# Patient Record
Sex: FEMALE | Race: WHITE | Employment: FULL TIME | URBAN - METROPOLITAN AREA
[De-identification: names, ages, dates, MRNs, and addresses within clinical notes are randomized per-mention and may not be internally consistent; named-entity substitution may affect disease eponyms.]

---

## 2017-01-04 ENCOUNTER — TELEPHONE (OUTPATIENT)
Dept: INTERNAL MEDICINE CLINIC | Age: 57
End: 2017-01-04

## 2017-01-04 ENCOUNTER — ANTI-COAG VISIT (OUTPATIENT)
Dept: INTERNAL MEDICINE CLINIC | Age: 57
End: 2017-01-04

## 2017-01-04 LAB — INR BLD: 2.8

## 2017-01-19 ENCOUNTER — TELEPHONE (OUTPATIENT)
Dept: INTERNAL MEDICINE CLINIC | Age: 57
End: 2017-01-19

## 2017-01-19 ENCOUNTER — ANTI-COAG VISIT (OUTPATIENT)
Dept: INTERNAL MEDICINE CLINIC | Age: 57
End: 2017-01-19

## 2017-01-19 LAB — INR BLD: 2.6

## 2017-01-23 ENCOUNTER — OFFICE VISIT (OUTPATIENT)
Dept: INTERNAL MEDICINE CLINIC | Age: 57
End: 2017-01-23

## 2017-01-23 VITALS
RESPIRATION RATE: 14 BRPM | BODY MASS INDEX: 48.82 KG/M2 | HEART RATE: 80 BPM | SYSTOLIC BLOOD PRESSURE: 130 MMHG | HEIGHT: 65 IN | DIASTOLIC BLOOD PRESSURE: 80 MMHG | WEIGHT: 293 LBS

## 2017-01-23 DIAGNOSIS — K76.0 FATTY INFILTRATION OF LIVER: ICD-10-CM

## 2017-01-23 DIAGNOSIS — I27.82 OTHER CHRONIC PULMONARY EMBOLISM WITHOUT ACUTE COR PULMONALE (HCC): ICD-10-CM

## 2017-01-23 DIAGNOSIS — G47.33 OSA ON CPAP: ICD-10-CM

## 2017-01-23 DIAGNOSIS — Z99.89 OSA ON CPAP: ICD-10-CM

## 2017-01-23 DIAGNOSIS — G25.0 BENIGN HEAD TREMOR: ICD-10-CM

## 2017-01-23 DIAGNOSIS — E11.9 TYPE 2 DIABETES MELLITUS WITHOUT COMPLICATION, WITHOUT LONG-TERM CURRENT USE OF INSULIN (HCC): Primary | ICD-10-CM

## 2017-01-23 DIAGNOSIS — E78.5 HYPERLIPIDEMIA ASSOCIATED WITH TYPE 2 DIABETES MELLITUS (HCC): ICD-10-CM

## 2017-01-23 DIAGNOSIS — E66.01 MORBID OBESITY WITH BMI OF 50.0-59.9, ADULT (HCC): ICD-10-CM

## 2017-01-23 DIAGNOSIS — E11.69 HYPERLIPIDEMIA ASSOCIATED WITH TYPE 2 DIABETES MELLITUS (HCC): ICD-10-CM

## 2017-01-23 PROCEDURE — 81002 URINALYSIS NONAUTO W/O SCOPE: CPT | Performed by: INTERNAL MEDICINE

## 2017-01-23 PROCEDURE — 99214 OFFICE O/P EST MOD 30 MIN: CPT | Performed by: INTERNAL MEDICINE

## 2017-01-23 ASSESSMENT — ENCOUNTER SYMPTOMS
VOMITING: 0
SHORTNESS OF BREATH: 0
NAUSEA: 0
RHINORRHEA: 0
WHEEZING: 0
ABDOMINAL PAIN: 0

## 2017-02-02 ENCOUNTER — ANTI-COAG VISIT (OUTPATIENT)
Dept: INTERNAL MEDICINE CLINIC | Age: 57
End: 2017-02-02

## 2017-02-02 ENCOUNTER — TELEPHONE (OUTPATIENT)
Dept: INTERNAL MEDICINE CLINIC | Age: 57
End: 2017-02-02

## 2017-02-02 LAB — INR BLD: 2.4

## 2017-02-04 ENCOUNTER — HOSPITAL ENCOUNTER (OUTPATIENT)
Dept: OTHER | Age: 57
Discharge: HOME | End: 2017-02-04
Attending: INTERNAL MEDICINE | Admitting: INTERNAL MEDICINE

## 2017-02-04 LAB
A/G RATIO: 1.4 (ref 1.1–2.2)
ALBUMIN SERPL-MCNC: 4.2 G/DL (ref 3.4–5)
ALP BLD-CCNC: 52 U/L (ref 40–129)
ALT SERPL-CCNC: 17 U/L (ref 10–40)
ANION GAP SERPL CALCULATED.3IONS-SCNC: 13 MMOL/L (ref 3–16)
AST SERPL-CCNC: 40 U/L (ref 15–37)
BASOPHILS ABSOLUTE: 0.1 K/UL (ref 0–0.2)
BASOPHILS RELATIVE PERCENT: 1.3 %
BILIRUB SERPL-MCNC: 0.4 MG/DL (ref 0–1)
BUN BLDV-MCNC: 12 MG/DL (ref 7–20)
CALCIUM SERPL-MCNC: 9.3 MG/DL (ref 8.3–10.6)
CHLORIDE BLD-SCNC: 104 MMOL/L (ref 99–110)
CHOLESTEROL, TOTAL: 149 MG/DL (ref 0–199)
CO2: 25 MMOL/L (ref 21–32)
CREAT SERPL-MCNC: 0.6 MG/DL (ref 0.6–1.1)
EOSINOPHILS ABSOLUTE: 0.1 K/UL (ref 0–0.6)
EOSINOPHILS RELATIVE PERCENT: 1.9 %
GFR AFRICAN AMERICAN: >60
GFR NON-AFRICAN AMERICAN: >60
GLOBULIN: 2.9 G/DL
GLUCOSE BLD-MCNC: 140 MG/DL (ref 70–99)
HCT VFR BLD CALC: 40.7 % (ref 36–48)
HDLC SERPL-MCNC: 26 MG/DL (ref 40–60)
HEMOGLOBIN: 13.7 G/DL (ref 12–16)
LDL CHOLESTEROL CALCULATED: ABNORMAL MG/DL
LDL CHOLESTEROL DIRECT: 88 MG/DL
LYMPHOCYTES ABSOLUTE: 1.2 K/UL (ref 1–5.1)
LYMPHOCYTES RELATIVE PERCENT: 24.3 %
MCH RBC QN AUTO: 29.3 PG (ref 26–34)
MCHC RBC AUTO-ENTMCNC: 33.7 G/DL (ref 31–36)
MCV RBC AUTO: 86.9 FL (ref 80–100)
MONOCYTES ABSOLUTE: 0.4 K/UL (ref 0–1.3)
MONOCYTES RELATIVE PERCENT: 8.5 %
NEUTROPHILS ABSOLUTE: 3.2 K/UL (ref 1.7–7.7)
NEUTROPHILS RELATIVE PERCENT: 64 %
PDW BLD-RTO: 14.7 % (ref 12.4–15.4)
PLATELET # BLD: 189 K/UL (ref 135–450)
PMV BLD AUTO: 8 FL (ref 5–10.5)
POTASSIUM SERPL-SCNC: 4.4 MMOL/L (ref 3.5–5.1)
RBC # BLD: 4.69 M/UL (ref 4–5.2)
SODIUM BLD-SCNC: 142 MMOL/L (ref 136–145)
TOTAL PROTEIN: 7.1 G/DL (ref 6.4–8.2)
TRIGL SERPL-MCNC: 365 MG/DL (ref 0–150)
VLDLC SERPL CALC-MCNC: ABNORMAL MG/DL
WBC # BLD: 5.1 K/UL (ref 4–11)

## 2017-02-05 LAB
ESTIMATED AVERAGE GLUCOSE: 148.5 MG/DL
HBA1C MFR BLD: 6.8 %

## 2017-02-06 RX ORDER — BLOOD SUGAR DIAGNOSTIC
STRIP MISCELLANEOUS
Qty: 100 EACH | Refills: 11 | Status: SHIPPED | OUTPATIENT
Start: 2017-02-06 | End: 2018-03-15 | Stop reason: SDUPTHER

## 2017-02-15 ENCOUNTER — TELEPHONE (OUTPATIENT)
Dept: INTERNAL MEDICINE CLINIC | Age: 57
End: 2017-02-15

## 2017-02-16 ENCOUNTER — ANTI-COAG VISIT (OUTPATIENT)
Dept: INTERNAL MEDICINE CLINIC | Age: 57
End: 2017-02-16

## 2017-02-16 LAB — INR BLD: 2.5

## 2017-03-03 ENCOUNTER — ANTI-COAG VISIT (OUTPATIENT)
Dept: INTERNAL MEDICINE CLINIC | Age: 57
End: 2017-03-03

## 2017-03-03 LAB — INR BLD: 2

## 2017-03-15 LAB — INR BLD: 2.6

## 2017-03-16 ENCOUNTER — ANTI-COAG VISIT (OUTPATIENT)
Dept: INTERNAL MEDICINE CLINIC | Age: 57
End: 2017-03-16

## 2017-03-16 RX ORDER — CITALOPRAM 40 MG/1
TABLET ORAL
Qty: 30 TABLET | Refills: 2 | Status: SHIPPED | OUTPATIENT
Start: 2017-03-16 | End: 2017-06-29 | Stop reason: SDUPTHER

## 2017-03-16 RX ORDER — GLIMEPIRIDE 2 MG/1
TABLET ORAL
Qty: 60 TABLET | Refills: 2 | Status: SHIPPED | OUTPATIENT
Start: 2017-03-16 | End: 2017-06-29 | Stop reason: SDUPTHER

## 2017-03-16 RX ORDER — WARFARIN SODIUM 3 MG/1
TABLET ORAL
Qty: 30 TABLET | Refills: 2 | Status: SHIPPED | OUTPATIENT
Start: 2017-03-16 | End: 2017-06-29 | Stop reason: SDUPTHER

## 2017-03-16 RX ORDER — SITAGLIPTIN AND METFORMIN HYDROCHLORIDE 1000; 50 MG/1; MG/1
TABLET, FILM COATED, EXTENDED RELEASE ORAL
Qty: 60 TABLET | Refills: 2 | Status: SHIPPED | OUTPATIENT
Start: 2017-03-16 | End: 2017-06-29 | Stop reason: SDUPTHER

## 2017-03-16 RX ORDER — FENOFIBRIC ACID 135 MG/1
CAPSULE, DELAYED RELEASE ORAL
Qty: 30 CAPSULE | Refills: 2 | Status: SHIPPED | OUTPATIENT
Start: 2017-03-16 | End: 2017-06-29 | Stop reason: SDUPTHER

## 2017-03-16 RX ORDER — TOPIRAMATE 50 MG/1
TABLET, FILM COATED ORAL
Qty: 30 TABLET | Refills: 2 | Status: SHIPPED | OUTPATIENT
Start: 2017-03-16 | End: 2017-06-29 | Stop reason: SDUPTHER

## 2017-03-16 RX ORDER — WARFARIN SODIUM 1 MG/1
TABLET ORAL
Qty: 30 TABLET | Refills: 2 | Status: SHIPPED | OUTPATIENT
Start: 2017-03-16 | End: 2017-06-29 | Stop reason: SDUPTHER

## 2017-03-16 RX ORDER — WARFARIN SODIUM 5 MG/1
TABLET ORAL
Qty: 30 TABLET | Refills: 2 | Status: SHIPPED | OUTPATIENT
Start: 2017-03-16 | End: 2017-06-29 | Stop reason: SDUPTHER

## 2017-03-27 ENCOUNTER — OFFICE VISIT (OUTPATIENT)
Dept: PULMONOLOGY | Age: 57
End: 2017-03-27

## 2017-03-27 VITALS
HEART RATE: 74 BPM | OXYGEN SATURATION: 98 % | RESPIRATION RATE: 20 BRPM | TEMPERATURE: 97.8 F | SYSTOLIC BLOOD PRESSURE: 136 MMHG | BODY MASS INDEX: 48.82 KG/M2 | WEIGHT: 293 LBS | DIASTOLIC BLOOD PRESSURE: 84 MMHG | HEIGHT: 65 IN

## 2017-03-27 DIAGNOSIS — G47.33 OSA ON CPAP: Primary | ICD-10-CM

## 2017-03-27 DIAGNOSIS — E66.01 MORBID OBESITY WITH BMI OF 50.0-59.9, ADULT (HCC): ICD-10-CM

## 2017-03-27 DIAGNOSIS — Z99.89 OSA ON CPAP: Primary | ICD-10-CM

## 2017-03-27 PROCEDURE — 99213 OFFICE O/P EST LOW 20 MIN: CPT | Performed by: NURSE PRACTITIONER

## 2017-03-27 ASSESSMENT — SLEEP AND FATIGUE QUESTIONNAIRES
HOW LIKELY ARE YOU TO NOD OFF OR FALL ASLEEP WHILE WATCHING TV: 1
HOW LIKELY ARE YOU TO NOD OFF OR FALL ASLEEP WHILE LYING DOWN TO REST IN THE AFTERNOON WHEN CIRCUMSTANCES PERMIT: 3
HOW LIKELY ARE YOU TO NOD OFF OR FALL ASLEEP WHILE SITTING AND TALKING TO SOMEONE: 0
HOW LIKELY ARE YOU TO NOD OFF OR FALL ASLEEP IN A CAR, WHILE STOPPED FOR A FEW MINUTES IN TRAFFIC: 0
HOW LIKELY ARE YOU TO NOD OFF OR FALL ASLEEP WHILE SITTING QUIETLY AFTER LUNCH WITHOUT ALCOHOL: 0
NECK CIRCUMFERENCE (INCHES): 18.5
HOW LIKELY ARE YOU TO NOD OFF OR FALL ASLEEP WHILE SITTING AND READING: 1
HOW LIKELY ARE YOU TO NOD OFF OR FALL ASLEEP WHILE SITTING INACTIVE IN A PUBLIC PLACE: 0
ESS TOTAL SCORE: 6
HOW LIKELY ARE YOU TO NOD OFF OR FALL ASLEEP WHEN YOU ARE A PASSENGER IN A CAR FOR AN HOUR WITHOUT A BREAK: 1

## 2017-03-31 ENCOUNTER — ANTI-COAG VISIT (OUTPATIENT)
Dept: INTERNAL MEDICINE CLINIC | Age: 57
End: 2017-03-31

## 2017-03-31 LAB — INR BLD: 2.5

## 2017-04-13 ENCOUNTER — TELEPHONE (OUTPATIENT)
Dept: INTERNAL MEDICINE CLINIC | Age: 57
End: 2017-04-13

## 2017-04-13 ENCOUNTER — ANTI-COAG VISIT (OUTPATIENT)
Dept: INTERNAL MEDICINE CLINIC | Age: 57
End: 2017-04-13

## 2017-04-13 LAB — INR BLD: 2.4

## 2017-04-25 ENCOUNTER — OFFICE VISIT (OUTPATIENT)
Dept: INTERNAL MEDICINE CLINIC | Age: 57
End: 2017-04-25

## 2017-04-25 VITALS
SYSTOLIC BLOOD PRESSURE: 138 MMHG | WEIGHT: 293 LBS | BODY MASS INDEX: 48.82 KG/M2 | RESPIRATION RATE: 14 BRPM | HEIGHT: 65 IN | DIASTOLIC BLOOD PRESSURE: 80 MMHG | HEART RATE: 80 BPM

## 2017-04-25 DIAGNOSIS — E78.5 HYPERLIPIDEMIA ASSOCIATED WITH TYPE 2 DIABETES MELLITUS (HCC): ICD-10-CM

## 2017-04-25 DIAGNOSIS — E66.01 MORBID OBESITY WITH BMI OF 50.0-59.9, ADULT (HCC): ICD-10-CM

## 2017-04-25 DIAGNOSIS — Z99.89 OSA ON CPAP: ICD-10-CM

## 2017-04-25 DIAGNOSIS — K76.0 FATTY INFILTRATION OF LIVER: ICD-10-CM

## 2017-04-25 DIAGNOSIS — K42.9 UMBILICAL HERNIA WITHOUT OBSTRUCTION AND WITHOUT GANGRENE: ICD-10-CM

## 2017-04-25 DIAGNOSIS — G47.33 OSA ON CPAP: ICD-10-CM

## 2017-04-25 DIAGNOSIS — E11.9 TYPE 2 DIABETES MELLITUS WITHOUT COMPLICATION, WITHOUT LONG-TERM CURRENT USE OF INSULIN (HCC): Primary | ICD-10-CM

## 2017-04-25 DIAGNOSIS — E11.69 HYPERLIPIDEMIA ASSOCIATED WITH TYPE 2 DIABETES MELLITUS (HCC): ICD-10-CM

## 2017-04-25 DIAGNOSIS — I27.82 OTHER CHRONIC PULMONARY EMBOLISM WITHOUT ACUTE COR PULMONALE (HCC): ICD-10-CM

## 2017-04-25 LAB
BILIRUBIN, POC: NORMAL
BLOOD URINE, POC: NORMAL
CLARITY, POC: NORMAL
COLOR, POC: NORMAL
CREATININE URINE: 106.6 MG/DL (ref 28–259)
GLUCOSE URINE, POC: NORMAL
KETONES, POC: NORMAL
LEUKOCYTE EST, POC: NORMAL
MICROALBUMIN UR-MCNC: 1.6 MG/DL
MICROALBUMIN/CREAT UR-RTO: 15 MG/G (ref 0–30)
NITRITE, POC: NORMAL
PH, POC: NORMAL
PROTEIN, POC: NORMAL
SPECIFIC GRAVITY, POC: NORMAL
UROBILINOGEN, POC: NORMAL

## 2017-04-25 PROCEDURE — 81002 URINALYSIS NONAUTO W/O SCOPE: CPT | Performed by: INTERNAL MEDICINE

## 2017-04-25 PROCEDURE — 99214 OFFICE O/P EST MOD 30 MIN: CPT | Performed by: INTERNAL MEDICINE

## 2017-04-25 ASSESSMENT — ENCOUNTER SYMPTOMS
WHEEZING: 0
ABDOMINAL PAIN: 0
NAUSEA: 0
RHINORRHEA: 0
VOMITING: 0
SHORTNESS OF BREATH: 0

## 2017-05-01 ENCOUNTER — TELEPHONE (OUTPATIENT)
Dept: INTERNAL MEDICINE CLINIC | Age: 57
End: 2017-05-01

## 2017-05-04 LAB — INR BLD: 2.2

## 2017-05-12 ENCOUNTER — ANTI-COAG VISIT (OUTPATIENT)
Dept: INTERNAL MEDICINE CLINIC | Age: 57
End: 2017-05-12

## 2017-05-12 ENCOUNTER — TELEPHONE (OUTPATIENT)
Dept: INTERNAL MEDICINE CLINIC | Age: 57
End: 2017-05-12

## 2017-05-22 LAB — INR BLD: 2

## 2017-05-23 ENCOUNTER — TELEPHONE (OUTPATIENT)
Dept: INTERNAL MEDICINE CLINIC | Age: 57
End: 2017-05-23

## 2017-05-23 ENCOUNTER — ANTI-COAG VISIT (OUTPATIENT)
Dept: INTERNAL MEDICINE CLINIC | Age: 57
End: 2017-05-23

## 2017-06-09 ENCOUNTER — ANTI-COAG VISIT (OUTPATIENT)
Dept: INTERNAL MEDICINE CLINIC | Age: 57
End: 2017-06-09

## 2017-06-09 LAB — INR BLD: 2.4

## 2017-06-22 ENCOUNTER — ANTI-COAG VISIT (OUTPATIENT)
Dept: INTERNAL MEDICINE CLINIC | Age: 57
End: 2017-06-22

## 2017-06-22 LAB — INR BLD: 2

## 2017-06-29 RX ORDER — FENOFIBRIC ACID 135 MG/1
CAPSULE, DELAYED RELEASE ORAL
Qty: 30 CAPSULE | Refills: 2 | Status: SHIPPED | OUTPATIENT
Start: 2017-06-29 | End: 2017-09-26 | Stop reason: SDUPTHER

## 2017-06-29 RX ORDER — GLIMEPIRIDE 2 MG/1
TABLET ORAL
Qty: 60 TABLET | Refills: 2 | Status: SHIPPED | OUTPATIENT
Start: 2017-06-29 | End: 2017-09-26 | Stop reason: SDUPTHER

## 2017-06-29 RX ORDER — WARFARIN SODIUM 5 MG/1
TABLET ORAL
Qty: 30 TABLET | Refills: 2 | Status: SHIPPED | OUTPATIENT
Start: 2017-06-29 | End: 2017-09-26 | Stop reason: SDUPTHER

## 2017-06-29 RX ORDER — CITALOPRAM 40 MG/1
TABLET ORAL
Qty: 30 TABLET | Refills: 2 | Status: SHIPPED | OUTPATIENT
Start: 2017-06-29 | End: 2017-09-26 | Stop reason: SDUPTHER

## 2017-06-29 RX ORDER — TOPIRAMATE 50 MG/1
TABLET, FILM COATED ORAL
Qty: 30 TABLET | Refills: 2 | Status: SHIPPED | OUTPATIENT
Start: 2017-06-29 | End: 2017-09-26 | Stop reason: SDUPTHER

## 2017-06-29 RX ORDER — WARFARIN SODIUM 3 MG/1
TABLET ORAL
Qty: 30 TABLET | Refills: 2 | Status: SHIPPED | OUTPATIENT
Start: 2017-06-29 | End: 2017-09-26 | Stop reason: SDUPTHER

## 2017-06-29 RX ORDER — SITAGLIPTIN AND METFORMIN HYDROCHLORIDE 1000; 50 MG/1; MG/1
TABLET, FILM COATED, EXTENDED RELEASE ORAL
Qty: 60 TABLET | Refills: 2 | Status: SHIPPED | OUTPATIENT
Start: 2017-06-29 | End: 2017-09-26 | Stop reason: SDUPTHER

## 2017-06-29 RX ORDER — WARFARIN SODIUM 1 MG/1
TABLET ORAL
Qty: 30 TABLET | Refills: 2 | Status: SHIPPED | OUTPATIENT
Start: 2017-06-29 | End: 2017-09-26 | Stop reason: SDUPTHER

## 2017-07-05 ENCOUNTER — ANTI-COAG VISIT (OUTPATIENT)
Dept: INTERNAL MEDICINE CLINIC | Age: 57
End: 2017-07-05

## 2017-07-05 LAB — INR BLD: 2.3

## 2017-07-20 ENCOUNTER — ANTI-COAG VISIT (OUTPATIENT)
Dept: INTERNAL MEDICINE CLINIC | Age: 57
End: 2017-07-20

## 2017-07-20 ENCOUNTER — TELEPHONE (OUTPATIENT)
Dept: INTERNAL MEDICINE CLINIC | Age: 57
End: 2017-07-20

## 2017-07-20 LAB — INR BLD: 2.6

## 2017-07-27 ENCOUNTER — OFFICE VISIT (OUTPATIENT)
Dept: INTERNAL MEDICINE CLINIC | Age: 57
End: 2017-07-27

## 2017-07-27 VITALS
HEART RATE: 70 BPM | BODY MASS INDEX: 48.82 KG/M2 | HEIGHT: 65 IN | WEIGHT: 293 LBS | SYSTOLIC BLOOD PRESSURE: 138 MMHG | RESPIRATION RATE: 14 BRPM | DIASTOLIC BLOOD PRESSURE: 80 MMHG

## 2017-07-27 DIAGNOSIS — Z11.59 NEED FOR HEPATITIS C SCREENING TEST: ICD-10-CM

## 2017-07-27 DIAGNOSIS — Z11.4 SCREENING FOR HIV WITHOUT PRESENCE OF RISK FACTORS: ICD-10-CM

## 2017-07-27 DIAGNOSIS — E11.9 TYPE 2 DIABETES MELLITUS WITHOUT COMPLICATION, WITHOUT LONG-TERM CURRENT USE OF INSULIN (HCC): ICD-10-CM

## 2017-07-27 DIAGNOSIS — G25.0 BENIGN HEAD TREMOR: ICD-10-CM

## 2017-07-27 DIAGNOSIS — E11.9 TYPE 2 DIABETES MELLITUS WITHOUT COMPLICATION, WITHOUT LONG-TERM CURRENT USE OF INSULIN (HCC): Primary | ICD-10-CM

## 2017-07-27 DIAGNOSIS — K76.0 FATTY INFILTRATION OF LIVER: ICD-10-CM

## 2017-07-27 DIAGNOSIS — E78.5 HYPERLIPIDEMIA ASSOCIATED WITH TYPE 2 DIABETES MELLITUS (HCC): ICD-10-CM

## 2017-07-27 DIAGNOSIS — I27.82 OTHER CHRONIC PULMONARY EMBOLISM WITHOUT ACUTE COR PULMONALE (HCC): ICD-10-CM

## 2017-07-27 DIAGNOSIS — E66.01 MORBID OBESITY WITH BMI OF 50.0-59.9, ADULT (HCC): ICD-10-CM

## 2017-07-27 DIAGNOSIS — G47.33 OSA ON CPAP: ICD-10-CM

## 2017-07-27 DIAGNOSIS — Z99.89 OSA ON CPAP: ICD-10-CM

## 2017-07-27 DIAGNOSIS — E11.69 HYPERLIPIDEMIA ASSOCIATED WITH TYPE 2 DIABETES MELLITUS (HCC): ICD-10-CM

## 2017-07-27 LAB — HEPATITIS C ANTIBODY INTERPRETATION: NORMAL

## 2017-07-27 PROCEDURE — 99214 OFFICE O/P EST MOD 30 MIN: CPT | Performed by: INTERNAL MEDICINE

## 2017-07-27 ASSESSMENT — ENCOUNTER SYMPTOMS
NAUSEA: 0
VOMITING: 0
RHINORRHEA: 0
ABDOMINAL PAIN: 0
WHEEZING: 0
SHORTNESS OF BREATH: 0

## 2017-07-27 ASSESSMENT — PATIENT HEALTH QUESTIONNAIRE - PHQ9
2. FEELING DOWN, DEPRESSED OR HOPELESS: 0
1. LITTLE INTEREST OR PLEASURE IN DOING THINGS: 0
SUM OF ALL RESPONSES TO PHQ QUESTIONS 1-9: 0
SUM OF ALL RESPONSES TO PHQ9 QUESTIONS 1 & 2: 0

## 2017-07-28 LAB
ESTIMATED AVERAGE GLUCOSE: 151.3 MG/DL
HBA1C MFR BLD: 6.9 %
HIV-1 AND HIV-2 ANTIBODIES: NORMAL

## 2017-08-02 ENCOUNTER — TELEPHONE (OUTPATIENT)
Dept: INTERNAL MEDICINE CLINIC | Age: 57
End: 2017-08-02

## 2017-08-02 ENCOUNTER — ANTI-COAG VISIT (OUTPATIENT)
Dept: INTERNAL MEDICINE CLINIC | Age: 57
End: 2017-08-02

## 2017-08-02 LAB — INR BLD: 1.8

## 2017-08-17 ENCOUNTER — ANTI-COAG VISIT (OUTPATIENT)
Dept: INTERNAL MEDICINE CLINIC | Age: 57
End: 2017-08-17

## 2017-08-17 LAB — INR BLD: 1.5

## 2017-08-24 ENCOUNTER — ANTI-COAG VISIT (OUTPATIENT)
Dept: INTERNAL MEDICINE CLINIC | Age: 57
End: 2017-08-24

## 2017-08-24 ENCOUNTER — TELEPHONE (OUTPATIENT)
Dept: INTERNAL MEDICINE CLINIC | Age: 57
End: 2017-08-24

## 2017-08-24 LAB — INR BLD: 2.3

## 2017-08-30 ENCOUNTER — HOSPITAL ENCOUNTER (OUTPATIENT)
Dept: MAMMOGRAPHY | Age: 57
Discharge: HOME | End: 2017-08-30
Attending: INTERNAL MEDICINE | Admitting: INTERNAL MEDICINE

## 2017-08-30 DIAGNOSIS — Z12.31 VISIT FOR SCREENING MAMMOGRAM: ICD-10-CM

## 2017-09-07 ENCOUNTER — ANTI-COAG VISIT (OUTPATIENT)
Dept: INTERNAL MEDICINE CLINIC | Age: 57
End: 2017-09-07

## 2017-09-07 ENCOUNTER — TELEPHONE (OUTPATIENT)
Dept: INTERNAL MEDICINE CLINIC | Age: 57
End: 2017-09-07

## 2017-09-07 LAB — INR BLD: 1.9

## 2017-09-13 ENCOUNTER — ANTI-COAG VISIT (OUTPATIENT)
Dept: INTERNAL MEDICINE CLINIC | Age: 57
End: 2017-09-13

## 2017-09-13 LAB — INR BLD: 2.5

## 2017-09-20 ENCOUNTER — ANTI-COAG VISIT (OUTPATIENT)
Dept: INTERNAL MEDICINE CLINIC | Age: 57
End: 2017-09-20

## 2017-09-20 LAB — INR BLD: 2.9

## 2017-09-26 RX ORDER — GLIMEPIRIDE 2 MG/1
TABLET ORAL
Qty: 60 TABLET | Refills: 0 | Status: SHIPPED | OUTPATIENT
Start: 2017-09-26 | End: 2017-10-23 | Stop reason: SDUPTHER

## 2017-09-26 RX ORDER — WARFARIN SODIUM 3 MG/1
TABLET ORAL
Qty: 30 TABLET | Refills: 0 | Status: SHIPPED | OUTPATIENT
Start: 2017-09-26 | End: 2018-03-09 | Stop reason: ALTCHOICE

## 2017-09-26 RX ORDER — SITAGLIPTIN AND METFORMIN HYDROCHLORIDE 1000; 50 MG/1; MG/1
TABLET, FILM COATED, EXTENDED RELEASE ORAL
Qty: 60 TABLET | Refills: 0 | Status: SHIPPED | OUTPATIENT
Start: 2017-09-26 | End: 2017-10-23 | Stop reason: SDUPTHER

## 2017-09-26 RX ORDER — TOPIRAMATE 50 MG/1
TABLET, FILM COATED ORAL
Qty: 30 TABLET | Refills: 0 | Status: SHIPPED | OUTPATIENT
Start: 2017-09-26 | End: 2017-10-23 | Stop reason: SDUPTHER

## 2017-09-26 RX ORDER — FENOFIBRIC ACID 135 MG/1
CAPSULE, DELAYED RELEASE ORAL
Qty: 30 CAPSULE | Refills: 0 | Status: SHIPPED | OUTPATIENT
Start: 2017-09-26 | End: 2017-10-23 | Stop reason: SDUPTHER

## 2017-09-26 RX ORDER — WARFARIN SODIUM 1 MG/1
TABLET ORAL
Qty: 30 TABLET | Refills: 0 | Status: SHIPPED | OUTPATIENT
Start: 2017-09-26 | End: 2018-03-09 | Stop reason: ALTCHOICE

## 2017-09-26 RX ORDER — WARFARIN SODIUM 5 MG/1
TABLET ORAL
Qty: 30 TABLET | Refills: 0 | Status: SHIPPED | OUTPATIENT
Start: 2017-09-26 | End: 2017-10-09 | Stop reason: SDUPTHER

## 2017-09-26 RX ORDER — CITALOPRAM 40 MG/1
TABLET ORAL
Qty: 30 TABLET | Refills: 0 | Status: SHIPPED | OUTPATIENT
Start: 2017-09-26 | End: 2017-10-23 | Stop reason: SDUPTHER

## 2017-10-05 ENCOUNTER — ANTI-COAG VISIT (OUTPATIENT)
Dept: INTERNAL MEDICINE CLINIC | Age: 57
End: 2017-10-05

## 2017-10-05 LAB — INR BLD: 2.8

## 2017-10-09 RX ORDER — WARFARIN SODIUM 5 MG/1
TABLET ORAL
Qty: 180 TABLET | Refills: 0 | Status: SHIPPED | OUTPATIENT
Start: 2017-10-09 | End: 2017-11-27 | Stop reason: SDUPTHER

## 2017-10-19 ENCOUNTER — TELEPHONE (OUTPATIENT)
Dept: INTERNAL MEDICINE CLINIC | Age: 57
End: 2017-10-19

## 2017-10-19 ENCOUNTER — ANTI-COAG VISIT (OUTPATIENT)
Dept: INTERNAL MEDICINE CLINIC | Age: 57
End: 2017-10-19

## 2017-10-19 LAB — INR BLD: 2.3

## 2017-10-19 NOTE — TELEPHONE ENCOUNTER
----- Message from Dedra Simmonds, MD sent at 10/19/2017 12:42 PM EDT -----  Contact: [phone]  Same.  Repeat 2 weeks   ----- Message -----  From: Kaila Bernal  Sent: 10/19/2017  12:36 PM  To: Dedra Simmonds, MD Dr H patient  INR: 2.30

## 2017-10-23 RX ORDER — SITAGLIPTIN AND METFORMIN HYDROCHLORIDE 1000; 50 MG/1; MG/1
TABLET, FILM COATED, EXTENDED RELEASE ORAL
Qty: 60 TABLET | Refills: 0 | Status: SHIPPED | OUTPATIENT
Start: 2017-10-23 | End: 2017-11-27 | Stop reason: SDUPTHER

## 2017-10-23 RX ORDER — TOPIRAMATE 50 MG/1
TABLET, FILM COATED ORAL
Qty: 30 TABLET | Refills: 0 | Status: SHIPPED | OUTPATIENT
Start: 2017-10-23 | End: 2017-11-27 | Stop reason: SDUPTHER

## 2017-10-23 RX ORDER — FENOFIBRIC ACID 135 MG/1
CAPSULE, DELAYED RELEASE ORAL
Qty: 30 CAPSULE | Refills: 0 | Status: SHIPPED | OUTPATIENT
Start: 2017-10-23 | End: 2017-11-27 | Stop reason: SDUPTHER

## 2017-10-23 RX ORDER — CITALOPRAM 40 MG/1
TABLET ORAL
Qty: 30 TABLET | Refills: 0 | Status: SHIPPED | OUTPATIENT
Start: 2017-10-23 | End: 2017-11-27 | Stop reason: SDUPTHER

## 2017-10-23 RX ORDER — GLIMEPIRIDE 2 MG/1
TABLET ORAL
Qty: 60 TABLET | Refills: 0 | Status: SHIPPED | OUTPATIENT
Start: 2017-10-23 | End: 2017-11-27 | Stop reason: SDUPTHER

## 2017-11-02 ENCOUNTER — TELEPHONE (OUTPATIENT)
Dept: INTERNAL MEDICINE CLINIC | Age: 57
End: 2017-11-02

## 2017-11-02 ENCOUNTER — ANTI-COAG VISIT (OUTPATIENT)
Dept: INTERNAL MEDICINE CLINIC | Age: 57
End: 2017-11-02

## 2017-11-02 LAB — INR BLD: 2.6

## 2017-11-14 ENCOUNTER — OFFICE VISIT (OUTPATIENT)
Dept: INTERNAL MEDICINE CLINIC | Age: 57
End: 2017-11-14

## 2017-11-14 VITALS
BODY MASS INDEX: 48.82 KG/M2 | SYSTOLIC BLOOD PRESSURE: 160 MMHG | HEIGHT: 65 IN | WEIGHT: 293 LBS | RESPIRATION RATE: 14 BRPM | DIASTOLIC BLOOD PRESSURE: 90 MMHG | HEART RATE: 80 BPM

## 2017-11-14 DIAGNOSIS — G47.33 OSA ON CPAP: ICD-10-CM

## 2017-11-14 DIAGNOSIS — Z00.00 ROUTINE GENERAL MEDICAL EXAMINATION AT A HEALTH CARE FACILITY: Primary | ICD-10-CM

## 2017-11-14 DIAGNOSIS — G25.0 BENIGN HEAD TREMOR: ICD-10-CM

## 2017-11-14 DIAGNOSIS — E66.01 MORBID OBESITY WITH BMI OF 50.0-59.9, ADULT (HCC): ICD-10-CM

## 2017-11-14 DIAGNOSIS — K76.0 FATTY INFILTRATION OF LIVER: ICD-10-CM

## 2017-11-14 DIAGNOSIS — E11.9 TYPE 2 DIABETES MELLITUS WITHOUT COMPLICATION, WITHOUT LONG-TERM CURRENT USE OF INSULIN (HCC): ICD-10-CM

## 2017-11-14 DIAGNOSIS — E78.5 HYPERLIPIDEMIA ASSOCIATED WITH TYPE 2 DIABETES MELLITUS (HCC): ICD-10-CM

## 2017-11-14 DIAGNOSIS — E11.69 HYPERLIPIDEMIA ASSOCIATED WITH TYPE 2 DIABETES MELLITUS (HCC): ICD-10-CM

## 2017-11-14 DIAGNOSIS — Z99.89 OSA ON CPAP: ICD-10-CM

## 2017-11-14 DIAGNOSIS — I27.82 OTHER CHRONIC PULMONARY EMBOLISM WITHOUT ACUTE COR PULMONALE (HCC): ICD-10-CM

## 2017-11-14 DIAGNOSIS — I10 BENIGN ESSENTIAL HYPERTENSION: ICD-10-CM

## 2017-11-14 LAB
A/G RATIO: 1.4 (ref 1.1–2.2)
ALBUMIN SERPL-MCNC: 4 G/DL (ref 3.4–5)
ALP BLD-CCNC: 73 U/L (ref 40–129)
ALT SERPL-CCNC: 14 U/L (ref 10–40)
ANION GAP SERPL CALCULATED.3IONS-SCNC: 14 MMOL/L (ref 3–16)
AST SERPL-CCNC: 30 U/L (ref 15–37)
BASOPHILS ABSOLUTE: 0.1 K/UL (ref 0–0.2)
BASOPHILS RELATIVE PERCENT: 1.2 %
BILIRUB SERPL-MCNC: 0.3 MG/DL (ref 0–1)
BUN BLDV-MCNC: 19 MG/DL (ref 7–20)
CALCIUM SERPL-MCNC: 9.6 MG/DL (ref 8.3–10.6)
CHLORIDE BLD-SCNC: 102 MMOL/L (ref 99–110)
CO2: 22 MMOL/L (ref 21–32)
CREAT SERPL-MCNC: 0.7 MG/DL (ref 0.6–1.1)
EOSINOPHILS ABSOLUTE: 0.1 K/UL (ref 0–0.6)
EOSINOPHILS RELATIVE PERCENT: 2.2 %
GFR AFRICAN AMERICAN: >60
GFR NON-AFRICAN AMERICAN: >60
GLOBULIN: 2.8 G/DL
GLUCOSE BLD-MCNC: 180 MG/DL (ref 70–99)
HCT VFR BLD CALC: 41.2 % (ref 36–48)
HEMOGLOBIN: 13.7 G/DL (ref 12–16)
LYMPHOCYTES ABSOLUTE: 1.3 K/UL (ref 1–5.1)
LYMPHOCYTES RELATIVE PERCENT: 23.1 %
MCH RBC QN AUTO: 29.8 PG (ref 26–34)
MCHC RBC AUTO-ENTMCNC: 33.2 G/DL (ref 31–36)
MCV RBC AUTO: 89.9 FL (ref 80–100)
MONOCYTES ABSOLUTE: 0.5 K/UL (ref 0–1.3)
MONOCYTES RELATIVE PERCENT: 8.9 %
NEUTROPHILS ABSOLUTE: 3.7 K/UL (ref 1.7–7.7)
NEUTROPHILS RELATIVE PERCENT: 64.6 %
PDW BLD-RTO: 14.7 % (ref 12.4–15.4)
PLATELET # BLD: 220 K/UL (ref 135–450)
PMV BLD AUTO: 9.4 FL (ref 5–10.5)
POTASSIUM SERPL-SCNC: 4.4 MMOL/L (ref 3.5–5.1)
RBC # BLD: 4.58 M/UL (ref 4–5.2)
SODIUM BLD-SCNC: 138 MMOL/L (ref 136–145)
TOTAL PROTEIN: 6.8 G/DL (ref 6.4–8.2)
WBC # BLD: 5.7 K/UL (ref 4–11)

## 2017-11-14 PROCEDURE — 99396 PREV VISIT EST AGE 40-64: CPT | Performed by: INTERNAL MEDICINE

## 2017-11-14 ASSESSMENT — ENCOUNTER SYMPTOMS
RHINORRHEA: 0
VOMITING: 0
ABDOMINAL PAIN: 0
WHEEZING: 0
SHORTNESS OF BREATH: 0
NAUSEA: 0

## 2017-11-14 NOTE — PATIENT INSTRUCTIONS
areas in the floor. · Move furniture and electrical cords to keep them out of walking paths. · Use nonskid floor wax, and wipe up spills right away, especially on ceramic tile floors. · If you use a walker or cane, put rubber tips on it. If you use crutches, clean the bottoms of them regularly with an abrasive pad, such as steel wool. · Keep your house well lit, especially [place], and outside walkways. Use night-lights in areas such as hallways and bathrooms. Add extra light switches or use remote switches (such as switches that go on or off when you clap your hands) to make it easier to turn lights on if you have to get up during the night. · Install sturdy handrails on stairways. Put grab bars near your shower, bathtub, and toilet. · Store household items on low shelves so that you do not have to climb or reach high. Or use a reaching device that you can get at a medical supply store. If you have to climb for something, use a step stool with handrails, or ask someone to get it for you. · Keep a cordless phone and a flashlight with new batteries by your bed. If possible, put a phone in each of the main rooms of your house, or carry a cell phone in case you fall and cannot reach a phone. Or you can wear a device around your neck or wrist. You push a button that sends a signal for help. · Wear low-heeled shoes that fit well and give your feet good support. Use footwear with nonskid soles. Check the heels and soles of your shoes for wear. Repair or replace worn heels or soles. · Do not wear socks without shoes on wood floors. · Walk on the grass when the sidewalks are slippery. If you live in an area that gets snow and ice in the winter, sprinkle salt on slippery steps and sidewalks. Where can you learn more? Go to [email or web address] and sign in to your [employer] account.  Enter J557 in the [facility] box to learn more about \"Diabetes and Preventing Falls: Care Instructions. \"     If you do not have an account, please click on the \"Sign Up Now\" link. Current as of: August 4, 2016  Content Version: 11.3  © 5675-4470 [employer]. Care instructions adapted under license by [facility] ([facility]). If you have questions about a medical condition or this instruction, always ask your healthcare professional. [address] any warranty or liability for your use of this information.

## 2017-11-14 NOTE — PROGRESS NOTES
oriented to person, place, and time. She appears well-developed. Morbidly obese   HENT:   Head: Normocephalic. Eyes: Conjunctivae and EOM are normal. Pupils are equal, round, and reactive to light. Neck: Trachea normal and normal range of motion. Neck supple. No JVD present. Carotid bruit is not present. No thyroid mass and no thyromegaly present. Cardiovascular: Normal rate, regular rhythm and normal heart sounds. Exam reveals no gallop. No murmur heard. Pulmonary/Chest: Effort normal and breath sounds normal. No respiratory distress. She has no wheezes. She has no rales. Abdominal: Soft. Bowel sounds are normal. She exhibits no distension. There is no splenomegaly or hepatomegaly. There is no tenderness. Musculoskeletal: She exhibits no edema. Lymphadenopathy:     She has no cervical adenopathy. Neurological: She is alert and oriented to person, place, and time. Skin: Skin is warm and dry. No rash noted. Psychiatric: She has a normal mood and affect. Her behavior is normal. Judgment and thought content normal.       Assessment:      1. Routine general medical examination at a health care facility     2. Type 2 diabetes mellitus without complication, without long-term current use of insulin (HCC)  Comprehensive Metabolic Panel    CBC Auto Differential    Hemoglobin A1C   3. Other chronic pulmonary embolism without acute cor pulmonale (HCC)     4. Benign head tremor     5. Hyperlipidemia associated with type 2 diabetes mellitus ([facility] [address]     6. Morbid obesity with BMI of 50.0-59.9, adult ([facility] [address]     7. ELEAZAR on CPAP     8. Fatty infiltration of liver     9. Benign essential hypertension            Plan:      Decrease calorie intake. Check labs. DM type 2: check labs. Continue oral medication. HTN new onset. I will start Lisinopril 10 mg po daily. PE: On home monitoring system. Head tremor is controlled well. Fatty liver: she is trying to lose weight. Depression: on Celexa. ELEAZAR: on CPAP. She is using it for over 8 hours a night. Hypertriglyceridemia. Stable. Bennett received counseling on the following healthy behaviors: nutrition and exercise  Reviewed prior labs and health maintenance  Continue current medications, diet and exercise. Discussed use, benefit, and side effects of prescribed medications. Barriers to medication compliance addressed. Patient given educational materials - see patient instructions  Was a self-tracking handout given in paper form or via [employer]? Yes    Requested Prescriptions      No prescriptions requested or ordered in this encounter       All patient questions answered. Patient voiced understanding. Quality Measures    Body mass index is 54.91 kg/m². Elevated. Weight control planned discussed conventional weight loss and Healthy diet and regular exercise. BP: (!) 160/90 (right wrist) Blood pressure is high. Treatment plan consists of Antihypertensive Medication Started and No treatment change needed. Lab Results   Component Value Date    LDLCALC see below 02/04/2017    LDLDIRECT 88 02/04/2017    (goal LDL reduction with dx if diabetes is 50% LDL reduction)      PHQ Scores 7/27/2017 7/25/2016   PHQ2 Score 0 5   PHQ9 Score 0 13     Interpretation of Total Score Depression Severity: 1-4 = Minimal depression, 5-9 = Mild depression, 10-14 = Moderate depression, 15-19 = Moderately severe depression, 20-27 = Severe depression         Discussed use, benefit, and side effects of prescribed medications. Barriers to medication compliance addressed. All patient questions answered. Pt voiced understanding. Exercise,weight loss recommended. The current medical regimen is effective;  continue present plan and medications. See orders.

## 2017-11-15 ENCOUNTER — ANTI-COAG VISIT (OUTPATIENT)
Dept: INTERNAL MEDICINE CLINIC | Age: 57
End: 2017-11-15

## 2017-11-15 ENCOUNTER — TELEPHONE (OUTPATIENT)
Dept: INTERNAL MEDICINE CLINIC | Age: 57
End: 2017-11-15

## 2017-11-15 LAB
ESTIMATED AVERAGE GLUCOSE: 157.1 MG/DL
HBA1C MFR BLD: 7.1 %
INR BLD: 2.7

## 2017-11-16 ENCOUNTER — TELEPHONE (OUTPATIENT)
Dept: INTERNAL MEDICINE CLINIC | Age: 57
End: 2017-11-16

## 2017-11-16 RX ORDER — LISINOPRIL 10 MG/1
10 TABLET ORAL DAILY
Qty: 30 TABLET | Refills: 1 | Status: SHIPPED | OUTPATIENT
Start: 2017-11-16 | End: 2017-12-15 | Stop reason: SDUPTHER

## 2017-11-16 NOTE — TELEPHONE ENCOUNTER
----- Message from Dwayne Octaviano sent at 11/16/2017  9:46 AM EST -----  Contact: pt [phone]      ----- Message -----  From: Mt Chaudhary MD  Sent: 11/16/2017   7:13 AM  To: Samy Cooneym    Sent it in   [facility] 3-4 weeks  ----- Message -----  From: Ericka Js  Sent: 11/15/2017   3:08 PM  To: Mt Chaudhary MD    Pt said that you were going to put her on BP medicine and she called the pharmacy and they didn't have anything.    She didn't know if you were still going to do it or not.     [address]     Last visit: 11-14-17  Future visit:12-15-17

## 2017-11-16 NOTE — TELEPHONE ENCOUNTER
----- Message from Delvin Rutledge sent at 11/16/2017  9:46 AM EST -----  Contact: pt [phone]      ----- Message -----  From: Jazmín Mattson MD  Sent: 11/16/2017   7:13 AM  To: Neha Del Valle    Sent it in   [facility] 3-4 weeks  ----- Message -----  From: Reid Blackburn  Sent: 11/15/2017   3:08 PM  To: Jazmín Mattson MD    Pt said that you were going to put her on BP medicine and she called the pharmacy and they didn't have anything.    She didn't know if you were still going to do it or not.     [address]     Last visit: 11-14-17  Future visit:12-15-17

## 2017-11-27 RX ORDER — TOPIRAMATE 50 MG/1
TABLET, FILM COATED ORAL
Qty: 30 TABLET | Refills: 0 | Status: SHIPPED | OUTPATIENT
Start: 2017-11-27 | End: 2017-12-15 | Stop reason: SDUPTHER

## 2017-11-27 RX ORDER — GLIMEPIRIDE 2 MG/1
TABLET ORAL
Qty: 60 TABLET | Refills: 0 | Status: SHIPPED | OUTPATIENT
Start: 2017-11-27 | End: 2017-12-15 | Stop reason: SDUPTHER

## 2017-11-27 RX ORDER — CITALOPRAM 40 MG/1
TABLET ORAL
Qty: 30 TABLET | Refills: 0 | Status: SHIPPED | OUTPATIENT
Start: 2017-11-27 | End: 2017-12-07 | Stop reason: SDUPTHER

## 2017-11-27 RX ORDER — WARFARIN SODIUM 5 MG/1
TABLET ORAL
Qty: 180 TABLET | Refills: 0 | Status: SHIPPED | OUTPATIENT
Start: 2017-11-27 | End: 2017-12-15 | Stop reason: SDUPTHER

## 2017-11-27 RX ORDER — SITAGLIPTIN AND METFORMIN HYDROCHLORIDE 1000; 50 MG/1; MG/1
TABLET, FILM COATED, EXTENDED RELEASE ORAL
Qty: 60 TABLET | Refills: 0 | Status: SHIPPED | OUTPATIENT
Start: 2017-11-27 | End: 2017-12-15 | Stop reason: SDUPTHER

## 2017-11-27 RX ORDER — FENOFIBRIC ACID 135 MG/1
CAPSULE, DELAYED RELEASE ORAL
Qty: 30 CAPSULE | Refills: 0 | Status: SHIPPED | OUTPATIENT
Start: 2017-11-27 | End: 2017-12-15 | Stop reason: SDUPTHER

## 2017-11-30 ENCOUNTER — TELEPHONE (OUTPATIENT)
Dept: INTERNAL MEDICINE CLINIC | Age: 57
End: 2017-11-30

## 2017-11-30 ENCOUNTER — ANTI-COAG VISIT (OUTPATIENT)
Dept: INTERNAL MEDICINE CLINIC | Age: 57
End: 2017-11-30

## 2017-11-30 LAB — INR BLD: 2.2

## 2017-11-30 NOTE — TELEPHONE ENCOUNTER
----- Message from Mannie Thomas MD sent at 11/30/2017  3:20 PM EST -----  Contact: [phone]  Same.    Repeat 2 weeks   ----- Message -----  From: Percell Soulier  Sent: 11/30/2017   3:13 PM  To: Mannie Thomas MD    INR 2.2  Unable to reach pt for dose

## 2017-12-07 RX ORDER — CITALOPRAM 40 MG/1
TABLET ORAL
Qty: 30 TABLET | Refills: 0 | Status: SHIPPED | OUTPATIENT
Start: 2017-12-07 | End: 2017-12-15 | Stop reason: SDUPTHER

## 2017-12-14 ENCOUNTER — ANTI-COAG VISIT (OUTPATIENT)
Dept: INTERNAL MEDICINE CLINIC | Age: 57
End: 2017-12-14

## 2017-12-14 LAB
INR BLD: 2.1
INR BLD: 2.6

## 2017-12-15 ENCOUNTER — OFFICE VISIT (OUTPATIENT)
Dept: INTERNAL MEDICINE CLINIC | Age: 57
End: 2017-12-15

## 2017-12-15 VITALS
DIASTOLIC BLOOD PRESSURE: 80 MMHG | HEART RATE: 80 BPM | SYSTOLIC BLOOD PRESSURE: 130 MMHG | WEIGHT: 293 LBS | HEIGHT: 65 IN | RESPIRATION RATE: 14 BRPM | BODY MASS INDEX: 48.82 KG/M2

## 2017-12-15 DIAGNOSIS — I10 BENIGN ESSENTIAL HYPERTENSION: ICD-10-CM

## 2017-12-15 DIAGNOSIS — I10 BENIGN ESSENTIAL HYPERTENSION: Primary | ICD-10-CM

## 2017-12-15 LAB
ANION GAP SERPL CALCULATED.3IONS-SCNC: 16 MMOL/L (ref 3–16)
BUN BLDV-MCNC: 18 MG/DL (ref 7–20)
CALCIUM SERPL-MCNC: 9.6 MG/DL (ref 8.3–10.6)
CHLORIDE BLD-SCNC: 99 MMOL/L (ref 99–110)
CO2: 22 MMOL/L (ref 21–32)
CREAT SERPL-MCNC: 0.8 MG/DL (ref 0.6–1.1)
GFR AFRICAN AMERICAN: >60
GFR NON-AFRICAN AMERICAN: >60
GLUCOSE BLD-MCNC: 131 MG/DL (ref 70–99)
POTASSIUM SERPL-SCNC: 4.2 MMOL/L (ref 3.5–5.1)
SODIUM BLD-SCNC: 137 MMOL/L (ref 136–145)

## 2017-12-15 PROCEDURE — 99213 OFFICE O/P EST LOW 20 MIN: CPT | Performed by: INTERNAL MEDICINE

## 2017-12-15 RX ORDER — FENOFIBRIC ACID 135 MG/1
CAPSULE, DELAYED RELEASE ORAL
Qty: 90 CAPSULE | Refills: 1 | Status: SHIPPED | OUTPATIENT
Start: 2017-12-15 | End: 2018-04-24 | Stop reason: SDUPTHER

## 2017-12-15 RX ORDER — LISINOPRIL 10 MG/1
10 TABLET ORAL DAILY
Qty: 90 TABLET | Refills: 1 | Status: SHIPPED | OUTPATIENT
Start: 2017-12-15 | End: 2018-04-24 | Stop reason: SDUPTHER

## 2017-12-15 RX ORDER — TOPIRAMATE 50 MG/1
TABLET, FILM COATED ORAL
Qty: 90 TABLET | Refills: 1 | Status: SHIPPED | OUTPATIENT
Start: 2017-12-15 | End: 2018-04-24 | Stop reason: SDUPTHER

## 2017-12-15 RX ORDER — GLIMEPIRIDE 2 MG/1
TABLET ORAL
Qty: 180 TABLET | Refills: 1 | Status: SHIPPED | OUTPATIENT
Start: 2017-12-15 | End: 2018-04-24 | Stop reason: SDUPTHER

## 2017-12-15 RX ORDER — CITALOPRAM 40 MG/1
TABLET ORAL
Qty: 90 TABLET | Refills: 1 | Status: SHIPPED | OUTPATIENT
Start: 2017-12-15 | End: 2018-04-24 | Stop reason: SDUPTHER

## 2017-12-15 RX ORDER — WARFARIN SODIUM 5 MG/1
TABLET ORAL
Qty: 180 TABLET | Refills: 1 | Status: SHIPPED | OUTPATIENT
Start: 2017-12-15 | End: 2018-06-05 | Stop reason: SDUPTHER

## 2017-12-15 ASSESSMENT — ENCOUNTER SYMPTOMS
SHORTNESS OF BREATH: 0
COUGH: 0

## 2017-12-15 NOTE — PROGRESS NOTES
Subjective:      Patient ID: Kaycee Bautista is a 65 y.o. female. HPI     Patient is here for follow up. She was diagnosed with hypertension. No chest pain or dyspnea. No cough. She is compliant. Review of Systems   Respiratory: Negative for cough and shortness of breath. Cardiovascular: Negative for chest pain. There are no changes to past medical history, family history, social history or review of systems(except as noted in the history section) since prior note (all reviewed with patient). Current Outpatient Prescriptions:     glimepiride (AMARYL) 2 MG tablet, TAKE ONE TABLET BY MOUTH 2 TIMES DAILY, Disp: 180 tablet, Rfl: 1    SITagliptin-metFORMIN (JANUMET XR)  MG TB24 per extended release tablet, TAKE ONE TABLET BY MOUTH 2 TIMES DAILY, Disp: 180 tablet, Rfl: 1    fenofibric acid (FIBRICOR) 135 MG CPDR capsule, TAKE ONE CAPSULE BY MOUTH DAILY, Disp: 90 capsule, Rfl: 1    topiramate (TOPAMAX) 50 MG tablet, TAKE ONE TABLET BY MOUTH EVERY EVENING, Disp: 90 tablet, Rfl: 1    warfarin (COUMADIN) 5 MG tablet, TAKE TWO (2) tablets BY MOUTH DAILY or as directed by your doctor, Disp: 180 tablet, Rfl: 1    lisinopril (PRINIVIL;ZESTRIL) 10 MG tablet, Take 1 tablet by mouth daily, Disp: 90 tablet, Rfl: 1    citalopram (CELEXA) 40 MG tablet, TAKE ONE TABLET BY MOUTH DAILY, Disp: 90 tablet, Rfl: 1    warfarin (COUMADIN) 3 MG tablet, TAKE 1 TABLET BY MOUTH DAILY, Disp: 30 tablet, Rfl: 0    warfarin (COUMADIN) 1 MG tablet, TAKE 1 TABLET BY MOUTH DAILY WITH 8MG, Disp: 30 tablet, Rfl: 0    PRODIGY NO CODING BLOOD GLUC strip, TEST BLOOD SUGARS 3 TIMES DAILY, Disp: 100 each, Rfl: 11    cyanocobalamin 1000 MCG tablet, Take 1,000 mcg by mouth daily, Disp: , Rfl:     ONE TOUCH LANCETS MISC, 1 each by Does not apply route daily. , Disp: 100 each, Rfl: 3    /80 (Site: Right Arm, Position: Sitting, Cuff Size: Large Adult)   Pulse 80   Resp 14   Ht 5' 5\" (1.651 m)   Wt (!) 332 lb (150.6 kg)

## 2017-12-22 ENCOUNTER — TELEPHONE (OUTPATIENT)
Dept: INTERNAL MEDICINE CLINIC | Age: 57
End: 2017-12-22

## 2017-12-22 NOTE — TELEPHONE ENCOUNTER
----- Message from Kennon Galeazzi, MD sent at 12/22/2017 10:56 AM EST -----  Contact: DD:[phone]  She needs to call her gyn    ----- Message -----  From: Anny Patten  Sent: 12/22/2017   9:26 AM  To: Kennon Galeazzi, MD    Stated she has not had a period in a year, said she started this month and is bleeding heavy. Stated that she tried to get ahold of her Gynecologist but hasn't had any luck getting ahold of them. The color is bright red, and she stated there are a few pea sized clots. Said she has some cramping in back and cramping here and there in ovary area. Stated she has taken medication to ease the cramps. Stated she wants to know if it is ok for her to go that long without a period then be bleeding how she is. Would like someone to give her a call back.     Last Visit:12-15-17  Future Visit:02-16-18    Glenn Myers

## 2017-12-28 ENCOUNTER — TELEPHONE (OUTPATIENT)
Dept: INTERNAL MEDICINE CLINIC | Age: 57
End: 2017-12-28

## 2017-12-28 ENCOUNTER — ANTI-COAG VISIT (OUTPATIENT)
Dept: INTERNAL MEDICINE CLINIC | Age: 57
End: 2017-12-28

## 2017-12-28 LAB — INR BLD: 3.3

## 2017-12-28 NOTE — TELEPHONE ENCOUNTER
----- Message from Sravani Crowder MD sent at 12/28/2017  3:40 PM EST -----  yes  ----- Message -----  From: Marie Sensing  Sent: 12/28/2017   2:44 PM  To: Sravani Crowder MD    Yes, 10 mg QD.  Hold today, resume dose tomorrow re 1 week still?  ----- Message -----  From: Sravani Crowder MD  Sent: 12/28/2017   2:41 PM  To: [address] [place] [address] today   Resume from am  Need dose  1 week  ----- Message -----  From: Marie Sensing  Sent: 12/28/2017   2:18 PM  To: Sravani Crowder MD    INR: 3.3    Taking: 10 mg QD according to chart (awaiting return call to verify accuracy)      LAST INR: 2.1 (12/14/17)

## 2018-01-08 ENCOUNTER — OFFICE VISIT (OUTPATIENT)
Dept: INTERNAL MEDICINE CLINIC | Age: 58
End: 2018-01-08

## 2018-01-08 VITALS
SYSTOLIC BLOOD PRESSURE: 138 MMHG | DIASTOLIC BLOOD PRESSURE: 80 MMHG | BODY MASS INDEX: 48.82 KG/M2 | WEIGHT: 293 LBS | HEIGHT: 65 IN | RESPIRATION RATE: 14 BRPM | HEART RATE: 80 BPM

## 2018-01-08 DIAGNOSIS — J01.00 ACUTE NON-RECURRENT MAXILLARY SINUSITIS: Primary | ICD-10-CM

## 2018-01-08 PROCEDURE — 99213 OFFICE O/P EST LOW 20 MIN: CPT | Performed by: INTERNAL MEDICINE

## 2018-01-08 RX ORDER — CEFDINIR 300 MG/1
300 CAPSULE ORAL 2 TIMES DAILY
Qty: 14 CAPSULE | Refills: 0 | Status: SHIPPED | OUTPATIENT
Start: 2018-01-08 | End: 2018-01-15

## 2018-01-08 ASSESSMENT — ENCOUNTER SYMPTOMS: COUGH: 1

## 2018-01-08 NOTE — PROGRESS NOTES
Subjective:      Patient ID: Lelia Cooper is a 65 y.o. female. Cough   The current episode started 1 to 4 weeks ago. The problem has been gradually worsening. The problem occurs every few minutes. The cough is non-productive. Associated symptoms include nasal congestion, postnasal drip and sweats. Pertinent negatives include no fever. She has tried OTC cough suppressant for the symptoms. The treatment provided mild relief. Review of Systems   Constitutional: Negative for fever. HENT: Positive for postnasal drip. Respiratory: Positive for cough. There are no changes to past medical history, family history, social history or review of systems(except as noted in the history section) since prior note (all reviewed with patient).       Current Outpatient Prescriptions:     glimepiride (AMARYL) 2 MG tablet, TAKE ONE TABLET BY MOUTH 2 TIMES DAILY, Disp: 180 tablet, Rfl: 1    SITagliptin-metFORMIN (JANUMET XR)  MG TB24 per extended release tablet, TAKE ONE TABLET BY MOUTH 2 TIMES DAILY, Disp: 180 tablet, Rfl: 1    fenofibric acid (FIBRICOR) 135 MG CPDR capsule, TAKE ONE CAPSULE BY MOUTH DAILY, Disp: 90 capsule, Rfl: 1    topiramate (TOPAMAX) 50 MG tablet, TAKE ONE TABLET BY MOUTH EVERY EVENING, Disp: 90 tablet, Rfl: 1    warfarin (COUMADIN) 5 MG tablet, TAKE TWO (2) tablets BY MOUTH DAILY or as directed by your doctor, Disp: 180 tablet, Rfl: 1    lisinopril (PRINIVIL;ZESTRIL) 10 MG tablet, Take 1 tablet by mouth daily, Disp: 90 tablet, Rfl: 1    citalopram (CELEXA) 40 MG tablet, TAKE ONE TABLET BY MOUTH DAILY, Disp: 90 tablet, Rfl: 1    warfarin (COUMADIN) 3 MG tablet, TAKE 1 TABLET BY MOUTH DAILY, Disp: 30 tablet, Rfl: 0    warfarin (COUMADIN) 1 MG tablet, TAKE 1 TABLET BY MOUTH DAILY WITH 8MG, Disp: 30 tablet, Rfl: 0    PRODIGY NO CODING BLOOD GLUC strip, TEST BLOOD SUGARS 3 TIMES DAILY, Disp: 100 each, Rfl: 11    cyanocobalamin 1000 MCG tablet, Take 1,000 mcg by mouth daily, Disp: , Rfl:     ONE TOUCH LANCETS MISC, 1 each by Does not apply route daily. , Disp: 100 each, Rfl: 3    /80 (Site: Right Arm, Position: Sitting, Cuff Size: Large Adult)   Pulse 80   Resp 14   Ht 5' 5\" (1.651 m)   Wt (!) 325 lb (147.4 kg)   BMI 54.08 kg/m²     Objective:   Physical Exam   Constitutional: She appears well-developed and well-nourished. She has a sickly appearance. No distress. HENT:   Head: Normocephalic. Right Ear: Tympanic membrane is not injected and not erythematous. Left Ear: Tympanic membrane is not injected and not erythematous. Nose: Rhinorrhea present. No septal deviation. Right sinus exhibits maxillary sinus tenderness. Right sinus exhibits no frontal sinus tenderness. Left sinus exhibits maxillary sinus tenderness. Left sinus exhibits no frontal sinus tenderness. Mouth/Throat: Oropharynx is clear and moist.   Eyes: Conjunctivae and EOM are normal. Pupils are equal, round, and reactive to light. Neck: Trachea normal. Neck supple. Cardiovascular: Normal rate, regular rhythm, S1 normal, S2 normal and normal heart sounds. Pulmonary/Chest: No respiratory distress. Assessment:      1. Acute non-recurrent maxillary sinusitis             Plan:      Treat with Omnicef. Check pro time.

## 2018-01-11 ENCOUNTER — ANTI-COAG VISIT (OUTPATIENT)
Dept: INTERNAL MEDICINE CLINIC | Age: 58
End: 2018-01-11

## 2018-01-11 LAB — INR BLD: 3.7

## 2018-01-23 ENCOUNTER — HOSPITAL ENCOUNTER (OUTPATIENT)
Dept: ULTRASOUND IMAGING | Age: 58
Discharge: HOME | End: 2018-01-23
Attending: OBSTETRICS & GYNECOLOGY | Admitting: OBSTETRICS & GYNECOLOGY

## 2018-01-23 DIAGNOSIS — N95.0 HEMORRHAGE, POSTMENOPAUSAL: ICD-10-CM

## 2018-01-23 DIAGNOSIS — N95.0 POSTMENOPAUSAL BLEEDING: ICD-10-CM

## 2018-01-25 ENCOUNTER — ANTI-COAG VISIT (OUTPATIENT)
Dept: INTERNAL MEDICINE CLINIC | Age: 58
End: 2018-01-25

## 2018-01-25 LAB
INR BLD: 2.2

## 2018-02-07 ENCOUNTER — ANTI-COAG VISIT (OUTPATIENT)
Dept: INTERNAL MEDICINE CLINIC | Age: 58
End: 2018-02-07

## 2018-02-07 ENCOUNTER — TELEPHONE (OUTPATIENT)
Dept: INTERNAL MEDICINE CLINIC | Age: 58
End: 2018-02-07

## 2018-02-07 LAB — INR BLD: 2.6

## 2018-02-16 ENCOUNTER — OFFICE VISIT (OUTPATIENT)
Dept: INTERNAL MEDICINE CLINIC | Age: 58
End: 2018-02-16

## 2018-02-16 VITALS
HEART RATE: 80 BPM | BODY MASS INDEX: 48.82 KG/M2 | SYSTOLIC BLOOD PRESSURE: 130 MMHG | RESPIRATION RATE: 14 BRPM | HEIGHT: 65 IN | DIASTOLIC BLOOD PRESSURE: 80 MMHG | WEIGHT: 293 LBS

## 2018-02-16 DIAGNOSIS — Z99.89 OSA ON CPAP: ICD-10-CM

## 2018-02-16 DIAGNOSIS — K42.9 UMBILICAL HERNIA WITHOUT OBSTRUCTION AND WITHOUT GANGRENE: ICD-10-CM

## 2018-02-16 DIAGNOSIS — G47.33 OSA ON CPAP: ICD-10-CM

## 2018-02-16 DIAGNOSIS — E78.5 HYPERLIPIDEMIA ASSOCIATED WITH TYPE 2 DIABETES MELLITUS (HCC): ICD-10-CM

## 2018-02-16 DIAGNOSIS — E11.9 TYPE 2 DIABETES MELLITUS WITHOUT COMPLICATION, WITHOUT LONG-TERM CURRENT USE OF INSULIN (HCC): Primary | ICD-10-CM

## 2018-02-16 DIAGNOSIS — I27.82 OTHER CHRONIC PULMONARY EMBOLISM WITHOUT ACUTE COR PULMONALE (HCC): ICD-10-CM

## 2018-02-16 DIAGNOSIS — K76.0 FATTY INFILTRATION OF LIVER: ICD-10-CM

## 2018-02-16 DIAGNOSIS — E11.69 HYPERLIPIDEMIA ASSOCIATED WITH TYPE 2 DIABETES MELLITUS (HCC): ICD-10-CM

## 2018-02-16 DIAGNOSIS — E66.01 MORBID OBESITY WITH BMI OF 50.0-59.9, ADULT (HCC): ICD-10-CM

## 2018-02-16 DIAGNOSIS — I10 BENIGN ESSENTIAL HYPERTENSION: ICD-10-CM

## 2018-02-16 DIAGNOSIS — G25.0 BENIGN HEAD TREMOR: ICD-10-CM

## 2018-02-16 PROCEDURE — 99214 OFFICE O/P EST MOD 30 MIN: CPT | Performed by: INTERNAL MEDICINE

## 2018-02-16 ASSESSMENT — ENCOUNTER SYMPTOMS
VOMITING: 0
RHINORRHEA: 0
NAUSEA: 0
WHEEZING: 0
SHORTNESS OF BREATH: 0
ABDOMINAL PAIN: 0

## 2018-02-16 NOTE — PROGRESS NOTES
behavior is normal. Judgment and thought content normal.       Assessment:      1. Type 2 diabetes mellitus without complication, without long-term current use of insulin (HCC)  Lipid Panel    Hemoglobin A1C   2. Other chronic pulmonary embolism without acute cor pulmonale (HCC)     3. Fatty infiltration of liver     4. Umbilical hernia without obstruction and without gangrene     5. Benign head tremor     6. Hyperlipidemia associated with type 2 diabetes mellitus ([facility] [address]     7. Morbid obesity with BMI of 50.0-59.9, adult ([facility] [address]     8. ELEAZAR on CPAP     9. Benign essential hypertension            Plan:      Decrease calorie intake. Check labs. DM type 2: check labs. Continue oral medication. Check A1C  PE: On home monitoring system. Head tremor is controlled well. Fatty liver: she is trying to lose weight. Depression: on Celexa. ELEAZAR: on CPAP. She is using it for over 8 hours a night. Hypertriglyceridemia. Stable. Discussed use, benefit, and side effects of prescribed medications. Barriers to medication compliance addressed. All patient questions answered. Pt voiced understanding. Exercise,weight loss recommended. The current medical regimen is effective;  continue present plan and medications. See orders.

## 2018-02-23 ENCOUNTER — ANTI-COAG VISIT (OUTPATIENT)
Dept: INTERNAL MEDICINE CLINIC | Age: 58
End: 2018-02-23

## 2018-02-23 ENCOUNTER — TELEPHONE (OUTPATIENT)
Dept: INTERNAL MEDICINE CLINIC | Age: 58
End: 2018-02-23

## 2018-02-23 LAB — INR BLD: 2.4

## 2018-02-24 ENCOUNTER — HOSPITAL ENCOUNTER (OUTPATIENT)
Dept: OTHER | Age: 58
Discharge: HOME | End: 2018-02-24
Attending: INTERNAL MEDICINE | Admitting: INTERNAL MEDICINE

## 2018-02-25 LAB
CHOLESTEROL, FASTING: 158 MG/DL (ref 0–199)
ESTIMATED AVERAGE GLUCOSE: 162.8 MG/DL
HBA1C MFR BLD: 7.3 %
HDLC SERPL-MCNC: 23 MG/DL (ref 40–60)
LDL CHOLESTEROL CALCULATED: ABNORMAL MG/DL
LDL CHOLESTEROL DIRECT: 68 MG/DL
TRIGLYCERIDE, FASTING: 452 MG/DL (ref 0–150)
VLDLC SERPL CALC-MCNC: ABNORMAL MG/DL

## 2018-02-26 ENCOUNTER — TELEPHONE (OUTPATIENT)
Dept: INTERNAL MEDICINE CLINIC | Age: 58
End: 2018-02-26

## 2018-02-26 ENCOUNTER — ANTI-COAG VISIT (OUTPATIENT)
Dept: INTERNAL MEDICINE CLINIC | Age: 58
End: 2018-02-26

## 2018-03-07 ENCOUNTER — OFFICE VISIT (OUTPATIENT)
Dept: INTERNAL MEDICINE CLINIC | Age: 58
End: 2018-03-07

## 2018-03-07 VITALS
RESPIRATION RATE: 16 BRPM | WEIGHT: 293 LBS | HEART RATE: 84 BPM | SYSTOLIC BLOOD PRESSURE: 136 MMHG | DIASTOLIC BLOOD PRESSURE: 80 MMHG | HEIGHT: 65 IN | BODY MASS INDEX: 48.82 KG/M2

## 2018-03-07 DIAGNOSIS — E78.5 HYPERLIPIDEMIA ASSOCIATED WITH TYPE 2 DIABETES MELLITUS (HCC): ICD-10-CM

## 2018-03-07 DIAGNOSIS — G47.33 OSA ON CPAP: ICD-10-CM

## 2018-03-07 DIAGNOSIS — I27.82 OTHER CHRONIC PULMONARY EMBOLISM WITHOUT ACUTE COR PULMONALE (HCC): ICD-10-CM

## 2018-03-07 DIAGNOSIS — E11.69 HYPERLIPIDEMIA ASSOCIATED WITH TYPE 2 DIABETES MELLITUS (HCC): ICD-10-CM

## 2018-03-07 DIAGNOSIS — Z01.818 PREOP EXAMINATION: Primary | ICD-10-CM

## 2018-03-07 DIAGNOSIS — K76.0 FATTY INFILTRATION OF LIVER: ICD-10-CM

## 2018-03-07 DIAGNOSIS — I10 BENIGN ESSENTIAL HYPERTENSION: ICD-10-CM

## 2018-03-07 DIAGNOSIS — E11.9 TYPE 2 DIABETES MELLITUS WITHOUT COMPLICATION, WITHOUT LONG-TERM CURRENT USE OF INSULIN (HCC): ICD-10-CM

## 2018-03-07 DIAGNOSIS — N93.9 VAGINAL BLEEDING: ICD-10-CM

## 2018-03-07 DIAGNOSIS — Z99.89 OSA ON CPAP: ICD-10-CM

## 2018-03-07 PROCEDURE — 99214 OFFICE O/P EST MOD 30 MIN: CPT | Performed by: INTERNAL MEDICINE

## 2018-03-13 ENCOUNTER — HOSPITAL ENCOUNTER (OUTPATIENT)
Dept: SURGERY | Age: 58
Discharge: HOME | End: 2018-03-13
Attending: OBSTETRICS & GYNECOLOGY | Admitting: OBSTETRICS & GYNECOLOGY

## 2018-03-13 VITALS
HEART RATE: 71 BPM | TEMPERATURE: 97.2 F | RESPIRATION RATE: 16 BRPM | OXYGEN SATURATION: 94 % | BODY MASS INDEX: 48.82 KG/M2 | DIASTOLIC BLOOD PRESSURE: 68 MMHG | WEIGHT: 293 LBS | SYSTOLIC BLOOD PRESSURE: 146 MMHG | HEIGHT: 65 IN

## 2018-03-13 LAB
ANION GAP SERPL CALCULATED.3IONS-SCNC: 13 MMOL/L (ref 3–16)
BUN BLDV-MCNC: 14 MG/DL (ref 7–20)
CALCIUM SERPL-MCNC: 9 MG/DL (ref 8.3–10.6)
CHLORIDE BLD-SCNC: 103 MMOL/L (ref 99–110)
CO2: 21 MMOL/L (ref 21–32)
CREAT SERPL-MCNC: 0.6 MG/DL (ref 0.6–1.1)
GFR AFRICAN AMERICAN: >60
GFR NON-AFRICAN AMERICAN: >60
GLUCOSE BLD-MCNC: 173 MG/DL (ref 70–99)
GLUCOSE BLD-MCNC: 177 MG/DL (ref 70–99)
GLUCOSE BLD-MCNC: 181 MG/DL (ref 70–99)
PERFORMED ON: ABNORMAL
PERFORMED ON: ABNORMAL
POTASSIUM SERPL-SCNC: 4 MMOL/L (ref 3.5–5.1)
SODIUM BLD-SCNC: 137 MMOL/L (ref 136–145)

## 2018-03-13 PROCEDURE — 93010 ELECTROCARDIOGRAM REPORT: CPT | Performed by: INTERNAL MEDICINE

## 2018-03-13 RX ORDER — HYDRALAZINE HYDROCHLORIDE 20 MG/ML
5 INJECTION INTRAMUSCULAR; INTRAVENOUS EVERY 10 MIN PRN
Status: DISCONTINUED | OUTPATIENT
Start: 2018-03-13 | End: 2018-03-14 | Stop reason: HOSPADM

## 2018-03-13 RX ORDER — OXYCODONE HYDROCHLORIDE AND ACETAMINOPHEN 5; 325 MG/1; MG/1
2 TABLET ORAL PRN
Status: ACTIVE | OUTPATIENT
Start: 2018-03-13 | End: 2018-03-13

## 2018-03-13 RX ORDER — MEPERIDINE HYDROCHLORIDE 25 MG/ML
12.5 INJECTION INTRAMUSCULAR; INTRAVENOUS; SUBCUTANEOUS EVERY 5 MIN PRN
Status: DISCONTINUED | OUTPATIENT
Start: 2018-03-13 | End: 2018-03-14 | Stop reason: HOSPADM

## 2018-03-13 RX ORDER — SODIUM CHLORIDE, SODIUM LACTATE, POTASSIUM CHLORIDE, CALCIUM CHLORIDE 600; 310; 30; 20 MG/100ML; MG/100ML; MG/100ML; MG/100ML
INJECTION, SOLUTION INTRAVENOUS CONTINUOUS
Status: DISCONTINUED | OUTPATIENT
Start: 2018-03-13 | End: 2018-03-14 | Stop reason: HOSPADM

## 2018-03-13 RX ORDER — OXYCODONE HYDROCHLORIDE AND ACETAMINOPHEN 5; 325 MG/1; MG/1
1 TABLET ORAL PRN
Status: ACTIVE | OUTPATIENT
Start: 2018-03-13 | End: 2018-03-13

## 2018-03-13 RX ORDER — PROMETHAZINE HYDROCHLORIDE 25 MG/ML
6.25 INJECTION, SOLUTION INTRAMUSCULAR; INTRAVENOUS
Status: DISCONTINUED | OUTPATIENT
Start: 2018-03-13 | End: 2018-03-14 | Stop reason: HOSPADM

## 2018-03-13 RX ORDER — HYDROMORPHONE HCL 110MG/55ML
0.5 PATIENT CONTROLLED ANALGESIA SYRINGE INTRAVENOUS EVERY 5 MIN PRN
Status: DISCONTINUED | OUTPATIENT
Start: 2018-03-13 | End: 2018-03-14 | Stop reason: HOSPADM

## 2018-03-13 RX ORDER — SODIUM CHLORIDE, SODIUM LACTATE, POTASSIUM CHLORIDE, CALCIUM CHLORIDE 600; 310; 30; 20 MG/100ML; MG/100ML; MG/100ML; MG/100ML
INJECTION, SOLUTION INTRAVENOUS
Status: COMPLETED
Start: 2018-03-13 | End: 2018-03-13

## 2018-03-13 RX ORDER — LIDOCAINE HYDROCHLORIDE 10 MG/ML
INJECTION, SOLUTION EPIDURAL; INFILTRATION; INTRACAUDAL; PERINEURAL
Status: COMPLETED
Start: 2018-03-13 | End: 2018-03-13

## 2018-03-13 RX ORDER — LABETALOL HYDROCHLORIDE 5 MG/ML
5 INJECTION, SOLUTION INTRAVENOUS EVERY 10 MIN PRN
Status: DISCONTINUED | OUTPATIENT
Start: 2018-03-13 | End: 2018-03-14 | Stop reason: HOSPADM

## 2018-03-13 RX ORDER — DIPHENHYDRAMINE HYDROCHLORIDE 50 MG/ML
12.5 INJECTION INTRAMUSCULAR; INTRAVENOUS
Status: ACTIVE | OUTPATIENT
Start: 2018-03-13 | End: 2018-03-13

## 2018-03-13 RX ORDER — HYDROMORPHONE HCL 110MG/55ML
0.25 PATIENT CONTROLLED ANALGESIA SYRINGE INTRAVENOUS EVERY 5 MIN PRN
Status: DISCONTINUED | OUTPATIENT
Start: 2018-03-13 | End: 2018-03-14 | Stop reason: HOSPADM

## 2018-03-13 RX ORDER — ONDANSETRON 2 MG/ML
4 INJECTION INTRAMUSCULAR; INTRAVENOUS PRN
Status: DISCONTINUED | OUTPATIENT
Start: 2018-03-13 | End: 2018-03-14 | Stop reason: HOSPADM

## 2018-03-13 RX ORDER — MORPHINE SULFATE 10 MG/ML
2 INJECTION, SOLUTION INTRAMUSCULAR; INTRAVENOUS EVERY 5 MIN PRN
Status: DISCONTINUED | OUTPATIENT
Start: 2018-03-13 | End: 2018-03-14 | Stop reason: HOSPADM

## 2018-03-13 RX ORDER — MORPHINE SULFATE 4 MG/ML
1 INJECTION, SOLUTION INTRAMUSCULAR; INTRAVENOUS EVERY 5 MIN PRN
Status: DISCONTINUED | OUTPATIENT
Start: 2018-03-13 | End: 2018-03-14 | Stop reason: HOSPADM

## 2018-03-13 RX ADMIN — SODIUM CHLORIDE, SODIUM LACTATE, POTASSIUM CHLORIDE, CALCIUM CHLORIDE: 600; 310; 30; 20 INJECTION, SOLUTION INTRAVENOUS at 07:17

## 2018-03-13 RX ADMIN — LIDOCAINE HYDROCHLORIDE 0.1 ML: 10 INJECTION, SOLUTION EPIDURAL; INFILTRATION; INTRACAUDAL; PERINEURAL at 07:17

## 2018-03-13 ASSESSMENT — PAIN SCALES - GENERAL
PAINLEVEL_OUTOF10: 0

## 2018-03-13 ASSESSMENT — PAIN - FUNCTIONAL ASSESSMENT: PAIN_FUNCTIONAL_ASSESSMENT: 0-10

## 2018-03-13 NOTE — ANESTHESIA PRE-OP
Department of Anesthesiology  Preprocedure Note       Name:  Eugene Slater   Age:  65 y.o.  :  1960                                          MRN:  [id number]         Date:  3/12/2018      Surgeon:    Procedure:    Medications prior to admission:   Prior to Admission medications    Medication Sig Start Date End Date Taking? Authorizing Provider   enoxaparin (LOVENOX) 150 MG/ML injection Inject 1 mL into the skin every 12 hours for 10 days 3/7/18 3/17/18 Yes Kaycee Bates MD   glimepiride (AMARYL) 2 MG tablet TAKE ONE TABLET BY MOUTH 2 TIMES DAILY 12/15/17  Yes Kaycee Bates MD   SITagliptin-metFORMIN (JANUMET XR)  MG TB24 per extended release tablet TAKE ONE TABLET BY MOUTH 2 TIMES DAILY 12/15/17  Yes Kaycee Bates MD   fenofibric acid (FIBRICOR) 135 MG CPDR capsule TAKE ONE CAPSULE BY MOUTH DAILY 12/15/17  Yes Kaycee Bates MD   topiramate (TOPAMAX) 50 MG tablet TAKE ONE TABLET BY MOUTH EVERY EVENING 12/15/17  Yes Kaycee Bates MD   warfarin (COUMADIN) 5 MG tablet TAKE TWO (2) tablets BY MOUTH DAILY or as directed by your doctor 12/15/17  Yes Kaycee Bates MD   lisinopril (PRINIVIL;ZESTRIL) 10 MG tablet Take 1 tablet by mouth daily 12/15/17  Yes Kaycee Bates MD   citalopram (CELEXA) 40 MG tablet TAKE ONE TABLET BY MOUTH DAILY 12/15/17  Yes Kaycee Bates MD   cyanocobalamin 1000 MCG tablet Take 1,000 mcg by mouth daily   Yes Historical Provider, MD   PRODIGY NO CODING BLOOD GLUC strip TEST BLOOD SUGARS 3 TIMES DAILY 17   Kaycee Bates MD   ONE TOUCH LANCETS MISC 1 each by Does not apply route daily.  14   Kaycee Bates MD       Current medications:    Current Outpatient Prescriptions   Medication Sig Dispense Refill    enoxaparin (LOVENOX) 150 MG/ML injection Inject 1 mL into the skin every 12 hours for 10 days 20 Syringe 0    glimepiride (AMARYL) 2 MG tablet TAKE ONE TABLET BY MOUTH 2 TIMES Hyperlipidemia associated with type 2 diabetes mellitus ([facility] 10/23/2015    Morbid obesity with BMI of 50.0-59.9, adult ([facility] 10/23/2015    No history of procedure 11/9/2015    no past colonoscopy    Pulmonary embolism (HCC)     Type II or unspecified type diabetes mellitus without mention of complication, not stated as uncontrolled     Umbilical hernia 86/6/1996    Unspecified sleep apnea        Past Surgical History:        Procedure Laterality Date    BREAST SURGERY  2015    CHOLECYSTECTOMY, LAPAROSCOPIC  8/2/2012    COLONOSCOPY  11/9/2015    cecal polyp    HERNIA REPAIR  16/78/08    lap umbilical       Social History:    Social History   Substance Use Topics    Smoking status: Never Smoker    Smokeless tobacco: Never Used    Alcohol use No                                Counseling given: Not Answered      Vital Signs (Current):   Vitals:    03/09/18 1102   Weight: (!) 332 lb (150.6 kg)   Height: 5' 5\" (1.651 m)                                              BP Readings from Last 3 Encounters:   03/07/18 136/80   02/16/18 130/80   01/08/18 138/80       NPO Status:                                                                                 BMI:   Wt Readings from Last 3 Encounters:   03/09/18 (!) 332 lb (150.6 kg)   03/07/18 (!) 332 lb (150.6 kg)   02/16/18 (!) 330 lb (149.7 kg)     Body mass index is 55.25 kg/m². Anesthesia Evaluation  Patient summary reviewed and Nursing notes reviewed no history of anesthetic complications:   Airway: Mallampati: II     Neck ROM: full   Dental:          Pulmonary:Negative Pulmonary ROS and normal exam    (+) sleep apnea:                             Cardiovascular:Negative CV ROS    (+) hypertension:, hyperlipidemia                  Neuro/Psych:   Negative Neuro/Psych ROS              GI/Hepatic/Renal: Neg GI/Hepatic/Renal ROS  (+) liver disease:,      (-) hiatal hernia and GERD       Endo/Other: Negative Endo/Other ROS   (+) Diabetes, . Abdominal:           Vascular:                                        Anesthesia Plan      general     ASA 3     (I discussed with the patient the risks and benefits of PIV, general anesthesia, IV Narcotics, PACU. All questions were answered the patient agrees with the plan.)  Induction: intravenous. Pre-Operative Diagnosis: POST MENOPAUSAL BLEEDING    65 y.o.   BMI:  Body mass index is 55.25 kg/m².      Vitals:    03/09/18 1102 03/13/18 0643   BP:  (!) 144/75   Pulse:  80   Resp:  18   Temp:  97.8 °F (36.6 °C)   TempSrc:  Temporal   SpO2:  97%   Weight: (!) 332 lb (150.6 kg) (!) 332 lb (150.6 kg)   Height: 5' 5\" (1.651 m) 5' 5\" (1.651 m)       Allergies   Allergen Reactions    Penicillins Other (See Comments)     Unsure of reaction--childhood    Sulfa Antibiotics Other (See Comments)     Unsure of reaction       Social History   Substance Use Topics    Smoking status: Never Smoker    Smokeless tobacco: Never Used    Alcohol use No       LABS:    CBC  Lab Results   Component Value Date/Time    WBC 5.7 11/14/2017 09:41 AM    HGB 13.7 11/14/2017 09:41 AM    HCT 41.2 11/14/2017 09:41 AM     11/14/2017 09:41 AM     RENAL  Lab Results   Component Value Date/Time     03/13/2018 06:54 AM    K 4.0 03/13/2018 06:54 AM     03/13/2018 06:54 AM    CO2 21 03/13/2018 06:54 AM    BUN 14 03/13/2018 06:54 AM    CREATININE 0.6 03/13/2018 06:54 AM    GLUCOSE 177 (H) 03/13/2018 06:54 AM    GLUCOSE 165 (H) 04/10/2012 05:50 PM     COAGS  Lab Results   Component Value Date/Time    PROTIME 28.7 (H) 10/26/2016 08:28 AM    PROTIME 15.6 07/27/2012    INR 2.40 02/23/2018         Jose Marcelino MD   3/12/2018

## 2018-03-14 LAB
EKG ATRIAL RATE: 81 BPM
EKG DIAGNOSIS: NORMAL
EKG P AXIS: 52 DEGREES
EKG P-R INTERVAL: 144 MS
EKG Q-T INTERVAL: 398 MS
EKG QRS DURATION: 86 MS
EKG QTC CALCULATION (BAZETT): 462 MS
EKG R AXIS: 36 DEGREES
EKG T AXIS: 28 DEGREES
EKG VENTRICULAR RATE: 81 BPM

## 2018-03-15 RX ORDER — BLOOD SUGAR DIAGNOSTIC
STRIP MISCELLANEOUS
Qty: 100 EACH | Refills: 2 | Status: SHIPPED | OUTPATIENT
Start: 2018-03-15

## 2018-03-26 ENCOUNTER — TELEPHONE (OUTPATIENT)
Dept: PULMONOLOGY | Age: 58
End: 2018-03-26

## 2018-03-26 ENCOUNTER — OFFICE VISIT (OUTPATIENT)
Dept: PULMONOLOGY | Age: 58
End: 2018-03-26

## 2018-03-26 VITALS
DIASTOLIC BLOOD PRESSURE: 83 MMHG | RESPIRATION RATE: 16 BRPM | OXYGEN SATURATION: 98 % | WEIGHT: 293 LBS | TEMPERATURE: 98.1 F | BODY MASS INDEX: 48.82 KG/M2 | HEIGHT: 65 IN | SYSTOLIC BLOOD PRESSURE: 152 MMHG | HEART RATE: 81 BPM

## 2018-03-26 DIAGNOSIS — G47.33 OSA ON CPAP: Primary | ICD-10-CM

## 2018-03-26 DIAGNOSIS — I10 BENIGN ESSENTIAL HYPERTENSION: ICD-10-CM

## 2018-03-26 DIAGNOSIS — E66.01 MORBID OBESITY WITH BMI OF 50.0-59.9, ADULT (HCC): ICD-10-CM

## 2018-03-26 DIAGNOSIS — R53.83 FATIGUE, UNSPECIFIED TYPE: ICD-10-CM

## 2018-03-26 DIAGNOSIS — Z99.89 OSA ON CPAP: Primary | ICD-10-CM

## 2018-03-26 PROCEDURE — 99213 OFFICE O/P EST LOW 20 MIN: CPT | Performed by: NURSE PRACTITIONER

## 2018-03-26 ASSESSMENT — SLEEP AND FATIGUE QUESTIONNAIRES
HOW LIKELY ARE YOU TO NOD OFF OR FALL ASLEEP WHILE SITTING AND READING: 2
HOW LIKELY ARE YOU TO NOD OFF OR FALL ASLEEP WHILE SITTING INACTIVE IN A PUBLIC PLACE: 0
HOW LIKELY ARE YOU TO NOD OFF OR FALL ASLEEP WHILE SITTING AND TALKING TO SOMEONE: 1
HOW LIKELY ARE YOU TO NOD OFF OR FALL ASLEEP IN A CAR, WHILE STOPPED FOR A FEW MINUTES IN TRAFFIC: 0
HOW LIKELY ARE YOU TO NOD OFF OR FALL ASLEEP WHILE SITTING QUIETLY AFTER LUNCH WITHOUT ALCOHOL: 1
HOW LIKELY ARE YOU TO NOD OFF OR FALL ASLEEP WHILE LYING DOWN TO REST IN THE AFTERNOON WHEN CIRCUMSTANCES PERMIT: 3
NECK CIRCUMFERENCE (INCHES): 18.25
HOW LIKELY ARE YOU TO NOD OFF OR FALL ASLEEP WHILE WATCHING TV: 2
HOW LIKELY ARE YOU TO NOD OFF OR FALL ASLEEP WHEN YOU ARE A PASSENGER IN A CAR FOR AN HOUR WITHOUT A BREAK: 2
ESS TOTAL SCORE: 11

## 2018-03-26 NOTE — PROGRESS NOTES
Subjective:      Patient ID: Jelly Tong is a 65 y.o. female. HPI    Jelly Tong is a 65 y.o. female in office for ELEAZAR follow up. She is doing well with CPAP. Patient is using CPAP 7 hrs/night. Not using humidifier. No snoring on CPAP. The pressure is well tolerated. The mask is comfortable-nasal. No mask leak. 6+ months since mask changed. No significant daytime sleepiness. No nodding off when driving. No dry nose or throat. Some fatigue in afternoons. Bedtime is 1030-11 pm and rise time is 715 am. Sleep onset is 10-15 minutes. Wakes up 2 times at night total. 0 nocturia. It takes few minutes to fall back a sleep. Sometimes naps during the day. No headache in am. No weight gain. 1 caffienated beverages during the day. No alcohol. ESS is 11    Blood pressure is elevated in office today. Denies CP, vision change or Headache.        Review of Systems    Past Medical History:   Diagnosis Date    Benign essential hypertension 11/14/2017    Benign head tremor 5/14/2014    Cholelithiasis     Disease of nasal cavity and sinuses     Fatty infiltration of liver     Hot flash, menopausal 7/22/2015    Hyperlipidemia     Hyperlipidemia associated with type 2 diabetes mellitus ([facility] [address] 10/23/2015    Morbid obesity with BMI of 50.0-59.9, adult ([facility] [address] 10/23/2015    No history of procedure 11/9/2015    no past colonoscopy    Pulmonary embolism (HCC)     Type II or unspecified type diabetes mellitus without mention of complication, not stated as uncontrolled     Umbilical hernia 66/5/7243    Unspecified sleep apnea      Past Surgical History:   Procedure Laterality Date    BREAST SURGERY  2015    CHOLECYSTECTOMY, LAPAROSCOPIC  8/2/2012    COLONOSCOPY  11/9/2015    cecal polyp    DILATION AND CURETTAGE OF UTERUS  03/13/2018    HERNIA REPAIR  35/19/78    lap umbilical    HYSTEROSCOPY  03/13/2018    and D&C     Allergies   Allergen Reactions    Penicillins Other (See Comments)     Unsure of and low O2 82%, improved sleep related breathing disorder with CPAP therapy. Started on AutoCPAP min pressure of 12 cmH2O and max pressure of 16 cmH2O. CPAP compliance Data:  Compliance download report from 2/25/17 to 3/26/17  showed patient is using machine 8:38 hrs/night with 100% compliance and AHI 0.2 within this time frame. 30/30days with greater than 4 hours of machine use. 95th percentile leak 37.8 L/min  90% pressure 15 cm H20    Compliance download report from 2/24/18 to 3/25/18 reviewed today by me and showed patient is using machine 8:14 hrs/night with 100% compliance and AHI 0.3 within this time frame. 30/30days with greater than 4 hours of machine use. Leak 40 L/min  90% pressure 15 cm H20 on auto CPAP 12-16 cm H2O    Assessment:      1. ELEAZAR - severe. Failed CPAP 12 cmH2O, now better with Auto CPAP 12-16. Nasal mask. Optimal compliance and efficacy on review today. 2. DM  3. History of PE 2005 and 2006. Chronically on Coumadin. 4.  Fatigue  5. Obesity  6. HTN -BP elevated in office       Plan:      - Changed to AutoCPAP min pressure of 13 cmH2O and max pressure of 17 cmH2O.  -Mask fitting in office with RT, can order chin strap if needed  - Advised to use CPAP 6-8 hrs at night and during naps. - Replacement of mask, tubing, head straps every 3-6 months or sooner if damaged. - Follow up CPAP compliance   - Weight loss and exercise. - Sleep hygiene  - Avoid sedatives, alcohol and caffeinated drinks at bed time.  - No driving motorized vehicles or operating heavy machinery while fatigue, drowsy or sleepy.    - Weight loss is also recommended as a long-term intervention.    - Complications of ELEAZAR if not treated were discussed with patient patient to include systemic hypertension, pulmonary hypertension, cardiovascular morbidities, car accidents and all cause mortality.  -Patient education handout provided regarding sleep tips and CPAP cleaning recommendations   -Follow up with PCP for

## 2018-04-06 ENCOUNTER — ANTI-COAG VISIT (OUTPATIENT)
Dept: INTERNAL MEDICINE CLINIC | Age: 58
End: 2018-04-06

## 2018-04-06 ENCOUNTER — TELEPHONE (OUTPATIENT)
Dept: INTERNAL MEDICINE CLINIC | Age: 58
End: 2018-04-06

## 2018-04-06 LAB — INR BLD: 1.7

## 2018-04-19 ENCOUNTER — TELEPHONE (OUTPATIENT)
Dept: INTERNAL MEDICINE CLINIC | Age: 58
End: 2018-04-19

## 2018-04-19 ENCOUNTER — ANTI-COAG VISIT (OUTPATIENT)
Dept: INTERNAL MEDICINE CLINIC | Age: 58
End: 2018-04-19

## 2018-04-19 LAB — INR BLD: 1.9

## 2018-04-24 RX ORDER — GLIMEPIRIDE 2 MG/1
TABLET ORAL
Qty: 180 TABLET | Refills: 0 | Status: SHIPPED | OUTPATIENT
Start: 2018-04-24 | End: 2018-06-22 | Stop reason: SDUPTHER

## 2018-04-24 RX ORDER — SITAGLIPTIN AND METFORMIN HYDROCHLORIDE 1000; 50 MG/1; MG/1
TABLET, FILM COATED, EXTENDED RELEASE ORAL
Qty: 180 TABLET | Refills: 0 | Status: SHIPPED | OUTPATIENT
Start: 2018-04-24 | End: 2018-06-05 | Stop reason: SDUPTHER

## 2018-04-24 RX ORDER — LISINOPRIL 10 MG/1
10 TABLET ORAL DAILY
Qty: 90 TABLET | Refills: 0 | Status: SHIPPED | OUTPATIENT
Start: 2018-04-24 | End: 2018-05-08 | Stop reason: SINTOL

## 2018-04-24 RX ORDER — CITALOPRAM 40 MG/1
TABLET ORAL
Qty: 90 TABLET | Refills: 0 | Status: SHIPPED | OUTPATIENT
Start: 2018-04-24 | End: 2018-06-22 | Stop reason: SDUPTHER

## 2018-04-24 RX ORDER — FENOFIBRIC ACID 135 MG/1
CAPSULE, DELAYED RELEASE ORAL
Qty: 90 CAPSULE | Refills: 0 | Status: SHIPPED | OUTPATIENT
Start: 2018-04-24 | End: 2018-06-22 | Stop reason: SDUPTHER

## 2018-04-24 RX ORDER — TOPIRAMATE 50 MG/1
TABLET, FILM COATED ORAL
Qty: 90 TABLET | Refills: 0 | Status: SHIPPED | OUTPATIENT
Start: 2018-04-24 | End: 2018-06-22 | Stop reason: SDUPTHER

## 2018-05-04 ENCOUNTER — ANTI-COAG VISIT (OUTPATIENT)
Dept: INTERNAL MEDICINE CLINIC | Age: 58
End: 2018-05-04

## 2018-05-04 ENCOUNTER — TELEPHONE (OUTPATIENT)
Dept: INTERNAL MEDICINE CLINIC | Age: 58
End: 2018-05-04

## 2018-05-04 LAB — INR BLD: 1.5

## 2018-05-08 ENCOUNTER — OFFICE VISIT (OUTPATIENT)
Dept: INTERNAL MEDICINE CLINIC | Age: 58
End: 2018-05-08

## 2018-05-08 VITALS
HEART RATE: 80 BPM | SYSTOLIC BLOOD PRESSURE: 144 MMHG | HEIGHT: 65 IN | RESPIRATION RATE: 14 BRPM | DIASTOLIC BLOOD PRESSURE: 80 MMHG

## 2018-05-08 DIAGNOSIS — G47.33 OSA ON CPAP: ICD-10-CM

## 2018-05-08 DIAGNOSIS — E78.5 HYPERLIPIDEMIA ASSOCIATED WITH TYPE 2 DIABETES MELLITUS (HCC): ICD-10-CM

## 2018-05-08 DIAGNOSIS — R15.9 INCONTINENCE OF FECES WITH FECAL URGENCY: ICD-10-CM

## 2018-05-08 DIAGNOSIS — K76.0 FATTY INFILTRATION OF LIVER: ICD-10-CM

## 2018-05-08 DIAGNOSIS — I27.82 OTHER CHRONIC PULMONARY EMBOLISM WITHOUT ACUTE COR PULMONALE (HCC): ICD-10-CM

## 2018-05-08 DIAGNOSIS — E11.69 HYPERLIPIDEMIA ASSOCIATED WITH TYPE 2 DIABETES MELLITUS (HCC): ICD-10-CM

## 2018-05-08 DIAGNOSIS — E11.9 TYPE 2 DIABETES MELLITUS WITHOUT COMPLICATION, WITHOUT LONG-TERM CURRENT USE OF INSULIN (HCC): Primary | ICD-10-CM

## 2018-05-08 DIAGNOSIS — R15.2 INCONTINENCE OF FECES WITH FECAL URGENCY: ICD-10-CM

## 2018-05-08 DIAGNOSIS — E66.01 MORBID OBESITY WITH BMI OF 50.0-59.9, ADULT (HCC): ICD-10-CM

## 2018-05-08 DIAGNOSIS — G25.0 BENIGN HEAD TREMOR: ICD-10-CM

## 2018-05-08 DIAGNOSIS — Z99.89 OSA ON CPAP: ICD-10-CM

## 2018-05-08 PROCEDURE — 81002 URINALYSIS NONAUTO W/O SCOPE: CPT | Performed by: INTERNAL MEDICINE

## 2018-05-08 PROCEDURE — 99214 OFFICE O/P EST MOD 30 MIN: CPT | Performed by: INTERNAL MEDICINE

## 2018-05-08 RX ORDER — CHOLESTYRAMINE 4 G/9G
1 POWDER, FOR SUSPENSION ORAL 2 TIMES DAILY
Qty: 60 PACKET | Refills: 3 | Status: SHIPPED | OUTPATIENT
Start: 2018-05-08 | End: 2019-08-07

## 2018-05-08 RX ORDER — VALSARTAN 160 MG/1
160 TABLET ORAL DAILY
Qty: 30 TABLET | Refills: 3 | Status: SHIPPED | OUTPATIENT
Start: 2018-05-08 | End: 2018-06-05 | Stop reason: SDUPTHER

## 2018-05-08 ASSESSMENT — ENCOUNTER SYMPTOMS
SHORTNESS OF BREATH: 0
NAUSEA: 0
WHEEZING: 0
RHINORRHEA: 0
COUGH: 1
VOMITING: 0
ABDOMINAL PAIN: 0

## 2018-05-14 ENCOUNTER — TELEPHONE (OUTPATIENT)
Dept: INTERNAL MEDICINE CLINIC | Age: 58
End: 2018-05-14

## 2018-05-14 ENCOUNTER — ANTI-COAG VISIT (OUTPATIENT)
Dept: INTERNAL MEDICINE CLINIC | Age: 58
End: 2018-05-14

## 2018-05-14 LAB — INR BLD: 2.7

## 2018-05-29 ENCOUNTER — ANTI-COAG VISIT (OUTPATIENT)
Dept: INTERNAL MEDICINE CLINIC | Age: 58
End: 2018-05-29

## 2018-05-29 LAB — INR BLD: 2.3

## 2018-06-04 ENCOUNTER — HOSPITAL ENCOUNTER (OUTPATIENT)
Dept: OTHER | Age: 58
Discharge: HOME | End: 2018-06-04
Attending: INTERNAL MEDICINE | Admitting: INTERNAL MEDICINE

## 2018-06-04 LAB
A/G RATIO: 1.4 (ref 1.1–2.2)
ALBUMIN SERPL-MCNC: 4 G/DL (ref 3.4–5)
ALP BLD-CCNC: 71 U/L (ref 40–129)
ALT SERPL-CCNC: 16 U/L (ref 10–40)
ANION GAP SERPL CALCULATED.3IONS-SCNC: 14 MMOL/L (ref 3–16)
AST SERPL-CCNC: 31 U/L (ref 15–37)
BASOPHILS ABSOLUTE: 0.1 K/UL (ref 0–0.2)
BASOPHILS RELATIVE PERCENT: 1.8 %
BILIRUB SERPL-MCNC: 0.3 MG/DL (ref 0–1)
BUN BLDV-MCNC: 17 MG/DL (ref 7–20)
CALCIUM SERPL-MCNC: 9.2 MG/DL (ref 8.3–10.6)
CHLORIDE BLD-SCNC: 100 MMOL/L (ref 99–110)
CO2: 22 MMOL/L (ref 21–32)
CREAT SERPL-MCNC: 0.7 MG/DL (ref 0.6–1.1)
CREATININE URINE: 113.8 MG/DL (ref 28–259)
EOSINOPHILS ABSOLUTE: 0.2 K/UL (ref 0–0.6)
EOSINOPHILS RELATIVE PERCENT: 3.1 %
GFR AFRICAN AMERICAN: >60
GFR NON-AFRICAN AMERICAN: >60
GLOBULIN: 2.9 G/DL
GLUCOSE BLD-MCNC: 212 MG/DL (ref 70–99)
HCT VFR BLD CALC: 39 % (ref 36–48)
HEMOGLOBIN: 13.4 G/DL (ref 12–16)
LYMPHOCYTES ABSOLUTE: 1.4 K/UL (ref 1–5.1)
LYMPHOCYTES RELATIVE PERCENT: 27.7 %
MCH RBC QN AUTO: 29.5 PG (ref 26–34)
MCHC RBC AUTO-ENTMCNC: 34.4 G/DL (ref 31–36)
MCV RBC AUTO: 85.9 FL (ref 80–100)
MICROALBUMIN UR-MCNC: 1.3 MG/DL
MICROALBUMIN/CREAT UR-RTO: 11.4 MG/G (ref 0–30)
MONOCYTES ABSOLUTE: 0.5 K/UL (ref 0–1.3)
MONOCYTES RELATIVE PERCENT: 9.7 %
NEUTROPHILS ABSOLUTE: 2.9 K/UL (ref 1.7–7.7)
NEUTROPHILS RELATIVE PERCENT: 57.7 %
PDW BLD-RTO: 14.9 % (ref 12.4–15.4)
PLATELET # BLD: 220 K/UL (ref 135–450)
PMV BLD AUTO: 8.5 FL (ref 5–10.5)
POTASSIUM SERPL-SCNC: 4.2 MMOL/L (ref 3.5–5.1)
RBC # BLD: 4.54 M/UL (ref 4–5.2)
SODIUM BLD-SCNC: 136 MMOL/L (ref 136–145)
TOTAL PROTEIN: 6.9 G/DL (ref 6.4–8.2)
WBC # BLD: 5 K/UL (ref 4–11)

## 2018-06-05 ENCOUNTER — OFFICE VISIT (OUTPATIENT)
Dept: INTERNAL MEDICINE CLINIC | Age: 58
End: 2018-06-05

## 2018-06-05 VITALS
HEIGHT: 65 IN | SYSTOLIC BLOOD PRESSURE: 130 MMHG | RESPIRATION RATE: 14 BRPM | HEART RATE: 80 BPM | DIASTOLIC BLOOD PRESSURE: 80 MMHG | BODY MASS INDEX: 48.82 KG/M2 | WEIGHT: 293 LBS

## 2018-06-05 DIAGNOSIS — I10 BENIGN ESSENTIAL HYPERTENSION: Primary | ICD-10-CM

## 2018-06-05 LAB
ESTIMATED AVERAGE GLUCOSE: 177.2 MG/DL
HBA1C MFR BLD: 7.8 %

## 2018-06-05 PROCEDURE — 99213 OFFICE O/P EST LOW 20 MIN: CPT | Performed by: INTERNAL MEDICINE

## 2018-06-05 RX ORDER — WARFARIN SODIUM 5 MG/1
TABLET ORAL
Qty: 180 TABLET | Refills: 1 | Status: SHIPPED | OUTPATIENT
Start: 2018-06-05 | End: 2018-09-28 | Stop reason: SDUPTHER

## 2018-06-05 RX ORDER — VALSARTAN 160 MG/1
160 TABLET ORAL DAILY
Qty: 90 TABLET | Refills: 1 | Status: SHIPPED | OUTPATIENT
Start: 2018-06-05 | End: 2018-08-17 | Stop reason: ALTCHOICE

## 2018-06-05 ASSESSMENT — ENCOUNTER SYMPTOMS
SHORTNESS OF BREATH: 0
COUGH: 0

## 2018-06-14 ENCOUNTER — TELEPHONE (OUTPATIENT)
Dept: INTERNAL MEDICINE CLINIC | Age: 58
End: 2018-06-14

## 2018-06-14 ENCOUNTER — ANTI-COAG VISIT (OUTPATIENT)
Dept: INTERNAL MEDICINE CLINIC | Age: 58
End: 2018-06-14

## 2018-06-14 LAB — INR BLD: 1.9

## 2018-07-03 ENCOUNTER — TELEPHONE (OUTPATIENT)
Dept: INTERNAL MEDICINE CLINIC | Age: 58
End: 2018-07-03

## 2018-07-03 ENCOUNTER — ANTI-COAG VISIT (OUTPATIENT)
Dept: INTERNAL MEDICINE CLINIC | Age: 58
End: 2018-07-03

## 2018-07-03 LAB — INR BLD: 2.4

## 2018-07-03 NOTE — TELEPHONE ENCOUNTER
----- Message from Destinee Hamilton MD sent at 7/3/2018  4:03 PM EDT -----  Contact: [phone]  Same 2  ----- Message -----  From: Benigno Lu  Sent: 7/3/2018   3:03 PM  To: Destinee Hamilton MD    INR: 2.4

## 2018-07-19 ENCOUNTER — ANTI-COAG VISIT (OUTPATIENT)
Dept: INTERNAL MEDICINE CLINIC | Age: 58
End: 2018-07-19

## 2018-07-19 ENCOUNTER — TELEPHONE (OUTPATIENT)
Dept: INTERNAL MEDICINE CLINIC | Age: 58
End: 2018-07-19

## 2018-07-19 LAB — INR BLD: 2.8

## 2018-08-06 ENCOUNTER — TELEPHONE (OUTPATIENT)
Dept: INTERNAL MEDICINE CLINIC | Age: 58
End: 2018-08-06

## 2018-08-06 ENCOUNTER — ANTI-COAG VISIT (OUTPATIENT)
Dept: INTERNAL MEDICINE CLINIC | Age: 58
End: 2018-08-06

## 2018-08-06 LAB — INR BLD: 2.5

## 2018-08-17 RX ORDER — LOSARTAN POTASSIUM 50 MG/1
50 TABLET ORAL DAILY
Qty: 30 TABLET | Refills: 2 | Status: SHIPPED | OUTPATIENT
Start: 2018-08-17 | End: 2018-10-30 | Stop reason: SDUPTHER

## 2018-08-27 ENCOUNTER — ANTI-COAG VISIT (OUTPATIENT)
Dept: INTERNAL MEDICINE CLINIC | Age: 58
End: 2018-08-27

## 2018-08-27 ENCOUNTER — TELEPHONE (OUTPATIENT)
Dept: INTERNAL MEDICINE CLINIC | Age: 58
End: 2018-08-27

## 2018-08-27 LAB — INR BLD: 2.6

## 2018-08-29 RX ORDER — GLIMEPIRIDE 2 MG/1
TABLET ORAL
Qty: 180 TABLET | Refills: 0 | Status: SHIPPED | OUTPATIENT
Start: 2018-08-29 | End: 2018-10-30 | Stop reason: SDUPTHER

## 2018-08-29 RX ORDER — FENOFIBRIC ACID 135 MG/1
CAPSULE, DELAYED RELEASE ORAL
Qty: 90 CAPSULE | Refills: 0 | Status: SHIPPED | OUTPATIENT
Start: 2018-08-29 | End: 2018-10-30 | Stop reason: SDUPTHER

## 2018-08-29 RX ORDER — CITALOPRAM 40 MG/1
TABLET ORAL
Qty: 90 TABLET | Refills: 0 | Status: SHIPPED | OUTPATIENT
Start: 2018-08-29 | End: 2018-10-30 | Stop reason: SDUPTHER

## 2018-08-29 RX ORDER — TOPIRAMATE 50 MG/1
TABLET, FILM COATED ORAL
Qty: 90 TABLET | Refills: 0 | Status: SHIPPED | OUTPATIENT
Start: 2018-08-29 | End: 2018-10-30 | Stop reason: SDUPTHER

## 2018-08-31 ENCOUNTER — HOSPITAL ENCOUNTER (OUTPATIENT)
Dept: MAMMOGRAPHY | Age: 58
Discharge: HOME | End: 2018-09-05
Payer: COMMERCIAL

## 2018-08-31 DIAGNOSIS — Z12.31 VISIT FOR SCREENING MAMMOGRAM: ICD-10-CM

## 2018-08-31 PROCEDURE — 77067 SCR MAMMO BI INCL CAD: CPT

## 2018-09-14 ENCOUNTER — TELEPHONE (OUTPATIENT)
Dept: INTERNAL MEDICINE CLINIC | Age: 58
End: 2018-09-14

## 2018-09-28 ENCOUNTER — TELEPHONE (OUTPATIENT)
Dept: INTERNAL MEDICINE CLINIC | Age: 58
End: 2018-09-28

## 2018-09-28 ENCOUNTER — ANTI-COAG VISIT (OUTPATIENT)
Dept: INTERNAL MEDICINE CLINIC | Age: 58
End: 2018-09-28

## 2018-09-28 LAB — INR BLD: 2.5

## 2018-09-28 RX ORDER — WARFARIN SODIUM 5 MG/1
TABLET ORAL
Qty: 180 TABLET | Refills: 0 | Status: SHIPPED | OUTPATIENT
Start: 2018-09-28 | End: 2018-12-06 | Stop reason: SDUPTHER

## 2018-10-12 ENCOUNTER — OFFICE VISIT (OUTPATIENT)
Dept: INTERNAL MEDICINE CLINIC | Age: 58
End: 2018-10-12

## 2018-10-12 VITALS
WEIGHT: 293 LBS | DIASTOLIC BLOOD PRESSURE: 80 MMHG | HEIGHT: 65 IN | BODY MASS INDEX: 48.82 KG/M2 | SYSTOLIC BLOOD PRESSURE: 118 MMHG | HEART RATE: 80 BPM | RESPIRATION RATE: 14 BRPM

## 2018-10-12 DIAGNOSIS — G25.0 BENIGN HEAD TREMOR: ICD-10-CM

## 2018-10-12 DIAGNOSIS — E11.9 TYPE 2 DIABETES MELLITUS WITHOUT COMPLICATION, WITHOUT LONG-TERM CURRENT USE OF INSULIN (HCC): Primary | ICD-10-CM

## 2018-10-12 DIAGNOSIS — I10 BENIGN ESSENTIAL HYPERTENSION: ICD-10-CM

## 2018-10-12 DIAGNOSIS — Z99.89 OSA ON CPAP: ICD-10-CM

## 2018-10-12 DIAGNOSIS — K76.0 FATTY INFILTRATION OF LIVER: ICD-10-CM

## 2018-10-12 DIAGNOSIS — E11.69 HYPERLIPIDEMIA ASSOCIATED WITH TYPE 2 DIABETES MELLITUS (HCC): ICD-10-CM

## 2018-10-12 DIAGNOSIS — G47.33 OSA ON CPAP: ICD-10-CM

## 2018-10-12 DIAGNOSIS — I27.82 OTHER CHRONIC PULMONARY EMBOLISM WITHOUT ACUTE COR PULMONALE (HCC): ICD-10-CM

## 2018-10-12 DIAGNOSIS — E66.01 MORBID OBESITY WITH BMI OF 50.0-59.9, ADULT (HCC): ICD-10-CM

## 2018-10-12 DIAGNOSIS — E78.5 HYPERLIPIDEMIA ASSOCIATED WITH TYPE 2 DIABETES MELLITUS (HCC): ICD-10-CM

## 2018-10-12 DIAGNOSIS — E11.9 TYPE 2 DIABETES MELLITUS WITHOUT COMPLICATION, WITHOUT LONG-TERM CURRENT USE OF INSULIN (HCC): ICD-10-CM

## 2018-10-12 LAB
A/G RATIO: 1.7 (ref 1.1–2.2)
ALBUMIN SERPL-MCNC: 4.5 G/DL (ref 3.4–5)
ALP BLD-CCNC: 69 U/L (ref 40–129)
ALT SERPL-CCNC: 14 U/L (ref 10–40)
ANION GAP SERPL CALCULATED.3IONS-SCNC: 16 MMOL/L (ref 3–16)
AST SERPL-CCNC: 40 U/L (ref 15–37)
BASOPHILS ABSOLUTE: 0.1 K/UL (ref 0–0.2)
BASOPHILS RELATIVE PERCENT: 1.8 %
BILIRUB SERPL-MCNC: <0.2 MG/DL (ref 0–1)
BUN BLDV-MCNC: 17 MG/DL (ref 7–20)
CALCIUM SERPL-MCNC: 10.1 MG/DL (ref 8.3–10.6)
CHLORIDE BLD-SCNC: 103 MMOL/L (ref 99–110)
CO2: 23 MMOL/L (ref 21–32)
CREAT SERPL-MCNC: 0.9 MG/DL (ref 0.6–1.1)
EOSINOPHILS ABSOLUTE: 0.1 K/UL (ref 0–0.6)
EOSINOPHILS RELATIVE PERCENT: 2.2 %
GFR AFRICAN AMERICAN: >60
GFR NON-AFRICAN AMERICAN: >60
GLOBULIN: 2.7 G/DL
GLUCOSE BLD-MCNC: 128 MG/DL (ref 70–99)
HCT VFR BLD CALC: 40.4 % (ref 36–48)
HEMOGLOBIN: 13.4 G/DL (ref 12–16)
LYMPHOCYTES ABSOLUTE: 1.4 K/UL (ref 1–5.1)
LYMPHOCYTES RELATIVE PERCENT: 26.1 %
MCH RBC QN AUTO: 28.8 PG (ref 26–34)
MCHC RBC AUTO-ENTMCNC: 33.1 G/DL (ref 31–36)
MCV RBC AUTO: 87 FL (ref 80–100)
MONOCYTES ABSOLUTE: 0.4 K/UL (ref 0–1.3)
MONOCYTES RELATIVE PERCENT: 7 %
NEUTROPHILS ABSOLUTE: 3.3 K/UL (ref 1.7–7.7)
NEUTROPHILS RELATIVE PERCENT: 62.9 %
PDW BLD-RTO: 15.1 % (ref 12.4–15.4)
PLATELET # BLD: 216 K/UL (ref 135–450)
PMV BLD AUTO: 9.7 FL (ref 5–10.5)
POTASSIUM SERPL-SCNC: 4.2 MMOL/L (ref 3.5–5.1)
RBC # BLD: 4.64 M/UL (ref 4–5.2)
SODIUM BLD-SCNC: 142 MMOL/L (ref 136–145)
TOTAL PROTEIN: 7.2 G/DL (ref 6.4–8.2)
WBC # BLD: 5.3 K/UL (ref 4–11)

## 2018-10-12 PROCEDURE — 99214 OFFICE O/P EST MOD 30 MIN: CPT | Performed by: INTERNAL MEDICINE

## 2018-10-12 ASSESSMENT — ENCOUNTER SYMPTOMS
RHINORRHEA: 0
ABDOMINAL PAIN: 0
SHORTNESS OF BREATH: 0
VOMITING: 0
NAUSEA: 0
WHEEZING: 0
COUGH: 1

## 2018-10-12 ASSESSMENT — PATIENT HEALTH QUESTIONNAIRE - PHQ9
SUM OF ALL RESPONSES TO PHQ QUESTIONS 1-9: 0
2. FEELING DOWN, DEPRESSED OR HOPELESS: 0
SUM OF ALL RESPONSES TO PHQ QUESTIONS 1-9: 0
1. LITTLE INTEREST OR PLEASURE IN DOING THINGS: 0
SUM OF ALL RESPONSES TO PHQ9 QUESTIONS 1 & 2: 0

## 2018-10-12 NOTE — PROGRESS NOTES
affect. Her behavior is normal. Judgment and thought content normal.       Assessment:       Diagnosis Orders   1. Type 2 diabetes mellitus without complication, without long-term current use of insulin (HCC)  HM DIABETES FOOT EXAM    Comprehensive Metabolic Panel    CBC Auto Differential    Hemoglobin A1C   2. Hyperlipidemia associated with type 2 diabetes mellitus ([facility] [address]     3. Other chronic pulmonary embolism without acute cor pulmonale ([facility] [address]     4. Morbid obesity with BMI of 50.0-59.9, [facility] ([facility]     5. Fatty infiltration of liver     6. Benign head tremor     7. ELEAZAR on CPAP     8. Benign essential hypertension            Plan:      Decrease calorie intake. Check labs. DM type 2: check labs. Continue oral medication. Check A1C  HTN: on Cozaar 50 mg daily. PE: On home monitoring system. Head tremor is controlled well. Fatty liver: she is trying to lose weight. Depression: on Celexa. ELEAZAR: on CPAP. She is using it for over 8 hours a night. Hypertriglyceridemia. Stable. Discussed use, benefit, and side effects of prescribed medications. Barriers to medication compliance addressed. All patient questions answered. Pt voiced understanding. Exercise,weight loss recommended. The current medical regimen is effective;  continue present plan and medications. See orders.

## 2018-10-13 LAB
ESTIMATED AVERAGE GLUCOSE: 177.2 MG/DL
HBA1C MFR BLD: 7.8 %

## 2018-10-15 ENCOUNTER — TELEPHONE (OUTPATIENT)
Dept: INTERNAL MEDICINE CLINIC | Age: 58
End: 2018-10-15

## 2018-10-15 NOTE — TELEPHONE ENCOUNTER
----- Message from Esme Miranda MD sent at 10/15/2018  1:17 PM EDT -----  Contact: [place] ([place]) [phone]  Yes continue other med  ----- Message -----  From: Ros Maldonado  Sent: 10/15/2018  12:40 PM  To: Esme Miranda MD    Pharmacy wants to know since you sent in [address] [place] [address], is she still taking all her other DM medications as well? Jardiance requires a PA.

## 2018-10-19 ENCOUNTER — ANTI-COAG VISIT (OUTPATIENT)
Dept: INTERNAL MEDICINE CLINIC | Age: 58
End: 2018-10-19

## 2018-10-19 LAB — INR BLD: 2.5

## 2018-10-30 RX ORDER — FENOFIBRIC ACID 135 MG/1
CAPSULE, DELAYED RELEASE ORAL
Qty: 90 CAPSULE | Refills: 0 | Status: SHIPPED | OUTPATIENT
Start: 2018-10-30 | End: 2019-03-07 | Stop reason: SDUPTHER

## 2018-10-30 RX ORDER — TOPIRAMATE 50 MG/1
TABLET, FILM COATED ORAL
Qty: 90 TABLET | Refills: 0 | Status: SHIPPED | OUTPATIENT
Start: 2018-10-30 | End: 2019-03-07 | Stop reason: SDUPTHER

## 2018-10-30 RX ORDER — GLIMEPIRIDE 2 MG/1
TABLET ORAL
Qty: 180 TABLET | Refills: 0 | Status: SHIPPED | OUTPATIENT
Start: 2018-10-30 | End: 2019-03-07 | Stop reason: SDUPTHER

## 2018-10-30 RX ORDER — LOSARTAN POTASSIUM 50 MG/1
50 TABLET ORAL DAILY
Qty: 90 TABLET | Refills: 0 | Status: SHIPPED | OUTPATIENT
Start: 2018-10-30 | End: 2018-12-06 | Stop reason: SDUPTHER

## 2018-10-30 RX ORDER — CITALOPRAM 40 MG/1
TABLET ORAL
Qty: 90 TABLET | Refills: 0 | Status: SHIPPED | OUTPATIENT
Start: 2018-10-30 | End: 2019-03-07 | Stop reason: SDUPTHER

## 2018-11-09 ENCOUNTER — ANTI-COAG VISIT (OUTPATIENT)
Dept: INTERNAL MEDICINE CLINIC | Age: 58
End: 2018-11-09

## 2018-11-09 LAB — INR BLD: 2.2

## 2018-12-05 ENCOUNTER — TELEPHONE (OUTPATIENT)
Dept: INTERNAL MEDICINE CLINIC | Age: 58
End: 2018-12-05

## 2018-12-05 ENCOUNTER — ANTI-COAG VISIT (OUTPATIENT)
Dept: INTERNAL MEDICINE CLINIC | Age: 58
End: 2018-12-05

## 2018-12-05 LAB — INR BLD: 2.3

## 2018-12-06 RX ORDER — LOSARTAN POTASSIUM 50 MG/1
50 TABLET ORAL DAILY
Qty: 90 TABLET | Refills: 0 | Status: SHIPPED | OUTPATIENT
Start: 2018-12-06 | End: 2019-03-07 | Stop reason: SDUPTHER

## 2018-12-06 RX ORDER — WARFARIN SODIUM 5 MG/1
TABLET ORAL
Qty: 180 TABLET | Refills: 0 | Status: SHIPPED | OUTPATIENT
Start: 2018-12-06 | End: 2019-05-08 | Stop reason: SDUPTHER

## 2019-01-10 ENCOUNTER — ANTI-COAG VISIT (OUTPATIENT)
Dept: INTERNAL MEDICINE CLINIC | Age: 59
End: 2019-01-10

## 2019-01-10 ENCOUNTER — TELEPHONE (OUTPATIENT)
Dept: INTERNAL MEDICINE CLINIC | Age: 59
End: 2019-01-10

## 2019-01-10 LAB — INR BLD: 1.8

## 2019-02-08 RX ORDER — EMPAGLIFLOZIN 25 MG/1
TABLET, FILM COATED ORAL
Qty: 30 TABLET | Refills: 2 | Status: SHIPPED | OUTPATIENT
Start: 2019-02-08 | End: 2019-05-08 | Stop reason: SDUPTHER

## 2019-02-12 ENCOUNTER — TELEPHONE (OUTPATIENT)
Dept: INTERNAL MEDICINE CLINIC | Age: 59
End: 2019-02-12

## 2019-02-12 ENCOUNTER — ANTI-COAG VISIT (OUTPATIENT)
Dept: INTERNAL MEDICINE CLINIC | Age: 59
End: 2019-02-12

## 2019-02-12 LAB — INR BLD: 2.2

## 2019-02-25 ENCOUNTER — OFFICE VISIT (OUTPATIENT)
Dept: INTERNAL MEDICINE CLINIC | Age: 59
End: 2019-02-25

## 2019-02-25 VITALS
WEIGHT: 293 LBS | RESPIRATION RATE: 14 BRPM | HEART RATE: 70 BPM | DIASTOLIC BLOOD PRESSURE: 80 MMHG | HEIGHT: 65 IN | SYSTOLIC BLOOD PRESSURE: 120 MMHG | BODY MASS INDEX: 48.82 KG/M2

## 2019-02-25 DIAGNOSIS — E11.69 HYPERLIPIDEMIA ASSOCIATED WITH TYPE 2 DIABETES MELLITUS (HCC): ICD-10-CM

## 2019-02-25 DIAGNOSIS — Z00.00 ROUTINE GENERAL MEDICAL EXAMINATION AT A HEALTH CARE FACILITY: Primary | ICD-10-CM

## 2019-02-25 DIAGNOSIS — I27.82 OTHER CHRONIC PULMONARY EMBOLISM WITHOUT ACUTE COR PULMONALE (HCC): ICD-10-CM

## 2019-02-25 DIAGNOSIS — I10 BENIGN ESSENTIAL HYPERTENSION: ICD-10-CM

## 2019-02-25 DIAGNOSIS — E11.9 TYPE 2 DIABETES MELLITUS WITHOUT COMPLICATION, WITHOUT LONG-TERM CURRENT USE OF INSULIN (HCC): ICD-10-CM

## 2019-02-25 DIAGNOSIS — G25.0 BENIGN HEAD TREMOR: ICD-10-CM

## 2019-02-25 DIAGNOSIS — E66.01 MORBID OBESITY WITH BMI OF 50.0-59.9, ADULT (HCC): ICD-10-CM

## 2019-02-25 DIAGNOSIS — Z99.89 OSA ON CPAP: ICD-10-CM

## 2019-02-25 DIAGNOSIS — E78.5 HYPERLIPIDEMIA ASSOCIATED WITH TYPE 2 DIABETES MELLITUS (HCC): ICD-10-CM

## 2019-02-25 DIAGNOSIS — G47.33 OSA ON CPAP: ICD-10-CM

## 2019-02-25 LAB
CHOLESTEROL, TOTAL: 161 MG/DL (ref 0–199)
HDLC SERPL-MCNC: 25 MG/DL (ref 40–60)
LDL CHOLESTEROL CALCULATED: ABNORMAL MG/DL
LDL CHOLESTEROL DIRECT: 59 MG/DL
TRIGL SERPL-MCNC: 630 MG/DL (ref 0–150)
VLDLC SERPL CALC-MCNC: ABNORMAL MG/DL

## 2019-02-25 PROCEDURE — 99396 PREV VISIT EST AGE 40-64: CPT | Performed by: INTERNAL MEDICINE

## 2019-02-25 ASSESSMENT — ENCOUNTER SYMPTOMS
ABDOMINAL PAIN: 0
WHEEZING: 0
RHINORRHEA: 0
VOMITING: 0
NAUSEA: 0
SHORTNESS OF BREATH: 0

## 2019-02-26 LAB
ESTIMATED AVERAGE GLUCOSE: 165.7 MG/DL
HBA1C MFR BLD: 7.4 %

## 2019-03-04 ENCOUNTER — ANTI-COAG VISIT (OUTPATIENT)
Dept: INTERNAL MEDICINE CLINIC | Age: 59
End: 2019-03-04

## 2019-03-04 ENCOUNTER — TELEPHONE (OUTPATIENT)
Dept: INTERNAL MEDICINE CLINIC | Age: 59
End: 2019-03-04

## 2019-03-04 LAB — INR BLD: 1.9

## 2019-03-07 RX ORDER — LOSARTAN POTASSIUM 50 MG/1
50 TABLET ORAL DAILY
Qty: 90 TABLET | Refills: 0 | Status: SHIPPED | OUTPATIENT
Start: 2019-03-07 | End: 2019-06-03 | Stop reason: SDUPTHER

## 2019-03-07 RX ORDER — TOPIRAMATE 50 MG/1
TABLET, FILM COATED ORAL
Qty: 90 TABLET | Refills: 0 | Status: SHIPPED | OUTPATIENT
Start: 2019-03-07 | End: 2019-06-03 | Stop reason: SDUPTHER

## 2019-03-07 RX ORDER — FENOFIBRIC ACID 135 MG/1
CAPSULE, DELAYED RELEASE ORAL
Qty: 90 CAPSULE | Refills: 0 | Status: SHIPPED | OUTPATIENT
Start: 2019-03-07 | End: 2019-06-03 | Stop reason: SDUPTHER

## 2019-03-07 RX ORDER — CITALOPRAM 40 MG/1
TABLET ORAL
Qty: 90 TABLET | Refills: 0 | Status: SHIPPED | OUTPATIENT
Start: 2019-03-07 | End: 2019-06-03 | Stop reason: SDUPTHER

## 2019-03-07 RX ORDER — GLIMEPIRIDE 2 MG/1
TABLET ORAL
Qty: 180 TABLET | Refills: 0 | Status: SHIPPED | OUTPATIENT
Start: 2019-03-07 | End: 2019-06-03 | Stop reason: ALTCHOICE

## 2019-03-18 ENCOUNTER — TELEPHONE (OUTPATIENT)
Dept: INTERNAL MEDICINE CLINIC | Age: 59
End: 2019-03-18

## 2019-03-18 ENCOUNTER — ANTI-COAG VISIT (OUTPATIENT)
Dept: INTERNAL MEDICINE CLINIC | Age: 59
End: 2019-03-18

## 2019-03-18 LAB — INR BLD: 1.9

## 2019-03-26 ENCOUNTER — TELEPHONE (OUTPATIENT)
Dept: INTERNAL MEDICINE CLINIC | Age: 59
End: 2019-03-26

## 2019-03-26 ENCOUNTER — TELEPHONE (OUTPATIENT)
Dept: PULMONOLOGY | Age: 59
End: 2019-03-26

## 2019-04-03 ENCOUNTER — ANTI-COAG VISIT (OUTPATIENT)
Dept: INTERNAL MEDICINE CLINIC | Age: 59
End: 2019-04-03

## 2019-04-03 ENCOUNTER — TELEPHONE (OUTPATIENT)
Dept: INTERNAL MEDICINE CLINIC | Age: 59
End: 2019-04-03

## 2019-04-03 LAB — INR BLD: 1.4

## 2019-04-03 NOTE — TELEPHONE ENCOUNTER
----- Message from Curt Penaloza MD sent at 4/3/2019 11:56 AM EDT -----  Viji Abebe same and recheck one week  ----- Message -----  From: Katey Kelly  Sent: 4/3/2019  11:36 AM  To: Curt Penaloza MD    INR 1.4     10 mg for 2 days, 7.5 mg the third day repeating  (pt had kale Sunday)

## 2019-04-08 ENCOUNTER — TELEPHONE (OUTPATIENT)
Dept: INTERNAL MEDICINE CLINIC | Age: 59
End: 2019-04-08

## 2019-04-08 NOTE — TELEPHONE ENCOUNTER
----- Message from Shiva Zuniga MD sent at 4/8/2019  1:38 PM EDT -----  Contact: [phone]  Stop amaryl  ----- Message -----  From: Rosalbaprabhjot Shankar  Sent: 4/8/2019  10:46 AM  To: Shiva Zuniga MD    Pt started diet and blood sugar is running in 80s and 90s and she gets sweaty and shaky. This started a couple of weeks ago when she started going to a [facility] that gave her a diet plan and supplements to lose weight. Otherwise pt sugars have been 137 at the highest and the lowest was 74. She said she mentioned this weight loss clinic to you at her appt. And you said that it was fine for her to go.      [address]

## 2019-04-08 NOTE — TELEPHONE ENCOUNTER
----- Message from Roberto Ramirez MD sent at 4/8/2019  1:38 PM EDT -----  Contact: [phone]   Stop amaryl  ----- Message -----  From: Simon Crouch  Sent: 4/8/2019  10:46 AM  To: Roberto Ramirez MD    Pt started diet and blood sugar is running in 80s and 90s and she gets sweaty and shaky. This started a couple of weeks ago when she started going to a [facility] that gave her a diet plan and supplements to lose weight. Otherwise pt sugars have been 137 at the highest and the lowest was 74. She said she mentioned this weight loss clinic to you at her appt. And you said that it was fine for her to go.      [address]

## 2019-04-17 ENCOUNTER — TELEPHONE (OUTPATIENT)
Dept: INTERNAL MEDICINE CLINIC | Age: 59
End: 2019-04-17

## 2019-04-17 ENCOUNTER — ANTI-COAG VISIT (OUTPATIENT)
Dept: INTERNAL MEDICINE CLINIC | Age: 59
End: 2019-04-17

## 2019-04-17 LAB — INR BLD: 1.5

## 2019-04-17 NOTE — TELEPHONE ENCOUNTER
----- Message from Pepito Lee MD sent at 4/17/2019  3:32 PM EDT -----  Have her take 10 mg daily except 7.5 mg on Wed and Fri  Recheck in two weeks  ----- Message -----  From: Yonnydion Peters  Sent: 4/17/2019   3:20 PM  To: Pepito Lee MD    10/10/7.5  ----- Message -----  From: Pepito Lee MD  Sent: 4/17/2019   3:04 PM  To:  Jayde Jones    How much  ----- Message -----  From: Yamile Messer  Sent: 4/17/2019   1:27 PM  To: Pepito Lee MD    INR:1.5    10/10/7.5

## 2019-05-02 ENCOUNTER — ANTI-COAG VISIT (OUTPATIENT)
Dept: INTERNAL MEDICINE CLINIC | Age: 59
End: 2019-05-02

## 2019-05-02 ENCOUNTER — TELEPHONE (OUTPATIENT)
Dept: INTERNAL MEDICINE CLINIC | Age: 59
End: 2019-05-02

## 2019-05-02 LAB — INR BLD: 1.5

## 2019-05-02 NOTE — TELEPHONE ENCOUNTER
----- Message from Maxi Greenberg MD sent at 5/2/2019  3:46 PM EDT -----  Change to 10 mg daily except for Friday  1 week  ----- Message -----  From: Marilyn Ness  Sent: 5/2/2019  11:44 AM  To: Maxi Greenberg MD    Do not know. Patient has not missed any doses. Her INR was 1.5 on 4/17/19 as well.    ----- Message -----  From: Maxi Greenberg MD  Sent: 5/2/2019  11:42 AM  To:  Marilyn Ness    Why low    ----- Message -----  From: Marilyn Ness  Sent: 5/2/2019  11:23 AM  To: MD Dr VALENCIA Marion patient   INR: 1.5  Currently taking 10 mg every day except on Monday and Friday she takes 7.5 mg.

## 2019-05-08 RX ORDER — WARFARIN SODIUM 5 MG/1
TABLET ORAL
Qty: 180 TABLET | Refills: 0 | Status: SHIPPED | OUTPATIENT
Start: 2019-05-08 | End: 2019-10-14 | Stop reason: SDUPTHER

## 2019-05-08 RX ORDER — EMPAGLIFLOZIN 25 MG/1
TABLET, FILM COATED ORAL
Qty: 30 TABLET | Refills: 2 | Status: SHIPPED | OUTPATIENT
Start: 2019-05-08 | End: 2019-07-09 | Stop reason: SDUPTHER

## 2019-05-09 ENCOUNTER — ANTI-COAG VISIT (OUTPATIENT)
Dept: INTERNAL MEDICINE CLINIC | Age: 59
End: 2019-05-09

## 2019-05-09 ENCOUNTER — TELEPHONE (OUTPATIENT)
Dept: INTERNAL MEDICINE CLINIC | Age: 59
End: 2019-05-09

## 2019-05-09 LAB — INR BLD: 1.8

## 2019-05-09 NOTE — TELEPHONE ENCOUNTER
----- Message from Tianna Thompson MD sent at 5/9/2019  4:57 PM EDT -----  Contact: pt- [phone]  Why low  1 week    ----- Message -----  From: Jeniffer Montes De Oca MA  Sent: 5/9/2019   2:32 PM  To: MD Dr. Christine Valadez patient - INR is 1.8, currently taking 10 mg daily and 7.5 mg on Fridays.  ----- Message -----  From: Leonela Merchant Sandor  Sent: 5/9/2019   2:24 PM  To: Jeniffer Montes De Oca MA    She is taking 10mg daily ,except for on fridays,she takes 7 1/2 mgs. --lr

## 2019-05-16 ENCOUNTER — ANTI-COAG VISIT (OUTPATIENT)
Dept: INTERNAL MEDICINE CLINIC | Age: 59
End: 2019-05-16

## 2019-05-16 LAB — INR BLD: 1.7

## 2019-05-20 ENCOUNTER — OFFICE VISIT (OUTPATIENT)
Dept: PULMONOLOGY | Age: 59
End: 2019-05-20
Payer: COMMERCIAL

## 2019-05-20 VITALS
RESPIRATION RATE: 16 BRPM | HEIGHT: 65 IN | WEIGHT: 272 LBS | OXYGEN SATURATION: 98 % | TEMPERATURE: 98 F | HEART RATE: 78 BPM | SYSTOLIC BLOOD PRESSURE: 131 MMHG | BODY MASS INDEX: 45.32 KG/M2 | DIASTOLIC BLOOD PRESSURE: 71 MMHG

## 2019-05-20 DIAGNOSIS — G47.33 SEVERE OBSTRUCTIVE SLEEP APNEA: Primary | ICD-10-CM

## 2019-05-20 DIAGNOSIS — E66.01 OBESITY, MORBID, BMI 40.0-49.9 (HCC): ICD-10-CM

## 2019-05-20 DIAGNOSIS — Z71.89 CPAP USE COUNSELING: ICD-10-CM

## 2019-05-20 DIAGNOSIS — R68.2 DRY MOUTH: ICD-10-CM

## 2019-05-20 PROCEDURE — 99213 OFFICE O/P EST LOW 20 MIN: CPT | Performed by: NURSE PRACTITIONER

## 2019-05-20 ASSESSMENT — SLEEP AND FATIGUE QUESTIONNAIRES
HOW LIKELY ARE YOU TO NOD OFF OR FALL ASLEEP IN A CAR, WHILE STOPPED FOR A FEW MINUTES IN TRAFFIC: 0
HOW LIKELY ARE YOU TO NOD OFF OR FALL ASLEEP WHILE WATCHING TV: 3
HOW LIKELY ARE YOU TO NOD OFF OR FALL ASLEEP WHILE SITTING QUIETLY AFTER LUNCH WITHOUT ALCOHOL: 0
ESS TOTAL SCORE: 6
NECK CIRCUMFERENCE (INCHES): 18.25
HOW LIKELY ARE YOU TO NOD OFF OR FALL ASLEEP WHILE SITTING INACTIVE IN A PUBLIC PLACE: 0
HOW LIKELY ARE YOU TO NOD OFF OR FALL ASLEEP WHEN YOU ARE A PASSENGER IN A CAR FOR AN HOUR WITHOUT A BREAK: 0
HOW LIKELY ARE YOU TO NOD OFF OR FALL ASLEEP WHILE SITTING AND TALKING TO SOMEONE: 0
HOW LIKELY ARE YOU TO NOD OFF OR FALL ASLEEP WHILE LYING DOWN TO REST IN THE AFTERNOON WHEN CIRCUMSTANCES PERMIT: 3
HOW LIKELY ARE YOU TO NOD OFF OR FALL ASLEEP WHILE SITTING AND READING: 0

## 2019-05-20 NOTE — PROGRESS NOTES
Sig Dispense Refill    SITagliptin-metFORMIN (JANUMET XR)  MG TB24 per extended release tablet TAKE ONE TABLET BY MOUTH 2 TIMES DAILY 60 tablet 0    JARDIANCE 25 MG tablet TAKE 1 TABLET BY MOUTH DAILY 30 tablet 2    warfarin (COUMADIN) 5 MG tablet TAKE TWO (2) TABLETS BY MOUTH DAILY OR AS DIRECTED BY YOUR DOCTOR 180 tablet 0    losartan (COZAAR) 50 MG tablet TAKE 1 TABLET BY MOUTH DAILY 90 tablet 0    topiramate (TOPAMAX) 50 MG tablet TAKE ONE TABLET BY MOUTH EVERY EVENING 90 tablet 0    fenofibric acid (FIBRICOR) 135 MG CPDR capsule TAKE ONE CAPSULE BY MOUTH DAILY 90 capsule 0    citalopram (CELEXA) 40 MG tablet TAKE ONE TABLET BY MOUTH DAILY 90 tablet 0    cholestyramine (QUESTRAN) 4 g packet Take 1 packet by mouth 2 times daily 60 packet 3    PRODIGY NO CODING BLOOD GLUC strip TEST BLOOD SUGARS 3 TIMES DAILY 100 each 2    cyanocobalamin 1000 MCG tablet Take 1,000 mcg by mouth daily      ONE TOUCH LANCETS MISC 1 each by Does not apply route daily. 100 each 3    glimepiride (AMARYL) 2 MG tablet TAKE ONE TABLET BY MOUTH 2 TIMES DAILY 180 tablet 0     No current facility-administered medications for this visit. Objective:   Physical Exam  /71 (Site: Right Lower Arm, Position: Sitting)   Pulse 78   Temp 98 °F (36.7 °C) (Oral)   Resp 16   Ht 5' 5\" (1.651 m)   Wt 272 lb (123.4 kg)   SpO2 98%   BMI 45.26 kg/m²  RA  Gen: No acute distress. Obese. BMI of 45.26  Eyes: PERRL. No sclera icterus. No conjunctival injection. ENT: No discharge. Pharynx clear. Mallampati class IV. Neck: Trachea midline. No obvious mass. Neck circumference 18.25\"  Resp: No accessory muscle use. No crackles. No wheezes. No rhonchi. CV: Regular rate. Regular rhythm. No murmur or rub. Skin: Warm and dry. M/S: No cyanosis. No obvious joint deformity. Neuro: Awake. Alert. Moves all four extremities. Psych: Oriented x 3. No anxiety.      Data:   Split night 1/29/16: AHI 70 and low O2 82%, improved sleep related breathing disorder with CPAP therapy. Started on AutoCPAP min pressure of 12 cmH2O and max pressure of 16 cmH2O. CPAP compliance Data:  Compliance download report from 2/25/17 to 3/26/17  showed patient is using machine 8:38 hrs/night with 100% compliance and AHI 0.2 within this time frame. 30/30days with greater than 4 hours of machine use. 95th percentile leak 37.8 L/min  90% pressure 15 cm H20    Compliance download report from 2/24/18 to 3/25/18 showed patient is using machine 8:14 hrs/night with 100% compliance and AHI 0.3 within this time frame. 30/30days with greater than 4 hours of machine use. Leak 40 L/min  90% pressure 15 cm H20 on auto CPAP 12-16 cm H2O    Compliance download report from 4/19/19 to 5/18/19 reviewed today by me and showed patient is using machine 8:09 hrs/night with 100% compliance and AHI 0.5 within this time frame. 30/30days with greater than 4 hours of machine use. 90% pressure 14.4 cm H20 on auto CPAP 13-17 cm H2O    Assessment:      1. ELEAZAR - severe. Failed CPAP 12 cmH2O, improved with Auto CPAP 13-17 cm H2O. Nasal mask. Optimal compliance and efficacy on review today. 2. DM  3. Fatigue  4. Obesity  5. HTN per PCP      Plan:      - Continue AutoCPAP min pressure of 13 cmH2O and max pressure of 17 cmH2O.  -Discussed humidification, demonstrated in office how to adjust settings, encouraged to use if needed. Also discussed dry mouth may be from oral leak- states she has tried chinstrap and did not like. Discussed could try full face mask if needed    - Replacement of mask, tubing, head straps every 3-6 months or sooner if damaged. - Sleep hygiene  - Avoid sedatives, alcohol and caffeinated drinks at bed time.  - No driving motorized vehicles or operating heavy machinery while fatigue, drowsy or sleepy.    - Weight loss is also recommended as a long-term intervention.    - Complications of ELEAZAR if not treated were discussed with patient patient to include systemic hypertension, pulmonary hypertension, cardiovascular morbidities, car accidents and all cause mortality.  -Patient education handout provided regarding sleep tips and CPAP cleaning recommendations       Follow-up:  1 year, sooner if needed

## 2019-05-28 ENCOUNTER — ANTI-COAG VISIT (OUTPATIENT)
Dept: INTERNAL MEDICINE CLINIC | Age: 59
End: 2019-05-28

## 2019-05-28 ENCOUNTER — TELEPHONE (OUTPATIENT)
Dept: INTERNAL MEDICINE CLINIC | Age: 59
End: 2019-05-28

## 2019-05-28 LAB — INR BLD: 1.8

## 2019-05-28 NOTE — TELEPHONE ENCOUNTER
----- Message from Gerardo Masters MD sent at 5/28/2019  4:13 PM EDT -----  Same 2 w  ----- Message -----  From: Jeniffer Montes De Oca MA  Sent: 5/28/2019  10:51 AM  To: Gerardo Masters MD    INR is 1.8, currently taking 7.5 on Friday and 10 mg the rest of the week.

## 2019-06-03 ENCOUNTER — OFFICE VISIT (OUTPATIENT)
Dept: INTERNAL MEDICINE CLINIC | Age: 59
End: 2019-06-03

## 2019-06-03 VITALS
DIASTOLIC BLOOD PRESSURE: 80 MMHG | SYSTOLIC BLOOD PRESSURE: 130 MMHG | HEIGHT: 65 IN | HEART RATE: 70 BPM | WEIGHT: 264 LBS | BODY MASS INDEX: 43.99 KG/M2 | RESPIRATION RATE: 14 BRPM

## 2019-06-03 DIAGNOSIS — E78.5 HYPERLIPIDEMIA ASSOCIATED WITH TYPE 2 DIABETES MELLITUS (HCC): ICD-10-CM

## 2019-06-03 DIAGNOSIS — Z99.89 OSA ON CPAP: ICD-10-CM

## 2019-06-03 DIAGNOSIS — E11.69 HYPERLIPIDEMIA ASSOCIATED WITH TYPE 2 DIABETES MELLITUS (HCC): ICD-10-CM

## 2019-06-03 DIAGNOSIS — I27.82 OTHER CHRONIC PULMONARY EMBOLISM WITHOUT ACUTE COR PULMONALE (HCC): ICD-10-CM

## 2019-06-03 DIAGNOSIS — E66.01 MORBID OBESITY WITH BMI OF 50.0-59.9, ADULT (HCC): ICD-10-CM

## 2019-06-03 DIAGNOSIS — G47.33 OSA ON CPAP: ICD-10-CM

## 2019-06-03 DIAGNOSIS — E11.9 TYPE 2 DIABETES MELLITUS WITHOUT COMPLICATION, WITHOUT LONG-TERM CURRENT USE OF INSULIN (HCC): Primary | ICD-10-CM

## 2019-06-03 DIAGNOSIS — I10 BENIGN ESSENTIAL HYPERTENSION: ICD-10-CM

## 2019-06-03 DIAGNOSIS — E11.9 TYPE 2 DIABETES MELLITUS WITHOUT COMPLICATION, WITHOUT LONG-TERM CURRENT USE OF INSULIN (HCC): ICD-10-CM

## 2019-06-03 DIAGNOSIS — G25.0 BENIGN HEAD TREMOR: ICD-10-CM

## 2019-06-03 LAB
BILIRUBIN, POC: NORMAL
BLOOD URINE, POC: NORMAL
CLARITY, POC: NORMAL
COLOR, POC: NORMAL
GLUCOSE URINE, POC: NORMAL
KETONES, POC: NORMAL
LEUKOCYTE EST, POC: NORMAL
NITRITE, POC: NORMAL
PH, POC: NORMAL
PROTEIN, POC: NORMAL
SPECIFIC GRAVITY, POC: NORMAL
UROBILINOGEN, POC: NORMAL

## 2019-06-03 PROCEDURE — 99214 OFFICE O/P EST MOD 30 MIN: CPT | Performed by: INTERNAL MEDICINE

## 2019-06-03 PROCEDURE — 81002 URINALYSIS NONAUTO W/O SCOPE: CPT | Performed by: INTERNAL MEDICINE

## 2019-06-03 RX ORDER — FENOFIBRIC ACID 135 MG/1
CAPSULE, DELAYED RELEASE ORAL
Qty: 90 CAPSULE | Refills: 0 | Status: SHIPPED | OUTPATIENT
Start: 2019-06-03 | End: 2019-06-27 | Stop reason: SDUPTHER

## 2019-06-03 RX ORDER — TOPIRAMATE 50 MG/1
TABLET, FILM COATED ORAL
Qty: 90 TABLET | Refills: 0 | Status: SHIPPED | OUTPATIENT
Start: 2019-06-03 | End: 2019-06-27 | Stop reason: SDUPTHER

## 2019-06-03 RX ORDER — CITALOPRAM 40 MG/1
TABLET ORAL
Qty: 90 TABLET | Refills: 0 | Status: SHIPPED | OUTPATIENT
Start: 2019-06-03 | End: 2019-06-27 | Stop reason: SDUPTHER

## 2019-06-03 RX ORDER — LOSARTAN POTASSIUM 50 MG/1
50 TABLET ORAL DAILY
Qty: 90 TABLET | Refills: 0 | Status: SHIPPED | OUTPATIENT
Start: 2019-06-03 | End: 2019-06-27 | Stop reason: SDUPTHER

## 2019-06-03 ASSESSMENT — ENCOUNTER SYMPTOMS
SHORTNESS OF BREATH: 0
VOMITING: 0
RHINORRHEA: 0
WHEEZING: 0
ABDOMINAL PAIN: 0
NAUSEA: 0
COUGH: 1

## 2019-06-03 NOTE — PROGRESS NOTES
Subjective:      Patient ID: Maria Esther Jaquez is a 63 y.o. female. HPI    Patient is here for follow up of diabetes, HTN, depression, pulmonary embolism and hyperlipidemia. She has lost 68 lbs in one year. Patient's is checking her sugars. He sugars are between 100-120 mg/dl. No hypoglycemia. Patient does not have polydipsia and polyuria. Her last A1C was 7.4. She is on cozaar for HTN. Her Bp is at goal.  She is compliant with coumadin. She checks pro time at home. Her INR is low. Her depression is stable. No new issues. Her cholesterol is controlled. It is at goal.  She has ELEAZAR. She is on CPAP. She is compliant. She has benign head tremor. No new issues. Review of Systems   Constitutional: Negative for appetite change and fatigue. HENT: Negative for postnasal drip and rhinorrhea. Respiratory: Positive for cough. Negative for shortness of breath and wheezing. Cardiovascular: Negative for chest pain and palpitations. Gastrointestinal: Negative for abdominal pain, nausea and vomiting. Endocrine: Negative for polydipsia, polyphagia and polyuria. Musculoskeletal: Negative for joint swelling. Skin: Negative for rash. Neurological: Negative for light-headedness. Psychiatric/Behavioral: Positive for sleep disturbance. The patient is nervous/anxious.       Current Outpatient Medications on File Prior to Visit   Medication Sig Dispense Refill    SITagliptin-metFORMIN (JANUMET XR)  MG TB24 per extended release tablet TAKE ONE TABLET BY MOUTH 2 TIMES DAILY 60 tablet 0    JARDIANCE 25 MG tablet TAKE 1 TABLET BY MOUTH DAILY 30 tablet 2    warfarin (COUMADIN) 5 MG tablet TAKE TWO (2) TABLETS BY MOUTH DAILY OR AS DIRECTED BY YOUR DOCTOR 180 tablet 0    losartan (COZAAR) 50 MG tablet TAKE 1 TABLET BY MOUTH DAILY 90 tablet 0    topiramate (TOPAMAX) 50 MG tablet TAKE ONE TABLET BY MOUTH EVERY EVENING 90 tablet 0    fenofibric acid (FIBRICOR) 135 MG CPDR capsule TAKE ONE CAPSULE BY MOUTH

## 2019-06-04 LAB
A/G RATIO: 1.9 (ref 1.1–2.2)
ALBUMIN SERPL-MCNC: 4.7 G/DL (ref 3.4–5)
ALP BLD-CCNC: 66 U/L (ref 40–129)
ALT SERPL-CCNC: 10 U/L (ref 10–40)
ANION GAP SERPL CALCULATED.3IONS-SCNC: 22 MMOL/L (ref 3–16)
AST SERPL-CCNC: 25 U/L (ref 15–37)
BASOPHILS ABSOLUTE: 0.1 K/UL (ref 0–0.2)
BASOPHILS RELATIVE PERCENT: 1 %
BILIRUB SERPL-MCNC: 0.3 MG/DL (ref 0–1)
BUN BLDV-MCNC: 22 MG/DL (ref 7–20)
CALCIUM SERPL-MCNC: 9.9 MG/DL (ref 8.3–10.6)
CHLORIDE BLD-SCNC: 103 MMOL/L (ref 99–110)
CHOLESTEROL, TOTAL: 163 MG/DL (ref 0–199)
CO2: 18 MMOL/L (ref 21–32)
CREAT SERPL-MCNC: 0.8 MG/DL (ref 0.6–1.1)
CREATININE URINE: 51.1 MG/DL (ref 28–259)
EOSINOPHILS ABSOLUTE: 0.1 K/UL (ref 0–0.6)
EOSINOPHILS RELATIVE PERCENT: 1.2 %
ESTIMATED AVERAGE GLUCOSE: 99.7 MG/DL
GFR AFRICAN AMERICAN: >60
GFR NON-AFRICAN AMERICAN: >60
GLOBULIN: 2.5 G/DL
GLUCOSE BLD-MCNC: 93 MG/DL (ref 70–99)
HBA1C MFR BLD: 5.1 %
HCT VFR BLD CALC: 41.1 % (ref 36–48)
HDLC SERPL-MCNC: 34 MG/DL (ref 40–60)
HEMOGLOBIN: 13.5 G/DL (ref 12–16)
LDL CHOLESTEROL CALCULATED: ABNORMAL MG/DL
LDL CHOLESTEROL DIRECT: 76 MG/DL
LYMPHOCYTES ABSOLUTE: 1.3 K/UL (ref 1–5.1)
LYMPHOCYTES RELATIVE PERCENT: 24.2 %
MCH RBC QN AUTO: 28 PG (ref 26–34)
MCHC RBC AUTO-ENTMCNC: 32.8 G/DL (ref 31–36)
MCV RBC AUTO: 85.2 FL (ref 80–100)
MICROALBUMIN UR-MCNC: <1.2 MG/DL
MICROALBUMIN/CREAT UR-RTO: NORMAL MG/G (ref 0–30)
MONOCYTES ABSOLUTE: 0.5 K/UL (ref 0–1.3)
MONOCYTES RELATIVE PERCENT: 8.7 %
NEUTROPHILS ABSOLUTE: 3.5 K/UL (ref 1.7–7.7)
NEUTROPHILS RELATIVE PERCENT: 64.9 %
PDW BLD-RTO: 18.2 % (ref 12.4–15.4)
PLATELET # BLD: 200 K/UL (ref 135–450)
PMV BLD AUTO: 9.4 FL (ref 5–10.5)
POTASSIUM SERPL-SCNC: 4.5 MMOL/L (ref 3.5–5.1)
RBC # BLD: 4.83 M/UL (ref 4–5.2)
SODIUM BLD-SCNC: 143 MMOL/L (ref 136–145)
TOTAL PROTEIN: 7.2 G/DL (ref 6.4–8.2)
TRIGL SERPL-MCNC: 363 MG/DL (ref 0–150)
VLDLC SERPL CALC-MCNC: ABNORMAL MG/DL
WBC # BLD: 5.3 K/UL (ref 4–11)

## 2019-06-10 ENCOUNTER — ANTI-COAG VISIT (OUTPATIENT)
Dept: INTERNAL MEDICINE CLINIC | Age: 59
End: 2019-06-10

## 2019-06-10 ENCOUNTER — TELEPHONE (OUTPATIENT)
Dept: INTERNAL MEDICINE CLINIC | Age: 59
End: 2019-06-10

## 2019-06-10 LAB — INR BLD: 2.2

## 2019-06-10 NOTE — TELEPHONE ENCOUNTER
----- Message from Gerardo Masters MD sent at 6/10/2019  2:44 PM EDT -----  Contact: [phone]  Same 2 w  ----- Message -----  From: Peter Dong  Sent: 6/10/2019   2:28 PM  To: Gerardo Masters MD    Patient taking 10 mg daily.   ----- Message -----  From: ePter Dong  Sent: 6/10/2019   1:33 PM  To: Gerardo Masters MD    INR: 2.20  Left message to return call with dosing.

## 2019-06-27 RX ORDER — CITALOPRAM 40 MG/1
TABLET ORAL
Qty: 90 TABLET | Refills: 0 | Status: SHIPPED | OUTPATIENT
Start: 2019-06-27 | End: 2020-03-04

## 2019-06-27 RX ORDER — FENOFIBRIC ACID 135 MG/1
CAPSULE, DELAYED RELEASE ORAL
Qty: 90 CAPSULE | Refills: 0 | Status: SHIPPED | OUTPATIENT
Start: 2019-06-27 | End: 2020-03-04

## 2019-06-27 RX ORDER — LOSARTAN POTASSIUM 50 MG/1
50 TABLET ORAL DAILY
Qty: 90 TABLET | Refills: 0 | Status: SHIPPED | OUTPATIENT
Start: 2019-06-27 | End: 2020-03-04

## 2019-06-27 RX ORDER — TOPIRAMATE 50 MG/1
TABLET, FILM COATED ORAL
Qty: 90 TABLET | Refills: 0 | Status: SHIPPED | OUTPATIENT
Start: 2019-06-27 | End: 2020-01-13 | Stop reason: DRUGHIGH

## 2019-07-03 ENCOUNTER — TELEPHONE (OUTPATIENT)
Dept: INTERNAL MEDICINE CLINIC | Age: 59
End: 2019-07-03

## 2019-07-03 ENCOUNTER — ANTI-COAG VISIT (OUTPATIENT)
Dept: INTERNAL MEDICINE CLINIC | Age: 59
End: 2019-07-03

## 2019-07-03 LAB — INR BLD: 2

## 2019-07-03 NOTE — TELEPHONE ENCOUNTER
----- Message from Dorys Oliveira MD sent at 7/3/2019  1:30 PM EDT -----  Same 2 w  ----- Message -----  From: Malissa Brittle  Sent: 7/3/2019   1:05 PM  To: Dorys Oliveira MD    INR 2

## 2019-07-09 RX ORDER — EMPAGLIFLOZIN 25 MG/1
TABLET, FILM COATED ORAL
Qty: 30 TABLET | Refills: 2 | Status: SHIPPED | OUTPATIENT
Start: 2019-07-09 | End: 2019-11-07 | Stop reason: SDUPTHER

## 2019-07-19 ENCOUNTER — ANTI-COAG VISIT (OUTPATIENT)
Dept: INTERNAL MEDICINE CLINIC | Age: 59
End: 2019-07-19

## 2019-07-19 LAB — INR BLD: 2.5

## 2019-08-07 ENCOUNTER — TELEPHONE (OUTPATIENT)
Dept: INTERNAL MEDICINE CLINIC | Age: 59
End: 2019-08-07

## 2019-08-07 ENCOUNTER — ANTI-COAG VISIT (OUTPATIENT)
Dept: INTERNAL MEDICINE CLINIC | Age: 59
End: 2019-08-07

## 2019-08-07 ENCOUNTER — HOSPITAL ENCOUNTER (EMERGENCY)
Age: 59
Discharge: HOME | End: 2019-08-07
Attending: EMERGENCY MEDICINE
Payer: COMMERCIAL

## 2019-08-07 ENCOUNTER — HOSPITAL ENCOUNTER (OUTPATIENT)
Age: 59
Setting detail: SPECIMEN
Discharge: HOME | End: 2019-08-07
Attending: INTERNAL MEDICINE
Payer: COMMERCIAL

## 2019-08-07 ENCOUNTER — APPOINTMENT (OUTPATIENT)
Dept: CT IMAGING | Age: 59
End: 2019-08-07
Payer: COMMERCIAL

## 2019-08-07 VITALS
HEART RATE: 66 BPM | WEIGHT: 280 LBS | BODY MASS INDEX: 46.65 KG/M2 | HEIGHT: 65 IN | TEMPERATURE: 97.9 F | DIASTOLIC BLOOD PRESSURE: 70 MMHG | OXYGEN SATURATION: 96 % | RESPIRATION RATE: 18 BRPM | SYSTOLIC BLOOD PRESSURE: 132 MMHG

## 2019-08-07 DIAGNOSIS — N20.0 KIDNEY STONE: ICD-10-CM

## 2019-08-07 DIAGNOSIS — R11.2 NAUSEA AND VOMITING, INTRACTABILITY OF VOMITING NOT SPECIFIED, UNSPECIFIED VOMITING TYPE: ICD-10-CM

## 2019-08-07 DIAGNOSIS — N20.0 KIDNEY STONE: Primary | ICD-10-CM

## 2019-08-07 DIAGNOSIS — R10.9 ACUTE RIGHT FLANK PAIN: ICD-10-CM

## 2019-08-07 DIAGNOSIS — R10.31 RIGHT LOWER QUADRANT ABDOMINAL PAIN: Primary | ICD-10-CM

## 2019-08-07 LAB
A/G RATIO: 1.4 (ref 1.1–2.2)
ALBUMIN SERPL-MCNC: 4.2 G/DL (ref 3.4–5)
ALP BLD-CCNC: 51 U/L (ref 40–129)
ALT SERPL-CCNC: <5 U/L (ref 10–40)
ANION GAP SERPL CALCULATED.3IONS-SCNC: 15 MMOL/L (ref 3–16)
AST SERPL-CCNC: 15 U/L (ref 15–37)
BASOPHILS ABSOLUTE: 0.1 K/UL (ref 0–0.2)
BASOPHILS RELATIVE PERCENT: 1 %
BILIRUB SERPL-MCNC: 0.3 MG/DL (ref 0–1)
BILIRUBIN URINE: NEGATIVE
BLOOD, URINE: ABNORMAL
BUN BLDV-MCNC: 24 MG/DL (ref 7–20)
CALCIUM SERPL-MCNC: 9.5 MG/DL (ref 8.3–10.6)
CHLORIDE BLD-SCNC: 101 MMOL/L (ref 99–110)
CLARITY: CLEAR
CO2: 20 MMOL/L (ref 21–32)
COLOR: YELLOW
CREAT SERPL-MCNC: 0.8 MG/DL (ref 0.6–1.1)
EKG ATRIAL RATE: 66 BPM
EKG DIAGNOSIS: NORMAL
EKG P AXIS: 24 DEGREES
EKG P-R INTERVAL: 140 MS
EKG Q-T INTERVAL: 416 MS
EKG QRS DURATION: 82 MS
EKG QTC CALCULATION (BAZETT): 436 MS
EKG R AXIS: 33 DEGREES
EKG T AXIS: 31 DEGREES
EKG VENTRICULAR RATE: 66 BPM
EOSINOPHILS ABSOLUTE: 0.1 K/UL (ref 0–0.6)
EOSINOPHILS RELATIVE PERCENT: 0.7 %
EPITHELIAL CELLS, UA: ABNORMAL /HPF
GFR AFRICAN AMERICAN: >60
GFR NON-AFRICAN AMERICAN: >60
GLOBULIN: 3.1 G/DL
GLUCOSE BLD-MCNC: 179 MG/DL (ref 70–99)
GLUCOSE URINE: >=1000 MG/DL
HCT VFR BLD CALC: 37.9 % (ref 36–48)
HEMOGLOBIN: 12.6 G/DL (ref 12–16)
INR BLD: 1.9 (ref 0.86–1.14)
KETONES, URINE: NEGATIVE MG/DL
LEUKOCYTE ESTERASE, URINE: NEGATIVE
LIPASE: 42 U/L (ref 13–60)
LYMPHOCYTES ABSOLUTE: 0.9 K/UL (ref 1–5.1)
LYMPHOCYTES RELATIVE PERCENT: 10.7 %
MCH RBC QN AUTO: 28.6 PG (ref 26–34)
MCHC RBC AUTO-ENTMCNC: 33.4 G/DL (ref 31–36)
MCV RBC AUTO: 85.6 FL (ref 80–100)
MICROSCOPIC EXAMINATION: YES
MONOCYTES ABSOLUTE: 0.5 K/UL (ref 0–1.3)
MONOCYTES RELATIVE PERCENT: 6.5 %
NEUTROPHILS ABSOLUTE: 6.7 K/UL (ref 1.7–7.7)
NEUTROPHILS RELATIVE PERCENT: 81.1 %
NITRITE, URINE: NEGATIVE
PDW BLD-RTO: 16.4 % (ref 12.4–15.4)
PH UA: 6 (ref 5–8)
PLATELET # BLD: 230 K/UL (ref 135–450)
PMV BLD AUTO: 8.3 FL (ref 5–10.5)
POTASSIUM REFLEX MAGNESIUM: 4.3 MMOL/L (ref 3.5–5.1)
PROTEIN UA: NEGATIVE MG/DL
PROTHROMBIN TIME: 21.7 SEC (ref 9.8–13)
RBC # BLD: 4.42 M/UL (ref 4–5.2)
RBC UA: ABNORMAL /HPF (ref 0–2)
SODIUM BLD-SCNC: 136 MMOL/L (ref 136–145)
SPECIFIC GRAVITY UA: 1.01 (ref 1–1.03)
TOTAL PROTEIN: 7.3 G/DL (ref 6.4–8.2)
URINE REFLEX TO CULTURE: ABNORMAL
URINE TYPE: ABNORMAL
UROBILINOGEN, URINE: 0.2 E.U./DL
WBC # BLD: 8.2 K/UL (ref 4–11)
WBC UA: ABNORMAL /HPF (ref 0–5)

## 2019-08-07 PROCEDURE — 82365 CALCULUS SPECTROSCOPY: CPT

## 2019-08-07 PROCEDURE — 93010 ELECTROCARDIOGRAM REPORT: CPT | Performed by: INTERNAL MEDICINE

## 2019-08-07 PROCEDURE — 85610 PROTHROMBIN TIME: CPT

## 2019-08-07 PROCEDURE — 81001 URINALYSIS AUTO W/SCOPE: CPT

## 2019-08-07 PROCEDURE — 85025 COMPLETE CBC W/AUTO DIFF WBC: CPT

## 2019-08-07 PROCEDURE — 96361 HYDRATE IV INFUSION ADD-ON: CPT

## 2019-08-07 PROCEDURE — 74177 CT ABD & PELVIS W/CONTRAST: CPT

## 2019-08-07 PROCEDURE — 80053 COMPREHEN METABOLIC PANEL: CPT

## 2019-08-07 PROCEDURE — 2580000003 HC RX 258: Performed by: EMERGENCY MEDICINE

## 2019-08-07 PROCEDURE — 88300 SURGICAL PATH GROSS: CPT

## 2019-08-07 PROCEDURE — 6360000002 HC RX W HCPCS: Performed by: EMERGENCY MEDICINE

## 2019-08-07 PROCEDURE — 93005 ELECTROCARDIOGRAM TRACING: CPT | Performed by: EMERGENCY MEDICINE

## 2019-08-07 PROCEDURE — 96375 TX/PRO/DX INJ NEW DRUG ADDON: CPT

## 2019-08-07 PROCEDURE — 96374 THER/PROPH/DIAG INJ IV PUSH: CPT

## 2019-08-07 PROCEDURE — 83690 ASSAY OF LIPASE: CPT

## 2019-08-07 PROCEDURE — 6360000004 HC RX CONTRAST MEDICATION: Performed by: EMERGENCY MEDICINE

## 2019-08-07 PROCEDURE — 2500000003 HC RX 250 WO HCPCS: Performed by: EMERGENCY MEDICINE

## 2019-08-07 PROCEDURE — 99284 EMERGENCY DEPT VISIT MOD MDM: CPT

## 2019-08-07 RX ORDER — MORPHINE SULFATE 4 MG/ML
4 INJECTION, SOLUTION INTRAMUSCULAR; INTRAVENOUS
Status: DISCONTINUED | OUTPATIENT
Start: 2019-08-07 | End: 2019-08-07 | Stop reason: HOSPADM

## 2019-08-07 RX ORDER — KETOROLAC TROMETHAMINE 30 MG/ML
15 INJECTION, SOLUTION INTRAMUSCULAR; INTRAVENOUS ONCE
Status: COMPLETED | OUTPATIENT
Start: 2019-08-07 | End: 2019-08-07

## 2019-08-07 RX ORDER — 0.9 % SODIUM CHLORIDE 0.9 %
1000 INTRAVENOUS SOLUTION INTRAVENOUS ONCE
Status: COMPLETED | OUTPATIENT
Start: 2019-08-07 | End: 2019-08-07

## 2019-08-07 RX ORDER — ONDANSETRON 4 MG/1
4 TABLET, ORALLY DISINTEGRATING ORAL 3 TIMES DAILY PRN
Qty: 10 TABLET | Refills: 0 | Status: SHIPPED | OUTPATIENT
Start: 2019-08-07 | End: 2020-04-27 | Stop reason: ALTCHOICE

## 2019-08-07 RX ORDER — ONDANSETRON 2 MG/ML
4 INJECTION INTRAMUSCULAR; INTRAVENOUS EVERY 30 MIN PRN
Status: DISCONTINUED | OUTPATIENT
Start: 2019-08-07 | End: 2019-08-07 | Stop reason: HOSPADM

## 2019-08-07 RX ORDER — OMEGA-3 FATTY ACIDS CAP DELAYED RELEASE 1000 MG 1000 MG
2000 CAPSULE DELAYED RELEASE ORAL DAILY
COMMUNITY
End: 2022-10-26

## 2019-08-07 RX ADMIN — MORPHINE SULFATE 4 MG: 4 INJECTION INTRAVENOUS at 07:02

## 2019-08-07 RX ADMIN — ONDANSETRON 4 MG: 2 INJECTION INTRAMUSCULAR; INTRAVENOUS at 06:40

## 2019-08-07 RX ADMIN — SODIUM CHLORIDE 1000 ML: 9 INJECTION, SOLUTION INTRAVENOUS at 06:40

## 2019-08-07 RX ADMIN — KETOROLAC TROMETHAMINE 15 MG: 30 INJECTION, SOLUTION INTRAMUSCULAR at 08:27

## 2019-08-07 RX ADMIN — FAMOTIDINE 20 MG: 10 INJECTION, SOLUTION INTRAVENOUS at 06:40

## 2019-08-07 RX ADMIN — IOPAMIDOL 75 ML: 755 INJECTION, SOLUTION INTRAVENOUS at 07:35

## 2019-08-07 ASSESSMENT — PAIN SCALES - GENERAL
PAINLEVEL_OUTOF10: 7
PAINLEVEL_OUTOF10: 8
PAINLEVEL_OUTOF10: 8

## 2019-08-07 ASSESSMENT — PAIN DESCRIPTION - LOCATION: LOCATION: ABDOMEN

## 2019-08-07 NOTE — ED PROVIDER NOTES
11/9/2015    cecal polyp    DILATION AND CURETTAGE OF UTERUS  03/13/2018    HERNIA REPAIR  89/78/29    lap umbilical    HYSTEROSCOPY  03/13/2018    and D&C        Family History:    Family History   Problem Relation Age of Onset    Hypertension Mother     Diabetes Mother     Heart Failure Father     Other Father         AAA    High Blood Pressure Sister     Diabetes Sister     High Blood Pressure Brother     Heart Attack Brother     Diabetes Brother        Social History     Socioeconomic History    Marital status:      Spouse name: Not on file    Number of children: Not on file    Years of education: Not on file    Highest education level: Not on file   Occupational History    Not on file   Social Needs    Financial resource strain: Not on file    Food insecurity:     Worry: Not on file     Inability: Not on file    Transportation needs:     Medical: Not on file     Non-medical: Not on file   Tobacco Use    Smoking status: Never Smoker    Smokeless tobacco: Never Used   Substance and Sexual Activity    Alcohol use: No     Alcohol/week: 0.0 standard drinks    Drug use: No    Sexual activity: Not Currently     Partners: Male   Lifestyle    Physical activity:     Days per week: Not on file     Minutes per session: Not on file    Stress: Not on file   Relationships    Social connections:     Talks on phone: Not on file     Gets together: Not on file     Attends [employer] service: Not on file     Active member of club or organization: Not on file     Attends meetings of clubs or organizations: Not on file     Relationship status: Not on file    Intimate partner violence:     Fear of current or ex partner: Not on file     Emotionally abused: Not on file     Physically abused: Not on file     Forced sexual activity: Not on file   Other Topics Concern    Not on file   Social History Narrative    Not on file       Nursing notes reviewed.     ED Triage Vitals [08/07/19 0611]   Enc Vitals

## 2019-08-12 LAB
CALCULI COMPOSITION: NORMAL
MASS: 10 MG
STONE DESCRIPTION: NORMAL
STONE NUMBER: 1
STONE SIZE: NORMAL MM

## 2019-08-27 ENCOUNTER — TELEPHONE (OUTPATIENT)
Dept: INTERNAL MEDICINE CLINIC | Age: 59
End: 2019-08-27

## 2019-08-27 ENCOUNTER — ANTI-COAG VISIT (OUTPATIENT)
Dept: INTERNAL MEDICINE CLINIC | Age: 59
End: 2019-08-27

## 2019-08-27 LAB — INR BLD: 1.1

## 2019-09-06 ENCOUNTER — HOSPITAL ENCOUNTER (OUTPATIENT)
Dept: MAMMOGRAPHY | Age: 59
Discharge: HOME | End: 2019-09-11
Payer: COMMERCIAL

## 2019-09-06 DIAGNOSIS — Z12.31 VISIT FOR SCREENING MAMMOGRAM: ICD-10-CM

## 2019-09-06 PROCEDURE — 77063 BREAST TOMOSYNTHESIS BI: CPT

## 2019-09-10 ENCOUNTER — TELEPHONE (OUTPATIENT)
Dept: INTERNAL MEDICINE CLINIC | Age: 59
End: 2019-09-10

## 2019-09-10 ENCOUNTER — ANTI-COAG VISIT (OUTPATIENT)
Dept: INTERNAL MEDICINE CLINIC | Age: 59
End: 2019-09-10

## 2019-09-10 LAB — INR BLD: 1.9

## 2019-09-24 ENCOUNTER — OFFICE VISIT (OUTPATIENT)
Dept: INTERNAL MEDICINE CLINIC | Age: 59
End: 2019-09-24

## 2019-09-24 VITALS
HEIGHT: 65 IN | DIASTOLIC BLOOD PRESSURE: 80 MMHG | WEIGHT: 288 LBS | HEART RATE: 80 BPM | BODY MASS INDEX: 47.98 KG/M2 | RESPIRATION RATE: 14 BRPM | SYSTOLIC BLOOD PRESSURE: 110 MMHG

## 2019-09-24 DIAGNOSIS — E11.9 TYPE 2 DIABETES MELLITUS WITHOUT COMPLICATION, WITHOUT LONG-TERM CURRENT USE OF INSULIN (HCC): ICD-10-CM

## 2019-09-24 DIAGNOSIS — K42.9 UMBILICAL HERNIA WITHOUT OBSTRUCTION AND WITHOUT GANGRENE: ICD-10-CM

## 2019-09-24 DIAGNOSIS — G47.33 SEVERE OBSTRUCTIVE SLEEP APNEA: ICD-10-CM

## 2019-09-24 DIAGNOSIS — I10 BENIGN ESSENTIAL HYPERTENSION: ICD-10-CM

## 2019-09-24 DIAGNOSIS — E11.9 TYPE 2 DIABETES MELLITUS WITHOUT COMPLICATION, WITHOUT LONG-TERM CURRENT USE OF INSULIN (HCC): Primary | ICD-10-CM

## 2019-09-24 DIAGNOSIS — E78.5 HYPERLIPIDEMIA ASSOCIATED WITH TYPE 2 DIABETES MELLITUS (HCC): ICD-10-CM

## 2019-09-24 DIAGNOSIS — Z23 NEED FOR INFLUENZA VACCINATION: ICD-10-CM

## 2019-09-24 DIAGNOSIS — I27.82 OTHER CHRONIC PULMONARY EMBOLISM WITHOUT ACUTE COR PULMONALE (HCC): ICD-10-CM

## 2019-09-24 DIAGNOSIS — E66.01 MORBID OBESITY WITH BMI OF 50.0-59.9, ADULT (HCC): ICD-10-CM

## 2019-09-24 DIAGNOSIS — E11.69 HYPERLIPIDEMIA ASSOCIATED WITH TYPE 2 DIABETES MELLITUS (HCC): ICD-10-CM

## 2019-09-24 PROCEDURE — 90471 IMMUNIZATION ADMIN: CPT | Performed by: INTERNAL MEDICINE

## 2019-09-24 PROCEDURE — 90715 TDAP VACCINE 7 YRS/> IM: CPT | Performed by: INTERNAL MEDICINE

## 2019-09-24 PROCEDURE — 90472 IMMUNIZATION ADMIN EACH ADD: CPT | Performed by: INTERNAL MEDICINE

## 2019-09-24 PROCEDURE — 99214 OFFICE O/P EST MOD 30 MIN: CPT | Performed by: INTERNAL MEDICINE

## 2019-09-24 PROCEDURE — 90682 RIV4 VACC RECOMBINANT DNA IM: CPT | Performed by: INTERNAL MEDICINE

## 2019-09-24 ASSESSMENT — ENCOUNTER SYMPTOMS
WHEEZING: 0
VOMITING: 0
COUGH: 0
RHINORRHEA: 0
NAUSEA: 0
SHORTNESS OF BREATH: 0
ABDOMINAL PAIN: 0

## 2019-09-25 LAB
ESTIMATED AVERAGE GLUCOSE: 128.4 MG/DL
HBA1C MFR BLD: 6.1 %

## 2019-09-26 ENCOUNTER — TELEPHONE (OUTPATIENT)
Dept: INTERNAL MEDICINE CLINIC | Age: 59
End: 2019-09-26

## 2019-09-26 ENCOUNTER — ANTI-COAG VISIT (OUTPATIENT)
Dept: INTERNAL MEDICINE CLINIC | Age: 59
End: 2019-09-26

## 2019-09-26 LAB — INR BLD: 1.9

## 2019-10-11 ENCOUNTER — TELEPHONE (OUTPATIENT)
Dept: INTERNAL MEDICINE CLINIC | Age: 59
End: 2019-10-11

## 2019-10-11 ENCOUNTER — ANTI-COAG VISIT (OUTPATIENT)
Dept: INTERNAL MEDICINE CLINIC | Age: 59
End: 2019-10-11

## 2019-10-11 LAB — INR BLD: 1.7

## 2019-10-15 RX ORDER — WARFARIN SODIUM 5 MG/1
TABLET ORAL
Qty: 180 TABLET | Refills: 3 | Status: SHIPPED | OUTPATIENT
Start: 2019-10-15 | End: 2020-09-11

## 2019-11-19 ENCOUNTER — TELEPHONE (OUTPATIENT)
Dept: INTERNAL MEDICINE CLINIC | Age: 59
End: 2019-11-19

## 2019-11-19 ENCOUNTER — ANTI-COAG VISIT (OUTPATIENT)
Dept: INTERNAL MEDICINE CLINIC | Age: 59
End: 2019-11-19

## 2019-11-19 LAB — INR BLD: 2

## 2019-12-04 ENCOUNTER — TELEPHONE (OUTPATIENT)
Dept: INTERNAL MEDICINE CLINIC | Age: 59
End: 2019-12-04

## 2019-12-04 ENCOUNTER — ANTI-COAG VISIT (OUTPATIENT)
Dept: INTERNAL MEDICINE CLINIC | Age: 59
End: 2019-12-04

## 2019-12-04 LAB — INR BLD: 1.6

## 2019-12-11 ENCOUNTER — ANTI-COAG VISIT (OUTPATIENT)
Dept: INTERNAL MEDICINE CLINIC | Age: 59
End: 2019-12-11

## 2019-12-11 ENCOUNTER — TELEPHONE (OUTPATIENT)
Dept: INTERNAL MEDICINE CLINIC | Age: 59
End: 2019-12-11

## 2019-12-11 LAB — INR BLD: 2.3

## 2020-01-13 ENCOUNTER — ANTI-COAG VISIT (OUTPATIENT)
Dept: INTERNAL MEDICINE CLINIC | Age: 60
End: 2020-01-13

## 2020-01-13 ENCOUNTER — OFFICE VISIT (OUTPATIENT)
Dept: INTERNAL MEDICINE CLINIC | Age: 60
End: 2020-01-13

## 2020-01-13 VITALS
BODY MASS INDEX: 48.82 KG/M2 | SYSTOLIC BLOOD PRESSURE: 138 MMHG | HEART RATE: 70 BPM | WEIGHT: 293 LBS | RESPIRATION RATE: 14 BRPM | DIASTOLIC BLOOD PRESSURE: 70 MMHG | HEIGHT: 65 IN

## 2020-01-13 DIAGNOSIS — E11.9 TYPE 2 DIABETES MELLITUS WITHOUT COMPLICATION, WITHOUT LONG-TERM CURRENT USE OF INSULIN (HCC): ICD-10-CM

## 2020-01-13 LAB
BASOPHILS ABSOLUTE: 0.1 K/UL (ref 0–0.2)
BASOPHILS RELATIVE PERCENT: 1.3 %
EOSINOPHILS ABSOLUTE: 0.1 K/UL (ref 0–0.6)
EOSINOPHILS RELATIVE PERCENT: 1.8 %
HCT VFR BLD CALC: 38 % (ref 36–48)
HEMOGLOBIN: 12.9 G/DL (ref 12–16)
INR BLD: 1.9
LYMPHOCYTES ABSOLUTE: 1.5 K/UL (ref 1–5.1)
LYMPHOCYTES RELATIVE PERCENT: 26.3 %
MCH RBC QN AUTO: 28.5 PG (ref 26–34)
MCHC RBC AUTO-ENTMCNC: 34 G/DL (ref 31–36)
MCV RBC AUTO: 83.8 FL (ref 80–100)
MONOCYTES ABSOLUTE: 0.5 K/UL (ref 0–1.3)
MONOCYTES RELATIVE PERCENT: 9.4 %
NEUTROPHILS ABSOLUTE: 3.6 K/UL (ref 1.7–7.7)
NEUTROPHILS RELATIVE PERCENT: 61.2 %
PDW BLD-RTO: 16.4 % (ref 12.4–15.4)
PLATELET # BLD: 220 K/UL (ref 135–450)
PMV BLD AUTO: 9.2 FL (ref 5–10.5)
RBC # BLD: 4.54 M/UL (ref 4–5.2)
WBC # BLD: 5.9 K/UL (ref 4–11)

## 2020-01-13 PROCEDURE — 99214 OFFICE O/P EST MOD 30 MIN: CPT | Performed by: INTERNAL MEDICINE

## 2020-01-13 RX ORDER — TOPIRAMATE 100 MG/1
100 TABLET, FILM COATED ORAL DAILY
Qty: 90 TABLET | Refills: 0 | Status: SHIPPED | OUTPATIENT
Start: 2020-01-13 | End: 2020-03-04

## 2020-01-13 ASSESSMENT — ENCOUNTER SYMPTOMS
ABDOMINAL PAIN: 0
VOMITING: 0
NAUSEA: 0
SHORTNESS OF BREATH: 0
RHINORRHEA: 0
WHEEZING: 0
COUGH: 0

## 2020-01-13 NOTE — PROGRESS NOTES
Diagnosis Orders   1. Type 2 diabetes mellitus without complication, without long-term current use of insulin (HCC)  Comprehensive Metabolic Panel    CBC Auto Differential    Hemoglobin A1C   2. Other chronic pulmonary embolism without acute cor pulmonale (HCC)     3. Hyperlipidemia associated with type 2 diabetes mellitus ([facility] [address]     4. Morbid obesity with BMI of 50.0-59.9, adult ([facility] [address]     5. Severe obstructive sleep apnea     6. Benign essential hypertension     7. Benign head tremor            Plan:      Decrease calorie intake. Check labs. DM type 2: Continue oral medication. Check A1C. She has gained weight. HTN: on Cozaar 50 mg daily. PE: On home monitoring system. Head tremor is some worse. Increase Topamax to 100 mg po qhs. Fatty liver: she has lost weight. Depression: on Celexa. ELEAZAR: on CPAP. She is using it for over 8 hours a night. Hypertriglyceridemia. Stable. Discussed use, benefit, and side effects of prescribed medications. Barriers to medication compliance addressed. All patient questions answered. Pt voiced understanding. Exercise,weight loss recommended. The current medical regimen is effective;  continue present plan and medications. See orders.

## 2020-01-14 LAB
A/G RATIO: 1.4 (ref 1.1–2.2)
ALBUMIN SERPL-MCNC: 4.2 G/DL (ref 3.4–5)
ALP BLD-CCNC: 66 U/L (ref 40–129)
ALT SERPL-CCNC: <5 U/L (ref 10–40)
ANION GAP SERPL CALCULATED.3IONS-SCNC: 18 MMOL/L (ref 3–16)
AST SERPL-CCNC: <5 U/L (ref 15–37)
BILIRUB SERPL-MCNC: <0.2 MG/DL (ref 0–1)
BUN BLDV-MCNC: 18 MG/DL (ref 7–20)
CALCIUM SERPL-MCNC: 9.7 MG/DL (ref 8.3–10.6)
CHLORIDE BLD-SCNC: 101 MMOL/L (ref 99–110)
CO2: 20 MMOL/L (ref 21–32)
CREAT SERPL-MCNC: 0.7 MG/DL (ref 0.6–1.1)
ESTIMATED AVERAGE GLUCOSE: 151.3 MG/DL
GFR AFRICAN AMERICAN: >60
GFR NON-AFRICAN AMERICAN: >60
GLOBULIN: 3.1 G/DL
GLUCOSE BLD-MCNC: 186 MG/DL (ref 70–99)
HBA1C MFR BLD: 6.9 %
POTASSIUM SERPL-SCNC: 4.3 MMOL/L (ref 3.5–5.1)
SODIUM BLD-SCNC: 139 MMOL/L (ref 136–145)
TOTAL PROTEIN: 7.3 G/DL (ref 6.4–8.2)

## 2020-01-14 RX ORDER — BLOOD-GLUCOSE METER
KIT MISCELLANEOUS
Qty: 1 KIT | Refills: 0 | Status: SHIPPED | OUTPATIENT
Start: 2020-01-14 | End: 2021-09-27 | Stop reason: SDUPTHER

## 2020-01-14 NOTE — TELEPHONE ENCOUNTER
----- Message from Reva Ramsey sent at 1/14/2020  2:20 PM EST -----  Contact: hi-[phone]  Pt called because she needs a new glucometer-She is requesting a one touch kit.     [facility]    [phone]

## 2020-02-05 ENCOUNTER — TELEPHONE (OUTPATIENT)
Dept: INTERNAL MEDICINE CLINIC | Age: 60
End: 2020-02-05

## 2020-02-05 ENCOUNTER — ANTI-COAG VISIT (OUTPATIENT)
Dept: INTERNAL MEDICINE CLINIC | Age: 60
End: 2020-02-05

## 2020-02-05 LAB — INR BLD: 1.5

## 2020-02-05 RX ORDER — WARFARIN SODIUM 1 MG/1
TABLET ORAL
Qty: 30 TABLET | Refills: 3 | Status: SHIPPED | OUTPATIENT
Start: 2020-02-05 | End: 2020-05-26

## 2020-02-05 NOTE — TELEPHONE ENCOUNTER
----- Message from Deyanira Barnes MD sent at 2/5/2020  4:07 PM EST -----  10 and 11 alt   Repeat 1 week   ----- Message -----  From: Saleem Tatum  Sent: 2/5/2020   2:43 PM EST  To: Deyanira Barnes MD    10 mg daily and has not missed any doses. ----- Message -----  From: Tiff Caal  Sent: 2/5/2020  12:31 PM EST  To: MD Dr. Amita Bryant  INR 1.5  LM to call back with dose.

## 2020-02-12 ENCOUNTER — ANTI-COAG VISIT (OUTPATIENT)
Dept: INTERNAL MEDICINE CLINIC | Age: 60
End: 2020-02-12

## 2020-02-12 ENCOUNTER — TELEPHONE (OUTPATIENT)
Dept: INTERNAL MEDICINE CLINIC | Age: 60
End: 2020-02-12

## 2020-02-12 LAB — INR BLD: 1.8

## 2020-03-04 RX ORDER — FENOFIBRIC ACID 135 MG/1
CAPSULE, DELAYED RELEASE ORAL
Qty: 90 CAPSULE | Refills: 0 | Status: SHIPPED | OUTPATIENT
Start: 2020-03-04 | End: 2020-05-29

## 2020-03-04 RX ORDER — LOSARTAN POTASSIUM 50 MG/1
50 TABLET ORAL DAILY
Qty: 90 TABLET | Refills: 0 | Status: SHIPPED | OUTPATIENT
Start: 2020-03-04 | End: 2020-05-29

## 2020-03-04 RX ORDER — CITALOPRAM 40 MG/1
TABLET ORAL
Qty: 90 TABLET | Refills: 0 | Status: SHIPPED | OUTPATIENT
Start: 2020-03-04 | End: 2020-05-29

## 2020-03-04 RX ORDER — TOPIRAMATE 50 MG/1
TABLET, FILM COATED ORAL
Qty: 90 TABLET | Refills: 0 | Status: SHIPPED | OUTPATIENT
Start: 2020-03-04 | End: 2020-04-08

## 2020-03-06 ENCOUNTER — TELEPHONE (OUTPATIENT)
Dept: INTERNAL MEDICINE CLINIC | Age: 60
End: 2020-03-06

## 2020-03-06 ENCOUNTER — ANTI-COAG VISIT (OUTPATIENT)
Dept: INTERNAL MEDICINE CLINIC | Age: 60
End: 2020-03-06

## 2020-03-06 LAB — INR BLD: 2.1

## 2020-03-17 ENCOUNTER — TELEPHONE (OUTPATIENT)
Dept: INTERNAL MEDICINE CLINIC | Age: 60
End: 2020-03-17

## 2020-03-17 ENCOUNTER — ANTI-COAG VISIT (OUTPATIENT)
Dept: INTERNAL MEDICINE CLINIC | Age: 60
End: 2020-03-17

## 2020-03-17 LAB — INR BLD: 2.2

## 2020-03-17 NOTE — TELEPHONE ENCOUNTER
----- Message from Donovan Bolanos MD sent at 3/17/2020  4:31 PM EDT -----  No change  2 week  s  ----- Message -----  From: Isai Dubon  Sent: 3/17/2020   4:11 PM EDT  To: Donovan Bolanos MD    INR 2.2

## 2020-04-08 RX ORDER — TOPIRAMATE 100 MG/1
TABLET, FILM COATED ORAL
Qty: 90 TABLET | Refills: 0 | Status: SHIPPED | OUTPATIENT
Start: 2020-04-08 | End: 2020-05-29

## 2020-04-27 ENCOUNTER — VIRTUAL VISIT (OUTPATIENT)
Dept: INTERNAL MEDICINE CLINIC | Age: 60
End: 2020-04-27

## 2020-04-27 VITALS — WEIGHT: 293 LBS | BODY MASS INDEX: 48.82 KG/M2 | HEIGHT: 65 IN | RESPIRATION RATE: 14 BRPM

## 2020-04-27 PROBLEM — Z00.00 ROUTINE GENERAL MEDICAL EXAMINATION AT A HEALTH CARE FACILITY: Status: ACTIVE | Noted: 2020-04-27

## 2020-04-27 PROCEDURE — 99396 PREV VISIT EST AGE 40-64: CPT | Performed by: INTERNAL MEDICINE

## 2020-04-27 ASSESSMENT — ENCOUNTER SYMPTOMS
ABDOMINAL PAIN: 0
NAUSEA: 0
WHEEZING: 0
SHORTNESS OF BREATH: 0
VOMITING: 0
RHINORRHEA: 0

## 2020-04-27 NOTE — ASSESSMENT & PLAN NOTE
Her last A1c was 6.9. Doing reasonably well. Continue with Janumet XR and Jardiance. I recommended that she exercise regularly.

## 2020-04-27 NOTE — PROGRESS NOTES
2020    TELEHEALTH EVALUATION -- Audio/Visual (During QBSID-42 public health emergency)    HPI:    Ajay Cueto (:  1960) has requested an audio/video evaluation for the following concern(s):    Patient is due for an annual physical today. She is also here for a follow up of diabetes, HTN, depression, pulmonary embolism and hyperlipidemia.     She has gained some weight due to stress from the pandemic. Patient's is checking her sugars. She checks it bid. It ranges from 150-220 mg/dl. No hypoglycemia. Patient does not have polydipsia and polyuria. Her last A1C was 6.9. She is on cozaar for HTN. Her Bp is at goal.  She is compliant with coumadin. She checks pro time at home. She takes 11 mg a day. Her depression is stable. No new issues. She takes Celexa. Her cholesterol is controlled. It is at goal.  She has ELEAZAR. She is on CPAP. She is compliant. She has benign head tremor. It is about the same. Review of Systems   Constitutional: Negative for appetite change and fatigue. HENT: Negative for postnasal drip and rhinorrhea. Respiratory: Negative for shortness of breath and wheezing. Cardiovascular: Negative for chest pain and palpitations. Gastrointestinal: Negative for abdominal pain, nausea and vomiting. Musculoskeletal: Negative for joint swelling. Skin: Negative for rash. Neurological: Negative for light-headedness. Psychiatric/Behavioral: Negative for sleep disturbance. Prior to Visit Medications    Medication Sig Taking?  Authorizing Provider   topiramate (TOPAMAX) 100 MG tablet TAKE ONE TABLET BY MOUTH EVERY DAY  Jeovanny Ochoa MD   citalopram (CELEXA) 40 MG tablet TAKE ONE TABLET BY MOUTH DAILY  Jeovanny Ochoa MD   fenofibric acid (FIBRICOR) 135 MG CPDR capsule TAKE ONE CAPSULE BY MOUTH DAILY  Jeovanny Ochoa MD   losartan (COZAAR) 50 MG tablet TAKE 1 TABLET BY MOUTH DAILY  Jeovanny Ochoa MD   warfarin (COUMADIN) 1 MG tablet Take as directed by physician. Sammy Lincoln MD   Blood Glucose Monitoring Suppl (ONE TOUCH ULTRA MINI) w/Device KIT Test daily. DX: E11.9  Sammy Lincoln MD   ONE TOUCH LANCETS MISC Test daily. DX: E11.9  Sammy Lincoln MD   blood glucose test strips (ASCENSIA AUTODISC VI;ONE TOUCH ULTRA TEST VI) strip Test Daily. DX: E11.9  Sammy Lincoln MD   SITagliptin-metFORMIN (JANUMET XR)  MG TB24 per extended release tablet TAKE ONE TABLET BY MOUTH 2 TIMES DAILY  Sammy Lincoln MD   JARDIANCE 25 MG tablet TAKE 1 TABLET BY MOUTH DAILY  Sammy Lincoln MD   warfarin (COUMADIN) 5 MG tablet TAKE TWO (2) TABLETS BY MOUTH DAILY OR AS DIRECTED BY YOUR DOCTOR  Sammy Lincoln MD   Omega-3 Fatty Acids (FISH OIL) 1000 MG CPDR Take 2,000 mg by mouth daily  Historical Provider, MD   ondansetron (ZOFRAN-ODT) 4 MG disintegrating tablet Take 1 tablet by mouth 3 times daily as needed for Nausea or Vomiting  DO YOSSI De NO CODING BLOOD GLUC strip TEST BLOOD SUGARS 3 TIMES DAILY  Sammy Lincoln MD   ONE TOUCH LANCETS MISC 1 each by Does not apply route daily.   Sammy Lincoln MD       Social History     Tobacco Use    Smoking status: Never Smoker    Smokeless tobacco: Never Used   Substance Use Topics    Alcohol use: No     Alcohol/week: 0.0 standard drinks    Drug use: No        Allergies   Allergen Reactions    Lisinopril Other (See Comments)     cough    Penicillins Other (See Comments)     Unsure of reaction--childhood    Sulfa Antibiotics Other (See Comments)     Unsure of reaction   ,   Past Medical History:   Diagnosis Date    Benign essential hypertension 11/14/2017    Benign head tremor 5/14/2014    Cholelithiasis     Disease of nasal cavity and sinuses     Fatty infiltration of liver     Hot flash, menopausal 7/22/2015    Hyperlipidemia     Hyperlipidemia associated with type 2 diabetes mellitus ([facility] [address]

## 2020-04-29 ENCOUNTER — TELEPHONE (OUTPATIENT)
Dept: PULMONOLOGY | Age: 60
End: 2020-04-29

## 2020-04-29 NOTE — TELEPHONE ENCOUNTER
limited to the following:    Your Provider(s) may not able to provide medical treatment for your particular condition and you may be required to seek alternative healthcare or emergency care services.  The electronic systems or other security protocols or safeguards used in the Service could fail, causing a breach of privacy of your medical or other information.  Given regulatory requirements in certain jurisdictions, your Provider(s) diagnosis and/or treatment options, especially pertaining to certain prescriptions, may be limited. Acceptance   1. You understand that Services will be provided via Telehealth. This process involves the use of HIPAA compliant and secure, real-time audio-visual interfacing with a qualified and appropriately trained provider located at [facility]. 2. You understand that, under no circumstances, will this session be recorded. 3. You understand that the Provider(s) at [facility] and other clinical participants will be party to the information obtained during the Telehealth session in accordance with best medical practices. 4. You understand that the information obtained during the Telehealth session will be used to help determine the most appropriate treatment options. 5. You understand that You have the right to revoke this consent at any point in time. 6. You understand that Telehealth is voluntary, and that continued treatment is not dependent upon consent. 7. You understand that, in the event of non-consent to Telehealth services and/or technical difficulties, you will obtain services as typically provided in the absence of Telehealth technology. 8. You understand that this consent will be kept in Your medical record. 9. No potential benefits from the use of Telehealth or specific results can be guaranteed. Your condition may not be cured or improved and, in some cases, may get worse.    10. There are limitations in the terms described in the Terms of Service and this Telehealth Consent. The patient was read the following statement and has consented to the visit as of 4/29/20. The patient has been scheduled for their first telehealth visit on 5/1/20 with BRYANNA Gonzalez.

## 2020-04-30 ENCOUNTER — TELEPHONE (OUTPATIENT)
Dept: INTERNAL MEDICINE CLINIC | Age: 60
End: 2020-04-30

## 2020-04-30 ENCOUNTER — ANTI-COAG VISIT (OUTPATIENT)
Dept: INTERNAL MEDICINE CLINIC | Age: 60
End: 2020-04-30

## 2020-04-30 LAB — INR BLD: 2.6

## 2020-05-01 ENCOUNTER — VIRTUAL VISIT (OUTPATIENT)
Dept: PULMONOLOGY | Age: 60
End: 2020-05-01
Payer: COMMERCIAL

## 2020-05-01 VITALS — HEIGHT: 65 IN | BODY MASS INDEX: 48.82 KG/M2 | WEIGHT: 293 LBS

## 2020-05-01 PROCEDURE — 99441 PR PHYS/QHP TELEPHONE EVALUATION 5-10 MIN: CPT | Performed by: NURSE PRACTITIONER

## 2020-05-01 ASSESSMENT — SLEEP AND FATIGUE QUESTIONNAIRES
ESS TOTAL SCORE: 8
HOW LIKELY ARE YOU TO NOD OFF OR FALL ASLEEP WHILE WATCHING TV: 0
HOW LIKELY ARE YOU TO NOD OFF OR FALL ASLEEP WHILE LYING DOWN TO REST IN THE AFTERNOON WHEN CIRCUMSTANCES PERMIT: 3
HOW LIKELY ARE YOU TO NOD OFF OR FALL ASLEEP WHILE SITTING INACTIVE IN A PUBLIC PLACE: 0
HOW LIKELY ARE YOU TO NOD OFF OR FALL ASLEEP WHILE SITTING AND TALKING TO SOMEONE: 0
HOW LIKELY ARE YOU TO NOD OFF OR FALL ASLEEP IN A CAR, WHILE STOPPED FOR A FEW MINUTES IN TRAFFIC: 0
HOW LIKELY ARE YOU TO NOD OFF OR FALL ASLEEP WHILE SITTING QUIETLY AFTER LUNCH WITHOUT ALCOHOL: 0
HOW LIKELY ARE YOU TO NOD OFF OR FALL ASLEEP WHEN YOU ARE A PASSENGER IN A CAR FOR AN HOUR WITHOUT A BREAK: 3
HOW LIKELY ARE YOU TO NOD OFF OR FALL ASLEEP WHILE SITTING AND READING: 2

## 2020-05-01 NOTE — PROGRESS NOTES
CPAP cleaning recommendations         Follow-up:  1 year, sooner if needed    Consent for telehealth visit was obtained and is noted in chart    Bouchra Osborne is a 62 y.o. female evaluated via telephone on 5/1/2020.       Patient identification was verified at the start of the visit: Yes    Total Time: 8 min    Alden Beltrán

## 2020-05-19 ENCOUNTER — ANTI-COAG VISIT (OUTPATIENT)
Dept: INTERNAL MEDICINE CLINIC | Age: 60
End: 2020-05-19

## 2020-05-19 LAB — INR BLD: 2.5

## 2020-05-26 RX ORDER — WARFARIN SODIUM 1 MG/1
TABLET ORAL
Qty: 30 TABLET | Refills: 0 | Status: SHIPPED | OUTPATIENT
Start: 2020-05-26 | End: 2020-06-16

## 2020-05-27 PROBLEM — Z00.00 ROUTINE GENERAL MEDICAL EXAMINATION AT A HEALTH CARE FACILITY: Status: RESOLVED | Noted: 2020-04-27 | Resolved: 2020-05-27

## 2020-05-29 RX ORDER — TOPIRAMATE 50 MG/1
TABLET, FILM COATED ORAL
Qty: 90 TABLET | Refills: 0 | Status: SHIPPED | OUTPATIENT
Start: 2020-05-29 | End: 2020-08-21

## 2020-05-29 RX ORDER — LOSARTAN POTASSIUM 50 MG/1
50 TABLET ORAL DAILY
Qty: 90 TABLET | Refills: 0 | Status: SHIPPED | OUTPATIENT
Start: 2020-05-29 | End: 2020-08-21

## 2020-05-29 RX ORDER — FENOFIBRIC ACID 135 MG/1
CAPSULE, DELAYED RELEASE ORAL
Qty: 90 CAPSULE | Refills: 0 | Status: SHIPPED | OUTPATIENT
Start: 2020-05-29 | End: 2020-08-21

## 2020-05-29 RX ORDER — CITALOPRAM 40 MG/1
TABLET ORAL
Qty: 90 TABLET | Refills: 0 | Status: SHIPPED | OUTPATIENT
Start: 2020-05-29 | End: 2020-08-21

## 2020-06-02 ENCOUNTER — TELEPHONE (OUTPATIENT)
Dept: INTERNAL MEDICINE CLINIC | Age: 60
End: 2020-06-02

## 2020-06-02 ENCOUNTER — ANTI-COAG VISIT (OUTPATIENT)
Dept: INTERNAL MEDICINE CLINIC | Age: 60
End: 2020-06-02

## 2020-06-02 LAB — INR BLD: 2.6

## 2020-06-02 NOTE — TELEPHONE ENCOUNTER
----- Message from Angi Rico MD sent at 6/2/2020  1:01 PM EDT -----  Contact: [phone]  Same 2 weeks  ----- Message -----  From: Shankar Mast  Sent: 6/2/2020  12:51 PM EDT  To: Angi Rico MD    INR: 2.6

## 2020-06-16 ENCOUNTER — ANTI-COAG VISIT (OUTPATIENT)
Dept: INTERNAL MEDICINE CLINIC | Age: 60
End: 2020-06-16

## 2020-06-16 LAB — INR BLD: 2.4

## 2020-06-16 RX ORDER — WARFARIN SODIUM 1 MG/1
TABLET ORAL
Qty: 30 TABLET | Refills: 5 | Status: SHIPPED | OUTPATIENT
Start: 2020-06-16 | End: 2020-11-23

## 2020-07-17 ENCOUNTER — TELEPHONE (OUTPATIENT)
Dept: INTERNAL MEDICINE CLINIC | Age: 60
End: 2020-07-17

## 2020-07-17 ENCOUNTER — ANTI-COAG VISIT (OUTPATIENT)
Dept: INTERNAL MEDICINE CLINIC | Age: 60
End: 2020-07-17

## 2020-07-17 LAB — INR BLD: 2.2

## 2020-08-03 ENCOUNTER — VIRTUAL VISIT (OUTPATIENT)
Dept: INTERNAL MEDICINE CLINIC | Age: 60
End: 2020-08-03

## 2020-08-03 PROCEDURE — 99214 OFFICE O/P EST MOD 30 MIN: CPT | Performed by: INTERNAL MEDICINE

## 2020-08-03 NOTE — PROGRESS NOTES
8/3/2020    TELEHEALTH EVALUATION -- Audio/Visual (During VKWKU-41 public health emergency)    HPI:    Isaac Harmon (:  1960) has requested an audio/video evaluation for the following concern(s):    Diabetes Mellitus Type 2: Current symptoms/problems include none. Medication compliance:  compliant most of the time  Diabetic diet compliance:  compliant most of the time,  Weight trend: stable  Current exercise: no regular exercise    Home blood sugar records: fasting range: 150s  Any episodes of hypoglycemia? no  Eye exam current (within one year): yes   reports that she has never smoked. She has never used smokeless tobacco.   Daily Aspirin? No: n/a  Known diabetic complications: none    Hypertension:  Blood pressure typically runs normal outside of the office. She is adherent to a low sodium diet. Patient denies any complaints. Antihypertensive medication side effects: no medication side effects noted. Use of agents associated with hypertension: none. Hyperlipidemia:  No new myalgias or GI upset on fenofibrate (Tricor, Trilipix). Lab Results   Component Value Date    LABA1C 6.9 2020    LABA1C 6.1 2019    LABA1C 5.1 2019     Lab Results   Component Value Date    LABMICR YES 2019    CREATININE 0.7 2020     Lab Results   Component Value Date    ALT <5 (L) 2020    AST <5 (A) 2020     No components found for: CHLPL  Lab Results   Component Value Date    TRIG 363 (H) 2019     Lab Results   Component Value Date    HDL 34 (L) 2019     Lab Results   Component Value Date    LDLCALC see below 2019    LDLDIRECT 76 2019           Review of Systems    Prior to Visit Medications    Medication Sig Taking? Authorizing Provider   warfarin (COUMADIN) 1 MG tablet Take as directed by physician.   Melanie Chen MD   citalopram (CELEXA) 40 MG tablet TAKE ONE TABLET BY MOUTH DAILY  Melanie Chen MD   fenofibric acid (FIBRICOR) 135 Pulmonary embolism (HCC)     Type II or unspecified type diabetes mellitus without mention of complication, not stated as uncontrolled     Umbilical hernia 53/0/5653    Unspecified sleep apnea    ,   Past Surgical History:   Procedure Laterality Date    BREAST SURGERY  2015    CHOLECYSTECTOMY, LAPAROSCOPIC  8/2/2012    COLONOSCOPY  11/9/2015    cecal polyp    DILATION AND CURETTAGE OF UTERUS  03/13/2018    HERNIA REPAIR  30/24/88    lap umbilical    HYSTEROSCOPY  03/13/2018    and D&C   ,   Social History     Tobacco Use    Smoking status: Never Smoker    Smokeless tobacco: Never Used   Substance Use Topics    Alcohol use: No     Alcohol/week: 0.0 standard drinks    Drug use: No   ,   Family History   Problem Relation Age of Onset    Hypertension Mother     Diabetes Mother     Heart Failure Father     Other Father         AAA    High Blood Pressure Sister     Diabetes Sister     High Blood Pressure Brother     Heart Attack Brother     Diabetes Brother      She has ELEAZAR. Using CPAP. She has head tremor. It is about the same      PHYSICAL EXAMINATION:  [ INSTRUCTIONS:  \"[x]\" Indicates a positive item  \"[]\" Indicates a negative item  -- DELETE ALL ITEMS NOT EXAMINED]  Vital Signs: (As obtained by patient/caregiver or practitioner observation)    [facility] 08/20/2014        Constitutional: [x] Appears well-developed and well-nourished [x] No apparent distress                           Mental status  [x] Alert and awake  [x] Oriented to person/place/time [x]Able to follow commands       Eyes:  EOM    [x]  Normal  [] Abnormal-  Sclera  [x]  Normal  [] Abnormal -             HENT:   [x] Normocephalic, atraumatic.   [] Abnormal   [x] Mouth/Throat: Mucous membranes are moist.      External Ears [x] Normal  [] Abnormal-      Neck: [x] No visualized mass      Pulmonary/Chest: [x] Respiratory effort normal.  [x] No visualized signs of difficulty breathing or respiratory distress        [] Abnormal- Appropriations Act, this Virtual Visit was conducted with patient's (and/or legal guardian's) consent, to reduce the patient's risk of exposure to COVID-19 and provide necessary medical care. The patient (and/or legal guardian) has also been advised to contact this office for worsening conditions or problems, and seek emergency medical treatment and/or call 911 if deemed necessary. Patient identification was verified at the start of the visit: Yes    Total time spent on this encounter: Not billed by time    Services were provided through a video synchronous discussion virtually to substitute for in-person clinic visit. Patient and provider were located at their individual homes. --Elsi Kirby MD on 8/3/2020 at 3:43 PM    An electronic signature was used to authenticate this note.

## 2020-08-04 ENCOUNTER — ANTI-COAG VISIT (OUTPATIENT)
Dept: INTERNAL MEDICINE CLINIC | Age: 60
End: 2020-08-04

## 2020-08-04 LAB — INR BLD: 2.5

## 2020-08-20 ENCOUNTER — HOSPITAL ENCOUNTER (OUTPATIENT)
Age: 60
Discharge: HOME | End: 2020-08-20
Payer: COMMERCIAL

## 2020-08-20 LAB
A/G RATIO: 1.6 (ref 1.1–2.2)
ALBUMIN SERPL-MCNC: 4.3 G/DL (ref 3.4–5)
ALP BLD-CCNC: 61 U/L (ref 40–129)
ALT SERPL-CCNC: 8 U/L (ref 10–40)
ANION GAP SERPL CALCULATED.3IONS-SCNC: 14 MMOL/L (ref 3–16)
AST SERPL-CCNC: 19 U/L (ref 15–37)
BASOPHILS ABSOLUTE: 0.1 K/UL (ref 0–0.2)
BASOPHILS RELATIVE PERCENT: 1.8 %
BILIRUB SERPL-MCNC: 0.3 MG/DL (ref 0–1)
BILIRUBIN URINE: NEGATIVE
BLOOD, URINE: NEGATIVE
BUN BLDV-MCNC: 18 MG/DL (ref 7–20)
CALCIUM SERPL-MCNC: 9.6 MG/DL (ref 8.3–10.6)
CHLORIDE BLD-SCNC: 104 MMOL/L (ref 99–110)
CHOLESTEROL, TOTAL: 172 MG/DL (ref 0–199)
CLARITY: ABNORMAL
CO2: 22 MMOL/L (ref 21–32)
COLOR: YELLOW
CREAT SERPL-MCNC: 0.7 MG/DL (ref 0.6–1.1)
EOSINOPHILS ABSOLUTE: 0.1 K/UL (ref 0–0.6)
EOSINOPHILS RELATIVE PERCENT: 2.6 %
GFR AFRICAN AMERICAN: >60
GFR NON-AFRICAN AMERICAN: >60
GLOBULIN: 2.7 G/DL
GLUCOSE BLD-MCNC: 178 MG/DL (ref 70–99)
GLUCOSE URINE: >=1000 MG/DL
HCT VFR BLD CALC: 33.8 % (ref 36–48)
HDLC SERPL-MCNC: 27 MG/DL (ref 40–60)
HEMOGLOBIN: 10.3 G/DL (ref 12–16)
KETONES, URINE: NEGATIVE MG/DL
LDL CHOLESTEROL CALCULATED: ABNORMAL MG/DL
LEUKOCYTE ESTERASE, URINE: NEGATIVE
LYMPHOCYTES ABSOLUTE: 1.2 K/UL (ref 1–5.1)
LYMPHOCYTES RELATIVE PERCENT: 23.1 %
MCH RBC QN AUTO: 22.2 PG (ref 26–34)
MCHC RBC AUTO-ENTMCNC: 30.5 G/DL (ref 31–36)
MCV RBC AUTO: 72.7 FL (ref 80–100)
MICROSCOPIC EXAMINATION: ABNORMAL
MONOCYTES ABSOLUTE: 0.5 K/UL (ref 0–1.3)
MONOCYTES RELATIVE PERCENT: 10.2 %
NEUTROPHILS ABSOLUTE: 3.2 K/UL (ref 1.7–7.7)
NEUTROPHILS RELATIVE PERCENT: 62.3 %
NITRITE, URINE: NEGATIVE
PDW BLD-RTO: 18.5 % (ref 12.4–15.4)
PH UA: 5 (ref 5–8)
PLATELET # BLD: 253 K/UL (ref 135–450)
PMV BLD AUTO: 8.1 FL (ref 5–10.5)
POTASSIUM SERPL-SCNC: 4.3 MMOL/L (ref 3.5–5.1)
PROTEIN UA: NEGATIVE MG/DL
RBC # BLD: 4.65 M/UL (ref 4–5.2)
SODIUM BLD-SCNC: 140 MMOL/L (ref 136–145)
SPECIFIC GRAVITY UA: 1.02 (ref 1–1.03)
TOTAL PROTEIN: 7 G/DL (ref 6.4–8.2)
TRIGL SERPL-MCNC: 569 MG/DL (ref 0–150)
URINE TYPE: ABNORMAL
UROBILINOGEN, URINE: 0.2 E.U./DL
VLDLC SERPL CALC-MCNC: ABNORMAL MG/DL
WBC # BLD: 5.2 K/UL (ref 4–11)

## 2020-08-20 PROCEDURE — 85025 COMPLETE CBC W/AUTO DIFF WBC: CPT

## 2020-08-20 PROCEDURE — 81003 URINALYSIS AUTO W/O SCOPE: CPT

## 2020-08-20 PROCEDURE — 83036 HEMOGLOBIN GLYCOSYLATED A1C: CPT

## 2020-08-20 PROCEDURE — 36415 COLL VENOUS BLD VENIPUNCTURE: CPT

## 2020-08-20 PROCEDURE — 80053 COMPREHEN METABOLIC PANEL: CPT

## 2020-08-20 PROCEDURE — 83721 ASSAY OF BLOOD LIPOPROTEIN: CPT

## 2020-08-20 PROCEDURE — 80061 LIPID PANEL: CPT

## 2020-08-21 DIAGNOSIS — D50.9 IRON DEFICIENCY ANEMIA, UNSPECIFIED IRON DEFICIENCY ANEMIA TYPE: ICD-10-CM

## 2020-08-21 LAB
ESTIMATED AVERAGE GLUCOSE: 162.8 MG/DL
FERRITIN: 7.7 NG/ML (ref 15–150)
HBA1C MFR BLD: 7.3 %
IRON SATURATION: 9 % (ref 15–50)
IRON: 41 UG/DL (ref 37–145)
LDL CHOLESTEROL DIRECT: 65 MG/DL
TOTAL IRON BINDING CAPACITY: 475 UG/DL (ref 260–445)

## 2020-08-21 RX ORDER — FENOFIBRIC ACID 135 MG/1
CAPSULE, DELAYED RELEASE ORAL
Qty: 90 CAPSULE | Refills: 0 | Status: SHIPPED | OUTPATIENT
Start: 2020-08-21 | End: 2020-11-17

## 2020-08-21 RX ORDER — CITALOPRAM 40 MG/1
TABLET ORAL
Qty: 90 TABLET | Refills: 0 | Status: SHIPPED | OUTPATIENT
Start: 2020-08-21 | End: 2020-11-17

## 2020-08-21 RX ORDER — LOSARTAN POTASSIUM 50 MG/1
50 TABLET ORAL DAILY
Qty: 90 TABLET | Refills: 0 | Status: SHIPPED | OUTPATIENT
Start: 2020-08-21 | End: 2020-11-17

## 2020-08-21 RX ORDER — TOPIRAMATE 50 MG/1
TABLET, FILM COATED ORAL
Qty: 90 TABLET | Refills: 0 | Status: SHIPPED | OUTPATIENT
Start: 2020-08-21 | End: 2020-11-17

## 2020-08-21 RX ORDER — ICOSAPENT ETHYL 1000 MG/1
2 CAPSULE ORAL 2 TIMES DAILY
Qty: 60 CAPSULE | Refills: 2 | Status: SHIPPED | OUTPATIENT
Start: 2020-08-21 | End: 2021-10-01

## 2020-08-21 NOTE — TELEPHONE ENCOUNTER
----- Message from Karl Valle MD sent at 8/21/2020  8:24 AM EDT -----  Her sugars are up. Watch diet more carefully. Triglycerides are up. Call in Vascepa 1 g two capsules bid  Add iron studies    She is more anemic. Have her see Dr Jennifer Ponce. She is due for a repeat colonoscopy in November 2020 anyway.

## 2020-09-01 ENCOUNTER — TELEPHONE (OUTPATIENT)
Dept: INTERNAL MEDICINE CLINIC | Age: 60
End: 2020-09-01

## 2020-09-01 ENCOUNTER — ANTI-COAG VISIT (OUTPATIENT)
Dept: INTERNAL MEDICINE CLINIC | Age: 60
End: 2020-09-01

## 2020-09-01 LAB — INR BLD: 2.4

## 2020-09-01 NOTE — TELEPHONE ENCOUNTER
----- Message from Mitesh Wood MD sent at 9/1/2020 11:08 AM EDT -----  Contact: [phone]  Same 2 w  ----- Message -----  From: Margaret Hills  Sent: 9/1/2020  10:14 AM EDT  To: Mitesh Wood MD    INR: 2.40

## 2020-09-11 RX ORDER — WARFARIN SODIUM 5 MG/1
TABLET ORAL
Qty: 180 TABLET | Refills: 0 | Status: SHIPPED | OUTPATIENT
Start: 2020-09-11 | End: 2020-12-09

## 2020-09-18 ENCOUNTER — ANTI-COAG VISIT (OUTPATIENT)
Dept: INTERNAL MEDICINE CLINIC | Age: 60
End: 2020-09-18

## 2020-09-18 LAB — INR BLD: 1.9

## 2020-09-29 RX ORDER — EMPAGLIFLOZIN 25 MG/1
TABLET, FILM COATED ORAL
Qty: 30 TABLET | Refills: 1 | Status: SHIPPED | OUTPATIENT
Start: 2020-09-29 | End: 2020-11-23

## 2020-10-27 ENCOUNTER — TELEPHONE (OUTPATIENT)
Dept: INTERNAL MEDICINE CLINIC | Age: 60
End: 2020-10-27

## 2020-10-27 NOTE — TELEPHONE ENCOUNTER
----- Message from Jose Barnes MD sent at 10/27/2020  4:17 PM EDT -----  Same 2 w  ----- Message -----  From: Leo Hinton  Sent: 10/27/2020   3:54 PM EDT  To: Jose Barnes MD    INR- 2.3    Last INR-1.9

## 2020-11-03 ENCOUNTER — TELEPHONE (OUTPATIENT)
Dept: INTERNAL MEDICINE CLINIC | Age: 60
End: 2020-11-03

## 2020-11-03 NOTE — TELEPHONE ENCOUNTER
----- Message from Irina Garcia MD sent at 11/3/2020  3:12 PM EST -----  Contact: Beka Rios [phone]    ----- Message -----  From: Codie Delgado  Sent: 11/3/2020   2:59 PM EST  To: Irina Garcia MD    Beka Rios had appointment on Thursday, 11/12/20. Returned our call to reschedule or change to a tele visit. First available appointment for her was scheduled for 1/4/20. Patient asking if she needs to be seen sooner? Please advise.  Thank you tsh

## 2020-11-03 NOTE — TELEPHONE ENCOUNTER
Patient rescheduled for 11/13/2020 @ 10:30 per Dr Nirali Valadez. Patient said she was exposed to someone who tested positive for COVID. Per Dr Nirali Valadez, patient informed that she will need to be tested for COVID, and to have test done on this Friday 11/6/2020.

## 2020-11-05 ENCOUNTER — TELEPHONE (OUTPATIENT)
Dept: INTERNAL MEDICINE CLINIC | Age: 60
End: 2020-11-05

## 2020-11-05 NOTE — TELEPHONE ENCOUNTER
----- Message from Perry Parks MD sent at 11/5/2020 12:02 PM EST -----  Contact: Jessica Ramirez [phone]  Only if COVID is negative  ----- Message -----  From: Lg Michael  Sent: 11/5/2020  11:21 AM EST  To: Perry Parks MD      ----- Message -----  From: Analilia Tyler  Sent: 11/5/2020  11:02 AM EST  To: Lg Michael    Patient states she has appointment Friday 11/13/20 per Dr. Jemima Douglas She was told she needed to be tested for Covid on 11/6/20. She can not get tested until Monday 11/9/20 but will have results. Patient will have results faxed here. Patient is asking can she still be seen on 11/13/20?  Thank you tsh

## 2020-11-12 ENCOUNTER — ANTI-COAG VISIT (OUTPATIENT)
Dept: INTERNAL MEDICINE CLINIC | Age: 60
End: 2020-11-12

## 2020-11-12 ENCOUNTER — TELEPHONE (OUTPATIENT)
Dept: INTERNAL MEDICINE CLINIC | Age: 60
End: 2020-11-12

## 2020-11-12 LAB — INR BLD: 2.5

## 2020-11-12 NOTE — TELEPHONE ENCOUNTER
----- Message from Giuliano Messer MD sent at 11/12/2020  2:02 PM EST -----  No change   2 weeks  ----- Message -----  From: Shaye Messer  Sent: 11/12/2020   1:14 PM EST  To: Giuliano Messer MD    11 mg daily  ----- Message -----  From: Shaye Messer  Sent: 11/12/2020   1:05 PM EST  To: MD Dr. Aashish Lane pt    INR 2.5

## 2020-11-13 ENCOUNTER — VIRTUAL VISIT (OUTPATIENT)
Dept: INTERNAL MEDICINE CLINIC | Age: 60
End: 2020-11-13

## 2020-11-13 PROCEDURE — 3051F HG A1C>EQUAL 7.0%<8.0%: CPT | Performed by: INTERNAL MEDICINE

## 2020-11-13 PROCEDURE — 99214 OFFICE O/P EST MOD 30 MIN: CPT | Performed by: INTERNAL MEDICINE

## 2020-11-13 ASSESSMENT — ENCOUNTER SYMPTOMS
NAUSEA: 0
RHINORRHEA: 0
WHEEZING: 0
ABDOMINAL PAIN: 0
SHORTNESS OF BREATH: 0
VOMITING: 0

## 2020-11-13 ASSESSMENT — PATIENT HEALTH QUESTIONNAIRE - PHQ9
1. LITTLE INTEREST OR PLEASURE IN DOING THINGS: 0
SUM OF ALL RESPONSES TO PHQ QUESTIONS 1-9: 0
SUM OF ALL RESPONSES TO PHQ9 QUESTIONS 1 & 2: 0
2. FEELING DOWN, DEPRESSED OR HOPELESS: 0

## 2020-11-13 NOTE — PROGRESS NOTES
2020    TELEHEALTH EVALUATION -- Audio/Visual (During ULCAG-40 public health emergency)    HPI:    Tawanna Balderrama (:  1960) has requested an audio/video evaluation for the following concern(s):    Diabetes Mellitus Type 2: Current symptoms/problems include none. Medication compliance:  compliant most of the time  Diabetic diet compliance:  compliant most of the time,  Weight trend: stable  Current exercise: no regular exercise    Home blood sugar records: fasting range: 150  Any episodes of hypoglycemia? no  Eye exam current (within one year): yes   reports that she has never smoked. She has never used smokeless tobacco.   Daily Aspirin? No: n/a  Known diabetic complications: none    Hypertension:  Blood pressure typically runs normal outside of the office. She is adherent to a low sodium diet. Patient denies headache, chest pain, dry cough. Antihypertensive medication side effects: no medication side effects noted. Use of agents associated with hypertension: none. Hyperlipidemia:  No new myalgias or GI upset on fenofibrate (Tricor, Trilipix). Lab Results   Component Value Date    LABA1C 7.3 2020    LABA1C 6.9 2020    LABA1C 6.1 2019     Lab Results   Component Value Date    LABMICR Not Indicated 2020    CREATININE 0.7 2020     Lab Results   Component Value Date    ALT 8 (L) 2020    AST 19 2020     No components found for: CHLPL  Lab Results   Component Value Date    TRIG 569 (H) 2020     Lab Results   Component Value Date    HDL 27 (L) 2020     Lab Results   Component Value Date    LDLCALC see below 2020    LDLDIRECT 65 2020         Review of Systems   Constitutional: Negative for appetite change and fatigue. HENT: Negative for postnasal drip and rhinorrhea. Respiratory: Negative for shortness of breath and wheezing. Cardiovascular: Negative for chest pain and palpitations.    Gastrointestinal: Negative for abdominal pain, nausea and vomiting. Musculoskeletal: Negative for joint swelling. Skin: Negative for rash. Neurological: Negative for light-headedness. Psychiatric/Behavioral: Negative for agitation and sleep disturbance. The patient is not nervous/anxious. Prior to Visit Medications    Medication Sig Taking? Authorizing Provider   JARDIANCE 25 MG tablet TAKE 1 TABLET BY MOUTH DAILY  Lolly Zhang MD   warfarin (COUMADIN) 5 MG tablet TAKE TWO (2) TABLETS BY MOUTH DAILY OR AS DIRECTED BY YOUR DOCTOR  Lolly Zhang MD   Icosapent Ethyl (VASCEPA) 1 g CAPS capsule Take 2 capsules by mouth 2 times daily  Lolly Zhang MD   topiramate (TOPAMAX) 50 MG tablet TAKE ONE TABLET BY MOUTH EVERY EVENING  Lolly Zhang MD   fenofibric acid (FIBRICOR) 135 MG CPDR capsule TAKE ONE CAPSULE BY MOUTH DAILY  Lolly Zhang MD   citalopram (CELEXA) 40 MG tablet TAKE ONE TABLET BY MOUTH DAILY  Lolly Zhang MD   losartan (COZAAR) 50 MG tablet TAKE 1 TABLET BY MOUTH DAILY  Lolly Zhang MD   warfarin (COUMADIN) 1 MG tablet Take as directed by physician. Lolly Zhang MD   Blood Glucose Monitoring Suppl (ONE TOUCH ULTRA MINI) w/Device KIT Test daily. DX: E11.9  Lolly Zhang MD   ONE TOUCH LANCETS MISC Test daily. DX: E11.9  Lolly Zhang MD   blood glucose test strips (ASCENSIA AUTODISC VI;ONE TOUCH ULTRA TEST VI) strip Test Daily. DX: E11.9  Lolly Zhang MD   SITagliptin-metFORMIN (JANUMET XR)  MG TB24 per extended release tablet TAKE ONE TABLET BY MOUTH 2 TIMES DAILY  Lolly Zhang MD   Omega-3 Fatty Acids (FISH OIL) 1000 MG CPDR Take 2,000 mg by mouth daily  Historical Provider, MD   PRODIGY NO CODING BLOOD GLUC strip TEST BLOOD SUGARS 3 TIMES DAILY  Lolly Zhang MD   ONE TOUCH LANCETS MISC 1 each by Does not apply route daily.   Lolly Zhang MD           Allergies Allergen Reactions    Lisinopril Other (See Comments)     cough    Penicillins Other (See Comments)     Unsure of reaction--childhood    Sulfa Antibiotics Other (See Comments)     Unsure of reaction   ,   Past Medical History:   Diagnosis Date    Benign essential hypertension 11/14/2017    Benign head tremor 5/14/2014    Cholelithiasis     Disease of nasal cavity and sinuses     Fatty infiltration of liver     Hot flash, menopausal 7/22/2015    Hyperlipidemia     Hyperlipidemia associated with type 2 diabetes mellitus ([facility] [address] 10/23/2015    Morbid obesity with BMI of 50.0-59.9, adult ([facility] [address] 10/23/2015    No history of procedure 11/9/2015    no past colonoscopy    Pulmonary embolism (HCC)     Type II or unspecified type diabetes mellitus without mention of complication, not stated as uncontrolled     Umbilical hernia 15/4/5846    Unspecified sleep apnea    ,   Past Surgical History:   Procedure Laterality Date    BREAST SURGERY  2015    CHOLECYSTECTOMY, LAPAROSCOPIC  8/2/2012    COLONOSCOPY  11/9/2015    cecal polyp    DILATION AND CURETTAGE OF UTERUS  03/13/2018    HERNIA REPAIR  80/76/79    lap umbilical    HYSTEROSCOPY  03/13/2018    and D&C   ,   Social History     Tobacco Use    Smoking status: Never Smoker    Smokeless tobacco: Never Used   Substance Use Topics    Alcohol use: No     Alcohol/week: 0.0 standard drinks    Drug use: No   ,   Family History   Problem Relation Age of Onset    Hypertension Mother     Diabetes Mother     Heart Failure Father     Other Father         AAA    High Blood Pressure Sister     Diabetes Sister     High Blood Pressure Brother     Heart Attack Brother     Diabetes Brother        She was anemic. She had a colonoscopy done. She was due to get a CT abdomen and pelvis. She has not done it.      PHYSICAL EXAMINATION:  [ INSTRUCTIONS:  \"[x]\" Indicates a positive item  \"[]\" Indicates a negative item  -- DELETE ALL ITEMS NOT EXAMINED]  Vital Signs: (As obtained by patient/caregiver or practitioner observation)    LMP 08/20/2014        Constitutional: [x] Appears well-developed and well-nourished [x] No apparent distress                           Mental status  [x] Alert and awake  [x] Oriented to person/place/time [x]Able to follow commands       Eyes:  EOM    [x]  Normal  [] Abnormal-  Sclera  [x]  Normal  [] Abnormal -             HENT:   [x] Normocephalic, atraumatic. [] Abnormal   [x] Mouth/Throat: Mucous membranes are moist.      External Ears [x] Normal  [] Abnormal-      Neck: [x] No visualized mass      Pulmonary/Chest: [x] Respiratory effort normal.  [x] No visualized signs of difficulty breathing or respiratory distress        [] Abnormal-      Musculoskeletal:   [x] Normal gait with no signs of ataxia         [x] Normal range of motion of neck        [] Abnormal-         Neurological:        [x] No Facial Asymmetry (Cranial nerve 7 motor function) (limited exam to video visit)                       [] No gaze palsy        [] Abnormal-         Skin:                     [] No significant exanthematous lesions or discoloration noted on facial skin         [] Abnormal-                                  Psychiatric:           [x] Normal Affect [x] No Hallucinations        [] Abnormal-     ASSESSMENT/PLAN:  1. Type 2 diabetes mellitus with other specified complication, without long-term current use of insulin ([address]  - check labs. - Comprehensive Metabolic Panel; Future  - CBC Auto Differential; Future  - Hemoglobin A1C; Future  - Lipid Panel; Future    2. Iron deficiency anemia, unspecified iron deficiency anemia type  - had colonoscopy done. She needs a CT abdomen and pelvis. She is taking iron tablets three times a week    3. Other chronic pulmonary embolism without acute cor pulmonale (HCC)  - on warfarin. Does home monitoring. 4. Hyperlipidemia associated with type 2 diabetes mellitus ([address]   check lipids    5.  Severe obstructive sleep apnea  - on CPAP    6. Obesity, morbid, BMI 40.0-49.9 (HCC)  Morbid Obesity  - There is no height or weight on file to calculate BMI. - Complicating assessment and treatment. Placing patient at risk for multiple co-morbidities as well as early death and contributing to the patient's presentation.   - Counseled on weight loss. 7. Benign essential hypertension  - controlled. Follow up with PCP    Maru Roy is a 62 y.o. female being evaluated by a Virtual Visit (video visit) encounter to address concerns as mentioned above. A caregiver was present when appropriate. Due to this being a TeleHealth encounter (During PKECK-61 public health emergency), evaluation of the following organ systems was limited: Vitals/Constitutional/EENT/Resp/CV/GI//MS/Neuro/Skin/Heme-Lymph-Imm. Pursuant to the emergency declaration under the [address] [place] [address] authority and the [employer] and [employer] Act, this Virtual Visit was conducted with patient's (and/or legal guardian's) consent, to reduce the patient's risk of exposure to COVID-19 and provide necessary medical care. The patient (and/or legal guardian) has also been advised to contact this office for worsening conditions or problems, and seek emergency medical treatment and/or call 911 if deemed necessary. Patient identification was verified at the start of the visit: Yes    Total time spent on this encounter: Not billed by time    Services were provided through a video synchronous discussion virtually to substitute for in-person clinic visit. Patient and provider were located at their individual homes. --Aditi Youssef MD on 11/13/2020 at 10:36 AM    An electronic signature was used to authenticate this note.

## 2020-11-17 RX ORDER — FENOFIBRIC ACID 135 MG/1
CAPSULE, DELAYED RELEASE ORAL
Qty: 90 CAPSULE | Refills: 0 | Status: SHIPPED | OUTPATIENT
Start: 2020-11-17 | End: 2021-02-11

## 2020-11-17 RX ORDER — TOPIRAMATE 50 MG/1
TABLET, FILM COATED ORAL
Qty: 90 TABLET | Refills: 0 | Status: SHIPPED | OUTPATIENT
Start: 2020-11-17 | End: 2021-02-11

## 2020-11-17 RX ORDER — LOSARTAN POTASSIUM 50 MG/1
50 TABLET ORAL DAILY
Qty: 90 TABLET | Refills: 0 | Status: SHIPPED | OUTPATIENT
Start: 2020-11-17 | End: 2021-02-11

## 2020-11-17 RX ORDER — CITALOPRAM 40 MG/1
TABLET ORAL
Qty: 90 TABLET | Refills: 0 | Status: SHIPPED | OUTPATIENT
Start: 2020-11-17 | End: 2021-02-11

## 2020-11-23 RX ORDER — WARFARIN SODIUM 1 MG/1
TABLET ORAL
Qty: 30 TABLET | Refills: 0 | Status: SHIPPED | OUTPATIENT
Start: 2020-11-23 | End: 2020-12-17

## 2020-11-23 RX ORDER — EMPAGLIFLOZIN 25 MG/1
TABLET, FILM COATED ORAL
Qty: 30 TABLET | Refills: 0 | Status: SHIPPED | OUTPATIENT
Start: 2020-11-23 | End: 2020-12-17

## 2020-11-25 ENCOUNTER — HOSPITAL ENCOUNTER (OUTPATIENT)
Age: 60
Discharge: HOME | End: 2020-11-25
Payer: COMMERCIAL

## 2020-11-25 LAB
A/G RATIO: 1.6 (ref 1.1–2.2)
ALBUMIN SERPL-MCNC: 4.1 G/DL (ref 3.4–5)
ALP BLD-CCNC: 69 U/L (ref 40–129)
ALT SERPL-CCNC: 10 U/L (ref 10–40)
ANION GAP SERPL CALCULATED.3IONS-SCNC: 12 MMOL/L (ref 3–16)
AST SERPL-CCNC: 20 U/L (ref 15–37)
BASOPHILS ABSOLUTE: 0.1 K/UL (ref 0–0.2)
BASOPHILS RELATIVE PERCENT: 1.4 %
BILIRUB SERPL-MCNC: <0.2 MG/DL (ref 0–1)
BUN BLDV-MCNC: 16 MG/DL (ref 7–20)
BUN BLDV-MCNC: 16 MG/DL (ref 7–20)
CALCIUM SERPL-MCNC: 9.5 MG/DL (ref 8.3–10.6)
CHLORIDE BLD-SCNC: 107 MMOL/L (ref 99–110)
CHOLESTEROL, TOTAL: 185 MG/DL (ref 0–199)
CO2: 22 MMOL/L (ref 21–32)
CREAT SERPL-MCNC: 0.7 MG/DL (ref 0.6–1.1)
CREAT SERPL-MCNC: 0.7 MG/DL (ref 0.6–1.1)
EOSINOPHILS ABSOLUTE: 0.1 K/UL (ref 0–0.6)
EOSINOPHILS RELATIVE PERCENT: 2.1 %
FERRITIN: 24.8 NG/ML (ref 15–150)
FOLATE: >20 NG/ML (ref 4.78–24.2)
GFR AFRICAN AMERICAN: >60
GFR AFRICAN AMERICAN: >60
GFR NON-AFRICAN AMERICAN: >60
GFR NON-AFRICAN AMERICAN: >60
GLOBULIN: 2.5 G/DL
GLUCOSE BLD-MCNC: 178 MG/DL (ref 70–99)
HCT VFR BLD CALC: 37.7 % (ref 36–48)
HDLC SERPL-MCNC: 28 MG/DL (ref 40–60)
HEMOGLOBIN: 12.1 G/DL (ref 12–16)
IRON SATURATION: 8 % (ref 15–50)
IRON: 36 UG/DL (ref 37–145)
LDL CHOLESTEROL CALCULATED: ABNORMAL MG/DL
LDL CHOLESTEROL DIRECT: 70 MG/DL
LYMPHOCYTES ABSOLUTE: 1 K/UL (ref 1–5.1)
LYMPHOCYTES RELATIVE PERCENT: 24.5 %
MCH RBC QN AUTO: 25.5 PG (ref 26–34)
MCHC RBC AUTO-ENTMCNC: 32.2 G/DL (ref 31–36)
MCV RBC AUTO: 79.2 FL (ref 80–100)
MONOCYTES ABSOLUTE: 0.4 K/UL (ref 0–1.3)
MONOCYTES RELATIVE PERCENT: 9 %
NEUTROPHILS ABSOLUTE: 2.7 K/UL (ref 1.7–7.7)
NEUTROPHILS RELATIVE PERCENT: 63 %
PDW BLD-RTO: 20.9 % (ref 12.4–15.4)
PLATELET # BLD: 217 K/UL (ref 135–450)
PMV BLD AUTO: 8.3 FL (ref 5–10.5)
POTASSIUM SERPL-SCNC: 4.3 MMOL/L (ref 3.5–5.1)
RBC # BLD: 4.75 M/UL (ref 4–5.2)
SODIUM BLD-SCNC: 141 MMOL/L (ref 136–145)
TOTAL IRON BINDING CAPACITY: 431 UG/DL (ref 260–445)
TOTAL PROTEIN: 6.6 G/DL (ref 6.4–8.2)
TRIGL SERPL-MCNC: 572 MG/DL (ref 0–150)
VITAMIN B-12: 264 PG/ML (ref 211–911)
VLDLC SERPL CALC-MCNC: ABNORMAL MG/DL
WBC # BLD: 4.2 K/UL (ref 4–11)

## 2020-11-25 PROCEDURE — 36415 COLL VENOUS BLD VENIPUNCTURE: CPT

## 2020-11-25 PROCEDURE — 80053 COMPREHEN METABOLIC PANEL: CPT

## 2020-11-25 PROCEDURE — 82728 ASSAY OF FERRITIN: CPT

## 2020-11-25 PROCEDURE — 82570 ASSAY OF URINE CREATININE: CPT

## 2020-11-25 PROCEDURE — 83540 ASSAY OF IRON: CPT

## 2020-11-25 PROCEDURE — 85025 COMPLETE CBC W/AUTO DIFF WBC: CPT

## 2020-11-25 PROCEDURE — 82565 ASSAY OF CREATININE: CPT

## 2020-11-25 PROCEDURE — 83036 HEMOGLOBIN GLYCOSYLATED A1C: CPT

## 2020-11-25 PROCEDURE — 80061 LIPID PANEL: CPT

## 2020-11-25 PROCEDURE — 82746 ASSAY OF FOLIC ACID SERUM: CPT

## 2020-11-25 PROCEDURE — 82043 UR ALBUMIN QUANTITATIVE: CPT

## 2020-11-25 PROCEDURE — 82607 VITAMIN B-12: CPT

## 2020-11-25 PROCEDURE — 84520 ASSAY OF UREA NITROGEN: CPT

## 2020-11-25 PROCEDURE — 83550 IRON BINDING TEST: CPT

## 2020-11-25 PROCEDURE — 83721 ASSAY OF BLOOD LIPOPROTEIN: CPT

## 2020-11-26 LAB
CREATININE URINE: 36.4 MG/DL (ref 28–259)
ESTIMATED AVERAGE GLUCOSE: 154.2 MG/DL
HBA1C MFR BLD: 7 %
MICROALBUMIN UR-MCNC: <1.2 MG/DL
MICROALBUMIN/CREAT UR-RTO: NORMAL MG/G (ref 0–30)

## 2020-11-27 ENCOUNTER — HOSPITAL ENCOUNTER (OUTPATIENT)
Dept: CT IMAGING | Age: 60
Discharge: HOME | End: 2020-11-27
Payer: COMMERCIAL

## 2020-11-27 PROCEDURE — 6360000004 HC RX CONTRAST MEDICATION: Performed by: INTERNAL MEDICINE

## 2020-11-27 PROCEDURE — 74177 CT ABD & PELVIS W/CONTRAST: CPT

## 2020-11-27 RX ADMIN — IOPAMIDOL 75 ML: 755 INJECTION, SOLUTION INTRAVENOUS at 09:11

## 2020-11-30 ENCOUNTER — ANTI-COAG VISIT (OUTPATIENT)
Dept: INTERNAL MEDICINE CLINIC | Age: 60
End: 2020-11-30

## 2020-11-30 LAB — INR BLD: 2.9

## 2020-12-07 ENCOUNTER — TELEPHONE (OUTPATIENT)
Dept: INTERNAL MEDICINE CLINIC | Age: 60
End: 2020-12-07

## 2020-12-07 NOTE — TELEPHONE ENCOUNTER
----- Message from Erick Ocampo MD sent at 12/7/2020 12:18 PM EST -----  Yes she can  ----- Message -----  From: Rachel Ca  Sent: 12/7/2020  10:33 AM EST  To: Erick Ocampo MD    Patient has an EGD on 12/14/2020 and Dr Evin Rodriguez is needing to know if patient can come off coumadin 5 days prior to the procedure?
Elina thorne
Home

## 2020-12-09 RX ORDER — WARFARIN SODIUM 5 MG/1
TABLET ORAL
Qty: 180 TABLET | Refills: 0 | Status: SHIPPED | OUTPATIENT
Start: 2020-12-09 | End: 2021-03-24

## 2020-12-18 RX ORDER — EMPAGLIFLOZIN 25 MG/1
TABLET, FILM COATED ORAL
Qty: 30 TABLET | Refills: 1 | Status: SHIPPED | OUTPATIENT
Start: 2020-12-18 | End: 2021-02-11

## 2020-12-18 RX ORDER — WARFARIN SODIUM 1 MG/1
TABLET ORAL
Qty: 30 TABLET | Refills: 1 | Status: SHIPPED | OUTPATIENT
Start: 2020-12-18 | End: 2021-02-11

## 2020-12-23 ENCOUNTER — TELEPHONE (OUTPATIENT)
Dept: INTERNAL MEDICINE CLINIC | Age: 60
End: 2020-12-23

## 2020-12-23 ENCOUNTER — ANTI-COAG VISIT (OUTPATIENT)
Dept: INTERNAL MEDICINE CLINIC | Age: 60
End: 2020-12-23

## 2020-12-23 LAB — INR BLD: 2.5

## 2020-12-23 NOTE — TELEPHONE ENCOUNTER
----- Message from Erick Ocampo MD sent at 12/23/2020 12:48 PM EST -----  Same 4 w  ----- Message -----  From: Shaye Messer  Sent: 12/23/2020  10:20 AM EST  To: Erick Ocampo MD    INR 2.5  LM to call back with dose

## 2021-01-04 ENCOUNTER — TELEPHONE (OUTPATIENT)
Dept: INTERNAL MEDICINE CLINIC | Age: 61
End: 2021-01-04

## 2021-01-04 RX ORDER — SITAGLIPTIN AND METFORMIN HYDROCHLORIDE 1000; 50 MG/1; MG/1
TABLET, FILM COATED, EXTENDED RELEASE ORAL
Qty: 60 TABLET | Refills: 2 | Status: SHIPPED | OUTPATIENT
Start: 2021-01-04 | End: 2021-03-23

## 2021-01-04 NOTE — TELEPHONE ENCOUNTER
----- Message from Jeana Ronquillo MD sent at 1/4/2021 12:24 PM EST -----  Contact: [facility] [phone]  How is her oxygenation.  ----- Message -----  From: Sophia Olguin  Sent: 1/4/2021  11:10 AM EST  To: Jeana Ronquillo MD    Patient has tested positive for Covid and is asking is there any medications she can add to her Tylenol. She has a non-productive cough, congestion, low grade fever. She has been in quarantine for 10 days and not improving.  Thank you tsh

## 2021-01-04 NOTE — TELEPHONE ENCOUNTER
Patient's first symptom started on 12/23/2020. Per Dr Ashanti Torres patient informed that there is nothing he can prescribe but to continue taking tylenol. Patient also informed to purchase a pulse ox and if her oxygen is below 90% she will need to go to ER. Patient voiced understanding.

## 2021-01-14 ENCOUNTER — TELEPHONE (OUTPATIENT)
Dept: INTERNAL MEDICINE CLINIC | Age: 61
End: 2021-01-14

## 2021-01-14 ENCOUNTER — ANTI-COAG VISIT (OUTPATIENT)
Dept: INTERNAL MEDICINE CLINIC | Age: 61
End: 2021-01-14

## 2021-01-14 LAB — INR BLD: 3.3

## 2021-01-14 NOTE — TELEPHONE ENCOUNTER
----- Message from Jass Arredondo MD sent at 1/14/2021  2:45 PM EST -----  No change  2 weeks  ----- Message -----  From: Lazarus Batter  Sent: 1/14/2021   2:06 PM EST  To: MD Dr. Rachel Jamison pt-    INR- 3.3    Taking 11 mg daily.

## 2021-01-30 ENCOUNTER — HOSPITAL ENCOUNTER (OUTPATIENT)
Age: 61
Discharge: HOME | End: 2021-01-30
Payer: COMMERCIAL

## 2021-01-30 PROCEDURE — 83013 H PYLORI (C-13) BREATH: CPT

## 2021-02-02 LAB — H PYLORI BREATH TEST: NEGATIVE

## 2021-02-08 ENCOUNTER — TELEPHONE (OUTPATIENT)
Dept: INTERNAL MEDICINE CLINIC | Age: 61
End: 2021-02-08

## 2021-02-08 ENCOUNTER — ANTI-COAG VISIT (OUTPATIENT)
Dept: INTERNAL MEDICINE CLINIC | Age: 61
End: 2021-02-08

## 2021-02-08 LAB — INR BLD: 3

## 2021-02-08 NOTE — TELEPHONE ENCOUNTER
----- Message from Darby Melvin sent at 2/8/2021  4:54 PM EST -----  Same per Dr Hever Meza  ----- Message -----  From: Darby Melvin  Sent: 2/8/2021   4:07 PM EST  To: Baldo Schrader MD    11 mg daily  ----- Message -----  From: Darby Melvin  Sent: 2/8/2021   3:47 PM EST  To: Baldo Schrader MD    INR 3  LM to call back with dose.   Last INR 3.3

## 2021-02-08 NOTE — TELEPHONE ENCOUNTER
----- Message from Paulette Suarez sent at 2/8/2021  4:54 PM EST -----  Same per Dr Alexus Santamaria  ----- Message -----  From: Paulette Suarez  Sent: 2/8/2021   4:07 PM EST  To: Teresa Souza MD    11 mg daily  ----- Message -----  From: Paulette Suarez  Sent: 2/8/2021   3:47 PM EST  To: Teresa Souza MD    INR 3  LM to call back with dose.   Last INR 3.3

## 2021-02-11 RX ORDER — FENOFIBRIC ACID 135 MG/1
CAPSULE, DELAYED RELEASE ORAL
Qty: 90 CAPSULE | Refills: 0 | Status: SHIPPED | OUTPATIENT
Start: 2021-02-11 | End: 2021-05-10

## 2021-02-11 RX ORDER — LOSARTAN POTASSIUM 50 MG/1
50 TABLET ORAL DAILY
Qty: 90 TABLET | Refills: 0 | Status: SHIPPED | OUTPATIENT
Start: 2021-02-11 | End: 2021-05-10

## 2021-02-11 RX ORDER — TOPIRAMATE 50 MG/1
TABLET, FILM COATED ORAL
Qty: 90 TABLET | Refills: 0 | Status: SHIPPED | OUTPATIENT
Start: 2021-02-11 | End: 2021-05-10

## 2021-02-11 RX ORDER — CITALOPRAM 40 MG/1
TABLET ORAL
Qty: 90 TABLET | Refills: 0 | Status: SHIPPED | OUTPATIENT
Start: 2021-02-11 | End: 2021-05-10

## 2021-02-11 RX ORDER — EMPAGLIFLOZIN 25 MG/1
TABLET, FILM COATED ORAL
Qty: 90 TABLET | Refills: 0 | Status: SHIPPED | OUTPATIENT
Start: 2021-02-11 | End: 2021-05-10

## 2021-02-11 RX ORDER — WARFARIN SODIUM 1 MG/1
TABLET ORAL
Qty: 90 TABLET | Refills: 0 | Status: SHIPPED | OUTPATIENT
Start: 2021-02-11 | End: 2021-05-10

## 2021-03-03 ENCOUNTER — TELEPHONE (OUTPATIENT)
Dept: INTERNAL MEDICINE CLINIC | Age: 61
End: 2021-03-03

## 2021-03-03 ENCOUNTER — ANTI-COAG VISIT (OUTPATIENT)
Dept: INTERNAL MEDICINE CLINIC | Age: 61
End: 2021-03-03

## 2021-03-03 LAB — INR BLD: 2.4

## 2021-03-03 NOTE — TELEPHONE ENCOUNTER
----- Message from Brendon Zacarias MD sent at 3/3/2021  9:57 AM EST -----  Contact: [phone]  Same 4 w  ----- Message -----  From: Tammi Alanis  Sent: 3/3/2021   9:53 AM EST  To: Brendon Zacarias MD    INR: 2.40  Currently taking 11 mg daily.   Last INR: 3.0 on 2/8/2021

## 2021-03-17 ENCOUNTER — HOSPITAL ENCOUNTER (OUTPATIENT)
Dept: ULTRASOUND IMAGING | Age: 61
Discharge: HOME | End: 2021-03-17
Payer: COMMERCIAL

## 2021-03-17 DIAGNOSIS — R10.13 EPIGASTRIC PAIN: ICD-10-CM

## 2021-03-17 PROCEDURE — 76705 ECHO EXAM OF ABDOMEN: CPT

## 2021-03-24 RX ORDER — WARFARIN SODIUM 5 MG/1
TABLET ORAL
Qty: 180 TABLET | Refills: 0 | Status: SHIPPED | OUTPATIENT
Start: 2021-03-24 | End: 2021-06-21

## 2021-04-16 ENCOUNTER — HOSPITAL ENCOUNTER (OUTPATIENT)
Age: 61
Discharge: HOME | End: 2021-04-16
Payer: COMMERCIAL

## 2021-04-16 LAB
A/G RATIO: 1.8 (ref 1.1–2.2)
ALBUMIN SERPL-MCNC: 4.5 G/DL (ref 3.4–5)
ALP BLD-CCNC: 96 U/L (ref 40–129)
ALT SERPL-CCNC: <5 U/L (ref 10–40)
ANION GAP SERPL CALCULATED.3IONS-SCNC: 14 MMOL/L (ref 3–16)
AST SERPL-CCNC: 28 U/L (ref 15–37)
BASOPHILS ABSOLUTE: 0.1 K/UL (ref 0–0.2)
BASOPHILS RELATIVE PERCENT: 1.4 %
BILIRUB SERPL-MCNC: 0.3 MG/DL (ref 0–1)
BUN BLDV-MCNC: 18 MG/DL (ref 7–20)
CALCIUM SERPL-MCNC: 10.1 MG/DL (ref 8.3–10.6)
CHLORIDE BLD-SCNC: 104 MMOL/L (ref 99–110)
CO2: 22 MMOL/L (ref 21–32)
CREAT SERPL-MCNC: 0.6 MG/DL (ref 0.6–1.2)
EOSINOPHILS ABSOLUTE: 0.1 K/UL (ref 0–0.6)
EOSINOPHILS RELATIVE PERCENT: 2.4 %
GFR AFRICAN AMERICAN: >60
GFR NON-AFRICAN AMERICAN: >60
GLOBULIN: 2.5 G/DL
GLUCOSE BLD-MCNC: 202 MG/DL (ref 70–99)
HCT VFR BLD CALC: 45.3 % (ref 36–48)
HEMOGLOBIN: 15.4 G/DL (ref 12–16)
INR BLD: 2.72 (ref 0.86–1.14)
LIPASE: 44 U/L (ref 13–60)
LYMPHOCYTES ABSOLUTE: 1.2 K/UL (ref 1–5.1)
LYMPHOCYTES RELATIVE PERCENT: 27.7 %
MCH RBC QN AUTO: 29.8 PG (ref 26–34)
MCHC RBC AUTO-ENTMCNC: 34 G/DL (ref 31–36)
MCV RBC AUTO: 87.4 FL (ref 80–100)
MONOCYTES ABSOLUTE: 0.5 K/UL (ref 0–1.3)
MONOCYTES RELATIVE PERCENT: 10.5 %
NEUTROPHILS ABSOLUTE: 2.6 K/UL (ref 1.7–7.7)
NEUTROPHILS RELATIVE PERCENT: 58 %
PDW BLD-RTO: 16.7 % (ref 12.4–15.4)
PLATELET # BLD: 180 K/UL (ref 135–450)
PMV BLD AUTO: 8.5 FL (ref 5–10.5)
POTASSIUM SERPL-SCNC: 4.2 MMOL/L (ref 3.5–5.1)
PROTHROMBIN TIME: 30.6 SEC (ref 10–13.2)
RBC # BLD: 5.18 M/UL (ref 4–5.2)
SODIUM BLD-SCNC: 140 MMOL/L (ref 136–145)
TOTAL PROTEIN: 7 G/DL (ref 6.4–8.2)
WBC # BLD: 4.4 K/UL (ref 4–11)

## 2021-04-16 PROCEDURE — 36415 COLL VENOUS BLD VENIPUNCTURE: CPT

## 2021-04-16 PROCEDURE — 80053 COMPREHEN METABOLIC PANEL: CPT

## 2021-04-16 PROCEDURE — 85025 COMPLETE CBC W/AUTO DIFF WBC: CPT

## 2021-04-16 PROCEDURE — 82607 VITAMIN B-12: CPT

## 2021-04-16 PROCEDURE — 85610 PROTHROMBIN TIME: CPT

## 2021-04-16 PROCEDURE — 83550 IRON BINDING TEST: CPT

## 2021-04-16 PROCEDURE — 83540 ASSAY OF IRON: CPT

## 2021-04-16 PROCEDURE — 82728 ASSAY OF FERRITIN: CPT

## 2021-04-16 PROCEDURE — 82746 ASSAY OF FOLIC ACID SERUM: CPT

## 2021-04-16 PROCEDURE — 83690 ASSAY OF LIPASE: CPT

## 2021-04-17 LAB
FERRITIN: 58.8 NG/ML (ref 15–150)
FOLATE: 16.99 NG/ML (ref 4.78–24.2)
IRON SATURATION: 20 % (ref 15–50)
IRON: 77 UG/DL (ref 37–145)
TOTAL IRON BINDING CAPACITY: 376 UG/DL (ref 260–445)
VITAMIN B-12: 291 PG/ML (ref 211–911)

## 2021-05-07 ENCOUNTER — TELEPHONE (OUTPATIENT)
Dept: PULMONOLOGY | Age: 61
End: 2021-05-07

## 2021-05-07 ENCOUNTER — VIRTUAL VISIT (OUTPATIENT)
Dept: PULMONOLOGY | Age: 61
End: 2021-05-07
Payer: COMMERCIAL

## 2021-05-07 DIAGNOSIS — E66.01 OBESITY, MORBID, BMI 40.0-49.9 (HCC): ICD-10-CM

## 2021-05-07 DIAGNOSIS — Z71.89 CPAP USE COUNSELING: ICD-10-CM

## 2021-05-07 DIAGNOSIS — G47.33 SEVERE OBSTRUCTIVE SLEEP APNEA: Primary | ICD-10-CM

## 2021-05-07 DIAGNOSIS — R53.83 OTHER FATIGUE: ICD-10-CM

## 2021-05-07 PROCEDURE — 99441 PR PHYS/QHP TELEPHONE EVALUATION 5-10 MIN: CPT | Performed by: NURSE PRACTITIONER

## 2021-05-07 RX ORDER — PANTOPRAZOLE SODIUM 20 MG/1
20 TABLET, DELAYED RELEASE ORAL DAILY
COMMUNITY
End: 2022-03-23 | Stop reason: SDUPTHER

## 2021-05-07 ASSESSMENT — SLEEP AND FATIGUE QUESTIONNAIRES
HOW LIKELY ARE YOU TO NOD OFF OR FALL ASLEEP WHILE SITTING INACTIVE IN A PUBLIC PLACE: 0
HOW LIKELY ARE YOU TO NOD OFF OR FALL ASLEEP WHILE SITTING QUIETLY AFTER LUNCH WITHOUT ALCOHOL: 1
HOW LIKELY ARE YOU TO NOD OFF OR FALL ASLEEP WHILE SITTING AND READING: 2
HOW LIKELY ARE YOU TO NOD OFF OR FALL ASLEEP WHILE LYING DOWN TO REST IN THE AFTERNOON WHEN CIRCUMSTANCES PERMIT: 3

## 2021-05-07 NOTE — TELEPHONE ENCOUNTER
.Within this Telehealth Consent, the terms you and yours refer to the person using the Telehealth Service (Service), or in the case of a use of the Service by or on behalf of a minor, you and yours refer to and include (i) the parent or legal guardian who provides consent to the use of the Service by such minor or uses the Service on behalf of such minor, and (ii) the minor for whom consent is being provided or on whose behalf the Service is being utilized. When using Service, you will be consulting with your health care providers via the use of Telehealth.   Telehealth involves the delivery of healthcare services using electronic communications, information technology or other means between a healthcare provider and a patient who are not in the same physical location. Telehealth may be used for diagnosis, treatment, follow-up and/or patient education, and may include, but is not limited to, one or more of the following:    Electronic transmission of medical records, photo images, personal health information or other data between a patient and a healthcare provider    Interactions between a patient and healthcare provider via audio, video and/or data communications    Use of output data from medical devices, sound and video files    Anticipated Benefits   The use of Telehealth by your Provider(s) through the Service may have the following possible benefits:    Making it easier and more efficient for you to access medical care and treatment for the conditions treated by such Provider(s) utilizing the Service    Allowing you to obtain medical care and treatment by Provider(s) at times that are convenient for you    Enabling you to interact with Provider(s) without the necessity of an in-office appointment     Possible Risks   While the use of Telehealth can provide potential benefits for you, there are also potential risks associated with the use of Telehealth.  These risks include, but may not be limited to the following:    Your Provider(s) may not able to provide medical treatment for your particular condition and you may be required to seek alternative healthcare or emergency care services.  The electronic systems or other security protocols or safeguards used in the Service could fail, causing a breach of privacy of your medical or other information.  Given regulatory requirements in certain jurisdictions, your Provider(s) diagnosis and/or treatment options, especially pertaining to certain prescriptions, may be limited. Acceptance   1. You understand that Services will be provided via Telehealth. This process involves the use of HIPAA compliant and secure, real-time audio-visual interfacing with a qualified and appropriately trained provider located at [facility]. 2. You understand that, under no circumstances, will this session be recorded. 3. You understand that the Provider(s) at [facility] and other clinical participants will be party to the information obtained during the Telehealth session in accordance with best medical practices. 4. You understand that the information obtained during the Telehealth session will be used to help determine the most appropriate treatment options. 5. You understand that You have the right to revoke this consent at any point in time. 6. You understand that Telehealth is voluntary, and that continued treatment is not dependent upon consent. 7. You understand that, in the event of non-consent to Telehealth services and/or technical difficulties, you will obtain services as typically provided in the absence of Telehealth technology. 8. You understand that this consent will be kept in Your medical record. 9. No potential benefits from the use of Telehealth or specific results can be guaranteed. Your condition may not be cured or improved and, in some cases, may get worse.    10. There are limitations in

## 2021-05-07 NOTE — PROGRESS NOTES
Subjective:      Patient ID: Daron Bronson is a 62 y.o. female. HPI    Daron Bronson is a 62 y.o. female for telephone only virtual visit for ELEAZAR follow up. States she is doing well with CPAP. Patient is using CPAP   8 hrs/night. Not using humidifier. No snoring on CPAP. The pressure is well tolerated. The mask is comfortable-nasal mask. No mask leak. No significant daytime sleepiness. No nodding off when driving. No dry nose or throat. Some increased fatigue states has been following with GI for anemia. Bedtime is 1030 pm and rise time is 7 am. Sleep onset is 15 minutes. Wakes up 3 times at night total. 1 nocturia. It takes few minutes to fall back a sleep. Occasional naps during the day. No headache in am. No weight gain. 32 oz caffienated beverages during the day. No alcohol.  ESS is 8      Review of Systems    Past Medical History:   Diagnosis Date    Benign essential hypertension 11/14/2017    Benign head tremor 5/14/2014    Cholelithiasis     Disease of nasal cavity and sinuses     Fatty infiltration of liver     Hot flash, menopausal 7/22/2015    Hyperlipidemia     Hyperlipidemia associated with type 2 diabetes mellitus ([facility] [address] 10/23/2015    Morbid obesity with BMI of 50.0-59.9, adult ([facility] [address] 10/23/2015    No history of procedure 11/9/2015    no past colonoscopy    Pulmonary embolism (HCC)     Type II or unspecified type diabetes mellitus without mention of complication, not stated as uncontrolled     Umbilical hernia 21/4/8990    Unspecified sleep apnea      Past Surgical History:   Procedure Laterality Date    BREAST SURGERY  2015    CHOLECYSTECTOMY, LAPAROSCOPIC  8/2/2012    COLONOSCOPY  11/9/2015    cecal polyp    DILATION AND CURETTAGE OF UTERUS  03/13/2018    HERNIA REPAIR  79/23/89    lap umbilical    HYSTEROSCOPY  03/13/2018    and D&C     Allergies   Allergen Reactions    Lisinopril Other (See Comments)     cough    Penicillins Other (See Comments)     Unsure of reaction--childhood    Sulfa Antibiotics Other (See Comments)     Unsure of reaction     Current Outpatient Medications   Medication Sig Dispense Refill    pantoprazole (PROTONIX) 20 MG tablet Take 20 mg by mouth daily      warfarin (COUMADIN) 5 MG tablet TAKE TWO (2) TABLETS BY MOUTH DAILY OR AS DIRECTED BY YOUR DOCTOR 180 tablet 0    JANUMET XR  MG TB24 per extended release tablet TAKE ONE TABLET BY MOUTH 2 TIMES DAILY 60 tablet 2    JARDIANCE 25 MG tablet TAKE 1 TABLET BY MOUTH DAILY 90 tablet 0    warfarin (COUMADIN) 1 MG tablet Take as directed by physician. 90 tablet 0    fenofibric acid (FIBRICOR) 135 MG CPDR capsule TAKE ONE CAPSULE BY MOUTH DAILY 90 capsule 0    citalopram (CELEXA) 40 MG tablet TAKE ONE TABLET BY MOUTH DAILY 90 tablet 0    topiramate (TOPAMAX) 50 MG tablet TAKE ONE TABLET BY MOUTH EVERY EVENING 90 tablet 0    losartan (COZAAR) 50 MG tablet TAKE 1 TABLET BY MOUTH DAILY 90 tablet 0    Icosapent Ethyl (VASCEPA) 1 g CAPS capsule Take 2 capsules by mouth 2 times daily 60 capsule 2    Blood Glucose Monitoring Suppl (ONE TOUCH ULTRA MINI) w/Device KIT Test daily. DX: E11.9 1 kit 0    ONE TOUCH LANCETS MISC Test daily. DX: E11.9 100 each 3    blood glucose test strips (ASCENSIA AUTODISC VI;ONE TOUCH ULTRA TEST VI) strip Test Daily. DX: E11.9 100 each 3    Omega-3 Fatty Acids (FISH OIL) 1000 MG CPDR Take 2,000 mg by mouth daily      PRODIGY NO CODING BLOOD GLUC strip TEST BLOOD SUGARS 3 TIMES DAILY 100 each 2    ONE TOUCH LANCETS MISC 1 each by Does not apply route daily. 100 each 3     No current facility-administered medications for this visit. Objective:   Physical Exam  LMP 08/20/2014      Data:   Split night 1/29/16: AHI 70 and low O2 82%, improved sleep related breathing disorder with CPAP therapy. Started on AutoCPAP min pressure of 12 cmH2O and max pressure of 16 cmH2O.       CPAP compliance Data:  Compliance download report from 2/25/17 to 3/26/17  showed patient is using machine 8:38 hrs/night with 100% compliance and AHI 0.2 within this time frame. 30/30days with greater than 4 hours of machine use. 95th percentile leak 37.8 L/min  90% pressure 15 cm H20    Compliance download report from 2/24/18 to 3/25/18 showed patient is using machine 8:14 hrs/night with 100% compliance and AHI 0.3 within this time frame. 30/30days with greater than 4 hours of machine use. Leak 40 L/min  90% pressure 15 cm H20 on auto CPAP 12-16 cm H2O    Compliance download report from 4/19/19 to 5/18/19 reviewed today by me and showed patient is using machine 8:09 hrs/night with 100% compliance and AHI 0.5 within this time frame. 30/30days with greater than 4 hours of machine use. 90% pressure 14.4 cm H20 on auto CPAP 13-17 cm H2O    Compliance download report from 3/31/20 to 4/29/20 reviewed today by me and showed patient is using machine 8:40 hrs/night with 100% compliance and AHI 0.3 within this time frame. 30/30days with greater than 4 hours of machine use. 90% pressure 14.7 cm H20 on auto CPAP 13-17 cm H2O    Compliance download report from 4/6/21 to 5/5/21 reviewed today by me and showed patient is using machine 8:11 hrs/night with 100% compliance and AHI 0.3 within this time frame. 30/30days with greater than 4 hours of machine use. 90% pressure 14.8 cm H20 on auto CPAP 13-17 cm H2O please    Assessment:      1. Severe ELEAZAR . Failed CPAP 12 cmH2O, improved with Auto CPAP 13-17 cm H2O. Nasal mask. Optimal compliance and efficacy on review today. 2. DM  3. Fatigue  4. Obesity  5. HTN per PCP      Plan:      - Continue AutoCPAP min pressure of 13 cmH2O and max pressure of 17 cmH2O  - Advised to use CPAP 6-8 hrs at night and during naps. - Replacement of mask, tubing, head straps every 3-6 months or sooner if damaged.    - Patient instructed to contact [employer] for any mask, tubing or machine trouble shooting if problems arise.  - Sleep hygiene  - Avoid sedatives, alcohol and caffeinated drinks at bed time. - Patient counseled to never drive or operate heavy machinery while fatigue, drowsy or sleepy. - Weight loss is recommended as a long-term intervention.    - Complications of ELEAZAR if not treated were discussed with patient patient, including: systemic hypertension, pulmonary hypertension, cardiovascular morbidities, car accidents and all cause mortality.  -Patient education provided regarding sleep tips and CPAP cleaning recommendations           Follow-up:  1 year, sooner if needed    Tomas Ferrer is a 62 y.o. female evaluated via telephone on 5/7/2021. Consent:  She and/or health care decision maker is aware that that she may receive a bill for this telephone service, depending on her insurance coverage, and has provided verbal consent to proceed: Yes    I affirm this is a Patient Initiated Episode with a Patient who has not had a related appointment within my department in the past 7 days or scheduled within the next 24 hours. Patient identification was verified at the start of the visit: Yes    Total Time: minutes: 5-10 minutes    The visit was conducted pursuant to the emergency declaration under the [address] [place] [address], [address] [place] [address] [facility] authority and the [employer] General Act. Patient identification was verified, and a caregiver was present when appropriate. The patient was located in a state where the provider was credentialed to provide care.     Note: not billable if this call serves to triage the patient into an appointment for the relevant concern      [facility] [[email or web address]] 75.

## 2021-05-10 RX ORDER — LOSARTAN POTASSIUM 50 MG/1
50 TABLET ORAL DAILY
Qty: 90 TABLET | Refills: 0 | Status: SHIPPED | OUTPATIENT
Start: 2021-05-10 | End: 2021-08-04

## 2021-05-10 RX ORDER — EMPAGLIFLOZIN 25 MG/1
TABLET, FILM COATED ORAL
Qty: 90 TABLET | Refills: 0 | Status: SHIPPED | OUTPATIENT
Start: 2021-05-10 | End: 2021-07-13 | Stop reason: SDUPTHER

## 2021-05-10 RX ORDER — TOPIRAMATE 50 MG/1
TABLET, FILM COATED ORAL
Qty: 90 TABLET | Refills: 0 | Status: SHIPPED | OUTPATIENT
Start: 2021-05-10 | End: 2021-05-18 | Stop reason: DRUGHIGH

## 2021-05-10 RX ORDER — CITALOPRAM 40 MG/1
TABLET ORAL
Qty: 90 TABLET | Refills: 0 | Status: SHIPPED | OUTPATIENT
Start: 2021-05-10 | End: 2021-08-04

## 2021-05-10 RX ORDER — FENOFIBRIC ACID 135 MG/1
CAPSULE, DELAYED RELEASE ORAL
Qty: 90 CAPSULE | Refills: 0 | Status: SHIPPED | OUTPATIENT
Start: 2021-05-10 | End: 2021-08-04

## 2021-05-10 RX ORDER — WARFARIN SODIUM 1 MG/1
TABLET ORAL
Qty: 90 TABLET | Refills: 0 | Status: SHIPPED | OUTPATIENT
Start: 2021-05-10 | End: 2021-08-04

## 2021-05-11 ENCOUNTER — TELEPHONE (OUTPATIENT)
Dept: INTERNAL MEDICINE CLINIC | Age: 61
End: 2021-05-11

## 2021-05-11 ENCOUNTER — ANTI-COAG VISIT (OUTPATIENT)
Dept: INTERNAL MEDICINE CLINIC | Age: 61
End: 2021-05-11

## 2021-05-11 LAB — INR BLD: 2.7

## 2021-05-11 NOTE — TELEPHONE ENCOUNTER
----- Message from Melany Fischer MD sent at 5/11/2021  2:32 PM EDT -----  Contact: [phone]  Same 2 w  ----- Message -----  From: Ashish Aguirre  Sent: 5/11/2021  10:50 AM EDT  To: Melany Fischer MD    INR: 2.70

## 2021-05-18 ENCOUNTER — OFFICE VISIT (OUTPATIENT)
Dept: INTERNAL MEDICINE CLINIC | Age: 61
End: 2021-05-18

## 2021-05-18 VITALS
HEIGHT: 65 IN | SYSTOLIC BLOOD PRESSURE: 130 MMHG | RESPIRATION RATE: 12 BRPM | HEART RATE: 70 BPM | WEIGHT: 293 LBS | BODY MASS INDEX: 48.82 KG/M2 | DIASTOLIC BLOOD PRESSURE: 80 MMHG

## 2021-05-18 DIAGNOSIS — I10 BENIGN ESSENTIAL HYPERTENSION: ICD-10-CM

## 2021-05-18 DIAGNOSIS — E78.5 HYPERLIPIDEMIA ASSOCIATED WITH TYPE 2 DIABETES MELLITUS (HCC): ICD-10-CM

## 2021-05-18 DIAGNOSIS — G47.33 SEVERE OBSTRUCTIVE SLEEP APNEA: ICD-10-CM

## 2021-05-18 DIAGNOSIS — D50.9 IRON DEFICIENCY ANEMIA, UNSPECIFIED IRON DEFICIENCY ANEMIA TYPE: ICD-10-CM

## 2021-05-18 DIAGNOSIS — E11.69 HYPERLIPIDEMIA ASSOCIATED WITH TYPE 2 DIABETES MELLITUS (HCC): ICD-10-CM

## 2021-05-18 DIAGNOSIS — E11.69 TYPE 2 DIABETES MELLITUS WITH OTHER SPECIFIED COMPLICATION, WITHOUT LONG-TERM CURRENT USE OF INSULIN (HCC): Primary | ICD-10-CM

## 2021-05-18 DIAGNOSIS — G25.0 BENIGN HEAD TREMOR: ICD-10-CM

## 2021-05-18 DIAGNOSIS — I27.82 OTHER CHRONIC PULMONARY EMBOLISM WITHOUT ACUTE COR PULMONALE (HCC): ICD-10-CM

## 2021-05-18 DIAGNOSIS — E66.01 OBESITY, MORBID, BMI 40.0-49.9 (HCC): ICD-10-CM

## 2021-05-18 PROCEDURE — 99214 OFFICE O/P EST MOD 30 MIN: CPT | Performed by: INTERNAL MEDICINE

## 2021-05-18 RX ORDER — TOPIRAMATE 100 MG/1
100 TABLET, FILM COATED ORAL NIGHTLY
Qty: 90 TABLET | Refills: 0 | Status: SHIPPED | OUTPATIENT
Start: 2021-05-18 | End: 2021-08-12

## 2021-05-18 ASSESSMENT — ENCOUNTER SYMPTOMS
SHORTNESS OF BREATH: 0
RHINORRHEA: 0
WHEEZING: 0
ABDOMINAL PAIN: 0
NAUSEA: 0
VOMITING: 0
COUGH: 0

## 2021-05-18 NOTE — PROGRESS NOTES
Subjective:      Patient ID: Dilcia Dee is a 62 y.o. female. HPI    Patient is here for follow up of diabetes, HTN, depression, pulmonary embolism and hyperlipidemia. Patient's is not checking her sugars. No hypoglycemia. Patient does not have polydipsia and polyuria. Her last A1C was 7.0. She is on cozaar for HTN. Her Bp is at goal.  She is compliant with coumadin. She checks pro time at home. Her depression is stable. No new issues. Her cholesterol is controlled. It is at goal.  She has ELEAZAR. She is on CPAP. She is compliant. She has benign head tremor. It is some worse. No new changes. Review of Systems   Constitutional: Negative for appetite change and fatigue. HENT: Negative for postnasal drip and rhinorrhea. Respiratory: Negative for cough, shortness of breath and wheezing. Cardiovascular: Negative for chest pain and palpitations. Gastrointestinal: Negative for abdominal pain, nausea and vomiting. Endocrine: Negative for polydipsia, polyphagia and polyuria. Musculoskeletal: Negative for joint swelling. Skin: Negative for rash. Neurological: Negative for light-headedness. Psychiatric/Behavioral: Positive for sleep disturbance. The patient is nervous/anxious. Current Outpatient Medications on File Prior to Visit   Medication Sig Dispense Refill    losartan (COZAAR) 50 MG tablet TAKE 1 TABLET BY MOUTH DAILY 90 tablet 0    fenofibric acid (FIBRICOR) 135 MG CPDR capsule TAKE ONE CAPSULE BY MOUTH DAILY 90 capsule 0    warfarin (COUMADIN) 1 MG tablet Take as directed by physician.  90 tablet 0    JARDIANCE 25 MG tablet TAKE 1 TABLET BY MOUTH DAILY 90 tablet 0    citalopram (CELEXA) 40 MG tablet TAKE ONE TABLET BY MOUTH DAILY 90 tablet 0    topiramate (TOPAMAX) 50 MG tablet TAKE ONE TABLET BY MOUTH EVERY EVENING 90 tablet 0    pantoprazole (PROTONIX) 20 MG tablet Take 20 mg by mouth daily      warfarin (COUMADIN) 5 MG tablet TAKE TWO (2) TABLETS BY MOUTH DAILY OR AS DIRECTED BY YOUR DOCTOR 180 tablet 0    JANUMET XR  MG TB24 per extended release tablet TAKE ONE TABLET BY MOUTH 2 TIMES DAILY 60 tablet 2    Icosapent Ethyl (VASCEPA) 1 g CAPS capsule Take 2 capsules by mouth 2 times daily 60 capsule 2    Blood Glucose Monitoring Suppl (ONE TOUCH ULTRA MINI) w/Device KIT Test daily. DX: E11.9 1 kit 0    ONE TOUCH LANCETS MISC Test daily. DX: E11.9 100 each 3    blood glucose test strips (ASCENSIA AUTODISC VI;ONE TOUCH ULTRA TEST VI) strip Test Daily. DX: E11.9 100 each 3    Omega-3 Fatty Acids (FISH OIL) 1000 MG CPDR Take 2,000 mg by mouth daily      PRODIGY NO CODING BLOOD GLUC strip TEST BLOOD SUGARS 3 TIMES DAILY 100 each 2    ONE TOUCH LANCETS MISC 1 each by Does not apply route daily. 100 each 3     No current facility-administered medications on file prior to visit. There are no changes to past medical history, family history, social history or review of systems(except as noted in the history section) since prior note (all reviewed with patient). /80 (Site: Right Upper Arm, Position: Sitting, Cuff Size: Large Adult)   Pulse 70   Resp 12   Ht 5' 5\" (1.651 m)   Wt 294 lb (133.4 kg)   LMP 08/20/2014   BMI 48.92 kg/m²        Objective:   Physical Exam  Constitutional:       Appearance: She is well-developed. Comments: Morbidly obese   HENT:      Head: Normocephalic. Eyes:      Conjunctiva/sclera: Conjunctivae normal.      Pupils: Pupils are equal, round, and reactive to light. Neck:      Thyroid: No thyroid mass or thyromegaly. Vascular: No carotid bruit or JVD. Trachea: Trachea normal.   Cardiovascular:      Rate and Rhythm: Normal rate and regular rhythm. Heart sounds: Normal heart sounds. No murmur heard. No gallop. Pulmonary:      Effort: Pulmonary effort is normal. No respiratory distress. Breath sounds: Normal breath sounds. No wheezing or rales.    Abdominal:      General: Bowel sounds are normal. There is no distension. Palpations: Abdomen is soft. There is no hepatomegaly or splenomegaly. Tenderness: There is no abdominal tenderness. Musculoskeletal:      Cervical back: Normal range of motion and neck supple. Lymphadenopathy:      Cervical: No cervical adenopathy. Skin:     General: Skin is warm and dry. Findings: No rash. Neurological:      Mental Status: She is alert and oriented to person, place, and time. Psychiatric:         Behavior: Behavior normal.         Thought Content: Thought content normal.         Judgment: Judgment normal.         Assessment:       Diagnosis Orders   1. Type 2 diabetes mellitus with other specified complication, without long-term current use of insulin (HCC)  HM DIABETES FOOT EXAM   2. Iron deficiency anemia, unspecified iron deficiency anemia type  Hemoglobin A1C   3. Other chronic pulmonary embolism without acute cor pulmonale ([facility])     4. Hyperlipidemia associated with type 2 diabetes mellitus ([facility] [address]     5. Severe obstructive sleep apnea     6. Obesity, morbid, BMI 40.0-49.9 ([facility] [address]     7. Benign essential hypertension     8. Benign head tremor            Plan:      Decrease calorie intake. Check labs. DM type 2: Continue oral medication. Check A1C.   HTN: on Cozaar 50 mg daily. PE: On home monitoring system. Head tremor is some worse. I had increased Topamax to 100 mg po qhs but she is only taking 50. I increased the dose again. Fatty liver: she has lost weight. Depression: on Celexa. ELEAZAR: on CPAP. She is using it for over 8 hours a night. Hypertriglyceridemia. Stable. Discussed use, benefit, and side effects of prescribed medications. Barriers to medication compliance addressed. All patient questions answered. Pt voiced understanding. Exercise,weight loss recommended. The current medical regimen is effective;  continue present plan and medications. See orders.

## 2021-05-19 LAB
ESTIMATED AVERAGE GLUCOSE: 177.2 MG/DL
HBA1C MFR BLD: 7.8 %

## 2021-05-26 ENCOUNTER — ANTI-COAG VISIT (OUTPATIENT)
Dept: INTERNAL MEDICINE CLINIC | Age: 61
End: 2021-05-26

## 2021-05-26 ENCOUNTER — TELEPHONE (OUTPATIENT)
Dept: INTERNAL MEDICINE CLINIC | Age: 61
End: 2021-05-26

## 2021-05-26 LAB — INR BLD: 2.3

## 2021-05-26 NOTE — TELEPHONE ENCOUNTER
----- Message from Sindhu Simon MD sent at 5/26/2021  3:38 PM EDT -----  Same   2 weeks   ----- Message -----  From: Karol Donovan  Sent: 5/26/2021   1:29 PM EDT  To: MD Dr. Alexandra Salazar pt    INR 2.3  Taking 11 mg daily    Last INR 2.7 on 5/11

## 2021-06-21 RX ORDER — WARFARIN SODIUM 5 MG/1
TABLET ORAL
Qty: 180 TABLET | Refills: 0 | Status: SHIPPED | OUTPATIENT
Start: 2021-06-21 | End: 2021-09-20

## 2021-06-23 RX ORDER — FERROUS SULFATE 325(65) MG
TABLET ORAL
Qty: 100 TABLET | Refills: 0 | Status: SHIPPED | OUTPATIENT
Start: 2021-06-23 | End: 2021-09-28

## 2021-06-30 ENCOUNTER — TELEPHONE (OUTPATIENT)
Dept: INTERNAL MEDICINE CLINIC | Age: 61
End: 2021-06-30

## 2021-06-30 ENCOUNTER — ANTI-COAG VISIT (OUTPATIENT)
Dept: INTERNAL MEDICINE CLINIC | Age: 61
End: 2021-06-30

## 2021-06-30 LAB — INR BLD: 2.5

## 2021-06-30 NOTE — TELEPHONE ENCOUNTER
----- Message from Lacie Galvan MD sent at 6/30/2021 11:53 AM EDT -----  Contact: [phone]  Same 2 w  ----- Message -----  From: Sterling Shah  Sent: 6/30/2021   8:15 AM EDT  To: Lacie Galvan MD    INR: 2.50

## 2021-07-13 ENCOUNTER — TELEPHONE (OUTPATIENT)
Dept: INTERNAL MEDICINE CLINIC | Age: 61
End: 2021-07-13

## 2021-07-13 ENCOUNTER — ANTI-COAG VISIT (OUTPATIENT)
Dept: INTERNAL MEDICINE CLINIC | Age: 61
End: 2021-07-13

## 2021-07-13 LAB — INR BLD: 2.5

## 2021-07-13 RX ORDER — SITAGLIPTIN AND METFORMIN HYDROCHLORIDE 1000; 50 MG/1; MG/1
TABLET, FILM COATED, EXTENDED RELEASE ORAL
Qty: 180 TABLET | Refills: 1 | Status: SHIPPED | OUTPATIENT
Start: 2021-07-13 | End: 2021-11-29

## 2021-07-13 RX ORDER — EMPAGLIFLOZIN 25 MG/1
TABLET, FILM COATED ORAL
Qty: 90 TABLET | Refills: 1 | Status: SHIPPED | OUTPATIENT
Start: 2021-07-13 | End: 2021-11-29

## 2021-07-13 NOTE — TELEPHONE ENCOUNTER
Left vm informing pt to continue the same dose of coumadin and recheck in 2 weeks per Dr. Henry Card.

## 2021-07-13 NOTE — TELEPHONE ENCOUNTER
----- Message from German [place] sent at 7/13/2021 11:54 AM EDT -----  Contact: Ry Mantilla  [phone]  Medication refill:    JARDIANCE 25 MG tablet    JANUMET XR  MG TB24 per extended release tablet        Called pharmacy to refill, but was told be pharmacy that they are losing money, and patient needs to have these prescriptions sent by mail order.         [facility] Mail order service

## 2021-08-03 LAB — INR BLD: 2.1

## 2021-08-04 ENCOUNTER — TELEPHONE (OUTPATIENT)
Dept: INTERNAL MEDICINE CLINIC | Age: 61
End: 2021-08-04

## 2021-08-04 ENCOUNTER — ANTI-COAG VISIT (OUTPATIENT)
Dept: INTERNAL MEDICINE CLINIC | Age: 61
End: 2021-08-04

## 2021-08-04 RX ORDER — WARFARIN SODIUM 1 MG/1
TABLET ORAL
Qty: 90 TABLET | Refills: 0 | Status: SHIPPED | OUTPATIENT
Start: 2021-08-04 | End: 2021-10-25

## 2021-08-04 RX ORDER — LOSARTAN POTASSIUM 50 MG/1
50 TABLET ORAL DAILY
Qty: 90 TABLET | Refills: 0 | Status: SHIPPED | OUTPATIENT
Start: 2021-08-04 | End: 2021-10-25

## 2021-08-04 RX ORDER — TOPIRAMATE 50 MG/1
TABLET, FILM COATED ORAL
Qty: 90 TABLET | Refills: 0 | Status: SHIPPED | OUTPATIENT
Start: 2021-08-04 | End: 2021-10-25

## 2021-08-04 RX ORDER — CITALOPRAM 40 MG/1
TABLET ORAL
Qty: 90 TABLET | Refills: 0 | Status: SHIPPED | OUTPATIENT
Start: 2021-08-04 | End: 2021-10-25

## 2021-08-04 RX ORDER — FENOFIBRIC ACID 135 MG/1
CAPSULE, DELAYED RELEASE ORAL
Qty: 90 CAPSULE | Refills: 0 | Status: SHIPPED | OUTPATIENT
Start: 2021-08-04 | End: 2021-10-25

## 2021-08-04 NOTE — TELEPHONE ENCOUNTER
----- Message from Joseph Jeronimo MD sent at 8/4/2021  9:02 AM EDT -----  Same 2 w  ----- Message -----  From: Jamie Warner  Sent: 8/4/2021   8:39 AM EDT  To: Joseph Jeronimo MD    INR 2.10  Unable to reach for dose

## 2021-08-12 RX ORDER — TOPIRAMATE 100 MG/1
TABLET, FILM COATED ORAL
Qty: 90 TABLET | Refills: 0 | Status: SHIPPED | OUTPATIENT
Start: 2021-08-12 | End: 2021-11-09

## 2021-08-31 ENCOUNTER — ANTI-COAG VISIT (OUTPATIENT)
Dept: INTERNAL MEDICINE CLINIC | Age: 61
End: 2021-08-31

## 2021-08-31 LAB — INR BLD: 2.4

## 2021-09-20 RX ORDER — WARFARIN SODIUM 5 MG/1
TABLET ORAL
Qty: 180 TABLET | Refills: 0 | Status: SHIPPED | OUTPATIENT
Start: 2021-09-20 | End: 2021-09-27 | Stop reason: SDUPTHER

## 2021-09-27 ENCOUNTER — TELEPHONE (OUTPATIENT)
Dept: MAMMOGRAPHY | Age: 61
End: 2021-09-27

## 2021-09-27 ENCOUNTER — OFFICE VISIT (OUTPATIENT)
Dept: INTERNAL MEDICINE CLINIC | Age: 61
End: 2021-09-27

## 2021-09-27 ENCOUNTER — HOSPITAL ENCOUNTER (OUTPATIENT)
Dept: MAMMOGRAPHY | Age: 61
Discharge: HOME | End: 2021-09-27
Payer: COMMERCIAL

## 2021-09-27 VITALS
BODY MASS INDEX: 48.48 KG/M2 | WEIGHT: 291 LBS | HEIGHT: 65 IN | RESPIRATION RATE: 12 BRPM | HEART RATE: 70 BPM | DIASTOLIC BLOOD PRESSURE: 80 MMHG | SYSTOLIC BLOOD PRESSURE: 128 MMHG

## 2021-09-27 DIAGNOSIS — E78.5 HYPERLIPIDEMIA ASSOCIATED WITH TYPE 2 DIABETES MELLITUS (HCC): ICD-10-CM

## 2021-09-27 DIAGNOSIS — G47.33 SEVERE OBSTRUCTIVE SLEEP APNEA: ICD-10-CM

## 2021-09-27 DIAGNOSIS — Z12.31 ENCOUNTER FOR SCREENING MAMMOGRAM FOR MALIGNANT NEOPLASM OF BREAST: ICD-10-CM

## 2021-09-27 DIAGNOSIS — E66.01 MORBID OBESITY WITH BMI OF 50.0-59.9, ADULT (HCC): ICD-10-CM

## 2021-09-27 DIAGNOSIS — E11.69 HYPERLIPIDEMIA ASSOCIATED WITH TYPE 2 DIABETES MELLITUS (HCC): ICD-10-CM

## 2021-09-27 DIAGNOSIS — E11.69 TYPE 2 DIABETES MELLITUS WITH OTHER SPECIFIED COMPLICATION, WITHOUT LONG-TERM CURRENT USE OF INSULIN (HCC): Primary | ICD-10-CM

## 2021-09-27 DIAGNOSIS — G25.0 BENIGN HEAD TREMOR: ICD-10-CM

## 2021-09-27 DIAGNOSIS — I10 BENIGN ESSENTIAL HYPERTENSION: ICD-10-CM

## 2021-09-27 DIAGNOSIS — I27.82 OTHER CHRONIC PULMONARY EMBOLISM WITHOUT ACUTE COR PULMONALE (HCC): ICD-10-CM

## 2021-09-27 LAB
BILIRUBIN, POC: NORMAL
BLOOD URINE, POC: NORMAL
CLARITY, POC: NORMAL
COLOR, POC: NORMAL
CREATININE URINE: 30.5 MG/DL (ref 28–259)
GLUCOSE URINE, POC: NORMAL
KETONES, POC: NORMAL
LEUKOCYTE EST, POC: NORMAL
MICROALBUMIN UR-MCNC: <1.2 MG/DL
MICROALBUMIN/CREAT UR-RTO: NORMAL MG/G (ref 0–30)
NITRITE, POC: NORMAL
PH, POC: NORMAL
PROTEIN, POC: NORMAL
SPECIFIC GRAVITY, POC: NORMAL
UROBILINOGEN, POC: NORMAL

## 2021-09-27 PROCEDURE — 99214 OFFICE O/P EST MOD 30 MIN: CPT | Performed by: INTERNAL MEDICINE

## 2021-09-27 PROCEDURE — 3051F HG A1C>EQUAL 7.0%<8.0%: CPT | Performed by: INTERNAL MEDICINE

## 2021-09-27 PROCEDURE — 81002 URINALYSIS NONAUTO W/O SCOPE: CPT | Performed by: INTERNAL MEDICINE

## 2021-09-27 PROCEDURE — 77063 BREAST TOMOSYNTHESIS BI: CPT

## 2021-09-27 RX ORDER — WARFARIN SODIUM 5 MG/1
TABLET ORAL
Qty: 180 TABLET | Refills: 0 | Status: SHIPPED | OUTPATIENT
Start: 2021-09-27 | End: 2022-03-10

## 2021-09-27 RX ORDER — BLOOD-GLUCOSE METER
KIT MISCELLANEOUS
Qty: 1 KIT | Refills: 0 | Status: SHIPPED | OUTPATIENT
Start: 2021-09-27 | End: 2021-09-29 | Stop reason: ALTCHOICE

## 2021-09-27 ASSESSMENT — PATIENT HEALTH QUESTIONNAIRE - PHQ9
SUM OF ALL RESPONSES TO PHQ QUESTIONS 1-9: 0
SUM OF ALL RESPONSES TO PHQ QUESTIONS 1-9: 0
2. FEELING DOWN, DEPRESSED OR HOPELESS: 0
1. LITTLE INTEREST OR PLEASURE IN DOING THINGS: 0
SUM OF ALL RESPONSES TO PHQ QUESTIONS 1-9: 0
SUM OF ALL RESPONSES TO PHQ9 QUESTIONS 1 & 2: 0

## 2021-09-27 ASSESSMENT — ENCOUNTER SYMPTOMS
COUGH: 0
ABDOMINAL PAIN: 0
NAUSEA: 0
RHINORRHEA: 0
VOMITING: 0
SHORTNESS OF BREATH: 0
WHEEZING: 0

## 2021-09-27 NOTE — PROGRESS NOTES
Subjective:      Patient ID: Shilpa Cordova is a 62 y.o. female. HPI    Patient is here for follow up of diabetes, HTN, depression, pulmonary embolism and hyperlipidemia. Patient's is not checking her sugars. No hypoglycemia. Patient does not have polydipsia and polyuria. Her last A1C was 7.8. She has not been very compliant with her diet. She is on cozaar for HTN. Her Bp is at goal.  She is compliant with coumadin. She checks pro time at home. Her depression is stable. No new issues. Her cholesterol is controlled. It is at goal.  She has ELEAZAR. She is on CPAP. She is compliant. She has benign head tremor. It is about the same. .    Review of Systems   Constitutional: Negative for appetite change and fatigue. HENT: Negative for postnasal drip and rhinorrhea. Respiratory: Negative for cough, shortness of breath and wheezing. Cardiovascular: Negative for chest pain and palpitations. Gastrointestinal: Negative for abdominal pain, nausea and vomiting. Endocrine: Negative for polydipsia, polyphagia and polyuria. Musculoskeletal: Negative for joint swelling. Skin: Negative for rash. Neurological: Negative for light-headedness. Psychiatric/Behavioral: Positive for sleep disturbance. The patient is nervous/anxious. Current Outpatient Medications on File Prior to Visit   Medication Sig Dispense Refill    topiramate (TOPAMAX) 100 MG tablet TAKE ONE TABLET BY MOUTH EVERY NIGHT 90 tablet 0    topiramate (TOPAMAX) 50 MG tablet TAKE ONE TABLET BY MOUTH EVERY EVENING 90 tablet 0    citalopram (CELEXA) 40 MG tablet TAKE ONE TABLET BY MOUTH DAILY 90 tablet 0    warfarin (COUMADIN) 1 MG tablet Take as directed by physician.  90 tablet 0    fenofibric acid (FIBRICOR) 135 MG CPDR capsule TAKE ONE CAPSULE BY MOUTH DAILY 90 capsule 0    losartan (COZAAR) 50 MG tablet TAKE 1 TABLET BY MOUTH DAILY 90 tablet 0    SITagliptin-metFORMIN (JANUMET XR)  MG TB24 per extended release tablet TAKE ONE TABLET BY MOUTH 2 TIMES DAILY 180 tablet 1    empagliflozin (JARDIANCE) 25 MG tablet TAKE 1 TABLET BY MOUTH DAILY 90 tablet 1    FEROSUL 325 (65 Fe) MG tablet Take 1 tablet by mouth every morning 100 tablet 0    pantoprazole (PROTONIX) 20 MG tablet Take 20 mg by mouth daily      Icosapent Ethyl (VASCEPA) 1 g CAPS capsule Take 2 capsules by mouth 2 times daily 60 capsule 2    ONE TOUCH LANCETS MISC Test daily. DX: E11.9 100 each 3    blood glucose test strips (ASCENSIA AUTODISC VI;ONE TOUCH ULTRA TEST VI) strip Test Daily. DX: E11.9 100 each 3    Omega-3 Fatty Acids (FISH OIL) 1000 MG CPDR Take 2,000 mg by mouth daily      PRODIGY NO CODING BLOOD GLUC strip TEST BLOOD SUGARS 3 TIMES DAILY 100 each 2    ONE TOUCH LANCETS MISC 1 each by Does not apply route daily. 100 each 3     No current facility-administered medications on file prior to visit. There are no changes to past medical history, family history, social history or review of systems(except as noted in the history section) since prior note (all reviewed with patient). /80 (Site: Right Upper Arm, Position: Sitting, Cuff Size: Large Adult)   Pulse 70   Resp 12   Ht 5' 5\" (1.651 m)   Wt 291 lb (132 kg)   LMP 08/20/2014   BMI 48.42 kg/m²        Objective:   Physical Exam  Constitutional:       Appearance: She is well-developed. Comments: Morbidly obese   HENT:      Head: Normocephalic. Eyes:      Conjunctiva/sclera: Conjunctivae normal.      Pupils: Pupils are equal, round, and reactive to light. Neck:      Thyroid: No thyroid mass or thyromegaly. Vascular: No carotid bruit or JVD. Trachea: Trachea normal.   Cardiovascular:      Rate and Rhythm: Normal rate and regular rhythm. Heart sounds: Normal heart sounds. No murmur heard. No gallop. Pulmonary:      Effort: Pulmonary effort is normal. No respiratory distress. Breath sounds: Normal breath sounds. No wheezing or rales.    Abdominal:      General: Bowel sounds are normal. There is no distension. Palpations: Abdomen is soft. There is no hepatomegaly or splenomegaly. Tenderness: There is no abdominal tenderness. Musculoskeletal:      Cervical back: Normal range of motion and neck supple. Lymphadenopathy:      Cervical: No cervical adenopathy. Skin:     General: Skin is warm and dry. Findings: No rash. Neurological:      Mental Status: She is alert and oriented to person, place, and time. Psychiatric:         Behavior: Behavior normal.         Thought Content: Thought content normal.         Judgment: Judgment normal.         Assessment:       Diagnosis Orders   1. Type 2 diabetes mellitus with other specified complication, without long-term current use of insulin (HCC)  Comprehensive Metabolic Panel    CBC Auto Differential    Hemoglobin A1C    Lipid Panel    POCT Urinalysis no Micro    Microalbumin / Creatinine Urine Ratio   2. Encounter for screening mammogram for malignant neoplasm of breast  Mammography screening bilateral   3. Other chronic pulmonary embolism without acute cor pulmonale ([facility] [address]  Protime-INR   4. Hyperlipidemia associated with type 2 diabetes mellitus ([facility] [address]     5. Morbid obesity with BMI of 50.0-59.9, adult ([facility] [address]     6. Severe obstructive sleep apnea     7. Benign essential hypertension     8. Benign head tremor            Plan:      Decrease calorie intake. Check labs. DM type 2: Continue oral medication. Check A1C.   HTN: on Cozaar 50 mg daily. PE: On home monitoring system. Head tremor -on Topamax  Fatty liver: she has lost weight. Depression: on Celexa. ELEAZAR: on CPAP. She is using it for over 8 hours a night. Hypertriglyceridemia. Stable. Discussed use, benefit, and side effects of prescribed medications. Barriers to medication compliance addressed. All patient questions answered. Pt voiced understanding. Exercise,weight loss recommended.   The current medical regimen is effective;  continue present plan and medications. See orders.

## 2021-09-28 RX ORDER — FERROUS SULFATE 325(65) MG
TABLET ORAL
Qty: 100 TABLET | Refills: 0 | Status: SHIPPED | OUTPATIENT
Start: 2021-09-28 | End: 2022-01-05

## 2021-09-29 ENCOUNTER — TELEPHONE (OUTPATIENT)
Dept: INTERNAL MEDICINE CLINIC | Age: 61
End: 2021-09-29

## 2021-09-29 RX ORDER — BLOOD-GLUCOSE METER
EACH MISCELLANEOUS
Qty: 1 KIT | Refills: 0 | Status: SHIPPED | OUTPATIENT
Start: 2021-09-29 | End: 2021-10-07 | Stop reason: ALTCHOICE

## 2021-09-29 NOTE — TELEPHONE ENCOUNTER
----- Message from Fouzia Concepcion MD sent at 9/29/2021 10:23 AM EDT -----  Contact: [phone]  ext [phone]  k  ----- Message -----  From: Arpan Montero MA  Sent: 9/29/2021   9:29 AM EDT  To: Fouzia Concepcion MD    Pt called stating that neither of her pharmacies have the Blood Glucose Monitoring Suppl (ONE TOUCH ULTRA MINI) w/Device KIT would like to know if you can call in a Contour Next Kit to [address] her insurance does cover this and the pharmacy has this one in stock.     Thank you

## 2021-10-01 ENCOUNTER — TELEPHONE (OUTPATIENT)
Dept: INTERNAL MEDICINE CLINIC | Age: 61
End: 2021-10-01

## 2021-10-01 RX ORDER — ICOSAPENT ETHYL 1000 MG/1
2 CAPSULE ORAL 2 TIMES DAILY
Qty: 60 CAPSULE | Refills: 2 | Status: SHIPPED | OUTPATIENT
Start: 2021-10-01 | End: 2021-10-01 | Stop reason: SDUPTHER

## 2021-10-01 RX ORDER — ICOSAPENT ETHYL 1000 MG/1
2 CAPSULE ORAL 2 TIMES DAILY
Qty: 120 CAPSULE | Refills: 2 | OUTPATIENT
Start: 2021-10-01 | End: 2021-11-16

## 2021-10-01 NOTE — TELEPHONE ENCOUNTER
----- Message from Deonte Haynes MD sent at 10/1/2021  3:13 PM EDT -----  Contact: [phone]  Yes. She should do her diet more gradually  ----- Message -----  From: Lisa Whitmore  Sent: 10/1/2021   1:42 PM EDT  To: Deonte Haynes MD    Patient states she started a diet on Tuesday, she has completely cut out carbs, restricted her eating, no pop. On Wednesday she could barely keep her eyes open and was very lethargic. Today she feels very foggy. Said she took her blood sugar and it was 136. Wanting to know if this is normal from starting on the diet?

## 2021-10-01 NOTE — TELEPHONE ENCOUNTER
----- Message from Lin Lima MD sent at 10/1/2021  1:10 PM EDT -----  She needs both    1 g 2 capsules bid

## 2021-10-06 ENCOUNTER — TELEPHONE (OUTPATIENT)
Dept: INTERNAL MEDICINE CLINIC | Age: 61
End: 2021-10-06

## 2021-10-06 NOTE — TELEPHONE ENCOUNTER
----- Message from Armandina Barthel sent at 10/6/2021  4:52 PM EDT -----  Contact: Nancy Link [phone]  Pt wanted to let you know that she did have a fall about a month ago and wants to know if she still needs to come in for a urine sample or if you think the fall might be causing these issues? Please advise.  ----- Message -----  From: Lin Lima MD  Sent: 10/6/2021   4:25 PM EDT  To: Armandina Barthel    Check UA and C and S  ----- Message -----  From: Sarthak Matthews  Sent: 10/6/2021   2:01 PM EDT  To: Lin Lima MD    Patient states she has a burning and pain in low back and down outside of Bi legs to knees. She also states it is keeping her awake at night when she tries to lay down.  Onset 9/29/21    Thank you     [address]

## 2021-10-07 ENCOUNTER — NURSE ONLY (OUTPATIENT)
Dept: INTERNAL MEDICINE CLINIC | Age: 61
End: 2021-10-07

## 2021-10-07 DIAGNOSIS — R39.9 UTI SYMPTOMS: Primary | ICD-10-CM

## 2021-10-07 PROCEDURE — 81002 URINALYSIS NONAUTO W/O SCOPE: CPT | Performed by: INTERNAL MEDICINE

## 2021-10-07 RX ORDER — LANCETS 30 GAUGE
EACH MISCELLANEOUS
Qty: 100 EACH | Refills: 3 | Status: SHIPPED | OUTPATIENT
Start: 2021-10-07

## 2021-10-07 RX ORDER — BLOOD-GLUCOSE METER
EACH MISCELLANEOUS
Qty: 1 KIT | Refills: 0 | Status: SHIPPED | OUTPATIENT
Start: 2021-10-07

## 2021-10-07 NOTE — TELEPHONE ENCOUNTER
Pt informed that her insurance does not cover the Contour Next blood glucose monitor.  Will send a One Touch Ultra monitor to [facility] per pt's request.

## 2021-10-08 LAB — URINE CULTURE, ROUTINE: NORMAL

## 2021-10-12 ENCOUNTER — TELEPHONE (OUTPATIENT)
Dept: INTERNAL MEDICINE CLINIC | Age: 61
End: 2021-10-12

## 2021-10-12 ENCOUNTER — ANTI-COAG VISIT (OUTPATIENT)
Dept: INTERNAL MEDICINE CLINIC | Age: 61
End: 2021-10-12

## 2021-10-12 LAB — INR BLD: 2.3

## 2021-10-25 ENCOUNTER — TELEPHONE (OUTPATIENT)
Dept: INTERNAL MEDICINE CLINIC | Age: 61
End: 2021-10-25

## 2021-10-25 ENCOUNTER — ANTI-COAG VISIT (OUTPATIENT)
Dept: INTERNAL MEDICINE CLINIC | Age: 61
End: 2021-10-25

## 2021-10-25 LAB — INR BLD: 2.1

## 2021-10-25 RX ORDER — LOSARTAN POTASSIUM 50 MG/1
50 TABLET ORAL DAILY
Qty: 90 TABLET | Refills: 0 | Status: SHIPPED | OUTPATIENT
Start: 2021-10-25 | End: 2022-01-19

## 2021-10-25 RX ORDER — FENOFIBRIC ACID 135 MG/1
CAPSULE, DELAYED RELEASE ORAL
Qty: 90 CAPSULE | Refills: 0 | Status: SHIPPED | OUTPATIENT
Start: 2021-10-25 | End: 2022-01-19

## 2021-10-25 RX ORDER — WARFARIN SODIUM 1 MG/1
TABLET ORAL
Qty: 90 TABLET | Refills: 0 | Status: SHIPPED | OUTPATIENT
Start: 2021-10-25 | End: 2022-01-19

## 2021-10-25 RX ORDER — TOPIRAMATE 50 MG/1
TABLET, FILM COATED ORAL
Qty: 90 TABLET | Refills: 0 | Status: SHIPPED | OUTPATIENT
Start: 2021-10-25 | End: 2021-11-09

## 2021-10-25 RX ORDER — CITALOPRAM 40 MG/1
TABLET ORAL
Qty: 90 TABLET | Refills: 0 | Status: SHIPPED | OUTPATIENT
Start: 2021-10-25 | End: 2022-01-19

## 2021-10-25 NOTE — TELEPHONE ENCOUNTER
----- Message from Lamont Chaudhary MD sent at 10/25/2021  3:30 PM EDT -----  No change  2 weeks  ----- Message -----  From: Yang Thomas  Sent: 10/25/2021   2:59 PM EDT  To: MD Dr. Jeanne Dueñas pt-    INR- 2.1

## 2021-11-08 ENCOUNTER — ANTI-COAG VISIT (OUTPATIENT)
Dept: INTERNAL MEDICINE CLINIC | Age: 61
End: 2021-11-08

## 2021-11-08 LAB — INR BLD: 1.7

## 2021-11-08 NOTE — TELEPHONE ENCOUNTER
----- Message from Deonte Haynes MD sent at 11/8/2021 12:58 PM EST -----  Same 2 w  ----- Message -----  From: Alcario Fothergill  Sent: 11/8/2021  11:33 AM EST  To: Deonte Haynes MD    INR 1.7  Taking 11 mg daily. Patient forgot to take last night.

## 2021-11-09 RX ORDER — TOPIRAMATE 100 MG/1
TABLET, FILM COATED ORAL
Qty: 90 TABLET | Refills: 0 | Status: SHIPPED | OUTPATIENT
Start: 2021-11-09 | End: 2022-02-01

## 2021-11-16 RX ORDER — ICOSAPENT ETHYL 1000 MG/1
CAPSULE ORAL
Qty: 120 CAPSULE | Refills: 2 | Status: SHIPPED | OUTPATIENT
Start: 2021-11-16 | End: 2022-03-11

## 2021-11-24 ENCOUNTER — ANTI-COAG VISIT (OUTPATIENT)
Dept: INTERNAL MEDICINE CLINIC | Age: 61
End: 2021-11-24

## 2021-11-24 LAB — INR BLD: 2.1

## 2021-11-29 RX ORDER — EMPAGLIFLOZIN 25 MG/1
TABLET, FILM COATED ORAL
Qty: 90 TABLET | Refills: 0 | Status: SHIPPED | OUTPATIENT
Start: 2021-11-29 | End: 2022-02-17

## 2021-11-29 RX ORDER — SITAGLIPTIN AND METFORMIN HYDROCHLORIDE 1000; 50 MG/1; MG/1
TABLET, FILM COATED, EXTENDED RELEASE ORAL
Qty: 180 TABLET | Refills: 0 | Status: SHIPPED | OUTPATIENT
Start: 2021-11-29 | End: 2022-02-17

## 2021-12-10 ENCOUNTER — ANTI-COAG VISIT (OUTPATIENT)
Dept: INTERNAL MEDICINE CLINIC | Age: 61
End: 2021-12-10

## 2021-12-10 ENCOUNTER — TELEPHONE (OUTPATIENT)
Dept: INTERNAL MEDICINE CLINIC | Age: 61
End: 2021-12-10

## 2021-12-10 LAB — INR BLD: 2.5

## 2021-12-10 NOTE — TELEPHONE ENCOUNTER
----- Message from Tegan Zimmerman MD sent at 12/10/2021 10:06 AM EST -----  Contact: [phone]  Same 2 w  ----- Message -----  From: Wilner Perera  Sent: 12/10/2021   9:08 AM EST  To: Tegan Zimmerman MD    INR: 2.50

## 2021-12-14 RX ORDER — PERPHENAZINE 16 MG/1
TABLET, FILM COATED ORAL
Qty: 300 STRIP | Refills: 3 | Status: SHIPPED | OUTPATIENT
Start: 2021-12-14

## 2021-12-14 RX ORDER — LANCETS
EACH MISCELLANEOUS
Qty: 300 EACH | Refills: 3 | Status: SHIPPED | OUTPATIENT
Start: 2021-12-14

## 2021-12-30 ENCOUNTER — TELEPHONE (OUTPATIENT)
Dept: INTERNAL MEDICINE CLINIC | Age: 61
End: 2021-12-30

## 2021-12-30 ENCOUNTER — ANTI-COAG VISIT (OUTPATIENT)
Dept: INTERNAL MEDICINE CLINIC | Age: 61
End: 2021-12-30

## 2021-12-30 LAB — INR BLD: 2.7

## 2021-12-30 NOTE — TELEPHONE ENCOUNTER
----- Message from Ashley Maldonado MD sent at 12/30/2021 10:14 AM EST -----  Contact: [phone]  Same   2 weeks   ----- Message -----  From: Reid Valentino  Sent: 12/30/2021   8:20 AM EST  To: MD Dr VALENCIA Pablo patient  INR: 2.7

## 2022-01-05 RX ORDER — FERROUS SULFATE 325(65) MG
TABLET ORAL
Qty: 100 TABLET | Refills: 0 | Status: SHIPPED | OUTPATIENT
Start: 2022-01-05 | End: 2022-03-15

## 2022-01-19 RX ORDER — LOSARTAN POTASSIUM 50 MG/1
50 TABLET ORAL DAILY
Qty: 90 TABLET | Refills: 0 | Status: SHIPPED | OUTPATIENT
Start: 2022-01-19 | End: 2022-03-23 | Stop reason: SDUPTHER

## 2022-01-19 RX ORDER — CITALOPRAM 40 MG/1
TABLET ORAL
Qty: 90 TABLET | Refills: 0 | Status: SHIPPED | OUTPATIENT
Start: 2022-01-19 | End: 2022-03-23 | Stop reason: SDUPTHER

## 2022-01-19 RX ORDER — FENOFIBRIC ACID 135 MG/1
CAPSULE, DELAYED RELEASE ORAL
Qty: 90 CAPSULE | Refills: 0 | Status: SHIPPED | OUTPATIENT
Start: 2022-01-19 | End: 2022-03-23 | Stop reason: SDUPTHER

## 2022-01-19 RX ORDER — TOPIRAMATE 50 MG/1
TABLET, FILM COATED ORAL
Qty: 90 TABLET | Refills: 0 | Status: SHIPPED | OUTPATIENT
Start: 2022-01-19 | End: 2022-03-23 | Stop reason: SDUPTHER

## 2022-01-19 RX ORDER — WARFARIN SODIUM 1 MG/1
TABLET ORAL
Qty: 90 TABLET | Refills: 0 | Status: SHIPPED | OUTPATIENT
Start: 2022-01-19 | End: 2022-03-23 | Stop reason: SDUPTHER

## 2022-01-21 ENCOUNTER — ANTI-COAG VISIT (OUTPATIENT)
Dept: INTERNAL MEDICINE CLINIC | Age: 62
End: 2022-01-21

## 2022-01-21 ENCOUNTER — TELEPHONE (OUTPATIENT)
Dept: INTERNAL MEDICINE CLINIC | Age: 62
End: 2022-01-21

## 2022-01-21 LAB — INR BLD: 2.2

## 2022-01-21 NOTE — TELEPHONE ENCOUNTER
----- Message from Sara Olivo MD sent at 1/21/2022 11:44 AM EST -----  Same 2 w  ----- Message -----  From: Joe Ramon  Sent: 1/21/2022   8:43 AM EST  To: Sara Olivo MD    INR- 2.2

## 2022-02-01 ENCOUNTER — HOSPITAL ENCOUNTER (OUTPATIENT)
Age: 62
Discharge: HOME | End: 2022-02-01
Payer: COMMERCIAL

## 2022-02-01 ENCOUNTER — VIRTUAL VISIT (OUTPATIENT)
Dept: INTERNAL MEDICINE CLINIC | Age: 62
End: 2022-02-01

## 2022-02-01 DIAGNOSIS — E11.69 TYPE 2 DIABETES MELLITUS WITH OTHER SPECIFIED COMPLICATION, WITHOUT LONG-TERM CURRENT USE OF INSULIN (HCC): Primary | ICD-10-CM

## 2022-02-01 DIAGNOSIS — E11.69 HYPERLIPIDEMIA ASSOCIATED WITH TYPE 2 DIABETES MELLITUS (HCC): ICD-10-CM

## 2022-02-01 DIAGNOSIS — G47.33 SEVERE OBSTRUCTIVE SLEEP APNEA: ICD-10-CM

## 2022-02-01 DIAGNOSIS — G25.0 BENIGN HEAD TREMOR: ICD-10-CM

## 2022-02-01 DIAGNOSIS — E11.69 TYPE 2 DIABETES MELLITUS WITH OTHER SPECIFIED COMPLICATION, WITHOUT LONG-TERM CURRENT USE OF INSULIN (HCC): ICD-10-CM

## 2022-02-01 DIAGNOSIS — E78.5 HYPERLIPIDEMIA ASSOCIATED WITH TYPE 2 DIABETES MELLITUS (HCC): ICD-10-CM

## 2022-02-01 DIAGNOSIS — I27.82 OTHER CHRONIC PULMONARY EMBOLISM WITHOUT ACUTE COR PULMONALE (HCC): ICD-10-CM

## 2022-02-01 DIAGNOSIS — I10 BENIGN ESSENTIAL HYPERTENSION: ICD-10-CM

## 2022-02-01 DIAGNOSIS — E66.01 MORBID OBESITY WITH BMI OF 50.0-59.9, ADULT (HCC): ICD-10-CM

## 2022-02-01 LAB
A/G RATIO: 1.6 (ref 1.1–2.2)
ALBUMIN SERPL-MCNC: 4.3 G/DL (ref 3.4–5)
ALP BLD-CCNC: 83 U/L (ref 40–129)
ALT SERPL-CCNC: 22 U/L (ref 10–40)
ANION GAP SERPL CALCULATED.3IONS-SCNC: 14 MMOL/L (ref 3–16)
AST SERPL-CCNC: 26 U/L (ref 15–37)
BASOPHILS ABSOLUTE: 0 K/UL (ref 0–0.2)
BASOPHILS RELATIVE PERCENT: 1.1 %
BILIRUB SERPL-MCNC: <0.2 MG/DL (ref 0–1)
BUN BLDV-MCNC: 20 MG/DL (ref 7–20)
CALCIUM SERPL-MCNC: 9.8 MG/DL (ref 8.3–10.6)
CHLORIDE BLD-SCNC: 107 MMOL/L (ref 99–110)
CO2: 22 MMOL/L (ref 21–32)
CREAT SERPL-MCNC: 0.6 MG/DL (ref 0.6–1.2)
EOSINOPHILS ABSOLUTE: 0.1 K/UL (ref 0–0.6)
EOSINOPHILS RELATIVE PERCENT: 2.3 %
GFR AFRICAN AMERICAN: >60
GFR NON-AFRICAN AMERICAN: >60
GLUCOSE BLD-MCNC: 190 MG/DL (ref 70–99)
HCT VFR BLD CALC: 43.4 % (ref 36–48)
HEMOGLOBIN: 15.3 G/DL (ref 12–16)
LYMPHOCYTES ABSOLUTE: 1.3 K/UL (ref 1–5.1)
LYMPHOCYTES RELATIVE PERCENT: 29.4 %
MCH RBC QN AUTO: 31.2 PG (ref 26–34)
MCHC RBC AUTO-ENTMCNC: 35.2 G/DL (ref 31–36)
MCV RBC AUTO: 88.8 FL (ref 80–100)
MONOCYTES ABSOLUTE: 0.4 K/UL (ref 0–1.3)
MONOCYTES RELATIVE PERCENT: 8.8 %
NEUTROPHILS ABSOLUTE: 2.5 K/UL (ref 1.7–7.7)
NEUTROPHILS RELATIVE PERCENT: 58.4 %
PDW BLD-RTO: 14.3 % (ref 12.4–15.4)
PLATELET # BLD: 171 K/UL (ref 135–450)
PMV BLD AUTO: 7.8 FL (ref 5–10.5)
POTASSIUM SERPL-SCNC: 4.4 MMOL/L (ref 3.5–5.1)
RBC # BLD: 4.89 M/UL (ref 4–5.2)
SODIUM BLD-SCNC: 143 MMOL/L (ref 136–145)
TOTAL PROTEIN: 7 G/DL (ref 6.4–8.2)
WBC # BLD: 4.3 K/UL (ref 4–11)

## 2022-02-01 PROCEDURE — 85025 COMPLETE CBC W/AUTO DIFF WBC: CPT

## 2022-02-01 PROCEDURE — 83036 HEMOGLOBIN GLYCOSYLATED A1C: CPT

## 2022-02-01 PROCEDURE — 36415 COLL VENOUS BLD VENIPUNCTURE: CPT

## 2022-02-01 PROCEDURE — 80053 COMPREHEN METABOLIC PANEL: CPT

## 2022-02-01 PROCEDURE — 80061 LIPID PANEL: CPT

## 2022-02-01 PROCEDURE — 83721 ASSAY OF BLOOD LIPOPROTEIN: CPT

## 2022-02-01 PROCEDURE — 99214 OFFICE O/P EST MOD 30 MIN: CPT | Performed by: INTERNAL MEDICINE

## 2022-02-01 RX ORDER — TOPIRAMATE 100 MG/1
TABLET, FILM COATED ORAL
Qty: 90 TABLET | Refills: 0 | Status: SHIPPED | OUTPATIENT
Start: 2022-02-01 | End: 2022-06-15 | Stop reason: SDUPTHER

## 2022-02-01 NOTE — PROGRESS NOTES
2022    TELEHEALTH EVALUATION -- Audio/Visual (During YTNSD-99 public health emergency)    HPI:    Karly James (:  1960) has requested an audio/video evaluation for the following concern(s):    Diabetes Mellitus Type 2: Current symptoms/problems include none. Medication compliance:  compliant most of the time  Diabetic diet compliance:  noncompliant: half the time,  Weight trend: decreasing  Current exercise: no regular exercise    Home blood sugar records: fasting range: 150-190 mg/dl  Any episodes of hypoglycemia? no  Eye exam current (within one year): yes   reports that she has never smoked. She has never used smokeless tobacco.   Daily Aspirin? No: n/a  Known diabetic complications: none    Hypertension:  Blood pressure typically runs normal outside of the office. She is adherent to a low sodium diet. Patient denies none. Antihypertensive medication side effects: no medication side effects noted. Use of agents associated with hypertension: none. Hyperlipidemia:  No new myalgias or GI upset on fenofibrate (Tricor, Trilipix) and Vascepa. Lab Results   Component Value Date    LABA1C 7.4 2021    LABA1C 7.8 2021    LABA1C 7.0 2020     Lab Results   Component Value Date    LABMICR <1.20 2021    CREATININE 0.6 2021     Lab Results   Component Value Date    ALT 14 2021    AST 27 2021     No components found for: CHLPL  Lab Results   Component Value Date    TRIG 1,816 (H) 2021     Lab Results   Component Value Date    HDL 20 (L) 2021     Lab Results   Component Value Date    LDLCALC see below 2021    LDLDIRECT 51 2021          Review of Systems    Prior to Visit Medications    Medication Sig Taking?  Authorizing Provider   fenofibric acid (FIBRICOR) 135 MG CPDR capsule TAKE ONE CAPSULE BY MOUTH DAILY  Ted Tillman MD   losartan (COZAAR) 50 MG tablet TAKE 1 TABLET BY MOUTH DAILY  Ted Tillman MD topiramate (TOPAMAX) 50 MG tablet TAKE ONE TABLET BY MOUTH EVERY EVENING  Ted Tillman MD   warfarin (COUMADIN) 1 MG tablet TAKE AS DIRECTED BY PHYSICIAN  Ted Tillman MD   citalopram (CELEXA) 40 MG tablet TAKE ONE TABLET BY MOUTH DAILY  Ted Tillman MD   FEROSUL 325 (65 Fe) MG tablet TAKE 1 TABLET BY MOUTH EVERY MORNING  Ted Tillman MD   CONTOUR NEXT TEST strip TEST 3 TIMES DAILY  Ted Tillman MD   Microlet Lancets MISC TEST 3 TIMES DAILY  Ted Tillman MD   JARDIANCE 25 MG tablet TAKE 1 TABLET BY MOUTH  DAILY  Ted Tillman MD   SITagliptin-metFORMIN (JANUMET XR)  MG TB24 per extended release tablet TAKE 1 TABLET BY MOUTH  TWICE DAILY  Ted Tillman MD   Icosapent Ethyl (VASCEPA) 1 g CAPS capsule TAKE 2 CAPSULES BY MOUTH 2 TIMES A DAY  Ted Tillman MD   topiramate (TOPAMAX) 100 MG tablet TAKE ONE TABLET BY MOUTH EVERY NIGHT  Ted Tillman MD   Blood Glucose Monitoring Suppl (ONE TOUCH ULTRA 2) w/Device KIT Test three times daily. DX:E11.9  Ted Tillman MD   blood glucose test strips (ASCENSIA AUTODISC VI;ONE TOUCH ULTRA TEST VI) strip Test three times daily. DX:E11.9  Ted Tillman MD   Lancets MISC Test three times daily. DX:E11.9  Ted Tillman MD   warfarin (COUMADIN) 5 MG tablet TAKE TWO (2) TABLETS BY MOUTH DAILY OR AS DIRECTED BY YOUR DOCTOR  Ted Tillman MD   pantoprazole (PROTONIX) 20 MG tablet Take 20 mg by mouth daily  Historical Provider, MD   ONE TOUCH LANCETS MISC Test daily. DX: E11.9  Ted Tillman MD   blood glucose test strips (ASCENSIA AUTODISC VI;ONE TOUCH ULTRA TEST VI) strip Test Daily.  DX: E11.9  Ted Tillman MD   Omega-3 Fatty Acids (FISH OIL) 1000 MG CPDR Take 2,000 mg by mouth daily  Historical Provider, MD   PRODIGY NO CODING BLOOD GLUC strip TEST BLOOD SUGARS 3 TIMES DAILY  Ted Tillman MD   ONE TOUCH LANCETS MISC 1 each by Does not apply route daily.   Cyndee Sood MD           Allergies   Allergen Reactions    Lisinopril Other (See Comments)     cough    Penicillins Other (See Comments)     Unsure of reaction--childhood    Sulfa Antibiotics Other (See Comments)     Unsure of reaction   ,   Past Medical History:   Diagnosis Date    Benign essential hypertension 11/14/2017    Benign head tremor 5/14/2014    Cholelithiasis     Disease of nasal cavity and sinuses     Fatty infiltration of liver     Hot flash, menopausal 7/22/2015    Hyperlipidemia     Hyperlipidemia associated with type 2 diabetes mellitus ([facility] [address] 10/23/2015    Morbid obesity with BMI of 50.0-59.9, adult ([facility] [address] 10/23/2015    No history of procedure 11/9/2015    no past colonoscopy    Pulmonary embolism (HCC)     Type II or unspecified type diabetes mellitus without mention of complication, not stated as uncontrolled     Umbilical hernia 25/8/5750    Unspecified sleep apnea    ,   Past Surgical History:   Procedure Laterality Date    BREAST SURGERY  2015    CHOLECYSTECTOMY, LAPAROSCOPIC  8/2/2012    COLONOSCOPY  11/9/2015    cecal polyp    DILATION AND CURETTAGE OF UTERUS  03/13/2018    HERNIA REPAIR  67/38/55    lap umbilical    HYSTEROSCOPY  03/13/2018    and D&C   ,   Social History     Tobacco Use    Smoking status: Never Smoker    Smokeless tobacco: Never Used   Vaping Use    Vaping Use: Never used   Substance Use Topics    Alcohol use: No     Alcohol/week: 0.0 standard drinks    Drug use: No   ,   Family History   Problem Relation Age of Onset    Hypertension Mother     Diabetes Mother     Heart Failure Father     Other Father         AAA    High Blood Pressure Sister     Diabetes Sister     High Blood Pressure Brother     Heart Attack Brother     Diabetes Brother        PHYSICAL EXAMINATION:  [ INSTRUCTIONS:  \"[x]\" Indicates a positive item  \"[]\" Indicates a negative item  -- DELETE ALL ITEMS NOT EXAMINED]  Vital Signs: (As obtained by patient/caregiver or practitioner observation)    LMP 08/20/2014     Patient-Reported Vitals 2/1/2022   Patient-Reported Weight 282 lbs   Patient-Reported Height -           Constitutional: [x] Appears well-developed and well-nourished [x] No apparent distress                           Mental status  [x] Alert and awake  [x] Oriented to person/place/time [x]Able to follow commands       Eyes:  EOM    [x]  Normal  [] Abnormal-  Sclera  [x]  Normal  [] Abnormal -             HENT:   [x] Normocephalic, atraumatic. [] Abnormal   [x] Mouth/Throat: Mucous membranes are moist.      External Ears [x] Normal  [] Abnormal-      Neck: [x] No visualized mass      Pulmonary/Chest: [x] Respiratory effort normal.  [x] No visualized signs of difficulty breathing or respiratory distress        [] Abnormal-      Musculoskeletal:   [x] Normal gait with no signs of ataxia         [x] Normal range of motion of neck        [] Abnormal-         Neurological:        [x] No Facial Asymmetry (Cranial nerve 7 motor function) (limited exam to video visit)                       [] No gaze palsy        [] Abnormal-         Skin:                     [] No significant exanthematous lesions or discoloration noted on facial skin         [] Abnormal-                                  Psychiatric:           [x] Normal Affect [x] No Hallucinations        [] Abnormal-     ASSESSMENT/PLAN:  1. Type 2 diabetes mellitus with other specified complication, without long-term current use of insulin (HCC)  -Stressed diet and exercise. Stressed medication compliance. Check labs. - Comprehensive Metabolic Panel; Future  - CBC Auto Differential; Future  - Hemoglobin A1C; Future  - Lipid Panel; Future    2. Hyperlipidemia associated with type 2 diabetes mellitus ([facility] [address]  -Her triglycerides were over 1800 during last check. I started her on Vascepa. She is also on fenofibric acid. She says she has been compliant. Recheck. 3. Severe obstructive sleep apnea  -She is compliant with CPAP    4. Other chronic pulmonary embolism without acute cor pulmonale (HCC)  -On Coumadin. Last INR was in therapeutic range. 5. Morbid obesity with BMI of 50.0-59.9, adult ([address]  - Complicating assessment and treatment. Placing patient at risk for multiple co-morbidities as well as early death and contributing to the patient's presentation.   - Counseled on weight loss. 6. Benign essential hypertension  -She has not checked her blood pressure at home. Usually well controlled. 7. Benign head tremor  -Unchanged. Follow up with PCP    Malik Sandovallelia is a 62 y.o. female being evaluated by a Virtual Visit (video visit) encounter to address concerns as mentioned above. A caregiver was present when appropriate. Due to this being a TeleHealth encounter (During XBXQ-74 public health emergency), evaluation of the following organ systems was limited: Vitals/Constitutional/EENT/Resp/CV/GI//MS/Neuro/Skin/Heme-Lymph-Imm. Pursuant to the emergency declaration under the [facility], [address] [place] [address] authority and the [employer] Act, this Virtual Visit was conducted with patient's (and/or legal guardian's) consent, to reduce the patient's risk of exposure to COVID-19 and provide necessary medical care. The patient (and/or legal guardian) has also been advised to contact this office for worsening conditions or problems, and seek emergency medical treatment and/or call 911 if deemed necessary. Patient identification was verified at the start of the visit: Yes    Total time spent on this encounter: Not billed by time    Services were provided through a video synchronous discussion virtually to substitute for in-person clinic visit. Patient and provider were located at their individual homes.     --Arya Dunn MD on 2/1/2022 at 9:48 AM    An electronic signature was used to authenticate this note.

## 2022-02-02 LAB
CHOLESTEROL, TOTAL: 182 MG/DL (ref 0–199)
ESTIMATED AVERAGE GLUCOSE: 148.5 MG/DL
HBA1C MFR BLD: 6.8 %
HDLC SERPL-MCNC: 32 MG/DL (ref 40–60)
LDL CHOLESTEROL CALCULATED: ABNORMAL MG/DL
LDL CHOLESTEROL DIRECT: 80 MG/DL
TRIGL SERPL-MCNC: 591 MG/DL (ref 0–150)
VLDLC SERPL CALC-MCNC: ABNORMAL MG/DL

## 2022-02-09 ENCOUNTER — TELEPHONE (OUTPATIENT)
Dept: INTERNAL MEDICINE CLINIC | Age: 62
End: 2022-02-09

## 2022-02-09 ENCOUNTER — ANTI-COAG VISIT (OUTPATIENT)
Dept: INTERNAL MEDICINE CLINIC | Age: 62
End: 2022-02-09

## 2022-02-09 LAB — INR BLD: 2.7

## 2022-02-09 NOTE — TELEPHONE ENCOUNTER
----- Message from Carrie Bower MD sent at 2/9/2022  9:18 AM EST -----  Same 4 w  ----- Message -----  From: Hannah Esposito  Sent: 2/9/2022   8:50 AM EST  To: Carrie Bower MD    INR- 2.7      Last INR- 2.2

## 2022-02-17 RX ORDER — EMPAGLIFLOZIN 25 MG/1
TABLET, FILM COATED ORAL
Qty: 90 TABLET | Refills: 0 | Status: SHIPPED | OUTPATIENT
Start: 2022-02-17 | End: 2022-05-12

## 2022-02-17 RX ORDER — SITAGLIPTIN AND METFORMIN HYDROCHLORIDE 1000; 50 MG/1; MG/1
TABLET, FILM COATED, EXTENDED RELEASE ORAL
Qty: 180 TABLET | Refills: 0 | Status: SHIPPED | OUTPATIENT
Start: 2022-02-17 | End: 2022-05-12

## 2022-02-28 ENCOUNTER — TELEPHONE (OUTPATIENT)
Dept: INTERNAL MEDICINE CLINIC | Age: 62
End: 2022-02-28

## 2022-02-28 ENCOUNTER — ANTI-COAG VISIT (OUTPATIENT)
Dept: INTERNAL MEDICINE CLINIC | Age: 62
End: 2022-02-28

## 2022-02-28 LAB — INR BLD: 2.6

## 2022-02-28 NOTE — TELEPHONE ENCOUNTER
----- Message from Zhen Villarreal MD sent at 2/28/2022  1:41 PM EST -----  Same 2 w  ----- Message -----  From: Chong Lord  Sent: 2/28/2022   1:22 PM EST  To: Zhen Villarreal MD    INR 2.6  Taking 11 mg daily

## 2022-03-10 RX ORDER — WARFARIN SODIUM 5 MG/1
TABLET ORAL
Qty: 180 TABLET | Refills: 0 | Status: SHIPPED | OUTPATIENT
Start: 2022-03-10 | End: 2022-03-23 | Stop reason: SDUPTHER

## 2022-03-11 RX ORDER — ICOSAPENT ETHYL 1000 MG/1
CAPSULE ORAL
Qty: 360 CAPSULE | Refills: 0 | Status: SHIPPED | OUTPATIENT
Start: 2022-03-11 | End: 2022-06-02

## 2022-03-15 RX ORDER — FERROUS SULFATE 325(65) MG
TABLET ORAL
Qty: 100 TABLET | Refills: 0 | Status: SHIPPED | OUTPATIENT
Start: 2022-03-15 | End: 2022-06-16

## 2022-03-23 RX ORDER — FENOFIBRIC ACID 135 MG/1
CAPSULE, DELAYED RELEASE ORAL
Qty: 90 CAPSULE | Refills: 0 | Status: SHIPPED | OUTPATIENT
Start: 2022-03-23 | End: 2022-04-13

## 2022-03-23 RX ORDER — LOSARTAN POTASSIUM 50 MG/1
50 TABLET ORAL DAILY
Qty: 90 TABLET | Refills: 0 | Status: SHIPPED | OUTPATIENT
Start: 2022-03-23 | End: 2022-04-13

## 2022-03-23 RX ORDER — TOPIRAMATE 50 MG/1
TABLET, FILM COATED ORAL
Qty: 90 TABLET | Refills: 0 | Status: SHIPPED | OUTPATIENT
Start: 2022-03-23 | End: 2022-05-31

## 2022-03-23 RX ORDER — PANTOPRAZOLE SODIUM 20 MG/1
20 TABLET, DELAYED RELEASE ORAL DAILY
Qty: 90 TABLET | Refills: 0 | Status: SHIPPED | OUTPATIENT
Start: 2022-03-23 | End: 2022-05-31

## 2022-03-23 RX ORDER — WARFARIN SODIUM 1 MG/1
TABLET ORAL
Qty: 90 TABLET | Refills: 0 | Status: SHIPPED | OUTPATIENT
Start: 2022-03-23 | End: 2022-03-24 | Stop reason: SDUPTHER

## 2022-03-23 RX ORDER — CITALOPRAM 40 MG/1
TABLET ORAL
Qty: 90 TABLET | Refills: 0 | Status: SHIPPED | OUTPATIENT
Start: 2022-03-23 | End: 2022-04-13

## 2022-03-23 RX ORDER — WARFARIN SODIUM 5 MG/1
TABLET ORAL
Qty: 180 TABLET | Refills: 0 | Status: SHIPPED | OUTPATIENT
Start: 2022-03-23 | End: 2022-06-02

## 2022-03-24 ENCOUNTER — ANTI-COAG VISIT (OUTPATIENT)
Dept: INTERNAL MEDICINE CLINIC | Age: 62
End: 2022-03-24

## 2022-03-24 ENCOUNTER — TELEPHONE (OUTPATIENT)
Dept: INTERNAL MEDICINE CLINIC | Age: 62
End: 2022-03-24

## 2022-03-24 LAB — INR BLD: 2.1

## 2022-03-24 RX ORDER — WARFARIN SODIUM 1 MG/1
TABLET ORAL
Qty: 90 TABLET | Refills: 0 | Status: SHIPPED | OUTPATIENT
Start: 2022-03-24 | End: 2022-04-13

## 2022-03-24 NOTE — TELEPHONE ENCOUNTER
----- Message from Ck Mccloud MD sent at 3/24/2022  2:23 PM EDT -----  Same 2 w  ----- Message -----  From: Rebecca Elias  Sent: 3/24/2022  11:40 AM EDT  To: Ck Mccloud MD    INR 2.10  Taking 11 mg daily  Last INR 2.6 and was informed to continue same and re 2 weeks.

## 2022-04-12 ENCOUNTER — ANTI-COAG VISIT (OUTPATIENT)
Dept: INTERNAL MEDICINE CLINIC | Age: 62
End: 2022-04-12

## 2022-04-12 ENCOUNTER — TELEPHONE (OUTPATIENT)
Dept: INTERNAL MEDICINE CLINIC | Age: 62
End: 2022-04-12

## 2022-04-12 LAB — INR BLD: 2.1

## 2022-04-12 NOTE — TELEPHONE ENCOUNTER
----- Message from Frank Amaya MD sent at 4/12/2022 12:17 PM EDT -----  Contact: [phone]  Same 2 w  ----- Message -----  From: Chela Velasco  Sent: 4/12/2022   8:25 AM EDT  To: Frank Amaya MD    INR: 2.10

## 2022-04-13 RX ORDER — CITALOPRAM 40 MG/1
TABLET ORAL
Qty: 90 TABLET | Refills: 0 | Status: SHIPPED | OUTPATIENT
Start: 2022-04-13 | End: 2022-05-31

## 2022-04-13 RX ORDER — WARFARIN SODIUM 1 MG/1
TABLET ORAL
Qty: 90 TABLET | Refills: 0 | Status: SHIPPED | OUTPATIENT
Start: 2022-04-13 | End: 2022-09-01

## 2022-04-13 RX ORDER — FENOFIBRIC ACID 135 MG/1
CAPSULE, DELAYED RELEASE ORAL
Qty: 90 CAPSULE | Refills: 0 | Status: SHIPPED | OUTPATIENT
Start: 2022-04-13 | End: 2022-05-31

## 2022-04-13 RX ORDER — LOSARTAN POTASSIUM 50 MG/1
50 TABLET ORAL DAILY
Qty: 90 TABLET | Refills: 0 | Status: SHIPPED | OUTPATIENT
Start: 2022-04-13 | End: 2022-05-31

## 2022-04-27 ENCOUNTER — TELEPHONE (OUTPATIENT)
Dept: INTERNAL MEDICINE CLINIC | Age: 62
End: 2022-04-27

## 2022-04-27 ENCOUNTER — ANTI-COAG VISIT (OUTPATIENT)
Dept: INTERNAL MEDICINE CLINIC | Age: 62
End: 2022-04-27

## 2022-04-27 LAB — INR BLD: 2.3

## 2022-05-09 ENCOUNTER — TELEPHONE (OUTPATIENT)
Dept: PULMONOLOGY | Age: 62
End: 2022-05-09

## 2022-05-09 ENCOUNTER — OFFICE VISIT (OUTPATIENT)
Dept: PULMONOLOGY | Age: 62
End: 2022-05-09
Payer: COMMERCIAL

## 2022-05-09 VITALS
HEART RATE: 90 BPM | WEIGHT: 293 LBS | DIASTOLIC BLOOD PRESSURE: 72 MMHG | BODY MASS INDEX: 48.82 KG/M2 | HEIGHT: 65 IN | OXYGEN SATURATION: 98 % | SYSTOLIC BLOOD PRESSURE: 128 MMHG

## 2022-05-09 DIAGNOSIS — E66.01 OBESITY, MORBID, BMI 40.0-49.9 (HCC): ICD-10-CM

## 2022-05-09 DIAGNOSIS — G47.33 SEVERE OBSTRUCTIVE SLEEP APNEA: Primary | ICD-10-CM

## 2022-05-09 DIAGNOSIS — Z71.89 CPAP USE COUNSELING: ICD-10-CM

## 2022-05-09 PROCEDURE — 99213 OFFICE O/P EST LOW 20 MIN: CPT | Performed by: NURSE PRACTITIONER

## 2022-05-09 RX ORDER — M-VIT,TX,IRON,MINS/CALC/FOLIC 27MG-0.4MG
1 TABLET ORAL DAILY
COMMUNITY

## 2022-05-09 ASSESSMENT — SLEEP AND FATIGUE QUESTIONNAIRES
HOW LIKELY ARE YOU TO NOD OFF OR FALL ASLEEP WHILE WATCHING TV: 1
HOW LIKELY ARE YOU TO NOD OFF OR FALL ASLEEP WHILE SITTING AND READING: 1
HOW LIKELY ARE YOU TO NOD OFF OR FALL ASLEEP IN A CAR, WHILE STOPPED FOR A FEW MINUTES IN TRAFFIC: 0
HOW LIKELY ARE YOU TO NOD OFF OR FALL ASLEEP WHILE SITTING AND TALKING TO SOMEONE: 0
HOW LIKELY ARE YOU TO NOD OFF OR FALL ASLEEP WHILE LYING DOWN TO REST IN THE AFTERNOON WHEN CIRCUMSTANCES PERMIT: 3
HOW LIKELY ARE YOU TO NOD OFF OR FALL ASLEEP WHEN YOU ARE A PASSENGER IN A CAR FOR AN HOUR WITHOUT A BREAK: 2
HOW LIKELY ARE YOU TO NOD OFF OR FALL ASLEEP WHILE SITTING INACTIVE IN A PUBLIC PLACE: 0
HOW LIKELY ARE YOU TO NOD OFF OR FALL ASLEEP WHILE SITTING QUIETLY AFTER LUNCH WITHOUT ALCOHOL: 2
NECK CIRCUMFERENCE (INCHES): 17
ESS TOTAL SCORE: 9

## 2022-05-09 NOTE — TELEPHONE ENCOUNTER
Please get report in 1 month (pressure was changed in office) Patient may have to take machine in for download

## 2022-05-09 NOTE — PROGRESS NOTES
Subjective:      Patient ID: Michael Garcia is a 65 y.o. female. HPI    Michael Garcia is a 65 y.o. female in office for ELEAZAR follow up. States she is doing well with CPAP. Patient is using CPAP   7 hrs/night. Using humidifier. No snoring on CPAP. The pressure is well tolerated. The mask is comfortable-nasal mask. No mask leak. No significant daytime sleepiness. No nodding off when driving. No dry nose or throat. Some fatigue. Bedtime is 11 pm and rise time is 645 am. Sleep onset is few minutes. Wakes up 1 times at night total. 1 nocturia. It takes few minutes to fall back a sleep. No naps during the day. No headache in am. No weight gain. 2 caffienated beverages during the day. No alcohol. ESS is 9.     Review of Systems    Past Medical History:   Diagnosis Date    Benign essential hypertension 11/14/2017    Benign head tremor 5/14/2014    Cholelithiasis     Disease of nasal cavity and sinuses     Fatty infiltration of liver     Hot flash, menopausal 7/22/2015    Hyperlipidemia     Hyperlipidemia associated with type 2 diabetes mellitus ([facility] [address] 10/23/2015    Morbid obesity with BMI of 50.0-59.9, adult ([address] 10/23/2015    No history of procedure 11/9/2015    no past colonoscopy    Pulmonary embolism (HCC)     Type II or unspecified type diabetes mellitus without mention of complication, not stated as uncontrolled     Umbilical hernia 81/2/6002    Unspecified sleep apnea      Past Surgical History:   Procedure Laterality Date    BREAST SURGERY  2015    CHOLECYSTECTOMY, LAPAROSCOPIC  8/2/2012    COLONOSCOPY  11/9/2015    cecal polyp    DILATION AND CURETTAGE OF UTERUS  03/13/2018    HERNIA REPAIR  03/93/56    lap umbilical    HYSTEROSCOPY  03/13/2018    and D&C     Allergies   Allergen Reactions    Lisinopril Other (See Comments)     cough    Penicillins Other (See Comments)     Unsure of reaction--childhood    Sulfa Antibiotics Other (See Comments)     Unsure of reaction     Current Outpatient Medications   Medication Sig Dispense Refill    Multiple Vitamins-Minerals (THERAPEUTIC MULTIVITAMIN-MINERALS) tablet Take 1 tablet by mouth daily      Ascorbic Acid (VITAMIN C PO) Take by mouth      fenofibric acid (TRILIPIX) 135 MG CPDR capsule TAKE ONE CAPSULE BY MOUTH DAILY 90 capsule 0    losartan (COZAAR) 50 MG tablet TAKE 1 TABLET BY MOUTH DAILY 90 tablet 0    warfarin (COUMADIN) 1 MG tablet TAKE AS DIRECTED BY PHYSICIAN 90 tablet 0    citalopram (CELEXA) 40 MG tablet TAKE ONE TABLET BY MOUTH DAILY 90 tablet 0    pantoprazole (PROTONIX) 20 MG tablet Take 1 tablet by mouth daily Take 20 mg by mouth daily 90 tablet 0    warfarin (COUMADIN) 5 MG tablet TAKE TWO (2) TABLETS BY MOUTH DAILY OR AS DIRECTED BY YOUR DOCTOR 180 tablet 0    FEROSUL 325 (65 Fe) MG tablet TAKE 1 TABLET BY MOUTH EVERY MORNING 100 tablet 0    Icosapent Ethyl (VASCEPA) 1 g CAPS capsule TAKE 2 CAPSULES BY MOUTH 2 TIMES A  capsule 0    JANUMET XR  MG TB24 per extended release tablet TAKE 1 TABLET BY MOUTH  TWICE DAILY 180 tablet 0    JARDIANCE 25 MG tablet TAKE 1 TABLET BY MOUTH  DAILY 90 tablet 0    topiramate (TOPAMAX) 100 MG tablet TAKE ONE TABLET BY MOUTH EVERY NIGHT 90 tablet 0    CONTOUR NEXT TEST strip TEST 3 TIMES DAILY 300 strip 3    Microlet Lancets MISC TEST 3 TIMES DAILY 300 each 3    Blood Glucose Monitoring Suppl (ONE TOUCH ULTRA 2) w/Device KIT Test three times daily. DX:E11.9 1 kit 0    blood glucose test strips (ASCENSIA AUTODISC VI;ONE TOUCH ULTRA TEST VI) strip Test three times daily. DX:E11.9 100 each 3    Lancets MISC Test three times daily. DX:E11.9 100 each 3    ONE TOUCH LANCETS MISC Test daily. DX: E11.9 100 each 3    blood glucose test strips (ASCENSIA AUTODISC VI;ONE TOUCH ULTRA TEST VI) strip Test Daily.  DX: E11.9 100 each 3    Omega-3 Fatty Acids (FISH OIL) 1000 MG CPDR Take 2,000 mg by mouth daily      PRODIGY NO CODING BLOOD GLUC strip TEST BLOOD SUGARS 3 TIMES DAILY 100 each 2    ONE TOUCH LANCETS MISC 1 each by Does not apply route daily. 100 each 3    topiramate (TOPAMAX) 50 MG tablet TAKE ONE TABLET BY MOUTH EVERY EVENING (Patient not taking: Reported on 5/9/2022) 90 tablet 0     No current facility-administered medications for this visit. Objective:   Physical Exam  /72 (Site: Left Upper Arm, Position: Sitting, Cuff Size: Large Adult)   Pulse 90   Ht 5' 5\" (1.651 m)   Wt 297 lb (134.7 kg)   LMP 08/20/2014   SpO2 98% Comment: RA  BMI 49.42 kg/m²      Gen: No acute distress. Obese. BMI of 49.42  Eyes: PERRL. No sclera icterus. No conjunctival injection. ENT: No discharge. Neck: Trachea midline. No obvious mass. Neck circumference 17\"  Resp: No accessory muscle use. No crackles. No wheezes. No rhonchi. CV: Regular rate. Regular rhythm. No murmur or rub. Skin: Warm and dry. M/S: No cyanosis. No obvious joint deformity. Neuro: Awake. Alert. Moves all four extremities. Psych: Oriented x 3. No anxiety. Data:   Split night 1/29/16: AHI 70 and low O2 82%, improved sleep related breathing disorder with CPAP therapy. Started on AutoCPAP min pressure of 12 cmH2O and max pressure of 16 cmH2O. CPAP compliance Data:  Compliance download report from 2/25/17 to 3/26/17  showed patient is using machine 8:38 hrs/night with 100% compliance and AHI 0.2 within this time frame. 30/30days with greater than 4 hours of machine use. 95th percentile leak 37.8 L/min  90% pressure 15 cm H20    Compliance download report from 2/24/18 to 3/25/18 showed patient is using machine 8:14 hrs/night with 100% compliance and AHI 0.3 within this time frame. 30/30days with greater than 4 hours of machine use. Leak 40 L/min  90% pressure 15 cm H20 on auto CPAP 12-16 cm H2O    Compliance download report from 4/19/19 to 5/18/19 reviewed today by me and showed patient is using machine 8:09 hrs/night with 100% compliance and AHI 0.5 within this time frame.   30/30days with greater than 4 hours of machine use. 90% pressure 14.4 cm H20 on auto CPAP 13-17 cm H2O    Compliance download report from 3/31/20 to 4/29/20 reviewed today by me and showed patient is using machine 8:40 hrs/night with 100% compliance and AHI 0.3 within this time frame. 30/30days with greater than 4 hours of machine use. 90% pressure 14.7 cm H20 on auto CPAP 13-17 cm H2O    Compliance download report from 4/6/21 to 5/5/21 reviewed today by me and showed patient is using machine 8:11 hrs/night with 100% compliance and AHI 0.3 within this time frame. 30/30days with greater than 4 hours of machine use. 90% pressure 14.8 cm H20 on auto CPAP 13-17 cm H2O please    Compliance download report from 3/13/22 to 4/11/22 reviewed today by me and showed patient is using machine 7:43 hrs/night with 100% compliance and AHI 0.4 within this time frame. 30/30days with greater than 4 hours of machine use. 90% pressure 15.7 cm H20 on auto CPAP 13-17 cm H2O      Assessment:      1. Severe ELEAZAR . Failed CPAP 12 cmH2O, improved with Auto CPAP 13-17 cm H2O. Nasal mask. Optimal compliance and efficacy on review today. 2. DM  3. Fatigue  4. Obesity  5. HTN per PCP      Plan:      - Change to AutoCPAP min pressure of 14 cmH2O and max pressure of 18 cmH2O  - Advised to use CPAP 6-8 hrs at night and during naps. - Replacement of mask, tubing, head straps every 3-6 months or sooner if damaged. - Patient instructed to contact [employer] for any mask, tubing or machine trouble shooting if problems arise.  - Sleep hygiene  - Avoid sedatives, alcohol and caffeinated drinks at bed time. - Patient counseled to never drive or operate heavy machinery while fatigue, drowsy or sleepy.    - Weight loss is recommended as a long-term intervention.    - Complications of ELEAZAR if not treated were discussed with patient patient, including: systemic hypertension, pulmonary hypertension, cardiovascular morbidities, car accidents and all cause mortality.  -Patient education provided regarding sleep tips and CPAP cleaning recommendations           Follow-up:  1 year, sooner if needed

## 2022-05-09 NOTE — PATIENT INSTRUCTIONS

## 2022-05-12 RX ORDER — SITAGLIPTIN AND METFORMIN HYDROCHLORIDE 1000; 50 MG/1; MG/1
TABLET, FILM COATED, EXTENDED RELEASE ORAL
Qty: 180 TABLET | Refills: 3 | Status: SHIPPED | OUTPATIENT
Start: 2022-05-12

## 2022-05-12 RX ORDER — EMPAGLIFLOZIN 25 MG/1
TABLET, FILM COATED ORAL
Qty: 90 TABLET | Refills: 3 | Status: SHIPPED | OUTPATIENT
Start: 2022-05-12

## 2022-05-19 ENCOUNTER — ANTI-COAG VISIT (OUTPATIENT)
Dept: INTERNAL MEDICINE CLINIC | Age: 62
End: 2022-05-19

## 2022-05-19 ENCOUNTER — TELEPHONE (OUTPATIENT)
Dept: INTERNAL MEDICINE CLINIC | Age: 62
End: 2022-05-19

## 2022-05-19 LAB — INR BLD: 2.3

## 2022-05-19 NOTE — TELEPHONE ENCOUNTER
----- Message from Saturnino Larios MD sent at 5/19/2022  4:32 PM EDT -----  No change   2 week  ----- Message -----  From: Sue Thompson  Sent: 5/19/2022   3:34 PM EDT  To: MD Dr. Concepcion Huynh pt    INR 2.3

## 2022-05-31 RX ORDER — FENOFIBRIC ACID 135 MG/1
CAPSULE, DELAYED RELEASE ORAL
Qty: 90 CAPSULE | Refills: 0 | Status: SHIPPED | OUTPATIENT
Start: 2022-05-31 | End: 2022-08-16

## 2022-05-31 RX ORDER — PANTOPRAZOLE SODIUM 20 MG/1
TABLET, DELAYED RELEASE ORAL
Qty: 90 TABLET | Refills: 0 | Status: SHIPPED | OUTPATIENT
Start: 2022-05-31 | End: 2022-09-01

## 2022-05-31 RX ORDER — LOSARTAN POTASSIUM 50 MG/1
50 TABLET ORAL DAILY
Qty: 90 TABLET | Refills: 0 | Status: SHIPPED | OUTPATIENT
Start: 2022-05-31 | End: 2022-09-15

## 2022-05-31 RX ORDER — TOPIRAMATE 50 MG/1
TABLET, FILM COATED ORAL
Qty: 90 TABLET | Refills: 0 | Status: SHIPPED | OUTPATIENT
Start: 2022-05-31 | End: 2022-06-15 | Stop reason: DRUGHIGH

## 2022-05-31 RX ORDER — CITALOPRAM 40 MG/1
TABLET ORAL
Qty: 90 TABLET | Refills: 0 | Status: SHIPPED | OUTPATIENT
Start: 2022-05-31 | End: 2022-09-09

## 2022-06-02 RX ORDER — ICOSAPENT ETHYL 1000 MG/1
CAPSULE ORAL
Qty: 360 CAPSULE | Refills: 0 | Status: SHIPPED | OUTPATIENT
Start: 2022-06-02 | End: 2022-06-15 | Stop reason: SDUPTHER

## 2022-06-02 RX ORDER — WARFARIN SODIUM 5 MG/1
TABLET ORAL
Qty: 180 TABLET | Refills: 0 | Status: SHIPPED | OUTPATIENT
Start: 2022-06-02 | End: 2022-09-01

## 2022-06-09 NOTE — TELEPHONE ENCOUNTER
Spoke with pt and informed her of need for download. Pt stated she works in [employer] and will take her machine to [employer] for a compliance download.

## 2022-06-09 NOTE — TELEPHONE ENCOUNTER
No data available in [employer]. Called patient to ask her to take machine in for download. Patient called with message left for patient to call back to office.

## 2022-06-10 ENCOUNTER — ANTI-COAG VISIT (OUTPATIENT)
Dept: INTERNAL MEDICINE CLINIC | Age: 62
End: 2022-06-10

## 2022-06-10 ENCOUNTER — TELEPHONE (OUTPATIENT)
Dept: INTERNAL MEDICINE CLINIC | Age: 62
End: 2022-06-10

## 2022-06-10 LAB — INR BLD: 2.1

## 2022-06-10 NOTE — TELEPHONE ENCOUNTER
----- Message from Everlean Fleischer, MD sent at 6/10/2022  9:36 AM EDT -----  Contact: [phone] (H)  Same 2 w  ----- Message -----  From: Christine Paris  Sent: 6/10/2022   9:34 AM EDT  To: Everlean Fleischer, MD    Pt called and stated that her INR is 2.1 and she is taking 11 mg a day of coumadin.     Thank you

## 2022-06-15 ENCOUNTER — TELEPHONE (OUTPATIENT)
Dept: INTERNAL MEDICINE CLINIC | Age: 62
End: 2022-06-15

## 2022-06-15 RX ORDER — ICOSAPENT ETHYL 1000 MG/1
CAPSULE ORAL
Qty: 360 CAPSULE | Refills: 0 | Status: SHIPPED | OUTPATIENT
Start: 2022-06-15 | End: 2022-08-22

## 2022-06-15 RX ORDER — TOPIRAMATE 100 MG/1
TABLET, FILM COATED ORAL
Qty: 90 TABLET | Refills: 0 | Status: SHIPPED | OUTPATIENT
Start: 2022-06-15 | End: 2022-08-22

## 2022-06-15 NOTE — TELEPHONE ENCOUNTER
----- Message from Davina Finch MD sent at 6/15/2022  3:11 PM EDT -----  Contact: [employer] [phone]  Check with her  ----- Message -----  From: Rema Mcguire  Sent: 6/15/2022   3:05 PM EDT  To: Davina Finch MD    There are 2 doses of Topiramate in pt's chart. Should pt be taking 100 mg daily or 50 mg nightly?  ----- Message -----  From: Raisa Duffy  Sent: 6/15/2022   2:01 PM EDT  To: Rema Mcguire    Refill for Tipiramate. Ref.  #[id number]    Agustín Burgos

## 2022-06-15 NOTE — TELEPHONE ENCOUNTER
----- Message from Ladi Huynh sent at 6/15/2022  4:02 PM EDT -----  Contact: [employer] [phone]  100 mg daily  ----- Message -----  From: Jose Barnes MD  Sent: 6/15/2022   3:11 PM EDT  To: Leo Hinton    Check with her  ----- Message -----  From: Leo Hinton  Sent: 6/15/2022   3:05 PM EDT  To: Jose Barnes MD    There are 2 doses of Topiramate in pt's chart. Should pt be taking 100 mg daily or 50 mg nightly?  ----- Message -----  From: Andre Alaniz  Sent: 6/15/2022   2:01 PM EDT  To: Leo Hinton    Refill for Tipiramate. Ref.  #[id number]    [facility]

## 2022-06-16 RX ORDER — FERROUS SULFATE 325(65) MG
TABLET ORAL
Qty: 100 TABLET | Refills: 0 | Status: SHIPPED | OUTPATIENT
Start: 2022-06-16 | End: 2022-10-19 | Stop reason: SDUPTHER

## 2022-06-16 NOTE — TELEPHONE ENCOUNTER
Please check in [employer] to verify pressure and settings. CPAP download report from 5/12/2022 - 6/10/2022 on auto CPAP 13-17 cm H2O reviewed. Compliance is good 97%. AHI is good 0.4.

## 2022-06-17 NOTE — TELEPHONE ENCOUNTER
Per resmed [facility] pressure was changed 6/10/22 to auto cpap 13-17 cm H2O    Last order states change to auto cpap 14-18 cm H2O

## 2022-06-20 ENCOUNTER — OFFICE VISIT (OUTPATIENT)
Dept: INTERNAL MEDICINE CLINIC | Age: 62
End: 2022-06-20

## 2022-06-20 VITALS
DIASTOLIC BLOOD PRESSURE: 80 MMHG | HEART RATE: 70 BPM | HEIGHT: 65 IN | BODY MASS INDEX: 48.82 KG/M2 | WEIGHT: 293 LBS | SYSTOLIC BLOOD PRESSURE: 130 MMHG | RESPIRATION RATE: 12 BRPM

## 2022-06-20 DIAGNOSIS — G25.0 BENIGN HEAD TREMOR: ICD-10-CM

## 2022-06-20 DIAGNOSIS — E78.5 HYPERLIPIDEMIA ASSOCIATED WITH TYPE 2 DIABETES MELLITUS (HCC): ICD-10-CM

## 2022-06-20 DIAGNOSIS — I10 BENIGN ESSENTIAL HYPERTENSION: ICD-10-CM

## 2022-06-20 DIAGNOSIS — E11.69 HYPERLIPIDEMIA ASSOCIATED WITH TYPE 2 DIABETES MELLITUS (HCC): ICD-10-CM

## 2022-06-20 DIAGNOSIS — E66.01 MORBID OBESITY WITH BMI OF 50.0-59.9, ADULT (HCC): ICD-10-CM

## 2022-06-20 DIAGNOSIS — G47.33 SEVERE OBSTRUCTIVE SLEEP APNEA: ICD-10-CM

## 2022-06-20 DIAGNOSIS — I27.82 OTHER CHRONIC PULMONARY EMBOLISM WITHOUT ACUTE COR PULMONALE (HCC): ICD-10-CM

## 2022-06-20 DIAGNOSIS — E11.69 TYPE 2 DIABETES MELLITUS WITH OTHER SPECIFIED COMPLICATION, WITHOUT LONG-TERM CURRENT USE OF INSULIN (HCC): ICD-10-CM

## 2022-06-20 DIAGNOSIS — E11.69 TYPE 2 DIABETES MELLITUS WITH OTHER SPECIFIED COMPLICATION, WITHOUT LONG-TERM CURRENT USE OF INSULIN (HCC): Primary | ICD-10-CM

## 2022-06-20 DIAGNOSIS — K76.0 FATTY INFILTRATION OF LIVER: ICD-10-CM

## 2022-06-20 LAB
CHOLESTEROL, FASTING: 189 MG/DL (ref 0–199)
HDLC SERPL-MCNC: 27 MG/DL (ref 40–60)
LDL CHOLESTEROL CALCULATED: ABNORMAL MG/DL
LDL CHOLESTEROL DIRECT: 59 MG/DL
TRIGLYCERIDE, FASTING: 832 MG/DL (ref 0–150)
VLDLC SERPL CALC-MCNC: ABNORMAL MG/DL

## 2022-06-20 PROCEDURE — 90750 HZV VACC RECOMBINANT IM: CPT | Performed by: INTERNAL MEDICINE

## 2022-06-20 PROCEDURE — 3044F HG A1C LEVEL LT 7.0%: CPT | Performed by: INTERNAL MEDICINE

## 2022-06-20 PROCEDURE — 99214 OFFICE O/P EST MOD 30 MIN: CPT | Performed by: INTERNAL MEDICINE

## 2022-06-20 PROCEDURE — 90471 IMMUNIZATION ADMIN: CPT | Performed by: INTERNAL MEDICINE

## 2022-06-20 ASSESSMENT — ENCOUNTER SYMPTOMS
RHINORRHEA: 0
COUGH: 0
ABDOMINAL PAIN: 0
VOMITING: 0
WHEEZING: 0
NAUSEA: 0
SHORTNESS OF BREATH: 0

## 2022-06-20 NOTE — PROGRESS NOTES
Subjective:      Patient ID: Meredith Allen is a 65 y.o. female. HPI    Patient is here for follow up of diabetes, HTN, depression, pulmonary embolism and hyperlipidemia. Patient's is not checking her sugars. No hypoglycemia. Patient does not have polydipsia and polyuria. Her last A1C was 6.8. She is on cozaar for HTN. Her Bp is at goal.  She is compliant with coumadin. She checks pro time at home. Her depression is stable. No new issues. Her cholesterol is controlled. It is at goal.  She has ELEAZAR. She is on CPAP. She is compliant. She has benign head tremor. It is about the same. .    Review of Systems   Constitutional: Negative for appetite change and fatigue. HENT: Negative for postnasal drip and rhinorrhea. Respiratory: Negative for cough, shortness of breath and wheezing. Cardiovascular: Negative for chest pain and palpitations. Gastrointestinal: Negative for abdominal pain, nausea and vomiting. Endocrine: Negative for polydipsia, polyphagia and polyuria. Musculoskeletal: Negative for joint swelling. Skin: Negative for rash. Neurological: Negative for light-headedness. Psychiatric/Behavioral: Positive for sleep disturbance. The patient is nervous/anxious.       Current Outpatient Medications on File Prior to Visit   Medication Sig Dispense Refill    FEROSUL 325 (65 Fe) MG tablet TAKE 1 TABLET BY MOUTH EVERY MORNING 100 tablet 0    Icosapent Ethyl (VASCEPA) 1 g CAPS capsule TAKE 2 CAPSULES BY MOUTH 2 TIMES A  capsule 0    topiramate (TOPAMAX) 100 MG tablet TAKE ONE TABLET BY MOUTH EVERY NIGHT 90 tablet 0    warfarin (COUMADIN) 5 MG tablet TAKE TWO (2) TABLETS BY MOUTH DAILY OR AS DIRECTED BY YOUR DOCTOR 180 tablet 0    citalopram (CELEXA) 40 MG tablet TAKE 1 TABLET BY MOUTH  DAILY 90 tablet 0    losartan (COZAAR) 50 mg tablet TAKE 1 TABLET BY MOUTH  DAILY 90 tablet 0    pantoprazole (PROTONIX) 20 MG tablet TAKE 1 TABLET BY MOUTH  DAILY 90 tablet 0    fenofibric acid (TRILIPIX) 135 MG CPDR capsule TAKE 1 CAPSULE BY MOUTH  DAILY 90 capsule 0    JANUMET XR  MG TB24 per extended release tablet TAKE 1 TABLET BY MOUTH  TWICE DAILY 180 tablet 3    JARDIANCE 25 MG tablet TAKE 1 TABLET BY MOUTH  DAILY 90 tablet 3    Multiple Vitamins-Minerals (THERAPEUTIC MULTIVITAMIN-MINERALS) tablet Take 1 tablet by mouth daily      Ascorbic Acid (VITAMIN C PO) Take by mouth      warfarin (COUMADIN) 1 MG tablet TAKE AS DIRECTED BY PHYSICIAN 90 tablet 0    CONTOUR NEXT TEST strip TEST 3 TIMES DAILY 300 strip 3    Microlet Lancets MISC TEST 3 TIMES DAILY 300 each 3    Blood Glucose Monitoring Suppl (ONE TOUCH ULTRA 2) w/Device KIT Test three times daily. DX:E11.9 1 kit 0    blood glucose test strips (ASCENSIA AUTODISC VI;ONE TOUCH ULTRA TEST VI) strip Test three times daily. DX:E11.9 100 each 3    Lancets MISC Test three times daily. DX:E11.9 100 each 3    ONE TOUCH LANCETS MISC Test daily. DX: E11.9 100 each 3    blood glucose test strips (ASCENSIA AUTODISC VI;ONE TOUCH ULTRA TEST VI) strip Test Daily. DX: E11.9 100 each 3    Omega-3 Fatty Acids (FISH OIL) 1000 MG CPDR Take 2,000 mg by mouth daily      PRODIGY NO CODING BLOOD GLUC strip TEST BLOOD SUGARS 3 TIMES DAILY 100 each 2    ONE TOUCH LANCETS MISC 1 each by Does not apply route daily. 100 each 3     No current facility-administered medications on file prior to visit. There are no changes to past medical history, family history, social history or review of systems(except as noted in the history section) since prior note (all reviewed with patient). /80 (Site: Right Wrist, Position: Sitting, Cuff Size: Medium Adult)   Pulse 70   Resp 12   Ht 5' 5\" (1.651 m)   Wt 295 lb (133.8 kg)   LMP 08/20/2014   BMI 49.09 kg/m²        Objective:   Physical Exam  Constitutional:       Appearance: She is well-developed. Comments: Morbidly obese   HENT:      Head: Normocephalic.    Eyes: Conjunctiva/sclera: Conjunctivae normal.      Pupils: Pupils are equal, round, and reactive to light. Neck:      Thyroid: No thyroid mass or thyromegaly. Vascular: No carotid bruit or JVD. Trachea: Trachea normal.   Cardiovascular:      Rate and Rhythm: Normal rate and regular rhythm. Heart sounds: Normal heart sounds. No murmur heard. No gallop. Pulmonary:      Effort: Pulmonary effort is normal. No respiratory distress. Breath sounds: Normal breath sounds. No wheezing or rales. Abdominal:      General: Bowel sounds are normal. There is no distension. Palpations: Abdomen is soft. There is no hepatomegaly or splenomegaly. Tenderness: There is no abdominal tenderness. Musculoskeletal:      Cervical back: Normal range of motion and neck supple. Lymphadenopathy:      Cervical: No cervical adenopathy. Skin:     General: Skin is warm and dry. Findings: No rash. Neurological:      Mental Status: She is alert and oriented to person, place, and time. Psychiatric:         Behavior: Behavior normal.         Thought Content: Thought content normal.         Judgment: Judgment normal.         Assessment:       Diagnosis Orders   1. Type 2 diabetes mellitus with other specified complication, without long-term current use of insulin (HCC)  HM DIABETES FOOT EXAM    Hemoglobin A1C    Lipid Panel   2. Other chronic pulmonary embolism without acute cor pulmonale (HCC)     3. Hyperlipidemia associated with type 2 diabetes mellitus ([facility] [address]     4. Morbid obesity with BMI of 50.0-59.9, adult ([facility] [address]     5. Benign essential hypertension     6. Severe obstructive sleep apnea     7. Benign head tremor     8. Fatty infiltration of liver            Plan:      Decrease calorie intake. Check labs. DM type 2: Continue oral medication. Check A1C.   HTN: on Cozaar 50 mg daily. PE: On home monitoring system. Head tremor -on Topamax  Fatty liver: she has lost weight. Depression: on Celexa.   ELEAZAR: on CPAP. She is using it for over 8 hours a night. She was feeling more tired and her pressure was increased. Hypertriglyceridemia. Stable. Check labs. Discussed use, benefit, and side effects of prescribed medications. Barriers to medication compliance addressed. All patient questions answered. Pt voiced understanding. Exercise,weight loss recommended. The current medical regimen is effective;  continue present plan and medications. See orders.

## 2022-06-20 NOTE — TELEPHONE ENCOUNTER
Please send order to change to auto CPAP 14-18 cm H2O and get new report about 30 days after pressure is changed

## 2022-06-21 LAB
ESTIMATED AVERAGE GLUCOSE: 171.4 MG/DL
HBA1C MFR BLD: 7.6 %

## 2022-06-21 NOTE — TELEPHONE ENCOUNTER
Order from 5/9/22 refaxed    Will watch for pressure change to be completed and get new report 30 days after

## 2022-06-27 ENCOUNTER — TELEPHONE (OUTPATIENT)
Dept: INTERNAL MEDICINE CLINIC | Age: 62
End: 2022-06-27

## 2022-06-27 ENCOUNTER — ANTI-COAG VISIT (OUTPATIENT)
Dept: INTERNAL MEDICINE CLINIC | Age: 62
End: 2022-06-27

## 2022-06-27 LAB — INR BLD: 2.1

## 2022-06-27 NOTE — TELEPHONE ENCOUNTER
----- Message from Nelida Curran MD sent at 6/27/2022  3:17 PM EDT -----  Same 2 w  ----- Message -----  From: Olga Santana  Sent: 6/27/2022   2:18 PM EDT  To: Nelida Curran MD    INR 2.10  Unable to reach for dose

## 2022-07-15 ENCOUNTER — ANTI-COAG VISIT (OUTPATIENT)
Dept: INTERNAL MEDICINE CLINIC | Age: 62
End: 2022-07-15

## 2022-07-15 ENCOUNTER — TELEPHONE (OUTPATIENT)
Dept: INTERNAL MEDICINE CLINIC | Age: 62
End: 2022-07-15

## 2022-07-15 LAB — INR BLD: 1.9

## 2022-07-15 NOTE — TELEPHONE ENCOUNTER
----- Message from Enma Singleton MD sent at 7/15/2022  1:36 PM EDT -----  Same 2 w  ----- Message -----  From: Mode Walls  Sent: 7/15/2022   1:04 PM EDT  To: Enma Singleton MD    INR 1.9

## 2022-07-27 ENCOUNTER — TELEPHONE (OUTPATIENT)
Dept: INTERNAL MEDICINE CLINIC | Age: 62
End: 2022-07-27

## 2022-07-27 ENCOUNTER — APPOINTMENT (OUTPATIENT)
Dept: GENERAL RADIOLOGY | Age: 62
End: 2022-07-27
Payer: COMMERCIAL

## 2022-07-27 ENCOUNTER — HOSPITAL ENCOUNTER (OUTPATIENT)
Age: 62
Setting detail: OBSERVATION
Discharge: HOME | End: 2022-07-29
Attending: EMERGENCY MEDICINE | Admitting: INTERNAL MEDICINE
Payer: COMMERCIAL

## 2022-07-27 DIAGNOSIS — R06.02 SHORTNESS OF BREATH: ICD-10-CM

## 2022-07-27 DIAGNOSIS — R07.9 CHEST PAIN, UNSPECIFIED TYPE: Primary | ICD-10-CM

## 2022-07-27 PROBLEM — E11.9 TYPE 2 DIABETES MELLITUS WITHOUT COMPLICATION, WITHOUT LONG-TERM CURRENT USE OF INSULIN (HCC): Status: ACTIVE | Noted: 2022-07-27

## 2022-07-27 PROBLEM — I10 HYPERTENSION: Status: ACTIVE | Noted: 2022-07-27

## 2022-07-27 LAB
A/G RATIO: 2 (ref 1.1–2.2)
ALBUMIN SERPL-MCNC: 4.7 G/DL (ref 3.4–5)
ALP BLD-CCNC: 86 U/L (ref 40–129)
ALT SERPL-CCNC: 16 U/L (ref 10–40)
ANION GAP SERPL CALCULATED.3IONS-SCNC: 15 MMOL/L (ref 3–16)
AST SERPL-CCNC: 31 U/L (ref 15–37)
BASOPHILS ABSOLUTE: 0.1 K/UL (ref 0–0.2)
BASOPHILS RELATIVE PERCENT: 1.4 %
BILIRUB SERPL-MCNC: 0.3 MG/DL (ref 0–1)
BUN BLDV-MCNC: 18 MG/DL (ref 7–20)
CALCIUM SERPL-MCNC: 10.1 MG/DL (ref 8.3–10.6)
CHLORIDE BLD-SCNC: 102 MMOL/L (ref 99–110)
CO2: 23 MMOL/L (ref 21–32)
CREAT SERPL-MCNC: 0.6 MG/DL (ref 0.6–1.2)
D DIMER: <0.27 UG/ML FEU (ref 0–0.6)
EKG ATRIAL RATE: 82 BPM
EKG DIAGNOSIS: NORMAL
EKG P AXIS: 50 DEGREES
EKG P-R INTERVAL: 142 MS
EKG Q-T INTERVAL: 398 MS
EKG QRS DURATION: 86 MS
EKG QTC CALCULATION (BAZETT): 464 MS
EKG R AXIS: 30 DEGREES
EKG T AXIS: 34 DEGREES
EKG VENTRICULAR RATE: 82 BPM
EOSINOPHILS ABSOLUTE: 0.1 K/UL (ref 0–0.6)
EOSINOPHILS RELATIVE PERCENT: 2.3 %
GFR AFRICAN AMERICAN: >60
GFR NON-AFRICAN AMERICAN: >60
GLUCOSE BLD-MCNC: 155 MG/DL (ref 70–99)
GLUCOSE BLD-MCNC: 189 MG/DL (ref 70–99)
HCT VFR BLD CALC: 43.6 % (ref 36–48)
HEMOGLOBIN: 15.1 G/DL (ref 12–16)
INR BLD: 2.07 (ref 0.87–1.14)
LYMPHOCYTES ABSOLUTE: 1.2 K/UL (ref 1–5.1)
LYMPHOCYTES RELATIVE PERCENT: 25.9 %
MCH RBC QN AUTO: 31.2 PG (ref 26–34)
MCHC RBC AUTO-ENTMCNC: 34.7 G/DL (ref 31–36)
MCV RBC AUTO: 89.7 FL (ref 80–100)
MONOCYTES ABSOLUTE: 0.5 K/UL (ref 0–1.3)
MONOCYTES RELATIVE PERCENT: 9.7 %
NEUTROPHILS ABSOLUTE: 2.9 K/UL (ref 1.7–7.7)
NEUTROPHILS RELATIVE PERCENT: 60.7 %
PDW BLD-RTO: 14.6 % (ref 12.4–15.4)
PERFORMED ON: ABNORMAL
PLATELET # BLD: 174 K/UL (ref 135–450)
PMV BLD AUTO: 8.1 FL (ref 5–10.5)
POTASSIUM REFLEX MAGNESIUM: 4 MMOL/L (ref 3.5–5.1)
PROTHROMBIN TIME: 23.2 SEC (ref 11.7–14.5)
RBC # BLD: 4.86 M/UL (ref 4–5.2)
REASON FOR REJECTION: NORMAL
REJECTED TEST: NORMAL
SARS-COV-2, NAAT: NOT DETECTED
SODIUM BLD-SCNC: 140 MMOL/L (ref 136–145)
TOTAL PROTEIN: 7.1 G/DL (ref 6.4–8.2)
TROPONIN: <0.01 NG/ML
WBC # BLD: 4.8 K/UL (ref 4–11)

## 2022-07-27 PROCEDURE — 6370000000 HC RX 637 (ALT 250 FOR IP)

## 2022-07-27 PROCEDURE — 93010 ELECTROCARDIOGRAM REPORT: CPT | Performed by: INTERNAL MEDICINE

## 2022-07-27 PROCEDURE — 85025 COMPLETE CBC W/AUTO DIFF WBC: CPT

## 2022-07-27 PROCEDURE — 87635 SARS-COV-2 COVID-19 AMP PRB: CPT

## 2022-07-27 PROCEDURE — 85379 FIBRIN DEGRADATION QUANT: CPT

## 2022-07-27 PROCEDURE — 93005 ELECTROCARDIOGRAM TRACING: CPT | Performed by: EMERGENCY MEDICINE

## 2022-07-27 PROCEDURE — 85610 PROTHROMBIN TIME: CPT

## 2022-07-27 PROCEDURE — 80053 COMPREHEN METABOLIC PANEL: CPT

## 2022-07-27 PROCEDURE — 71046 X-RAY EXAM CHEST 2 VIEWS: CPT

## 2022-07-27 PROCEDURE — 99221 1ST HOSP IP/OBS SF/LOW 40: CPT

## 2022-07-27 PROCEDURE — 6370000000 HC RX 637 (ALT 250 FOR IP): Performed by: EMERGENCY MEDICINE

## 2022-07-27 PROCEDURE — 99285 EMERGENCY DEPT VISIT HI MDM: CPT

## 2022-07-27 PROCEDURE — 36415 COLL VENOUS BLD VENIPUNCTURE: CPT

## 2022-07-27 PROCEDURE — 84484 ASSAY OF TROPONIN QUANT: CPT

## 2022-07-27 PROCEDURE — 2580000003 HC RX 258

## 2022-07-27 PROCEDURE — G0378 HOSPITAL OBSERVATION PER HR: HCPCS

## 2022-07-27 RX ORDER — ASPIRIN 81 MG/1
324 TABLET, CHEWABLE ORAL ONCE
Status: COMPLETED | OUTPATIENT
Start: 2022-07-27 | End: 2022-07-27

## 2022-07-27 RX ORDER — TOPIRAMATE 100 MG/1
100 TABLET, FILM COATED ORAL NIGHTLY
Status: DISCONTINUED | OUTPATIENT
Start: 2022-07-27 | End: 2022-07-29 | Stop reason: HOSPADM

## 2022-07-27 RX ORDER — OMEGA-3 FATTY ACIDS CAP DELAYED RELEASE 1000 MG 1000 MG
2000 CAPSULE DELAYED RELEASE ORAL DAILY
Status: DISCONTINUED | OUTPATIENT
Start: 2022-07-27 | End: 2022-07-27

## 2022-07-27 RX ORDER — M-VIT,TX,IRON,MINS/CALC/FOLIC 27MG-0.4MG
1 TABLET ORAL DAILY
Status: DISCONTINUED | OUTPATIENT
Start: 2022-07-27 | End: 2022-07-29 | Stop reason: HOSPADM

## 2022-07-27 RX ORDER — FENOFIBRATE 160 MG/1
160 TABLET ORAL DAILY
Status: DISCONTINUED | OUTPATIENT
Start: 2022-07-27 | End: 2022-07-29 | Stop reason: HOSPADM

## 2022-07-27 RX ORDER — SODIUM CHLORIDE 0.9 % (FLUSH) 0.9 %
5-40 SYRINGE (ML) INJECTION EVERY 12 HOURS SCHEDULED
Status: DISCONTINUED | OUTPATIENT
Start: 2022-07-27 | End: 2022-07-29 | Stop reason: HOSPADM

## 2022-07-27 RX ORDER — LOSARTAN POTASSIUM 25 MG/1
50 TABLET ORAL DAILY
Status: DISCONTINUED | OUTPATIENT
Start: 2022-07-27 | End: 2022-07-29 | Stop reason: HOSPADM

## 2022-07-27 RX ORDER — ACETAMINOPHEN 325 MG/1
650 TABLET ORAL EVERY 6 HOURS PRN
Status: DISCONTINUED | OUTPATIENT
Start: 2022-07-27 | End: 2022-07-29 | Stop reason: HOSPADM

## 2022-07-27 RX ORDER — SODIUM CHLORIDE 9 MG/ML
INJECTION, SOLUTION INTRAVENOUS CONTINUOUS
Status: DISCONTINUED | OUTPATIENT
Start: 2022-07-27 | End: 2022-07-29 | Stop reason: HOSPADM

## 2022-07-27 RX ORDER — INSULIN LISPRO 100 [IU]/ML
0-4 INJECTION, SOLUTION INTRAVENOUS; SUBCUTANEOUS NIGHTLY
Status: DISCONTINUED | OUTPATIENT
Start: 2022-07-27 | End: 2022-07-29 | Stop reason: HOSPADM

## 2022-07-27 RX ORDER — NITROGLYCERIN 0.4 MG/1
0.4 TABLET SUBLINGUAL EVERY 5 MIN PRN
Status: DISCONTINUED | OUTPATIENT
Start: 2022-07-27 | End: 2022-07-29 | Stop reason: HOSPADM

## 2022-07-27 RX ORDER — ACETAMINOPHEN 650 MG/1
650 SUPPOSITORY RECTAL EVERY 6 HOURS PRN
Status: DISCONTINUED | OUTPATIENT
Start: 2022-07-27 | End: 2022-07-29 | Stop reason: HOSPADM

## 2022-07-27 RX ORDER — ATORVASTATIN CALCIUM 40 MG/1
40 TABLET, FILM COATED ORAL NIGHTLY
Status: DISCONTINUED | OUTPATIENT
Start: 2022-07-27 | End: 2022-07-29 | Stop reason: HOSPADM

## 2022-07-27 RX ORDER — PANTOPRAZOLE SODIUM 20 MG/1
20 TABLET, DELAYED RELEASE ORAL
Status: DISCONTINUED | OUTPATIENT
Start: 2022-07-28 | End: 2022-07-29 | Stop reason: HOSPADM

## 2022-07-27 RX ORDER — POTASSIUM CHLORIDE 7.45 MG/ML
10 INJECTION INTRAVENOUS PRN
Status: DISCONTINUED | OUTPATIENT
Start: 2022-07-27 | End: 2022-07-29 | Stop reason: HOSPADM

## 2022-07-27 RX ORDER — POTASSIUM CHLORIDE 20 MEQ/1
40 TABLET, EXTENDED RELEASE ORAL PRN
Status: DISCONTINUED | OUTPATIENT
Start: 2022-07-27 | End: 2022-07-29 | Stop reason: HOSPADM

## 2022-07-27 RX ORDER — ONDANSETRON 4 MG/1
4 TABLET, ORALLY DISINTEGRATING ORAL EVERY 8 HOURS PRN
Status: DISCONTINUED | OUTPATIENT
Start: 2022-07-27 | End: 2022-07-29 | Stop reason: HOSPADM

## 2022-07-27 RX ORDER — POLYETHYLENE GLYCOL 3350 17 G/17G
17 POWDER, FOR SOLUTION ORAL DAILY PRN
Status: DISCONTINUED | OUTPATIENT
Start: 2022-07-27 | End: 2022-07-29 | Stop reason: HOSPADM

## 2022-07-27 RX ORDER — ONDANSETRON 2 MG/ML
4 INJECTION INTRAMUSCULAR; INTRAVENOUS EVERY 6 HOURS PRN
Status: DISCONTINUED | OUTPATIENT
Start: 2022-07-27 | End: 2022-07-29 | Stop reason: HOSPADM

## 2022-07-27 RX ORDER — INSULIN LISPRO 100 [IU]/ML
0-8 INJECTION, SOLUTION INTRAVENOUS; SUBCUTANEOUS
Status: DISCONTINUED | OUTPATIENT
Start: 2022-07-27 | End: 2022-07-29 | Stop reason: HOSPADM

## 2022-07-27 RX ORDER — ASPIRIN 81 MG/1
81 TABLET, CHEWABLE ORAL DAILY
Status: DISCONTINUED | OUTPATIENT
Start: 2022-07-28 | End: 2022-07-29 | Stop reason: HOSPADM

## 2022-07-27 RX ORDER — SODIUM CHLORIDE 9 MG/ML
INJECTION, SOLUTION INTRAVENOUS PRN
Status: DISCONTINUED | OUTPATIENT
Start: 2022-07-27 | End: 2022-07-29 | Stop reason: HOSPADM

## 2022-07-27 RX ORDER — CITALOPRAM 20 MG/1
40 TABLET ORAL DAILY
Status: DISCONTINUED | OUTPATIENT
Start: 2022-07-27 | End: 2022-07-29 | Stop reason: HOSPADM

## 2022-07-27 RX ORDER — MAGNESIUM SULFATE 1 G/100ML
1000 INJECTION INTRAVENOUS PRN
Status: DISCONTINUED | OUTPATIENT
Start: 2022-07-27 | End: 2022-07-29 | Stop reason: HOSPADM

## 2022-07-27 RX ORDER — FERROUS SULFATE 325(65) MG
325 TABLET ORAL
Status: DISCONTINUED | OUTPATIENT
Start: 2022-07-28 | End: 2022-07-29 | Stop reason: HOSPADM

## 2022-07-27 RX ORDER — DEXTROSE MONOHYDRATE 100 MG/ML
INJECTION, SOLUTION INTRAVENOUS CONTINUOUS PRN
Status: DISCONTINUED | OUTPATIENT
Start: 2022-07-27 | End: 2022-07-29 | Stop reason: HOSPADM

## 2022-07-27 RX ORDER — SODIUM CHLORIDE 0.9 % (FLUSH) 0.9 %
10 SYRINGE (ML) INJECTION PRN
Status: DISCONTINUED | OUTPATIENT
Start: 2022-07-27 | End: 2022-07-29 | Stop reason: HOSPADM

## 2022-07-27 RX ADMIN — Medication 10 ML: at 21:46

## 2022-07-27 RX ADMIN — TOPIRAMATE 100 MG: 100 TABLET, FILM COATED ORAL at 21:46

## 2022-07-27 RX ADMIN — FENOFIBRATE 160 MG: 160 TABLET ORAL at 19:14

## 2022-07-27 RX ADMIN — ASPIRIN 324 MG: 81 TABLET, CHEWABLE ORAL at 15:13

## 2022-07-27 RX ADMIN — WARFARIN SODIUM 11 MG: 1 TABLET ORAL at 19:14

## 2022-07-27 RX ADMIN — SODIUM CHLORIDE: 9 INJECTION, SOLUTION INTRAVENOUS at 19:16

## 2022-07-27 RX ADMIN — ATORVASTATIN CALCIUM 40 MG: 40 TABLET, FILM COATED ORAL at 21:46

## 2022-07-27 ASSESSMENT — PAIN SCALES - GENERAL
PAINLEVEL_OUTOF10: 2
PAINLEVEL_OUTOF10: 5

## 2022-07-27 ASSESSMENT — PAIN DESCRIPTION - PAIN TYPE: TYPE: ACUTE PAIN

## 2022-07-27 ASSESSMENT — PAIN DESCRIPTION - LOCATION
LOCATION: CHEST
LOCATION: CHEST

## 2022-07-27 ASSESSMENT — PAIN - FUNCTIONAL ASSESSMENT: PAIN_FUNCTIONAL_ASSESSMENT: 0-10

## 2022-07-27 NOTE — ED PROVIDER NOTES
[facility] PCU TELEMETRY      CHIEF COMPLAINT  Shortness of Breath (Increased sob for a few days with some chest heaviness. Hx fo PE's.)       HISTORY OF PRESENT ILLNESS  Nancy Miller is a 65 y.o. female  who presents to the ED for evaluation of shortness of breath, fatigue, chest pressure left sided for 3 days. Reports she has been having increasing shortness of breath over the past 3 days with some intermittent chest heaviness. Says she had similar symptoms in the past when she was diagnosed with PE. Says she has been diagnosed with PE twice. Says the first time she was diagnosed with a PE, she was on warfarin for about 6 months then taken off of it. Says shortly after, she had a another PE and has been on warfarin since. Says that was around 2007 and 6. Has been compliant with her warfarin. Last INR check was approximately 2 weeks ago. Says it has been in the therapeutic range. No associated fevers, chills, cough, or congestion. Denies having noticed any leg swelling or calf tenderness. Denies having history of any cardiac problems. No history of MIs. Has not taken any aspirin. Reports having a 1 out of 10, state, pressure-like sensation to the left chest only. Denies having any lung problems. Is non-smoker. Does not have breathing treatments at home. No other complaints, modifying factors or associated symptoms. I have reviewed the following from the nursing documentation.     Past Medical History:   Diagnosis Date    Benign essential hypertension 11/14/2017    Benign head tremor 5/14/2014    Cholelithiasis     Disease of nasal cavity and sinuses     Fatty infiltration of liver     Hot flash, menopausal 7/22/2015    Hyperlipidemia     Hyperlipidemia associated with type 2 diabetes mellitus ([facility] [address] 10/23/2015    Morbid obesity with BMI of 50.0-59.9, adult ([facility] [address] 10/23/2015    No history of procedure 11/9/2015    no past colonoscopy    Pulmonary embolism (HCC)     Type II or unspecified type diabetes mellitus without mention of complication, not stated as uncontrolled     Umbilical hernia 45/0/5259    Unspecified sleep apnea      Past Surgical History:   Procedure Laterality Date    BREAST SURGERY  2015    CHOLECYSTECTOMY, LAPAROSCOPIC  8/2/2012    COLONOSCOPY  11/9/2015    cecal polyp    DILATION AND CURETTAGE OF UTERUS  03/13/2018    HERNIA REPAIR  80/09/68    lap umbilical    HYSTEROSCOPY  03/13/2018    and D&C     Family History   Problem Relation Age of Onset    Hypertension Mother     Diabetes Mother     Heart Failure Father     Other Father         AAA    High Blood Pressure Sister     Diabetes Sister     High Blood Pressure Brother     Heart Attack Brother     Diabetes Brother      Social History     Socioeconomic History    Marital status:      Spouse name: Not on file    Number of children: Not on file    Years of education: Not on file    Highest education level: Not on file   Occupational History    Not on file   Tobacco Use    Smoking status: Never    Smokeless tobacco: Never   Vaping Use    Vaping Use: Never used   Substance and Sexual Activity    Alcohol use: No     Alcohol/week: 0.0 standard drinks    Drug use: No    Sexual activity: Not Currently     Partners: Male   Other Topics Concern    Not on file   Social History Narrative    Not on file     Social Determinants of Health     Financial Resource Strain: Not on file   Food Insecurity: Not on file   Transportation Needs: Not on file   Physical Activity: Not on file   Stress: Not on file   Social Connections: Not on file   Intimate Partner Violence: Not on file   Housing Stability: Not on file     Current Facility-Administered Medications   Medication Dose Route Frequency Provider Last Rate Last Admin    warfarin (COUMADIN) tablet 11 mg  11 mg Oral Once Sharad Parks MD        citalopram (CELEXA) tablet 40 mg  40 mg Oral Daily CHUCKIE Nails   40 mg at 07/28/22 1022    fenofibrate (TRIGLIDE) tablet 160 mg  160 mg Oral Daily [place], [place]   160 mg at 07/28/22 1022    ferrous sulfate (IRON 325) tablet 325 mg  325 mg Oral Daily with breakfast [place]   325 mg at 07/28/22 1022    losartan (COZAAR) tablet 50 mg  50 mg Oral Daily [place]   50 mg at 07/28/22 1022    therapeutic multivitamin-minerals 1 tablet  1 tablet Oral Daily [place], [place]   1 tablet at 07/28/22 1022    pantoprazole (PROTONIX) tablet 20 mg  20 mg Oral QAM [place]   20 mg at 07/28/22 0557    topiramate (TOPAMAX) tablet 100 mg  100 mg Oral Nightly [place]   100 mg at 07/27/22 2146    glucose chewable tablet 16 g  4 tablet Oral PRN [place]        dextrose bolus 10% 125 mL  125 mL IntraVENous PRN [place]        Or    dextrose bolus 10% 250 mL  250 mL IntraVENous PRN [place], [place]        glucagon (rDNA) injection 1 mg  1 mg SubCUTAneous PRN [place]        dextrose 10 % infusion   IntraVENous Continuous PRN [place]        sodium chloride flush 0.9 % injection 5-40 mL  5-40 mL IntraVENous 2 times per day [place]   10 mL at 07/27/22 2146    sodium chloride flush 0.9 % injection 10 mL  10 mL IntraVENous PRN [place]        0.9 % sodium chloride infusion   IntraVENous PRN [place], [place]        ondansetron (ZOFRAN-ODT) disintegrating tablet 4 mg  4 mg Oral Q8H PRN [place]        Or    ondansetron (ZOFRAN) injection 4 mg  4 mg IntraVENous Q6H PRN [place]        acetaminophen (TYLENOL) tablet 650 mg  650 mg Oral Q6H PRN [place]        Or    acetaminophen (TYLENOL) suppository 650 mg  650 mg Rectal Q6H PRN [place], [place]        polyethylene glycol (GLYCOLAX) packet 17 g  17 g Oral Daily PRN [place]        aspirin chewable tablet 81 mg  81 mg Oral Daily [place]   81 mg at 07/28/22 1022    potassium chloride (KLOR-CON M) extended release tablet 40 mEq  40 mEq Oral PRN [place] Or    potassium bicarb-citric acid (EFFER-K) effervescent tablet 40 mEq  40 mEq Oral PRN CHUCKIE Parisi        Or    potassium chloride 10 mEq/100 mL IVPB (Peripheral Line)  10 mEq IntraVENous PRN CHUCKIE Parisi        magnesium sulfate 1000 mg in dextrose 5% 100 mL IVPB  1,000 mg IntraVENous PRN CHUCKIE Parisi        nitroGLYCERIN (NITROSTAT) SL tablet 0.4 mg  0.4 mg SubLINGual Q5 Min PRN CHUCKIE Nails        0.9 % sodium chloride infusion   IntraVENous Continuous CHUCKIE Parisi 75 mL/hr at 07/28/22 0559 New Bag at 07/28/22 0559    perflutren lipid microspheres (DEFINITY) injection 1.65 mg  1.5 mL IntraVENous ONCE PRN CHUCKIE Parisi        atorvastatin (LIPITOR) tablet 40 mg  40 mg Oral Nightly CHUCKIE Parisi   40 mg at 07/27/22 2146    insulin lispro (HUMALOG) injection vial 0-8 Units  0-8 Units SubCUTAneous TID WC CHUCKIE Parisi   2 Units at 07/28/22 1127    insulin lispro (HUMALOG) injection vial 0-4 Units  0-4 Units SubCUTAneous Nightly CHUCKIE Parisi        warfarin placeholder: dosing by pharmacy   Other RX Placeholder Kylah Kaminski, [address]         Allergies   Allergen Reactions    Lisinopril Other (See Comments)     cough    Penicillins Other (See Comments)     Unsure of reaction--childhood    Sulfa Antibiotics Other (See Comments)     Unsure of reaction       PMH, Surgical Hx, FH, Social Hx reviewed by myself. REVIEW OF SYSTEMS  10 systems reviewed, pertinent positives per HPI otherwise noted to be negative. PHYSICAL EXAM  /73   Pulse 83   Temp 97.6 °F (36.4 °C) (Oral)   Resp 18   Ht 5' 5\" (1.651 m)   Wt 296 lb 1.6 oz (134.3 kg)   LMP 08/20/2014   SpO2 97%   BMI 49.27 kg/m²    GENERAL APPEARANCE: Awake and alert. No acute distress. HENT: Normocephalic. Atraumatic. EOMI. No facial droop. HEART/CHEST: RRR. No tenderness to palpation over the chest wall. LUNGS: Respirations unlabored. Speaking comfortably in full sentences.   ABDOMEN: Soft, non-distended abdomen. Non tender to palpation. No guarding. No rebound. EXTREMITIES:  no gross deformities. Moving all extremities. Minimal, symmetric, bilateral lower extremity edema. Palpable pulses to bilateral lower extremities. Negative Homans' sign bilaterally. SKIN: Warm and dry. No acute rashes. NEUROLOGICAL:  Alert and oriented. No gross facial drooping. Answering questions appropriately. Moving all extremities. PSYCHIATRIC: Pleasant. Normal mood and affect.      LABS  Results for orders placed or performed during the hospital encounter of 07/27/22   COVID-19, Rapid   Result Value Ref Range    SARS-CoV-2, NAAT Not Detected Not Detected   CBC with Auto Differential   Result Value Ref Range    WBC 4.8 4.0 - 11.0 K/uL    RBC 4.86 4.00 - 5.20 M/uL    Hemoglobin 15.1 12.0 - 16.0 g/dL    Hematocrit 43.6 36.0 - 48.0 %    MCV 89.7 80.0 - 100.0 fL    MCH 31.2 26.0 - 34.0 pg    MCHC 34.7 31.0 - 36.0 g/dL    RDW 14.6 12.4 - 15.4 %    Platelets 760 702 - 925 K/uL    MPV 8.1 5.0 - 10.5 fL    Neutrophils % 60.7 %    Lymphocytes % 25.9 %    Monocytes % 9.7 %    Eosinophils % 2.3 %    Basophils % 1.4 %    Neutrophils Absolute 2.9 1.7 - 7.7 K/uL    Lymphocytes Absolute 1.2 1.0 - 5.1 K/uL    Monocytes Absolute 0.5 0.0 - 1.3 K/uL    Eosinophils Absolute 0.1 0.0 - 0.6 K/uL    Basophils Absolute 0.1 0.0 - 0.2 K/uL   Comprehensive Metabolic Panel w/ Reflex to MG   Result Value Ref Range    Sodium 140 136 - 145 mmol/L    Potassium reflex Magnesium 4.0 3.5 - 5.1 mmol/L    Chloride 102 99 - 110 mmol/L    CO2 23 21 - 32 mmol/L    Anion Gap 15 3 - 16    Glucose 155 (H) 70 - 99 mg/dL    BUN 18 7 - 20 mg/dL    Creatinine 0.6 0.6 - 1.2 mg/dL    GFR Non-African American >60 >60    GFR African American >60 >60    Calcium 10.1 8.3 - 10.6 mg/dL    Total Protein 7.1 6.4 - 8.2 g/dL    Albumin 4.7 3.4 - 5.0 g/dL    Albumin/Globulin Ratio 2.0 1.1 - 2.2    Total Bilirubin 0.3 0.0 - 1.0 mg/dL    Alkaline Phosphatase 86 40 - 129 U/L ALT 16 10 - 40 U/L    AST 31 15 - 37 U/L   Troponin   Result Value Ref Range    Troponin <0.01 <0.01 ng/mL   D-Dimer, Quantitative   Result Value Ref Range    D-Dimer, Quant <0.27 0.00 - 0.60 ug/mL FEU   SPECIMEN REJECTION   Result Value Ref Range    Rejected Test covrg     Reason for Rejection see below    Protime-INR   Result Value Ref Range    Protime 23.2 (H) 11.7 - 14.5 sec    INR 2.07 (H) 0.87 - 1.14   Troponin   Result Value Ref Range    Troponin <0.01 <0.01 ng/mL   Troponin   Result Value Ref Range    Troponin <0.01 <0.01 ng/mL   Basic Metabolic Panel w/ Reflex to MG   Result Value Ref Range    Sodium 136 136 - 145 mmol/L    Potassium reflex Magnesium 3.8 3.5 - 5.1 mmol/L    Chloride 103 99 - 110 mmol/L    CO2 21 21 - 32 mmol/L    Anion Gap 12 3 - 16    Glucose 198 (H) 70 - 99 mg/dL    BUN 19 7 - 20 mg/dL    Creatinine 0.6 0.6 - 1.2 mg/dL    GFR Non-African American >60 >60    GFR African American >60 >60    Calcium 8.8 8.3 - 10.6 mg/dL   CBC   Result Value Ref Range    WBC 5.0 4.0 - 11.0 K/uL    RBC 4.38 4.00 - 5.20 M/uL    Hemoglobin 14.2 12.0 - 16.0 g/dL    Hematocrit 40.0 36.0 - 48.0 %    MCV 91.5 80.0 - 100.0 fL    MCH 32.4 26.0 - 34.0 pg    MCHC 35.5 31.0 - 36.0 g/dL    RDW 14.7 12.4 - 15.4 %    Platelets 432 152 - 450 K/uL    MPV 8.0 5.0 - 10.5 fL   Protime-INR   Result Value Ref Range    Protime 21.8 (H) 11.7 - 14.5 sec    INR 1.92 (H) 0.87 - 1.14   POCT Glucose   Result Value Ref Range    POC Glucose 189 (H) 70 - 99 mg/dl    Performed on ACCU-CHEK    POCT Glucose   Result Value Ref Range    POC Glucose 207 (H) 70 - 99 mg/dl    Performed on ACCU-CHEK    POCT Glucose   Result Value Ref Range    POC Glucose 247 (H) 70 - 99 mg/dl    Performed on ACCU-CHEK    POCT Glucose   Result Value Ref Range    POC Glucose 209 (H) 70 - 99 mg/dl    Performed on ACCU-CHEK    EKG 12 Lead   Result Value Ref Range    Ventricular Rate 82 BPM    Atrial Rate 82 BPM    P-R Interval 142 ms    QRS Duration 86 ms    Q-T Interval 398 ms    QTc Calculation (Bazett) 464 ms    P Axis 50 degrees    R Axis 30 degrees    T Axis 34 degrees    Diagnosis       Normal sinus rhythmNormal ECGWhen compared with ECG of 07-AUG-2019 06:49,No significant change was foundConfirmed by ELISHA Sung MD ([id number]) on 7/27/2022 6:28:01 PM   EKG 12 lead   Result Value Ref Range    Ventricular Rate 74 BPM    Atrial Rate 74 BPM    P-R Interval 126 ms    QRS Duration 74 ms    Q-T Interval 416 ms    QTc Calculation (Bazett) 461 ms    P Axis 32 degrees    R Axis 52 degrees    T Axis 58 degrees    Diagnosis       Normal sinus rhythmNormal ECGWhen compared with ECG of 27-JUL-2022 14:06,No significant change was foundConfirmed by ELISHA Sung MD ([id number]) on 7/28/2022 7:46:44 AM       I have reviewed all labs for this visit. ECG  The Ekg interpreted by me shows  Normal sinus rhythm with a rate of 82  Axis is normal  QTc is acceptable at 464  Intervals and Durations are unremarkable. ST Segments: Nonspecific changes    RADIOLOGY  Echo Complete    Result Date: 7/28/2022  Transthoracic Echocardiography Report (TTE)  Demographics   Patient Name      Magdi Camilo   Date of Study     07/28/2022          Gender              Female   Patient Number    [id number]          Date of Birth       1960   Visit Number      [id number]           Age                 65 year(s)   Accession Number  [id number]          Room Number            Corporate ID      [id number]              Tristin NOGUERA  Interpreting        Chucky Echeverria,  Physician                             Physician           MD, [facility]  Procedure Type of Study   TTE procedure:ECHOCARDIOGRAM COMPLETE 2D W DOPPLER W COLOR. Procedure Date Date: 07/28/2022 Start: 08:02 AM Study Location: [address] [place] [address] - Echo Lab Technical Quality: Adequate visualization Indications:Chest pain. Patient Status: Routine Height: 65 inches Weight: 296 pounds BSA: 2.34 m2 BMI: 49.26 kg/m2 BP: 126/77 mmHg  Conclusions   Summary  LV systolic function is normal with Ef estimated at 55%. No regional wall motion abnormalities. There is mild concentric left ventricular hypertrophy. Grade I diastolic dysfunction with normal left ventricular filling pressure. Inadequate tricuspid regurgitation jet to estimate systolic artery pressure  (SPAP). Signature   ------------------------------------------------------------------  Electronically signed by Kiersten Peter MD, [facility]  (Interpreting physician) on 07/28/2022 at 12:04 PM  ------------------------------------------------------------------   Findings   Left Ventricle  LV systolic function is normal with ejection fraction estimated at 55%. No regional wall motion abnormalities. There is mild concentric left ventricular hypertrophy. Grade I diastolic dysfunction with normal left ventricular filling pressure. Left ventricular cavity size is normal.   Mitral Valve  Mild mitral annular calcification. Mild thickening of the anterior leaflet of mitral valve. Trivial mitral regurgitation. Left Atrium  The left atrium is normal in size. Aortic Valve  Aortic valve leaflets appear mildly thickened. No aortic regurgitation. Aorta  The aortic root is normal in size. Right Ventricle  The right ventricle is normal in size and function. TAPSE = 28 mm. S' prime velocity is measured at 14.5 cm/sec. Tricuspid Valve  Tricuspid valve is structurally normal.  Trivial tricuspid regurgitation. Inadequate tricuspid regurgitation jet to estimate systolic artery pressure  (SPAP). Right Atrium  The right atrial size is normal.  Right atrial area is 17.5 cm2. Pulmonic Valve  The pulmonic valve is structurally normal.  No pulmonic regurgitation. Pericardial Effusion  No pericardial effusion noted. Pleural Effusion  No pleural effusion.    Miscellaneous  IVC is not well visualized. M-Mode/2D Measurements (cm)   LV Diastolic Dimension: 4.9 cm LV Systolic Dimension: 2.85 cm  LV Septum Diastolic: 0.61 cm  LV PW Diastolic: 6.45 cm       AO Root Dimension: 3.2 cm                                 AV Cusp Separation: 2.2 cm                                 LA Dimension: 3.4 cm                                 LA Area: 19.65 cm2                                 LA volume/Index: 60.33 ml /26 ml/m2  Doppler Measurements   AV Peak Velocity: 143 cm/s     MV Peak E-Wave: 82.4 cm/s  AV Peak Gradient: 8.18 mmHg    MV Peak A-Wave: 103 cm/s                                 MV E/A Ratio: 0.8   E' Septal Velocity: 8.58 cm/s  E' Lateral Velocity: 8.48 cm/s  E/E' ratio: 9.6  PV Peak Velocity: 111 cm/s  PV Peak Gradient: 4.93 mmHg   Aortic Valve   Peak Velocity: 143 cm/s  Peak Gradient: 8.18 mmHg   Cusp Separation: 2.2 cm  Aorta   Aortic Root: 3.2 cm      XR CHEST (2 VW)    Result Date: 7/27/2022  EXAMINATION: TWO XRAY VIEWS OF THE CHEST 7/27/2022 2:38 pm COMPARISON: None. HISTORY: ORDERING SYSTEM PROVIDED HISTORY: sob TECHNOLOGIST PROVIDED HISTORY: Reason for exam:->sob Reason for Exam: sob FINDINGS: The heart and pulmonary vascularity are within normal limits. There are no confluent infiltrates or pleural effusions. There is an ill-defined left suprahilar nodular density that may represent superimposition of tissues is this overlies the anterior left 1st rib. This likely represents prominence of the costochondral margin as it is similar to prior exams measuring back to 2007. There is complete separation of the left AC joint of indeterminate age. No acute cardiopulmonary abnormality. Complete separation of the left [email or web address] joint of indeterminate age      ED COURSE/MDM  Patient seen and evaluated. At presentation, patient was awake, alert, afebrile, hemodynamically stable, and satting well on room air. Patient placed on cardiac monitor for close observation.   Differential diagnosis includes ACS, arrhythmia, PE, COVID, pneumonia, others. She was given full dose aspirin in the ER. Stat EKG obtained which shows normal sinus rhythm. No acute ischemic changes seen. Chest x-ray shows no acute pathology. There was a complete separation of the left AC joint. Patient reports this is from an old injury. Denies any new injuries. INR obtained and is 2.07. As INR within therapeutic range, low concern for PE at this time. Do not think CT is indicated. Troponin is negative. Liver enzymes within her limits. Hospitalist consulted for admission for HEART score 5, further chest pain work up. Admit. Is this patient to be included in the SEP-1 Core Measure due to severe sepsis or septic shock? No   Exclusion criteria - the patient is NOT to be included for SEP-1 Core Measure due to:  2+ SIRS criteria are not met     Pt was seen during the COVID 19 pandemic. Appropriate PPE worn by ME during patient encounters. Patient was cared for during a time with constrained hospital bed capacity with nationwide stress on resources and staffing.       During the patient's ED course, the patient was given:  Medications   citalopram (CELEXA) tablet 40 mg (40 mg Oral Given 7/28/22 1022)   fenofibrate (TRIGLIDE) tablet 160 mg (160 mg Oral Given 7/28/22 1022)   ferrous sulfate (IRON 325) tablet 325 mg (325 mg Oral Given 7/28/22 1022)   losartan (COZAAR) tablet 50 mg (50 mg Oral Given 7/28/22 1022)   therapeutic multivitamin-minerals 1 tablet (1 tablet Oral Given 7/28/22 1022)   pantoprazole (PROTONIX) tablet 20 mg (20 mg Oral Given 7/28/22 0557)   topiramate (TOPAMAX) tablet 100 mg (100 mg Oral Given 7/27/22 1506)   glucose chewable tablet 16 g (has no administration in time range)   dextrose bolus 10% 125 mL (has no administration in time range)     Or   dextrose bolus 10% 250 mL (has no administration in time range)   glucagon (rDNA) injection 1 mg (has no administration in time range)   dextrose 10 % infusion (has no administration in time range)   sodium chloride flush 0.9 % injection 5-40 mL ( IntraVENous Canceled Entry 7/28/22 7175)   sodium chloride flush 0.9 % injection 10 mL (has no administration in time range)   0.9 % sodium chloride infusion (has no administration in time range)   ondansetron (ZOFRAN-ODT) disintegrating tablet 4 mg (has no administration in time range)     Or   ondansetron (ZOFRAN) injection 4 mg (has no administration in time range)   acetaminophen (TYLENOL) tablet 650 mg (has no administration in time range)     Or   acetaminophen (TYLENOL) suppository 650 mg (has no administration in time range)   polyethylene glycol (GLYCOLAX) packet 17 g (has no administration in time range)   aspirin chewable tablet 81 mg (81 mg Oral Given 7/28/22 1022)   potassium chloride (KLOR-CON M) extended release tablet 40 mEq (has no administration in time range)     Or   potassium bicarb-citric acid (EFFER-K) effervescent tablet 40 mEq (has no administration in time range)     Or   potassium chloride 10 mEq/100 mL IVPB (Peripheral Line) (has no administration in time range)   magnesium sulfate 1000 mg in dextrose 5% 100 mL IVPB (has no administration in time range)   nitroGLYCERIN (NITROSTAT) SL tablet 0.4 mg (has no administration in time range)   0.9 % sodium chloride infusion ( IntraVENous New Bag 7/28/22 0590)   perflutren lipid microspheres (DEFINITY) injection 1.65 mg (has no administration in time range)   atorvastatin (LIPITOR) tablet 40 mg (40 mg Oral Given 7/27/22 2146)   insulin lispro (HUMALOG) injection vial 0-8 Units (2 Units SubCUTAneous Given 7/28/22 1127)   insulin lispro (HUMALOG) injection vial 0-4 Units (0 Units SubCUTAneous Not Given 7/27/22 2142)   warfarin placeholder: dosing by pharmacy (has no administration in time range)   warfarin (COUMADIN) tablet 11 mg (has no administration in time range)   aspirin chewable tablet 324 mg (324 mg Oral Given 7/27/22 1513)   regadenoson (LEXISCAN) injection 0.4 mg (0.4 mg IntraVENous Given 7/28/22 0740)   warfarin (COUMADIN) tablet 11 mg (11 mg Oral Given 7/27/22 1914)   technetium tetrofosmin (Tc-MYOVIEW) injection 61.0 millicurie (44.5 millicuries IntraVENous Given 7/28/22 0740)        CLINICAL IMPRESSION  1. Chest pain, unspecified type    2. Shortness of breath        Blood pressure 103/73, pulse 83, temperature 97.6 °F (36.4 °C), temperature source Oral, resp. rate 18, height 5' 5\" (1.651 m), weight 296 lb 1.6 oz (134.3 kg), last menstrual period 08/20/2014, SpO2 97 %. Omer Ambriz was admitted to the hospital.     Patient was given scripts for the following medications. I counseled patient how to take these medications. Current Discharge Medication List          Follow-up with:  No follow-up provider specified. DISCLAIMER: This chart was created using Dragon dictation software. Efforts were made by me to ensure accuracy, however some errors may be present due to limitations of this technology and occasionally words are not transcribed correctly.         Barrera Taveras MD  07/28/22 7996

## 2022-07-27 NOTE — ED NOTES
1546- Perfect served hospitalist  Dina [id number] returned call, spoke to Dr. Ramses Sanford  07/27/22 [address] [place] [address] [place]  07/27/22 0437

## 2022-07-27 NOTE — ED NOTES
Patient resting in bed, sister at bedside, updated on admission. Patient has no needs at this time. Will continue to monitor.      Luis Felipe Hidalgo RN  07/27/22 1757

## 2022-07-27 NOTE — FLOWSHEET NOTE
07/27/22 1801   Vital Signs   Temp 97.3 °F (36.3 °C)   Temp Source Oral   Heart Rate 92   Heart Rate Source Monitor   Resp 16   BP (!) 141/77   BP Location Left lower arm   BP Method Automatic   MAP (Calculated) 98.33   Patient Position Sitting   Level of Consciousness Alert (0)   MEWS Score 1   Oxygen Therapy   SpO2 94 %   Pulse Oximetry Type Intermittent   Pulse Oximeter Device Mode Intermittent   Pulse Oximeter Device Location Finger   O2 Device None (Room air)   Patient admitted to PCU. Denies any current chest pain. Orders released. Patient NSR on tele, confirmed with CMU. Patient is able to demonstrate the ability to move from a reclining position to an upright position within the recliner. Patient denies any skin issues and refuses 4 eyes at this time.

## 2022-07-27 NOTE — H&P
Hospital Medicine History & Physical      PCP: Georgette Summers MD    Date of Admission: 2022    Date of Service: Pt seen/examined on 2022      Chief Complaint:    Chief Complaint   Patient presents with    Shortness of Breath     Increased sob for a few days with some chest heaviness. Hx fo PE's. History Of Present Illness: The patient is a 65 y.o. female with DMII, HTN, HLD, head tremor, hx of PE, ELEAZAR and depression who presented to [address] ED with complaint of chest pain that has been present x 1 day. This was preceded by 2 days of fatigue, increased SOB and feeling of being \"hotter than usual\". Chest pain is described more as pressure and is left sided and does not radiate. Non-pleuritic in nature. Aggravated by nothing. Alleviated by nothing. Has been intermittent since onset and is not present currently. She does have remote history of pain like this in the past and had a negative cardiac work up in the past she states was over 10 years ago. Denies any URI symptoms, cough, recent new exercises or heavy lifting, abd pain, n/v/d. She does have history of PE and is on coumadin for this. Cardiac risk factors:   HTN: Yes  HLD: Yes  DM: Yes  Body mass index is 49.92 kg/m². Tobacco abuse: Never  First degree family or personal history of CAD; no CAD. Father who  from ruptured aorta. Maternal GM had 6 heart attacks. The patient denies any known history of connective tissue disease or aneurysms. The patient denies any recent surgery, hospitalization, immobilization, long car/plane trip, active malignancy, long bone fracture or oral estrogen use.       Past Medical History:        Diagnosis Date    Benign essential hypertension 2017    Benign head tremor 2014    Cholelithiasis     Disease of nasal cavity and sinuses     Fatty infiltration of liver     Hot flash, menopausal 2015    Hyperlipidemia     Hyperlipidemia associated with type 2 diabetes mellitus ([address] 10/23/2015    Morbid obesity with BMI of 50.0-59.9, adult ([facility] [address] 10/23/2015    No history of procedure 11/9/2015    no past colonoscopy    Pulmonary embolism ([facility]     Type II or unspecified type diabetes mellitus without mention of complication, not stated as uncontrolled     Umbilical hernia 21/3/8314    Unspecified sleep apnea        Past Surgical History:        Procedure Laterality Date    BREAST SURGERY  2015    CHOLECYSTECTOMY, LAPAROSCOPIC  8/2/2012    COLONOSCOPY  11/9/2015    cecal polyp    DILATION AND CURETTAGE OF UTERUS  03/13/2018    HERNIA REPAIR  72/30/69    lap umbilical    HYSTEROSCOPY  03/13/2018    and D&C       Medications Prior to Admission:    Prior to Admission medications    Medication Sig Start Date End Date Taking?  Authorizing Provider   FEROSUL 325 (65 Fe) MG tablet TAKE 1 TABLET BY MOUTH EVERY MORNING 6/16/22   Dennis Eddy MD   Icosapent Ethyl (VASCEPA) 1 g CAPS capsule TAKE 2 CAPSULES BY MOUTH 2 TIMES A DAY 6/15/22   Dennis Eddy MD   topiramate (TOPAMAX) 100 MG tablet TAKE ONE TABLET BY MOUTH EVERY NIGHT 6/15/22   Dennis Eddy MD   warfarin (COUMADIN) 5 MG tablet TAKE TWO (2) TABLETS BY MOUTH DAILY OR AS DIRECTED BY YOUR DOCTOR 6/2/22   Dennis Eddy MD   citalopram (CELEXA) 40 MG tablet TAKE 1 TABLET BY MOUTH  DAILY 5/31/22   Meaghan Bond MD   losartan (COZAAR) 50 mg tablet TAKE 1 TABLET BY MOUTH  DAILY 5/31/22   Meaghan Bond MD   pantoprazole (PROTONIX) 20 MG tablet TAKE 1 TABLET BY MOUTH  DAILY 5/31/22   Meaghan Bond MD   fenofibric acid (TRILIPIX) 135 MG CPDR capsule TAKE 1 CAPSULE BY MOUTH  DAILY 5/31/22   Meaghan Bond MD   JANUMET XR  MG TB24 per extended release tablet TAKE 1 TABLET BY MOUTH  TWICE DAILY 5/12/22   Dennis Eddy MD   JARDIANCE 25 MG tablet TAKE 1 TABLET BY MOUTH  DAILY 5/12/22   Dennis Eddy MD   Multiple Vitamins-Minerals (THERAPEUTIC MULTIVITAMIN-MINERALS) tablet Take 1 tablet by mouth daily    Historical Provider, MD   Ascorbic Acid (VITAMIN C PO) Take by mouth    Historical Provider, MD   warfarin (COUMADIN) 1 MG tablet TAKE AS DIRECTED BY PHYSICIAN 4/13/22   Court Rm MD   CONTOUR NEXT TEST strip TEST 3 TIMES DAILY 12/14/21   Court Rm MD   Microlet Lancets MISC TEST 3 TIMES DAILY 12/14/21   Court Rm MD   Blood Glucose Monitoring Suppl (ONE TOUCH ULTRA 2) w/Device KIT Test three times daily. DX:E11.9 10/7/21   Court Rm MD   blood glucose test strips (ASCENSIA AUTODISC VI;ONE TOUCH ULTRA TEST VI) strip Test three times daily. DX:E11.9 10/7/21   Court Rm MD   Lancets MISC Test three times daily. DX:E11.9 10/7/21   Court Rm MD   ONE TOUCH LANCETS MISC Test daily. DX: E11.9 1/14/20   Court Rm MD   blood glucose test strips (ASCENSIA AUTODISC VI;ONE TOUCH ULTRA TEST VI) strip Test Daily. DX: E11.9 1/14/20   Court Rm MD   Omega-3 Fatty Acids (FISH OIL) 1000 MG CPDR Take 2,000 mg by mouth daily    Historical Provider, MD   PRODIGY NO CODING BLOOD GLUC strip TEST BLOOD SUGARS 3 TIMES DAILY 3/15/18   Court Rm MD   ONE TOUCH LANCETS MISC 1 each by Does not apply route daily. 5/14/14   Court Rm MD       Allergies:  Lisinopril, Penicillins, and Sulfa antibiotics    Social History:  The patient currently lives at home    TOBACCO:   reports that she has never smoked. She has never used smokeless tobacco.  ETOH:   reports no history of alcohol use.       Family History:   Positive as follows:        Problem Relation Age of Onset    Hypertension Mother     Diabetes Mother     Heart Failure Father     Other Father         AAA    High Blood Pressure Sister     Diabetes Sister     High Blood Pressure Brother     Heart Attack Brother     Diabetes Brother        REVIEW OF SYSTEMS:       Constitutional: Negative for fever + \"feeling hotter than usual\"  HENT: Negative for sore throat   Eyes: Negative for redness   Respiratory: Negative for cough, + SOB  Cardiovascular: + chest pain   Gastrointestinal: Negative for vomiting, diarrhea   Genitourinary: Negative for hematuria   Musculoskeletal: Negative for arthralgias   Skin: Negative for rash   Neurological: Negative for syncope   Hematological: Negative for adenopathy   Psychiatric/Behavorial: Negative for anxiety    PHYSICAL EXAM:    BP (!) 141/77   Pulse 92   Temp 97.3 °F (36.3 °C) (Oral)   Resp 16   Ht 5' 5\" (1.651 m)   Wt 300 lb (136.1 kg)   LMP 08/20/2014   SpO2 94%   BMI 49.92 kg/m²     Gen: No distress. Alert. Obese. Very pleasant. Eyes: PERRL. No sclera icterus. No conjunctival injection. ENT: No discharge. Pharynx clear. Neck: No JVD. Trachea midline. Resp: No accessory muscle use. No crackles. No wheezes. No rhonchi. CV: Regular rate. Regular rhythm. No murmur. No rub. No edema. Capillary Refill: Brisk,< 3 seconds   Peripheral Pulses: +2 palpable, equal bilaterally   GI: Non-tender. Non-distended. No masses. No organomegaly. Normal bowel sounds. No hernia. Skin: Warm and dry. No nodule on exposed extremities. No rash on exposed extremities. M/S: No cyanosis. No joint deformity. No clubbing. Neuro: Awake. Grossly nonfocal    Psych: Oriented x 3. No anxiety or agitation.      CBC:   Recent Labs     07/27/22  1409   WBC 4.8   HGB 15.1   HCT 43.6   MCV 89.7        BMP:   Recent Labs     07/27/22  1409      K 4.0      CO2 23   BUN 18   CREATININE 0.6     LIVER PROFILE:   Recent Labs     07/27/22  1409   AST 31   ALT 16   BILITOT 0.3   ALKPHOS 86     PT/INR:   Recent Labs     07/27/22  1409   PROTIME 23.2*   INR 2.07*       CARDIAC ENZYMES  Recent Labs     07/27/22  1409   TROPONINI <0.01       CULTURES  Covid not detected    EKG:  I have reviewed the EKG with the following interpretation:   Normal sinus rhythm  Normal ECG  When compared with ECG of 07-AUG-2019 06:49,  No significant change was found  Confirmed by ELISHA ANDERSON MD ([id number]) on 2022 6:28:01 PM    RADIOLOGY  XR CHEST (2 VW)   Final Result   No acute cardiopulmonary abnormality. Complete separation of the left [facility] joint of indeterminate age         NM Cardiac Stress Test Nuclear Imaging    (Results Pending)        Pertinent previous results reviewed   ECHO 11  Summary-   1. LV ejection fraction is estimated to be 55-60%. 2.  Mild tricuspid regurgitation. ASSESSMENT/PLAN:  #Chest pain  -Left sided CP/pressure with associated SOB  -Cardiac risk factors:   HTN: Yes  HLD: Yes  DM: Yes  Body mass index is 49.92 kg/m². Tobacco abuse: Never  First degree family or personal history of CAD; no CAD. Father who  from ruptured aorta. Maternal GM had 6 heart attacks.   -Serial trops; initial neg  -EKG without signs of ischemia  -ASA and statin  -Pharm stress ordered; patient cannot walk on treadmill  -Patient is prepared for the possibility of 2 day stress  -ECHO ordered    #DMII  -BG controlled  -home regimen held  -med SSI  -cc diet    #HTN  -BP initially elevated but now stable  -continue losartan    #HLD  -High triglycerides  -continue fibrate  -vascepa is not carried here    #Head tremor  -continue topamax    #Hx of PE  -on coumadin; pharm to dose  -INR therapeutic    #ELEAZAR  -continue home CPAP    #Depression  -continue celexa    DVT Prophylaxis: Coumadin  Diet: ADULT DIET; Regular; 4 carb choices (60 gm/meal);  No Caffeine  Diet NPO  Code Status: Full Code    Smiley Loya PA-C  2022 6:55 PM

## 2022-07-27 NOTE — ED NOTES
Report to [facility] RN on PCU, no questions at this time.      Gina Bernal RN  07/27/22 4783

## 2022-07-27 NOTE — PLAN OF CARE
Admit to PCU obs    CP rule out. Pharm stress ordered. Possible 2 day given body habitus. ECHO ordered.     Carisa Lundberg PA-C  7/27/2022 4:25 PM

## 2022-07-27 NOTE — ED TRIAGE NOTES
Chief Complaint   Patient presents with    Shortness of Breath     Increased sob for a few days with some chest heaviness. Payal newton PE's.

## 2022-07-27 NOTE — TELEPHONE ENCOUNTER
----- Message from Ezekiel Yip MD sent at 7/27/2022  1:00 PM EDT -----  Contact: [phone] (H)  Go to ER  ----- Message -----  From: Mcihael Moss MA  Sent: 7/27/2022  10:32 AM EDT  To: Ezekiel Yip MD    Pt called today said she is sob and very fatigued said she has a history of PE  started felling tired on tues and it is progressively getting worse pleased advise she has no other symptoms             [address] [place] [address] [phone] - F [phone] (Ph: [phone])

## 2022-07-28 ENCOUNTER — APPOINTMENT (OUTPATIENT)
Dept: NUCLEAR MEDICINE | Age: 62
End: 2022-07-28
Payer: COMMERCIAL

## 2022-07-28 LAB
ANION GAP SERPL CALCULATED.3IONS-SCNC: 12 MMOL/L (ref 3–16)
BUN BLDV-MCNC: 19 MG/DL (ref 7–20)
CALCIUM SERPL-MCNC: 8.8 MG/DL (ref 8.3–10.6)
CHLORIDE BLD-SCNC: 103 MMOL/L (ref 99–110)
CHOLESTEROL, TOTAL: 190 MG/DL (ref 0–199)
CO2: 21 MMOL/L (ref 21–32)
CREAT SERPL-MCNC: 0.6 MG/DL (ref 0.6–1.2)
EKG ATRIAL RATE: 74 BPM
EKG DIAGNOSIS: NORMAL
EKG P AXIS: 32 DEGREES
EKG P-R INTERVAL: 126 MS
EKG Q-T INTERVAL: 416 MS
EKG QRS DURATION: 74 MS
EKG QTC CALCULATION (BAZETT): 461 MS
EKG R AXIS: 52 DEGREES
EKG T AXIS: 58 DEGREES
EKG VENTRICULAR RATE: 74 BPM
GFR AFRICAN AMERICAN: >60
GFR NON-AFRICAN AMERICAN: >60
GLUCOSE BLD-MCNC: 198 MG/DL (ref 70–99)
GLUCOSE BLD-MCNC: 207 MG/DL (ref 70–99)
GLUCOSE BLD-MCNC: 209 MG/DL (ref 70–99)
GLUCOSE BLD-MCNC: 212 MG/DL (ref 70–99)
GLUCOSE BLD-MCNC: 247 MG/DL (ref 70–99)
HCT VFR BLD CALC: 40 % (ref 36–48)
HDLC SERPL-MCNC: 20 MG/DL (ref 40–60)
HEMOGLOBIN: 14.2 G/DL (ref 12–16)
INR BLD: 1.92 (ref 0.87–1.14)
LDL CHOLESTEROL CALCULATED: ABNORMAL MG/DL
LV EF: 55 %
LV EF: 66 %
LVEF MODALITY: NORMAL
LVEF MODALITY: NORMAL
MCH RBC QN AUTO: 32.4 PG (ref 26–34)
MCHC RBC AUTO-ENTMCNC: 35.5 G/DL (ref 31–36)
MCV RBC AUTO: 91.5 FL (ref 80–100)
PDW BLD-RTO: 14.7 % (ref 12.4–15.4)
PERFORMED ON: ABNORMAL
PLATELET # BLD: 164 K/UL (ref 135–450)
PMV BLD AUTO: 8 FL (ref 5–10.5)
POTASSIUM REFLEX MAGNESIUM: 3.8 MMOL/L (ref 3.5–5.1)
PROTHROMBIN TIME: 21.8 SEC (ref 11.7–14.5)
RBC # BLD: 4.38 M/UL (ref 4–5.2)
SODIUM BLD-SCNC: 136 MMOL/L (ref 136–145)
TRIGL SERPL-MCNC: 1413 MG/DL (ref 0–150)
VLDLC SERPL CALC-MCNC: ABNORMAL MG/DL
WBC # BLD: 5 K/UL (ref 4–11)

## 2022-07-28 PROCEDURE — 6370000000 HC RX 637 (ALT 250 FOR IP)

## 2022-07-28 PROCEDURE — 99225 PR SBSQ OBSERVATION CARE/DAY 25 MINUTES: CPT | Performed by: INTERNAL MEDICINE

## 2022-07-28 PROCEDURE — 93306 TTE W/DOPPLER COMPLETE: CPT

## 2022-07-28 PROCEDURE — 6360000002 HC RX W HCPCS

## 2022-07-28 PROCEDURE — 80048 BASIC METABOLIC PNL TOTAL CA: CPT

## 2022-07-28 PROCEDURE — 3430000000 HC RX DIAGNOSTIC RADIOPHARMACEUTICAL

## 2022-07-28 PROCEDURE — 85610 PROTHROMBIN TIME: CPT

## 2022-07-28 PROCEDURE — 93010 ELECTROCARDIOGRAM REPORT: CPT | Performed by: INTERNAL MEDICINE

## 2022-07-28 PROCEDURE — A9502 TC99M TETROFOSMIN: HCPCS

## 2022-07-28 PROCEDURE — 80061 LIPID PANEL: CPT

## 2022-07-28 PROCEDURE — 93005 ELECTROCARDIOGRAM TRACING: CPT

## 2022-07-28 PROCEDURE — 93017 CV STRESS TEST TRACING ONLY: CPT

## 2022-07-28 PROCEDURE — 83721 ASSAY OF BLOOD LIPOPROTEIN: CPT

## 2022-07-28 PROCEDURE — G0378 HOSPITAL OBSERVATION PER HR: HCPCS

## 2022-07-28 PROCEDURE — 6370000000 HC RX 637 (ALT 250 FOR IP): Performed by: INTERNAL MEDICINE

## 2022-07-28 PROCEDURE — 36415 COLL VENOUS BLD VENIPUNCTURE: CPT

## 2022-07-28 PROCEDURE — 2580000003 HC RX 258

## 2022-07-28 PROCEDURE — 78452 HT MUSCLE IMAGE SPECT MULT: CPT

## 2022-07-28 PROCEDURE — 85027 COMPLETE CBC AUTOMATED: CPT

## 2022-07-28 RX ADMIN — TOPIRAMATE 100 MG: 100 TABLET, FILM COATED ORAL at 20:46

## 2022-07-28 RX ADMIN — WARFARIN SODIUM 11 MG: 1 TABLET ORAL at 19:23

## 2022-07-28 RX ADMIN — CITALOPRAM HYDROBROMIDE 40 MG: 20 TABLET ORAL at 10:22

## 2022-07-28 RX ADMIN — ATORVASTATIN CALCIUM 40 MG: 40 TABLET, FILM COATED ORAL at 20:46

## 2022-07-28 RX ADMIN — TETROFOSMIN 34.7 MILLICURIE: 1.38 INJECTION, POWDER, LYOPHILIZED, FOR SOLUTION INTRAVENOUS at 07:40

## 2022-07-28 RX ADMIN — Medication 10 ML: at 20:46

## 2022-07-28 RX ADMIN — ASPIRIN 81 MG: 81 TABLET, CHEWABLE ORAL at 10:22

## 2022-07-28 RX ADMIN — REGADENOSON 0.4 MG: 0.08 INJECTION, SOLUTION INTRAVENOUS at 07:40

## 2022-07-28 RX ADMIN — FERROUS SULFATE TAB 325 MG (65 MG ELEMENTAL FE) 325 MG: 325 (65 FE) TAB at 10:22

## 2022-07-28 RX ADMIN — SODIUM CHLORIDE: 9 INJECTION, SOLUTION INTRAVENOUS at 05:59

## 2022-07-28 RX ADMIN — PANTOPRAZOLE SODIUM 20 MG: 20 TABLET, DELAYED RELEASE ORAL at 05:57

## 2022-07-28 RX ADMIN — INSULIN LISPRO 2 UNITS: 100 INJECTION, SOLUTION INTRAVENOUS; SUBCUTANEOUS at 11:27

## 2022-07-28 RX ADMIN — FENOFIBRATE 160 MG: 160 TABLET ORAL at 10:22

## 2022-07-28 RX ADMIN — MULTIPLE VITAMINS W/ MINERALS TAB 1 TABLET: TAB at 10:22

## 2022-07-28 RX ADMIN — LOSARTAN POTASSIUM 50 MG: 25 TABLET, FILM COATED ORAL at 10:22

## 2022-07-28 RX ADMIN — INSULIN LISPRO 2 UNITS: 100 INJECTION, SOLUTION INTRAVENOUS; SUBCUTANEOUS at 16:40

## 2022-07-28 NOTE — ACP (ADVANCE CARE PLANNING)
Advance Care Planning     General Advance Care Planning (ACP) Conversation    Date of Conversation: 7/27/2022  Conducted with: Patient with Decision Making Capacity    Healthcare Decision Maker:    Primary Decision Maker: Brie Motta - Brother/Sister - [phone]  Click here to complete Healthcare Decision Makers including selection of the Healthcare Decision Maker Relationship (ie \"Primary\"). Today we documented Decision Maker(s) consistent with Legal Next of Kin hierarchy.     Content/Action Overview:  Has NO ACP documents/care preferences - information provided, considering goals and options  Reviewed DNR/DNI and patient elects Full Code (Attempt Resuscitation)  Referal to Pastoral for ACP dox    Length of Voluntary ACP Conversation in minutes:  <16 minutes (Non-Billable)    Francisco J Harrington RN

## 2022-07-28 NOTE — PROGRESS NOTES
A Lexiscan myoview stress test was completed on this patient as ordered. The patient tolerated the procedure well. Awaiting stress imaging at this time. W/c alarm in place per protocol.

## 2022-07-28 NOTE — PROGRESS NOTES
Patient resting in bed, back from stress test. Second part will be done tomorrow. Lungs decreased. BS+. Diet re-ordered and patient provided a tray. No acute distress noted. Call light in reach. Will continue to monitor.

## 2022-07-28 NOTE — PROGRESS NOTES
Pharmacy Note  Warfarin Consult    Dx: Hx of PE  Goal INR range 2-3   Home Warfarin dose:11 mg daily    Date  INR  Warfarin  7/27   2.02  11mg  7/28                1.92     11mg    Recommend Warfarin 11 mg tonight x1. Daily INR ordered. Rx will continue to manage therapy per consult order.     Benny Dacosta Pharm D 0/41/30435:27 PM  .

## 2022-07-28 NOTE — PROGRESS NOTES
Patient's IVF off since back from stress test. Patient eating and drinking very well with every meal and does not want IVF hooked back up at this time. Sister at bedside. Report given to [facility] for transfer of care.

## 2022-07-28 NOTE — FLOWSHEET NOTE
07/27/22 2139   Vital Signs   Temp 98.1 °F (36.7 °C)   Temp Source Oral   Heart Rate 92   Heart Rate Source Monitor   Resp 16   /77   BP Location Left upper arm   BP Method Automatic   MAP (Calculated) 97.33   Patient Position Semi fowlers   Level of Consciousness Alert (0)   MEWS Score 1   Pain Assessment   Pain Assessment None - Denies Pain   Oxygen Therapy   SpO2 94 %   O2 Device None (Room air)     Admission assessment complete. Denies chest pain/discomfort at this time. Will continue to monitor Resting in bed, watching TV with call light in reach.  Arelia Bloch, RN

## 2022-07-28 NOTE — CARE COORDINATION
Case Management Assessment  Initial Evaluation      Patient Name: Melani Patel  YOB: 1960  Diagnosis: Shortness of breath [R06.02]  Chest pain, rule out acute myocardial infarction [R07.9]  Chest pain, unspecified type [R07.9]  Date / Time: 7/27/2022  2:01 PM    Admission status/Date:OBS  Chart Reviewed: Yes      Patient Interviewed: Yes   Family Interviewed:  No      Hospitalization in the last 30 days:  No      Health Care Decision Maker :   Primary Decision Maker: Charleen Cruz - Brother/Sister - [phone]    (CM - must 1st enter selection under Navigator - emergency contact- [address] Relationship and pick relationship)   Who do you trust or have selected to make healthcare decisions for you    Current PCP: [address] [place] [address] (Commercial)  Precert required for SNF : YES         3 night stay required - NA    ADLS  Support Systems/Care Needs: Family Members, Friends/Neighbors  Transportation: self    Meal Preparation: self    Housing  Living Arrangements: lives alone, IPTA  Steps: ramp  Intent for return to present living arrangements: Yes  Identified Issues: NA    [address] : No Agency:(Services)  Type of Home Care Services: None  Passport/Waiver : No  :                      Phone Number:    Passport/Waiver Services: NA          Durable Medical Equiptment   DME Provider:   Equipment:   Walker___Cane___RTS___ BSC___Shower Chair___Hospital Bed___W/C____Other________  02 at ____Liter(s)---wears(frequency)_______ HHN ___ CPAP_X__ BiPap___   N/A____      Home O2 Use :  No      Informed of need for care provider to bring portable home O2 tank on day of discharge for nursing to connect prior to leaving:   Not Indicated  Verbalized agreement/Understanding:   Not Indicated    Community Service Affiliation  Dialysis:  No    Agency:  Location:  Dialysis Schedule:  Phone:   Fax:     Other Community Services: (ex:PT/OT,Mental Health,Wound Clinic, Cardio/Pul [address])    DISCHARGE PLAN: Explained Case Management role/services. Day 1 of 2 day stress GXT. Chart reviewed. Met with pt at bedside and explained the role of the CM. IPTA. Plans to return home. Likely no needs. CM following from periphery.

## 2022-07-28 NOTE — PROGRESS NOTES
Hospitalist Progress Note      PCP: Uday Aguilar MD    Date of Admission: 7/27/2022    Chief Complaint: chest pain        Subjective:     Ongoing mild chest pressure. No dyspnea at rest.     2 day stress underway. Medications:  Reviewed    Infusion Medications    dextrose      sodium chloride      sodium chloride 75 mL/hr at 07/28/22 0559     Scheduled Medications    warfarin  11 mg Oral Once    citalopram  40 mg Oral Daily    fenofibrate  160 mg Oral Daily    ferrous sulfate  325 mg Oral Daily with breakfast    losartan  50 mg Oral Daily    therapeutic multivitamin-minerals  1 tablet Oral Daily    pantoprazole  20 mg Oral QAM AC    topiramate  100 mg Oral Nightly    sodium chloride flush  5-40 mL IntraVENous 2 times per day    aspirin  81 mg Oral Daily    atorvastatin  40 mg Oral Nightly    insulin lispro  0-8 Units SubCUTAneous TID WC    insulin lispro  0-4 Units SubCUTAneous Nightly    warfarin placeholder: dosing by pharmacy   Other RX Placeholder     PRN Meds: glucose, dextrose bolus **OR** dextrose bolus, glucagon (rDNA), dextrose, sodium chloride flush, sodium chloride, ondansetron **OR** ondansetron, acetaminophen **OR** acetaminophen, polyethylene glycol, potassium chloride **OR** potassium alternative oral replacement **OR** potassium chloride, magnesium sulfate, nitroGLYCERIN, perflutren lipid microspheres      Intake/Output Summary (Last 24 hours) at 7/28/2022 1418  Last data filed at 7/28/2022 1217  Gross per 24 hour   Intake 1298.79 ml   Output --   Net 1298.79 ml       Physical Exam Performed:    /73   Pulse 83   Temp 97.6 °F (36.4 °C) (Oral)   Resp 18   Ht 5' 5\" (1.651 m)   Wt 296 lb 1.6 oz (134.3 kg)   LMP 08/20/2014   SpO2 97%   BMI 49.27 kg/m²     General appearance: No apparent distress, appears stated age and cooperative. HEENT: Pupils equal, round, and reactive to light. Conjunctivae/corneas clear. Neck: Supple, with full range of motion.  No jugular venous distention. Trachea midline. Respiratory:  Normal respiratory effort. Clear to auscultation, bilaterally without Rales/Wheezes/Rhonchi. Cardiovascular: Regular rate and rhythm with normal S1/S2 without murmurs, rubs or gallops. Abdomen: Soft, non-tender, non-distended with normal bowel sounds. Musculoskeletal: No clubbing, cyanosis or edema bilaterally. Full range of motion without deformity. Skin: Skin color, texture, turgor normal.  No rashes or lesions. Neurologic:  Neurovascularly intact without any focal sensory/motor deficits. Cranial nerves: II-XII intact, grossly non-focal.  Psychiatric: Alert and oriented, thought content appropriate, normal insight  Capillary Refill: Brisk,3 seconds, normal   Peripheral Pulses: +2 palpable, equal bilaterally       Labs:   Recent Labs     07/27/22  1409 07/28/22  0500   WBC 4.8 5.0   HGB 15.1 14.2   HCT 43.6 40.0    164     Recent Labs     07/27/22  1409 07/28/22  0500    136   K 4.0 3.8    103   CO2 23 21   BUN 18 19   CREATININE 0.6 0.6   CALCIUM 10.1 8.8     Recent Labs     07/27/22  1409   AST 31   ALT 16   BILITOT 0.3   ALKPHOS 86     Recent Labs     07/27/22  1409 07/28/22  1242   INR 2.07* 1.92*     Recent Labs     07/27/22  1409 07/27/22  1827 07/27/22  2121   TROPONINI <0.01 <0.01 <0.01       Urinalysis:      Lab Results   Component Value Date/Time    NITRU Negative 08/20/2020 10:23 AM    WBCUA 0-2 08/07/2019 06:15 AM    BACTERIA Rare 09/13/2013 05:45 PM    RBCUA 10-20 08/07/2019 06:15 AM    BLOODU neg 10/07/2021 08:28 AM    BLOODU Negative 08/20/2020 10:23 AM    SPECGRAV 1.020 08/20/2020 10:23 AM    GLUCOSEU large 10/07/2021 08:28 AM    GLUCOSEU >=1000 08/20/2020 10:23 AM    GLUCOSEU Neg 06/10/2011 08:35 PM       Radiology:  XR CHEST (2 VW)   Final Result   No acute cardiopulmonary abnormality.       Complete separation of the left [facility] joint of indeterminate age         NM Cardiac Stress Test Nuclear Imaging    (Results Pending) Assessment/Plan:    Active Hospital Problems    Diagnosis     Chest pain, rule out acute myocardial infarction [R07.9]      Priority: Medium    Shortness of breath [R06.02]      Priority: Medium    Hypertension [I10]      Priority: Medium    Type 2 diabetes mellitus without complication, without long-term current use of insulin (HCC) [E11.9]      Priority: Medium    Hyperlipidemia [E78.5]      #Chest pain  -Left sided CP/pressure with associated SOB  -Cardiac risk factors:  HTN: Yes  HLD: Yes  DM: Yes  Body mass index is 49.92 kg/m². Tobacco abuse: Never  First degree family or personal history of CAD; no CAD. Father who  from ruptured aorta. Maternal GM had 6 heart attacks.  -Serial trops - negative  -EKG without signs of ischemia  -ASA and statin  -Pharm stress ordered; patient cannot walk on treadmill  - 2 day stress  -ECHO ordered       #DMII  -BG controlled  -home regimen held  -med SSI  -cc diet       #HTN  -BP initially elevated but now stable  -continue losartan       #HLD  -High triglycerides  -continue fibrate  -vascepa is not carried here       #Head tremor  -continue topamax       #Hx of PE  -on coumadin; pharm to dose  -INR therapeutic       #ELEAZAR  -continue home CPAP       #Depression  -continue celexa       DVT Prophylaxis: Coumadin  Diet: ADULT DIET; Regular; 4 carb choices (60 gm/meal);  No Caffeine  Diet NPO  Code Status: Full Code        Leatha Moijca MD

## 2022-07-28 NOTE — PROGRESS NOTES
Shift report given to Alexis Adams RN at this time. Patient stable. Care transferred at this time.  [facility], RN

## 2022-07-29 ENCOUNTER — APPOINTMENT (OUTPATIENT)
Dept: NUCLEAR MEDICINE | Age: 62
End: 2022-07-29
Payer: COMMERCIAL

## 2022-07-29 ENCOUNTER — HOSPITAL ENCOUNTER (OUTPATIENT)
Dept: CARDIAC CATH/INVASIVE PROCEDURES | Age: 62
Discharge: HOME | End: 2022-07-29
Attending: INTERNAL MEDICINE | Admitting: INTERNAL MEDICINE
Payer: COMMERCIAL

## 2022-07-29 VITALS
BODY MASS INDEX: 48.82 KG/M2 | TEMPERATURE: 97.9 F | WEIGHT: 293 LBS | HEIGHT: 65 IN | SYSTOLIC BLOOD PRESSURE: 147 MMHG | HEART RATE: 79 BPM | OXYGEN SATURATION: 96 % | DIASTOLIC BLOOD PRESSURE: 69 MMHG | RESPIRATION RATE: 18 BRPM

## 2022-07-29 VITALS
TEMPERATURE: 97.9 F | HEART RATE: 68 BPM | RESPIRATION RATE: 14 BRPM | DIASTOLIC BLOOD PRESSURE: 68 MMHG | OXYGEN SATURATION: 98 % | SYSTOLIC BLOOD PRESSURE: 129 MMHG

## 2022-07-29 PROBLEM — I20.0 UNSTABLE ANGINA (HCC): Status: ACTIVE | Noted: 2022-07-29

## 2022-07-29 PROBLEM — R94.39 ABNORMAL STRESS TEST: Status: ACTIVE | Noted: 2022-07-29

## 2022-07-29 PROBLEM — R93.1 ABNORMAL NUCLEAR CARDIAC IMAGING TEST: Status: ACTIVE | Noted: 2022-07-29

## 2022-07-29 LAB
ANION GAP SERPL CALCULATED.3IONS-SCNC: 12 MMOL/L (ref 3–16)
BUN BLDV-MCNC: 17 MG/DL (ref 7–20)
CALCIUM SERPL-MCNC: 9.1 MG/DL (ref 8.3–10.6)
CHLORIDE BLD-SCNC: 104 MMOL/L (ref 99–110)
CO2: 23 MMOL/L (ref 21–32)
CREAT SERPL-MCNC: 0.7 MG/DL (ref 0.6–1.2)
GFR AFRICAN AMERICAN: >60
GFR NON-AFRICAN AMERICAN: >60
GLUCOSE BLD-MCNC: 230 MG/DL (ref 70–99)
GLUCOSE BLD-MCNC: 242 MG/DL (ref 70–99)
GLUCOSE BLD-MCNC: 261 MG/DL (ref 70–99)
HCT VFR BLD CALC: 42.7 % (ref 36–48)
HEMOGLOBIN: 14.7 G/DL (ref 12–16)
INR BLD: 2.14 (ref 0.87–1.14)
LDL CHOLESTEROL DIRECT: 42 MG/DL
MCH RBC QN AUTO: 31.1 PG (ref 26–34)
MCHC RBC AUTO-ENTMCNC: 34.4 G/DL (ref 31–36)
MCV RBC AUTO: 90.3 FL (ref 80–100)
PDW BLD-RTO: 14.7 % (ref 12.4–15.4)
PERFORMED ON: ABNORMAL
PERFORMED ON: ABNORMAL
PLATELET # BLD: 165 K/UL (ref 135–450)
PMV BLD AUTO: 7.8 FL (ref 5–10.5)
POTASSIUM SERPL-SCNC: 3.8 MMOL/L (ref 3.5–5.1)
PROTHROMBIN TIME: 23.8 SEC (ref 11.7–14.5)
RBC # BLD: 4.73 M/UL (ref 4–5.2)
SODIUM BLD-SCNC: 139 MMOL/L (ref 136–145)
WBC # BLD: 4.7 K/UL (ref 4–11)

## 2022-07-29 PROCEDURE — 36415 COLL VENOUS BLD VENIPUNCTURE: CPT

## 2022-07-29 PROCEDURE — 6370000000 HC RX 637 (ALT 250 FOR IP)

## 2022-07-29 PROCEDURE — A9502 TC99M TETROFOSMIN: HCPCS

## 2022-07-29 PROCEDURE — G0378 HOSPITAL OBSERVATION PER HR: HCPCS

## 2022-07-29 PROCEDURE — C1887 CATHETER, GUIDING: HCPCS

## 2022-07-29 PROCEDURE — 2500000003 HC RX 250 WO HCPCS

## 2022-07-29 PROCEDURE — C1894 INTRO/SHEATH, NON-LASER: HCPCS

## 2022-07-29 PROCEDURE — 99217 PR OBSERVATION CARE DISCHARGE MANAGEMENT: CPT | Performed by: INTERNAL MEDICINE

## 2022-07-29 PROCEDURE — 80048 BASIC METABOLIC PNL TOTAL CA: CPT

## 2022-07-29 PROCEDURE — 2709999900 HC NON-CHARGEABLE SUPPLY

## 2022-07-29 PROCEDURE — 3430000000 HC RX DIAGNOSTIC RADIOPHARMACEUTICAL

## 2022-07-29 PROCEDURE — 93458 L HRT ARTERY/VENTRICLE ANGIO: CPT | Performed by: INTERNAL MEDICINE

## 2022-07-29 PROCEDURE — 6360000002 HC RX W HCPCS

## 2022-07-29 PROCEDURE — 93458 L HRT ARTERY/VENTRICLE ANGIO: CPT

## 2022-07-29 PROCEDURE — C1769 GUIDE WIRE: HCPCS

## 2022-07-29 PROCEDURE — 85027 COMPLETE CBC AUTOMATED: CPT

## 2022-07-29 PROCEDURE — 99214 OFFICE O/P EST MOD 30 MIN: CPT | Performed by: INTERNAL MEDICINE

## 2022-07-29 PROCEDURE — 85610 PROTHROMBIN TIME: CPT

## 2022-07-29 RX ORDER — SODIUM CHLORIDE 0.9 % (FLUSH) 0.9 %
5-40 SYRINGE (ML) INJECTION EVERY 12 HOURS SCHEDULED
Status: DISCONTINUED | OUTPATIENT
Start: 2022-07-29 | End: 2022-07-30 | Stop reason: HOSPADM

## 2022-07-29 RX ORDER — SODIUM CHLORIDE 0.9 % (FLUSH) 0.9 %
5-40 SYRINGE (ML) INJECTION PRN
Status: DISCONTINUED | OUTPATIENT
Start: 2022-07-29 | End: 2022-07-30 | Stop reason: HOSPADM

## 2022-07-29 RX ORDER — HEPARIN SODIUM 1000 [USP'U]/ML
INJECTION, SOLUTION INTRAVENOUS; SUBCUTANEOUS
Status: COMPLETED | OUTPATIENT
Start: 2022-07-29 | End: 2022-07-29

## 2022-07-29 RX ORDER — ONDANSETRON 4 MG/1
4 TABLET, ORALLY DISINTEGRATING ORAL EVERY 8 HOURS PRN
Status: DISCONTINUED | OUTPATIENT
Start: 2022-07-29 | End: 2022-07-30 | Stop reason: HOSPADM

## 2022-07-29 RX ORDER — SODIUM CHLORIDE 9 MG/ML
INJECTION, SOLUTION INTRAVENOUS PRN
Status: DISCONTINUED | OUTPATIENT
Start: 2022-07-29 | End: 2022-07-30 | Stop reason: HOSPADM

## 2022-07-29 RX ORDER — POTASSIUM CHLORIDE 7.45 MG/ML
10 INJECTION INTRAVENOUS PRN
Status: DISCONTINUED | OUTPATIENT
Start: 2022-07-29 | End: 2022-07-30 | Stop reason: HOSPADM

## 2022-07-29 RX ORDER — ATORVASTATIN CALCIUM 40 MG/1
40 TABLET, FILM COATED ORAL NIGHTLY
Status: DISCONTINUED | OUTPATIENT
Start: 2022-07-29 | End: 2022-07-30 | Stop reason: HOSPADM

## 2022-07-29 RX ORDER — SODIUM CHLORIDE 9 MG/ML
INJECTION, SOLUTION INTRAVENOUS CONTINUOUS
Status: DISCONTINUED | OUTPATIENT
Start: 2022-07-29 | End: 2022-07-29

## 2022-07-29 RX ORDER — M-VIT,TX,IRON,MINS/CALC/FOLIC 27MG-0.4MG
1 TABLET ORAL DAILY
Status: DISCONTINUED | OUTPATIENT
Start: 2022-07-30 | End: 2022-07-30 | Stop reason: HOSPADM

## 2022-07-29 RX ORDER — MAGNESIUM SULFATE 1 G/100ML
1000 INJECTION INTRAVENOUS PRN
Status: DISCONTINUED | OUTPATIENT
Start: 2022-07-29 | End: 2022-07-30 | Stop reason: HOSPADM

## 2022-07-29 RX ORDER — ONDANSETRON 2 MG/ML
4 INJECTION INTRAMUSCULAR; INTRAVENOUS EVERY 6 HOURS PRN
Status: DISCONTINUED | OUTPATIENT
Start: 2022-07-29 | End: 2022-07-30 | Stop reason: HOSPADM

## 2022-07-29 RX ORDER — ACETAMINOPHEN 325 MG/1
650 TABLET ORAL EVERY 6 HOURS PRN
Status: DISCONTINUED | OUTPATIENT
Start: 2022-07-29 | End: 2022-07-30 | Stop reason: HOSPADM

## 2022-07-29 RX ORDER — LOSARTAN POTASSIUM 25 MG/1
50 TABLET ORAL DAILY
Status: DISCONTINUED | OUTPATIENT
Start: 2022-07-30 | End: 2022-07-30 | Stop reason: HOSPADM

## 2022-07-29 RX ORDER — SODIUM CHLORIDE 0.9 % (FLUSH) 0.9 %
10 SYRINGE (ML) INJECTION PRN
Status: DISCONTINUED | OUTPATIENT
Start: 2022-07-29 | End: 2022-07-30 | Stop reason: HOSPADM

## 2022-07-29 RX ORDER — INSULIN LISPRO 100 [IU]/ML
0-8 INJECTION, SOLUTION INTRAVENOUS; SUBCUTANEOUS
Status: DISCONTINUED | OUTPATIENT
Start: 2022-07-29 | End: 2022-07-30 | Stop reason: HOSPADM

## 2022-07-29 RX ORDER — ATORVASTATIN CALCIUM 40 MG/1
40 TABLET, FILM COATED ORAL DAILY
Qty: 90 TABLET | Refills: 1 | Status: SHIPPED | OUTPATIENT
Start: 2022-07-29 | End: 2022-08-31 | Stop reason: SDUPTHER

## 2022-07-29 RX ORDER — MIDAZOLAM HYDROCHLORIDE 1 MG/ML
INJECTION INTRAMUSCULAR; INTRAVENOUS
Status: COMPLETED | OUTPATIENT
Start: 2022-07-29 | End: 2022-07-29

## 2022-07-29 RX ORDER — FERROUS SULFATE 325(65) MG
325 TABLET ORAL
Status: DISCONTINUED | OUTPATIENT
Start: 2022-07-30 | End: 2022-07-30 | Stop reason: HOSPADM

## 2022-07-29 RX ORDER — POTASSIUM CHLORIDE 20 MEQ/1
40 TABLET, EXTENDED RELEASE ORAL PRN
Status: DISCONTINUED | OUTPATIENT
Start: 2022-07-29 | End: 2022-07-30 | Stop reason: HOSPADM

## 2022-07-29 RX ORDER — ASPIRIN 81 MG/1
81 TABLET ORAL DAILY
Qty: 90 TABLET | Refills: 1 | Status: SHIPPED | OUTPATIENT
Start: 2022-07-29 | End: 2022-08-31 | Stop reason: SDUPTHER

## 2022-07-29 RX ORDER — CITALOPRAM 20 MG/1
40 TABLET ORAL DAILY
Status: DISCONTINUED | OUTPATIENT
Start: 2022-07-30 | End: 2022-07-30 | Stop reason: HOSPADM

## 2022-07-29 RX ORDER — PANTOPRAZOLE SODIUM 20 MG/1
20 TABLET, DELAYED RELEASE ORAL
Status: DISCONTINUED | OUTPATIENT
Start: 2022-07-30 | End: 2022-07-30 | Stop reason: HOSPADM

## 2022-07-29 RX ORDER — ASPIRIN 81 MG/1
81 TABLET ORAL DAILY
Status: DISCONTINUED | OUTPATIENT
Start: 2022-07-29 | End: 2022-07-30 | Stop reason: HOSPADM

## 2022-07-29 RX ORDER — SODIUM CHLORIDE 0.9 % (FLUSH) 0.9 %
5-40 SYRINGE (ML) INJECTION EVERY 12 HOURS SCHEDULED
Status: CANCELLED | OUTPATIENT
Start: 2022-07-29

## 2022-07-29 RX ORDER — FENTANYL CITRATE 50 UG/ML
INJECTION, SOLUTION INTRAMUSCULAR; INTRAVENOUS
Status: COMPLETED | OUTPATIENT
Start: 2022-07-29 | End: 2022-07-29

## 2022-07-29 RX ORDER — POLYETHYLENE GLYCOL 3350 17 G/17G
17 POWDER, FOR SOLUTION ORAL DAILY PRN
Status: DISCONTINUED | OUTPATIENT
Start: 2022-07-29 | End: 2022-07-30 | Stop reason: HOSPADM

## 2022-07-29 RX ORDER — ACETAMINOPHEN 650 MG/1
650 SUPPOSITORY RECTAL EVERY 6 HOURS PRN
Status: DISCONTINUED | OUTPATIENT
Start: 2022-07-29 | End: 2022-07-30 | Stop reason: HOSPADM

## 2022-07-29 RX ORDER — SODIUM CHLORIDE 0.9 % (FLUSH) 0.9 %
5-40 SYRINGE (ML) INJECTION PRN
Status: CANCELLED | OUTPATIENT
Start: 2022-07-29

## 2022-07-29 RX ORDER — TOPIRAMATE 100 MG/1
100 TABLET, FILM COATED ORAL NIGHTLY
Status: DISCONTINUED | OUTPATIENT
Start: 2022-07-29 | End: 2022-07-30 | Stop reason: HOSPADM

## 2022-07-29 RX ORDER — NITROGLYCERIN 0.4 MG/1
0.4 TABLET SUBLINGUAL EVERY 5 MIN PRN
Status: DISCONTINUED | OUTPATIENT
Start: 2022-07-29 | End: 2022-07-30 | Stop reason: HOSPADM

## 2022-07-29 RX ORDER — ACETAMINOPHEN 325 MG/1
650 TABLET ORAL EVERY 4 HOURS PRN
Status: DISCONTINUED | OUTPATIENT
Start: 2022-07-29 | End: 2022-07-30 | Stop reason: HOSPADM

## 2022-07-29 RX ORDER — INSULIN LISPRO 100 [IU]/ML
0-4 INJECTION, SOLUTION INTRAVENOUS; SUBCUTANEOUS NIGHTLY
Status: DISCONTINUED | OUTPATIENT
Start: 2022-07-29 | End: 2022-07-30 | Stop reason: HOSPADM

## 2022-07-29 RX ORDER — DEXTROSE MONOHYDRATE 100 MG/ML
INJECTION, SOLUTION INTRAVENOUS CONTINUOUS PRN
Status: DISCONTINUED | OUTPATIENT
Start: 2022-07-29 | End: 2022-07-30 | Stop reason: HOSPADM

## 2022-07-29 RX ORDER — SODIUM CHLORIDE 9 MG/ML
INJECTION, SOLUTION INTRAVENOUS PRN
Status: CANCELLED | OUTPATIENT
Start: 2022-07-29

## 2022-07-29 RX ADMIN — FENOFIBRATE 160 MG: 160 TABLET ORAL at 10:19

## 2022-07-29 RX ADMIN — ASPIRIN 81 MG: 81 TABLET, CHEWABLE ORAL at 10:19

## 2022-07-29 RX ADMIN — FERROUS SULFATE TAB 325 MG (65 MG ELEMENTAL FE) 325 MG: 325 (65 FE) TAB at 10:19

## 2022-07-29 RX ADMIN — FENTANYL CITRATE 25 MCG: 50 INJECTION, SOLUTION INTRAMUSCULAR; INTRAVENOUS at 15:23

## 2022-07-29 RX ADMIN — TETROFOSMIN 32 MILLICURIE: 1.38 INJECTION, POWDER, LYOPHILIZED, FOR SOLUTION INTRAVENOUS at 08:08

## 2022-07-29 RX ADMIN — LOSARTAN POTASSIUM 50 MG: 25 TABLET, FILM COATED ORAL at 10:19

## 2022-07-29 RX ADMIN — CITALOPRAM HYDROBROMIDE 40 MG: 20 TABLET ORAL at 10:19

## 2022-07-29 RX ADMIN — MIDAZOLAM HYDROCHLORIDE 2 MG: 1 INJECTION INTRAMUSCULAR; INTRAVENOUS at 15:22

## 2022-07-29 RX ADMIN — PANTOPRAZOLE SODIUM 20 MG: 20 TABLET, DELAYED RELEASE ORAL at 05:45

## 2022-07-29 RX ADMIN — MULTIPLE VITAMINS W/ MINERALS TAB 1 TABLET: TAB at 10:19

## 2022-07-29 RX ADMIN — HEPARIN SODIUM 5000 UNITS: 1000 INJECTION, SOLUTION INTRAVENOUS; SUBCUTANEOUS at 15:27

## 2022-07-29 NOTE — PLAN OF CARE
Problem: Discharge Planning  Goal: Discharge to home or other facility with appropriate resources  Outcome: Progressing  Flowsheets (Taken 7/28/2022 2334)  Discharge to home or other facility with appropriate resources:   Identify barriers to discharge with patient and caregiver   Arrange for needed discharge resources and transportation as appropriate   Identify discharge learning needs (meds, wound care, etc)     Problem: Pain  Goal: Verbalizes/displays adequate comfort level or baseline comfort level  Outcome: Progressing  Flowsheets (Taken 7/28/2022 2334)  Verbalizes/displays adequate comfort level or baseline comfort level:   Encourage patient to monitor pain and request assistance   Assess pain using appropriate pain scale   Administer analgesics based on type and severity of pain and evaluate response   Implement non-pharmacological measures as appropriate and evaluate response     Problem: Safety - Adult  Goal: Free from fall injury  Outcome: Progressing  Flowsheets  Taken 7/28/2022 2334  Free From Fall Injury: Instruct family/caregiver on patient safety  Taken 7/28/2022 2333  Free From Fall Injury: Instruct family/caregiver on patient safety

## 2022-07-29 NOTE — PROGRESS NOTES
Report called to Rosaura Haq RN at [facility] at this time for transfer report. Patient to be picked up at 1230 per squad.

## 2022-07-29 NOTE — PROGRESS NOTES
Patient taken out in stable condition per stretcher at this time. Patient's purse, water for Cpap, and crackers given to [employer] which is the patient's niece.

## 2022-07-29 NOTE — PROGRESS NOTES
Patient back from stress test this AM. Patient to remain NPO per Dr. Nicholas Fuelling decreased. BS+. INT intact. VSS. No acute distress noted. Patient denies any needs. Call light in reach. Will continue to monitor.

## 2022-07-29 NOTE — CONSULTS
[address] [facility]  [phone]        Reason for Consultation/Chief Complaint: \"I have been having SOB and chest pressure\"  Consulted for sob and abn karyn-nuc test  No known cardiologist      History of Present Illness:  Vi Das is a 65 y.o. patient who presented to [facility] 7/27/2022 with c/o SOB, fatigue and chest pressure. She has PMH HTN, HLD, DM II, +premature FH CAD (brother age 48 known CAD), hypertriglyceridemia, PE x2 (2007/2006) on warfarin and ELEAZAR. Note Echo 6/15/2011 noted EF=55-60%; mild TR. Now presents with c/o SOB, fatigue and chest pressure for 3 days. Reports left-sided chest pressure; no radiation; occurs at rest; no modifying factors; assoc with sig SOB, lasting 5-10 minutes, increased frequency occurring 10x over few day. Reports similar CP but less intense off/on last 6 months. CXR was unremarkable. EKG noted NSR; 74 bpm.  Second EKG same. LABS: , K+ 3.8, BUN/Cr 19/0.6, Chol 190, Trig 1,413 and H/H 14.2/40.; INR=2.14; D-dimer < 0.27. Admitted to PCU for cardiac work up. Echo noted 55%; mild cLVH; grade I DD. Karyn nuc 7/29/22 noted moderate sized inferior reversible defect consistent with ischemia in the territory of the  mid RCA. EF=66%. Patient with no c/o palpitations, dizziness, edema, or orthopnea/PND. I have been asked to provide consultation regarding further management and testing. Past Medical History:   has a past medical history of Benign essential hypertension, Benign head tremor, Cholelithiasis, Disease of nasal cavity and sinuses, Fatty infiltration of liver, Hot flash, menopausal, Hyperlipidemia, Hyperlipidemia associated with type 2 diabetes mellitus ([address] Morbid obesity with BMI of 50.0-59.9, adult ([address] No history of procedure, Pulmonary embolism ([address] Type II or unspecified type diabetes mellitus without mention of complication, not stated as uncontrolled, Umbilical hernia, and Unspecified sleep apnea.     Surgical History:   has a past surgical history that includes Cholecystectomy, laparoscopic (8/2/2012); hernia repair (10/11/13); Colonoscopy (11/9/2015); Breast surgery (2015); hysteroscopy (03/13/2018); and Dilation and curettage of uterus (03/13/2018). Social History:   reports that she has never smoked. She has never used smokeless tobacco. She reports that she does not drink alcohol and does not use drugs. Family History:  family history includes Diabetes in her brother, mother, and sister; Heart Attack in her brother; Heart Failure in her father; High Blood Pressure in her brother and sister; Hypertension in her mother; Other in her father. Home Medications:  Were reviewed and are listed in nursing record. and/or listed below  Prior to Admission medications    Medication Sig Start Date End Date Taking?  Authorizing Provider   FEROSUL 325 (65 Fe) MG tablet TAKE 1 TABLET BY MOUTH EVERY MORNING 6/16/22   Ruma Grande MD   Icosapent Ethyl (VASCEPA) 1 g CAPS capsule TAKE 2 CAPSULES BY MOUTH 2 TIMES A DAY 6/15/22   Ruma Grande MD   topiramate (TOPAMAX) 100 MG tablet TAKE ONE TABLET BY MOUTH EVERY NIGHT 6/15/22   Ruma Grande MD   warfarin (COUMADIN) 5 MG tablet TAKE TWO (2) TABLETS BY MOUTH DAILY OR AS DIRECTED BY YOUR DOCTOR 6/2/22   Ruma Grande MD   citalopram (CELEXA) 40 MG tablet TAKE 1 TABLET BY MOUTH  DAILY 5/31/22   Lamont Chaudhary MD   losartan (COZAAR) 50 mg tablet TAKE 1 TABLET BY MOUTH  DAILY 5/31/22   Lamont Chaudhary MD   pantoprazole (PROTONIX) 20 MG tablet TAKE 1 TABLET BY MOUTH  DAILY 5/31/22   Lamont Chaudhary MD   fenofibric acid (TRILIPIX) 135 MG CPDR capsule TAKE 1 CAPSULE BY MOUTH  DAILY 5/31/22   Lamont Chaudhary MD   JANUMET XR  MG TB24 per extended release tablet TAKE 1 TABLET BY MOUTH  TWICE DAILY 5/12/22   Ruma Grande MD   JARDIANCE 25 MG tablet TAKE 1 TABLET BY MOUTH  DAILY 5/12/22   Ruma Grande MD   Multiple abnormality, atraumatic   Eyes:  PERRL, conjunctiva/corneas clear       Nose: Nares normal, no drainage or sinus tenderness   Throat: Lips, mucosa, and tongue normal   Neck: Supple, symmetrical, trachea midline, no adenopathy, thyroid: not enlarged, symmetric, no tenderness/mass/nodules, no carotid bruit or JVD       Lungs:   Clear to auscultation bilaterally, respirations unlabored   Chest Wall:  No tenderness or deformity   Heart:  Regular rate and rhythm, S1, S2 normal, no murmur, rub or gallop   Abdomen:   Soft, obese, non-tender, bowel sounds active all four quadrants,  no masses, no organomegaly           Extremities: Extremities normal, atraumatic, no cyanosis or edema   Pulses: 2+ and symmetric   Skin: Skin color, texture, turgor normal, no rashes or lesions   Pysch: Normal mood and affect   Neurologic: Normal gross motor and sensory exam.         Labs  CBC:   Lab Results   Component Value Date/Time    WBC 5.0 07/28/2022 05:00 AM    RBC 4.38 07/28/2022 05:00 AM    HGB 14.2 07/28/2022 05:00 AM    HCT 40.0 07/28/2022 05:00 AM    MCV 91.5 07/28/2022 05:00 AM    RDW 14.7 07/28/2022 05:00 AM     07/28/2022 05:00 AM     CMP:    Lab Results   Component Value Date/Time     07/28/2022 05:00 AM    K 3.8 07/28/2022 05:00 AM     07/28/2022 05:00 AM    CO2 21 07/28/2022 05:00 AM    BUN 19 07/28/2022 05:00 AM    CREATININE 0.6 07/28/2022 05:00 AM    GFRAA >60 07/28/2022 05:00 AM    GFRAA >60 06/10/2011 09:23 PM    AGRATIO 2.0 07/27/2022 02:09 PM    LABGLOM >60 07/28/2022 05:00 AM    LABGLOM 67 04/20/2016 04:03 PM    GLUCOSE 198 07/28/2022 05:00 AM    GLUCOSE 165 04/10/2012 05:50 PM    PROT 7.1 07/27/2022 02:09 PM    PROT 6.7 04/10/2012 05:50 PM    CALCIUM 8.8 07/28/2022 05:00 AM    BILITOT 0.3 07/27/2022 02:09 PM    ALKPHOS 86 07/27/2022 02:09 PM    AST 31 07/27/2022 02:09 PM    ALT 16 07/27/2022 02:09 PM     PT/INR:  No results found for: PTINR  Lab Results   Component Value Date    TROPONINI <0.01 07/27/2022       EKG:  I have reviewed EKG with the following interpretation:  Impression:  See HPI    Assessment:  Sendy Driscoll is a 65 y.o. patient who presented to [facility] 7/27/2022 with c/o SOB, fatigue and chest pressure. She has PMH HTN, HLD, DM II, +premature FH CAD (brother age 48 known CAD), hypertriglyceridemia, PE x2 (2007/2006) on warfarin and ELEAZAR. Note Echo 6/15/2011 noted EF=55-60%; mild TR. Now presents with c/o SOB, fatigue and chest pressure for 3 days. Reports left-sided chest pressure; no radiation; occurs at rest; no modifying factors; assoc with sig SOB, lasting 5-10 minutes, increased frequency occurring 10x over few day. Reports similar CP but less intense off/on last 6 months. CXR was unremarkable. EKG noted NSR; 74 bpm.  Second EKG same. LABS: , K+ 3.8, BUN/Cr 19/0.6, Chol 190, Trig 1,413 and H/H 14.2/40.; INR=2.14; D-dimer < 0.27. Admitted to PCU for cardiac work up. Echo noted 55%; mild cLVH; grade I DD. Karyn nuc 7/29/22 noted moderate sized inferior reversible defect consistent with ischemia in the territory of the  mid RCA. EF=66%. Diagnosis of unstable angina given increased frequency/severity of CP and abnormal nuc imaging study in older female with multiple CAD RF's (hypertriglyceridemia, HTN, DM, etc)    Recs:  Based on these findings above I recommend left heart cath for definitive evaluation of coronary arteries. Risks, benefits, expectations, and alternative treatments were discussed. Questions appropriately answered. Sendy Driscoll agrees to proceed and verbalized understanding. Continue triglide 160mg qd, baby asa qd, cozaar 50mg qd, lipitor 40mg qd  Hold warfarin.  INR 2.14 and d/w interventional partner who is OK doing procedure      Patient Active Problem List   Diagnosis    Type 2 diabetes mellitus with other specified complication ([address]    Hyperlipidemia    Pulmonary embolism ([address]    Disease of nasal cavity and sinuses    Fatty infiltration of liver Umbilical hernia    Benign head tremor    Hot flash, menopausal    Hyperlipidemia associated with type 2 diabetes mellitus ([address]    Morbid obesity with BMI of 50.0-59.9, adult (HCC)    Severe obstructive sleep apnea    Benign essential hypertension    Obesity, morbid, BMI 40.0-49.9 (HCC)    Chest pain    Shortness of breath    Hypertension    Type 2 diabetes mellitus without complication, without long-term current use of insulin [address]         Thank you for allowing to us to participate in the care or Kylie Silva. Further evaluation will be based upon the patient's clinical course and testing results.

## 2022-07-29 NOTE — CARE COORDINATION
INTERDISCIPLINARY PLAN OF CARE CONFERENCE    Date/Time: 7/29/2022 10:13 AM  Completed by: Lillie Forde RN, Case Management      Patient Name:  Enrique Cantrell  YOB: 1960  Admitting Diagnosis: Shortness of breath [R06.02]  Chest pain, rule out acute myocardial infarction [R07.9]  Chest pain, unspecified type [R07.9]     Admit Date/Time:  7/27/2022  2:01 PM    Chart reviewed. Interdisciplinary team contacted or reviewed plan related to patient progress and discharge plans. Disciplines included Case Management, Nursing, and Dietitian. Current Status:OBS  PT/OT recommendation for discharge plan of care: NA    Expected D/C Disposition:  Home    Discharge Plan Comments: Day 2 of 2 day stress GXT. CM following for needs. IPTA. And plans to return home. Possible DC later today.      Home O2 in place on admit: No  Pt informed of need to bring portable home O2 tank on day of discharge for nursing to connect prior to leaving:  Not Indicated  Verbalized agreement/Understanding:  Not Indicated

## 2022-07-29 NOTE — PROCEDURES
CARDIAC CATHETERIZATION REPORT    Date of Procedure: 7/29/2022  : Ewa Mendiola DO  Primary Indication: Chest pain, abnormal nuclear SPECT stress test    Procedures Performed:  1. Coronary angiography  2. Left heart catheterization  3. Moderate conscious sedation    Procedural Details:  Access: Local anesthetic was given and access was obtained in the right radial artery using a micropuncture technique and a 6F Terumo Slender Sheath was placed without difficulty. Diagnostic: A 5F TIG catheter was used to perform selective right and left coronary angiography. The 5F TIG catheter was also used to perform the left heart catheterization. No significant gradient was observed on pull-back of the catheter across the aortic valve. Hemostasis: At the end of the procedure, the radial sheath was removed and a hemoband was placed over the arteriotomy site and filled with air to maintain hemostasis. Findings:  Hemodynamics:     A. Opening arterial pressure: 124/73 (96) mmHg      B. LVEDP: 15 mmHg    2. Coronary anatomy:  A. Left main artery: The left main artery trifurcates into the left anterior descending artery, ramus intermedius artery, and left circumflex artery. The left main artery has no angiographically significant disease. B. Left anterior descending artery: Transapical vessel which gives rise to 3 diagonal arteries. The LAD has 30% proximal and 40% mid-distal stenoses. The diagonal arteries have minor luminal irregularities. C. Ramus intermedius artery: Long, medium caliber vessel with no angiographically significant disease. D. Left circumflex artery: Non-dominant vessel that gives rise to one large branching obtuse marginal artery. The LCx has no angiographically significant disease. The obtuse marginal artery has no angiographically significant disease. E. Right coronary artery: Dominant vessel that gives rise to the posterior descending artery and 2 posterolateral branches.   The RCA has 30% proximal and 30% mid-vessel stenoses. The posterior descending artery has minor luminal irregularities. The right posterolateral branches have minor luminal irregularities. Technical Factors:  Complications: None. Estimated blood loss: Minimal.  Radiation: Air kerma 977 mGy and 2.1 minutes of fluoroscopy  Sedation: Moderate conscious sedation was administered by qualified nursing personnel under continuous hemodynamic monitoring, starting at 3:24 PM and ending at 3:36 PM.  Medications: 2.5 mg IA verapamil, 2 mg IV Versed, 25 mcg IV Fentanyl, 5,000 units IV heparin  Contrast: 40cc of Isovue     Impression:  1. Mild-moderate non-obstructive coronary artery disease. 2. LVEDP 15 mmHg. Plan:  1. Continue aspirin 81 mg daily and atorvastatin 40 mg daily. 2. Can resume coumadin for goal INR 2-3 (history of PE). 3. Optimal medical therapy for CAD risk factors. 4. [facility] in 2-4 weeks.       Cottie Cockayne, [address]

## 2022-07-29 NOTE — PRE SEDATION
Brief Pre-Op Note/Sedation Assessment      [facility]  1960  [id number]  5:32 PM    Planned Procedure: Cardiac Catheterization Procedure  Post Procedure Plan: Return to same level of care  Consent: I have discussed with the patient and/or the patient representative the indication, alternatives, and the possible risks and/or complications of the planned procedure and the anesthesia methods. The patient and/or patient representative appear to understand and agree to proceed. Chief Complaint:   Chest pain, abnormal stress test    Indications for Cath Procedure:  Presentation:  New Onset Angina <= 2 months and Suspected CAD  2. Anginal Classification within 2 weeks:  CCS III - Symptoms with everyday living activities, i.e., moderate limitation  3. Angina Symptoms Assessment:  Typical Chest Pain  4. Heart Failure Class within last 2 weeks:  No symptoms  5. Cardiovascular Instability:  No    Prior Ischemic Workup/Eval:  Pre-Procedural Medications: Yes: Aspirin and STATIN  2. Stress Test Completed? Yes:  Stress or Imaging Studies Performed (within ANY time period):   Type:  Stress Nuclear  Results:  Positive:  Myocardial Perfusion Defects (Nuclear) Extent of Ischemia:  Intermediate    Does Patient need surgery? Cath Valve Surgery:  No    Pre-Procedure Medical History:  Vital Signs:  [facility] 08/20/2014     Allergies:     Allergies   Allergen Reactions    Lisinopril Other (See Comments)     cough    Penicillins Other (See Comments)     Unsure of reaction--childhood    Sulfa Antibiotics Other (See Comments)     Unsure of reaction     Medications:    Current Facility-Administered Medications   Medication Dose Route Frequency Provider Last Rate Last Admin    sodium chloride flush 0.9 % injection 5-40 mL  5-40 mL IntraVENous 2 times per day Mohan Jenkins MD        sodium chloride flush 0.9 % injection 5-40 mL  5-40 mL IntraVENous PRN Mohan Jenkins MD        0.9 % sodium chloride infusion   IntraVENous PRN Anjelica Santa MD        sodium chloride flush 0.9 % injection 5-40 mL  5-40 mL IntraVENous 2 times per day Kael Haddox, DO        sodium chloride flush 0.9 % injection 5-40 mL  5-40 mL IntraVENous PRN Kael Haddox, DO        0.9 % sodium chloride infusion   IntraVENous PRN Kael Haddox, DO        acetaminophen (TYLENOL) tablet 650 mg  650 mg Oral Q4H PRN Kael Haddox, DO           Past Medical History:    Past Medical History:   Diagnosis Date    Benign essential hypertension 11/14/2017    Benign head tremor 5/14/2014    Cholelithiasis     Disease of nasal cavity and sinuses     Fatty infiltration of liver     Hot flash, menopausal 7/22/2015    Hyperlipidemia     Hyperlipidemia associated with type 2 diabetes mellitus ([facility] [address] [address] 10/23/2015    Morbid obesity with BMI of 50.0-59.9, adult ([facility] 10/23/2015    No history of procedure 11/9/2015    no past colonoscopy    Pulmonary embolism (HCC)     Type II or unspecified type diabetes mellitus without mention of complication, not stated as uncontrolled     Umbilical hernia 40/5/7900    Unspecified sleep apnea        Surgical History:    Past Surgical History:   Procedure Laterality Date    BREAST SURGERY  2015    CHOLECYSTECTOMY, LAPAROSCOPIC  8/2/2012    COLONOSCOPY  11/9/2015    cecal polyp    DILATION AND CURETTAGE OF UTERUS  03/13/2018    HERNIA REPAIR  95/42/88    lap umbilical    HYSTEROSCOPY  03/13/2018    and D&C             Pre-Sedation:  Pre-Sedation Documentation and Exam:  I have assessed the patient and reviewed the H&P on the chart. Prior History of Anesthesia Complications:   none    Modified Mallampati:  III (soft palate, base of uvula visible)    ASA Classification:  Class 3 - A patient with severe systemic disease that limits activity but is not incapacitating    Bing Scale:   Activity:  2 - Able to move 4 extremities voluntarily on command  Respiration:  2 - Able to breathe deeply and cough freely  Circulation:  2 - BP+/- 20mmHg of normal  Consciousness:  2 - Fully awake  Oxygen Saturation (color):  2 - Able to maintain oxygen saturation >92% on room air    Sedation/Anesthesia Plan:  Guard the patient's safety and welfare. Minimize physical discomfort and pain. Minimize negative psychological responses to treatment by providing sedation and analgesia and maximize the potential amnesia. Patient to meet pre-procedure discharge plan.     Medication Planned:  midazolam intravenously and fentanyl intravenously    Patient is an appropriate candidate for plan of sedation:   yes      Electronically signed by Amy Lovelace DO on 7/29/2022 at 5:32 PM

## 2022-07-29 NOTE — PROGRESS NOTES
Consult has been called to Dr. Krystal Cortes on 7/29/22. Spoke with Unruly Â®.  11:27 AM    Cydney Huber  7/29/2022

## 2022-07-29 NOTE — CONSULTS
Pharmacy Note  Warfarin Consult  Dx: hx of PE  Goal INR range 2-3   Home Warfarin dose: 11mg daily      Date  INR  Warfarin  7/29                2.14                  11mg    Recommend Warfarin 11mg tonight x1. Daily INR ordered. Rx will continue to manage therapy per consult order.   Radha Acosta, PharmD 7/29/2022 7:50 PM

## 2022-07-29 NOTE — PROGRESS NOTES
Pharmacy Note  Warfarin Consult    Dx: Hx of PE  Goal INR range 2-3   Home Warfarin dose:11 mg daily    Date  INR  Warfarin  7/27   2.02  11mg  7/28                1.92     11mg  7/29                2.14     11mg    Recommend Warfarin 11 mg tonight x1. Daily INR ordered. Rx will continue to manage therapy per consult order. Maryuri Bautista Pharm D 7/29/202211:52 AM  .      .

## 2022-07-29 NOTE — DISCHARGE SUMMARY
Hospital Medicine Discharge Summary    Patient ID: Merrill Catalan      Patient's PCP: Pearl Irene MD    Admit Date: 2022     Discharge Date: 2022      Admitting Provider: Maninder Baldwin MD     Discharge Provider: Maninder Baldwin MD     Discharge Diagnoses: Active Hospital Problems    Diagnosis     Unstable angina ([address] [I20.0]      Priority: Medium    Abnormal nuclear cardiac imaging test [R93.1]      Priority: Medium    Chest pain [R07.9]      Priority: Medium    Shortness of breath [R06.02]      Priority: Medium    Hypertension [I10]      Priority: Medium    Type 2 diabetes mellitus without complication, without long-term current use of insulin (HCC) [E11.9]      Priority: Medium    Hypertriglyceridemia [E78.1]        The patient was seen and examined on day of discharge and this discharge summary is in conjunction with any daily progress note from day of discharge. Hospital Course: 62yo woman morbid obesity and severe HLD presented with chest pain and pressure. #Chest pain  -Left sided CP/pressure with associated SOB  -Cardiac risk factors:  HTN: Yes  HLD: Yes - severe  DM: Yes  Body mass index is 49.92 kg/m². Tobacco abuse: Never  First degree family or personal history of CAD; no CAD. Father who  from ruptured aorta. Maternal GM had 6 heart attacks.  -Serial trops - negative  -EKG without signs of ischemia  -ASA and statin  -Pharm stress ordered; patient cannot walk on treadmill  - 2 day stress - this is abnormal   - cardiology team involved and pt transferred directly to [facility] for [facility] evaluation in stable condition.          #DMII  -BG controlled  -home regimen held  -med SSI  -cc diet        #HTN  -BP initially elevated but now stable  -continue losartan        #HLD  -High triglycerides  -continue fibrate  -vascepa is not carried here        #Head tremor  -continue topamax        #Hx of PE  -on coumadin; pharm to dose  -INR therapeutic #ELEAZAR  -continue home CPAP        #Depression  -continue celexa         Physical Exam Performed:     BP (!) 147/69   Pulse 79   Temp 97.9 °F (36.6 °C) (Oral)   Resp 18   Ht 5' 5\" (1.651 m)   Wt 297 lb 6 oz (134.9 kg)   LMP 08/20/2014   SpO2 96%   BMI 49.49 kg/m²       General appearance: No apparent distress, appears stated age and cooperative. HEENT: Pupils equal, round, and reactive to light. Conjunctivae/corneas clear. Neck: Supple, with full range of motion. No jugular venous distention. Trachea midline. Respiratory:  Normal respiratory effort. Clear to auscultation, bilaterally without Rales/Wheezes/Rhonchi. Cardiovascular: Regular rate and rhythm with normal S1/S2 without murmurs, rubs or gallops. Abdomen: Soft, non-tender, non-distended with normal bowel sounds. Musculoskeletal: No clubbing, cyanosis or edema bilaterally. Full range of motion without deformity. Skin: Skin color, texture, turgor normal.  No rashes or lesions. Neurologic:  Neurovascularly intact without any focal sensory/motor deficits. Cranial nerves: II-XII intact, grossly non-focal.  Psychiatric: Alert and oriented, thought content appropriate, normal insight  Capillary Refill: Brisk,3 seconds, normal  Peripheral Pulses: +2 palpable, equal bilaterally         Labs:  For convenience and continuity at follow-up the following most recent labs are provided:      CBC:    Lab Results   Component Value Date/Time    WBC 5.0 07/28/2022 05:00 AM    HGB 14.2 07/28/2022 05:00 AM    HCT 40.0 07/28/2022 05:00 AM     07/28/2022 05:00 AM       Renal:    Lab Results   Component Value Date/Time     07/28/2022 05:00 AM    K 3.8 07/28/2022 05:00 AM     07/28/2022 05:00 AM    CO2 21 07/28/2022 05:00 AM    BUN 19 07/28/2022 05:00 AM    CREATININE 0.6 07/28/2022 05:00 AM    CALCIUM 8.8 07/28/2022 05:00 AM         Significant Diagnostic Studies    Radiology:   NM Cardiac Stress Test Nuclear Imaging   Final Result      XR CHEST (2 VW)   Final Result   No acute cardiopulmonary abnormality. Complete separation of the left AC joint of indeterminate age                Consults:     IP CONSULT TO HOSPITALIST  PHARMACY TO DOSE MEDICATION  IP CONSULT TO RESPIRATORY CARE  IP CONSULT TO SPIRITUAL SERVICES  IP CONSULT TO CARDIOLOGY    Disposition:  [facility]     Condition at Discharge: Stable    Discharge Instructions/Follow-up:  PCP 1 week. Code Status:  Full Code     Activity: activity as tolerated    Diet:  low fat low cholesterol diet - severe hyperTG - on multiple meds.        Discharge Medications:     Discharge Medication List as of 7/29/2022  1:35 PM             Details   FEROSUL 325 (65 Fe) MG tablet TAKE 1 TABLET BY MOUTH EVERY MORNING, Disp-100 tablet, R-0Normal      Icosapent Ethyl (VASCEPA) 1 g CAPS capsule TAKE 2 CAPSULES BY MOUTH 2 TIMES A DAY, Disp-360 capsule, R-0Normal      topiramate (TOPAMAX) 100 MG tablet TAKE ONE TABLET BY MOUTH EVERY NIGHT, Disp-90 tablet, R-0Normal      !! warfarin (COUMADIN) 5 MG tablet TAKE TWO (2) TABLETS BY MOUTH DAILY OR AS DIRECTED BY YOUR DOCTOR, Disp-180 tablet, R-0Normal      citalopram (CELEXA) 40 MG tablet TAKE 1 TABLET BY MOUTH  DAILY, Disp-90 tablet, R-0Requesting 1 year supplyNormal      losartan (COZAAR) 50 mg tablet TAKE 1 TABLET BY MOUTH  DAILY, Disp-90 tablet, R-0Requesting 1 year supplyNormal      pantoprazole (PROTONIX) 20 MG tablet TAKE 1 TABLET BY MOUTH  DAILY, Disp-90 tablet, R-0Requesting 1 year supplyNormal      fenofibric acid (TRILIPIX) 135 MG CPDR capsule TAKE 1 CAPSULE BY MOUTH  DAILY, Disp-90 capsule, R-0Requesting 1 year supplyNormal      JANUMET XR  MG TB24 per extended release tablet TAKE 1 TABLET BY MOUTH  TWICE DAILY, Disp-180 tablet, R-3Requesting 1 year supplyNormal      JARDIANCE 25 MG tablet TAKE 1 TABLET BY MOUTH  DAILY, Disp-90 tablet, R-3Requesting 1 year supplyNormal      Multiple Vitamins-Minerals (THERAPEUTIC MULTIVITAMIN-MINERALS) tablet Take 1 tablet by mouth dailyHistorical Med      Ascorbic Acid (VITAMIN C PO) Take by mouthHistorical Med      !! warfarin (COUMADIN) 1 MG tablet TAKE AS DIRECTED BY PHYSICIAN, Disp-90 tablet, R-0Normal      !! CONTOUR NEXT TEST strip TEST 3 TIMES DAILY, Disp-300 strip, R-3Requesting 1 year supplyNormal      !! Microlet Lancets MISC Disp-300 each, R-3, Normal      Blood Glucose Monitoring Suppl (ONE TOUCH ULTRA 2) w/Device KIT Disp-1 kit, R-0, NormalTest three times daily. DX:E11.9      !! blood glucose test strips (ASCENSIA AUTODISC VI;ONE TOUCH ULTRA TEST VI) strip Disp-100 each, R-3, NormalTest three times daily. DX:E11.9      !! Lancets MISC Disp-100 each, R-3, NormalTest three times daily. DX:E11.9      !! ONE TOUCH LANCETS MISC Disp-100 each, R-3, NormalTest daily. DX: E11.9      !! blood glucose test strips (ASCENSIA AUTODISC VI;ONE TOUCH ULTRA TEST VI) strip Disp-100 each, R-3, NormalTest Daily. DX: E11.9      Omega-3 Fatty Acids (FISH OIL) 1000 MG CPDR Take 2,000 mg by mouth dailyHistorical Med      !! PRODIGY NO CODING BLOOD GLUC strip TEST BLOOD SUGARS 3 TIMES DAILY, Disp-100 each, R-2PLEASE INCLUDE PRESCRIPTIONS FOR LANCETS AND ALCOHOL SWABSNormal      !! ONE TOUCH LANCETS MISC DAILY Starting 5/14/2014, Until Discontinued, Disp-100 each, R-3, Print       !! - Potential duplicate medications found. Please discuss with provider. Time Spent on discharge is more than 30 minutes in the examination, evaluation, counseling and review of medications and discharge plan. Signed:    Leatha Mojica MD   7/29/2022      Thank you Bakari Arias MD for the opportunity to be involved in this patient's care. If you have any questions or concerns, please feel free to contact me at [phone].

## 2022-07-29 NOTE — DISCHARGE INSTR - COC
Continuity of Care Form    Patient Name: Jamar Bowen   :  1960  MRN:  [id number]    Admit date:  2022  Discharge date:  ***    Code Status Order: Full Code   Advance Directives:     Admitting Physician:  Daniel Carson MD  PCP: Landry Rodriguez MD    Discharging Nurse: [facility] Unit/Room#: /1038-81  Discharging Unit Phone Number: ***    Emergency Contact:   Extended Emergency Contact Information  Primary Emergency Contact: [address] Phone: [phone]  Mobile Phone: [phone]  Relation: Brother/Sister    Past Surgical History:  Past Surgical History:   Procedure Laterality Date    BREAST SURGERY      CHOLECYSTECTOMY, LAPAROSCOPIC  2012    COLONOSCOPY  2015    cecal polyp    DILATION AND CURETTAGE OF UTERUS  2018    HERNIA REPAIR      lap umbilical    HYSTEROSCOPY  2018    and D&C       Immunization History:   Immunization History   Administered Date(s) Administered    COVID-19, PFIZER PURPLE top, DILUTE for use, (age 15 y+), 30mcg/0.3mL 2021, 2021    Influenza Vaccine, unspecified formulation 10/28/2016, 2017, 10/05/2018    Influenza Virus Vaccine 2012, 10/15/2014, 02/15/2016, 2017, 10/05/2018, 10/23/2020, 10/12/2021    Influenza Whole 10/11/2010, 10/15/2014, 02/15/2016    Influenza, Kenna Yeung, Recombinant, IM PF (Flublok 18 yrs and older) 2019    Pneumococcal Polysaccharide ([id number]) 2016    Tdap (Boostrix, Adacel) 2019    Zoster Recombinant (Shingrix) 2022       Active Problems:  Patient Active Problem List   Diagnosis Code    Type 2 diabetes mellitus with other specified complication ([facility] [address] C77.93    Hyperlipidemia E78.5    Pulmonary embolism (HCC) I26.99    Disease of nasal cavity and sinuses J34.9    Fatty infiltration of liver T94.0    Umbilical hernia W15.0    Benign head tremor G25.0    Hot flash, menopausal N95.1    Hyperlipidemia associated with type 2 diabetes mellitus (HCC) E11.69, E78.5    Morbid obesity with BMI of 50.0-59.9, adult (HCC) E66.01, Z68.43    Severe obstructive sleep apnea G47.33    Benign essential hypertension I10    Obesity, morbid, BMI 40.0-49.9 (HCC) E66.01    Chest pain R07.9    Shortness of breath R06.02    Hypertension I10    Type 2 diabetes mellitus without complication, without long-term current use of insulin (HCC) E11.9       Isolation/Infection:   Isolation            No Isolation          Patient Infection Status       Infection Onset Added Last Indicated Last Indicated By Review Planned Expiration Resolved Resolved By    None active    Resolved    COVID-19 (Rule Out) 07/27/22 07/27/22 07/27/22 COVID-19, Rapid (Ordered)   07/27/22 Rule-Out Test Resulted    COVID-19 (Rule Out) 07/27/22 07/27/22 07/27/22 COVID-19, Rapid (Ordered)   07/27/22 Rule-Out Test Canceled            Nurse Assessment:  Last Vital Signs: BP (!) 147/69   Pulse 79   Temp 97.9 °F (36.6 °C) (Oral)   Resp 18   Ht 5' 5\" (1.651 m)   Wt 297 lb 6 oz (134.9 kg)   LMP 08/20/2014   SpO2 96%   BMI 49.49 kg/m²     Last documented pain score (0-10 scale): Pain Level: 2  Last Weight:   Wt Readings from Last 1 Encounters:   07/29/22 297 lb 6 oz (134.9 kg)     Mental Status:  {IP PT MENTAL STATUS:42645}    IV Access:  { KLARISSA IV ACCESS:121825273}    Nursing Mobility/ADLs:  Walking   {CHP DME QNTP:344215953}  Transfer  {CHP DME LSYO:336134010}  Bathing  {CHP DME GIWS:440346694}  Dressing  {CHP DME BAJT:364534476}  Toileting  {CHP DME SUOQ:503872406}  Feeding  {CHP DME LZUA:983527868}  Med Admin  {CHP DME NDIL:244489840}  Med Delivery   { KLARISSA MED Delivery:020063302}    Wound Care Documentation and Therapy:        Elimination:  Continence:    Bowel: {YES / IM:47777}  Bladder: {YES / XP:67861}  Urinary Catheter: {Urinary Catheter:743955907}   Colostomy/Ileostomy/Ileal Conduit: {YES / ZH:70675}       Date of Last BM: ***    Intake/Output Summary (Last 24 hours) at 2022 1008  Last data filed at 2022 0817  Gross per 24 hour   Intake 817 ml   Output --   Net 817 ml     I/O last 3 completed shifts:   In: 1520.8 [P.O.:817; I.V.:703.8]  Out: -     Safety Concerns:     [address] CYR Safety Concerns:598806914}    Impairments/Disabilities:      [address] CYR Impairments/Disabilities:444355040}    Nutrition Therapy:  Current Nutrition Therapy:   [address] CYR Diet List:988169758}    Routes of Feeding: {CHP DME Other Feedings:481345165}  Liquids: {Slp liquid thickness:51409}  Daily Fluid Restriction: {CHP DME Yes amt example:744846799}  Last Modified Barium Swallow with Video (Video Swallowing Test): {Done Not Done KOGF:192238769}    Treatments at the Time of Hospital Discharge:   Respiratory Treatments: ***  Oxygen Therapy:  {Therapy; copd oxygen:10477}  Ventilator:    { CC Vent NUMQ:356353824}    Rehab Therapies: {THERAPEUTIC INTERVENTION:3821390194}  Weight Bearing Status/Restrictions: { CC Weight Bearin}  Other Medical Equipment (for information only, NOT a DME order):  {EQUIPMENT:862099691}  Other Treatments: ***    Patient's personal belongings (please select all that are sent with patient):  {[facility] DME Belongings:339216192}    RN SIGNATURE:  {Esignature:137882442}    CASE MANAGEMENT/SOCIAL WORK SECTION    Inpatient Status Date: OBS    Readmission Risk Assessment Score:  Readmission Risk              Risk of Unplanned Readmission:  0           Discharging to Facility/ Agency       Dialysis Facility (if applicable)   Name:  Address:  Dialysis Schedule:  Phone:  Fax:    / signature: {Esignature:409743428}    PHYSICIAN SECTION    Prognosis: {Prognosis:9205278024}    Condition at Discharge: [address] Patient Condition:161586182}    Rehab Potential (if transferring to Rehab): {Prognosis:1394259756}    Recommended Labs or Other Treatments After Discharge: ***    Physician Certification: I certify the above information and transfer of Karey Marcsu  is necessary for the continuing treatment of the diagnosis listed and that she requires {Admit to Appropriate Level of Care:22681} for {GREATER/LESS:200699361} 30 days.      Update Admission H&P: {CHP DME Changes in DSP:022844880}    PHYSICIAN SIGNATURE:  {Esignature:196217050}

## 2022-07-29 NOTE — FLOWSHEET NOTE
07/29/22 0543   Vital Signs   Temp 97.1 °F (36.2 °C)   Temp Source Oral   Heart Rate 72   Heart Rate Source Monitor   Resp 17   /69   BP Location Left upper arm   BP Method Automatic   MAP (Calculated) 85.67   Patient Position Supine   Level of Consciousness Alert (0)   MEWS Score 1   Pain Assessment   Pain Assessment None - Denies Pain   Oxygen Therapy   SpO2 95 %   O2 Device None (Room air)     Resting in bed without complaints most of night. Compliant with home CPAP. No complaints voiced this shift. No chest pain, pressure, or discomfort voiced. All care per POC.

## 2022-07-30 NOTE — DISCHARGE INSTRUCTIONS
Stop taking Fibrate, continue taking aspirin, atorvastatin,  restart coumadin tonight and continue taking all medications that you were previously on.

## 2022-08-01 ENCOUNTER — TELEPHONE (OUTPATIENT)
Dept: INTERNAL MEDICINE CLINIC | Age: 62
End: 2022-08-01

## 2022-08-01 ENCOUNTER — TELEPHONE (OUTPATIENT)
Dept: CARDIOLOGY CLINIC | Age: 62
End: 2022-08-01

## 2022-08-01 NOTE — TELEPHONE ENCOUNTER
Pt stated that she needed to schedule a f/u ov after heart cath within 2-4 weeks. Pt preferred that follow up to be with smm. Pt would like to know if its ok to wait until 09/29/2022? Pt is open to going to any location.

## 2022-08-01 NOTE — TELEPHONE ENCOUNTER
OK for patient to return to work today. She should not lift more than 5 lbs with her right arm for the first 5 days following her angiogram which was performed via right radial artery access.

## 2022-08-01 NOTE — TELEPHONE ENCOUNTER
[facility] please advise when pt is okay to return to work and if pt should have any restrictions, ty

## 2022-08-01 NOTE — TELEPHONE ENCOUNTER
Pt stated that her work will need a letter in order to be able to return to work. Pt stated that she had a heart cath on 07/29/2022. If we can write letter please fax to [phone] and att to [facility].

## 2022-08-01 NOTE — TELEPHONE ENCOUNTER
----- Message from Marry Kaur sent at 8/1/2022 12:46 PM EDT -----  Contact: Katie Nava [phone]  See cardiology , Dr. Justyn Chaney or Dr More Merritt per Dr Butts Earing  ----- Message -----  From: Adrian Orosco: 8/1/2022   9:12 AM EDT  To: Candice Gitelman, MD    Patient was in the hospital from 7/27/22 to 7/29/22 for pressure in the chest, shortness of breath, and fatigue. Patient was told to see a cardiologist and wants to know if you would like to follow up with her.

## 2022-08-01 NOTE — TELEPHONE ENCOUNTER
She needs a return to work note. She does office work-medicare billing.    You saw her 7/29/22 in patient    Changed OV to 8/29

## 2022-08-02 ENCOUNTER — TELEPHONE (OUTPATIENT)
Dept: INTERNAL MEDICINE CLINIC | Age: 62
End: 2022-08-02

## 2022-08-04 NOTE — TELEPHONE ENCOUNTER
Download report scanned ends 6/10/2022. Per below pressure was changed on 6/24/2022, is there an updated report?

## 2022-08-05 NOTE — TELEPHONE ENCOUNTER
Please call patient to see if she is using her CPAP if so she will need to take it to DME for download.   If she is not using it please call patient and inform should use PAP at least 4 hours a night and ideally all night every night for maximum benefit and get new report in about 30 days

## 2022-08-09 NOTE — TELEPHONE ENCOUNTER
Spoke to patient and she said that she is using her machine every night. Patient is currently out of town will be back Thursday and will try to take machine into [facility] on Friday for download. Please watch for report.

## 2022-08-12 LAB — INR BLD: 2.1

## 2022-08-15 ENCOUNTER — TELEPHONE (OUTPATIENT)
Dept: INTERNAL MEDICINE CLINIC | Age: 62
End: 2022-08-15

## 2022-08-15 ENCOUNTER — ANTI-COAG VISIT (OUTPATIENT)
Dept: INTERNAL MEDICINE CLINIC | Age: 62
End: 2022-08-15

## 2022-08-15 NOTE — TELEPHONE ENCOUNTER
----- Message from Tez Guerin MD sent at 8/15/2022 10:16 AM EDT -----  Same   2 weeks   ----- Message -----  From: Yessica Whitfield  Sent: 8/15/2022   9:21 AM EDT  To: MD Dr VALENCIA Francisco patient  INR: 2.1

## 2022-08-15 NOTE — TELEPHONE ENCOUNTER
CPAP download report from 7/16/2022 - 8/14/2022 on auto CPAP 13 to 17 cm H2O reviewed. Compliance is good 100%. AHI is good 0.5. Did CPAP ever get changed to auto CPAP 14-18 cm H2O? It appears it is still set at 13-17 cm H2O, or is this an [employer] error?

## 2022-08-16 RX ORDER — FENOFIBRIC ACID 135 MG/1
CAPSULE, DELAYED RELEASE ORAL
Qty: 90 CAPSULE | Refills: 0 | Status: SHIPPED | OUTPATIENT
Start: 2022-08-16 | End: 2022-10-27 | Stop reason: SDUPTHER

## 2022-08-17 NOTE — TELEPHONE ENCOUNTER
Spoke to Monico wolff (RT at [facility]) and he confirmed that the pressure is set at Auto CPAP 13-17 cm H2O     Monico wolff said that the machine must of not taken the pressure change that was attempted on 6/24/22. Monico wolff to reach out to patient.

## 2022-08-22 RX ORDER — ICOSAPENT ETHYL 1000 MG/1
CAPSULE ORAL
Qty: 360 CAPSULE | Refills: 0 | Status: SHIPPED | OUTPATIENT
Start: 2022-08-22 | End: 2022-09-15

## 2022-08-22 RX ORDER — TOPIRAMATE 100 MG/1
TABLET, FILM COATED ORAL
Qty: 90 TABLET | Refills: 0 | Status: SHIPPED | OUTPATIENT
Start: 2022-08-22 | End: 2022-09-15

## 2022-08-23 NOTE — TELEPHONE ENCOUNTER
Chon Donald with [facility] called back today - said that patient brought her machine in yesterday at 4:45 p.m. for the pressure settings change. You can't remote into patient's machine - will need to obtain CR in approx 30 days.

## 2022-08-24 ENCOUNTER — ANTI-COAG VISIT (OUTPATIENT)
Dept: INTERNAL MEDICINE CLINIC | Age: 62
End: 2022-08-24

## 2022-08-24 ENCOUNTER — TELEPHONE (OUTPATIENT)
Dept: INTERNAL MEDICINE CLINIC | Age: 62
End: 2022-08-24

## 2022-08-24 LAB — INR BLD: 1.7

## 2022-08-31 ENCOUNTER — OFFICE VISIT (OUTPATIENT)
Dept: CARDIOLOGY CLINIC | Age: 62
End: 2022-08-31
Payer: COMMERCIAL

## 2022-08-31 VITALS
HEART RATE: 90 BPM | SYSTOLIC BLOOD PRESSURE: 132 MMHG | TEMPERATURE: 98.7 F | HEIGHT: 65 IN | WEIGHT: 293 LBS | OXYGEN SATURATION: 96 % | DIASTOLIC BLOOD PRESSURE: 80 MMHG | BODY MASS INDEX: 48.82 KG/M2

## 2022-08-31 DIAGNOSIS — R94.39 ABNORMAL STRESS TEST: ICD-10-CM

## 2022-08-31 DIAGNOSIS — E78.1 HYPERTRIGLYCERIDEMIA: ICD-10-CM

## 2022-08-31 DIAGNOSIS — I25.118 CORONARY ARTERY DISEASE OF NATIVE ARTERY OF NATIVE HEART WITH STABLE ANGINA PECTORIS (HCC): ICD-10-CM

## 2022-08-31 DIAGNOSIS — E11.69 HYPERLIPIDEMIA ASSOCIATED WITH TYPE 2 DIABETES MELLITUS (HCC): ICD-10-CM

## 2022-08-31 DIAGNOSIS — E78.5 HYPERLIPIDEMIA ASSOCIATED WITH TYPE 2 DIABETES MELLITUS (HCC): ICD-10-CM

## 2022-08-31 DIAGNOSIS — R06.02 SOB (SHORTNESS OF BREATH): ICD-10-CM

## 2022-08-31 DIAGNOSIS — G47.33 SEVERE OBSTRUCTIVE SLEEP APNEA: ICD-10-CM

## 2022-08-31 DIAGNOSIS — I10 PRIMARY HYPERTENSION: Primary | ICD-10-CM

## 2022-08-31 PROBLEM — I25.10 CORONARY ARTERY DISEASE INVOLVING NATIVE CORONARY ARTERY OF NATIVE HEART: Status: ACTIVE | Noted: 2022-08-31

## 2022-08-31 PROCEDURE — 3051F HG A1C>EQUAL 7.0%<8.0%: CPT | Performed by: INTERNAL MEDICINE

## 2022-08-31 PROCEDURE — 99214 OFFICE O/P EST MOD 30 MIN: CPT | Performed by: INTERNAL MEDICINE

## 2022-08-31 RX ORDER — ATORVASTATIN CALCIUM 40 MG/1
40 TABLET, FILM COATED ORAL DAILY
Qty: 90 TABLET | Refills: 3 | Status: SHIPPED | OUTPATIENT
Start: 2022-08-31

## 2022-08-31 RX ORDER — ASPIRIN 81 MG/1
81 TABLET ORAL DAILY
Qty: 90 TABLET | Refills: 3 | Status: SHIPPED | OUTPATIENT
Start: 2022-08-31

## 2022-08-31 NOTE — PATIENT INSTRUCTIONS
Plan:  Current medications reviewed. Refills given as warranted. Coronary artery disease means that you have blockages in the heart arteries. I would recommend that you see a lung specialist  -referral made to [facility]  4. We are going to treat your blockages with medication. -we want to keep your blood pressure and cholesterol as low as possible  5. I want for you to see an endocrinologist to help manage your triglycerides              -referral made to [facility] endocrinologist              -call my office and let me know what he says  6.  Continue to work on the diet changes you have been making.              -increase fruits, vegetables, olive oil, nuts              -limit dairy, fried food, butter, heavy cream sauces     Follow up with me in 6 months, call back in January to make your next appointment

## 2022-08-31 NOTE — PROGRESS NOTES
[address]   Cardiac Consultation    Referring Provider:  Christine Sena MD     Chief Complaint   Patient presents with    Follow-Up from Hospital     S/P CATH    Hypertension    Hyperlipidemia    Other     Shortness of breath      Subjective: Ms. Bakari Crandall is here today to establish cardiology care after [facility]; c/o chronic SOB    History of Present Illness:   Patria Hand is a 65 y.o. female who has PMH HTN, HLD, DM II, +premature FH CAD (brother age 48 known CAD), hypertriglyceridemia, PE x 2 (2007/2006) on warfarin and ELEAZAR. Admitted to [facility] 7/27/2022 with c/o SOB, fatigue and chest pressure. Christine Sena MD manages her coumadin. Admitted with c/o SOB, fatigue and chest pressure x several days. EKG noted NSR; 74 bpm.   LABS: , K+ 3.8, BUN/Cr 19/0.6, Chol 190, Trig 1,413 and H/H 14.2/40.; INR=2.14; D-dimer < 0.27. Admitted to PCU for cardiac work up. ECHO 7/28/22 EF=55%; no WMA; mild LVH; grade I DD normal LV filling pressure (EF=55-60% in 6/11). Karyn nuc 7/29/22 noted moderate sized inferior reversible defect c/w ischemia in territory mid RCA. EF=66%. [facility] 7/29/22 Mild-mod NOCAD; LVEDP 15 mmHg. Today, she reports chronic SOB with walking since she had her PE's. She reports she has been taking fenofibrate for a long time and she takes it every day. She has been taking vascepa for the past year. She is trying to do better with her diet recently but admits to doing much better in past.  Patient denies current edema, chest pain, palpitations, dizziness or syncope. Patient is taking all cardiac medications as prescribed and tolerates them well. Weight today is 297#     Patient is vaccinated against Covid.  Pfizer 2/2    Past Medical History:   has a past medical history of Benign essential hypertension, Benign head tremor, Cholelithiasis, Coronary artery disease involving native coronary artery of native heart, Disease of nasal cavity and sinuses, Fatty infiltration of liver, Hot flash, menopausal, Hyperlipidemia, Hyperlipidemia associated with type 2 diabetes mellitus ([facility] [address] Morbid obesity with BMI of 50.0-59.9, adult ([facility] [address] No history of procedure, Pulmonary embolism ([facility] [address] Type II or unspecified type diabetes mellitus without mention of complication, not stated as uncontrolled, Umbilical hernia, and Unspecified sleep apnea. Surgical History:   has a past surgical history that includes Cholecystectomy, laparoscopic (8/2/2012); hernia repair (10/11/13); Colonoscopy (11/9/2015); Breast surgery (2015); hysteroscopy (03/13/2018); and Dilation and curettage of uterus (03/13/2018). Social History:   reports that she has never smoked. She has never used smokeless tobacco. She reports that she does not drink alcohol and does not use drugs. Family History:  family history includes Diabetes in her brother, mother, and sister; Heart Attack in her brother; Heart Failure in her father; High Blood Pressure in her brother and sister; Hypertension in her mother; Other in her father. Home Medications:  Prior to Admission medications    Medication Sig Start Date End Date Taking?  Authorizing Provider   Icosapent Ethyl (VASCEPA) 1 g CAPS capsule TAKE 2 CAPSULES BY MOUTH  TWICE DAILY 8/22/22  Yes Lyn Martinez MD   topiramate (TOPAMAX) 100 MG tablet TAKE 1 TABLET BY MOUTH  EVERY NIGHT 8/22/22  Yes Lyn Martinez MD   fenofibric acid (TRILIPIX) 135 MG CPDR capsule TAKE 1 CAPSULE BY MOUTH  DAILY 8/16/22  Yes Beckie Zayas MD   aspirin EC 81 MG EC tablet Take 1 tablet by mouth in the morning. 7/29/22  Yes Kael Lovelace DO   atorvastatin (LIPITOR) 40 MG tablet Take 1 tablet by mouth in the morning. 7/29/22  Yes Kael Lovelace DO   FEROSUL 325 (65 Fe) MG tablet TAKE 1 TABLET BY MOUTH EVERY MORNING 6/16/22  Yes Lyn Martinez MD   warfarin (COUMADIN) 5 MG tablet TAKE TWO (2) TABLETS BY MOUTH DAILY OR AS DIRECTED BY YOUR DOCTOR 6/2/22 Yes Lucila Hilliard MD   citalopram (CELEXA) 40 MG tablet TAKE 1 TABLET BY MOUTH  DAILY 5/31/22  Yes Asif Cooper MD   losartan (COZAAR) 50 mg tablet TAKE 1 TABLET BY MOUTH  DAILY 5/31/22  Yes Asif Cooper MD   pantoprazole (PROTONIX) 20 MG tablet TAKE 1 TABLET BY MOUTH  DAILY 5/31/22  Yes Asif Cooper MD   JANUMET XR  MG TB24 per extended release tablet TAKE 1 TABLET BY MOUTH  TWICE DAILY 5/12/22  Yes Lucila Hilliard MD   JARDIANCE 25 MG tablet TAKE 1 TABLET BY MOUTH  DAILY 5/12/22  Yes Lucila Hilliard MD   Multiple Vitamins-Minerals (THERAPEUTIC MULTIVITAMIN-MINERALS) tablet Take 1 tablet by mouth daily   Yes Historical Provider, MD   Ascorbic Acid (VITAMIN C PO) Take by mouth   Yes Historical Provider, MD   warfarin (COUMADIN) 1 MG tablet TAKE AS DIRECTED BY PHYSICIAN 4/13/22  Yes Lucila Hilliard MD   CONTOUR NEXT TEST strip TEST 3 TIMES DAILY 12/14/21  Yes Lucila Hilliard MD   Microlet Lancets MISC TEST 3 TIMES DAILY 12/14/21  Yes Lucila Hilliard MD   Blood Glucose Monitoring Suppl (ONE TOUCH ULTRA 2) w/Device KIT Test three times daily. DX:E11.9 10/7/21  Yes Lucila Hilliard MD   blood glucose test strips (ASCENSIA AUTODISC VI;ONE TOUCH ULTRA TEST VI) strip Test three times daily. DX:E11.9 10/7/21  Yes Lucila Hilliard MD   Lancets MISC Test three times daily. DX:E11.9 10/7/21  Yes Lucila Hilliard MD   ONE TOUCH LANCETS MISC Test daily. DX: E11.9 1/14/20  Yes Lucila Hilliard MD   blood glucose test strips (ASCENSIA AUTODISC VI;ONE TOUCH ULTRA TEST VI) strip Test Daily. DX: E11.9 1/14/20  Yes Lucila Hilliard MD   Omega-3 Fatty Acids (FISH OIL) 1000 MG CPDR Take 2,000 mg by mouth daily   Yes Historical Provider, MD   PRODIGY NO CODING BLOOD GLUC strip TEST BLOOD SUGARS 3 TIMES DAILY 3/15/18  Yes Lucila Hilliard MD   ONE TOUCH LANCETS MISC 1 each by Does not apply route daily.  5/14/14 Yes Gavi Lan MD        Allergies:  Lisinopril, Penicillins, and Sulfa antibiotics     Review of Systems:   Constitutional: there has been no unanticipated weight loss. There's been no change in energy level, sleep pattern, or activity level. Eyes: No visual changes or diplopia. No scleral icterus. ENT: No Headaches, hearing loss or vertigo. No mouth sores or sore throat. Cardiovascular: Reviewed in HPI  Respiratory: No cough or wheezing, no sputum production. No hematemesis. Gastrointestinal: No abdominal pain, appetite loss, blood in stools. No change in bowel or bladder habits. Genitourinary: No dysuria, trouble voiding, or hematuria. Musculoskeletal:  No gait disturbance, weakness or joint complaints. Integumentary: No rash or pruritis. Neurological: No headache, diplopia, change in muscle strength, numbness or tingling. No change in gait, balance, coordination, mood, affect, memory, mentation, behavior. Psychiatric: No anxiety, no depression. Endocrine: No malaise, fatigue or temperature intolerance. No excessive thirst, fluid intake, or urination. No tremor. Hematologic/Lymphatic: No abnormal bruising or bleeding, blood clots or swollen lymph nodes. Allergic/Immunologic: No nasal congestion or hives. Physical Examination:    Vitals:    08/31/22 1309   BP: 132/80   Pulse: 90   Temp: 98.7 °F (37.1 °C)   SpO2: 96%        Constitutional and General Appearance: NAD   Respiratory:  Normal excursion and expansion without use of accessory muscles  Resp Auscultation: Clear, no crackles or wheezes   Cardiovascular: The apical impulses not displaced  Heart tones are crisp and normal  Cervical veins are not engorged  The carotid upstroke is normal in amplitude and contour without delay or bruit  Normal S1S2, No S3, No Murmur  Peripheral pulses are symmetrical and full  There is no clubbing, cyanosis of the extremities.   No edema  Femoral Arteries: 2+ and equal  Pedal Pulses: 2+ and equal Abdomen:  No masses or tenderness  Liver/Spleen: No Abnormalities Noted  Neurological/Psychiatric:  Alert and oriented in all spheres  Moves all extremities well  Exhibits normal gait balance and coordination  No abnormalities of mood, affect, memory, mentation, or behavior are noted  Skin:  Skin: warm and dry. Lab Results   Component Value Date    CHOL 190 07/28/2022    CHOL 182 02/01/2022    CHOL 236 (H) 09/27/2021     Lab Results   Component Value Date    TRIG 1,413 (H) 07/28/2022    TRIG 591 (H) 02/01/2022    TRIG 1,816 (H) 09/27/2021     Lab Results   Component Value Date    HDL 20 (L) 07/28/2022    HDL 27 (L) 06/20/2022    HDL 32 (L) 02/01/2022     Lab Results   Component Value Date    LDLCALC see below 07/28/2022    [address] see below 06/20/2022    LDLCALC see below 02/01/2022     Lab Results   Component Value Date    LABVLDL see below 07/28/2022    LABVLDL see below 06/20/2022    LABVLDL see below 02/01/2022     Lab Results   Component Value Date    CHOLHDLRATIO 6.1 (H) 10/27/2011     I have personally reviewed all labs including lipids 7/28/22    Assessment:     1. Primary hypertension: Well controlled and will continue current medical regimen. 2. Abnormal stress test: See #4 below     3. Hyperlipidemia associated with type 2 diabetes mellitus ([facility] [address]): See #5 below         4. Coronary artery disease of native artery of native heart with stable angina pectoris [facility]): [facility] 7/29/22 Mild-mod NOCAD; LVEDP 15 mmHg. Performed due to CP and SOB symptoms with abnormal nuc imaging study c/w possible ischemia RCA territory. Need to be aggressive with med mgt and lipids. 5. Hypertriglyceridemia: I personally reviewed most recent labs from 7/28/22. Her HDL is chronically low and TG severely elevated. She takes fenofibrate and vascepa regularly but admits to poor diet. Counselled for better dietary habits and will refer to endocrine specialist to see if any other treatment possible for TG.  Reports family members with high TG. Results have been elevated but better in past.          Plan:  Current medications reviewed. Refills given as warranted. Coronary artery disease means that you have blockages in the heart arteries. I would recommend that you see a lung specialist  -referral made to [facility]  4. We are going to treat your blockages with medication. -we want to keep your blood pressure and cholesterol as low as possible  5. I want for you to see an endocrinologist to help manage your triglycerides   -referral made to [facility] endocrinologist   -call my office and let me know what he says  6. Continue to work on the diet changes you have been making.   -increase fruits, vegetables, olive oil, nuts   -limit dairy, fried food, butter, heavy cream sauces    Follow up with me in 6 months    This note is scribed in the presence of Dr. Marco Saini. Sudha Mc by Daphne Fam RN.    I, Dr. Jeff Matthews, personally performed the services described in this documentation, as scribed by the above signed scribe in my presence. It is both accurate and complete to my knowledge. I agree with the details independently gathered by the clinical support staff, while the remaining scribed note accurately describes my personal service to the patient. Cost of prescription medications and patient compliance have been reviewed with patient. All questions answered. Thank you for allowing me to participate in the care of this individual.    Marco Saini.  Sudha Mc M.D., [facility]

## 2022-09-01 RX ORDER — WARFARIN SODIUM 1 MG/1
TABLET ORAL
Qty: 90 TABLET | Refills: 0 | Status: SHIPPED | OUTPATIENT
Start: 2022-09-01

## 2022-09-01 RX ORDER — WARFARIN SODIUM 5 MG/1
TABLET ORAL
Qty: 180 TABLET | Refills: 0 | Status: SHIPPED | OUTPATIENT
Start: 2022-09-01

## 2022-09-01 RX ORDER — PANTOPRAZOLE SODIUM 20 MG/1
TABLET, DELAYED RELEASE ORAL
Qty: 90 TABLET | Refills: 0 | Status: SHIPPED | OUTPATIENT
Start: 2022-09-01 | End: 2022-09-15

## 2022-09-09 RX ORDER — CITALOPRAM 40 MG/1
TABLET ORAL
Qty: 90 TABLET | Refills: 0 | Status: SHIPPED | OUTPATIENT
Start: 2022-09-09 | End: 2022-11-21

## 2022-09-14 ENCOUNTER — TELEPHONE (OUTPATIENT)
Dept: INTERNAL MEDICINE CLINIC | Age: 62
End: 2022-09-14

## 2022-09-14 ENCOUNTER — ANTI-COAG VISIT (OUTPATIENT)
Dept: INTERNAL MEDICINE CLINIC | Age: 62
End: 2022-09-14

## 2022-09-14 LAB — INR BLD: 1.4

## 2022-09-14 NOTE — TELEPHONE ENCOUNTER
----- Message from Court Rm MD sent at 9/14/2022  2:30 PM EDT -----  Contact: [phone]  Would she consider changing to Eliquis or Xarelto  ----- Message -----  From: Arisai Quesadady  Sent: 9/14/2022   1:11 PM EDT  To: Court Rm MD    INR: 1.4  Currently taking 11 mg daily. She has not missed any doses and has not changed anything.

## 2022-09-14 NOTE — TELEPHONE ENCOUNTER
Patient informed. Patient just received a 90 day supply of coumadin. She is going to finish that before starting Xarelto or Eliquis. Per Dr Tierney Schultz patient informed to call us a week before she is about to run out.

## 2022-09-14 NOTE — TELEPHONE ENCOUNTER
----- Message from Naman Camejo sent at 9/14/2022  4:07 PM EDT -----  Contact: [phone]  Per Dr Clementina Buchanan take 15 mg for 3 days then continue with 11 mg and re 1 week  ----- Message -----  From: Amita Quiñones  Sent: 9/14/2022   3:57 PM EDT  To: Kenneth Tinajero MD    Patient is going to contact her insurance first.  What do you want her to do for now?  ----- Message -----  From: Kenneth Tinajero MD  Sent: 9/14/2022   3:21 PM EDT  To: Amita Quiñones    The advantages of Eliquis and Xarelto would be that she would not have to worry about checking PT/INR. There has been a lot of fluctuations with her INR. That is what concerns me the most.  The only disadvantageous that Eliquis and Xarelto are more expensive. They are however well covered by insurance. ----- Message -----  From: Amita Bonnermarcelle  Sent: 9/14/2022   3:18 PM EDT  To: Kenneth Tinajero MD    Patient states that is up to you  Patient would like us to call her at [phone] ext [phone] if before 4:30  ----- Message -----  From: Kenneth Tinajero MD  Sent: 9/14/2022   2:31 PM EDT  To: Amita Quiñones    Would she consider changing to Eliquis or Xarelto  ----- Message -----  From: Aminataneto Ishmael  Sent: 9/14/2022   1:11 PM EDT  To: Kenneth Tinajero MD    INR: 1.4  Currently taking 11 mg daily. She has not missed any doses and has not changed anything.

## 2022-09-15 RX ORDER — TOPIRAMATE 100 MG/1
TABLET, FILM COATED ORAL
Qty: 90 TABLET | Refills: 3 | Status: SHIPPED | OUTPATIENT
Start: 2022-09-15

## 2022-09-15 RX ORDER — ICOSAPENT ETHYL 1000 MG/1
CAPSULE ORAL
Qty: 360 CAPSULE | Refills: 3 | Status: SHIPPED | OUTPATIENT
Start: 2022-09-15

## 2022-09-15 RX ORDER — PANTOPRAZOLE SODIUM 20 MG/1
TABLET, DELAYED RELEASE ORAL
Qty: 90 TABLET | Refills: 3 | Status: SHIPPED | OUTPATIENT
Start: 2022-09-15

## 2022-09-15 RX ORDER — LOSARTAN POTASSIUM 50 MG/1
50 TABLET ORAL DAILY
Qty: 90 TABLET | Refills: 3 | Status: SHIPPED | OUTPATIENT
Start: 2022-09-15

## 2022-09-21 ENCOUNTER — TELEPHONE (OUTPATIENT)
Dept: INTERNAL MEDICINE CLINIC | Age: 62
End: 2022-09-21

## 2022-09-21 ENCOUNTER — ANTI-COAG VISIT (OUTPATIENT)
Dept: INTERNAL MEDICINE CLINIC | Age: 62
End: 2022-09-21

## 2022-09-21 LAB — INR BLD: 1.8

## 2022-09-21 NOTE — TELEPHONE ENCOUNTER
----- Message from Ellie Gamez MD sent at 9/21/2022  2:25 PM EDT -----  Increase to 212 mg dailyand recheck INR in one week  ----- Message -----  From: Michael Rosenbaum  Sent: 9/21/2022   1:52 PM EDT  To: Ellie Gamez MD    INR- 1.8    Taking 11 mg daily    Last INR- 1.4

## 2022-09-23 NOTE — TELEPHONE ENCOUNTER
Spoke to patient and she said that she will try to take her machine to [employer] on Tuesday for download

## 2022-09-28 NOTE — TELEPHONE ENCOUNTER
Noted. Patient did not follow-up as recommended. Patient does have appointment with Dr. Hermelinda Jose on 10/6/2022.

## 2022-09-28 NOTE — TELEPHONE ENCOUNTER
Called and spoke to [place] at [address] and she said that the patient did not bring PAP in for download

## 2022-09-29 ENCOUNTER — TELEPHONE (OUTPATIENT)
Dept: INTERNAL MEDICINE CLINIC | Age: 62
End: 2022-09-29

## 2022-09-29 ENCOUNTER — ANTI-COAG VISIT (OUTPATIENT)
Dept: INTERNAL MEDICINE CLINIC | Age: 62
End: 2022-09-29

## 2022-09-29 LAB — INR BLD: 1.8

## 2022-09-29 NOTE — TELEPHONE ENCOUNTER
----- Message from Manjinder Oneil MD sent at 9/29/2022  3:04 PM EDT -----  Same   2 weeks   ----- Message -----  From: Josse Townsend  Sent: 9/29/2022   1:30 PM EDT  To: MD Dr. [employer] pt-  INR- 1.8    12 mg daily

## 2022-10-03 ENCOUNTER — TELEPHONE (OUTPATIENT)
Dept: INTERNAL MEDICINE CLINIC | Age: 62
End: 2022-10-03

## 2022-10-03 DIAGNOSIS — J06.9 UPPER RESPIRATORY TRACT INFECTION, UNSPECIFIED TYPE: Primary | ICD-10-CM

## 2022-10-03 RX ORDER — AZITHROMYCIN 250 MG/1
TABLET, FILM COATED ORAL
Qty: 1 PACKET | Refills: 0 | Status: SHIPPED | OUTPATIENT
Start: 2022-10-03

## 2022-10-03 NOTE — TELEPHONE ENCOUNTER
----- Message from Kamilah Duffy MD sent at 10/3/2022  2:42 PM EDT -----  Contact: Ifeoma Franklin [phone]  [facility]  ----- Message -----  From: Cintia Rosado  Sent: 10/3/2022   1:12 PM EDT  To: Kamilah Duffy MD    Patient states she has cough, congestion, fatigue, and nasal drainage. Patient states this has been going on for about 2 weeks and she's tried multiple over the counter medications which are not providing relief. Patient has tested negative for covid 3 times and is requesting medication.    [address]

## 2022-10-03 NOTE — TELEPHONE ENCOUNTER
----- Message from Marlon Cordova sent at 10/3/2022  2:53 PM EDT -----  Contact: Laith Shay [phone]  Drug-interaction with warfarin.  ----- Message -----  From: Radha Reid MD  Sent: 10/3/2022   2:42 PM EDT  To: Gail Guzmán  ----- Message -----  From: Melani Hope  Sent: 10/3/2022   1:12 PM EDT  To: Radha Reid MD    Patient states she has cough, congestion, fatigue, and nasal drainage. Patient states this has been going on for about 2 weeks and she's tried multiple over the counter medications which are not providing relief. Patient has tested negative for covid 3 times and is requesting medication.    [address]

## 2022-10-06 ENCOUNTER — HOSPITAL ENCOUNTER (OUTPATIENT)
Age: 62
Discharge: HOME | End: 2022-10-06
Payer: COMMERCIAL

## 2022-10-06 ENCOUNTER — OFFICE VISIT (OUTPATIENT)
Dept: PULMONOLOGY | Age: 62
End: 2022-10-06
Payer: COMMERCIAL

## 2022-10-06 VITALS
DIASTOLIC BLOOD PRESSURE: 64 MMHG | OXYGEN SATURATION: 95 % | HEIGHT: 65 IN | BODY MASS INDEX: 49.42 KG/M2 | SYSTOLIC BLOOD PRESSURE: 124 MMHG | HEART RATE: 95 BPM

## 2022-10-06 DIAGNOSIS — R53.83 OTHER FATIGUE: Primary | ICD-10-CM

## 2022-10-06 DIAGNOSIS — R05.8 DRY COUGH: ICD-10-CM

## 2022-10-06 DIAGNOSIS — R53.83 OTHER FATIGUE: ICD-10-CM

## 2022-10-06 DIAGNOSIS — R06.02 SOB (SHORTNESS OF BREATH): ICD-10-CM

## 2022-10-06 LAB — TSH REFLEX: 1.29 UIU/ML (ref 0.27–4.2)

## 2022-10-06 PROCEDURE — 99214 OFFICE O/P EST MOD 30 MIN: CPT | Performed by: INTERNAL MEDICINE

## 2022-10-06 PROCEDURE — 82785 ASSAY OF IGE: CPT

## 2022-10-06 PROCEDURE — 36415 COLL VENOUS BLD VENIPUNCTURE: CPT

## 2022-10-06 PROCEDURE — 86003 ALLG SPEC IGE CRUDE XTRC EA: CPT

## 2022-10-06 PROCEDURE — 84443 ASSAY THYROID STIM HORMONE: CPT

## 2022-10-06 RX ORDER — BUDESONIDE AND FORMOTEROL FUMARATE DIHYDRATE 160; 4.5 UG/1; UG/1
2 AEROSOL RESPIRATORY (INHALATION) 2 TIMES DAILY
Qty: 1 EACH | Refills: 5 | Status: SHIPPED | OUTPATIENT
Start: 2022-10-06

## 2022-10-06 RX ORDER — AZELASTINE 1 MG/ML
1 SPRAY, METERED NASAL 2 TIMES DAILY
Qty: 30 ML | Refills: 5 | Status: SHIPPED | OUTPATIENT
Start: 2022-10-06

## 2022-10-06 ASSESSMENT — SLEEP AND FATIGUE QUESTIONNAIRES
HOW LIKELY ARE YOU TO NOD OFF OR FALL ASLEEP IN A CAR, WHILE STOPPED FOR A FEW MINUTES IN TRAFFIC: 1
HOW LIKELY ARE YOU TO NOD OFF OR FALL ASLEEP WHILE SITTING AND READING: 2
HOW LIKELY ARE YOU TO NOD OFF OR FALL ASLEEP WHILE SITTING INACTIVE IN A PUBLIC PLACE: 1
HOW LIKELY ARE YOU TO NOD OFF OR FALL ASLEEP WHILE SITTING QUIETLY AFTER LUNCH WITHOUT ALCOHOL: 2
ESS TOTAL SCORE: 15
HOW LIKELY ARE YOU TO NOD OFF OR FALL ASLEEP WHEN YOU ARE A PASSENGER IN A CAR FOR AN HOUR WITHOUT A BREAK: 3
HOW LIKELY ARE YOU TO NOD OFF OR FALL ASLEEP WHILE WATCHING TV: 2
HOW LIKELY ARE YOU TO NOD OFF OR FALL ASLEEP WHILE LYING DOWN TO REST IN THE AFTERNOON WHEN CIRCUMSTANCES PERMIT: 3
NECK CIRCUMFERENCE (INCHES): 17
HOW LIKELY ARE YOU TO NOD OFF OR FALL ASLEEP WHILE SITTING AND TALKING TO SOMEONE: 1

## 2022-10-06 NOTE — PROGRESS NOTES
[facility]                                                                  CHIEF COMPLAINT: SOB/cough         HPI:   SOB for 6 years. Worse with exertion and better with resting. Dry cough for 2 weeks. No sputum  Dry cough   No wheezes  Able to walk 1/2 block   No asthma  Seasonal allergy worse as she got olders: trees, pollens, mold       Past Medical History:   Diagnosis Date    Benign essential hypertension 11/14/2017    Benign head tremor 5/14/2014    Cholelithiasis     Coronary artery disease involving native coronary artery of native heart 8/31/2022    Disease of nasal cavity and sinuses     Fatty infiltration of liver     Hot flash, menopausal 7/22/2015    Hyperlipidemia     Hyperlipidemia associated with type 2 diabetes mellitus ([facility] [address] 10/23/2015    Morbid obesity with BMI of 50.0-59.9, adult ([facility] [address] 10/23/2015    No history of procedure 11/9/2015    no past colonoscopy    Pulmonary embolism (HCC)     Type II or unspecified type diabetes mellitus without mention of complication, not stated as uncontrolled     Umbilical hernia 81/9/3364    Unspecified sleep apnea        Past Surgical History:        Procedure Laterality Date    BREAST SURGERY  2015    CHOLECYSTECTOMY, LAPAROSCOPIC  8/2/2012    COLONOSCOPY  11/9/2015    cecal polyp    DILATION AND CURETTAGE OF UTERUS  03/13/2018    HERNIA REPAIR  44/93/35    lap umbilical    HYSTEROSCOPY  03/13/2018    and D&C       Allergies:  is allergic to lisinopril, penicillins, and sulfa antibiotics. Social History:    TOBACCO:   reports that she has never smoked. She has never used smokeless tobacco.  ETOH:   reports no history of alcohol use.       Family History:       Problem Relation Age of Onset    Hypertension Mother     Diabetes Mother     Heart Failure Father     Other Father         AAA    High Blood Pressure Sister     Diabetes Sister     High Blood Pressure Brother     Heart Attack Brother     Diabetes Brother        Current Medications:    Current Outpatient Medications:     azelastine (ASTELIN) 0.1 % nasal spray, 1 spray by Nasal route 2 times daily Use in each nostril as directed, Disp: 30 mL, Rfl: 5    SYMBICORT 160-4.5 MCG/ACT AERO, Inhale 2 puffs into the lungs 2 times daily, Disp: 1 each, Rfl: 5    azithromycin (ZITHROMAX) 250 MG tablet, Take 2 tabs (500 MG) on Day 1, and take 1 tab (250 MG) on days 2-5., Disp: 1 packet, Rfl: 0    losartan (COZAAR) 50 MG tablet, TAKE 1 TABLET BY MOUTH  DAILY, Disp: 90 tablet, Rfl: 3    pantoprazole (PROTONIX) 20 MG tablet, TAKE 1 TABLET BY MOUTH  DAILY, Disp: 90 tablet, Rfl: 3    topiramate (TOPAMAX) 100 MG tablet, TAKE 1 TABLET BY MOUTH AT  NIGHT, Disp: 90 tablet, Rfl: 3    Icosapent Ethyl (VASCEPA) 1 g CAPS capsule, TAKE 2 CAPSULES BY MOUTH  TWICE DAILY, Disp: 360 capsule, Rfl: 3    citalopram (CELEXA) 40 MG tablet, TAKE 1 TABLET BY MOUTH  DAILY, Disp: 90 tablet, Rfl: 0    warfarin (COUMADIN) 1 MG tablet, TAKE 1 TABLET BY MOUTH  DAILY OR AS DIRECTED BY  PHYSICIAN, Disp: 90 tablet, Rfl: 0    warfarin (COUMADIN) 5 MG tablet, TAKE 2 TABLETS BY MOUTH  DAILY OR AS DIRECTED BY  YOUR DOCTOR, Disp: 180 tablet, Rfl: 0    aspirin EC 81 MG EC tablet, Take 1 tablet by mouth daily, Disp: 90 tablet, Rfl: 3    atorvastatin (LIPITOR) 40 MG tablet, Take 1 tablet by mouth daily, Disp: 90 tablet, Rfl: 3    fenofibric acid (TRILIPIX) 135 MG CPDR capsule, TAKE 1 CAPSULE BY MOUTH  DAILY, Disp: 90 capsule, Rfl: 0    FEROSUL 325 (65 Fe) MG tablet, TAKE 1 TABLET BY MOUTH EVERY MORNING, Disp: 100 tablet, Rfl: 0    JANUMET XR  MG TB24 per extended release tablet, TAKE 1 TABLET BY MOUTH  TWICE DAILY, Disp: 180 tablet, Rfl: 3    JARDIANCE 25 MG tablet, TAKE 1 TABLET BY MOUTH  DAILY, Disp: 90 tablet, Rfl: 3    Multiple Vitamins-Minerals (THERAPEUTIC MULTIVITAMIN-MINERALS) tablet, Take 1 tablet by mouth daily, Disp: , Rfl:     Ascorbic Acid (VITAMIN C PO), Take by mouth, Disp: , Rfl:     CONTOUR NEXT TEST strip, TEST 3 TIMES DAILY, Disp: 300 strip, Rfl: 3    Microlet Lancets MISC, TEST 3 TIMES DAILY, Disp: 300 each, Rfl: 3    Blood Glucose Monitoring Suppl (ONE TOUCH ULTRA 2) w/Device KIT, Test three times daily. DX:E11.9, Disp: 1 kit, Rfl: 0    blood glucose test strips (ASCENSIA AUTODISC VI;ONE TOUCH ULTRA TEST VI) strip, Test three times daily. DX:E11.9, Disp: 100 each, Rfl: 3    Lancets MISC, Test three times daily. DX:E11.9, Disp: 100 each, Rfl: 3    ONE TOUCH LANCETS MISC, Test daily. DX: E11.9, Disp: 100 each, Rfl: 3    blood glucose test strips (ASCENSIA AUTODISC VI;ONE TOUCH ULTRA TEST VI) strip, Test Daily. DX: E11.9, Disp: 100 each, Rfl: 3    Omega-3 Fatty Acids (FISH OIL) 1000 MG CPDR, Take 2,000 mg by mouth daily, Disp: , Rfl:     PRODIGY NO CODING BLOOD GLUC strip, TEST BLOOD SUGARS 3 TIMES DAILY, Disp: 100 each, Rfl: 2    ONE TOUCH LANCETS MISC, 1 each by Does not apply route daily. , Disp: 100 each, Rfl: 3      Objective:   PHYSICAL EXAM:    Blood pressure 124/64, pulse 95, height 5' 5\" (1.651 m), last menstrual period 08/20/2014, SpO2 95 %.' on RA  Gen: No distress. Obese. BMI of 49.42   Eyes: PERRL. No sclera icterus. No conjunctival injection. ENT: No discharge. Pharynx clear. Mallampati class IV. Neck: Trachea midline. No obvious mass. Neck circumference 17\"  Resp: No accessory muscle use. No crackles. No wheezes. No rhonchi. No dullness on percussion. Good air entry. CV: Regular rate. Regular rhythm. No murmur or rub. No edema. GI: Non-tender. Non-distended. No hernia. Skin: Warm and dry. No nodule on exposed extremities. Lymph: No cervical LAD. No supraclavicular LAD. M/S: No cyanosis. No joint deformity. No clubbing. Neuro: Awake. Alert. Moves all four extremities. Psych: Oriented x 3. No anxiety.          DATA reviewed by me:   Hb 14.7     CXR 7/27/22  images reviewed by me and showed:   No acute cardiopulmonary disease       Echo 7/28/22 LV systolic function is normal with Ef estimated at 55%. No regional wall motion abnormalities. There is mild concentric left ventricular hypertrophy. Grade I diastolic dysfunction with normal left ventricular filling pressure. Inadequate tricuspid regurgitation jet to estimate systolic artery pressure        Split night 1/29/16: AHI 70 and low O2 82%, improved sleep related breathing disorder with CPAP therapy. Started on AutoCPAP min pressure of 12 cmH2O and max pressure of 16 cmH2O. CPAP data 09/06-10/05 2022 reviewed by me. Uses  8-9 hrs/night with 100% compliance and AHI of  0.6. Median 14.8 and [address]. APAP 14-18        Assessment:       Severe ELEAZAR on CPAP 12 cmH2O. Nasal mask. Optimal compliance and efficacy upon review today. Dyspnea on exertion. Morbid obesity and deconditioning are contributing. Cannot exclude underlying asthma given seasonal allergy and cough  Seasonal allergy worse as she got olders: trees, pollens, mold   Allergic rhinitnis/PND  History of PE 2005 and 2006. Chronically on Coumadin. Plan:       PFTs and 6MW  TSH  CXR PA and lateral   IgE and immnocaop   Trial of Symbicort 160/4.5 2 puffs BID   Azelastine 137 mcg/spray (Astelin) 2 sprays/nostril BID PRN   Order for CPAP supplies  Continue CPAP 6-8 hrs at night and during naps.   Follow up in 4-6 weeks

## 2022-10-12 LAB
2000687N OAK TREE IGE: <0.1 KU/L (ref 0–0.34)
ALLERGEN BARLEY IGE: <0.1
ALLERGEN BEEF: <0.1
ALLERGEN BERMUDA GRASS IGE: <0.1 KU/L (ref 0–0.34)
ALLERGEN BIRCH IGE: <0.1 KU/L (ref 0–0.34)
ALLERGEN CABBAGE IGE: <0.1
ALLERGEN CARROT IGE: <0.1
ALLERGEN CHICKEN IGE: <0.1
ALLERGEN CODFISH IGE: <0.1
ALLERGEN CORN IGE: <0.1
ALLERGEN COW MILK IGE: <0.1
ALLERGEN CRAB IGE: <0.1
ALLERGEN DOG DANDER IGE: <0.1 KU/L (ref 0–0.34)
ALLERGEN EGG WHITE IGE: <0.1
ALLERGEN GERMAN COCKROACH IGE: <0.1 KU/L (ref 0–0.34)
ALLERGEN GRAPE IGE: <0.1
ALLERGEN HORMODENDRUM IGE: <0.1 KUL/L (ref 0–0.34)
ALLERGEN LETTUCE IGE: <0.1
ALLERGEN MOUSE EPITHELIA IGE: <0.1 KU/L (ref 0–0.34)
ALLERGEN NAVY BEAN: <0.1
ALLERGEN OAT: <0.1
ALLERGEN ORANGE IGE: <0.1
ALLERGEN PEANUT (F13) IGE: <0.1
ALLERGEN PECAN TREE IGE: <0.1 KU/L (ref 0–0.34)
ALLERGEN PEPPER C. ANNUUM IGE: <0.1
ALLERGEN PIGWEED ROUGH IGE: <0.1 KU/L (ref 0–0.34)
ALLERGEN PORK: <0.1
ALLERGEN RICE IGE: <0.1
ALLERGEN RYE IGE: <0.1
ALLERGEN SHEEP SORREL (W18) IGE: <0.1 KU/L (ref 0–0.34)
ALLERGEN SOYBEAN IGE: <0.1
ALLERGEN TOMATO IGE: 0.14
ALLERGEN TREE SYCAMORE: <0.1 KU/L (ref 0–0.34)
ALLERGEN TUNA IGE: <0.1
ALLERGEN WALNUT TREE IGE: <0.1 KU/L (ref 0–0.34)
ALLERGEN WHEAT IGE: <0.1
ALLERGEN WHITE MULBERRY TREE, IGE: <0.1 KU/L (ref 0–0.34)
ALLERGEN, TREE, WHITE ASH IGE: <0.1 KU/L (ref 0–0.34)
ALTERNARIA ALTERNATA: <0.1 KU/L (ref 0–0.34)
ASPERGILLUS FUMIGATUS: <0.1 KU/L (ref 0–0.34)
CAT DANDER ANTIBODY: <0.1 KU/L (ref 0–0.34)
COTTONWOOD TREE: <0.1 KU/L (ref 0–0.34)
D. FARINAE: <0.1 KU/L (ref 0–0.34)
D. PTERONYSSINUS: <0.1 KU/L (ref 0–0.34)
ELM TREE: <0.1 KU/L (ref 0–0.34)
IGE: 38 IU/ML
MAPLE/BOXELDER TREE: <0.1 KU/L (ref 0–0.34)
MOUNTAIN CEDAR TREE: <0.1 KU/L (ref 0–0.34)
MUCOR RACEMOSUS: <0.1 KU/L (ref 0–0.34)
P. NOTATUM: <0.1 KU/L (ref 0–0.34)
POTATO, IGE: <0.1
RUSSIAN THISTLE: <0.1 KU/L (ref 0–0.34)
SHORT RAGWD(A ARTEMIS.) IGE: <0.1 KU/L (ref 0–0.34)
SHRIMP: <0.1
TIMOTHY GRASS: <0.1 KU/L (ref 0–0.34)

## 2022-10-13 ENCOUNTER — TELEPHONE (OUTPATIENT)
Dept: INTERNAL MEDICINE CLINIC | Age: 62
End: 2022-10-13

## 2022-10-13 ENCOUNTER — ANTI-COAG VISIT (OUTPATIENT)
Dept: INTERNAL MEDICINE CLINIC | Age: 62
End: 2022-10-13

## 2022-10-13 LAB — INR BLD: 1.9

## 2022-10-13 NOTE — TELEPHONE ENCOUNTER
----- Message from Daina Hairston sent at 10/13/2022 10:40 AM EDT -----  Per Dr. Vidya Bryant- raymond, 2 weeks.   ----- Message -----  From: Daina Hairston  Sent: 10/13/2022   9:45 AM EDT  To: Javier Andrade MD    INR- 1.9    Last INR- 1.8

## 2022-10-19 RX ORDER — FERROUS SULFATE 325(65) MG
TABLET ORAL
Qty: 90 TABLET | Refills: 3 | Status: SHIPPED | OUTPATIENT
Start: 2022-10-19

## 2022-10-26 ENCOUNTER — ANTI-COAG VISIT (OUTPATIENT)
Dept: INTERNAL MEDICINE CLINIC | Age: 62
End: 2022-10-26

## 2022-10-26 ENCOUNTER — OFFICE VISIT (OUTPATIENT)
Dept: INTERNAL MEDICINE CLINIC | Age: 62
End: 2022-10-26

## 2022-10-26 ENCOUNTER — TELEPHONE (OUTPATIENT)
Dept: INTERNAL MEDICINE CLINIC | Age: 62
End: 2022-10-26

## 2022-10-26 VITALS
DIASTOLIC BLOOD PRESSURE: 80 MMHG | HEART RATE: 70 BPM | HEIGHT: 65 IN | WEIGHT: 293 LBS | SYSTOLIC BLOOD PRESSURE: 130 MMHG | RESPIRATION RATE: 12 BRPM | BODY MASS INDEX: 48.82 KG/M2

## 2022-10-26 DIAGNOSIS — I27.82 OTHER CHRONIC PULMONARY EMBOLISM WITHOUT ACUTE COR PULMONALE (HCC): ICD-10-CM

## 2022-10-26 DIAGNOSIS — Z00.00 ROUTINE GENERAL MEDICAL EXAMINATION AT A HEALTH CARE FACILITY: ICD-10-CM

## 2022-10-26 DIAGNOSIS — E11.69 HYPERLIPIDEMIA ASSOCIATED WITH TYPE 2 DIABETES MELLITUS (HCC): ICD-10-CM

## 2022-10-26 DIAGNOSIS — I10 BENIGN ESSENTIAL HYPERTENSION: ICD-10-CM

## 2022-10-26 DIAGNOSIS — E78.1 HYPERTRIGLYCERIDEMIA: ICD-10-CM

## 2022-10-26 DIAGNOSIS — Z00.00 ENCOUNTER FOR WELL ADULT EXAM WITHOUT ABNORMAL FINDINGS: Primary | ICD-10-CM

## 2022-10-26 DIAGNOSIS — E66.01 MORBID OBESITY WITH BMI OF 50.0-59.9, ADULT (HCC): ICD-10-CM

## 2022-10-26 DIAGNOSIS — E78.5 HYPERLIPIDEMIA ASSOCIATED WITH TYPE 2 DIABETES MELLITUS (HCC): ICD-10-CM

## 2022-10-26 DIAGNOSIS — E11.9 TYPE 2 DIABETES MELLITUS WITHOUT COMPLICATION, WITHOUT LONG-TERM CURRENT USE OF INSULIN (HCC): ICD-10-CM

## 2022-10-26 DIAGNOSIS — I25.118 CORONARY ARTERY DISEASE OF NATIVE ARTERY OF NATIVE HEART WITH STABLE ANGINA PECTORIS (HCC): ICD-10-CM

## 2022-10-26 DIAGNOSIS — G47.33 SEVERE OBSTRUCTIVE SLEEP APNEA: ICD-10-CM

## 2022-10-26 LAB
A/G RATIO: 2 (ref 1.1–2.2)
ALBUMIN SERPL-MCNC: 4.5 G/DL (ref 3.4–5)
ALP BLD-CCNC: 72 U/L (ref 40–129)
ALT SERPL-CCNC: 9 U/L (ref 10–40)
ANION GAP SERPL CALCULATED.3IONS-SCNC: 19 MMOL/L (ref 3–16)
AST SERPL-CCNC: 17 U/L (ref 15–37)
BASOPHILS ABSOLUTE: 0.1 K/UL (ref 0–0.2)
BASOPHILS RELATIVE PERCENT: 1.5 %
BILIRUB SERPL-MCNC: 0.3 MG/DL (ref 0–1)
BILIRUBIN, POC: NORMAL
BLOOD URINE, POC: NORMAL
BUN BLDV-MCNC: 21 MG/DL (ref 7–20)
CALCIUM SERPL-MCNC: 9.6 MG/DL (ref 8.3–10.6)
CHLORIDE BLD-SCNC: 103 MMOL/L (ref 99–110)
CHOLESTEROL, TOTAL: 123 MG/DL (ref 0–199)
CLARITY, POC: NORMAL
CO2: 20 MMOL/L (ref 21–32)
COLOR, POC: NORMAL
CREAT SERPL-MCNC: 0.8 MG/DL (ref 0.6–1.2)
CREATININE URINE: 68 MG/DL (ref 28–259)
EOSINOPHILS ABSOLUTE: 0.2 K/UL (ref 0–0.6)
EOSINOPHILS RELATIVE PERCENT: 3.3 %
GFR SERPL CREATININE-BSD FRML MDRD: >60 ML/MIN/{1.73_M2}
GLUCOSE BLD-MCNC: 207 MG/DL (ref 70–99)
GLUCOSE URINE, POC: NORMAL
HCT VFR BLD CALC: 43.8 % (ref 36–48)
HDLC SERPL-MCNC: 34 MG/DL (ref 40–60)
HEMOGLOBIN: 15.1 G/DL (ref 12–16)
INR BLD: 1.59 (ref 0.87–1.14)
INR BLD: 1.7
KETONES, POC: NORMAL
LDL CHOLESTEROL CALCULATED: ABNORMAL MG/DL
LDL CHOLESTEROL DIRECT: 30 MG/DL
LEUKOCYTE EST, POC: NORMAL
LYMPHOCYTES ABSOLUTE: 1.2 K/UL (ref 1–5.1)
LYMPHOCYTES RELATIVE PERCENT: 21.5 %
MCH RBC QN AUTO: 31.1 PG (ref 26–34)
MCHC RBC AUTO-ENTMCNC: 34.5 G/DL (ref 31–36)
MCV RBC AUTO: 90.2 FL (ref 80–100)
MICROALBUMIN UR-MCNC: 1.4 MG/DL
MICROALBUMIN/CREAT UR-RTO: 20.6 MG/G (ref 0–30)
MONOCYTES ABSOLUTE: 0.5 K/UL (ref 0–1.3)
MONOCYTES RELATIVE PERCENT: 8.5 %
NEUTROPHILS ABSOLUTE: 3.7 K/UL (ref 1.7–7.7)
NEUTROPHILS RELATIVE PERCENT: 65.2 %
NITRITE, POC: NORMAL
PDW BLD-RTO: 15 % (ref 12.4–15.4)
PH, POC: NORMAL
PLATELET # BLD: 141 K/UL (ref 135–450)
PMV BLD AUTO: 9 FL (ref 5–10.5)
POTASSIUM SERPL-SCNC: 4 MMOL/L (ref 3.5–5.1)
PROTEIN, POC: NORMAL
PROTHROMBIN TIME: 18.9 SEC (ref 11.7–14.5)
RBC # BLD: 4.85 M/UL (ref 4–5.2)
SODIUM BLD-SCNC: 142 MMOL/L (ref 136–145)
SPECIFIC GRAVITY, POC: NORMAL
TOTAL PROTEIN: 6.8 G/DL (ref 6.4–8.2)
TRIGL SERPL-MCNC: 468 MG/DL (ref 0–150)
UROBILINOGEN, POC: NORMAL
VLDLC SERPL CALC-MCNC: ABNORMAL MG/DL
WBC # BLD: 5.6 K/UL (ref 4–11)

## 2022-10-26 PROCEDURE — 3078F DIAST BP <80 MM HG: CPT | Performed by: INTERNAL MEDICINE

## 2022-10-26 PROCEDURE — 90471 IMMUNIZATION ADMIN: CPT | Performed by: INTERNAL MEDICINE

## 2022-10-26 PROCEDURE — 90750 HZV VACC RECOMBINANT IM: CPT | Performed by: INTERNAL MEDICINE

## 2022-10-26 PROCEDURE — 81002 URINALYSIS NONAUTO W/O SCOPE: CPT | Performed by: INTERNAL MEDICINE

## 2022-10-26 PROCEDURE — 99396 PREV VISIT EST AGE 40-64: CPT | Performed by: INTERNAL MEDICINE

## 2022-10-26 PROCEDURE — 3074F SYST BP LT 130 MM HG: CPT | Performed by: INTERNAL MEDICINE

## 2022-10-26 ASSESSMENT — ENCOUNTER SYMPTOMS
SHORTNESS OF BREATH: 0
ABDOMINAL PAIN: 0
WHEEZING: 0
NAUSEA: 0
RHINORRHEA: 0
VOMITING: 0

## 2022-10-26 ASSESSMENT — PATIENT HEALTH QUESTIONNAIRE - PHQ9
SUM OF ALL RESPONSES TO PHQ QUESTIONS 1-9: 0
2. FEELING DOWN, DEPRESSED OR HOPELESS: 0
SUM OF ALL RESPONSES TO PHQ QUESTIONS 1-9: 0
SUM OF ALL RESPONSES TO PHQ QUESTIONS 1-9: 0
1. LITTLE INTEREST OR PLEASURE IN DOING THINGS: 0
SUM OF ALL RESPONSES TO PHQ9 QUESTIONS 1 & 2: 0
SUM OF ALL RESPONSES TO PHQ QUESTIONS 1-9: 0

## 2022-10-26 NOTE — TELEPHONE ENCOUNTER
----- Message from Jessie Rodriguez MD sent at 10/26/2022  2:02 PM EDT -----  Increase to 13 mg daily. Check in one week.  This would also be a good time to switch to Xarelto or Eliquis  ----- Message -----  From: Celso Raymond  Sent: 10/26/2022   1:27 PM EDT  To: Jessie Rodriguez MD    INR- 1.7    Last INR- 1.9

## 2022-10-26 NOTE — PATIENT INSTRUCTIONS
Advance Directives: Care Instructions  Overview  An advance directive is a legal way to state your wishes at the end of your life. It tells your family and your doctor what to do if you can't say what you want. There are two main types of advance directives. You can change them any time your wishes change. Living will. This form tells your family and your doctor your wishes about life support and other treatment. The form is also called a declaration. Medical power of . This form lets you name a person to make treatment decisions for you when you can't speak for yourself. This person is called a health care agent (health care proxy, health care surrogate). The form is also called a durable power of  for health care. If you do not have an advance directive, decisions about your medical care may be made by a family member, or by a doctor or a  who doesn't know you. It may help to think of an advance directive as a gift to the people who care for you. If you have one, they won't have to make tough decisions by themselves. Follow-up care is a key part of your treatment and safety. Be sure to make and go to all appointments, and call your doctor if you are having problems. It's also a good idea to know your test results and keep a list of the medicines you take. What should you include in an advance directive? Many states have a unique advance directive form. (It may ask you to address specific issues.) Or you might use a universal form that's approved by many states. If your form doesn't tell you what to address, it may be hard to know what to include in your advance directive. Use the questions below to help you get started. Who do you want to make decisions about your medical care if you are not able to? What life-support measures do you want if you have a serious illness that gets worse over time or can't be cured? What are you most afraid of that might happen?  (Maybe you're afraid of having pain, losing your independence, or being kept alive by machines.)  Where would you prefer to die? (Your home? A hospital? A nursing home?)  Do you want to donate your organs when you die? Do you want certain Bahai practices performed before you die? When should you call for help? Be sure to contact your doctor if you have any questions. Where can you learn more? Go to https://chpepiceweb.[email or web address] org and sign in to your [employer] account. Enter R264 in the [employer] box to learn more about \"Advance Directives: Care Instructions. \"     If you do not have an account, please click on the \"Sign Up Now\" link. Current as of: June 16, 2022               Content Version: 13.4  © 2006-2022 [employer]. Care instructions adapted under license by [facility] ([facility]). If you have questions about a medical condition or this instruction, always ask your healthcare professional. [place] [address] any warranty or liability for your use of this information. Well Visit, Women 48 to 72: Care Instructions  Overview     Well visits can help you stay healthy. Your doctor has checked your overall health and may have suggested ways to take good care of yourself. Your doctor also may have recommended tests. At home, you can help prevent illness with healthy eating, regular exercise, and other steps. Follow-up care is a key part of your treatment and safety. Be sure to make and go to all appointments, and call your doctor if you are having problems. It's also a good idea to know your test results and keep a list of the medicines you take. How can you care for yourself at home? Get screening tests that you and your doctor decide on. Screening helps find diseases before any symptoms appear. Eat healthy foods. Choose fruits, vegetables, whole grains, protein, and low-fat dairy foods. Limit fat, especially saturated fat. Reduce salt in your diet.   Limit alcohol. Have no more than 1 drink a day or 7 drinks a week. Get at least 30 minutes of exercise on most days of the week. Walking is a good choice. You also may want to do other activities, such as running, swimming, cycling, or playing tennis or team sports. Reach and stay at a healthy weight. This will lower your risk for many problems, such as obesity, diabetes, heart disease, and high blood pressure. Do not smoke. Smoking can make health problems worse. If you need help quitting, talk to your doctor about stop-smoking programs and medicines. These can increase your chances of quitting for good. Care for your mental health. It is easy to get weighed down by worry and stress. Learn strategies to manage stress, like deep breathing and mindfulness, and stay connected with your family and community. If you find you often feel sad or hopeless, talk with your doctor. Treatment can help. Talk to your doctor about whether you have any risk factors for sexually transmitted infections (STIs). You can help prevent STIs if you wait to have sex with a new partner (or partners) until you've each been tested for STIs. It also helps if you use condoms (male or female condoms) and if you limit your sex partners to one person who only has sex with you. Vaccines are available for some STIs. If you think you may have a problem with alcohol or drug use, talk to your doctor. This includes prescription medicines (such as amphetamines and opioids) and illegal drugs (such as cocaine and methamphetamine). Your doctor can help you figure out what type of treatment is best for you. Protect your skin from too much sun. When you're outdoors from 10 a.m. to 4 p.m., stay in the shade or cover up with clothing and a hat with a wide brim. Wear sunglasses that block UV rays. Even when it's cloudy, put broad-spectrum sunscreen (SPF 30 or higher) on any exposed skin.   See a dentist one or two times a year for checkups and to have your teeth cleaned. Wear a seat belt in the car. When should you call for help? Watch closely for changes in your health, and be sure to contact your doctor if you have any problems or symptoms that concern you. Where can you learn more? Go to https://chsepideh.[email or web address] org and sign in to your [employer] account. Enter F200 in the [facility] box to learn more about \"Well Visit, Women 50 to 72: Care Instructions. \"     If you do not have an account, please click on the \"Sign Up Now\" link. Current as of: June 6, 2022               Content Version: 13.4  © 6707-5139 Healthwise, [employer]. Care instructions adapted under license by [facility] ([facility]). If you have questions about a medical condition or this instruction, always ask your healthcare professional. [address] any warranty or liability for your use of this information.

## 2022-10-26 NOTE — PROGRESS NOTES
10/26/2022    Mary Bush (:  1960) is a 65 y.o. female, here for a preventive medicine evaluation. Patient Active Problem List   Diagnosis    Type 2 diabetes mellitus with other specified complication ([facility] [address]    Hypertriglyceridemia    Pulmonary embolism (HCC)    Disease of nasal cavity and sinuses    Fatty infiltration of liver    Umbilical hernia    Benign head tremor    Hot flash, menopausal    Hyperlipidemia associated with type 2 diabetes mellitus ([facility] [address]    Morbid obesity with BMI of 50.0-59.9, adult (HCC)    Severe obstructive sleep apnea    Benign essential hypertension    Obesity, morbid, BMI 40.0-49.9 (HCC)    Chest pain    Shortness of breath    Hypertension    Type 2 diabetes mellitus without complication, without long-term current use of insulin (HCC)    Unstable angina (HCC)    Abnormal nuclear cardiac imaging test    Abnormal stress test    Coronary artery disease involving native coronary artery of native heart       Patient is here for an annual exam.    She had a dry cough and saw her pulmonologist. There was concern for asthma that maybe made worse from fall allergies. She is taking Symbicort and it is helping. Patient's is not checking her sugars. No hypoglycemia. Patient does not have polydipsia and polyuria. Her last A1C was 7.6. She is on cozaar for HTN. Her Bp is at goal.  She is compliant with coumadin. She checks pro time at home. Her depression is stable. No new issues. Her cholesterol is controlled. It is at goal. She has hypertriglyceridemia. She takes Lipitor. She is unsure if she is taking Trilipix and Vascepa. She has ELEAZAR. She is on CPAP. She is compliant. She has benign head tremor. It is about the same. Review of Systems   Constitutional:  Negative for appetite change and fatigue. HENT:  Negative for postnasal drip and rhinorrhea. Respiratory:  Negative for shortness of breath and wheezing.     Cardiovascular:  Negative for chest pain and palpitations. Gastrointestinal:  Negative for abdominal pain, nausea and vomiting. Musculoskeletal:  Negative for joint swelling. Skin:  Negative for rash. Neurological:  Negative for light-headedness. Psychiatric/Behavioral:  Negative for sleep disturbance. Prior to Visit Medications    Medication Sig Taking? Authorizing Provider   ferrous sulfate (FEROSUL) 325 (65 Fe) MG tablet TAKE 1 TABLET BY MOUTH EVERY MORNING  Jayce Davidson MD   azelastine (ASTELIN) 0.1 % nasal spray 1 spray by Nasal route 2 times daily Use in each nostril as directed  Sarah Layton MD   SYMBICORT 160-4.5 MCG/ACT AERO Inhale 2 puffs into the lungs 2 times daily  Sarah Layton MD   azithromycin (ZITHROMAX) 250 MG tablet Take 2 tabs (500 MG) on Day 1, and take 1 tab (250 MG) on days 2-5.   Jayce Davidson MD   losartan (COZAAR) 50 MG tablet TAKE 1 TABLET BY MOUTH  DAILY  Jayce Davidson MD   pantoprazole (PROTONIX) 20 MG tablet TAKE 1 TABLET BY MOUTH  DAILY  Jayce Davidson MD   topiramate (TOPAMAX) 100 MG tablet TAKE 1 TABLET BY MOUTH AT  NIGHT  Jayce Davidson MD   Icosapent Ethyl (VASCEPA) 1 g CAPS capsule TAKE 2 CAPSULES BY MOUTH  TWICE DAILY  Jayce Davidson MD   citalopram (CELEXA) 40 MG tablet TAKE 1 TABLET BY MOUTH  DAILY  Laneta Gosselin, MD   warfarin (COUMADIN) 1 MG tablet TAKE 1 TABLET BY MOUTH  DAILY OR AS DIRECTED BY  PHYSICIAN  Jayce Davidson MD   warfarin (COUMADIN) 5 MG tablet TAKE 2 TABLETS BY MOUTH  DAILY OR AS DIRECTED BY  YOUR DOCTOR  Jayce Davidson MD   aspirin EC 81 MG EC tablet Take 1 tablet by mouth daily  Cameron Boogie MD   atorvastatin (LIPITOR) 40 MG tablet Take 1 tablet by mouth daily  Cameron Boogie MD   fenofibric acid (TRILIPIX) 135 MG CPDR capsule TAKE 1 CAPSULE BY MOUTH  DAILY  Andrew Kuhn MD   JANUMET XR  MG TB24 per extended release tablet TAKE 1 TABLET BY MOUTH  TWICE DAILY  Marialuisa Mat Cueva MD   JARDIANCE 25 MG tablet TAKE 1 TABLET BY MOUTH  DAILY  Gabrielle Saini MD   Multiple Vitamins-Minerals (THERAPEUTIC MULTIVITAMIN-MINERALS) tablet Take 1 tablet by mouth daily  Historical Provider, MD   Ascorbic Acid (VITAMIN C PO) Take by mouth  Historical Provider, MD   CONTOUR NEXT TEST strip TEST 3 TIMES DAILY  Gabrielle Saini MD   [address] TEST 3 TIMES DAILY  Gabrielle Saini MD   Blood Glucose Monitoring Suppl (ONE TOUCH ULTRA 2) w/Device KIT Test three times daily. DX:E11.9  Gabrielle Saini MD   blood glucose test strips (ASCENSIA AUTODISC VI;ONE TOUCH ULTRA TEST VI) strip Test three times daily. DX:E11.9  Gabrilele Saini MD   Lancets MISC Test three times daily. DX:E11.9  Gabrielle Saini MD   ONE TOUCH LANCETS MISC Test daily. DX: E11.9  Gabrielle Saini MD   blood glucose test strips (ASCENSIA AUTODISC VI;ONE TOUCH ULTRA TEST VI) strip Test Daily. DX: E11.9  Gabrielle Saini MD   PRODIGY NO CODING BLOOD GLUC strip TEST BLOOD SUGARS 3 TIMES DAILY  Gabrielle Saini MD   ONE TOUCH LANCETS MISC 1 each by Does not apply route daily.   Gabrielle Saini MD        Allergies   Allergen Reactions    Lisinopril Other (See Comments)     cough    Penicillins Other (See Comments)     Unsure of reaction--childhood    Sulfa Antibiotics Other (See Comments)     Unsure of reaction       Past Medical History:   Diagnosis Date    Benign essential hypertension 11/14/2017    Benign head tremor 5/14/2014    Cholelithiasis     Coronary artery disease involving native coronary artery of native heart 8/31/2022    Disease of nasal cavity and sinuses     Fatty infiltration of liver     Hot flash, menopausal 7/22/2015    Hyperlipidemia     Hyperlipidemia associated with type 2 diabetes mellitus ([facility] [address] 10/23/2015    Morbid obesity with BMI of 50.0-59.9, adult ([facility] [address] 10/23/2015    No history of procedure 11/9/2015    no past colonoscopy Pulmonary embolism (HCC)     Type II or unspecified type diabetes mellitus without mention of complication, not stated as uncontrolled     Umbilical hernia 11/6/3650    Unspecified sleep apnea        Past Surgical History:   Procedure Laterality Date    BREAST SURGERY  2015    CHOLECYSTECTOMY, LAPAROSCOPIC  8/2/2012    COLONOSCOPY  11/9/2015    cecal polyp    DILATION AND CURETTAGE OF UTERUS  03/13/2018    HERNIA REPAIR  71/52/82    lap umbilical    HYSTEROSCOPY  03/13/2018    and D&C       Social History     Socioeconomic History    Marital status:      Spouse name: Not on file    Number of children: Not on file    Years of education: Not on file    Highest education level: Not on file   Occupational History    Not on file   Tobacco Use    Smoking status: Never    Smokeless tobacco: Never   Vaping Use    Vaping Use: Never used   Substance and Sexual Activity    Alcohol use: No     Alcohol/week: 0.0 standard drinks    Drug use: No    Sexual activity: Not Currently     Partners: Male   Other Topics Concern    Not on file   Social History Narrative    Not on file     Social Determinants of Health     Financial Resource Strain: Not on file   Food Insecurity: Not on file   Transportation Needs: Not on file   Physical Activity: Not on file   Stress: Not on file   Social Connections: Not on file   Intimate Partner Violence: Not on file   Housing Stability: Not on file        Family History   Problem Relation Age of Onset    Hypertension Mother     Diabetes Mother     Heart Failure Father     Other Father         AAA    High Blood Pressure Sister     Diabetes Sister     High Blood Pressure Brother     Heart Attack Brother     Diabetes Brother        ADVANCE DIRECTIVE: N, <no information>    Vitals:    10/26/22 0843   BP: 130/80   Site: Right Upper Arm   Position: Sitting   Cuff Size: Large Adult   Pulse: 70   Resp: 12   Weight: 295 lb (133.8 kg)   Height: 5' 5\" (1.651 m)     Estimated body mass index is 49.09 kg/m² as calculated from the following:    Height as of this encounter: 5' 5\" (1.651 m). Weight as of this encounter: 295 lb (133.8 kg). Physical Exam  Constitutional:       Appearance: She is well-developed. HENT:      Head: Normocephalic. Eyes:      Conjunctiva/sclera: Conjunctivae normal.      Pupils: Pupils are equal, round, and reactive to light. Neck:      Thyroid: No thyroid mass or thyromegaly. Vascular: No carotid bruit or JVD. Trachea: Trachea normal.   Cardiovascular:      Rate and Rhythm: Normal rate and regular rhythm. Heart sounds: Normal heart sounds. No murmur heard. No gallop. Pulmonary:      Effort: Pulmonary effort is normal. No respiratory distress. Breath sounds: Normal breath sounds. No wheezing or rales. Abdominal:      General: Bowel sounds are normal. There is no distension. Palpations: Abdomen is soft. There is no hepatomegaly, splenomegaly or mass. Tenderness: There is no abdominal tenderness. Musculoskeletal:         General: Normal range of motion. Cervical back: Normal range of motion and neck supple. Lymphadenopathy:      Cervical: No cervical adenopathy. Skin:     General: Skin is warm and dry. Findings: No rash. Neurological:      Mental Status: She is alert and oriented to person, place, and time. Cranial Nerves: No cranial nerve deficit. Deep Tendon Reflexes: Reflexes are normal and symmetric. Psychiatric:         Behavior: Behavior normal.         Thought Content: Thought content normal.         Judgment: Judgment normal.           No flowsheet data found.     Lab Results   Component Value Date/Time    CHOL 190 07/28/2022 05:00 AM    CHOL 182 02/01/2022 10:51 AM    CHOL 236 09/27/2021 08:35 AM    CHOLFAST 189 06/20/2022 09:48 AM    CHOLFAST 158 02/24/2018 11:30 AM    TRIG 1,413 07/28/2022 05:00 AM    TRIG 591 02/01/2022 10:51 AM    TRIG 1,816 09/27/2021 08:35 AM    TRIGLYCFAST 221 06/20/2022 09:48 AM TRIGLYCFAST 452 02/24/2018 11:30 AM    HDL 20 07/28/2022 05:00 AM    HDL 27 06/20/2022 09:48 AM    HDL 32 02/01/2022 10:51 AM    LDLCALC see below 07/28/2022 05:00 AM    LDLCALC see below 06/20/2022 09:48 AM    LDLCALC see below 02/01/2022 10:51 AM    GLUCOSE 261 07/29/2022 08:17 PM    GLUCOSE 165 04/10/2012 05:50 PM    LABA1C 7.6 06/20/2022 09:48 AM    LABA1C 6.8 02/01/2022 10:51 AM    LABA1C 7.4 09/27/2021 08:35 AM       The 10-year ASCVD risk score (Mir BECKMAN, et al., 2019) is: 16.8%    Values used to calculate the score:      Age: 65 years      Sex: Female      Is Non- : No      Diabetic: Yes      Tobacco smoker: No      Systolic Blood Pressure: 850 mmHg      Is BP treated: Yes      HDL Cholesterol: 20 mg/dL      Total Cholesterol: 190 mg/dL    Immunization History   Administered Date(s) Administered    COVID-19, PFIZER PURPLE top, DILUTE for use, (age 15 y+), 30mcg/0.3mL 11/16/2021, 12/07/2021    Influenza Vaccine, unspecified formulation 10/28/2016, 11/09/2017, 10/05/2018    Influenza Virus Vaccine 09/27/2012, 10/15/2014, 02/15/2016, 11/09/2017, 10/05/2018, 10/23/2020, 10/12/2021    Influenza Whole 10/11/2010, 10/15/2014, 02/15/2016    Influenza, FLUBLOK, (age 25 y+), PF, 0.5mL 09/24/2019    Pneumococcal Polysaccharide ([id number]) 04/20/2016    Tdap (Boostrix, Adacel) 09/24/2019    Zoster Recombinant (Shingrix) 06/20/2022       Health Maintenance   Topic Date Due    Cervical cancer screen  04/02/2015    COVID-19 Vaccine (3 - Booster for Pfizer series) 02/01/2022    Flu vaccine (1) 08/01/2022    Shingles vaccine (2 of 2) 08/15/2022    Diabetic microalbuminuria test  09/27/2022    Depression Screen  09/27/2022    Diabetic retinal exam  03/24/2023    Diabetic foot exam  06/20/2023    A1C test (Diabetic or Prediabetic)  06/20/2023    Lipids  07/28/2023    Breast cancer screen  09/27/2023    Colorectal Cancer Screen  10/05/2025    DTaP/Tdap/Td vaccine (2 - Td or Tdap) 09/24/2029 Pneumococcal 0-64 years Vaccine  Completed    Hepatitis C screen  Completed    HIV screen  Completed    Hepatitis A vaccine  Aged Out    Hib vaccine  Aged Out    Meningococcal (ACWY) vaccine  Aged Out       Assessment & Plan   Routine general medical examination at a health care facility  Type 2 diabetes mellitus without complication, without long-term current use of insulin ([facility] [address]  -     Comprehensive Metabolic Panel; Future  -     CBC with Auto Differential; Future  -     Hemoglobin A1C; Future  -     Lipid Panel; Future  -     POCT Urinalysis no Micro  -     Microalbumin / Creatinine Urine Ratio  Hypertriglyceridemia  Coronary artery disease of native artery of native heart with stable angina pectoris ([facility] [address]  Other chronic pulmonary embolism without acute cor pulmonale (HCC)  Hyperlipidemia associated with type 2 diabetes mellitus ([facility] [address]  Morbid obesity with BMI of 50.0-59.9, adult (HCC)  Severe obstructive sleep apnea  Benign essential hypertension  No follow-ups on file. #  Annual exam done. #  DM type 2. Check labs. #  BP controlled. #  call pharmacy to verify her cholesterol medications. # CAD stable  #  ELEAZAR on CPAP. #  weight loss recommended. #  PE. Check INR        --Carolina Malcolm MD    Advance Care Planning   Advanced Care Planning: Discussed the patients choices for care and treatment in case of a health event that adversely affects decision-making abilities. Also discussed the patients long-term treatment options. Reviewed with the patient the appropriate state-specific advance directive documents. Reviewed the process of designating a competent adult as an Agent (or -in-fact) that could take make health care decisions for the patient if incompetent. Patient was asked to complete the declaration forms, either acknowledge the forms by a public notary or an eligible witness and provide a signed copy to the practice office.   Time spent (minutes): 3

## 2022-10-27 LAB
ESTIMATED AVERAGE GLUCOSE: 171.4 MG/DL
HBA1C MFR BLD: 7.6 %

## 2022-10-27 RX ORDER — FENOFIBRIC ACID 135 MG/1
CAPSULE, DELAYED RELEASE ORAL
Qty: 90 CAPSULE | Refills: 0 | Status: SHIPPED | OUTPATIENT
Start: 2022-10-27

## 2022-10-27 NOTE — TELEPHONE ENCOUNTER
----- Message from Stephane Paulino MD sent at 10/27/2022  8:40 AM EDT -----  Ok to continue coumadin for now. Call us when she is ready  ----- Message -----  From: Tempie Lightning  Sent: 10/26/2022   4:05 PM EDT  To: Stephane Paulino MD    Pt informed, states she would like to wait to switch as she has enough coumadin to last her until December but if you think it is best for her to switch she is willing to.  ----- Message -----  From: Stephane Paulino MD  Sent: 10/26/2022   2:02 PM EDT  To: Tempie Lightning    Increase to 13 mg daily. Check in one week.  This would also be a good time to switch to Xarelto or Eliquis  ----- Message -----  From: Tempie Lightning  Sent: 10/26/2022   1:27 PM EDT  To: Stephane Paulino MD    INR- 1.7    Last INR- 1.9

## 2022-10-27 NOTE — TELEPHONE ENCOUNTER
Pt informed per Dr. Huber Zacarias to stop the otc fish oil, start fenobric acid when she receives it from her mail order pharmacy, have INR checked 5-7 days after starting fenofibric acid, and okay to get ferrous sulfate otc.

## 2022-10-27 NOTE — TELEPHONE ENCOUNTER
----- Message from Karan Gann sent at 10/27/2022  8:12 AM EDT -----  Pt taking all medications on her list except fenofibric acid. Also taking otc Vitamin D3 5000 units and fish oil 1200 mg. Pt wants to know if she should get otc ferrous sulfate 325 mg instead of getting it at the pharmacy. Please advise.

## 2022-11-08 ENCOUNTER — OFFICE VISIT (OUTPATIENT)
Dept: PULMONOLOGY | Age: 62
End: 2022-11-08
Payer: COMMERCIAL

## 2022-11-08 ENCOUNTER — HOSPITAL ENCOUNTER (OUTPATIENT)
Dept: PULMONOLOGY | Age: 62
Discharge: HOME | End: 2022-11-08
Payer: COMMERCIAL

## 2022-11-08 VITALS
HEART RATE: 90 BPM | SYSTOLIC BLOOD PRESSURE: 130 MMHG | WEIGHT: 293 LBS | DIASTOLIC BLOOD PRESSURE: 75 MMHG | RESPIRATION RATE: 18 BRPM | HEIGHT: 65 IN | OXYGEN SATURATION: 93 % | BODY MASS INDEX: 48.82 KG/M2

## 2022-11-08 DIAGNOSIS — R06.02 SOB (SHORTNESS OF BREATH): ICD-10-CM

## 2022-11-08 DIAGNOSIS — R05.9 COUGH, UNSPECIFIED TYPE: ICD-10-CM

## 2022-11-08 DIAGNOSIS — G47.33 OSA (OBSTRUCTIVE SLEEP APNEA): Primary | ICD-10-CM

## 2022-11-08 DIAGNOSIS — R05.8 DRY COUGH: ICD-10-CM

## 2022-11-08 DIAGNOSIS — R06.02 SHORTNESS OF BREATH: ICD-10-CM

## 2022-11-08 LAB
DLCO %PRED: 113 %
DLCO PRED: NORMAL
DLCO/VA %PRED: NORMAL
DLCO/VA PRED: NORMAL
DLCO/VA: NORMAL
DLCO: NORMAL
EXPIRATORY TIME-POST: NORMAL
EXPIRATORY TIME: NORMAL
FEF 25-75% %CHNG: NORMAL
FEF 25-75% %PRED-POST: NORMAL
FEF 25-75% %PRED-PRE: NORMAL
FEF 25-75% PRED: NORMAL
FEF 25-75%-POST: NORMAL
FEF 25-75%-PRE: NORMAL
FEV1 %PRED-POST: 92 %
FEV1 %PRED-PRE: 91 %
FEV1 PRED: NORMAL
FEV1-POST: NORMAL
FEV1-PRE: NORMAL
FEV1/FVC %PRED-POST: NORMAL
FEV1/FVC %PRED-PRE: NORMAL
FEV1/FVC PRED: NORMAL
FEV1/FVC-POST: 80 %
FEV1/FVC-PRE: 79 %
FVC %PRED-POST: NORMAL
FVC %PRED-PRE: NORMAL
FVC PRED: NORMAL
FVC-POST: NORMAL
FVC-PRE: NORMAL
GAW %PRED: NORMAL
GAW PRED: NORMAL
GAW: NORMAL
IC %PRED: NORMAL
IC PRED: NORMAL
IC: NORMAL
MEP: NORMAL
MIP: NORMAL
MVV %PRED-PRE: NORMAL
MVV PRED: NORMAL
MVV-PRE: NORMAL
PEF %PRED-POST: NORMAL
PEF %PRED-PRE: NORMAL
PEF PRED: NORMAL
PEF%CHNG: NORMAL
PEF-POST: NORMAL
PEF-PRE: NORMAL
RAW %PRED: NORMAL
RAW PRED: NORMAL
RAW: NORMAL
RV %PRED: NORMAL
RV PRED: NORMAL
RV: NORMAL
SVC %PRED: NORMAL
SVC PRED: NORMAL
SVC: NORMAL
TLC %PRED: 91 %
TLC PRED: NORMAL
TLC: NORMAL
VA %PRED: NORMAL
VA PRED: NORMAL
VA: NORMAL
VTG %PRED: NORMAL
VTG PRED: NORMAL
VTG: NORMAL

## 2022-11-08 PROCEDURE — 94729 DIFFUSING CAPACITY: CPT

## 2022-11-08 PROCEDURE — 94618 PULMONARY STRESS TESTING: CPT

## 2022-11-08 PROCEDURE — 94640 AIRWAY INHALATION TREATMENT: CPT

## 2022-11-08 PROCEDURE — 94060 EVALUATION OF WHEEZING: CPT

## 2022-11-08 PROCEDURE — 99214 OFFICE O/P EST MOD 30 MIN: CPT | Performed by: INTERNAL MEDICINE

## 2022-11-08 PROCEDURE — 94726 PLETHYSMOGRAPHY LUNG VOLUMES: CPT

## 2022-11-08 PROCEDURE — 6370000000 HC RX 637 (ALT 250 FOR IP): Performed by: INTERNAL MEDICINE

## 2022-11-08 PROCEDURE — 3074F SYST BP LT 130 MM HG: CPT | Performed by: INTERNAL MEDICINE

## 2022-11-08 PROCEDURE — 3078F DIAST BP <80 MM HG: CPT | Performed by: INTERNAL MEDICINE

## 2022-11-08 RX ORDER — ALBUTEROL SULFATE 90 UG/1
4 AEROSOL, METERED RESPIRATORY (INHALATION) ONCE
Status: COMPLETED | OUTPATIENT
Start: 2022-11-08 | End: 2022-11-08

## 2022-11-08 RX ORDER — ALBUTEROL SULFATE 90 UG/1
2 AEROSOL, METERED RESPIRATORY (INHALATION) 4 TIMES DAILY PRN
Qty: 1 EACH | Refills: 5 | Status: SHIPPED | OUTPATIENT
Start: 2022-11-08

## 2022-11-08 RX ADMIN — Medication 4 PUFF: at 08:43

## 2022-11-08 ASSESSMENT — SLEEP AND FATIGUE QUESTIONNAIRES
HOW LIKELY ARE YOU TO NOD OFF OR FALL ASLEEP WHILE SITTING QUIETLY AFTER LUNCH WITHOUT ALCOHOL: 1
HOW LIKELY ARE YOU TO NOD OFF OR FALL ASLEEP WHILE LYING DOWN TO REST IN THE AFTERNOON WHEN CIRCUMSTANCES PERMIT: 3
HOW LIKELY ARE YOU TO NOD OFF OR FALL ASLEEP WHILE SITTING INACTIVE IN A PUBLIC PLACE: 1
HOW LIKELY ARE YOU TO NOD OFF OR FALL ASLEEP WHILE WATCHING TV: 2
NECK CIRCUMFERENCE (INCHES): 16
HOW LIKELY ARE YOU TO NOD OFF OR FALL ASLEEP WHILE SITTING AND READING: 2
ESS TOTAL SCORE: 12
HOW LIKELY ARE YOU TO NOD OFF OR FALL ASLEEP WHEN YOU ARE A PASSENGER IN A CAR FOR AN HOUR WITHOUT A BREAK: 2
HOW LIKELY ARE YOU TO NOD OFF OR FALL ASLEEP WHILE SITTING AND TALKING TO SOMEONE: 1
HOW LIKELY ARE YOU TO NOD OFF OR FALL ASLEEP IN A CAR, WHILE STOPPED FOR A FEW MINUTES IN TRAFFIC: 0

## 2022-11-08 ASSESSMENT — PULMONARY FUNCTION TESTS
FEV1/FVC_POST: 80
FEV1/FVC_PRE: 79
FEV1_PERCENT_PREDICTED_PRE: 91
FEV1_PERCENT_PREDICTED_POST: 92

## 2022-11-08 NOTE — PROGRESS NOTES
[facility]                                                                  CHIEF COMPLAINT: Follow up tests results           HPI:   Feels much better  Residual dry cough   Symbicort working well   No asthma  Doing well with CPAP. Uses every night. Mask and pressure are good. From prior visit:   Seasonal allergy worse as she got olders: trees, pollens, mold       Past Medical History:   Diagnosis Date    Benign essential hypertension 11/14/2017    Benign head tremor 5/14/2014    Cholelithiasis     Coronary artery disease involving native coronary artery of native heart 8/31/2022    Disease of nasal cavity and sinuses     Fatty infiltration of liver     Hot flash, menopausal 7/22/2015    Hyperlipidemia     Hyperlipidemia associated with type 2 diabetes mellitus ([facility] 10/23/2015    Morbid obesity with BMI of 50.0-59.9, adult ([facility] [facility] 10/23/2015    No history of procedure 11/9/2015    no past colonoscopy    Pulmonary embolism (HCC)     Type II or unspecified type diabetes mellitus without mention of complication, not stated as uncontrolled     Umbilical hernia 19/4/6105    Unspecified sleep apnea        Past Surgical History:        Procedure Laterality Date    BREAST SURGERY  2015    CHOLECYSTECTOMY, LAPAROSCOPIC  8/2/2012    COLONOSCOPY  11/9/2015    cecal polyp    DILATION AND CURETTAGE OF UTERUS  03/13/2018    HERNIA REPAIR  44/05/68    lap umbilical    HYSTEROSCOPY  03/13/2018    and D&C       Allergies:  is allergic to lisinopril, penicillins, and sulfa antibiotics. Social History:    TOBACCO:   reports that she has never smoked. She has never used smokeless tobacco.  ETOH:   reports no history of alcohol use.       Family History:       Problem Relation Age of Onset    Hypertension Mother     Diabetes Mother     Heart Failure Father     Other Father         AAA    High Blood Pressure Sister     Diabetes Sister     High Blood Pressure Brother     Heart Attack Brother     Diabetes Brother        Current Medications:    Current Outpatient Medications:     fenofibric acid (TRILIPIX) 135 MG CPDR capsule, TAKE 1 CAPSULE BY MOUTH  DAILY, Disp: 90 capsule, Rfl: 0    ferrous sulfate (FEROSUL) 325 (65 Fe) MG tablet, TAKE 1 TABLET BY MOUTH EVERY MORNING, Disp: 90 tablet, Rfl: 3    azelastine (ASTELIN) 0.1 % nasal spray, 1 spray by Nasal route 2 times daily Use in each nostril as directed, Disp: 30 mL, Rfl: 5    SYMBICORT 160-4.5 MCG/ACT AERO, Inhale 2 puffs into the lungs 2 times daily, Disp: 1 each, Rfl: 5    azithromycin (ZITHROMAX) 250 MG tablet, Take 2 tabs (500 MG) on Day 1, and take 1 tab (250 MG) on days 2-5., Disp: 1 packet, Rfl: 0    losartan (COZAAR) 50 MG tablet, TAKE 1 TABLET BY MOUTH  DAILY, Disp: 90 tablet, Rfl: 3    pantoprazole (PROTONIX) 20 MG tablet, TAKE 1 TABLET BY MOUTH  DAILY, Disp: 90 tablet, Rfl: 3    topiramate (TOPAMAX) 100 MG tablet, TAKE 1 TABLET BY MOUTH AT  NIGHT, Disp: 90 tablet, Rfl: 3    Icosapent Ethyl (VASCEPA) 1 g CAPS capsule, TAKE 2 CAPSULES BY MOUTH  TWICE DAILY, Disp: 360 capsule, Rfl: 3    citalopram (CELEXA) 40 MG tablet, TAKE 1 TABLET BY MOUTH  DAILY, Disp: 90 tablet, Rfl: 0    warfarin (COUMADIN) 1 MG tablet, TAKE 1 TABLET BY MOUTH  DAILY OR AS DIRECTED BY  PHYSICIAN, Disp: 90 tablet, Rfl: 0    warfarin (COUMADIN) 5 MG tablet, TAKE 2 TABLETS BY MOUTH  DAILY OR AS DIRECTED BY  YOUR DOCTOR, Disp: 180 tablet, Rfl: 0    aspirin EC 81 MG EC tablet, Take 1 tablet by mouth daily, Disp: 90 tablet, Rfl: 3    atorvastatin (LIPITOR) 40 MG tablet, Take 1 tablet by mouth daily, Disp: 90 tablet, Rfl: 3    JANUMET XR  MG TB24 per extended release tablet, TAKE 1 TABLET BY MOUTH  TWICE DAILY, Disp: 180 tablet, Rfl: 3    JARDIANCE 25 MG tablet, TAKE 1 TABLET BY MOUTH  DAILY, Disp: 90 tablet, Rfl: 3    Multiple Vitamins-Minerals (THERAPEUTIC MULTIVITAMIN-MINERALS) tablet, Take 1 tablet by mouth daily, Disp: , Rfl: Ascorbic Acid (VITAMIN C PO), Take by mouth, Disp: , Rfl:     CONTOUR NEXT TEST strip, TEST 3 TIMES DAILY, Disp: 300 strip, Rfl: 3    Microlet Lancets MISC, TEST 3 TIMES DAILY, Disp: 300 each, Rfl: 3    Blood Glucose Monitoring Suppl (ONE TOUCH ULTRA 2) w/Device KIT, Test three times daily. DX:E11.9, Disp: 1 kit, Rfl: 0    blood glucose test strips (ASCENSIA AUTODISC VI;ONE TOUCH ULTRA TEST VI) strip, Test three times daily. DX:E11.9, Disp: 100 each, Rfl: 3    Lancets MISC, Test three times daily. DX:E11.9, Disp: 100 each, Rfl: 3    ONE TOUCH LANCETS MISC, Test daily. DX: E11.9, Disp: 100 each, Rfl: 3    blood glucose test strips (ASCENSIA AUTODISC VI;ONE TOUCH ULTRA TEST VI) strip, Test Daily. DX: E11.9, Disp: 100 each, Rfl: 3    PRODIGY NO CODING BLOOD GLUC strip, TEST BLOOD SUGARS 3 TIMES DAILY, Disp: 100 each, Rfl: 2    ONE TOUCH LANCETS MISC, 1 each by Does not apply route daily. , Disp: 100 each, Rfl: 3      Objective:   PHYSICAL EXAM:    Blood pressure 130/75, pulse 90, resp. rate 18, height 5' 5\" (1.651 m), weight 297 lb (134.7 kg), last menstrual period 08/20/2014, SpO2 93 %.' on RA  Gen: No distress. Obese. BMI of 49.42   Eyes: PERRL. No sclera icterus. No conjunctival injection. ENT: No discharge. Pharynx clear. Mallampati class IV. Neck: Trachea midline. No obvious mass. Neck circumference 16\"  Resp: No accessory muscle use. No crackles. No wheezes. No rhonchi. No dullness on percussion. Good air entry. CV: Regular rate. Regular rhythm. No murmur or rub. No edema. GI: Non-tender. Non-distended. No hernia. Skin: Warm and dry. No nodule on exposed extremities. Lymph: No cervical LAD. No supraclavicular LAD. M/S: No cyanosis. No joint deformity. No clubbing. Neuro: Awake. Alert. Moves all four extremities. Psych: Oriented x 3. No anxiety.          DATA reviewed by me:   PFTs 11/08/2022 FVC 3.02(89%) FEV1 2.38(91%) FEV1/FVC 79% TLC 4.87(91%) DLCO 26.93(113%) 6MW 880 F Low O2 93% Labs 10/6/22   Unremarkable immunocap   Normal TSH     CXR 7/27/22    No acute cardiopulmonary disease       Echo 6/45/91    LV systolic function is normal with Ef estimated at 55%. No regional wall motion abnormalities. There is mild concentric left ventricular hypertrophy. Grade I diastolic dysfunction with normal left ventricular filling pressure. Inadequate tricuspid regurgitation jet to estimate systolic artery pressure        Split night 1/29/16: AHI 70 and low O2 82%, improved sleep related breathing disorder with CPAP therapy. Started on AutoCPAP min pressure of 12 cmH2O and max pressure of 16 cmH2O. CPAP data 09/06-10/05 2022 reviewed by me. Uses  8-9 hrs/night with 100% compliance and AHI of  0.6. Median 14.8 and [address] 17. APAP 14-18    CPAP data 09/06-10/05 2022 reviewed by me. Uses  8-9 hrs/night with 100% compliance and AHI of  0.6. Median 14.8 and [address] 17. APAP 14-18        Assessment:       Severe ELEAZAR on CPAP 12 cmH2O. Nasal mask. Optimal compliance and efficacy upon review today. Cough and dyspnea on exertion. Morbid obesity and deconditioning are contributing. Cannot exclude underlying asthma given seasonal allergy and cough. Much improved with Symbicort  Seasonal allergy worse as she got olders: trees, pollens, mold   Allergic rhinitnis/PND  History of PE 2005 and 2006. Chronically on Coumadin. Plan:       Continue Symbicort 160/4.5 2 puffs BID - complete 4-6 weeks  Trial Albuterol 2 puffs Q4-6 hrs PRN  Continue Azelastine 137 mcg/spray (Astelin) 2 sprays/nostril BID PRN   Order for CPAP supplies  Continue CPAP 6-8 hrs at night and during naps. Replacement of mask, tubing, head straps every 3-6 months or sooner if damaged. Follow up CPAP compliance and pressure adjustment if needed  Sleep hygiene  Avoid sedatives, alcohol and caffeinated drinks at bed time. No driving motorized vehicles or operating heavy machinery while fatigue, drowsy or sleepy.    Weight loss is also recommended as a long-term intervention.     Follow up in 1 year or sooner if needed

## 2022-11-15 PROCEDURE — 94060 EVALUATION OF WHEEZING: CPT | Performed by: INTERNAL MEDICINE

## 2022-11-15 PROCEDURE — 94726 PLETHYSMOGRAPHY LUNG VOLUMES: CPT | Performed by: INTERNAL MEDICINE

## 2022-11-15 PROCEDURE — 94729 DIFFUSING CAPACITY: CPT | Performed by: INTERNAL MEDICINE

## 2022-11-15 PROCEDURE — 94618 PULMONARY STRESS TESTING: CPT | Performed by: INTERNAL MEDICINE

## 2022-11-15 NOTE — PROCEDURES
[address]                 [address] [address]                               PULMONARY FUNCTION    PATIENT NAME: Alistair Jackson                      :        1960  MED REC NO:   [id number]                          ROOM:  ACCOUNT NO:   [de-identified]                           ADMIT DATE: 2022  PROVIDER:     Juan Abebe MD    DATE OF PROCEDURE:  2022    BMI 49, height 65, and weight 295. SPIROMETRY:  FVC 3.02 which is 89% of predicted. FEV1 2.38 which is 91%  of predicted. There is no bronchodilator response. FEV1/FVC 79. PLETHYSMOGRAPHY:  TLC 4.87 which is 91% of predicted. FRC 3.05 which is  110% of predicted. RV/TLC 38. ERV 1.20 which is 151%. Diffusion capacity 26.93 which is 113%, corrects alveolar volume 137. IMPRESSION:  This is a normal PFT including normal spirometry, normal  lung volume, normal diffusion capacity. Clinical and radiological  correlation indicated with the patient's history. SIX-MINUTE WALK TEST:  The patient did walk for 880 feet which is 6 laps  and 140 feet, the patient saturation remained above 93%. However, his  heart rate went to 134. Respiratory rate went to 136 and his dyspnea  score went to 80. This six-minute walk test shows no evidence of  hypoxia on ambulation; however, the patient's heart rate and respiratory  rate went up, rule out other condition including but not limited to  deconditioning, cardiovascular disease, also clinical and radiological  correlation indicated.         Yesica Arzate MD    D: 2022 14:05:34       T: 2022 23:11:05     KELLY_IN  Job#: [id number]     Doc#: [id number]

## 2022-11-21 RX ORDER — CITALOPRAM 40 MG/1
TABLET ORAL
Qty: 90 TABLET | Refills: 0 | Status: SHIPPED | OUTPATIENT
Start: 2022-11-21

## 2022-11-22 ENCOUNTER — HOSPITAL ENCOUNTER (OUTPATIENT)
Dept: MAMMOGRAPHY | Age: 62
Discharge: HOME | End: 2022-11-22
Payer: COMMERCIAL

## 2022-11-22 ENCOUNTER — ANTI-COAG VISIT (OUTPATIENT)
Dept: INTERNAL MEDICINE CLINIC | Age: 62
End: 2022-11-22

## 2022-11-22 ENCOUNTER — TELEPHONE (OUTPATIENT)
Dept: INTERNAL MEDICINE CLINIC | Age: 62
End: 2022-11-22

## 2022-11-22 DIAGNOSIS — Z12.39 SCREENING BREAST EXAMINATION: ICD-10-CM

## 2022-11-22 LAB — INR BLD: 2.3

## 2022-11-22 PROCEDURE — 77063 BREAST TOMOSYNTHESIS BI: CPT

## 2022-11-22 NOTE — TELEPHONE ENCOUNTER
----- Message from Saulo Zuluaga sent at 11/22/2022  4:41 PM EST -----  Same per Dr Breanne Mejia  ----- Message -----  From: Saulo Zuluaga  Sent: 11/22/2022   4:05 PM EST  To: Adrianne Omer MD    INR 2.3  LM to call with dose    Last INR on 10/26 was 1.7, last recorded dose 13 mg daily

## 2022-11-25 ENCOUNTER — TELEPHONE (OUTPATIENT)
Dept: MAMMOGRAPHY | Age: 62
End: 2022-11-25

## 2022-12-07 ENCOUNTER — TELEPHONE (OUTPATIENT)
Dept: PULMONOLOGY | Age: 62
End: 2022-12-07

## 2022-12-07 ENCOUNTER — TELEMEDICINE (OUTPATIENT)
Dept: PULMONOLOGY | Age: 62
End: 2022-12-07
Payer: COMMERCIAL

## 2022-12-07 ENCOUNTER — HOSPITAL ENCOUNTER (OUTPATIENT)
Age: 62
Discharge: HOME | End: 2022-12-07
Payer: COMMERCIAL

## 2022-12-07 DIAGNOSIS — G47.33 SEVERE OBSTRUCTIVE SLEEP APNEA: ICD-10-CM

## 2022-12-07 DIAGNOSIS — R06.02 SHORTNESS OF BREATH: ICD-10-CM

## 2022-12-07 DIAGNOSIS — J06.9 UPPER RESPIRATORY TRACT INFECTION, UNSPECIFIED TYPE: ICD-10-CM

## 2022-12-07 DIAGNOSIS — J06.9 UPPER RESPIRATORY TRACT INFECTION, UNSPECIFIED TYPE: Primary | ICD-10-CM

## 2022-12-07 DIAGNOSIS — R05.9 COUGH, UNSPECIFIED TYPE: ICD-10-CM

## 2022-12-07 LAB
RAPID INFLUENZA  B AGN: NEGATIVE
RAPID INFLUENZA A AGN: POSITIVE

## 2022-12-07 PROCEDURE — 99214 OFFICE O/P EST MOD 30 MIN: CPT | Performed by: INTERNAL MEDICINE

## 2022-12-07 PROCEDURE — 87804 INFLUENZA ASSAY W/OPTIC: CPT

## 2022-12-07 RX ORDER — BENZONATATE 200 MG/1
200 CAPSULE ORAL 3 TIMES DAILY PRN
Qty: 90 CAPSULE | Refills: 0 | Status: SHIPPED | OUTPATIENT
Start: 2022-12-07 | End: 2023-01-06

## 2022-12-07 NOTE — PROGRESS NOTES
[facility]                                                            TELEHEALTH EVALUATION: Service performed was Audio (During [facility] public health emergency) and not a face-to-face visit         CHIEF COMPLAINT: illness       HPI:   Not doing well for 3 days   Cough head congestion SOB runny nose  No fever   No wheezes  No sputum   No hemoptysis   Tested negative for Covid   Has not had flu shot   Doing fine with her CPAP           From prior visit:   Seasonal allergy worse as she got olders: trees, pollens, mold       Past Medical History:   Diagnosis Date    Benign essential hypertension 11/14/2017    Benign head tremor 5/14/2014    Cholelithiasis     Coronary artery disease involving native coronary artery of native heart 8/31/2022    Disease of nasal cavity and sinuses     Fatty infiltration of liver     Hot flash, menopausal 7/22/2015    Hyperlipidemia     Hyperlipidemia associated with type 2 diabetes mellitus ([facility] [address] 10/23/2015    Morbid obesity with BMI of 50.0-59.9, adult ([address] 10/23/2015    No history of procedure 11/9/2015    no past colonoscopy    Pulmonary embolism ([facility] [address]     Type II or unspecified type diabetes mellitus without mention of complication, not stated as uncontrolled     Umbilical hernia 12/5/8116    Unspecified sleep apnea        Past Surgical History:        Procedure Laterality Date    BREAST SURGERY  2015    CHOLECYSTECTOMY, LAPAROSCOPIC  8/2/2012    COLONOSCOPY  11/9/2015    cecal polyp    DILATION AND CURETTAGE OF UTERUS  03/13/2018    HERNIA REPAIR  55/65/01    lap umbilical    HYSTEROSCOPY  03/13/2018    and D&C       Allergies:  is allergic to lisinopril, penicillins, and sulfa antibiotics. Social History:    TOBACCO:   reports that she has never smoked. She has never used smokeless tobacco.  ETOH:   reports no history of alcohol use.       Family History:       Problem Relation Age of Onset Hypertension Mother     Diabetes Mother     Heart Failure Father     Other Father         AAA    High Blood Pressure Sister     Diabetes Sister     High Blood Pressure Brother     Heart Attack Brother     Diabetes Brother        Current Medications:    Current Outpatient Medications:     citalopram (CELEXA) 40 MG tablet, TAKE 1 TABLET BY MOUTH  DAILY, Disp: 90 tablet, Rfl: 0    albuterol sulfate HFA (VENTOLIN HFA) 108 (90 Base) MCG/ACT inhaler, Inhale 2 puffs into the lungs 4 times daily as needed for Wheezing, Disp: 1 each, Rfl: 5    fenofibric acid (TRILIPIX) 135 MG CPDR capsule, TAKE 1 CAPSULE BY MOUTH  DAILY, Disp: 90 capsule, Rfl: 0    ferrous sulfate (FEROSUL) 325 (65 Fe) MG tablet, TAKE 1 TABLET BY MOUTH EVERY MORNING, Disp: 90 tablet, Rfl: 3    azelastine (ASTELIN) 0.1 % nasal spray, 1 spray by Nasal route 2 times daily Use in each nostril as directed, Disp: 30 mL, Rfl: 5    SYMBICORT 160-4.5 MCG/ACT AERO, Inhale 2 puffs into the lungs 2 times daily, Disp: 1 each, Rfl: 5    azithromycin (ZITHROMAX) 250 MG tablet, Take 2 tabs (500 MG) on Day 1, and take 1 tab (250 MG) on days 2-5., Disp: 1 packet, Rfl: 0    losartan (COZAAR) 50 MG tablet, TAKE 1 TABLET BY MOUTH  DAILY, Disp: 90 tablet, Rfl: 3    pantoprazole (PROTONIX) 20 MG tablet, TAKE 1 TABLET BY MOUTH  DAILY, Disp: 90 tablet, Rfl: 3    topiramate (TOPAMAX) 100 MG tablet, TAKE 1 TABLET BY MOUTH AT  NIGHT, Disp: 90 tablet, Rfl: 3    Icosapent Ethyl (VASCEPA) 1 g CAPS capsule, TAKE 2 CAPSULES BY MOUTH  TWICE DAILY, Disp: 360 capsule, Rfl: 3    warfarin (COUMADIN) 1 MG tablet, TAKE 1 TABLET BY MOUTH  DAILY OR AS DIRECTED BY  PHYSICIAN, Disp: 90 tablet, Rfl: 0    warfarin (COUMADIN) 5 MG tablet, TAKE 2 TABLETS BY MOUTH  DAILY OR AS DIRECTED BY  YOUR DOCTOR, Disp: 180 tablet, Rfl: 0    aspirin EC 81 MG EC tablet, Take 1 tablet by mouth daily, Disp: 90 tablet, Rfl: 3    atorvastatin (LIPITOR) 40 MG tablet, Take 1 tablet by mouth daily, Disp: 90 tablet, Rfl: 3 JANUMET XR  MG TB24 per extended release tablet, TAKE 1 TABLET BY MOUTH  TWICE DAILY, Disp: 180 tablet, Rfl: 3    JARDIANCE 25 MG tablet, TAKE 1 TABLET BY MOUTH  DAILY, Disp: 90 tablet, Rfl: 3    Multiple Vitamins-Minerals (THERAPEUTIC MULTIVITAMIN-MINERALS) tablet, Take 1 tablet by mouth daily, Disp: , Rfl:     Ascorbic Acid (VITAMIN C PO), Take by mouth, Disp: , Rfl:     CONTOUR NEXT TEST strip, TEST 3 TIMES DAILY, Disp: 300 strip, Rfl: 3    Microlet Lancets MISC, TEST 3 TIMES DAILY, Disp: 300 each, Rfl: 3    Blood Glucose Monitoring Suppl (ONE TOUCH ULTRA 2) w/Device KIT, Test three times daily. DX:E11.9, Disp: 1 kit, Rfl: 0    blood glucose test strips (ASCENSIA AUTODISC VI;ONE TOUCH ULTRA TEST VI) strip, Test three times daily. DX:E11.9, Disp: 100 each, Rfl: 3    Lancets MISC, Test three times daily. DX:E11.9, Disp: 100 each, Rfl: 3    ONE TOUCH LANCETS MISC, Test daily. DX: E11.9, Disp: 100 each, Rfl: 3    blood glucose test strips (ASCENSIA AUTODISC VI;ONE TOUCH ULTRA TEST VI) strip, Test Daily. DX: E11.9, Disp: 100 each, Rfl: 3    PRODIGY NO CODING BLOOD GLUC strip, TEST BLOOD SUGARS 3 TIMES DAILY, Disp: 100 each, Rfl: 2    ONE TOUCH LANCETS MISC, 1 each by Does not apply route daily. , Disp: 100 each, Rfl: 3      Objective:   PHYSICAL EXAM:    Last menstrual period 08/20/2014.' on RA  O2 Sat:  HR:  BP:  RR:  Temperature:  Neck size: Not able to obtain   Mallampati class IV. Constitutional:  No acute distress. Appears well developed and nourished. Eyes: No sclera icterus. No visible discharge. HENT: Normal appearing nose. External Ears normal.   Neck: No visualized mass. Cinthia Chelsea is midline   Resp: No accessory muscle use. Respiratory effort normal. No visualized signs of difficulty breathing or respiratory distress. Cardiovascular: No LE edema per patient's report   Musculoskeletal: No signs of ataxia. Normal range of motion of the neck.   Skin: No significant exanthematous lesions or discoloration noted on facial skin    Neuro: Awake. Alert. Able to follow commands. No facial asymmetry. No gaze palsy. Psych: No agitation. Normal affect. No hallucinations. Normal judgement and insight. DATA reviewed by me:   PFTs 11/08/2022 FVC 3.02(89%) FEV1 2.38(91%) FEV1/FVC 79% TLC 4.87(91%) DLCO 26.93(113%) 6MW 880 F Low O2 93%     Labs 10/6/22   Unremarkable immunocap   Normal TSH     CXR 7/27/22    No acute cardiopulmonary disease       Echo 1/70/17    LV systolic function is normal with Ef estimated at 55%. No regional wall motion abnormalities. There is mild concentric left ventricular hypertrophy. Grade I diastolic dysfunction with normal left ventricular filling pressure. Inadequate tricuspid regurgitation jet to estimate systolic artery pressure        Split night 1/29/16: AHI 70 and low O2 82%, improved sleep related breathing disorder with CPAP therapy. Started on AutoCPAP min pressure of 12 cmH2O and max pressure of 16 cmH2O. CPAP data 09/06-10/05 2022 reviewed by me. Uses  8-9 hrs/night with 100% compliance and AHI of  0.6. Median 14.8 and [address] 17. APAP 14-18    CPAP data 09/06-10/05 2022 reviewed by me. Uses  8-9 hrs/night with 100% compliance and AHI of  0.6. Median 14.8 and [address] 17. APAP 14-18        Assessment:       URI- favor viral   Severe ELEAZAR on CPAP 12 cmH2O. Nasal mask. Optimal compliance per patient's report  Cough and dyspnea on exertion. Morbid obesity and deconditioning are contributing. Cannot exclude underlying asthma given seasonal allergy and cough. Much improved with Symbicort  Seasonal allergy worse as she got olders: trees, pollens, mold   Allergic rhinitnis/PND  History of PE 2005 and 2006. Chronically on Coumadin.        Plan:       Supportive care and symptomatic treatment with tylenol, vitamin C, adequate fluid intake, target 7-9 hrs sleep, and Dextromethorphan   Benzonatate (Tessalon) 200 mg PO TID PRN for cough  Check Flu test No need for Abx   Advised to monitor O2 using pulse oximeter and report any desaturation < 90%   Continue Albuterol 2 puffs Q4-6 hrs PRN  Continue Azelastine 137 mcg/spray (Astelin) 2 sprays/nostril BID PRN   Continue CPAP 6-8 hrs at night and during naps. Sai Jones is a 65 y.o. female being evaluated by a Virtual Visit (video visit) encounter to address concerns as mentioned above. A caregiver was present when appropriate. Due to this being a TeleHealth encounter ([facility]), evaluation of the following organ systems was limited: Vitals/Constitutional/EENT/Resp/CV/GI//MS/Neuro/Skin/Heme-Lymph-Imm. Pursuant to the emergency declaration under the [address] [place] [address], [address] [place] [address] authority and the [employer] and [employer] Act, this Virtual Visit was conducted with patient's (and/or legal guardian's) consent, to reduce the patient's risk of exposure to COVID-19 and provide necessary medical care. The patient (and/or legal guardian) has also been advised to contact this office for worsening conditions or problems, and seek emergency medical treatment and/or call 911 if deemed necessary. Services were provided through a video synchronous discussion virtually to substitute for in-person clinic visit. Patient was located in her home, provider was located in his office. --Eddie Covington MD on 12/7/2022 at 1:36 PM    An electronic signature was used to authenticate this note.

## 2022-12-07 NOTE — TELEPHONE ENCOUNTER
Pt states she wasn't sure if she should call our office or PCP. States she tested negative for covid but is just feeling really \"crappy\". Do you have the following symptoms? Shortness of Breath  yes  Wheezing  no  Cough  yes  Cough Characteristics:  Productive    no  Sputum Color    n/a  Hemoptysis   no  Consistency of sputum   n/a     Fever:    no    Temp:no  Chills/Sweats:  yes  What other symptoms are you having?:  headache, fatigue, head congestion    How long have you had these symptoms? 3 days     [facility]     Have you been vaccinated for covid? Yes  Have you received a booster vaccine? No          Review medications and allergies: Allergies? Allergies   Allergen Reactions    Lisinopril Other (See Comments)     cough    Penicillins Other (See Comments)     Unsure of reaction--childhood    Sulfa Antibiotics Other (See Comments)     Unsure of reaction             Currently on Antibiotics? (Drug/Dose/Frequency and how long on?) no        Systemic Steroids? (Drug/Dose/Frequency and how long on?) no      Last sick call taken on n/a. Meds prescribed were n/a, by n/a. Last OV 11/8/2022 with Dr. Corinne Siemens:       Severe ELEAZAR on CPAP 12 cmH2O. Nasal mask. Optimal compliance and efficacy upon review today. Cough and dyspnea on exertion. Morbid obesity and deconditioning are contributing. Cannot exclude underlying asthma given seasonal allergy and cough. Much improved with Symbicort  Seasonal allergy worse as she got olders: trees, pollens, mold   Allergic rhinitnis/PND  History of PE 2005 and 2006. Chronically on Coumadin. Plan:       Continue Symbicort 160/4.5 2 puffs BID - complete 4-6 weeks  Trial Albuterol 2 puffs Q4-6 hrs PRN  Continue Azelastine 137 mcg/spray (Astelin) 2 sprays/nostril BID PRN   Order for CPAP supplies  Continue CPAP 6-8 hrs at night and during naps. Replacement of mask, tubing, head straps every 3-6 months or sooner if damaged. Follow up CPAP compliance and pressure adjustment if needed  Sleep hygiene  Avoid sedatives, alcohol and caffeinated drinks at bed time. No driving motorized vehicles or operating heavy machinery while fatigue, drowsy or sleepy. Weight loss is also recommended as a long-term intervention.     Follow up in 1 year or sooner if needed

## 2022-12-08 RX ORDER — OSELTAMIVIR PHOSPHATE 75 MG/1
75 CAPSULE ORAL 2 TIMES DAILY
Qty: 10 CAPSULE | Refills: 0 | Status: SHIPPED | OUTPATIENT
Start: 2022-12-08 | End: 2022-12-13

## 2022-12-09 NOTE — TELEPHONE ENCOUNTER
Pt called indicating that she now has a sore throat and asked if this is normal. Advised pt that if the sore throat doesn't resolve or gets worse she should call PCP or go to Urgent Care for additional testing for other potential causes such as strep. Pt verbalized understanding.

## 2022-12-12 NOTE — TELEPHONE ENCOUNTER
----- Message from [facility] sent at 12/12/2022  1:23 PM EST -----  Contact: Connie Molina [phone]  Eliquis 5 mg BID-Per Dr Jermain Gupta  ----- Message -----  From: [facility]  Sent: 12/12/2022   8:51 AM EST  To: Mir Nava MD    Patient informed and voiced understanding. What dose of Eliquis?  ----- Message -----  From: Tyler Dacosta  Sent: 12/12/2022   8:03 AM EST  To: [facility]      ----- Message -----  From: Mir Nava MD  Sent: 12/11/2022   5:19 PM EST  To: Tyler Dacosta    We can start eliquis. She should stop warfarin. Her INR has to be less than 2  ----- Message -----  From: Lieutenant Harvey  Sent: 12/9/2022  11:34 AM EST  To: Mir Nava MD    Patient has 5 days of Warfarin left. She states you wanted to change it to something else when she was out of Warfarin. She needs a script for new medication sent to pharmacy. Surekha Christie.  [place]    Thank you

## 2022-12-19 ENCOUNTER — TELEPHONE (OUTPATIENT)
Dept: INTERNAL MEDICINE CLINIC | Age: 62
End: 2022-12-19

## 2022-12-19 NOTE — TELEPHONE ENCOUNTER
----- Message from Red Sales MD sent at 12/19/2022  4:46 PM EST -----  Contact: Sadie Michael [phone]  Yes  Stop coumadin    ----- Message -----  From: Chase Storey  Sent: 12/19/2022   4:00 PM EST  To: Red Sales MD      ----- Message -----  From: Prosper Caruso  Sent: 12/19/2022   3:55 PM EST  To: Gisell Rincon Bhavnaott    Patient is asking if she can start taking eliquis now that her INR is 1.7.

## 2022-12-22 ENCOUNTER — TELEPHONE (OUTPATIENT)
Dept: PULMONOLOGY | Age: 62
End: 2022-12-22

## 2022-12-22 RX ORDER — PREDNISONE 20 MG/1
20 TABLET ORAL DAILY
Qty: 5 TABLET | Refills: 0 | Status: SHIPPED | OUTPATIENT
Start: 2022-12-22 | End: 2022-12-27

## 2022-12-22 NOTE — TELEPHONE ENCOUNTER
Received call from pt who stated she had tested positive for Flu A on 12/07/2022. Pt was taking Tamiflu 12/8--12/12. Pt is currently taking Mucinex DM, started on 12/15 in addition to tessalon pearls. Pt has had a cough and nasal drainage since testing positive on 12/7. Pt is asking if there is anything else she should take in addition to current medications. Do you have the following symptoms? Shortness of Breath    Wheezing    Cough  yes  Cough Characteristics:  Productive    no - pt is having clear nasal drainage   Sputum Color      Hemoptysis     Consistency of sputum        Fever:    no    Temp:98.1  Chills/Sweats:  no  What other symptoms are you having?:  back pain from coughing    How long have you had these symptoms? 12/07/2022     [facility]    Have you been vaccinated for covid? Yes  Have you received a booster vaccine? No          Review medications and allergies: Allergies? Allergies   Allergen Reactions    Lisinopril Other (See Comments)     cough    Penicillins Other (See Comments)     Unsure of reaction--childhood    Sulfa Antibiotics Other (See Comments)     Unsure of reaction           Currently on Antibiotics? (Drug/Dose/Frequency and how long on?) no        Systemic Steroids? (Drug/Dose/Frequency and how long on?) no      Last sick call taken on 12/07/2022. Meds prescribed were Tessalon and Tamiflu, by Dr Nat Brown. Last OV 12/07/2022 with Dr. Nat Brown     Assessment:       URI- favor viral   Severe ELEAZAR on CPAP 12 cmH2O. Nasal mask. Optimal compliance per patient's report  Cough and dyspnea on exertion. Morbid obesity and deconditioning are contributing. Cannot exclude underlying asthma given seasonal allergy and cough. Much improved with Symbicort  Seasonal allergy worse as she got olders: trees, pollens, mold   Allergic rhinitnis/PND  History of PE 2005 and 2006. Chronically on Coumadin.        Plan:       Supportive care and symptomatic treatment with tylenol, vitamin C, adequate fluid intake, target 7-9 hrs sleep, and Dextromethorphan   Benzonatate (Tessalon) 200 mg PO TID PRN for cough  Check Flu test   No need for Abx   Advised to monitor O2 using pulse oximeter and report any desaturation < 90%   Continue Albuterol 2 puffs Q4-6 hrs PRN  Continue Azelastine 137 mcg/spray (Astelin) 2 sprays/nostril BID PRN   Continue CPAP 6-8 hrs at night and during naps.

## 2023-01-16 RX ORDER — FENOFIBRIC ACID 135 MG/1
CAPSULE, DELAYED RELEASE ORAL
Qty: 90 CAPSULE | Refills: 0 | Status: SHIPPED | OUTPATIENT
Start: 2023-01-16

## 2023-02-06 DIAGNOSIS — R05.9 COUGH, UNSPECIFIED TYPE: Primary | ICD-10-CM

## 2023-02-07 RX ORDER — ALBUTEROL SULFATE 90 UG/1
2 AEROSOL, METERED RESPIRATORY (INHALATION) 4 TIMES DAILY PRN
Qty: 3 EACH | Refills: 1 | Status: SHIPPED | OUTPATIENT
Start: 2023-02-07

## 2023-02-16 ENCOUNTER — TELEPHONE (OUTPATIENT)
Dept: PULMONOLOGY | Age: 63
End: 2023-02-16

## 2023-02-16 NOTE — TELEPHONE ENCOUNTER
Pt called stating she is falling asleep at work and having excessive daytime sleepiness for the last 3 weeks. Pt states she is using her BIPAP every night. Please advise.

## 2023-02-16 NOTE — TELEPHONE ENCOUNTER
Pt called stating she is falling asleep at work and having excessive daytime sleepiness for the last 3 weeks. Pt states she is using her BIPAP every night. Please advise. Unable to pull data. 12/7/22    Assessment:       URI- favor viral   Severe ELEAZAR on CPAP 12 cmH2O. Nasal mask. Optimal compliance per patient's report  Cough and dyspnea on exertion. Morbid obesity and deconditioning are contributing. Cannot exclude underlying asthma given seasonal allergy and cough. Much improved with Symbicort  Seasonal allergy worse as she got olders: trees, pollens, mold   Allergic rhinitnis/PND  History of PE 2005 and 2006. Chronically on Coumadin. Plan:       Supportive care and symptomatic treatment with tylenol, vitamin C, adequate fluid intake, target 7-9 hrs sleep, and Dextromethorphan   Benzonatate (Tessalon) 200 mg PO TID PRN for cough  Check Flu test   No need for Abx   Advised to monitor O2 using pulse oximeter and report any desaturation < 90%   Continue Albuterol 2 puffs Q4-6 hrs PRN  Continue Azelastine 137 mcg/spray (Astelin) 2 sprays/nostril BID PRN   Continue CPAP 6-8 hrs at night and during naps.

## 2023-02-21 ENCOUNTER — OFFICE VISIT (OUTPATIENT)
Dept: INTERNAL MEDICINE CLINIC | Age: 63
End: 2023-02-21

## 2023-02-21 VITALS
DIASTOLIC BLOOD PRESSURE: 80 MMHG | HEART RATE: 70 BPM | HEIGHT: 65 IN | RESPIRATION RATE: 12 BRPM | BODY MASS INDEX: 47.15 KG/M2 | WEIGHT: 283 LBS | SYSTOLIC BLOOD PRESSURE: 130 MMHG

## 2023-02-21 DIAGNOSIS — E78.1 HYPERTRIGLYCERIDEMIA: ICD-10-CM

## 2023-02-21 DIAGNOSIS — E11.69 TYPE 2 DIABETES MELLITUS WITH OTHER SPECIFIED COMPLICATION, WITHOUT LONG-TERM CURRENT USE OF INSULIN (HCC): ICD-10-CM

## 2023-02-21 DIAGNOSIS — G47.33 SEVERE OBSTRUCTIVE SLEEP APNEA: ICD-10-CM

## 2023-02-21 DIAGNOSIS — E11.9 TYPE 2 DIABETES MELLITUS WITHOUT COMPLICATION, WITHOUT LONG-TERM CURRENT USE OF INSULIN (HCC): ICD-10-CM

## 2023-02-21 DIAGNOSIS — G25.0 BENIGN HEAD TREMOR: ICD-10-CM

## 2023-02-21 DIAGNOSIS — E11.69 HYPERLIPIDEMIA ASSOCIATED WITH TYPE 2 DIABETES MELLITUS (HCC): ICD-10-CM

## 2023-02-21 DIAGNOSIS — E11.69 TYPE 2 DIABETES MELLITUS WITH OTHER SPECIFIED COMPLICATION, WITHOUT LONG-TERM CURRENT USE OF INSULIN (HCC): Primary | ICD-10-CM

## 2023-02-21 DIAGNOSIS — I27.82 OTHER CHRONIC PULMONARY EMBOLISM WITHOUT ACUTE COR PULMONALE (HCC): ICD-10-CM

## 2023-02-21 DIAGNOSIS — I10 PRIMARY HYPERTENSION: ICD-10-CM

## 2023-02-21 DIAGNOSIS — E78.5 HYPERLIPIDEMIA ASSOCIATED WITH TYPE 2 DIABETES MELLITUS (HCC): ICD-10-CM

## 2023-02-21 DIAGNOSIS — E66.01 MORBID OBESITY WITH BMI OF 50.0-59.9, ADULT (HCC): ICD-10-CM

## 2023-02-21 LAB
A/G RATIO: 1.8 (ref 1.1–2.2)
ALBUMIN SERPL-MCNC: 4.5 G/DL (ref 3.4–5)
ALP BLD-CCNC: 54 U/L (ref 40–129)
ALT SERPL-CCNC: 10 U/L (ref 10–40)
ANION GAP SERPL CALCULATED.3IONS-SCNC: 14 MMOL/L (ref 3–16)
AST SERPL-CCNC: 21 U/L (ref 15–37)
BASOPHILS ABSOLUTE: 0.1 K/UL (ref 0–0.2)
BASOPHILS RELATIVE PERCENT: 1.3 %
BILIRUB SERPL-MCNC: 0.6 MG/DL (ref 0–1)
BUN BLDV-MCNC: 16 MG/DL (ref 7–20)
CALCIUM SERPL-MCNC: 10.1 MG/DL (ref 8.3–10.6)
CHLORIDE BLD-SCNC: 99 MMOL/L (ref 99–110)
CHOLESTEROL, TOTAL: 125 MG/DL (ref 0–199)
CO2: 23 MMOL/L (ref 21–32)
CREAT SERPL-MCNC: 0.7 MG/DL (ref 0.6–1.2)
EOSINOPHILS ABSOLUTE: 0.1 K/UL (ref 0–0.6)
EOSINOPHILS RELATIVE PERCENT: 2.9 %
GFR SERPL CREATININE-BSD FRML MDRD: >60 ML/MIN/{1.73_M2}
GLUCOSE BLD-MCNC: 209 MG/DL (ref 70–99)
HCT VFR BLD CALC: 45.9 % (ref 36–48)
HDLC SERPL-MCNC: 31 MG/DL (ref 40–60)
HEMOGLOBIN: 15.1 G/DL (ref 12–16)
LDL CHOLESTEROL CALCULATED: ABNORMAL MG/DL
LDL CHOLESTEROL DIRECT: 35 MG/DL
LYMPHOCYTES ABSOLUTE: 1.3 K/UL (ref 1–5.1)
LYMPHOCYTES RELATIVE PERCENT: 27.1 %
MCH RBC QN AUTO: 30.6 PG (ref 26–34)
MCHC RBC AUTO-ENTMCNC: 33 G/DL (ref 31–36)
MCV RBC AUTO: 92.7 FL (ref 80–100)
MONOCYTES ABSOLUTE: 0.4 K/UL (ref 0–1.3)
MONOCYTES RELATIVE PERCENT: 8.8 %
NEUTROPHILS ABSOLUTE: 2.9 K/UL (ref 1.7–7.7)
NEUTROPHILS RELATIVE PERCENT: 59.9 %
PDW BLD-RTO: 14.7 % (ref 12.4–15.4)
PLATELET # BLD: 166 K/UL (ref 135–450)
PMV BLD AUTO: 8.7 FL (ref 5–10.5)
POTASSIUM SERPL-SCNC: 4.4 MMOL/L (ref 3.5–5.1)
RBC # BLD: 4.95 M/UL (ref 4–5.2)
SODIUM BLD-SCNC: 136 MMOL/L (ref 136–145)
TOTAL PROTEIN: 7 G/DL (ref 6.4–8.2)
TRIGL SERPL-MCNC: 501 MG/DL (ref 0–150)
VLDLC SERPL CALC-MCNC: ABNORMAL MG/DL
WBC # BLD: 4.8 K/UL (ref 4–11)

## 2023-02-21 PROCEDURE — 3079F DIAST BP 80-89 MM HG: CPT | Performed by: INTERNAL MEDICINE

## 2023-02-21 PROCEDURE — 90686 IIV4 VACC NO PRSV 0.5 ML IM: CPT | Performed by: INTERNAL MEDICINE

## 2023-02-21 PROCEDURE — 99214 OFFICE O/P EST MOD 30 MIN: CPT | Performed by: INTERNAL MEDICINE

## 2023-02-21 PROCEDURE — 3075F SYST BP GE 130 - 139MM HG: CPT | Performed by: INTERNAL MEDICINE

## 2023-02-21 PROCEDURE — 90471 IMMUNIZATION ADMIN: CPT | Performed by: INTERNAL MEDICINE

## 2023-02-21 ASSESSMENT — ENCOUNTER SYMPTOMS
COUGH: 0
WHEEZING: 0
SHORTNESS OF BREATH: 0
RHINORRHEA: 0
NAUSEA: 0
VOMITING: 0
ABDOMINAL PAIN: 0

## 2023-02-21 NOTE — PROGRESS NOTES
Subjective:      Patient ID: Adama Yu is a 65 y.o. female. HPI    Patient is here for follow up of diabetes, HTN, depression, pulmonary embolism and hyperlipidemia. Patient's is not checking her sugars. No hypoglycemia. Patient does not have polydipsia and polyuria. Her last A1C was 7.6. She is on Cozaar for HTN. Her Bp is at goal.  She is compliant with coumadin. She checks pro time at home. Her depression is stable. No new issues. Her cholesterol is controlled. It is at goal.  She has ELEAZAR. She is on CPAP. She is compliant. She has benign head tremor. It is about the same. Review of Systems   Constitutional:  Negative for appetite change and fatigue. HENT:  Negative for postnasal drip and rhinorrhea. Respiratory:  Negative for cough, shortness of breath and wheezing. Cardiovascular:  Negative for chest pain and palpitations. Gastrointestinal:  Negative for abdominal pain, nausea and vomiting. Endocrine: Negative for polydipsia, polyphagia and polyuria. Musculoskeletal:  Negative for joint swelling. Skin:  Negative for rash. Neurological:  Negative for light-headedness. Psychiatric/Behavioral:  Positive for sleep disturbance. The patient is nervous/anxious.         Current Outpatient Medications on File Prior to Visit   Medication Sig Dispense Refill    albuterol sulfate HFA (VENTOLIN HFA) 108 (90 Base) MCG/ACT inhaler Inhale 2 puffs into the lungs 4 times daily as needed for Wheezing 3 each 1    fenofibric acid (TRILIPIX) 135 MG CPDR capsule TAKE 1 CAPSULE BY MOUTH DAILY 90 capsule 0    citalopram (CELEXA) 40 MG tablet TAKE 1 TABLET BY MOUTH  DAILY 90 tablet 0    ferrous sulfate (FEROSUL) 325 (65 Fe) MG tablet TAKE 1 TABLET BY MOUTH EVERY MORNING 90 tablet 3    azelastine (ASTELIN) 0.1 % nasal spray 1 spray by Nasal route 2 times daily Use in each nostril as directed 30 mL 5    SYMBICORT 160-4.5 MCG/ACT AERO Inhale 2 puffs into the lungs 2 times daily 1 each 5    losartan (COZAAR) 50 MG tablet TAKE 1 TABLET BY MOUTH  DAILY 90 tablet 3    pantoprazole (PROTONIX) 20 MG tablet TAKE 1 TABLET BY MOUTH  DAILY 90 tablet 3    topiramate (TOPAMAX) 100 MG tablet TAKE 1 TABLET BY MOUTH AT  NIGHT 90 tablet 3    Icosapent Ethyl (VASCEPA) 1 g CAPS capsule TAKE 2 CAPSULES BY MOUTH  TWICE DAILY 360 capsule 3    aspirin EC 81 MG EC tablet Take 1 tablet by mouth daily 90 tablet 3    atorvastatin (LIPITOR) 40 MG tablet Take 1 tablet by mouth daily 90 tablet 3    JANUMET XR  MG TB24 per extended release tablet TAKE 1 TABLET BY MOUTH  TWICE DAILY 180 tablet 3    JARDIANCE 25 MG tablet TAKE 1 TABLET BY MOUTH  DAILY 90 tablet 3    Multiple Vitamins-Minerals (THERAPEUTIC MULTIVITAMIN-MINERALS) tablet Take 1 tablet by mouth daily      Ascorbic Acid (VITAMIN C PO) Take by mouth      CONTOUR NEXT TEST strip TEST 3 TIMES DAILY 300 strip 3    Microlet Lancets MISC TEST 3 TIMES DAILY 300 each 3    Blood Glucose Monitoring Suppl (ONE TOUCH ULTRA 2) w/Device KIT Test three times daily. DX:E11.9 1 kit 0    blood glucose test strips (ASCENSIA AUTODISC VI;ONE TOUCH ULTRA TEST VI) strip Test three times daily. DX:E11.9 100 each 3    Lancets MISC Test three times daily. DX:E11.9 100 each 3    ONE TOUCH LANCETS MISC Test daily. DX: E11.9 100 each 3    blood glucose test strips (ASCENSIA AUTODISC VI;ONE TOUCH ULTRA TEST VI) strip Test Daily. DX: E11.9 100 each 3    PRODIGY NO CODING BLOOD GLUC strip TEST BLOOD SUGARS 3 TIMES DAILY 100 each 2    ONE TOUCH LANCETS MISC 1 each by Does not apply route daily. 100 each 3     No current facility-administered medications on file prior to visit. There are no changes to past medical history, family history, social history or review of systems(except as noted in the history section) since prior note (all reviewed with patient).     /80 (Site: Right Upper Arm, Position: Sitting, Cuff Size: Large Adult)   Pulse 70   Resp 12   Ht 5' 5\" (1.651 m)   Wt 283 lb (128.4 kg)   LMP 08/20/2014   BMI 47.09 kg/m²        Objective:   Physical Exam  Constitutional:       Appearance: She is well-developed. Comments: Morbidly obese   HENT:      Head: Normocephalic. Eyes:      Conjunctiva/sclera: Conjunctivae normal.      Pupils: Pupils are equal, round, and reactive to light. Neck:      Thyroid: No thyroid mass or thyromegaly. Vascular: No carotid bruit or JVD. Trachea: Trachea normal.   Cardiovascular:      Rate and Rhythm: Normal rate and regular rhythm. Heart sounds: Normal heart sounds. No murmur heard. No gallop. Pulmonary:      Effort: Pulmonary effort is normal. No respiratory distress. Breath sounds: Normal breath sounds. No wheezing or rales. Abdominal:      General: Bowel sounds are normal. There is no distension. Palpations: Abdomen is soft. There is no hepatomegaly or splenomegaly. Tenderness: There is no abdominal tenderness. Musculoskeletal:      Cervical back: Normal range of motion and neck supple. Lymphadenopathy:      Cervical: No cervical adenopathy. Skin:     General: Skin is warm and dry. Findings: No rash. Neurological:      Mental Status: She is alert and oriented to person, place, and time. Psychiatric:         Behavior: Behavior normal.         Thought Content: Thought content normal.         Judgment: Judgment normal.       Assessment:       Diagnosis Orders   1. Type 2 diabetes mellitus with other specified complication, without long-term current use of insulin ([facility])  Comprehensive Metabolic Panel    CBC with Auto Differential    Hemoglobin A1C    Lipid Panel      2. Type 2 diabetes mellitus without complication, without long-term current use of insulin ([address]        3. Primary hypertension        4. Hypertriglyceridemia        5. Other chronic pulmonary embolism without acute cor pulmonale (HCC)        6. Benign head tremor        7. Morbid obesity with BMI of 50.0-59.9, adult ([address]        8. Hyperlipidemia associated with type 2 diabetes mellitus ([facility]        9. Severe obstructive sleep apnea                 Plan:      Decrease calorie intake. Check labs. DM type 2: Continue oral medication. Check A1C. She has lost over 14 lbs. Non obstructive CAD on ASA. HTN: on Cozaar 50 mg daily. PE: has changed to Eliquis. Head tremor -on Topamax  Fatty liver: she has lost weight. Depression: on Celexa. ELEAZAR: on CPAP. She is using it for over 8 hours a night. She was feeling more tired and feels her CPAP needs adjusted. Hypertriglyceridemia. Stable. Check labs. Discussed use, benefit, and side effects of prescribed medications. Barriers to medication compliance addressed. All patient questions answered. Pt voiced understanding. Exercise,weight loss recommended. The current medical regimen is effective;  continue present plan and medications. See orders.

## 2023-02-22 LAB
ESTIMATED AVERAGE GLUCOSE: 182.9 MG/DL
HBA1C MFR BLD: 8 %

## 2023-03-01 RX ORDER — TIRZEPATIDE 2.5 MG/.5ML
2.5 INJECTION, SOLUTION SUBCUTANEOUS WEEKLY
Qty: 4 ADJUSTABLE DOSE PRE-FILLED PEN SYRINGE | Refills: 0 | Status: SHIPPED | OUTPATIENT
Start: 2023-03-01 | End: 2023-03-23

## 2023-03-06 ENCOUNTER — TELEPHONE (OUTPATIENT)
Dept: INTERNAL MEDICINE CLINIC | Age: 63
End: 2023-03-06

## 2023-03-06 NOTE — TELEPHONE ENCOUNTER
----- Message from Dennis Eddy MD sent at 3/6/2023  1:39 PM EST -----  Contact: Patient [phone]  Have her see podiatry Dr. Olga Tom.  ----- Message -----  From: Andrea Melgar  Sent: 3/6/2023   9:27 AM EST  To: Dennis Eddy MD    Patient states that over the weekend she hit her right toe on the refrigerator and feels that it may be broken. However the top of her foot up to her ankle is swollen and discolored. Said the color is pink and white. It's very sore and hard to walk on. Wanting to know if she needs to have an xray done?

## 2023-03-10 ENCOUNTER — TELEPHONE (OUTPATIENT)
Dept: INTERNAL MEDICINE CLINIC | Age: 63
End: 2023-03-10

## 2023-03-10 ENCOUNTER — OFFICE VISIT (OUTPATIENT)
Dept: INTERNAL MEDICINE CLINIC | Age: 63
End: 2023-03-10

## 2023-03-10 VITALS
SYSTOLIC BLOOD PRESSURE: 130 MMHG | HEIGHT: 65 IN | HEART RATE: 90 BPM | BODY MASS INDEX: 47.32 KG/M2 | WEIGHT: 284 LBS | RESPIRATION RATE: 14 BRPM | DIASTOLIC BLOOD PRESSURE: 70 MMHG

## 2023-03-10 DIAGNOSIS — E11.69 HYPERLIPIDEMIA ASSOCIATED WITH TYPE 2 DIABETES MELLITUS (HCC): ICD-10-CM

## 2023-03-10 DIAGNOSIS — G47.33 SEVERE OBSTRUCTIVE SLEEP APNEA: ICD-10-CM

## 2023-03-10 DIAGNOSIS — I27.82 OTHER CHRONIC PULMONARY EMBOLISM WITHOUT ACUTE COR PULMONALE (HCC): ICD-10-CM

## 2023-03-10 DIAGNOSIS — G25.0 BENIGN HEAD TREMOR: ICD-10-CM

## 2023-03-10 DIAGNOSIS — Z01.818 PRE-OP EXAM: Primary | ICD-10-CM

## 2023-03-10 DIAGNOSIS — E11.9 TYPE 2 DIABETES MELLITUS WITHOUT COMPLICATION, WITHOUT LONG-TERM CURRENT USE OF INSULIN (HCC): ICD-10-CM

## 2023-03-10 DIAGNOSIS — E78.5 HYPERLIPIDEMIA ASSOCIATED WITH TYPE 2 DIABETES MELLITUS (HCC): ICD-10-CM

## 2023-03-10 DIAGNOSIS — I25.10 CORONARY ARTERY DISEASE INVOLVING NATIVE CORONARY ARTERY OF NATIVE HEART WITHOUT ANGINA PECTORIS: ICD-10-CM

## 2023-03-10 DIAGNOSIS — E78.1 HYPERTRIGLYCERIDEMIA: ICD-10-CM

## 2023-03-10 DIAGNOSIS — I10 PRIMARY HYPERTENSION: ICD-10-CM

## 2023-03-10 NOTE — TELEPHONE ENCOUNTER
----- Message from Joe Hayes sent at 3/10/2023 10:22 AM EST -----  Contact: [phone]  Patient needs H&P for foot surgery this comingTuesday. She was told to go to the [facility] to have it done due to not being able to get a appointment in the office. [facility] told her they were not comfortable doing the physical due to patients heart problems. Patient asking advice on what to do now? Please advise.

## 2023-03-10 NOTE — PROGRESS NOTES
Subjective:      Vi Das is a 65 y.o. female who presents to the office today for a preoperative consultation at the request of surgeon Dr. Brigida Hirsch who plans on performing right foot surgery. Planned anesthesia is General and MAC.        Past Medical History:   Diagnosis Date    Benign essential hypertension 11/14/2017    Benign head tremor 5/14/2014    Cholelithiasis     Coronary artery disease involving native coronary artery of native heart 8/31/2022    Disease of nasal cavity and sinuses     Fatty infiltration of liver     Hot flash, menopausal 7/22/2015    Hyperlipidemia     Hyperlipidemia associated with type 2 diabetes mellitus ([address] 10/23/2015    Morbid obesity with BMI of 50.0-59.9, adult ([address] 10/23/2015    No history of procedure 11/9/2015    no past colonoscopy    Pulmonary embolism (HCC)     Type II or unspecified type diabetes mellitus without mention of complication, not stated as uncontrolled     Umbilical hernia 46/9/8637    Unspecified sleep apnea      Patient Active Problem List    Diagnosis Date Noted    Coronary artery disease involving native coronary artery of native heart 08/31/2022    Unstable angina ([address] 07/29/2022    Abnormal nuclear cardiac imaging test 07/29/2022    Abnormal stress test 07/29/2022    Chest pain 07/27/2022    Shortness of breath 07/27/2022    Hypertension 07/27/2022    Type 2 diabetes mellitus without complication, without long-term current use of insulin ([address] 07/27/2022    Obesity, morbid, BMI 40.0-49.9 ([address] 05/20/2019    Benign essential hypertension 11/14/2017    Severe obstructive sleep apnea 03/30/2016    Hyperlipidemia associated with type 2 diabetes mellitus ([address] 10/23/2015    Morbid obesity with BMI of 50.0-59.9, adult ([address] 10/23/2015    Hot flash, menopausal 07/22/2015    Benign head tremor 90/43/1741    Umbilical hernia 71/43/2826    Fatty infiltration of liver     Type 2 diabetes mellitus with other specified complication ([address] Hypertriglyceridemia     Pulmonary embolism (HCC)     Disease of nasal cavity and sinuses      Past Surgical History:   Procedure Laterality Date    BREAST SURGERY  2015    CHOLECYSTECTOMY, LAPAROSCOPIC  8/2/2012    COLONOSCOPY  11/9/2015    cecal polyp    DILATION AND CURETTAGE OF UTERUS  03/13/2018    HERNIA REPAIR  76/29/76    lap umbilical    HYSTEROSCOPY  03/13/2018    and D&C     Family History   Problem Relation Age of Onset    Hypertension Mother     Diabetes Mother     Heart Failure Father     Other Father         AAA    High Blood Pressure Sister     Diabetes Sister     High Blood Pressure Brother     Heart Attack Brother     Diabetes Brother      Social History     Socioeconomic History    Marital status:    Tobacco Use    Smoking status: Never    Smokeless tobacco: Never   Vaping Use    Vaping Use: Never used   Substance and Sexual Activity    Alcohol use: No     Alcohol/week: 0.0 standard drinks    Drug use: No    Sexual activity: Not Currently     Partners: Male     Current Outpatient Medications   Medication Sig Dispense Refill    Tirzepatide (MOUNJARO) 2.5 MG/0.5ML SOPN SC injection Inject 0.5 mLs into the skin once a week for 4 doses 4 Adjustable Dose Pre-filled Pen Syringe 0    apixaban (ELIQUIS) 5 MG TABS tablet Take 1 tablet by mouth 2 times daily 60 tablet 0    albuterol sulfate HFA (VENTOLIN HFA) 108 (90 Base) MCG/ACT inhaler Inhale 2 puffs into the lungs 4 times daily as needed for Wheezing 3 each 1    fenofibric acid (TRILIPIX) 135 MG CPDR capsule TAKE 1 CAPSULE BY MOUTH DAILY 90 capsule 0    citalopram (CELEXA) 40 MG tablet TAKE 1 TABLET BY MOUTH  DAILY 90 tablet 0    ferrous sulfate (FEROSUL) 325 (65 Fe) MG tablet TAKE 1 TABLET BY MOUTH EVERY MORNING 90 tablet 3    losartan (COZAAR) 50 MG tablet TAKE 1 TABLET BY MOUTH  DAILY 90 tablet 3    pantoprazole (PROTONIX) 20 MG tablet TAKE 1 TABLET BY MOUTH  DAILY 90 tablet 3    topiramate (TOPAMAX) 100 MG tablet TAKE 1 TABLET BY MOUTH AT NIGHT 90 tablet 3    Icosapent Ethyl (VASCEPA) 1 g CAPS capsule TAKE 2 CAPSULES BY MOUTH  TWICE DAILY 360 capsule 3    aspirin EC 81 MG EC tablet Take 1 tablet by mouth daily 90 tablet 3    atorvastatin (LIPITOR) 40 MG tablet Take 1 tablet by mouth daily 90 tablet 3    JANUMET XR  MG TB24 per extended release tablet TAKE 1 TABLET BY MOUTH  TWICE DAILY 180 tablet 3    JARDIANCE 25 MG tablet TAKE 1 TABLET BY MOUTH  DAILY 90 tablet 3    Multiple Vitamins-Minerals (THERAPEUTIC MULTIVITAMIN-MINERALS) tablet Take 1 tablet by mouth daily      Ascorbic Acid (VITAMIN C PO) Take by mouth      CONTOUR NEXT TEST strip TEST 3 TIMES DAILY 300 strip 3    Blood Glucose Monitoring Suppl (ONE TOUCH ULTRA 2) w/Device KIT Test three times daily. DX:E11.9 1 kit 0    blood glucose test strips (ASCENSIA AUTODISC VI;ONE TOUCH ULTRA TEST VI) strip Test three times daily. DX:E11.9 100 each 3    blood glucose test strips (ASCENSIA AUTODISC VI;ONE TOUCH ULTRA TEST VI) strip Test Daily. DX: E11.9 100 each 3    Microlet Lancets MISC TEST 3 TIMES DAILY 300 each 3    Lancets MISC Test three times daily. DX:E11.9 100 each 3    ONE TOUCH LANCETS MISC Test daily. DX: E11.9 100 each 3    PRODIGY NO CODING BLOOD GLUC strip TEST BLOOD SUGARS 3 TIMES DAILY 100 each 2    ONE TOUCH LANCETS MISC 1 each by Does not apply route daily. 100 each 3     No current facility-administered medications for this visit.      Allergies   Allergen Reactions    Lisinopril Other (See Comments)     cough    Penicillins Other (See Comments)     Unsure of reaction--childhood    Sulfa Antibiotics Other (See Comments)     Unsure of reaction     Review of Systems  A comprehensive review of systems was negative.   +cough (baseline), right foot pain    Planned anesthesia: General; MAC  Known anesthesia problems: Denies  Bleeding risk:  on Eliquis  Personal or FH of DVT/PE:  h/o PE  Patient objection to receiving blood products: No     Objective:     Vitals:    03/10/23 1605   BP: 130/70   Pulse: 90   Resp: 14       Gen: No distress. Alert. Eyes: PERRL. No sclera icterus. No conjunctival injection. ENT: No discharge. Pharynx clear. Neck: No JVD. No Carotid Bruit. Trachea midline. Resp: No accessory muscle use. No crackles. No wheezes. No rhonchi. CV: Regular rate. Regular rhythm. No murmur. No rub. No edema. Capillary Refill: Brisk,< 3 seconds   Peripheral Pulses: +2 palpable, equal bilaterally   GI: Non-tender. Non-distended. Normal bowel sounds. No hernia. Skin: Warm and dry. No nodule on exposed extremities. No rash on exposed extremities. M/S: right foot in boot. Neuro: Awake. Grossly nonfocal    Psych: Oriented x 3. No anxiety or agitation       Assessment:       Diagnosis Orders   1. Pre-op exam  EKG 12 Lead - Clinic Performed      2. Type 2 diabetes mellitus without complication, without long-term current use of insulin ([address]        3. Primary hypertension        4. Hypertriglyceridemia        5. Other chronic pulmonary embolism without acute cor pulmonale (HCC)        6. Benign head tremor        7. Hyperlipidemia associated with type 2 diabetes mellitus ([address]        8. Severe obstructive sleep apnea        9. Coronary artery disease involving native coronary artery of native heart without angina pectoris              65 y.o. female with planned surgery as above. Plan:     Patient medically cleared for above planned procedure.     Salomon Miller FNP-C  3/10/2023

## 2023-03-13 ENCOUNTER — TELEPHONE (OUTPATIENT)
Dept: INTERNAL MEDICINE CLINIC | Age: 63
End: 2023-03-13

## 2023-03-13 NOTE — TELEPHONE ENCOUNTER
----- Message from Maryam Bryant MD sent at 3/13/2023  1:07 PM EDT -----  Contact: [phone]  ASA one week  Eliquis 2-3 days before  ----- Message -----  From: Colin Reynolds  Sent: 3/13/2023  11:04 AM EDT  To: Maryam Bryant MD    Patient will be having foot surgery in the future. She is asking when it is scheduled when will she need to stop taking eliquis and baby Asprin?  Please advise

## 2023-03-14 NOTE — PROGRESS NOTES
[place] PRE-SURGICAL TESTING INSTRUCTIONS                      PRIOR TO PROCEDURE DATE:    1. PLEASE FOLLOW ANY INSTRUCTIONS GIVEN TO YOU PER YOUR SURGEON. 2. Arrange for someone to drive you home and be with you for the first 24 hours after discharge for your safety after your procedure for which you received sedation. Ensure it is someone we can share information with regarding your discharge. NOTE: At this time ONLY 2 ADULTS may accompany you   One person ENCOURAGED to stay at hospital entire time if outpatient surgery      3. You must contact your surgeon for instructions IF:  You are taking any blood thinners, aspirin, anti-inflammatory or vitamins. There is a change in your physical condition such as a cold, fever, rash, cuts, sores, or any other infection, especially near your surgical site. 4. Do not drink alcohol the day before or day of your procedure. Do not use any recreational marijuana at least 24 hours or street drugs (heroin, cocaine) at minimum 5 days prior to your procedure. 5. A Pre-Surgical History and Physical MUST be completed WITHIN 30 DAYS OR LESS prior to your procedure. by your Physician or an Urgent Care        THE DAY OF YOUR PROCEDURE:  1. Follow instructions for ARRIVAL TIME as DIRECTED BY YOUR SURGEON. 2. Enter the MAIN entrance from [address] and follow the signs to the free Parking [employer] or [employer] (offered free of charge 7 am-5pm). 3. Enter the Main Entrance of the hospital (do not enter from the lower level of the parking garage). Upon entrance, check in with the  at the surgical information desk on your LEFT. Bring your insurance card and photo ID to register      4. DO NOT EAT ANYTHING 8 hours prior to arrival for surgery. You may have up to 8 ounces of water 4 hours prior to your arrival for surgery.    NOTE: ALL Gastric, Bariatric & Bowel surgery patients - you MUST follow your surgeon's instructions regarding eating/ drinking as you will have very specific instructions to follow. If you did not receive these, call your surgeon's office immediately. 5. MEDICATIONS:  Take the following medications with a SMALL sip of water: protonix and celexa, bring albuterol inhaler  Use your usual dose of inhalers the morning of surgery. BRING your rescue inhaler with you to hospital.   Anesthesia does NOT want you to take insulin the morning of surgery. They will control your blood sugar while you are at the hospital. Please contact your ordering physician for instructions regarding your insulin the night before your procedure. If you have an insulin pump, please keep it set on basal rate. Bariatric patient's call your surgeon if on diabetic medications as some may need to be stopped 1 week prior to surgery    6. Do not swallow additional water when brushing teeth. No gum, candy, mints, or ice chips. Refrain from smoking or at least decrease the amount on day of surgery. 7. Morning of surgery:   Take a shower with an antibacterial soap (i.e., Safeguard or Dial) OR your physician may have instructed you to use Hibiclens. Dress in loose, comfortable clothing appropriate for redressing after your procedure. Do not wear jewelry (including body piercings), make-up (especially NO eye make-up), fingernail polish (NO toenail polish if foot/leg surgery), lotion, powders, or metal hairclips. Do not shave or wax for 72 hours prior to procedure near your operative site. Shaving with a razor can irritate your skin and make it easier to develop an infection. On the day of your procedure, any hair that needs to be removed near the surgical site will be 'clipped' by a healthcare worker using a special clipper designed to avoid skin irritation. 8. Dentures, glasses, or contacts will need to be removed before your procedure. Bring cases for your glasses, contacts, dentures, or hearing aids to protect them while you are in surgery. 9. If you use a CPAP, please bring it with you on the day of your procedure. 10. We recommend that valuable personal belongings such as cash, cell phones, e-tablets, or jewelry, be left at home during your stay. The hospital will not be responsible for valuables that are not secured in the hospital safe. However, if your insurance requires a co-pay, you may want to bring a method of payment, i.e., Check or credit card, if you wish to pay your co-pay the day of surgery. 11. If you are to stay overnight, you may bring a bag with personal items. Please have any large items you may need brought in by your family after your arrival to your hospital room. 12. If you have a Living Will or Durable Power of , please bring a copy on the day of your procedure. How we keep you safe and work to prevent surgical site infections:   1. Health care workers should always check your ID bracelet to verify your name and birth date. You will be asked many times to state your name, date of birth, and allergies. 2. Health care workers should always clean their hands with soap or alcohol gel before providing care to you. It is okay to ask anyone if they cleaned their hands before they touch you. 3. You will be actively involved in verifying the type of procedure you are having and ensuring the correct surgical site. This will be confirmed multiple times prior to your procedure. Do NOT adele your surgery site UNLESS instructed to by your surgeon. 4. When you are in the operating room, your surgical site will be cleansed with a special soap, and in most cases, you will be given an antibiotic before the surgery begins. What to expect AFTER your procedure? 1. Immediately following your procedure, your will be taken to the PACU for the first phase of your recovery.   Your nurse will help you recover from any potential side effects of anesthesia, such as extreme drowsiness, changes in your vital signs or breathing patterns. Nausea, headache, muscle aches, or sore throat may also occur after anesthesia. Your nurse will help you manage these potential side effects. 2. For comfort and safety, arrange to have someone at home with you for the first 24 hours after discharge. 3. You and your family will be given written instructions about your diet, activity, dressing care, medications, and return visits. 4. Once at home, should issues with nausea, pain, or bleeding occur, or should you notice any signs of infection, you should call your surgeon. 5. Always clean your hands before and after caring for your wound. Do not let your family touch your surgery site without cleaning their hands. 6. Narcotic pain medications can cause significant constipation. You may want to add a stool softener to your postoperative medication schedule or speak to your surgeon on how best to manage this SIDE EFFECT. SPECIAL INSTRUCTIONS bring albuterol inhaler and cpap machine, patient acknowledges understanding of this along with pre op instructions. Thank you for allowing us to care for you. We strive to exceed your expectations in the delivery of care and service provided to you and your family. If you need to contact the [place] [address] staff for any reason, please call us at [phone]    Instructions reviewed with patient during preadmission testing phone interview.   Honey Salazar RN.3/14/2023 .4:09 PM      ADDITIONAL EDUCATIONAL INFORMATION REVIEWED PER PHONE WITH YOU AND/OR YOUR FAMILY:    Yes Antibacterial Soap dial or safeguard when showering

## 2023-03-14 NOTE — PROGRESS NOTES
Place patient label inside box (if no patient label, complete below)  Name:  :    MR#:   PROCEDURAL INFORMED CONSENT FOR OPERATION / PROCEDURE  I (we), SIDDHARTH DENNIS TAB  (Patient Name) authorize DALIA BACK DPM (Provider / Proceduralist) and/or such assistants as may be selected by him/her, to perform the following operation/procedure(s): OPEN REDUCTION INTERNAL FIXATION RIGHT 5TH METATARSAL       Note: If unable to obtain consent prior to an emergent procedure, document the emergent reason in the medical record.       This procedure has been explained to my (our) satisfaction and included in the explanation was:  The intended benefit, nature, and extent of the procedure to be performed;  The significant risks involved and the probability of success;  Alternative procedures and methods of treatment;  The dangers and probable consequences of such alternatives (including no procedure or treatment);  The expected consequences of the procedure on my future health;  Whether other qualified individuals would be performing important surgical tasks and/or whether  would be present to advise or support the procedure.    I (we) understand that there are other risks of infection and other serious complications in the pre-operative/procedural and postoperative/procedural stages of my (our) care.     I (we) have asked all of the questions which I (we) thought were important in deciding whether or not to undergo treatment or diagnosis. These questions have been answered to my (our) satisfaction.    I (we) understand that no assurance can be given that the procedure will be a success, and no guarantee or warranty of success has been given to me (us).    It has been explained to me (us) that during the course of the operation/procedure, unforeseen conditions may be revealed that necessitate extension of the original procedure(s) or different procedure(s) than those set forth in Paragraph 1. I (we)  authorize and request that the above-named physician, his/her assistants or his/her designees, perform procedures as necessary and desirable if deemed to be in my (our) best interest.     Revised 8/2/2021                                                                          Page 1 of 2       I acknowledge that health care personnel may be observing this procedure for the purpose of medical education or other specified purposes as may be necessary as requested and/or approved by my (our) physician. I (we) consent to the disposal by the hospital Pathologist of the removed tissue, parts or organs in accordance with hospital policy. I do ____ do not ____ consent to the use of a local infiltration pain blocking agent that will be used by my provider/surgical provider to help alleviate pain during my procedure. I do ____ do not ____ consent to an emergent blood transfusion in the case of a life-threatening situation that requires blood components to be administered. This consent is valid for 24 hours from the beginning of the procedure. This patient does ____ or does not ____ currently have a DNR status/order. If DNR order is in place, obtain Addendum to the Surgical Consent for ALL Patients with a DNR Order to address yanira-operative status for limited intervention or DNR suspension.      I have read and fully understand the above Consent for Operation/Procedure and that all blanks were completed before I signed the consent.   _____________________________       _____________________      ____/____am/pm  Signature of Patient or legal representative      Printed Name / Relationship            Date / Time   ____________________________       _____________________      ____/____am/pm  Witness to Signature                                    Printed Name                    Date / Time    If patient is unable to sign or is a minor, complete the following)  Patient is a minor, ____ years of age, or unable to sign because:   ______________________________________________________________________________________________    If a phone consent is obtained, consent will be documented by using two health care professionals, each affirming that the consenting party has no questions and gives consent for the procedure discussed with the physician/provider.   _____________________          ____________________       _____/_____am/pm   2nd witness to phone consent        Printed name           Date / Time    Informed Consent:  I have provided the explanation described above in section 1 to the patient and/or legal representative.  I have provided the patient and/or legal representative with an opportunity to ask any questions about the proposed operation/procedure.   ___________________________          ____________________         ____/____am/pm  Provider / Proceduralist                            Printed name            Date / Time  Revised 8/2/2021                                                                      Page 2 of 2

## 2023-03-15 ENCOUNTER — ANESTHESIA EVENT (OUTPATIENT)
Dept: OPERATING ROOM | Age: 63
End: 2023-03-15
Payer: COMMERCIAL

## 2023-03-15 ENCOUNTER — APPOINTMENT (OUTPATIENT)
Dept: GENERAL RADIOLOGY | Age: 63
End: 2023-03-15
Attending: PODIATRIST
Payer: COMMERCIAL

## 2023-03-15 ENCOUNTER — ANESTHESIA (OUTPATIENT)
Dept: OPERATING ROOM | Age: 63
End: 2023-03-15
Payer: COMMERCIAL

## 2023-03-15 ENCOUNTER — HOSPITAL ENCOUNTER (OUTPATIENT)
Age: 63
Setting detail: OUTPATIENT SURGERY
Discharge: HOME | End: 2023-03-15
Attending: PODIATRIST | Admitting: PODIATRIST
Payer: COMMERCIAL

## 2023-03-15 VITALS
WEIGHT: 282.08 LBS | DIASTOLIC BLOOD PRESSURE: 69 MMHG | HEART RATE: 82 BPM | HEIGHT: 65 IN | OXYGEN SATURATION: 94 % | BODY MASS INDEX: 47 KG/M2 | RESPIRATION RATE: 16 BRPM | TEMPERATURE: 98.1 F | SYSTOLIC BLOOD PRESSURE: 120 MMHG

## 2023-03-15 DIAGNOSIS — G89.18 POST-OP PAIN: Primary | ICD-10-CM

## 2023-03-15 LAB
APTT BLD: 30.1 SEC (ref 23–34.3)
GLUCOSE BLD-MCNC: 141 MG/DL (ref 70–99)
GLUCOSE BLD-MCNC: 211 MG/DL (ref 70–99)
INR PPP: 1.02 (ref 0.87–1.14)
PERFORMED ON: ABNORMAL
PERFORMED ON: ABNORMAL
PROTHROMBIN TIME: 13.3 SEC (ref 11.7–14.5)

## 2023-03-15 PROCEDURE — 3700000001 HC ADD 15 MINUTES (ANESTHESIA): Performed by: PODIATRIST

## 2023-03-15 PROCEDURE — 7100000010 HC PHASE II RECOVERY - FIRST 15 MIN: Performed by: PODIATRIST

## 2023-03-15 PROCEDURE — 6360000002 HC RX W HCPCS: Performed by: PODIATRIST

## 2023-03-15 PROCEDURE — 7100000001 HC PACU RECOVERY - ADDTL 15 MIN: Performed by: PODIATRIST

## 2023-03-15 PROCEDURE — 2580000003 HC RX 258: Performed by: ANESTHESIOLOGY

## 2023-03-15 PROCEDURE — A4217 STERILE WATER/SALINE, 500 ML: HCPCS | Performed by: PODIATRIST

## 2023-03-15 PROCEDURE — 3600000014 HC SURGERY LEVEL 4 ADDTL 15MIN: Performed by: PODIATRIST

## 2023-03-15 PROCEDURE — 2580000003 HC RX 258: Performed by: NURSE ANESTHETIST, CERTIFIED REGISTERED

## 2023-03-15 PROCEDURE — 2500000003 HC RX 250 WO HCPCS: Performed by: PODIATRIST

## 2023-03-15 PROCEDURE — 6360000002 HC RX W HCPCS: Performed by: NURSE ANESTHETIST, CERTIFIED REGISTERED

## 2023-03-15 PROCEDURE — 3600000004 HC SURGERY LEVEL 4 BASE: Performed by: PODIATRIST

## 2023-03-15 PROCEDURE — 7100000011 HC PHASE II RECOVERY - ADDTL 15 MIN: Performed by: PODIATRIST

## 2023-03-15 PROCEDURE — 2580000003 HC RX 258: Performed by: PODIATRIST

## 2023-03-15 PROCEDURE — 7100000000 HC PACU RECOVERY - FIRST 15 MIN: Performed by: PODIATRIST

## 2023-03-15 PROCEDURE — C1889 IMPLANT/INSERT DEVICE, NOC: HCPCS | Performed by: PODIATRIST

## 2023-03-15 PROCEDURE — 2709999900 HC NON-CHARGEABLE SUPPLY: Performed by: PODIATRIST

## 2023-03-15 PROCEDURE — 85610 PROTHROMBIN TIME: CPT

## 2023-03-15 PROCEDURE — 2500000003 HC RX 250 WO HCPCS: Performed by: NURSE ANESTHETIST, CERTIFIED REGISTERED

## 2023-03-15 PROCEDURE — 3700000000 HC ANESTHESIA ATTENDED CARE: Performed by: PODIATRIST

## 2023-03-15 PROCEDURE — 85730 THROMBOPLASTIN TIME PARTIAL: CPT

## 2023-03-15 PROCEDURE — 73630 X-RAY EXAM OF FOOT: CPT

## 2023-03-15 DEVICE — PATCH AMNION 2 LAYR PROTCT 4 X 4CM STERISHIELD II: Type: IMPLANTABLE DEVICE | Site: FOOT | Status: FUNCTIONAL

## 2023-03-15 RX ORDER — TRAMADOL HYDROCHLORIDE 50 MG/1
50 TABLET ORAL EVERY 4 HOURS PRN
Qty: 42 TABLET | Refills: 0 | Status: SHIPPED | OUTPATIENT
Start: 2023-03-15 | End: 2023-03-22

## 2023-03-15 RX ORDER — SODIUM CHLORIDE, SODIUM LACTATE, POTASSIUM CHLORIDE, CALCIUM CHLORIDE 600; 310; 30; 20 MG/100ML; MG/100ML; MG/100ML; MG/100ML
INJECTION, SOLUTION INTRAVENOUS CONTINUOUS PRN
Status: DISCONTINUED | OUTPATIENT
Start: 2023-03-15 | End: 2023-03-15 | Stop reason: SDUPTHER

## 2023-03-15 RX ORDER — LIDOCAINE HYDROCHLORIDE 20 MG/ML
INJECTION, SOLUTION INFILTRATION; PERINEURAL PRN
Status: DISCONTINUED | OUTPATIENT
Start: 2023-03-15 | End: 2023-03-15 | Stop reason: HOSPADM

## 2023-03-15 RX ORDER — SUCCINYLCHOLINE/SOD CL,ISO/PF 200MG/10ML
SYRINGE (ML) INTRAVENOUS PRN
Status: DISCONTINUED | OUTPATIENT
Start: 2023-03-15 | End: 2023-03-15 | Stop reason: SDUPTHER

## 2023-03-15 RX ORDER — SODIUM CHLORIDE 0.9 % (FLUSH) 0.9 %
5-40 SYRINGE (ML) INJECTION PRN
Status: CANCELLED | OUTPATIENT
Start: 2023-03-15

## 2023-03-15 RX ORDER — MIDAZOLAM HYDROCHLORIDE 1 MG/ML
INJECTION INTRAMUSCULAR; INTRAVENOUS PRN
Status: DISCONTINUED | OUTPATIENT
Start: 2023-03-15 | End: 2023-03-15 | Stop reason: SDUPTHER

## 2023-03-15 RX ORDER — FENTANYL CITRATE 50 UG/ML
INJECTION, SOLUTION INTRAMUSCULAR; INTRAVENOUS PRN
Status: DISCONTINUED | OUTPATIENT
Start: 2023-03-15 | End: 2023-03-15 | Stop reason: SDUPTHER

## 2023-03-15 RX ORDER — MEPERIDINE HYDROCHLORIDE 25 MG/ML
12.5 INJECTION INTRAMUSCULAR; INTRAVENOUS; SUBCUTANEOUS EVERY 5 MIN PRN
Status: CANCELLED | OUTPATIENT
Start: 2023-03-15

## 2023-03-15 RX ORDER — ROCURONIUM BROMIDE 10 MG/ML
INJECTION, SOLUTION INTRAVENOUS PRN
Status: DISCONTINUED | OUTPATIENT
Start: 2023-03-15 | End: 2023-03-15 | Stop reason: SDUPTHER

## 2023-03-15 RX ORDER — FENTANYL CITRATE 50 UG/ML
25 INJECTION, SOLUTION INTRAMUSCULAR; INTRAVENOUS EVERY 5 MIN PRN
Status: CANCELLED | OUTPATIENT
Start: 2023-03-15

## 2023-03-15 RX ORDER — SODIUM CHLORIDE 9 MG/ML
INJECTION, SOLUTION INTRAVENOUS PRN
Status: CANCELLED | OUTPATIENT
Start: 2023-03-15

## 2023-03-15 RX ORDER — FENTANYL CITRATE 50 UG/ML
50 INJECTION, SOLUTION INTRAMUSCULAR; INTRAVENOUS EVERY 5 MIN PRN
Status: CANCELLED | OUTPATIENT
Start: 2023-03-15

## 2023-03-15 RX ORDER — BUPIVACAINE HYDROCHLORIDE 5 MG/ML
INJECTION, SOLUTION EPIDURAL; INTRACAUDAL PRN
Status: DISCONTINUED | OUTPATIENT
Start: 2023-03-15 | End: 2023-03-15 | Stop reason: ALTCHOICE

## 2023-03-15 RX ORDER — SODIUM CHLORIDE, SODIUM LACTATE, POTASSIUM CHLORIDE, CALCIUM CHLORIDE 600; 310; 30; 20 MG/100ML; MG/100ML; MG/100ML; MG/100ML
INJECTION, SOLUTION INTRAVENOUS CONTINUOUS
Status: DISCONTINUED | OUTPATIENT
Start: 2023-03-15 | End: 2023-03-15 | Stop reason: HOSPADM

## 2023-03-15 RX ORDER — LIDOCAINE HYDROCHLORIDE 20 MG/ML
INJECTION, SOLUTION INTRAVENOUS PRN
Status: DISCONTINUED | OUTPATIENT
Start: 2023-03-15 | End: 2023-03-15 | Stop reason: SDUPTHER

## 2023-03-15 RX ORDER — PHENYLEPHRINE HYDROCHLORIDE 10 MG/ML
INJECTION INTRAVENOUS PRN
Status: DISCONTINUED | OUTPATIENT
Start: 2023-03-15 | End: 2023-03-15 | Stop reason: SDUPTHER

## 2023-03-15 RX ORDER — DIPHENHYDRAMINE HYDROCHLORIDE 50 MG/ML
12.5 INJECTION INTRAMUSCULAR; INTRAVENOUS
Status: CANCELLED | OUTPATIENT
Start: 2023-03-15 | End: 2023-03-16

## 2023-03-15 RX ORDER — SODIUM CHLORIDE 0.9 % (FLUSH) 0.9 %
5-40 SYRINGE (ML) INJECTION EVERY 12 HOURS SCHEDULED
Status: CANCELLED | OUTPATIENT
Start: 2023-03-15

## 2023-03-15 RX ORDER — PROCHLORPERAZINE EDISYLATE 5 MG/ML
5 INJECTION INTRAMUSCULAR; INTRAVENOUS
Status: CANCELLED | OUTPATIENT
Start: 2023-03-15 | End: 2023-03-16

## 2023-03-15 RX ORDER — ONDANSETRON 2 MG/ML
INJECTION INTRAMUSCULAR; INTRAVENOUS PRN
Status: DISCONTINUED | OUTPATIENT
Start: 2023-03-15 | End: 2023-03-15 | Stop reason: SDUPTHER

## 2023-03-15 RX ORDER — PROPOFOL 10 MG/ML
INJECTION, EMULSION INTRAVENOUS PRN
Status: DISCONTINUED | OUTPATIENT
Start: 2023-03-15 | End: 2023-03-15 | Stop reason: SDUPTHER

## 2023-03-15 RX ORDER — ONDANSETRON 2 MG/ML
4 INJECTION INTRAMUSCULAR; INTRAVENOUS
Status: CANCELLED | OUTPATIENT
Start: 2023-03-15 | End: 2023-03-16

## 2023-03-15 RX ADMIN — PHENYLEPHRINE HYDROCHLORIDE 150 MCG: 10 INJECTION INTRAVENOUS at 08:07

## 2023-03-15 RX ADMIN — ROCURONIUM BROMIDE 5 MG: 10 INJECTION INTRAVENOUS at 07:43

## 2023-03-15 RX ADMIN — ROCURONIUM BROMIDE 35 MG: 10 INJECTION INTRAVENOUS at 07:51

## 2023-03-15 RX ADMIN — PROPOFOL 150 MG: 10 INJECTION, EMULSION INTRAVENOUS at 07:43

## 2023-03-15 RX ADMIN — FENTANYL CITRATE 50 MCG: 50 INJECTION, SOLUTION INTRAMUSCULAR; INTRAVENOUS at 07:50

## 2023-03-15 RX ADMIN — SODIUM CHLORIDE, SODIUM LACTATE, POTASSIUM CHLORIDE, AND CALCIUM CHLORIDE: .6; .31; .03; .02 INJECTION, SOLUTION INTRAVENOUS at 07:39

## 2023-03-15 RX ADMIN — ONDANSETRON 4 MG: 2 INJECTION INTRAMUSCULAR; INTRAVENOUS at 08:00

## 2023-03-15 RX ADMIN — PROPOFOL 50 MG: 10 INJECTION, EMULSION INTRAVENOUS at 08:16

## 2023-03-15 RX ADMIN — LIDOCAINE HYDROCHLORIDE 60 MG: 20 INJECTION, SOLUTION INTRAVENOUS at 07:43

## 2023-03-15 RX ADMIN — FENTANYL CITRATE 50 MCG: 50 INJECTION, SOLUTION INTRAMUSCULAR; INTRAVENOUS at 07:43

## 2023-03-15 RX ADMIN — MIDAZOLAM HYDROCHLORIDE 1 MG: 2 INJECTION, SOLUTION INTRAMUSCULAR; INTRAVENOUS at 07:38

## 2023-03-15 RX ADMIN — Medication 140 MG: at 07:43

## 2023-03-15 RX ADMIN — MIDAZOLAM HYDROCHLORIDE 1 MG: 2 INJECTION, SOLUTION INTRAMUSCULAR; INTRAVENOUS at 07:41

## 2023-03-15 RX ADMIN — SODIUM CHLORIDE, POTASSIUM CHLORIDE, SODIUM LACTATE AND CALCIUM CHLORIDE: 600; 310; 30; 20 INJECTION, SOLUTION INTRAVENOUS at 06:57

## 2023-03-15 RX ADMIN — PHENYLEPHRINE HYDROCHLORIDE 150 MCG: 10 INJECTION INTRAVENOUS at 08:05

## 2023-03-15 RX ADMIN — SUGAMMADEX 200 MG: 100 INJECTION, SOLUTION INTRAVENOUS at 08:14

## 2023-03-15 ASSESSMENT — PAIN SCALES - GENERAL
PAINLEVEL_OUTOF10: 0

## 2023-03-15 ASSESSMENT — ENCOUNTER SYMPTOMS: SHORTNESS OF BREATH: 1

## 2023-03-15 NOTE — PROGRESS NOTES
Ambulatory Surgery/Procedure Discharge Note    Vitals:    03/15/23 0935   BP: 120/69   Pulse: 82   Resp: 16   Temp: 98.1 °F (36.7 °C)   SpO2: 94%       In: 820 [P.O.:120; I.V.:700]  Out: -     Restroom use offered before discharge. Yes    Pain assessment:  level of pain (1-10, 10 severe),   Pain Level: 0    Pt and S.O./family states \"ready to go home\". Pt alert and oriented x4. IV removed. Denies N/V or pain. Dressing C, D & I.  Voided prior to discharge. Discharge instructions given to pt and sister with pt permission. Pt and sister verbalized understanding of all instructions. Left with all belongings, prescriptions, and discharge instructions. Patient discharged to home/self care.  Patient discharged via wheel chair by transporter to waiting family/S.O.       3/15/2023 10:04 AM

## 2023-03-15 NOTE — H&P
Date of Surgery Update:  Anette Carnes was seen, history and physical examination reviewed, and patient examined by me today. There have been no significant clinical changes since the completion of the previous history and physical.    The nature of the procedure, possible complications, alternative forms of therapy, post-op course, and post-op goals have been explained to patient (or appropriate guardian) and patient's family and understanding was verbalized. All questions have been answered. The consent has been signed. Patient's surgical site has been marked. Patient wishes to proceed with surgery.     Discussed with Dr. Aydin Frias DPM.    Valeria Sorensen [facility]  03/15/23  7:21 AM

## 2023-03-15 NOTE — ANESTHESIA POSTPROCEDURE EVALUATION
Department of Anesthesiology  Postprocedure Note    Patient: Sergei Knott  MRN: [id number]  YOB: 1960  Date of evaluation: 3/15/2023      Procedure Summary     Date: 03/15/23 Room / Location: [address] [place] [address]    Anesthesia Start: 5808 Anesthesia Stop: 2425    Procedure: OPEN REDUCTION INTERNAL FIXATION RIGHT 5TH METATARSAL (Right) Diagnosis:       Closed displaced fracture of fifth metatarsal bone of right foot, initial encounter      Osteomyelitis, chronic, ankle or foot, right ([address]      Diabetic polyneuropathy associated with other specified diabetes mellitus ([facility] [address]      (Closed displaced fracture of fifth metatarsal bone of right foot, initial encounter [S92.351A] Osteomyelitis, chronic, ankle or foot, right ([facility] [address] [F23.595])    Surgeons: Elizabeth Navarro DPM Responsible Provider: Beata Galan MD    Anesthesia Type: general ASA Status: 3          Anesthesia Type: No value filed.     Bing Phase I: Bnig Score: 10    Bing Phase II: Bing Score: 10      Anesthesia Post Evaluation    Patient location during evaluation: PACU  Patient participation: complete - patient participated  Level of consciousness: awake  Pain score: 0  Airway patency: patent  Nausea & Vomiting: no nausea and no vomiting  Complications: no  Cardiovascular status: hemodynamically stable  Respiratory status: acceptable  Hydration status: euvolemic

## 2023-03-15 NOTE — OP NOTE
Operative Note      Patient: Karol Mclean  YOB: 1960  MRN: [id number]     Date of Procedure: 3/15/2023     Pre-Op Diagnosis: Closed displaced fracture of proximal phalanx of right lesser toe(s) [S92. 511A]     Post-Op Diagnosis: Same       Procedure(s):  Removal of fracture fragment of the right 5th proximal phalanx     Surgeon(s):  Hali Gallardo DPM     Assistant:  Resident: Cherelle Rodriguez DPM     Anesthesia: General     Injectables: pre-op 10 cc of 2% lidocaine plain and post-op 5 cc of 0.5% marcaine plain      Hemostasis: pneumatic calf tourniquet at 250 mmHg for 26 minutes     Materials: 3-0 vicryl, 5-0 Nylon  1- 4x4cm Sterishield Amnion Patch       Estimated Blood Loss (mL): Minimal     Complications: None     Specimens:   * No specimens in log *     Implants:  Implant Name Type Inv. Item Serial No.  Lot No. LRB No. Used Action   PATCH AMNION 2 LAYR PROTCT 4 X 4CM STERISHIELD II - [id number]   PATCH AMNION 2 LAYR PROTCT 4 X 4CM STERISHIELD II [id number] [employer]-WD   Right 1 Implanted          Drains: * No LDAs found *     Findings: Displaced fracture fragment of the proximal medial base of the right 5th proximal phalanx removed in total.       INDICATIONS FOR PROCEDURE: This patient has signs and symptoms clinically and radiographically consistent with the above mentioned preoperative diagnosis. Having failed conservative treatment, it was determined that the patient would benefit from surgical intervention. All potential risks, benefits, and complications were discussed with the patient prior to the scheduling of surgery. All the patient's questions were answered and no guarantees were given. The patient wished to proceed with surgery, and informed written consent was obtained. DETAILS OF PROCEDURE: The patient was brought from the pre-operative area and placed on the operating table in the supine position.  A pneumatic calf tourniquet was placed around the patient's well-padded right lower extremity. Following IV sedation, a local anesthetic block was then injected proximal to the incision site consisting of 10 cc of 2% lidocaine plain. The right lower extremity was then scrubbed, prepped, and draped in the usual sterile fashion. A time-out was performed. The patient, procedure, and operative site were confirmed. An Esmarch bandage was then utilized to exsanguinate the patient's right lower extremity. The tourniquet was then inflated to 250 mmHg and the following procedure was performed. PROCEDURE #1 REMOVAL OF FRACTURE FRAGMENT OF THE RIGHT 5TH PROXIMAL PHALANX    Attention was directed to the dorsal aspect of the 5th digit of the right foot. Using a #15 blade, an approximately 3.5 cm curvi-linear incision was made over the dorsal aspect of the proximal interphalangeal joint and metatarsophalangeal joint. Using sharp and blunt dissection techniques, the incision was deepened through the subcutaneous tissue. Care was taken to identify and retract all vital neurovascular structures. All venous tributaries were electrocoagulated as encountered. Dissection was then continued to the level of the joint capsule. At this time, a transverse tenotomy were performed at the level of the proximal interphalangeal joint. All ligamentous and capsular structures, as well as the long extensor tendon, were reflected from the underlying bone, thereby allowing exposure and visualization of the head of the proximal phalanx. The long extensor tendon was reflected from its osseous attachments back to the level of the MPJ. At this time the fracture fragment of the proximal medial aspect of the 5th proximal phalanx was identified and freed using a freer. Then utilizing curved mosquito hemostats were used to grasp the fragment and was removed in total from the foot and passed to the back table. Attention was then drawn to the surgical site of the right foot.  A 4x4 cm SteriShield Amnion graft was placed within the surgical site per 's recommendations. The graft was applied in efforts to reduce inflammation, and prevent adhesions and fibrotic scar tissue. The wound was then vigorously lavaged with sterile normal saline. The extensor tendon was then reapproximated using 3-0 Vicryl suture in a simple interrupted fashion. Skin edges were then re-approximated using 5-0 Nylon suture in a simple interrupted fashion. At this time, a local anesthetic was injected about the incision sites consisting of 5 cc of 0.5% marcaine plain, for the patient's postoperative comfort. A soft sterile dressing was applied consisting of Adaptic, gauze, Yuliet, Webril, and ACE. The pneumatic calf tourniquet was rapidly deflated after a total time of 26 minutes and a prompt hyperemic response was noted on all aspects of the patient's right lower extremity. END OF PROCEDURE: The patient tolerated the procedure and anesthesia well and was transported from the operating room to the PACU with vital signs stable and vascular status intact to all aspects of the patient's right lower extremity and digital capillary refill time immediate to the digits of the right foot. Following a period of post-operative monitoring, the patient will be discharged home with written and oral wound care and follow-up instructions. The patient was provided with prescriptions for Ultram. The patient is to follow-up with Dr. Claudia Chase in his private office within 5-7 days. The patient is to keep dressing clean, dry and intact at all times. The patient is to call if any complications occur.     This operative report was dictated on behalf of Dr. Samy Clayton DPM.    Electronically signed by Ventura Guo DPM on 3/15/2023 at 10:20 AM

## 2023-03-15 NOTE — DISCHARGE INSTRUCTIONS
Dr. Joaquim Kim, DPM  [address] [place] [address]  [phone]                                           Post Operative Instructions    -  Have Prescriptions filled and take as directed. All medications should be taken with food or milk. -  KEEP FOOT ELEVATED AS MUCH AS POSSIBLE UNTIL YOUR NEXT VISIT    -  Place an ice pack on the bandaged site for 15 minutes every hour while awake    -  Keep dressing clean, dry, and intact. DO NOT REMOVE DRESSING.      -  Weightbearing as tolerated to the right lower extremity in surgical shoe at all times    -  All instructions are to be followed until otherwise instructed by your surgeon. .    -  Your first post-operative appointment is scheduled, call the office for appointment date and time if not known prior to today. [facility]    There are potential side effects of anesthesia or sedation you may experience for the first 24 hours. These side effects include:    Confusion or Memory loss, Dizziness, or Delayed Reaction Times   [x]A responsible person should be with you for the next 24 hours. Do not operate any vehicles (automobiles, bicycles, motorcycles) or power tools or machinery for 24 hours. Do not sign any legal documents or make any legal decisions for 24 hours. Do not drink alcohol for 24 hours or while taking narcotic pain medication. Nausea    [x]Start with light diet and progress to your normal diet as you feel like eating. However, if you experience nausea or repeated episodes of vomiting which persist beyond 12-24 hours, notify your physician. Once nausea has passed, remember to keep drinking fluids. Difficulty Passing Urine  [x]Drink extra amounts of fluid today. Notify your physician if you have not urinated within 8 hours after your procedure or you feel uncomfortable. Irritated Throat from a Breathing Tube  [x]Drink extra amounts of fluid today. Lozenges may help.     Muscle Aches  [x]You may experience some generalized body aches as your muscles recover from medications used to relax them during surgery. These will gradually subside. MEDICATION INSTRUCTIONS:  [x]Prescription(S) x   1  sent with you. Use as directed. When taking pain medications, you may experience the side effect of dizziness or drowsiness. Do not drink alcohol or drive when taking these medications. []Prescription(S) x          Called to Pharmacy Name and location:    [x]Give the list of your medications to your primary care physician on your next visit. Keep your med list updated and carry it with in case of emergencies. [x] Narcotic pain medications can cause the side effect of significant constipation. You may want to add a stool softener to your postoperative medication schedule or speak to your surgeon on how best to manage this side effect. NARCOTIC SAFETY:  Your pain medicine is only for you to take. Safely store your medicines. Store pills up high and out of reach of children and pets. Ensure safety caps are snapped tightly  Keep track of how many pills you have left    Unused medication can be disposed of by taking them to a drop-off box or take-back program that is authorized by the [facility]. Access to a site near you can be found on the [facility] [place] website ([address]. [facility]). If you have a CPAP machine, it is very important that you use it daily during all periods of sleep and daytime rest during your recovery at home. Surgery and Anesthesia place a significant amount of stress on your body. Using your CPAP will help keep you safe and lessen the negative effects of that stress. FOLLOW-UP RECOVERY CARE:  [x]Call the office at  for follow-up appointment and problems    Watch for these possible complications, symptoms, or side effects of anesthesia.   Call physician if they or any other problems occur:  Signs of INFECTION   > Fever over 101°     > Redness, swelling, hardness or warmth at the operative site   >Foul smelling or cloudy drainage at the operative site   Unrelieved PAIN  Unrelieved NAUSEA  Blood soaked dressing. (Some oozing may be normal)  Inability to urinate      Numb, pale, blue, cold or tingling extremity      Physician: The above instructions were reviewed with patient/significant other. The following additional patient specific information was reviewed with the patient/significant other:  [x]Procedure/physician specific instructions  []Medication information sheet(S) including potential side effects  []Yesicas egress test  []Pain Ball management  []FAQ Catheter associated blood stream infections  [x]FAQ Surgical Site Infections  []Other-    I have read and understand the instructions given to me: ____________________________________________   (Patient/S.O. Signature)            Date/time 3/15/2023 9:10 AM         PACU:  [phone]   M-F 700 AM - 7 PM      SAME DAY SERVICES:  [phone] M-F 7AM-6PM        If you smoke STOP. We care about your health!

## 2023-03-15 NOTE — PROGRESS NOTES
PACU Transfer to [facility]    Vitals:    03/15/23 0915   BP: 129/70   Pulse: 82   Resp: 18   Temp: 97.3 °F (36.3 °C)   SpO2: 96%         Intake/Output Summary (Last 24 hours) at 3/15/2023 0929  Last data filed at 3/15/2023 1462  Gross per 24 hour   Intake 700 ml   Output --   Net 700 ml       Pain assessment:  none  Pain Level: 0    Patient transferred to care of [facility] RN.    3/15/2023 9:29 AM

## 2023-03-15 NOTE — BRIEF OP NOTE
Brief Postoperative Note      Patient: Mary Jo Wilson  YOB: 1960  MRN: [id number]    Date of Procedure: 3/15/2023    Pre-Op Diagnosis: Closed displaced fracture of proximal phalanx of right lesser toe(s) [S92. 511A]    Post-Op Diagnosis: Same       Procedure(s):  Removal of fracture fragment of the right 5th proximal phalanx    Surgeon(s):  Anitha Kevin DPM    Assistant:  Resident: Teetee Donovan DPM    Anesthesia: General    Injectables: pre-op 10 cc of 2% lidocaine plain and post-op 5 cc of 0.5% marcaine plain     Hemostasis: pneumatic calf tourniquet at 250 mmHg for 26 minutes    Materials: 3-0 vicryl, 5-0 Nylon  1- 4x4cm Sterishield Amnion Patch      Estimated Blood Loss (mL): Minimal    Complications: None    Specimens:   * No specimens in log *    Implants:  Implant Name Type Inv.  Item Serial No.  Lot No. LRB No. Used Action   PATCH AMNION 2 LAYR PROTCT 4 X 4CM STERISHIELD II - [id number]  PATCH AMNION 2 LAYR PROTCT 4 X 4CM STERISHIELD II [id number] [employer]-WD  Right 1 Implanted         Drains: * No LDAs found *    Findings: Displaced fracture fragment of the proximal medial base of the right 5th proximal phalanx removed in total.     Electronically signed by Teetee Donovan DPM on 3/15/2023 at 8:36 AM

## 2023-03-15 NOTE — ANESTHESIA PRE PROCEDURE
Department of Anesthesiology  Preprocedure Note       Name:  Benedict He   Age:  65 y.o.  :  1960                                          MRN:  [id number]         Date:  3/15/2023      Surgeon: Amber Fuentes):  Barry Sahu DPM    Procedure: Procedure(s):  OPEN REDUCTION INTERNAL FIXATION RIGHT 5TH METATARSAL    Medications prior to admission:   Prior to Admission medications    Medication Sig Start Date End Date Taking?  Authorizing Provider   [facility]) 2.5 MG/0.5ML SOPN SC injection Inject 0.5 mLs into the skin once a week for 4 doses 3/1/23 3/23/23  Radha Reid MD   apixaban (ELIQUIS) 5 MG TABS tablet Take 1 tablet by mouth 2 times daily 2/21/23 3/23/23  Radha Reid MD   albuterol sulfate HFA (VENTOLIN HFA) 108 (90 Base) MCG/ACT inhaler Inhale 2 puffs into the lungs 4 times daily as needed for Wheezing 23   Elizabeth Del Valle MD   fenofibric acid (TRILIPIX) 135 MG CPDR capsule TAKE 1 CAPSULE BY MOUTH DAILY 23   Radha Reid MD   citalopram (CELEXA) 40 MG tablet TAKE 1 TABLET BY MOUTH  DAILY 22   Loretta Osorio MD   ferrous sulfate (FEROSUL) 325 (65 Fe) MG tablet TAKE 1 TABLET BY MOUTH EVERY MORNING 10/19/22   Radha Reid MD   losartan (COZAAR) 50 MG tablet TAKE 1 TABLET BY MOUTH  DAILY 9/15/22   Radha Reid MD   pantoprazole (PROTONIX) 20 MG tablet TAKE 1 TABLET BY MOUTH  DAILY 9/15/22   Radha Reid MD   topiramate (TOPAMAX) 100 MG tablet TAKE 1 TABLET BY MOUTH AT  NIGHT 9/15/22   Radha Reid MD   Icosapent Ethyl (VASCEPA) 1 g CAPS capsule TAKE 2 CAPSULES BY MOUTH  TWICE DAILY 9/15/22   Radha Reid MD   aspirin EC 81 MG EC tablet Take 1 tablet by mouth daily 22   Tammi Santos MD   atorvastatin (LIPITOR) 40 MG tablet Take 1 tablet by mouth daily 22   Tammi Santos MD   JANUMET XR  MG TB24 per extended release tablet TAKE 1 TABLET BY MOUTH  TWICE DAILY 22 Breanna Lee MD   JARDIANCE 25 MG tablet TAKE 1 TABLET BY MOUTH  DAILY 5/12/22   Breanna Lee MD   Multiple Vitamins-Minerals (THERAPEUTIC MULTIVITAMIN-MINERALS) tablet Take 1 tablet by mouth daily    Historical Provider, MD   Ascorbic Acid (VITAMIN C PO) Take by mouth    Historical Provider, MD   CONTOUR NEXT TEST strip TEST 3 TIMES DAILY 12/14/21   Breanna Lee MD   Microlet Lancets MISC TEST 3 TIMES DAILY 12/14/21   Breanna Lee MD   Blood Glucose Monitoring Suppl (ONE TOUCH ULTRA 2) w/Device KIT Test three times daily. DX:E11.9 10/7/21   Breanna Lee MD   blood glucose test strips (ASCENSIA AUTODISC VI;ONE TOUCH ULTRA TEST VI) strip Test three times daily. DX:E11.9 10/7/21   Breanna Lee MD   Lancets MISC Test three times daily. DX:E11.9 10/7/21   Breanna Lee MD   ONE TOUCH LANCETS MISC Test daily. DX: E11.9 1/14/20   Breanna Lee MD   blood glucose test strips (ASCENSIA AUTODISC VI;ONE TOUCH ULTRA TEST VI) strip Test Daily. DX: E11.9 1/14/20   Breanna Lee MD   PRODIGY NO CODING BLOOD GLUC strip TEST BLOOD SUGARS 3 TIMES DAILY 3/15/18   Breanna Lee MD   ONE TOUCH LANCETS MISC 1 each by Does not apply route daily. 5/14/14   Breanna Lee MD       Current medications:    No current facility-administered medications for this encounter. Allergies:     Allergies   Allergen Reactions    Lisinopril Other (See Comments)     cough    Penicillins Other (See Comments)     Unsure of reaction--childhood    Sulfa Antibiotics Other (See Comments)     Unsure of reaction       Problem List:    Patient Active Problem List   Diagnosis Code    Type 2 diabetes mellitus with other specified complication (HCC) B14.78    Hypertriglyceridemia E78.1    Pulmonary embolism (HCC) I26.99    Disease of nasal cavity and sinuses J34.9    Fatty infiltration of liver P58.2    Umbilical hernia C05.2    Benign head tremor G25.0    Hot flash, menopausal N95.1    Hyperlipidemia associated with type 2 diabetes mellitus (HCC) E11.69, E78.5    Morbid obesity with BMI of 50.0-59.9, adult (HCC) E66.01, Z68.43    Severe obstructive sleep apnea G47.33    Benign essential hypertension I10    Obesity, morbid, BMI 40.0-49.9 (HCC) E66.01    Chest pain R07.9    Shortness of breath R06.02    Hypertension I10    Type 2 diabetes mellitus without complication, without long-term current use of insulin (HCC) E11.9    Unstable angina ([facility]) I20.0    Abnormal nuclear cardiac imaging test R93.1    Abnormal stress test R94.39    Coronary artery disease involving native coronary artery of native heart I25.10       Past Medical History:        Diagnosis Date    Asthma     Benign essential hypertension 11/14/2017    Benign head tremor 05/14/2014    Cholelithiasis     Coronary artery disease involving native coronary artery of native heart 08/31/2022    Disease of nasal cavity and sinuses     Fatty infiltration of liver     Hot flash, menopausal 07/22/2015    Hx of blood clots     lungs bilateral x 2    Hyperlipidemia     Hyperlipidemia associated with type 2 diabetes mellitus ([facility] 10/23/2015    Morbid obesity with BMI of 50.0-59.9, adult ([facility] 10/23/2015    No history of procedure 11/09/2015    no past colonoscopy    ELEAZAR on CPAP     Pulmonary embolism (HCC)     both lungs x 2    Type II or unspecified type diabetes mellitus without mention of complication, not stated as uncontrolled     Umbilical hernia 41/93/7096    Unspecified sleep apnea     Wears glasses        Past Surgical History:        Procedure Laterality Date    BREAST SURGERY Right 2015    benign tumor    CHOLECYSTECTOMY, LAPAROSCOPIC  08/02/2012    COLONOSCOPY  11/09/2015    cecal polyp    DILATION AND CURETTAGE OF UTERUS  03/13/2018    HERNIA REPAIR  06/17/5210    lap umbilical    HYSTEROSCOPY  03/13/2018    and D&C       Social History: Social History     Tobacco Use    Smoking status: Never    Smokeless tobacco: Never   Substance Use Topics    Alcohol use: No     Alcohol/week: 0.0 standard drinks                                Counseling given: Not Answered      Vital Signs (Current):   Vitals:    03/14/23 1600 03/15/23 0620   BP:  (!) 147/81   Pulse:  94   Resp:  16   Temp:  98.4 °F (36.9 °C)   TempSrc:  Temporal   SpO2:  94%   Weight: 284 lb (128.8 kg) 282 lb 1.3 oz (128 kg)   Height: 5' 5\" (1.651 m) 5' 5\" (1.651 m)                                              BP Readings from Last 3 Encounters:   03/15/23 (!) 147/81   03/10/23 130/70   02/21/23 130/80       NPO Status:                                                                                 BMI:   Wt Readings from Last 3 Encounters:   03/15/23 282 lb 1.3 oz (128 kg)   03/10/23 284 lb (128.8 kg)   02/21/23 283 lb (128.4 kg)     Body mass index is 46.94 kg/m².     CBC:   Lab Results   Component Value Date/Time    WBC 4.8 02/21/2023 08:59 AM    RBC 4.95 02/21/2023 08:59 AM    HGB 15.1 02/21/2023 08:59 AM    HCT 45.9 02/21/2023 08:59 AM    MCV 92.7 02/21/2023 08:59 AM    RDW 14.7 02/21/2023 08:59 AM     02/21/2023 08:59 AM       CMP:   Lab Results   Component Value Date/Time     02/21/2023 08:59 AM    K 4.4 02/21/2023 08:59 AM    K 3.8 07/28/2022 05:00 AM    CL 99 02/21/2023 08:59 AM    CO2 23 02/21/2023 08:59 AM    BUN 16 02/21/2023 08:59 AM    CREATININE 0.7 02/21/2023 08:59 AM    GFRAA >60 07/29/2022 08:17 PM    GFRAA >60 06/10/2011 09:23 PM    AGRATIO 1.8 02/21/2023 08:59 AM    LABGLOM >60 02/21/2023 08:59 AM    GLUCOSE 209 02/21/2023 08:59 AM    GLUCOSE 165 04/10/2012 05:50 PM    PROT 7.0 02/21/2023 08:59 AM    PROT 6.7 04/10/2012 05:50 PM    CALCIUM 10.1 02/21/2023 08:59 AM    BILITOT 0.6 02/21/2023 08:59 AM    ALKPHOS 54 02/21/2023 08:59 AM    AST 21 02/21/2023 08:59 AM    ALT 10 02/21/2023 08:59 AM       POC Tests: No results for input(s): POCGLU, POCNA, POCK, POCCL, Greg Payton, POCHCT in the last 72 hours. Coags:   Lab Results   Component Value Date/Time    PROTIME 18.9 10/26/2022 09:31 AM    PROTIME 15.6 07/27/2012 12:00 AM    INR 2.30 11/22/2022 12:00 AM       HCG (If Applicable):   Lab Results   Component Value Date    PREGTESTUR Negative 10/11/2013        ABGs: No results found for: PHART, PO2ART, IWG7PQP, FBQ8URV, BEART, Z9OPOIPQ     Type & Screen (If Applicable):  No results found for: LABABO, LABRH    Drug/Infectious Status (If Applicable):  No results found for: HIV, HEPCAB    COVID-19 Screening (If Applicable):   Lab Results   Component Value Date/Time    COVID19 Not Detected 07/27/2022 02:45 PM           Anesthesia Evaluation    Airway: Mallampati: II  TM distance: >3 FB   Neck ROM: full  Mouth opening: > = 3 FB   Dental:          Pulmonary:   (+) shortness of breath:  sleep apnea: on CPAP,  asthma:                            Cardiovascular:    (+) hypertension:, CAD:, CHF:,     (-)  angina                Neuro/Psych:      (-) seizures and CVA           GI/Hepatic/Renal:             Endo/Other:    (+) DiabetesType II DM, , .                 Abdominal:             Vascular: Other Findings:           Anesthesia Plan      general     ASA 3       Induction: intravenous. MIPS: Postoperative opioids intended. Anesthetic plan and risks discussed with patient. Plan discussed with CRNA. Echo 2022  Conclusions        Summary    Moderate sized inferior completely reversible defect consistent with    ischemia in the territory of the mid RCA. LV function is normal with uniform    wall motion and ejection fraction of 66 %. Lower risk abnormal study.            Chiki Armijo MD   3/15/2023

## 2023-03-28 RX ORDER — TIRZEPATIDE 5 MG/.5ML
5 INJECTION, SOLUTION SUBCUTANEOUS WEEKLY
Qty: 12 ADJUSTABLE DOSE PRE-FILLED PEN SYRINGE | Refills: 0 | Status: SHIPPED | OUTPATIENT
Start: 2023-03-28

## 2023-04-03 RX ORDER — FENOFIBRIC ACID 135 MG/1
CAPSULE, DELAYED RELEASE ORAL
Qty: 90 CAPSULE | Refills: 0 | Status: SHIPPED | OUTPATIENT
Start: 2023-04-03

## 2023-04-07 RX ORDER — SITAGLIPTIN AND METFORMIN HYDROCHLORIDE 1000; 50 MG/1; MG/1
TABLET, FILM COATED, EXTENDED RELEASE ORAL
Qty: 180 TABLET | Refills: 3 | Status: SHIPPED | OUTPATIENT
Start: 2023-04-07

## 2023-04-07 RX ORDER — EMPAGLIFLOZIN 25 MG/1
TABLET, FILM COATED ORAL
Qty: 90 TABLET | Refills: 3 | Status: SHIPPED | OUTPATIENT
Start: 2023-04-07

## 2023-04-10 RX ORDER — CITALOPRAM 40 MG/1
TABLET ORAL
Qty: 90 TABLET | Refills: 0 | Status: SHIPPED | OUTPATIENT
Start: 2023-04-10

## 2023-04-19 RX ORDER — APIXABAN 5 MG/1
TABLET, FILM COATED ORAL
Qty: 60 TABLET | Refills: 2 | Status: SHIPPED | OUTPATIENT
Start: 2023-04-19

## 2023-05-10 RX ORDER — TIRZEPATIDE 5 MG/.5ML
INJECTION, SOLUTION SUBCUTANEOUS
Qty: 6 ML | Refills: 0 | Status: SHIPPED | OUTPATIENT
Start: 2023-05-10

## 2023-05-10 RX ORDER — METFORMIN HYDROCHLORIDE 500 MG/1
1000 TABLET, EXTENDED RELEASE ORAL 2 TIMES DAILY
Qty: 360 TABLET | Refills: 0 | Status: SHIPPED | OUTPATIENT
Start: 2023-05-10

## 2023-05-22 SDOH — ECONOMIC STABILITY: TRANSPORTATION INSECURITY
IN THE PAST 12 MONTHS, HAS LACK OF TRANSPORTATION KEPT YOU FROM MEETINGS, WORK, OR FROM GETTING THINGS NEEDED FOR DAILY LIVING?: NO

## 2023-05-22 SDOH — ECONOMIC STABILITY: INCOME INSECURITY: HOW HARD IS IT FOR YOU TO PAY FOR THE VERY BASICS LIKE FOOD, HOUSING, MEDICAL CARE, AND HEATING?: NOT HARD AT ALL

## 2023-05-22 SDOH — ECONOMIC STABILITY: HOUSING INSECURITY
IN THE LAST 12 MONTHS, WAS THERE A TIME WHEN YOU DID NOT HAVE A STEADY PLACE TO SLEEP OR SLEPT IN A SHELTER (INCLUDING NOW)?: NO

## 2023-05-22 SDOH — ECONOMIC STABILITY: FOOD INSECURITY: WITHIN THE PAST 12 MONTHS, THE FOOD YOU BOUGHT JUST DIDN'T LAST AND YOU DIDN'T HAVE MONEY TO GET MORE.: NEVER TRUE

## 2023-05-22 SDOH — ECONOMIC STABILITY: FOOD INSECURITY: WITHIN THE PAST 12 MONTHS, YOU WORRIED THAT YOUR FOOD WOULD RUN OUT BEFORE YOU GOT MONEY TO BUY MORE.: NEVER TRUE

## 2023-05-25 ENCOUNTER — OFFICE VISIT (OUTPATIENT)
Dept: INTERNAL MEDICINE CLINIC | Age: 63
End: 2023-05-25

## 2023-05-25 VITALS
HEIGHT: 65 IN | BODY MASS INDEX: 46.98 KG/M2 | WEIGHT: 282 LBS | RESPIRATION RATE: 12 BRPM | HEART RATE: 70 BPM | SYSTOLIC BLOOD PRESSURE: 120 MMHG | DIASTOLIC BLOOD PRESSURE: 80 MMHG

## 2023-05-25 DIAGNOSIS — I25.118 CORONARY ARTERY DISEASE OF NATIVE ARTERY OF NATIVE HEART WITH STABLE ANGINA PECTORIS (HCC): ICD-10-CM

## 2023-05-25 DIAGNOSIS — E11.69 HYPERLIPIDEMIA ASSOCIATED WITH TYPE 2 DIABETES MELLITUS (HCC): ICD-10-CM

## 2023-05-25 DIAGNOSIS — K76.0 FATTY INFILTRATION OF LIVER: ICD-10-CM

## 2023-05-25 DIAGNOSIS — E11.9 TYPE 2 DIABETES MELLITUS WITHOUT COMPLICATION, WITHOUT LONG-TERM CURRENT USE OF INSULIN (HCC): Primary | ICD-10-CM

## 2023-05-25 DIAGNOSIS — E66.01 MORBID OBESITY WITH BMI OF 50.0-59.9, ADULT (HCC): ICD-10-CM

## 2023-05-25 DIAGNOSIS — G25.0 BENIGN HEAD TREMOR: ICD-10-CM

## 2023-05-25 DIAGNOSIS — E78.5 HYPERLIPIDEMIA ASSOCIATED WITH TYPE 2 DIABETES MELLITUS (HCC): ICD-10-CM

## 2023-05-25 DIAGNOSIS — G47.33 SEVERE OBSTRUCTIVE SLEEP APNEA: ICD-10-CM

## 2023-05-25 DIAGNOSIS — E11.69 TYPE 2 DIABETES MELLITUS WITH OTHER SPECIFIED COMPLICATION, WITHOUT LONG-TERM CURRENT USE OF INSULIN (HCC): ICD-10-CM

## 2023-05-25 DIAGNOSIS — I27.82 OTHER CHRONIC PULMONARY EMBOLISM WITHOUT ACUTE COR PULMONALE (HCC): ICD-10-CM

## 2023-05-25 DIAGNOSIS — I10 BENIGN ESSENTIAL HYPERTENSION: ICD-10-CM

## 2023-05-25 DIAGNOSIS — E78.1 HYPERTRIGLYCERIDEMIA: ICD-10-CM

## 2023-05-25 DIAGNOSIS — E11.9 TYPE 2 DIABETES MELLITUS WITHOUT COMPLICATION, WITHOUT LONG-TERM CURRENT USE OF INSULIN (HCC): ICD-10-CM

## 2023-05-25 LAB
ALBUMIN SERPL-MCNC: 4.4 G/DL (ref 3.4–5)
ALBUMIN/GLOB SERPL: 1.9 {RATIO} (ref 1.1–2.2)
ALP SERPL-CCNC: 72 U/L (ref 40–129)
ALT SERPL-CCNC: 8 U/L (ref 10–40)
ANION GAP SERPL CALCULATED.3IONS-SCNC: 13 MMOL/L (ref 3–16)
AST SERPL-CCNC: 15 U/L (ref 15–37)
BASOPHILS # BLD: 0.1 K/UL (ref 0–0.2)
BASOPHILS NFR BLD: 1.3 %
BILIRUB SERPL-MCNC: 0.4 MG/DL (ref 0–1)
BUN SERPL-MCNC: 19 MG/DL (ref 7–20)
CALCIUM SERPL-MCNC: 9.7 MG/DL (ref 8.3–10.6)
CHLORIDE SERPL-SCNC: 105 MMOL/L (ref 99–110)
CHOLEST SERPL-MCNC: 118 MG/DL (ref 0–199)
CO2 SERPL-SCNC: 23 MMOL/L (ref 21–32)
CREAT SERPL-MCNC: 0.7 MG/DL (ref 0.6–1.2)
DEPRECATED RDW RBC AUTO: 14.7 % (ref 12.4–15.4)
EOSINOPHIL # BLD: 0.1 K/UL (ref 0–0.6)
EOSINOPHIL NFR BLD: 2.8 %
GFR SERPLBLD CREATININE-BSD FMLA CKD-EPI: >60 ML/MIN/{1.73_M2}
GLUCOSE SERPL-MCNC: 188 MG/DL (ref 70–99)
HCT VFR BLD AUTO: 42.4 % (ref 36–48)
HDLC SERPL-MCNC: 35 MG/DL (ref 40–60)
HGB BLD-MCNC: 14.4 G/DL (ref 12–16)
LDLC SERPL CALC-MCNC: ABNORMAL MG/DL
LDLC SERPL-MCNC: 44 MG/DL
LYMPHOCYTES # BLD: 1.3 K/UL (ref 1–5.1)
LYMPHOCYTES NFR BLD: 26.4 %
MCH RBC QN AUTO: 31 PG (ref 26–34)
MCHC RBC AUTO-ENTMCNC: 33.9 G/DL (ref 31–36)
MCV RBC AUTO: 91.4 FL (ref 80–100)
MONOCYTES # BLD: 0.4 K/UL (ref 0–1.3)
MONOCYTES NFR BLD: 8.3 %
NEUTROPHILS # BLD: 3 K/UL (ref 1.7–7.7)
NEUTROPHILS NFR BLD: 61.2 %
PLATELET # BLD AUTO: 162 K/UL (ref 135–450)
PMV BLD AUTO: 8.8 FL (ref 5–10.5)
POTASSIUM SERPL-SCNC: 3.9 MMOL/L (ref 3.5–5.1)
PROT SERPL-MCNC: 6.7 G/DL (ref 6.4–8.2)
RBC # BLD AUTO: 4.63 M/UL (ref 4–5.2)
SODIUM SERPL-SCNC: 141 MMOL/L (ref 136–145)
TRIGL SERPL-MCNC: 394 MG/DL (ref 0–150)
VLDLC SERPL CALC-MCNC: ABNORMAL MG/DL
WBC # BLD AUTO: 4.9 K/UL (ref 4–11)

## 2023-05-25 PROCEDURE — 3052F HG A1C>EQUAL 8.0%<EQUAL 9.0%: CPT | Performed by: INTERNAL MEDICINE

## 2023-05-25 PROCEDURE — 3074F SYST BP LT 130 MM HG: CPT | Performed by: INTERNAL MEDICINE

## 2023-05-25 PROCEDURE — 3079F DIAST BP 80-89 MM HG: CPT | Performed by: INTERNAL MEDICINE

## 2023-05-25 PROCEDURE — 99214 OFFICE O/P EST MOD 30 MIN: CPT | Performed by: INTERNAL MEDICINE

## 2023-05-25 RX ORDER — TIRZEPATIDE 7.5 MG/.5ML
7.5 INJECTION, SOLUTION SUBCUTANEOUS WEEKLY
Qty: 12 ADJUSTABLE DOSE PRE-FILLED PEN SYRINGE | Refills: 2 | Status: SHIPPED | OUTPATIENT
Start: 2023-05-25 | End: 2023-08-23

## 2023-05-25 ASSESSMENT — ENCOUNTER SYMPTOMS
ABDOMINAL PAIN: 0
SHORTNESS OF BREATH: 0
NAUSEA: 0
RHINORRHEA: 0
WHEEZING: 0
COUGH: 0
VOMITING: 0

## 2023-05-25 NOTE — PROGRESS NOTES
menopausal 07/22/2015    Hx of blood clots     lungs bilateral x 2    Hyperlipidemia     Hyperlipidemia associated with type 2 diabetes mellitus ([facility] 10/23/2015    Morbid obesity with BMI of 50.0-59.9, adult ([facility] 10/23/2015    No history of procedure 11/09/2015    no past colonoscopy    ELEAZAR on CPAP     Pulmonary embolism (HCC)     both lungs x 2    Type II or unspecified type diabetes mellitus without mention of complication, not stated as uncontrolled     Umbilical hernia 84/91/7907    Unspecified sleep apnea     Wears glasses        Past Surgical History:   Procedure Laterality Date    BREAST SURGERY Right 2015    benign tumor    CHOLECYSTECTOMY, LAPAROSCOPIC  08/02/2012    COLONOSCOPY  11/09/2015    cecal polyp    DILATION AND CURETTAGE OF UTERUS  03/13/2018    FOOT SURGERY Right 3/15/2023    OPEN REDUCTION INTERNAL FIXATION RIGHT 5TH METATARSAL performed by Yuri Rios DPM at [facility]  77/50/4011    lap umbilical    HYSTEROSCOPY  03/13/2018    and D&C       Family History   Problem Relation Age of Onset    Hypertension Mother     Diabetes Mother     Heart Failure Father     Other Father         AAA    High Blood Pressure Sister     Diabetes Sister     High Blood Pressure Brother     Heart Attack Brother     Diabetes Brother        Social History     Tobacco Use    Smoking status: Never    Smokeless tobacco: Never   Vaping Use    Vaping Use: Never used   Substance Use Topics    Alcohol use: No     Alcohol/week: 0.0 standard drinks    Drug use: No        /80 (Site: Right Upper Arm, Position: Sitting, Cuff Size: Medium Adult)   Pulse 70   Resp 12   Ht 5' 5\" (1.651 m)   Wt 282 lb (127.9 kg)   LMP 08/20/2014   BMI 46.93 kg/m²        Objective:   Physical Exam  Constitutional:       Appearance: She is well-developed. Comments: Morbidly obese   HENT:      Head: Normocephalic.    Eyes:      Conjunctiva/sclera: Conjunctivae normal.      Pupils: Pupils are equal, round, and reactive

## 2023-05-26 LAB
EST. AVERAGE GLUCOSE BLD GHB EST-MCNC: 159.9 MG/DL
HBA1C MFR BLD: 7.2 %

## 2023-06-16 DIAGNOSIS — R05.9 COUGH, UNSPECIFIED TYPE: ICD-10-CM

## 2023-06-19 RX ORDER — FENOFIBRIC ACID 135 MG/1
CAPSULE, DELAYED RELEASE ORAL
Qty: 90 CAPSULE | Refills: 0 | Status: SHIPPED | OUTPATIENT
Start: 2023-06-19

## 2023-06-19 RX ORDER — ALBUTEROL SULFATE 90 UG/1
AEROSOL, METERED RESPIRATORY (INHALATION)
Qty: 54 G | Refills: 3 | OUTPATIENT
Start: 2023-06-19

## 2023-06-29 ENCOUNTER — HOSPITAL ENCOUNTER (EMERGENCY)
Age: 63
Discharge: HOME | End: 2023-06-30
Attending: EMERGENCY MEDICINE
Payer: COMMERCIAL

## 2023-06-29 ENCOUNTER — APPOINTMENT (OUTPATIENT)
Dept: GENERAL RADIOLOGY | Age: 63
End: 2023-06-29
Payer: COMMERCIAL

## 2023-06-29 DIAGNOSIS — R07.9 CHEST PAIN, UNSPECIFIED TYPE: Primary | ICD-10-CM

## 2023-06-29 LAB
ALBUMIN SERPL-MCNC: 4.1 G/DL (ref 3.4–5)
ALBUMIN/GLOB SERPL: 1.8 {RATIO} (ref 1.1–2.2)
ALP SERPL-CCNC: 66 U/L (ref 40–129)
ALT SERPL-CCNC: 9 U/L (ref 10–40)
ANION GAP SERPL CALCULATED.3IONS-SCNC: 16 MMOL/L (ref 3–16)
AST SERPL-CCNC: 20 U/L (ref 15–37)
BASE EXCESS BLDV CALC-SCNC: -2.8 MMOL/L (ref -3–3)
BASOPHILS # BLD: 0.1 K/UL (ref 0–0.2)
BASOPHILS NFR BLD: 1.1 %
BILIRUB SERPL-MCNC: 0.3 MG/DL (ref 0–1)
BUN SERPL-MCNC: 19 MG/DL (ref 7–20)
CALCIUM SERPL-MCNC: 9.4 MG/DL (ref 8.3–10.6)
CHLORIDE SERPL-SCNC: 108 MMOL/L (ref 99–110)
CO2 BLDV-SCNC: 21 MMOL/L
CO2 SERPL-SCNC: 19 MMOL/L (ref 21–32)
COHGB MFR BLDV: 3.4 % (ref 0–1.5)
CREAT SERPL-MCNC: 0.7 MG/DL (ref 0.6–1.2)
D DIMER: 0.3 UG/ML FEU (ref 0–0.6)
DEPRECATED RDW RBC AUTO: 14.2 % (ref 12.4–15.4)
EOSINOPHIL # BLD: 0.1 K/UL (ref 0–0.6)
EOSINOPHIL NFR BLD: 2 %
GFR SERPLBLD CREATININE-BSD FMLA CKD-EPI: >60 ML/MIN/{1.73_M2}
GLUCOSE SERPL-MCNC: 163 MG/DL (ref 70–99)
HCO3 BLDV-SCNC: 19.8 MMOL/L (ref 23–29)
HCT VFR BLD AUTO: 41 % (ref 36–48)
HGB BLD-MCNC: 14 G/DL (ref 12–16)
INR PPP: 1.06 (ref 0.84–1.16)
LYMPHOCYTES # BLD: 1.8 K/UL (ref 1–5.1)
LYMPHOCYTES NFR BLD: 35.1 %
MCH RBC QN AUTO: 31.4 PG (ref 26–34)
MCHC RBC AUTO-ENTMCNC: 34.2 G/DL (ref 31–36)
MCV RBC AUTO: 91.7 FL (ref 80–100)
METHGB MFR BLDV: 0.3 %
MONOCYTES # BLD: 0.4 K/UL (ref 0–1.3)
MONOCYTES NFR BLD: 8.3 %
NEUTROPHILS # BLD: 2.8 K/UL (ref 1.7–7.7)
NEUTROPHILS NFR BLD: 53.5 %
NT-PROBNP SERPL-MCNC: <36 PG/ML (ref 0–124)
O2 CT VFR BLDV CALC: 20 VOL %
O2 THERAPY: ABNORMAL
PCO2 BLDV: 29.2 MMHG (ref 40–50)
PH BLDV: 7.45 [PH] (ref 7.35–7.45)
PLATELET # BLD AUTO: 161 K/UL (ref 135–450)
PMV BLD AUTO: 8.4 FL (ref 5–10.5)
PO2 BLDV: 97.7 MMHG (ref 25–40)
POTASSIUM SERPL-SCNC: 3.9 MMOL/L (ref 3.5–5.1)
PROT SERPL-MCNC: 6.4 G/DL (ref 6.4–8.2)
PROTHROMBIN TIME: 13.8 SEC (ref 11.5–14.8)
RBC # BLD AUTO: 4.47 M/UL (ref 4–5.2)
SAO2 % BLDV: 98 %
SODIUM SERPL-SCNC: 143 MMOL/L (ref 136–145)
TROPONIN, HIGH SENSITIVITY: <6 NG/L (ref 0–14)
WBC # BLD AUTO: 5.2 K/UL (ref 4–11)

## 2023-06-29 PROCEDURE — 85379 FIBRIN DEGRADATION QUANT: CPT

## 2023-06-29 PROCEDURE — 93005 ELECTROCARDIOGRAM TRACING: CPT | Performed by: EMERGENCY MEDICINE

## 2023-06-29 PROCEDURE — 99285 EMERGENCY DEPT VISIT HI MDM: CPT

## 2023-06-29 PROCEDURE — 84484 ASSAY OF TROPONIN QUANT: CPT

## 2023-06-29 PROCEDURE — 71046 X-RAY EXAM CHEST 2 VIEWS: CPT

## 2023-06-29 PROCEDURE — 82803 BLOOD GASES ANY COMBINATION: CPT

## 2023-06-29 PROCEDURE — 85610 PROTHROMBIN TIME: CPT

## 2023-06-29 PROCEDURE — 36415 COLL VENOUS BLD VENIPUNCTURE: CPT

## 2023-06-29 PROCEDURE — 85025 COMPLETE CBC W/AUTO DIFF WBC: CPT

## 2023-06-29 PROCEDURE — 83880 ASSAY OF NATRIURETIC PEPTIDE: CPT

## 2023-06-29 PROCEDURE — 80053 COMPREHEN METABOLIC PANEL: CPT

## 2023-06-29 ASSESSMENT — LIFESTYLE VARIABLES
HOW MANY STANDARD DRINKS CONTAINING ALCOHOL DO YOU HAVE ON A TYPICAL DAY: PATIENT DOES NOT DRINK
HOW OFTEN DO YOU HAVE A DRINK CONTAINING ALCOHOL: NEVER

## 2023-06-29 ASSESSMENT — PAIN DESCRIPTION - ORIENTATION: ORIENTATION: LEFT

## 2023-06-29 ASSESSMENT — PAIN DESCRIPTION - DESCRIPTORS: DESCRIPTORS: PRESSURE

## 2023-06-29 ASSESSMENT — PAIN DESCRIPTION - ONSET: ONSET: ON-GOING

## 2023-06-29 ASSESSMENT — PAIN DESCRIPTION - FREQUENCY: FREQUENCY: CONTINUOUS

## 2023-06-29 ASSESSMENT — PAIN DESCRIPTION - LOCATION: LOCATION: CHEST

## 2023-06-29 ASSESSMENT — PAIN - FUNCTIONAL ASSESSMENT: PAIN_FUNCTIONAL_ASSESSMENT: 0-10

## 2023-06-29 ASSESSMENT — PAIN DESCRIPTION - PAIN TYPE: TYPE: ACUTE PAIN

## 2023-06-30 VITALS
HEIGHT: 65 IN | BODY MASS INDEX: 47.18 KG/M2 | OXYGEN SATURATION: 97 % | HEART RATE: 71 BPM | TEMPERATURE: 97.8 F | DIASTOLIC BLOOD PRESSURE: 76 MMHG | RESPIRATION RATE: 18 BRPM | WEIGHT: 283.2 LBS | SYSTOLIC BLOOD PRESSURE: 133 MMHG

## 2023-06-30 LAB
EKG ATRIAL RATE: 82 BPM
EKG DIAGNOSIS: NORMAL
EKG P AXIS: 58 DEGREES
EKG P-R INTERVAL: 156 MS
EKG Q-T INTERVAL: 406 MS
EKG QRS DURATION: 88 MS
EKG QTC CALCULATION (BAZETT): 474 MS
EKG R AXIS: 22 DEGREES
EKG T AXIS: 39 DEGREES
EKG VENTRICULAR RATE: 82 BPM

## 2023-06-30 PROCEDURE — 93010 ELECTROCARDIOGRAM REPORT: CPT | Performed by: INTERNAL MEDICINE

## 2023-06-30 ASSESSMENT — PAIN - FUNCTIONAL ASSESSMENT: PAIN_FUNCTIONAL_ASSESSMENT: NONE - DENIES PAIN

## 2023-07-02 ASSESSMENT — ENCOUNTER SYMPTOMS
ABDOMINAL PAIN: 0
BACK PAIN: 0
COUGH: 0
VOMITING: 0
DIARRHEA: 0
RHINORRHEA: 0
CHEST TIGHTNESS: 0
SHORTNESS OF BREATH: 0

## 2023-07-03 RX ORDER — CITALOPRAM 40 MG/1
TABLET ORAL
Qty: 90 TABLET | Refills: 0 | Status: SHIPPED | OUTPATIENT
Start: 2023-07-03

## 2023-07-17 RX ORDER — METFORMIN HYDROCHLORIDE 500 MG/1
TABLET, EXTENDED RELEASE ORAL
Qty: 360 TABLET | Refills: 0 | Status: SHIPPED | OUTPATIENT
Start: 2023-07-17

## 2023-07-25 ENCOUNTER — OFFICE VISIT (OUTPATIENT)
Dept: CARDIOLOGY CLINIC | Age: 63
End: 2023-07-25
Payer: COMMERCIAL

## 2023-07-25 VITALS
BODY MASS INDEX: 46.62 KG/M2 | OXYGEN SATURATION: 97 % | HEIGHT: 65 IN | HEART RATE: 96 BPM | SYSTOLIC BLOOD PRESSURE: 132 MMHG | DIASTOLIC BLOOD PRESSURE: 72 MMHG | WEIGHT: 279.8 LBS

## 2023-07-25 DIAGNOSIS — E11.69 HYPERLIPIDEMIA ASSOCIATED WITH TYPE 2 DIABETES MELLITUS (HCC): ICD-10-CM

## 2023-07-25 DIAGNOSIS — R07.9 CHEST PAIN, UNSPECIFIED TYPE: ICD-10-CM

## 2023-07-25 DIAGNOSIS — E78.5 HYPERLIPIDEMIA ASSOCIATED WITH TYPE 2 DIABETES MELLITUS (HCC): ICD-10-CM

## 2023-07-25 DIAGNOSIS — I10 PRIMARY HYPERTENSION: Primary | ICD-10-CM

## 2023-07-25 DIAGNOSIS — E78.1 HYPERTRIGLYCERIDEMIA: ICD-10-CM

## 2023-07-25 PROCEDURE — 3075F SYST BP GE 130 - 139MM HG: CPT | Performed by: INTERNAL MEDICINE

## 2023-07-25 PROCEDURE — 99214 OFFICE O/P EST MOD 30 MIN: CPT | Performed by: INTERNAL MEDICINE

## 2023-07-25 PROCEDURE — 3051F HG A1C>EQUAL 7.0%<8.0%: CPT | Performed by: INTERNAL MEDICINE

## 2023-07-25 PROCEDURE — 3078F DIAST BP <80 MM HG: CPT | Performed by: INTERNAL MEDICINE

## 2023-07-25 RX ORDER — ACETAMINOPHEN 500 MG
500 TABLET ORAL EVERY 6 HOURS PRN
COMMUNITY

## 2023-07-25 NOTE — PROGRESS NOTES
[facility]   Cardiac Consultation    Referring Provider:  Denise Sanchez MD     Chief Complaint   Patient presents with    Follow-up    Coronary Artery Disease    Hypertension    Hyperlipidemia      Subjective: Ms. Doreen Shone is here today for cardiology follow up; c/o mild leg swelling at times and fatigue    History of Present Illness:     Norma Clinton is a 59 y.o. female who has PMH HTN, HLD, DM II, +premature FH CAD (brother age 48 known CAD), hypertriglyceridemia (on trilipix and vascepa), PE x 2 (2007/2006) on warfarin and ELEAZAR on CPAP. Denise Sanchez MD manages her coumadin. Admitted [facility] 7/27/2022 with c/o SOB, fatigue and CP. Admitted to PCU for cardiac work up. ECHO 7/28/22 EF=55%; no WMA; mild LVH; grade I DD normal LV filling pressure (EF=55-60% in 6/11). Karyn nuc 7/29/22 noted moderate sized inferior reversible defect c/w ischemia in territory mid RCA. EF=66%. LHC 7/29/22 Mild-mod NOCAD; LVEDP 15 mmHg. OV 8/31/22 referral given to endocrinologist for better triglyceride control but did not follow through. On 3/15/23 fracture fragment removal of right 5th proximal phalanx. Presented to ED 6/29/23 CP and SOB. EKG NSR 82 bpm NS ST abnormality. Suresh negative and sent home. Today, she reports she is trying to make better food choices and has been losing weight. Dr. Dain Cabrales told her she didn't need to see endocrinologist so she did not go. She has good days overall and some bad but is able to do all of her daily activities. She is now having some occasional swelling in her legs and feet and fatigue. Her sister just got  and has moved in with her. This is causing stress in her life. Patient denies current chest pain, sob, palpitations, dizziness or syncope. Patient is taking all cardiac medications as prescribed and tolerates them well. Weight today is 279# (down 18# from 8/2022)    Patient is vaccinated against Covid.  Pfizer 2/2    Past

## 2023-07-25 NOTE — PATIENT INSTRUCTIONS
Plan:  Current medications reviewed. Refills given as warranted. Recommend drinking less than 64 oz of liquid a day  Continue taking vascepa and fenofibric acid to help manage your triglycerides  Try to watch your diet and limit red meat, fried foods, butter, heavy cream sauces, and desserts. No cardiac testing at this time. Follow up with me in 6 months, call in December to make your next appointment.

## 2023-07-31 RX ORDER — ATORVASTATIN CALCIUM 40 MG/1
40 TABLET, FILM COATED ORAL DAILY
Qty: 90 TABLET | Refills: 3 | Status: SHIPPED | OUTPATIENT
Start: 2023-07-31

## 2023-08-07 RX ORDER — FENOFIBRIC ACID 135 MG/1
CAPSULE, DELAYED RELEASE ORAL
Qty: 90 CAPSULE | Refills: 0 | Status: SHIPPED | OUTPATIENT
Start: 2023-08-07

## 2023-08-10 ENCOUNTER — TELEPHONE (OUTPATIENT)
Dept: INTERNAL MEDICINE CLINIC | Age: 63
End: 2023-08-10

## 2023-08-10 NOTE — TELEPHONE ENCOUNTER
Patient states that she is going to hold off on paxlovid. If she worsens in the next few days she will call us.

## 2023-08-10 NOTE — TELEPHONE ENCOUNTER
----- Message from Michelle Villarreal MD sent at 8/10/2023  1:03 PM EDT -----  Contact: Pt [phone]  Hold Lipitor. Typically with Eliquis I would not recommend Paxlovid unless patient is feeling very sick  ----- Message -----  From: Angela Tenorio  Sent: 8/10/2023  11:11 AM EDT  To: Michelle Villarreal MD    Eliquis-D  Atorvastatin-D  ----- Message -----  From: Michelle Villarreal MD  Sent: 8/10/2023  10:52 AM EDT  To: Anam Hernandez  ----- Message -----  From: Margaret Corley  Sent: 8/10/2023  10:10 AM EDT  To: Michelle Villarreal MD    Pt called and states she tested positive for covid and is requesting medication to subside the symptoms. Symptoms include high fever, congestion, fatigue and headache. Please advise.           [address]

## 2023-08-24 DIAGNOSIS — R05.9 COUGH, UNSPECIFIED TYPE: ICD-10-CM

## 2023-08-24 RX ORDER — ALBUTEROL SULFATE 90 UG/1
AEROSOL, METERED RESPIRATORY (INHALATION)
Qty: 54 G | Refills: 1 | Status: SHIPPED | OUTPATIENT
Start: 2023-08-24

## 2023-09-11 RX ORDER — LOSARTAN POTASSIUM 50 MG/1
50 TABLET ORAL DAILY
Qty: 90 TABLET | Refills: 3 | Status: SHIPPED | OUTPATIENT
Start: 2023-09-11

## 2023-09-11 RX ORDER — PANTOPRAZOLE SODIUM 20 MG/1
TABLET, DELAYED RELEASE ORAL
Qty: 90 TABLET | Refills: 3 | Status: SHIPPED | OUTPATIENT
Start: 2023-09-11

## 2023-09-11 RX ORDER — ICOSAPENT ETHYL 1000 MG/1
CAPSULE ORAL
Qty: 360 CAPSULE | Refills: 3 | Status: SHIPPED | OUTPATIENT
Start: 2023-09-11

## 2023-09-25 RX ORDER — CITALOPRAM 40 MG/1
TABLET ORAL
Qty: 90 TABLET | Refills: 0 | Status: SHIPPED | OUTPATIENT
Start: 2023-09-25

## 2023-10-23 RX ORDER — APIXABAN 5 MG/1
5 TABLET, FILM COATED ORAL 2 TIMES DAILY
Qty: 180 TABLET | Refills: 1 | Status: SHIPPED | OUTPATIENT
Start: 2023-10-23

## 2023-10-23 RX ORDER — TOPIRAMATE 100 MG/1
TABLET, FILM COATED ORAL
Qty: 90 TABLET | Refills: 0 | Status: SHIPPED | OUTPATIENT
Start: 2023-10-23

## 2023-10-25 ENCOUNTER — OFFICE VISIT (OUTPATIENT)
Dept: INTERNAL MEDICINE CLINIC | Age: 63
End: 2023-10-25

## 2023-10-25 VITALS
SYSTOLIC BLOOD PRESSURE: 120 MMHG | HEART RATE: 70 BPM | HEIGHT: 65 IN | DIASTOLIC BLOOD PRESSURE: 80 MMHG | WEIGHT: 269 LBS | RESPIRATION RATE: 12 BRPM | BODY MASS INDEX: 44.82 KG/M2

## 2023-10-25 DIAGNOSIS — G47.33 SEVERE OBSTRUCTIVE SLEEP APNEA: ICD-10-CM

## 2023-10-25 DIAGNOSIS — Z00.00 ENCOUNTER FOR WELL ADULT EXAM WITHOUT ABNORMAL FINDINGS: Primary | ICD-10-CM

## 2023-10-25 DIAGNOSIS — E11.69 HYPERLIPIDEMIA ASSOCIATED WITH TYPE 2 DIABETES MELLITUS (HCC): ICD-10-CM

## 2023-10-25 DIAGNOSIS — E11.9 TYPE 2 DIABETES MELLITUS WITHOUT COMPLICATION, WITHOUT LONG-TERM CURRENT USE OF INSULIN (HCC): ICD-10-CM

## 2023-10-25 DIAGNOSIS — E78.5 HYPERLIPIDEMIA ASSOCIATED WITH TYPE 2 DIABETES MELLITUS (HCC): ICD-10-CM

## 2023-10-25 DIAGNOSIS — I27.82 OTHER CHRONIC PULMONARY EMBOLISM WITHOUT ACUTE COR PULMONALE (HCC): ICD-10-CM

## 2023-10-25 DIAGNOSIS — E78.1 HYPERTRIGLYCERIDEMIA: ICD-10-CM

## 2023-10-25 DIAGNOSIS — E66.01 OBESITY, MORBID, BMI 40.0-49.9 (HCC): ICD-10-CM

## 2023-10-25 DIAGNOSIS — Z00.00 ROUTINE GENERAL MEDICAL EXAMINATION AT A HEALTH CARE FACILITY: ICD-10-CM

## 2023-10-25 DIAGNOSIS — G25.0 BENIGN HEAD TREMOR: ICD-10-CM

## 2023-10-25 LAB
ALBUMIN SERPL-MCNC: 4.4 G/DL (ref 3.4–5)
ALBUMIN/GLOB SERPL: 2 {RATIO} (ref 1.1–2.2)
ALP SERPL-CCNC: 70 U/L (ref 40–129)
ALT SERPL-CCNC: 8 U/L (ref 10–40)
ANION GAP SERPL CALCULATED.3IONS-SCNC: 14 MMOL/L (ref 3–16)
AST SERPL-CCNC: 19 U/L (ref 15–37)
BASOPHILS # BLD: 0.1 K/UL (ref 0–0.2)
BASOPHILS NFR BLD: 1.3 %
BILIRUB SERPL-MCNC: 0.3 MG/DL (ref 0–1)
BILIRUBIN, POC: NORMAL
BLOOD URINE, POC: NORMAL
BUN SERPL-MCNC: 20 MG/DL (ref 7–20)
CALCIUM SERPL-MCNC: 9.5 MG/DL (ref 8.3–10.6)
CHLORIDE SERPL-SCNC: 105 MMOL/L (ref 99–110)
CHOLEST SERPL-MCNC: 101 MG/DL (ref 0–199)
CLARITY, POC: NORMAL
CO2 SERPL-SCNC: 23 MMOL/L (ref 21–32)
COLOR, POC: NORMAL
CREAT SERPL-MCNC: 0.6 MG/DL (ref 0.6–1.2)
CREAT UR-MCNC: 42.2 MG/DL (ref 28–259)
DEPRECATED RDW RBC AUTO: 14.4 % (ref 12.4–15.4)
EOSINOPHIL # BLD: 0.1 K/UL (ref 0–0.6)
EOSINOPHIL NFR BLD: 2.4 %
GFR SERPLBLD CREATININE-BSD FMLA CKD-EPI: >60 ML/MIN/{1.73_M2}
GLUCOSE SERPL-MCNC: 148 MG/DL (ref 70–99)
GLUCOSE URINE, POC: NORMAL
HCT VFR BLD AUTO: 41.7 % (ref 36–48)
HDLC SERPL-MCNC: 33 MG/DL (ref 40–60)
HGB BLD-MCNC: 14.2 G/DL (ref 12–16)
KETONES, POC: NORMAL
LDLC SERPL CALC-MCNC: ABNORMAL MG/DL
LDLC SERPL-MCNC: 36 MG/DL
LEUKOCYTE EST, POC: NORMAL
LYMPHOCYTES # BLD: 1.2 K/UL (ref 1–5.1)
LYMPHOCYTES NFR BLD: 27.5 %
MCH RBC QN AUTO: 31.2 PG (ref 26–34)
MCHC RBC AUTO-ENTMCNC: 34.1 G/DL (ref 31–36)
MCV RBC AUTO: 91.5 FL (ref 80–100)
MICROALBUMIN UR DL<=1MG/L-MCNC: <1.2 MG/DL
MICROALBUMIN/CREAT UR: NORMAL MG/G (ref 0–30)
MONOCYTES # BLD: 0.3 K/UL (ref 0–1.3)
MONOCYTES NFR BLD: 7.7 %
NEUTROPHILS # BLD: 2.7 K/UL (ref 1.7–7.7)
NEUTROPHILS NFR BLD: 61.1 %
NITRITE, POC: NORMAL
PH, POC: NORMAL
PLATELET # BLD AUTO: 167 K/UL (ref 135–450)
PMV BLD AUTO: 8.9 FL (ref 5–10.5)
POTASSIUM SERPL-SCNC: 4 MMOL/L (ref 3.5–5.1)
PROT SERPL-MCNC: 6.6 G/DL (ref 6.4–8.2)
PROTEIN, POC: NORMAL
RBC # BLD AUTO: 4.56 M/UL (ref 4–5.2)
SODIUM SERPL-SCNC: 142 MMOL/L (ref 136–145)
SPECIFIC GRAVITY, POC: NORMAL
TRIGL SERPL-MCNC: 328 MG/DL (ref 0–150)
UROBILINOGEN, POC: NORMAL
VLDLC SERPL CALC-MCNC: ABNORMAL MG/DL
WBC # BLD AUTO: 4.4 K/UL (ref 4–11)

## 2023-10-25 PROCEDURE — 3079F DIAST BP 80-89 MM HG: CPT | Performed by: INTERNAL MEDICINE

## 2023-10-25 PROCEDURE — 81002 URINALYSIS NONAUTO W/O SCOPE: CPT | Performed by: INTERNAL MEDICINE

## 2023-10-25 PROCEDURE — 3074F SYST BP LT 130 MM HG: CPT | Performed by: INTERNAL MEDICINE

## 2023-10-25 PROCEDURE — 90677 PCV20 VACCINE IM: CPT | Performed by: INTERNAL MEDICINE

## 2023-10-25 PROCEDURE — 99396 PREV VISIT EST AGE 40-64: CPT | Performed by: INTERNAL MEDICINE

## 2023-10-25 PROCEDURE — 90471 IMMUNIZATION ADMIN: CPT | Performed by: INTERNAL MEDICINE

## 2023-10-25 ASSESSMENT — ENCOUNTER SYMPTOMS
ABDOMINAL PAIN: 0
WHEEZING: 0
RHINORRHEA: 0
SHORTNESS OF BREATH: 0
VOMITING: 0
NAUSEA: 0

## 2023-10-25 NOTE — PROGRESS NOTES
10/25/2023    Tabby Ramirez (:  1960) is a 59 y.o. female, here for a preventive medicine evaluation. Patient Active Problem List   Diagnosis    Type 2 diabetes mellitus with other specified complication ([address])    Hypertriglyceridemia    Pulmonary embolism (HCC)    Disease of nasal cavity and sinuses    Fatty infiltration of liver    Umbilical hernia    Benign head tremor    Hot flash, menopausal    Hyperlipidemia associated with type 2 diabetes mellitus ([address])    Morbid obesity with BMI of 50.0-59.9, adult (HCC)    Severe obstructive sleep apnea    Benign essential hypertension    Obesity, morbid, BMI 40.0-49.9 (HCC)    Chest pain    Shortness of breath    Hypertension    Type 2 diabetes mellitus without complication, without long-term current use of insulin (HCC)    Unstable angina (HCC)    Abnormal nuclear cardiac imaging test    Abnormal stress test    Coronary artery disease involving native coronary artery of native heart       Patient is here for follow up of diabetes, HTN, depression, pulmonary embolism and hyperlipidemia. Patient's is not checking her sugars. No hypoglycemia. Patient does not have polydipsia and polyuria. Her last A1C was 7.2. She has lost 4 kg. She is on Cozaar for HTN. Her Bp is at goal.  She is compliant with Eliquis    Her depression is stable. No new issues. Her cholesterol is controlled. It is at goal.  She has ELEAZAR. She is on CPAP. She is compliant. She has benign head tremor. It is about the same. Review of Systems   Constitutional:  Negative for appetite change and fatigue. HENT:  Negative for postnasal drip and rhinorrhea. Respiratory:  Negative for shortness of breath and wheezing. Cardiovascular:  Negative for chest pain and palpitations. Gastrointestinal:  Negative for abdominal pain, nausea and vomiting. Musculoskeletal:  Negative for joint swelling. Skin:  Negative for rash. Neurological:  Negative for light-headedness.

## 2023-10-26 LAB
EST. AVERAGE GLUCOSE BLD GHB EST-MCNC: 128.4 MG/DL
HBA1C MFR BLD: 6.1 %

## 2023-10-30 RX ORDER — ASPIRIN 81 MG/1
81 TABLET, COATED ORAL DAILY
Qty: 90 TABLET | Refills: 3 | Status: SHIPPED | OUTPATIENT
Start: 2023-10-30

## 2023-10-31 RX ORDER — FENOFIBRIC ACID 135 MG/1
CAPSULE, DELAYED RELEASE ORAL
Qty: 90 CAPSULE | Refills: 1 | Status: SHIPPED | OUTPATIENT
Start: 2023-10-31

## 2023-11-08 ENCOUNTER — OFFICE VISIT (OUTPATIENT)
Dept: PULMONOLOGY | Age: 63
End: 2023-11-08
Payer: COMMERCIAL

## 2023-11-08 VITALS
RESPIRATION RATE: 16 BRPM | DIASTOLIC BLOOD PRESSURE: 74 MMHG | BODY MASS INDEX: 45.1 KG/M2 | OXYGEN SATURATION: 97 % | SYSTOLIC BLOOD PRESSURE: 134 MMHG | WEIGHT: 271 LBS | HEART RATE: 87 BPM

## 2023-11-08 DIAGNOSIS — R05.9 COUGH, UNSPECIFIED TYPE: ICD-10-CM

## 2023-11-08 DIAGNOSIS — G47.33 SEVERE OBSTRUCTIVE SLEEP APNEA: Primary | ICD-10-CM

## 2023-11-08 PROCEDURE — 99214 OFFICE O/P EST MOD 30 MIN: CPT | Performed by: INTERNAL MEDICINE

## 2023-11-08 PROCEDURE — 3078F DIAST BP <80 MM HG: CPT | Performed by: INTERNAL MEDICINE

## 2023-11-08 PROCEDURE — 3075F SYST BP GE 130 - 139MM HG: CPT | Performed by: INTERNAL MEDICINE

## 2023-11-08 ASSESSMENT — SLEEP AND FATIGUE QUESTIONNAIRES
HOW LIKELY ARE YOU TO NOD OFF OR FALL ASLEEP WHILE SITTING QUIETLY AFTER LUNCH WITHOUT ALCOHOL: 1
HOW LIKELY ARE YOU TO NOD OFF OR FALL ASLEEP WHILE WATCHING TV: 2
HOW LIKELY ARE YOU TO NOD OFF OR FALL ASLEEP WHILE SITTING AND TALKING TO SOMEONE: 0
HOW LIKELY ARE YOU TO NOD OFF OR FALL ASLEEP WHEN YOU ARE A PASSENGER IN A CAR FOR AN HOUR WITHOUT A BREAK: 1
HOW LIKELY ARE YOU TO NOD OFF OR FALL ASLEEP WHILE SITTING AND READING: 2
HOW LIKELY ARE YOU TO NOD OFF OR FALL ASLEEP IN A CAR, WHILE STOPPED FOR A FEW MINUTES IN TRAFFIC: 0
HOW LIKELY ARE YOU TO NOD OFF OR FALL ASLEEP WHILE LYING DOWN TO REST IN THE AFTERNOON WHEN CIRCUMSTANCES PERMIT: 3
ESS TOTAL SCORE: 9
HOW LIKELY ARE YOU TO NOD OFF OR FALL ASLEEP WHILE SITTING INACTIVE IN A PUBLIC PLACE: 0
NECK CIRCUMFERENCE (INCHES): 16

## 2023-11-08 NOTE — PROGRESS NOTES
[facility]                                                                CHIEF COMPLAINT: ELEAZAR      HPI:   Covid 6 month ago and recovered well used her INH a lot at that time   Now doing fine   Not needing to use the rescue inhaler- Albuterol once a month    Patient is using CPAP 8-9  hrs/night. Sign saying motor exceeded life expectancy. Using humidifier. No snoring on CPAP. The pressure is well tolerated. The mask is comfortable. No mask leak. No significant daytime sleepiness. No nodding off when driving. Bedtime is 11 pm and rise time is 7 am. Sleep onset is 602 minutes.   ESS is             From prior visit:   Seasonal allergy worse as she got olders: trees, pollens, mold       Past Medical History:   Diagnosis Date    Asthma     Benign essential hypertension 11/14/2017    Benign head tremor 05/14/2014    Cholelithiasis     Coronary artery disease involving native coronary artery of native heart 08/31/2022    Disease of nasal cavity and sinuses     Fatty infiltration of liver     Hot flash, menopausal 07/22/2015    Hx of blood clots     lungs bilateral x 2    Hyperlipidemia     Hyperlipidemia associated with type 2 diabetes mellitus ([address]) 10/23/2015    Morbid obesity with BMI of 50.0-59.9, adult ([address]) 10/23/2015    No history of procedure 11/09/2015    no past colonoscopy    ELEAZAR on CPAP     Pulmonary embolism (HCC)     both lungs x 2    Type II or unspecified type diabetes mellitus without mention of complication, not stated as uncontrolled     Umbilical hernia 30/92/3628    Unspecified sleep apnea     Wears glasses        Past Surgical History:        Procedure Laterality Date    BREAST SURGERY Right 2015    benign tumor    CHOLECYSTECTOMY, LAPAROSCOPIC  08/02/2012    COLONOSCOPY  11/09/2015    cecal polyp    DILATION AND CURETTAGE OF UTERUS  03/13/2018    FOOT SURGERY Right 3/15/2023    OPEN REDUCTION INTERNAL FIXATION RIGHT 5TH

## 2023-11-14 RX ORDER — METFORMIN HYDROCHLORIDE 500 MG/1
TABLET, EXTENDED RELEASE ORAL
Qty: 360 TABLET | Refills: 0 | Status: SHIPPED | OUTPATIENT
Start: 2023-11-14

## 2023-11-27 RX ORDER — CITALOPRAM 40 MG/1
TABLET ORAL
Qty: 90 TABLET | Refills: 0 | Status: SHIPPED | OUTPATIENT
Start: 2023-11-27

## 2024-01-02 RX ORDER — TOPIRAMATE 100 MG/1
TABLET, FILM COATED ORAL
Qty: 90 TABLET | Refills: 1 | Status: SHIPPED | OUTPATIENT
Start: 2024-01-02

## 2024-01-16 NOTE — PROGRESS NOTES
Hyperlipidemia associated with type 2 diabetes mellitus (HCC), Morbid obesity with BMI of 50.0-59.9, adult (HCC), No history of procedure, ELEAZAR on CPAP, Pulmonary embolism (HCC), Type II or unspecified type diabetes mellitus without mention of complication, not stated as uncontrolled, Umbilical hernia, Unspecified sleep apnea, and Wears glasses.    Surgical History:   has a past surgical history that includes Cholecystectomy, laparoscopic (08/02/2012); hernia repair (10/11/2013); Colonoscopy (11/09/2015); Breast surgery (Right, 2015); hysteroscopy (03/13/2018); Dilation and curettage of uterus (03/13/2018); and Foot surgery (Right, 3/15/2023).     Social History:   reports that she has never smoked. She has never used smokeless tobacco. She reports that she does not drink alcohol and does not use drugs.     Family History:  family history includes Diabetes in her brother, mother, and sister; Heart Attack in her brother; Heart Failure in her father; High Blood Pressure in her brother and sister; Hypertension in her mother; Kidney Cancer in her brother; Other in her father.     Home Medications:  Prior to Admission medications    Medication Sig Start Date End Date Taking? Authorizing Provider   topiramate (TOPAMAX) 100 MG tablet TAKE 1 TABLET BY MOUTH AT NIGHT 1/2/24  Yes Marialuisa Andujar MD   MOUNJARO 7.5 MG/0.5ML SOPN SC injection INJECT THE CONTENTS OF ONE PEN  SUBCUTANEOUSLY WEEKLY AS  DIRECTED 12/18/23  Yes Marialuisa Andujar MD   citalopram (CELEXA) 40 MG tablet TAKE 1 TABLET BY MOUTH DAILY 11/27/23  Yes Guille Rocha MD   fenofibric acid (TRILIPIX) 135 MG CPDR capsule TAKE 1 CAPSULE BY MOUTH DAILY 10/31/23  Yes Marialuisa Andujar MD   ASPIRIN LOW DOSE 81 MG EC tablet TAKE 1 TABLET BY MOUTH  DAILY 10/30/23  Yes Palomo Castellon MD   ELIQUIS 5 MG TABS tablet TAKE 1 TABLET BY MOUTH TWICE A DAY 10/23/23  Yes Guille Rocha MD   losartan (COZAAR) 50 MG tablet TAKE 1 TABLET BY

## 2024-01-22 ENCOUNTER — OFFICE VISIT (OUTPATIENT)
Dept: CARDIOLOGY CLINIC | Age: 64
End: 2024-01-22
Payer: COMMERCIAL

## 2024-01-22 VITALS
SYSTOLIC BLOOD PRESSURE: 130 MMHG | HEIGHT: 65 IN | HEART RATE: 91 BPM | OXYGEN SATURATION: 97 % | BODY MASS INDEX: 44.68 KG/M2 | DIASTOLIC BLOOD PRESSURE: 68 MMHG | WEIGHT: 268.2 LBS

## 2024-01-22 DIAGNOSIS — R07.9 CHEST PAIN, UNSPECIFIED TYPE: ICD-10-CM

## 2024-01-22 DIAGNOSIS — I25.118 CORONARY ARTERY DISEASE OF NATIVE ARTERY OF NATIVE HEART WITH STABLE ANGINA PECTORIS (HCC): ICD-10-CM

## 2024-01-22 DIAGNOSIS — E78.1 HYPERTRIGLYCERIDEMIA: ICD-10-CM

## 2024-01-22 DIAGNOSIS — I10 PRIMARY HYPERTENSION: Primary | ICD-10-CM

## 2024-01-22 DIAGNOSIS — E78.5 HYPERLIPIDEMIA ASSOCIATED WITH TYPE 2 DIABETES MELLITUS (HCC): ICD-10-CM

## 2024-01-22 DIAGNOSIS — E11.69 HYPERLIPIDEMIA ASSOCIATED WITH TYPE 2 DIABETES MELLITUS (HCC): ICD-10-CM

## 2024-01-22 PROCEDURE — 3075F SYST BP GE 130 - 139MM HG: CPT | Performed by: INTERNAL MEDICINE

## 2024-01-22 PROCEDURE — 99214 OFFICE O/P EST MOD 30 MIN: CPT | Performed by: INTERNAL MEDICINE

## 2024-01-22 PROCEDURE — 3078F DIAST BP <80 MM HG: CPT | Performed by: INTERNAL MEDICINE

## 2024-01-22 RX ORDER — METFORMIN HYDROCHLORIDE 500 MG/1
TABLET, EXTENDED RELEASE ORAL
Qty: 360 TABLET | Refills: 1 | Status: SHIPPED | OUTPATIENT
Start: 2024-01-22

## 2024-01-22 RX ORDER — ATORVASTATIN CALCIUM 40 MG/1
40 TABLET, FILM COATED ORAL DAILY
Qty: 90 TABLET | Refills: 3 | Status: SHIPPED | OUTPATIENT
Start: 2024-01-22

## 2024-01-22 NOTE — PATIENT INSTRUCTIONS
Plan:  Labs reviewed in epic and discussed with patient.  Current medications reviewed.  Refills given as warranted.  If your chest pain, catching feeling, gets worse, becomes more frequent then let me know and I can prescribe some nitroglycerin for you.  Recommend talking to pcp about topamax and side effects.  No cardiac testing at this time.    Follow up with me in 1 year, or sooner if needed.

## 2024-02-06 ENCOUNTER — OFFICE VISIT (OUTPATIENT)
Dept: PULMONOLOGY | Age: 64
End: 2024-02-06
Payer: COMMERCIAL

## 2024-02-06 ENCOUNTER — HOSPITAL ENCOUNTER (OUTPATIENT)
Dept: MAMMOGRAPHY | Age: 64
Discharge: HOME | End: 2024-02-06
Payer: COMMERCIAL

## 2024-02-06 ENCOUNTER — HOSPITAL ENCOUNTER (OUTPATIENT)
Age: 64
Discharge: HOME | End: 2024-02-06
Payer: COMMERCIAL

## 2024-02-06 ENCOUNTER — TELEPHONE (OUTPATIENT)
Dept: PULMONOLOGY | Age: 64
End: 2024-02-06

## 2024-02-06 ENCOUNTER — OFFICE VISIT (OUTPATIENT)
Dept: INTERNAL MEDICINE CLINIC | Age: 64
End: 2024-02-06

## 2024-02-06 VITALS
HEIGHT: 65 IN | HEART RATE: 99 BPM | OXYGEN SATURATION: 96 % | DIASTOLIC BLOOD PRESSURE: 64 MMHG | RESPIRATION RATE: 14 BRPM | SYSTOLIC BLOOD PRESSURE: 114 MMHG | WEIGHT: 267.4 LBS | BODY MASS INDEX: 44.55 KG/M2

## 2024-02-06 VITALS
HEART RATE: 70 BPM | RESPIRATION RATE: 12 BRPM | DIASTOLIC BLOOD PRESSURE: 80 MMHG | WEIGHT: 266 LBS | BODY MASS INDEX: 44.32 KG/M2 | SYSTOLIC BLOOD PRESSURE: 120 MMHG | HEIGHT: 65 IN

## 2024-02-06 DIAGNOSIS — K76.0 FATTY INFILTRATION OF LIVER: ICD-10-CM

## 2024-02-06 DIAGNOSIS — I10 HYPERTENSION, UNSPECIFIED TYPE: ICD-10-CM

## 2024-02-06 DIAGNOSIS — Z12.39 SCREENING BREAST EXAMINATION: ICD-10-CM

## 2024-02-06 DIAGNOSIS — E66.01 MORBID OBESITY (HCC): ICD-10-CM

## 2024-02-06 DIAGNOSIS — G47.33 SEVERE OBSTRUCTIVE SLEEP APNEA: Primary | ICD-10-CM

## 2024-02-06 DIAGNOSIS — E11.9 TYPE 2 DIABETES MELLITUS WITHOUT COMPLICATION, WITHOUT LONG-TERM CURRENT USE OF INSULIN (HCC): Primary | ICD-10-CM

## 2024-02-06 DIAGNOSIS — I27.82 OTHER CHRONIC PULMONARY EMBOLISM WITHOUT ACUTE COR PULMONALE (HCC): ICD-10-CM

## 2024-02-06 DIAGNOSIS — E11.9 TYPE 2 DIABETES MELLITUS WITHOUT COMPLICATION, WITHOUT LONG-TERM CURRENT USE OF INSULIN (HCC): ICD-10-CM

## 2024-02-06 DIAGNOSIS — E78.5 HYPERLIPIDEMIA ASSOCIATED WITH TYPE 2 DIABETES MELLITUS (HCC): ICD-10-CM

## 2024-02-06 DIAGNOSIS — R06.09 DYSPNEA ON EXERTION: ICD-10-CM

## 2024-02-06 DIAGNOSIS — I10 PRIMARY HYPERTENSION: ICD-10-CM

## 2024-02-06 DIAGNOSIS — E11.69 TYPE 2 DIABETES MELLITUS WITH OTHER SPECIFIED COMPLICATION, WITHOUT LONG-TERM CURRENT USE OF INSULIN (HCC): ICD-10-CM

## 2024-02-06 DIAGNOSIS — G25.0 BENIGN HEAD TREMOR: ICD-10-CM

## 2024-02-06 DIAGNOSIS — E11.69 HYPERLIPIDEMIA ASSOCIATED WITH TYPE 2 DIABETES MELLITUS (HCC): ICD-10-CM

## 2024-02-06 LAB
ALBUMIN SERPL-MCNC: 4.4 G/DL (ref 3.4–5)
ALBUMIN/GLOB SERPL: 1.9 {RATIO} (ref 1.1–2.2)
ALP SERPL-CCNC: 55 U/L (ref 40–129)
ALT SERPL-CCNC: 10 U/L (ref 10–40)
ANION GAP SERPL CALCULATED.3IONS-SCNC: 11 MMOL/L (ref 3–16)
AST SERPL-CCNC: 29 U/L (ref 15–37)
BASOPHILS # BLD: 0 K/UL (ref 0–0.2)
BASOPHILS NFR BLD: 0.7 %
BILIRUB SERPL-MCNC: 0.9 MG/DL (ref 0–1)
BUN SERPL-MCNC: 20 MG/DL (ref 7–20)
CALCIUM SERPL-MCNC: 9 MG/DL (ref 8.3–10.6)
CHLORIDE SERPL-SCNC: 102 MMOL/L (ref 99–110)
CO2 SERPL-SCNC: 26 MMOL/L (ref 21–32)
CREAT SERPL-MCNC: 0.7 MG/DL (ref 0.6–1.2)
DEPRECATED RDW RBC AUTO: 14.4 % (ref 12.4–15.4)
EOSINOPHIL # BLD: 0 K/UL (ref 0–0.6)
EOSINOPHIL NFR BLD: 0.4 %
GFR SERPLBLD CREATININE-BSD FMLA CKD-EPI: >60 ML/MIN/{1.73_M2}
GLUCOSE SERPL-MCNC: 176 MG/DL (ref 70–99)
HCT VFR BLD AUTO: 44.9 % (ref 36–48)
HGB BLD-MCNC: 15.6 G/DL (ref 12–16)
LYMPHOCYTES # BLD: 0.6 K/UL (ref 1–5.1)
LYMPHOCYTES NFR BLD: 13.8 %
MCH RBC QN AUTO: 31.4 PG (ref 26–34)
MCHC RBC AUTO-ENTMCNC: 34.6 G/DL (ref 31–36)
MCV RBC AUTO: 90.5 FL (ref 80–100)
MONOCYTES # BLD: 0.6 K/UL (ref 0–1.3)
MONOCYTES NFR BLD: 13.7 %
NEUTROPHILS # BLD: 2.9 K/UL (ref 1.7–7.7)
NEUTROPHILS NFR BLD: 71.4 %
PLATELET # BLD AUTO: 159 K/UL (ref 135–450)
PMV BLD AUTO: 8.8 FL (ref 5–10.5)
POTASSIUM SERPL-SCNC: 4.3 MMOL/L (ref 3.5–5.1)
PROT SERPL-MCNC: 6.7 G/DL (ref 6.4–8.2)
RBC # BLD AUTO: 4.96 M/UL (ref 4–5.2)
SODIUM SERPL-SCNC: 139 MMOL/L (ref 136–145)
URATE SERPL-MCNC: 4.5 MG/DL (ref 2.6–6)
WBC # BLD AUTO: 4 K/UL (ref 4–11)

## 2024-02-06 PROCEDURE — 99214 OFFICE O/P EST MOD 30 MIN: CPT | Performed by: INTERNAL MEDICINE

## 2024-02-06 PROCEDURE — 80053 COMPREHEN METABOLIC PANEL: CPT

## 2024-02-06 PROCEDURE — 85025 COMPLETE CBC W/AUTO DIFF WBC: CPT

## 2024-02-06 PROCEDURE — 3074F SYST BP LT 130 MM HG: CPT | Performed by: INTERNAL MEDICINE

## 2024-02-06 PROCEDURE — 36415 COLL VENOUS BLD VENIPUNCTURE: CPT

## 2024-02-06 PROCEDURE — 77063 BREAST TOMOSYNTHESIS BI: CPT

## 2024-02-06 PROCEDURE — 3078F DIAST BP <80 MM HG: CPT | Performed by: INTERNAL MEDICINE

## 2024-02-06 PROCEDURE — 83036 HEMOGLOBIN GLYCOSYLATED A1C: CPT

## 2024-02-06 PROCEDURE — 84550 ASSAY OF BLOOD/URIC ACID: CPT

## 2024-02-06 PROCEDURE — 3079F DIAST BP 80-89 MM HG: CPT | Performed by: INTERNAL MEDICINE

## 2024-02-06 ASSESSMENT — PATIENT HEALTH QUESTIONNAIRE - PHQ9
SUM OF ALL RESPONSES TO PHQ QUESTIONS 1-9: 1
SUM OF ALL RESPONSES TO PHQ QUESTIONS 1-9: 1
2. FEELING DOWN, DEPRESSED OR HOPELESS: 1
SUM OF ALL RESPONSES TO PHQ9 QUESTIONS 1 & 2: 1
SUM OF ALL RESPONSES TO PHQ QUESTIONS 1-9: 1
1. LITTLE INTEREST OR PLEASURE IN DOING THINGS: 0
SUM OF ALL RESPONSES TO PHQ QUESTIONS 1-9: 1

## 2024-02-06 ASSESSMENT — SLEEP AND FATIGUE QUESTIONNAIRES
HOW LIKELY ARE YOU TO NOD OFF OR FALL ASLEEP WHILE SITTING INACTIVE IN A PUBLIC PLACE: 1
HOW LIKELY ARE YOU TO NOD OFF OR FALL ASLEEP IN A CAR, WHILE STOPPED FOR A FEW MINUTES IN TRAFFIC: 0
NECK CIRCUMFERENCE (INCHES): 16
HOW LIKELY ARE YOU TO NOD OFF OR FALL ASLEEP WHILE SITTING QUIETLY AFTER LUNCH WITHOUT ALCOHOL: 0
HOW LIKELY ARE YOU TO NOD OFF OR FALL ASLEEP WHILE SITTING AND READING: 3
HOW LIKELY ARE YOU TO NOD OFF OR FALL ASLEEP WHILE SITTING AND TALKING TO SOMEONE: 0
HOW LIKELY ARE YOU TO NOD OFF OR FALL ASLEEP WHILE LYING DOWN TO REST IN THE AFTERNOON WHEN CIRCUMSTANCES PERMIT: 3
HOW LIKELY ARE YOU TO NOD OFF OR FALL ASLEEP WHEN YOU ARE A PASSENGER IN A CAR FOR AN HOUR WITHOUT A BREAK: 1
HOW LIKELY ARE YOU TO NOD OFF OR FALL ASLEEP WHILE WATCHING TV: 1
ESS TOTAL SCORE: 9

## 2024-02-06 ASSESSMENT — ENCOUNTER SYMPTOMS
ABDOMINAL PAIN: 0
RHINORRHEA: 0
WHEEZING: 0
COUGH: 0
NAUSEA: 0
VOMITING: 0
SHORTNESS OF BREATH: 0

## 2024-02-06 NOTE — TELEPHONE ENCOUNTER
Faxed nasal mask order, nasal pillows mask order, full face mask order, CR, and ov notes to [facility] at [phone] via [employer]Fax.

## 2024-02-06 NOTE — PROGRESS NOTES
Subjective:      Patient ID: Pretty Covington is a 63 y.o. female.    HPI    Patient is here for follow up of diabetes, HTN, depression, pulmonary embolism and hyperlipidemia.      Patient's is not checking her sugars. No hypoglycemia.  Patient does not have polydipsia and polyuria. Her last A1C was 6.1. She does not check her sugars.  She is on Cozaar for HTN. Her Bp is at goal.  She is compliant with Eliquis.  She has history of recurrent PE.  Her depression is stable. No new issues.  Her cholesterol is controlled. It is at goal.  She has ELEAZAR. She is on CPAP. She is compliant.  She has benign head tremor. It is about the same.         Review of Systems   Constitutional:  Negative for appetite change and fatigue.   HENT:  Negative for postnasal drip and rhinorrhea.    Respiratory:  Negative for cough, shortness of breath and wheezing.    Cardiovascular:  Negative for chest pain and palpitations.   Gastrointestinal:  Negative for abdominal pain, nausea and vomiting.   Endocrine: Negative for polydipsia, polyphagia and polyuria.   Musculoskeletal:  Negative for joint swelling.   Skin:  Negative for rash.   Neurological:  Negative for light-headedness.   Psychiatric/Behavioral:  Positive for sleep disturbance. The patient is nervous/anxious.          Current Outpatient Medications on File Prior to Visit   Medication Sig Dispense Refill    metFORMIN (GLUCOPHAGE-XR) 500 MG extended release tablet TAKE 2 TABLETS BY MOUTH IN THE  MORNING AND AT BEDTIME 360 tablet 1    atorvastatin (LIPITOR) 40 MG tablet Take 1 tablet by mouth daily 90 tablet 3    topiramate (TOPAMAX) 100 MG tablet TAKE 1 TABLET BY MOUTH AT NIGHT 90 tablet 1    MOUNJARO 7.5 MG/0.5ML SOPN SC injection INJECT THE CONTENTS OF ONE PEN  SUBCUTANEOUSLY WEEKLY AS  DIRECTED 6 mL 3    citalopram (CELEXA) 40 MG tablet TAKE 1 TABLET BY MOUTH DAILY 90 tablet 0    fenofibric acid (TRILIPIX) 135 MG CPDR capsule TAKE 1 CAPSULE BY MOUTH DAILY 90 capsule 1    ASPIRIN LOW DOSE

## 2024-02-06 NOTE — PROGRESS NOTES
murmur or rub. No edema.   GI: Non-tender. Non-distended. No hernia.   Skin: Warm and dry. No nodule on exposed extremities.   Lymph: No cervical LAD. No supraclavicular LAD.   M/S: No cyanosis. No joint deformity. No clubbing.   Neuro: Awake. Alert. Moves all four extremities.   Psych: Oriented x 3. No anxiety.                 DATA reviewed by me:   PFTs 11/08/2022 FVC 3.02(89%) FEV1 2.38(91%) FEV1/FVC 79% TLC 4.87(91%) DLCO 26.93(113%) 6MW 880 F Low O2 93%     Labs 10/6/22   Unremarkable immunocap   Normal TSH     CXR 7/27/22    No acute cardiopulmonary disease       Echo 7/28/22    LV systolic function is normal with Ef estimated at 55%.   No regional wall motion abnormalities.   There is mild concentric left ventricular hypertrophy.   Grade I diastolic dysfunction with normal left ventricular filling pressure.   Inadequate tricuspid regurgitation jet to estimate systolic artery pressure        Split night 1/29/16: AHI 70 and low O2 82%, improved sleep related breathing disorder with CPAP therapy.    Started on AutoCPAP min pressure of 12 cmH2O and max pressure of 16 cmH2O.        CPAP data 09/06-10/05 2022 reviewed by me. Uses  8-9 hrs/night with 100% compliance and AHI of  0.6. Median 14.8 and [address] 17. APAP 14-18    CPAP data 09/06-10/05 2022 reviewed by me. Uses  8-9 hrs/night with 100% compliance and AHI of  0.6. Median 14.8 and [address] 17. APAP 14-18    CPAP data 10/09-11/07 2023 reviewed by me. Uses  8-9 hrs/night with 100% compliance and AHI of  1. Median 14.2 and [address] 15.6. APAP 14-18    CPAP data 12/31-01/29 2024 reviewed by me. Uses  8-9 hrs/night with 100% compliance and AHI of  0.4. CPAP 12    Assessment:      Severe ELEAZAR on CPAP 12 cmH2O. Nasal mask. Optimal compliance and efficacy upon review today.    Cough and dyspnea on exertion.  Morbid obesity and deconditioning are contributing.  Cannot exclude underlying asthma given seasonal allergy and cough.  Much improved with Symbicort. Off now   Seasonal

## 2024-02-06 NOTE — PATIENT INSTRUCTIONS
comfortable. A cooler room along with enough blankets to stay warm is recommended. If your room is too noisy, try a “white noise” machine. If too bright, try black out shades or an eye mask.    Don’t take worries to bed. Leave worries about work, school etc. behind you when you go to bed. Some people find it helpful to assign a “worry period” in the evening or late afternoon to write down your worries and get them out of your system.     CPAP Equipment Cleaning and Disinfecting Schedule  Equipment Cleaning Frequency Instructions  Disinfecting Frequency   Non-Disposable Filters  Weekly Mild soapy water, Rinse, Air Dry Not Required   Disposable Filters Change as needed  2-4 weeks Do Not Wash Not Required   Hose/tubing Daily Mild soapy water, Rinse, Air Dry Once a week   Mask / Nasal Pillows Daily Mild soapy water, Rinse, Air Dry Once a week   Headgear Weekly Hand wash, Mild soapy water, Rinse, Dry  Not Required   Humidifier Daily Empty water daily  Mild soapy water, Rinse well, Air Dry  Once a week   CPAP Unit As Needed Dust with damp cloth,  No detergents or sprays Not Required         Disinfect (per schedule) with 1 part white vinegar and 3 parts water- soak mask and water chamber for 30 minutes every 1-2 weeks, more often if sick.  Allow water/vinegar mixture to run through tubing.  Allow all equipment to air dry.   Drying Hints:   Always hang tubing away from direct sunlight, as this will cause the tubing to become yellow, brittle and crack over a period of time. DO NOT attach the wet tubing to your CPAP unit to blow-dry it. The moisture from the tubing can drain back into your machine. Moisture in your unit can cause sudden pressure increases or short circuits  DO's and DON'Ts:  - Don't use alcohol-based products to clean your mask, because it can cause the materials to become hard and brittle.   - Don't put headgear in the washer or dryer  - Don't use any caustic or household cleaning solutions such as bleach on

## 2024-02-07 LAB
EST. AVERAGE GLUCOSE BLD GHB EST-MCNC: 134.1 MG/DL
HBA1C MFR BLD: 6.3 %

## 2024-02-19 RX ORDER — CITALOPRAM 40 MG/1
TABLET ORAL
Qty: 90 TABLET | Refills: 1 | Status: SHIPPED | OUTPATIENT
Start: 2024-02-19

## 2024-03-18 RX ORDER — EMPAGLIFLOZIN 25 MG/1
TABLET, FILM COATED ORAL
Qty: 90 TABLET | Refills: 0 | Status: SHIPPED | OUTPATIENT
Start: 2024-03-18

## 2024-03-18 RX ORDER — FENOFIBRIC ACID 135 MG/1
CAPSULE, DELAYED RELEASE ORAL
Qty: 90 CAPSULE | Refills: 0 | Status: SHIPPED | OUTPATIENT
Start: 2024-03-18

## 2024-04-10 ENCOUNTER — TELEPHONE (OUTPATIENT)
Dept: PULMONOLOGY | Age: 64
End: 2024-04-10

## 2024-04-10 DIAGNOSIS — G47.33 SEVERE OBSTRUCTIVE SLEEP APNEA: Primary | ICD-10-CM

## 2024-04-10 NOTE — TELEPHONE ENCOUNTER
Pt stated that she is having a hard time staying awake at work Pt stated she is busy at work and this does not happen everyday. Pt is wondering what could be causing this Pt is wondering if you could check her CR to see if her machine is malfunctioning. Pt stated she seems to be sleeping ok at night. She is looking for advise. Please advise

## 2024-04-12 NOTE — TELEPHONE ENCOUNTER
Pt returned call and was informed with verbal understanding.  Orders sent to sleep center to call pt to schedule.

## 2024-04-15 NOTE — TELEPHONE ENCOUNTER
Jacqueline with sleep center is asking what the starting pressure for Titration with MSLT should be?  Pt is currently on CPAP 12.

## 2024-04-17 ENCOUNTER — HOSPITAL ENCOUNTER (OUTPATIENT)
Age: 64
Discharge: HOME | End: 2024-04-17
Payer: COMMERCIAL

## 2024-04-17 LAB
INR PPP: 1.21 (ref 0.85–1.15)
PROTHROMBIN TIME: 15.5 SEC (ref 11.9–14.9)

## 2024-04-17 PROCEDURE — 85025 COMPLETE CBC W/AUTO DIFF WBC: CPT

## 2024-04-17 PROCEDURE — 80053 COMPREHEN METABOLIC PANEL: CPT

## 2024-04-17 PROCEDURE — 85610 PROTHROMBIN TIME: CPT

## 2024-04-17 PROCEDURE — 36415 COLL VENOUS BLD VENIPUNCTURE: CPT

## 2024-04-17 PROCEDURE — 83690 ASSAY OF LIPASE: CPT

## 2024-04-18 ENCOUNTER — HOSPITAL ENCOUNTER (OUTPATIENT)
Age: 64
Setting detail: SPECIMEN
Discharge: HOME | End: 2024-04-18
Payer: COMMERCIAL

## 2024-04-18 LAB
ALBUMIN SERPL-MCNC: 4.6 G/DL (ref 3.4–5)
ALBUMIN/GLOB SERPL: 1.6 {RATIO} (ref 1.1–2.2)
ALP SERPL-CCNC: 67 U/L (ref 40–129)
ALT SERPL-CCNC: 12 U/L (ref 10–40)
ANION GAP SERPL CALCULATED.3IONS-SCNC: 16 MMOL/L (ref 3–16)
AST SERPL-CCNC: 35 U/L (ref 15–37)
BASOPHILS # BLD: 0.1 K/UL (ref 0–0.2)
BASOPHILS NFR BLD: 0.7 %
BILIRUB SERPL-MCNC: 0.7 MG/DL (ref 0–1)
BUN SERPL-MCNC: 24 MG/DL (ref 7–20)
C DIFF TOX A+B STL QL IA: NORMAL
CALCIUM SERPL-MCNC: 10 MG/DL (ref 8.3–10.6)
CHLORIDE SERPL-SCNC: 101 MMOL/L (ref 99–110)
CO2 SERPL-SCNC: 18 MMOL/L (ref 21–32)
CREAT SERPL-MCNC: 0.9 MG/DL (ref 0.6–1.2)
DEPRECATED RDW RBC AUTO: 14.4 % (ref 12.4–15.4)
EOSINOPHIL # BLD: 0.3 K/UL (ref 0–0.6)
EOSINOPHIL NFR BLD: 3.7 %
GFR SERPLBLD CREATININE-BSD FMLA CKD-EPI: 72 ML/MIN/{1.73_M2}
GLUCOSE SERPL-MCNC: 149 MG/DL (ref 70–99)
HCT VFR BLD AUTO: 49.2 % (ref 36–48)
HGB BLD-MCNC: 16.8 G/DL (ref 12–16)
LIPASE SERPL-CCNC: 33 U/L (ref 13–60)
LYMPHOCYTES # BLD: 1.4 K/UL (ref 1–5.1)
LYMPHOCYTES NFR BLD: 15.1 %
MCH RBC QN AUTO: 30.9 PG (ref 26–34)
MCHC RBC AUTO-ENTMCNC: 34.2 G/DL (ref 31–36)
MCV RBC AUTO: 90.4 FL (ref 80–100)
MONOCYTES # BLD: 0.8 K/UL (ref 0–1.3)
MONOCYTES NFR BLD: 9.4 %
NEUTROPHILS # BLD: 6.4 K/UL (ref 1.7–7.7)
NEUTROPHILS NFR BLD: 71.1 %
PLATELET # BLD AUTO: 196 K/UL (ref 135–450)
PMV BLD AUTO: 9.2 FL (ref 5–10.5)
POTASSIUM SERPL-SCNC: 3.8 MMOL/L (ref 3.5–5.1)
PROT SERPL-MCNC: 7.5 G/DL (ref 6.4–8.2)
RBC # BLD AUTO: 5.45 M/UL (ref 4–5.2)
SODIUM SERPL-SCNC: 135 MMOL/L (ref 136–145)
WBC # BLD AUTO: 9 K/UL (ref 4–11)

## 2024-04-18 PROCEDURE — 83993 ASSAY FOR CALPROTECTIN FECAL: CPT

## 2024-04-18 PROCEDURE — 87336 ENTAMOEB HIST DISPR AG IA: CPT

## 2024-04-18 PROCEDURE — 87324 CLOSTRIDIUM AG IA: CPT

## 2024-04-18 PROCEDURE — 82705 FATS/LIPIDS FECES QUAL: CPT

## 2024-04-18 PROCEDURE — 82653 EL-1 FECAL QUANTITATIVE: CPT

## 2024-04-18 PROCEDURE — 87449 NOS EACH ORGANISM AG IA: CPT

## 2024-04-18 PROCEDURE — 87506 IADNA-DNA/RNA PROBE TQ 6-11: CPT

## 2024-04-18 PROCEDURE — 87328 CRYPTOSPORIDIUM AG IA: CPT

## 2024-04-19 LAB — GI PATHOGENS PNL STL NAA+PROBE: NORMAL

## 2024-04-20 LAB
CRYPTOSP AG STL QL IA: NORMAL
E HISTOLYT AG STL QL IA: NORMAL
G LAMBLIA AG STL QL IA: NORMAL

## 2024-04-21 LAB
FAT STL QL: NORMAL
NEUTRAL FAT STL QL: NORMAL

## 2024-04-23 LAB
CALPROTECTIN STL-MCNT: 80 UG/G
ELASTASE PANC STL-MCNT: >800 UG/G

## 2024-05-13 ENCOUNTER — HOSPITAL ENCOUNTER (OUTPATIENT)
Dept: SLEEP CENTER | Age: 64
Discharge: HOME | End: 2024-05-15
Payer: COMMERCIAL

## 2024-05-13 DIAGNOSIS — G47.33 SEVERE OBSTRUCTIVE SLEEP APNEA: ICD-10-CM

## 2024-05-13 PROCEDURE — 95811 POLYSOM 6/>YRS CPAP 4/> PARM: CPT

## 2024-05-14 PROCEDURE — 95811 POLYSOM 6/>YRS CPAP 4/> PARM: CPT | Performed by: INTERNAL MEDICINE

## 2024-05-16 RX ORDER — FENOFIBRIC ACID 135 MG/1
CAPSULE, DELAYED RELEASE ORAL
Qty: 90 CAPSULE | Refills: 0 | Status: SHIPPED | OUTPATIENT
Start: 2024-05-16

## 2024-05-16 RX ORDER — EMPAGLIFLOZIN 25 MG/1
TABLET, FILM COATED ORAL
Qty: 90 TABLET | Refills: 0 | Status: SHIPPED | OUTPATIENT
Start: 2024-05-16

## 2024-06-09 ENCOUNTER — APPOINTMENT (OUTPATIENT)
Dept: GENERAL RADIOLOGY | Age: 64
End: 2024-06-09
Attending: STUDENT IN AN ORGANIZED HEALTH CARE EDUCATION/TRAINING PROGRAM
Payer: COMMERCIAL

## 2024-06-09 ENCOUNTER — APPOINTMENT (OUTPATIENT)
Dept: CT IMAGING | Age: 64
End: 2024-06-09
Attending: STUDENT IN AN ORGANIZED HEALTH CARE EDUCATION/TRAINING PROGRAM
Payer: COMMERCIAL

## 2024-06-09 ENCOUNTER — HOSPITAL ENCOUNTER (EMERGENCY)
Age: 64
Discharge: ANOTHER HOSPITAL | End: 2024-06-10
Attending: STUDENT IN AN ORGANIZED HEALTH CARE EDUCATION/TRAINING PROGRAM
Payer: COMMERCIAL

## 2024-06-09 DIAGNOSIS — S32.018A OTHER CLOSED FRACTURE OF FIRST LUMBAR VERTEBRA, INITIAL ENCOUNTER (HCC): ICD-10-CM
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
## 2024-06-09 PROCEDURE — 72128 CT CHEST SPINE W/O DYE: CPT

## 2024-06-09 PROCEDURE — 72125 CT NECK SPINE W/O DYE: CPT

## 2024-06-09 PROCEDURE — 70450 CT HEAD/BRAIN W/O DYE: CPT

## 2024-06-09 PROCEDURE — 72170 X-RAY EXAM OF PELVIS: CPT

## 2024-06-09 PROCEDURE — 99285 EMERGENCY DEPT VISIT HI MDM: CPT

## 2024-06-09 PROCEDURE — 6370000000 HC RX 637 (ALT 250 FOR IP): Performed by: STUDENT IN AN ORGANIZED HEALTH CARE EDUCATION/TRAINING PROGRAM

## 2024-06-09 PROCEDURE — 72131 CT LUMBAR SPINE W/O DYE: CPT

## 2024-06-09 RX ORDER — SODIUM CHLORIDE, SODIUM LACTATE, POTASSIUM CHLORIDE, AND CALCIUM CHLORIDE .6; .31; .03; .02 G/100ML; G/100ML; G/100ML; G/100ML
500 INJECTION, SOLUTION INTRAVENOUS ONCE
Status: COMPLETED | OUTPATIENT
Start: 2024-06-09 | End: 2024-06-10

## 2024-06-09 RX ORDER — OXYCODONE HYDROCHLORIDE 5 MG/1
10 TABLET ORAL ONCE
Status: COMPLETED | OUTPATIENT
Start: 2024-06-09 | End: 2024-06-09

## 2024-06-09 RX ORDER — LIDOCAINE 4 G/G
1 PATCH TOPICAL DAILY
Status: DISCONTINUED | OUTPATIENT
Start: 2024-06-10 | End: 2024-06-09

## 2024-06-09 RX ORDER — LIDOCAINE 4 G/G
1 PATCH TOPICAL ONCE
Status: DISCONTINUED | OUTPATIENT
Start: 2024-06-09 | End: 2024-06-10 | Stop reason: HOSPADM

## 2024-06-09 RX ADMIN — OXYCODONE HYDROCHLORIDE 10 MG: 5 TABLET ORAL at 22:27

## 2024-06-09 ASSESSMENT — LIFESTYLE VARIABLES
HOW OFTEN DO YOU HAVE A DRINK CONTAINING ALCOHOL: NEVER
HOW MANY STANDARD DRINKS CONTAINING ALCOHOL DO YOU HAVE ON A TYPICAL DAY: PATIENT DOES NOT DRINK

## 2024-06-09 ASSESSMENT — PAIN - FUNCTIONAL ASSESSMENT: PAIN_FUNCTIONAL_ASSESSMENT: 0-10

## 2024-06-09 ASSESSMENT — PAIN SCALES - GENERAL: PAINLEVEL_OUTOF10: 8

## 2024-06-09 ASSESSMENT — PAIN DESCRIPTION - LOCATION: LOCATION: BACK

## 2024-06-10 ENCOUNTER — APPOINTMENT (OUTPATIENT)
Dept: MRI IMAGING | Age: 64
End: 2024-06-10
Attending: FAMILY MEDICINE
Payer: COMMERCIAL

## 2024-06-10 ENCOUNTER — HOSPITAL ENCOUNTER (INPATIENT)
Age: 64
LOS: 10 days | Discharge: NURSING, REHAB OR LONG-TERM CARE | End: 2024-06-20
Attending: FAMILY MEDICINE | Admitting: FAMILY MEDICINE
Payer: COMMERCIAL

## 2024-06-10 VITALS
BODY MASS INDEX: 42.74 KG/M2 | RESPIRATION RATE: 18 BRPM | TEMPERATURE: 97.9 F | DIASTOLIC BLOOD PRESSURE: 64 MMHG | SYSTOLIC BLOOD PRESSURE: 128 MMHG | HEART RATE: 84 BPM | WEIGHT: 256.5 LBS | HEIGHT: 65 IN | OXYGEN SATURATION: 94 %

## 2024-06-10 DIAGNOSIS — S32.009S: ICD-10-CM

## 2024-06-10 DIAGNOSIS — S32.009A CLOSED FRACTURE OF LUMBAR VERTEBRAL BODY (HCC): Primary | ICD-10-CM

## 2024-06-10 LAB
ALBUMIN SERPL-MCNC: 3.9 G/DL (ref 3.4–5)
ALBUMIN/GLOB SERPL: 1.6 {RATIO} (ref 1.1–2.2)
ALP SERPL-CCNC: 59 U/L (ref 40–129)
ALT SERPL-CCNC: 9 U/L (ref 10–40)
ANION GAP SERPL CALCULATED.3IONS-SCNC: 12 MMOL/L (ref 3–16)
ANION GAP SERPL CALCULATED.3IONS-SCNC: 17 MMOL/L (ref 3–16)
AST SERPL-CCNC: 20 U/L (ref 15–37)
BASOPHILS # BLD: 0 K/UL (ref 0–0.2)
BASOPHILS NFR BLD: 0.2 %
BILIRUB SERPL-MCNC: 0.7 MG/DL (ref 0–1)
BUN SERPL-MCNC: 16 MG/DL (ref 7–20)
BUN SERPL-MCNC: 18 MG/DL (ref 7–20)
CALCIUM SERPL-MCNC: 8.9 MG/DL (ref 8.3–10.6)
CALCIUM SERPL-MCNC: 9 MG/DL (ref 8.3–10.6)
CHLORIDE SERPL-SCNC: 102 MMOL/L (ref 99–110)
CHLORIDE SERPL-SCNC: 104 MMOL/L (ref 99–110)
CO2 SERPL-SCNC: 20 MMOL/L (ref 21–32)
CO2 SERPL-SCNC: 24 MMOL/L (ref 21–32)
CREAT SERPL-MCNC: <0.5 MG/DL (ref 0.6–1.2)
CREAT SERPL-MCNC: <0.5 MG/DL (ref 0.6–1.2)
DEPRECATED RDW RBC AUTO: 14 % (ref 12.4–15.4)
EOSINOPHIL # BLD: 0 K/UL (ref 0–0.6)
EOSINOPHIL NFR BLD: 0.3 %
GFR SERPLBLD CREATININE-BSD FMLA CKD-EPI: >90 ML/MIN/{1.73_M2}
GFR SERPLBLD CREATININE-BSD FMLA CKD-EPI: >90 ML/MIN/{1.73_M2}
GLUCOSE BLD-MCNC: 138 MG/DL (ref 70–99)
GLUCOSE BLD-MCNC: 153 MG/DL (ref 70–99)
GLUCOSE BLD-MCNC: 172 MG/DL (ref 70–99)
GLUCOSE BLD-MCNC: 196 MG/DL (ref 70–99)
GLUCOSE SERPL-MCNC: 136 MG/DL (ref 70–99)
GLUCOSE SERPL-MCNC: 195 MG/DL (ref 70–99)
HCT VFR BLD AUTO: 43.4 % (ref 36–48)
HGB BLD-MCNC: 14.9 G/DL (ref 12–16)
LYMPHOCYTES # BLD: 0.8 K/UL (ref 1–5.1)
LYMPHOCYTES NFR BLD: 7.1 %
MCH RBC QN AUTO: 30.7 PG (ref 26–34)
MCHC RBC AUTO-ENTMCNC: 34.3 G/DL (ref 31–36)
MCV RBC AUTO: 89.5 FL (ref 80–100)
MONOCYTES # BLD: 0.6 K/UL (ref 0–1.3)
MONOCYTES NFR BLD: 5.5 %
NEUTROPHILS # BLD: 10.1 K/UL (ref 1.7–7.7)
NEUTROPHILS NFR BLD: 86.9 %
PERFORMED ON: ABNORMAL
PLATELET # BLD AUTO: 178 K/UL (ref 135–450)
PMV BLD AUTO: 8.1 FL (ref 5–10.5)
POTASSIUM SERPL-SCNC: 3.8 MMOL/L (ref 3.5–5.1)
POTASSIUM SERPL-SCNC: 4 MMOL/L (ref 3.5–5.1)
PROT SERPL-MCNC: 6.3 G/DL (ref 6.4–8.2)
RBC # BLD AUTO: 4.85 M/UL (ref 4–5.2)
SODIUM SERPL-SCNC: 139 MMOL/L (ref 136–145)
SODIUM SERPL-SCNC: 140 MMOL/L (ref 136–145)
TSH SERPL DL<=0.005 MIU/L-ACNC: 2.27 UIU/ML (ref 0.27–4.2)
WBC # BLD AUTO: 11.6 K/UL (ref 4–11)

## 2024-06-10 PROCEDURE — 96376 TX/PRO/DX INJ SAME DRUG ADON: CPT

## 2024-06-10 PROCEDURE — 36415 COLL VENOUS BLD VENIPUNCTURE: CPT

## 2024-06-10 PROCEDURE — 80053 COMPREHEN METABOLIC PANEL: CPT

## 2024-06-10 PROCEDURE — 99223 1ST HOSP IP/OBS HIGH 75: CPT | Performed by: FAMILY MEDICINE

## 2024-06-10 PROCEDURE — 6370000000 HC RX 637 (ALT 250 FOR IP): Performed by: FAMILY MEDICINE

## 2024-06-10 PROCEDURE — 85025 COMPLETE CBC W/AUTO DIFF WBC: CPT

## 2024-06-10 PROCEDURE — 6360000002 HC RX W HCPCS

## 2024-06-10 PROCEDURE — 6360000002 HC RX W HCPCS: Performed by: FAMILY MEDICINE

## 2024-06-10 PROCEDURE — 96374 THER/PROPH/DIAG INJ IV PUSH: CPT

## 2024-06-10 PROCEDURE — 6370000000 HC RX 637 (ALT 250 FOR IP): Performed by: HOSPITALIST

## 2024-06-10 PROCEDURE — 80048 BASIC METABOLIC PNL TOTAL CA: CPT

## 2024-06-10 PROCEDURE — 94761 N-INVAS EAR/PLS OXIMETRY MLT: CPT

## 2024-06-10 PROCEDURE — 84443 ASSAY THYROID STIM HORMONE: CPT

## 2024-06-10 PROCEDURE — 2580000003 HC RX 258: Performed by: STUDENT IN AN ORGANIZED HEALTH CARE EDUCATION/TRAINING PROGRAM

## 2024-06-10 PROCEDURE — 1200000000 HC SEMI PRIVATE

## 2024-06-10 PROCEDURE — 72148 MRI LUMBAR SPINE W/O DYE: CPT

## 2024-06-10 PROCEDURE — 2580000003 HC RX 258: Performed by: FAMILY MEDICINE

## 2024-06-10 PROCEDURE — 6360000002 HC RX W HCPCS: Performed by: NURSE PRACTITIONER

## 2024-06-10 PROCEDURE — 72146 MRI CHEST SPINE W/O DYE: CPT

## 2024-06-10 PROCEDURE — 6360000002 HC RX W HCPCS: Performed by: STUDENT IN AN ORGANIZED HEALTH CARE EDUCATION/TRAINING PROGRAM

## 2024-06-10 PROCEDURE — 6370000000 HC RX 637 (ALT 250 FOR IP): Performed by: NURSE PRACTITIONER

## 2024-06-10 PROCEDURE — 6360000002 HC RX W HCPCS: Performed by: HOSPITALIST

## 2024-06-10 RX ORDER — HYDROMORPHONE HYDROCHLORIDE 1 MG/ML
1 INJECTION, SOLUTION INTRAMUSCULAR; INTRAVENOUS; SUBCUTANEOUS
Status: DISCONTINUED | OUTPATIENT
Start: 2024-06-10 | End: 2024-06-14

## 2024-06-10 RX ORDER — PROMETHAZINE HYDROCHLORIDE 12.5 MG/1
12.5 TABLET ORAL EVERY 6 HOURS PRN
Status: DISCONTINUED | OUTPATIENT
Start: 2024-06-10 | End: 2024-06-20 | Stop reason: HOSPADM

## 2024-06-10 RX ORDER — GLUCAGON 1 MG/ML
1 KIT INJECTION PRN
Status: DISCONTINUED | OUTPATIENT
Start: 2024-06-10 | End: 2024-06-16

## 2024-06-10 RX ORDER — LORAZEPAM 2 MG/ML
0.5 INJECTION INTRAMUSCULAR
Status: DISCONTINUED | OUTPATIENT
Start: 2024-06-10 | End: 2024-06-11

## 2024-06-10 RX ORDER — ACETAMINOPHEN 325 MG/1
650 TABLET ORAL EVERY 6 HOURS PRN
Status: DISCONTINUED | OUTPATIENT
Start: 2024-06-10 | End: 2024-06-16

## 2024-06-10 RX ORDER — DEXTROSE MONOHYDRATE 100 MG/ML
INJECTION, SOLUTION INTRAVENOUS CONTINUOUS PRN
Status: DISCONTINUED | OUTPATIENT
Start: 2024-06-10 | End: 2024-06-20 | Stop reason: HOSPADM

## 2024-06-10 RX ORDER — DIAZEPAM 5 MG/1
5 TABLET ORAL EVERY 6 HOURS PRN
Status: DISCONTINUED | OUTPATIENT
Start: 2024-06-10 | End: 2024-06-14

## 2024-06-10 RX ORDER — FERROUS SULFATE 325(65) MG
325 TABLET ORAL
Status: DISCONTINUED | OUTPATIENT
Start: 2024-06-10 | End: 2024-06-10

## 2024-06-10 RX ORDER — ENOXAPARIN SODIUM 100 MG/ML
40 INJECTION SUBCUTANEOUS DAILY
Status: DISCONTINUED | OUTPATIENT
Start: 2024-06-10 | End: 2024-06-10

## 2024-06-10 RX ORDER — POTASSIUM CHLORIDE 20 MEQ/1
40 TABLET, EXTENDED RELEASE ORAL PRN
Status: DISCONTINUED | OUTPATIENT
Start: 2024-06-10 | End: 2024-06-20 | Stop reason: HOSPADM

## 2024-06-10 RX ORDER — OXYCODONE HYDROCHLORIDE 5 MG/1
5 TABLET ORAL EVERY 4 HOURS PRN
Status: DISCONTINUED | OUTPATIENT
Start: 2024-06-10 | End: 2024-06-14

## 2024-06-10 RX ORDER — METHOCARBAMOL 750 MG/1
750 TABLET, FILM COATED ORAL 4 TIMES DAILY
Status: DISCONTINUED | OUTPATIENT
Start: 2024-06-11 | End: 2024-06-14

## 2024-06-10 RX ORDER — M-VIT,TX,IRON,MINS/CALC/FOLIC 27MG-0.4MG
1 TABLET ORAL DAILY
Status: DISCONTINUED | OUTPATIENT
Start: 2024-06-10 | End: 2024-06-20 | Stop reason: HOSPADM

## 2024-06-10 RX ORDER — ONDANSETRON 2 MG/ML
4 INJECTION INTRAMUSCULAR; INTRAVENOUS EVERY 6 HOURS PRN
Status: DISCONTINUED | OUTPATIENT
Start: 2024-06-10 | End: 2024-06-20 | Stop reason: HOSPADM

## 2024-06-10 RX ORDER — CITALOPRAM 40 MG/1
40 TABLET ORAL NIGHTLY
Status: DISCONTINUED | OUTPATIENT
Start: 2024-06-10 | End: 2024-06-20 | Stop reason: HOSPADM

## 2024-06-10 RX ORDER — POLYETHYLENE GLYCOL 3350 17 G/17G
17 POWDER, FOR SOLUTION ORAL DAILY PRN
Status: DISCONTINUED | OUTPATIENT
Start: 2024-06-10 | End: 2024-06-14

## 2024-06-10 RX ORDER — INSULIN LISPRO 100 [IU]/ML
0-4 INJECTION, SOLUTION INTRAVENOUS; SUBCUTANEOUS
Status: DISCONTINUED | OUTPATIENT
Start: 2024-06-10 | End: 2024-06-16

## 2024-06-10 RX ORDER — ACETAMINOPHEN 650 MG/1
650 SUPPOSITORY RECTAL EVERY 6 HOURS PRN
Status: DISCONTINUED | OUTPATIENT
Start: 2024-06-10 | End: 2024-06-16

## 2024-06-10 RX ORDER — SODIUM CHLORIDE 0.9 % (FLUSH) 0.9 %
5-40 SYRINGE (ML) INJECTION PRN
Status: DISCONTINUED | OUTPATIENT
Start: 2024-06-10 | End: 2024-06-20 | Stop reason: HOSPADM

## 2024-06-10 RX ORDER — METHOCARBAMOL 100 MG/ML
1000 INJECTION, SOLUTION INTRAMUSCULAR; INTRAVENOUS EVERY 8 HOURS
Status: COMPLETED | OUTPATIENT
Start: 2024-06-10 | End: 2024-06-11

## 2024-06-10 RX ORDER — ACETAMINOPHEN 325 MG/1
650 TABLET ORAL 4 TIMES DAILY
Status: DISCONTINUED | OUTPATIENT
Start: 2024-06-10 | End: 2024-06-16

## 2024-06-10 RX ORDER — ENOXAPARIN SODIUM 100 MG/ML
30 INJECTION SUBCUTANEOUS 2 TIMES DAILY
Status: DISCONTINUED | OUTPATIENT
Start: 2024-06-11 | End: 2024-06-20 | Stop reason: HOSPADM

## 2024-06-10 RX ORDER — SODIUM CHLORIDE 9 MG/ML
INJECTION, SOLUTION INTRAVENOUS CONTINUOUS
Status: DISCONTINUED | OUTPATIENT
Start: 2024-06-10 | End: 2024-06-10

## 2024-06-10 RX ORDER — ATORVASTATIN CALCIUM 40 MG/1
40 TABLET, FILM COATED ORAL DAILY
Status: DISCONTINUED | OUTPATIENT
Start: 2024-06-10 | End: 2024-06-20 | Stop reason: HOSPADM

## 2024-06-10 RX ORDER — MAGNESIUM SULFATE IN WATER 40 MG/ML
2000 INJECTION, SOLUTION INTRAVENOUS PRN
Status: DISCONTINUED | OUTPATIENT
Start: 2024-06-10 | End: 2024-06-20 | Stop reason: HOSPADM

## 2024-06-10 RX ORDER — PANTOPRAZOLE SODIUM 20 MG/1
20 TABLET, DELAYED RELEASE ORAL DAILY
Status: DISCONTINUED | OUTPATIENT
Start: 2024-06-10 | End: 2024-06-20 | Stop reason: HOSPADM

## 2024-06-10 RX ORDER — POTASSIUM CHLORIDE 7.45 MG/ML
10 INJECTION INTRAVENOUS PRN
Status: DISCONTINUED | OUTPATIENT
Start: 2024-06-10 | End: 2024-06-20 | Stop reason: HOSPADM

## 2024-06-10 RX ORDER — METHOCARBAMOL 750 MG/1
750 TABLET, FILM COATED ORAL EVERY 6 HOURS SCHEDULED
Status: DISCONTINUED | OUTPATIENT
Start: 2024-06-11 | End: 2024-06-10 | Stop reason: CLARIF

## 2024-06-10 RX ORDER — INSULIN LISPRO 100 [IU]/ML
0-4 INJECTION, SOLUTION INTRAVENOUS; SUBCUTANEOUS NIGHTLY
Status: DISCONTINUED | OUTPATIENT
Start: 2024-06-10 | End: 2024-06-16

## 2024-06-10 RX ORDER — SODIUM CHLORIDE 0.9 % (FLUSH) 0.9 %
5-40 SYRINGE (ML) INJECTION EVERY 12 HOURS SCHEDULED
Status: DISCONTINUED | OUTPATIENT
Start: 2024-06-10 | End: 2024-06-20 | Stop reason: HOSPADM

## 2024-06-10 RX ORDER — SODIUM CHLORIDE 9 MG/ML
INJECTION, SOLUTION INTRAVENOUS PRN
Status: DISCONTINUED | OUTPATIENT
Start: 2024-06-10 | End: 2024-06-20 | Stop reason: HOSPADM

## 2024-06-10 RX ADMIN — HYDROMORPHONE HYDROCHLORIDE 1 MG: 1 INJECTION, SOLUTION INTRAMUSCULAR; INTRAVENOUS; SUBCUTANEOUS at 02:59

## 2024-06-10 RX ADMIN — ENOXAPARIN SODIUM 40 MG: 100 INJECTION SUBCUTANEOUS at 08:58

## 2024-06-10 RX ADMIN — METHOCARBAMOL 1000 MG: 100 INJECTION INTRAMUSCULAR; INTRAVENOUS at 09:06

## 2024-06-10 RX ADMIN — HYDROMORPHONE HYDROCHLORIDE 1 MG: 1 INJECTION, SOLUTION INTRAMUSCULAR; INTRAVENOUS; SUBCUTANEOUS at 17:51

## 2024-06-10 RX ADMIN — CITALOPRAM 40 MG: 40 TABLET, FILM COATED ORAL at 21:17

## 2024-06-10 RX ADMIN — HYDROMORPHONE HYDROCHLORIDE 1 MG: 1 INJECTION, SOLUTION INTRAMUSCULAR; INTRAVENOUS; SUBCUTANEOUS at 00:26

## 2024-06-10 RX ADMIN — DIAZEPAM 5 MG: 5 TABLET ORAL at 17:52

## 2024-06-10 RX ADMIN — ACETAMINOPHEN 650 MG: 325 TABLET ORAL at 14:00

## 2024-06-10 RX ADMIN — ATORVASTATIN CALCIUM 40 MG: 40 TABLET, FILM COATED ORAL at 08:58

## 2024-06-10 RX ADMIN — OXYCODONE HYDROCHLORIDE 5 MG: 5 TABLET ORAL at 21:27

## 2024-06-10 RX ADMIN — DIAZEPAM 5 MG: 5 TABLET ORAL at 09:58

## 2024-06-10 RX ADMIN — SODIUM CHLORIDE, PRESERVATIVE FREE 10 ML: 5 INJECTION INTRAVENOUS at 09:11

## 2024-06-10 RX ADMIN — SODIUM CHLORIDE: 9 INJECTION, SOLUTION INTRAVENOUS at 06:34

## 2024-06-10 RX ADMIN — ACETAMINOPHEN 650 MG: 325 TABLET ORAL at 08:58

## 2024-06-10 RX ADMIN — Medication 1 TABLET: at 08:58

## 2024-06-10 RX ADMIN — METHOCARBAMOL 1000 MG: 100 INJECTION INTRAMUSCULAR; INTRAVENOUS at 17:53

## 2024-06-10 RX ADMIN — HYDROMORPHONE HYDROCHLORIDE 1 MG: 1 INJECTION, SOLUTION INTRAMUSCULAR; INTRAVENOUS; SUBCUTANEOUS at 14:00

## 2024-06-10 RX ADMIN — LORAZEPAM 0.5 MG: 2 INJECTION INTRAMUSCULAR; INTRAVENOUS at 10:09

## 2024-06-10 RX ADMIN — HYDROMORPHONE HYDROCHLORIDE 1 MG: 1 INJECTION, SOLUTION INTRAMUSCULAR; INTRAVENOUS; SUBCUTANEOUS at 06:36

## 2024-06-10 RX ADMIN — ACETAMINOPHEN 650 MG: 325 TABLET ORAL at 17:52

## 2024-06-10 RX ADMIN — HYDROMORPHONE HYDROCHLORIDE 1 MG: 1 INJECTION, SOLUTION INTRAMUSCULAR; INTRAVENOUS; SUBCUTANEOUS at 09:58

## 2024-06-10 RX ADMIN — PANTOPRAZOLE SODIUM 20 MG: 20 TABLET, DELAYED RELEASE ORAL at 08:58

## 2024-06-10 RX ADMIN — Medication 1 MG: at 02:59

## 2024-06-10 RX ADMIN — ACETAMINOPHEN 650 MG: 325 TABLET ORAL at 21:17

## 2024-06-10 RX ADMIN — SODIUM CHLORIDE, PRESERVATIVE FREE 10 ML: 5 INJECTION INTRAVENOUS at 21:18

## 2024-06-10 RX ADMIN — HYDROMORPHONE HYDROCHLORIDE 1 MG: 1 INJECTION, SOLUTION INTRAMUSCULAR; INTRAVENOUS; SUBCUTANEOUS at 23:54

## 2024-06-10 RX ADMIN — SODIUM CHLORIDE, POTASSIUM CHLORIDE, SODIUM LACTATE AND CALCIUM CHLORIDE 500 ML: 600; 310; 30; 20 INJECTION, SOLUTION INTRAVENOUS at 00:26

## 2024-06-10 ASSESSMENT — PAIN DESCRIPTION - PAIN TYPE
TYPE: ACUTE PAIN

## 2024-06-10 ASSESSMENT — PAIN DESCRIPTION - FREQUENCY
FREQUENCY: CONTINUOUS
FREQUENCY: INTERMITTENT
FREQUENCY: CONTINUOUS
FREQUENCY: CONTINUOUS

## 2024-06-10 ASSESSMENT — PAIN SCALES - GENERAL
PAINLEVEL_OUTOF10: 7
PAINLEVEL_OUTOF10: 8
PAINLEVEL_OUTOF10: 10
PAINLEVEL_OUTOF10: 6
PAINLEVEL_OUTOF10: 7
PAINLEVEL_OUTOF10: 7
PAINLEVEL_OUTOF10: 4
PAINLEVEL_OUTOF10: 5
PAINLEVEL_OUTOF10: 5
PAINLEVEL_OUTOF10: 9
PAINLEVEL_OUTOF10: 9
PAINLEVEL_OUTOF10: 5

## 2024-06-10 ASSESSMENT — PAIN DESCRIPTION - LOCATION
LOCATION: BACK

## 2024-06-10 ASSESSMENT — PAIN DESCRIPTION - ONSET
ONSET: ON-GOING

## 2024-06-10 ASSESSMENT — PAIN - FUNCTIONAL ASSESSMENT
PAIN_FUNCTIONAL_ASSESSMENT: PREVENTS OR INTERFERES SOME ACTIVE ACTIVITIES AND ADLS
PAIN_FUNCTIONAL_ASSESSMENT: PREVENTS OR INTERFERES WITH MANY ACTIVE NOT PASSIVE ACTIVITIES
PAIN_FUNCTIONAL_ASSESSMENT: PREVENTS OR INTERFERES SOME ACTIVE ACTIVITIES AND ADLS
PAIN_FUNCTIONAL_ASSESSMENT: PREVENTS OR INTERFERES SOME ACTIVE ACTIVITIES AND ADLS

## 2024-06-10 ASSESSMENT — PAIN DESCRIPTION - ORIENTATION
ORIENTATION: LOWER;MID
ORIENTATION: RIGHT;LOWER
ORIENTATION: LOWER;MID

## 2024-06-10 ASSESSMENT — PAIN DESCRIPTION - DESCRIPTORS
DESCRIPTORS: STABBING
DESCRIPTORS: ACHING;DISCOMFORT
DESCRIPTORS: ACHING
DESCRIPTORS: STABBING
DESCRIPTORS: DISCOMFORT
DESCRIPTORS: DISCOMFORT

## 2024-06-10 NOTE — ED PROVIDER NOTES
Final Result   No acute fracture or malalignment of the cervical spine.      Prominent right thyroid lobe containing a nodule measuring up to 3.0 cm.   Correlation with nonemergent sonography is recommended.         CT Head W/O Contrast   Final Result   No acute intracranial abnormality.         XR PELVIS (1-2 VIEWS)   Final Result   Diffuse osteopenia and prominent internal rotation of both hips which is   limiting the exam.  Within the limitations of the exam, no obvious acute   fracture or dislocation is seen.  If the patient is persistently symptomatic,   suggest follow-up dedicated views of the symptomatic hip or CT correlation.      Moderate osteoarthritic changes in both hips which is more prominent on the   left.             LABS:   No results found for this visit on 06/09/24.    ED BEDSIDE ULTRASOUND:  na    RECENT VITALS:  BP: (!) 152/73, Temp: 97.9 °F (36.6 °C), Pulse: 85,Respirations: 18, SpO2: 97 %     Procedures     na    ED Course and MDM     Pretty Covington is a 64 y.o. female who presents with lower back pain after mechanical fall.  Here she is afebrile hemodynamically stable.  She took Tylenol prior to presentation no other medications.  There is no evidence of trauma on exam.  She is on anticoagulation and struck her head therefore require CT imaging of head and cervical spine and will expand to include thoracic and lumbar spine particular given her chief complaint of lower back pain.  Will seek to manage her pain as well pending the studies.    ED Course as of 06/09/24 2356   Sun Jun 09, 2024   231 CT head and neck are negative pelvic x-ray benign.  Unfortunately patient appears to have suffered an L1 Chance fracture on CT of the lumbar spine.  Will discuss with neurosurgery anticipate transfer. [NG]   3813 I discussed the patient's case with neurosurgery they recommend bed rest spinal precautions and transfer for likely operative intervention.  I revisited the patient her pain is minimally

## 2024-06-10 NOTE — CARE COORDINATION
Case Management Assessment  Initial Evaluation    Date/Time of Evaluation: 6/10/2024 4:46 PM  Assessment Completed by: MAXX Caruso    If patient is discharged prior to next notation, then this note serves as note for discharge by case management.    Patient Name: Pretty Covington                   YOB: 1960  Diagnosis: Closed fracture of lumbar vertebral body (HCC) [S32.009A]  Closed fracture of lumbar vertebra, unspecified fracture morphology, sequela [S32.009S]                   Date / Time: 6/10/2024  3:53 AM    Patient Admission Status: Inpatient   Readmission Risk (Low < 19, Mod (19-27), High > 27): Readmission Risk Score: 7.3    Current PCP: Marialuisa Andujar MD  PCP verified by CM? Yes    Chart Reviewed: Yes      History Provided by: Patient  Patient Orientation: Alert and Oriented    Patient Cognition: Alert    Hospitalization in the last 30 days (Readmission):  No    If yes, Readmission Assessment in CM Navigator will be completed.    Advance Directives:      Code Status: Full Code   Patient's Primary Decision Maker is: Legal Next of Kin    Primary Decision Maker: Charleen Cruz - Brother/Sister - [phone]    Discharge Planning:    Patient lives with: Family Members (lives with Charleen esparza) Type of Home: House  Primary Care Giver: Self  Patient Support Systems include: Family Members   Current Financial resources: Other (Comment) (Commercial insurance)  Current community resources: None  Current services prior to admission: None            Current DME:              Type of Home Care services:  None    ADLS  Prior functional level: Independent in ADLs/IADLs  Current functional level: Other (see comment) (PT/OT post-op)    PT AM-PAC:   /24  OT AM-PAC:   /24    Family can provide assistance at DC: Yes  Would you like Case Management to discuss the discharge plan with any other family members/significant others, and if so, who? Yes  Plans to Return to Present Housing: Unknown at  fracture of lumbar vertebra, unspecified fracture morphology, sequela [S32.009S]    IF APPLICABLE: The Patient and/or patient representative Pretty and her family were provided with a choice of provider and agrees with the discharge plan. Freedom of choice list with basic dialogue that supports the patient's individualized plan of care/goals and shares the quality data associated with the providers was provided to: Patient, Patient Representative   Patient Representative Name: Viv Villaseñor     The Patient and/or Patient Representative Agree with the Discharge Plan? Yes    MAXX Caruso  Case Management Department  Ph: [phone] Fax: [phone]

## 2024-06-10 NOTE — PROGRESS NOTES
V2.0    [facility] Progress Note      Pt was seen and examined. Admitted this morning after mechanical fall resulting in Chance type fracture at L1 extending into the posterior elements bilaterally.Widening of the disc space anteriorly at T8-9 with air in the disc space and this may relate to an acute ligamentous injury.  Plan for MRI. Pending NS opinion. PT/OT. Add oxycodone for pain control     Sandeep Ivey  Hospitalist

## 2024-06-10 NOTE — PROGRESS NOTES
Occupational Therapy/ Physical Therapy    DISCHARGE    Orders received, chart reviewed.  Pt awaiting neuro surgery consult and MRI's thoracic and Lumbar spine.  Will hold OT /PT evals. Follow up later this date vs 6/11     Addendum 3725   Neuro sx cancelled PT/OT orders this pm, placed back on bedrest orders and pending Neuro sx intervention.  Will await therapy orders per Neuro sx team when appropriate.  RN aware.    Meghan Sagastume  MS, OTR/L #[id number]   Dahlia Torres, PT #[id number]

## 2024-06-10 NOTE — PROGRESS NOTES
Pt admitted to room 5528 from [facility] s/p. VSS s/p fall anre. 4 eye assessment completed. Skin is intact besides skin tear to the pannus. Pt ambulated to the bathroom, gait was stable using front wheeled walker. Call light is in reach. All safety measures in place. Will continue to monitor

## 2024-06-10 NOTE — PROGRESS NOTES
Patient is alert and oriented x4. VSS on room air. Medications given per MAR, no side effects noted. Patient on bedrest at this time. Complaints of pain to back, managing per MAR - no side effects noted. Tolerating diet well.      Patient is currently resting in bed with bed alarm on for safety. Call light within reach and all fall precautions in place. Plan of care continues.    Electronically signed by Mercedez Martinez RN on 6/10/2024 at 7:43 PM
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
## 2024-06-10 NOTE — CONSULTS
NEUROSURGERY CONSULT NOTE    PRETTY COVINGTON  [id number]   1960   6/10/2024    Requesting physician: Isiah Mahoney MD    Reason for consultation:L1 chance fracture    History of present illness: Pretty Covington is a 64 y.o. female who presents with lower back pain after a mechanical ground-level fall.  The patient lives independently with her sister in their home she states that she requires no assistive devices at baseline.  She was attending to affairs in her kitchen and lost her balance while using the refrigerator falling backwards on a seated position onto her bottom and then rolling backwards striking her head on the handle of the stove.  There was no medical prodrome or syncope.  Subsequent to the head injury there was no loss of consciousness.  The patient had difficulty standing up a neighbor came over and did assist her up and she was ambulatory though with severe lower back pain and therefore presents to the emergency department for evaluation.  Of note the patient is on Eliquis on a background of pulmonary embolism.  She took Tylenol prior to presentation. CT shows chance fracture of L1.       ROS:   GENERAL:  Denies fever or recent illness. Denies weight changes   EYES:  Denies vision change or diplopia  EARS:  Denies hearing loss  CARDIAC:  Denies chest pain  RESPIRATORY:  Denies shortness of breath  SKIN:  Denies rash or lesions   HEM:  Denies excessive bruising  PSYCH:  Denies anxiety or depression  NEURO:  Denies headache, numbness or tingling or lateralizing weakness   :  Denies urinary difficulty  GI: Denies nausea, vomiting, diarrhea or constipation  MUSCULOSKELETAL:  No arthralgias    Allergies   Allergen Reactions    Lisinopril Other (See Comments)     cough    Penicillins Other (See Comments)     Unsure of reaction--childhood    Sulfa Antibiotics Other (See Comments)     Unsure of reaction       Past Medical History:   Diagnosis Date    Asthma     Benign essential hypertension  Hypertriglyceridemia     Pulmonary embolism (HCC)     Disease of nasal cavity and sinuses        Assessment:  Patient is a 64 y.o. female s/p mechanical fall resulting in Chance fracture at L1 extending into the posterior elements bilaterally.     Plan:  No emergent neurosurgical intervention indicated but this is an unstable fracture and will need to be stabilized. She was on eliquis last does was Sunday am. Surgery timing TBD  Neurologic exams frequency:: Q4H  Bed rest  MRI thoracic and lumbar  Muscle spasms: scheduled Robaxin and Valium prn  Pain control: Managed by medical team  Advance diet   Thank you for consult. Will follow inpatient.  Please call with any questions or decline in neurological status    DISPO: Remain inpatient from neurosurgery standpoint.    Patient was seen and examined with Dr. Campoverde who agrees with above assessment and plan.     Electronically signed by: Anton Hawkins, LOYDA - CNP, LOYDA-CNP, 6/10/2024 8:38 AM  [phone]

## 2024-06-10 NOTE — CARE COORDINATION
10:15 AM  Therapy orders placed and Strict Bedrest orders removed per MD verbal order.     Electronically signed by Quique Borja RN, CM on 6/10/2024 at 10:16 AM.  Phone: [phone]  Fax: [phone]

## 2024-06-10 NOTE — PLAN OF CARE
Problem: Safety - Adult  Goal: Free from fall injury  6/10/2024 1151 by Mercedez Martinez, RN  Outcome: Progressing   All fall precautions in place. Bed locked and in lowest position with alarm on. Overbed table and personal belonings within reach. Call light within reach and patient instructed to use call light for assistance. Non-skid socks on.    Problem: Pain  Goal: Verbalizes/displays adequate comfort level or baseline comfort level  6/10/2024 1151 by Mercedez Martinez, RN  Outcome: Progressing   Pt endorsing pain to back. Being treated with PRN pain medication, rest, and frequent repositioning with pillow support for comfort and pressure relief. Pt reports some relief from pain with above interventions.

## 2024-06-10 NOTE — PLAN OF CARE
Problem: Safety - Adult  Goal: Free from fall injury  Outcome: Progressing   Pt remains free from injury during this shift. Pt did ambulate x 1 person, gait belt walker. Tolerated well. Encourage pt to call for all assistance. Call light is in reach, bed alarm is activated, bed locked and in lowest position. Will continue to monitor.    Problem: Pain  Goal: Verbalizes/displays adequate comfort level or baseline comfort level  Outcome: Progressing

## 2024-06-11 LAB
ALBUMIN SERPL-MCNC: 4 G/DL (ref 3.4–5)
ALBUMIN/GLOB SERPL: 1.7 {RATIO} (ref 1.1–2.2)
ALP SERPL-CCNC: 65 U/L (ref 40–129)
ALT SERPL-CCNC: 8 U/L (ref 10–40)
ANION GAP SERPL CALCULATED.3IONS-SCNC: 12 MMOL/L (ref 3–16)
AST SERPL-CCNC: 22 U/L (ref 15–37)
BASOPHILS # BLD: 0 K/UL (ref 0–0.2)
BASOPHILS NFR BLD: 0.6 %
BILIRUB SERPL-MCNC: 0.6 MG/DL (ref 0–1)
BUN SERPL-MCNC: 14 MG/DL (ref 7–20)
CALCIUM SERPL-MCNC: 8.8 MG/DL (ref 8.3–10.6)
CHLORIDE SERPL-SCNC: 104 MMOL/L (ref 99–110)
CO2 SERPL-SCNC: 23 MMOL/L (ref 21–32)
CREAT SERPL-MCNC: <0.5 MG/DL (ref 0.6–1.2)
DEPRECATED RDW RBC AUTO: 13.9 % (ref 12.4–15.4)
EOSINOPHIL # BLD: 0.1 K/UL (ref 0–0.6)
EOSINOPHIL NFR BLD: 1.7 %
GFR SERPLBLD CREATININE-BSD FMLA CKD-EPI: >90 ML/MIN/{1.73_M2}
GLUCOSE BLD-MCNC: 183 MG/DL (ref 70–99)
GLUCOSE BLD-MCNC: 188 MG/DL (ref 70–99)
GLUCOSE BLD-MCNC: 197 MG/DL (ref 70–99)
GLUCOSE SERPL-MCNC: 177 MG/DL (ref 70–99)
HCT VFR BLD AUTO: 39.4 % (ref 36–48)
HGB BLD-MCNC: 13.5 G/DL (ref 12–16)
LYMPHOCYTES # BLD: 0.9 K/UL (ref 1–5.1)
LYMPHOCYTES NFR BLD: 12.2 %
MAGNESIUM SERPL-MCNC: 2 MG/DL (ref 1.8–2.4)
MCH RBC QN AUTO: 30.9 PG (ref 26–34)
MCHC RBC AUTO-ENTMCNC: 34.3 G/DL (ref 31–36)
MCV RBC AUTO: 90.1 FL (ref 80–100)
MONOCYTES # BLD: 0.7 K/UL (ref 0–1.3)
MONOCYTES NFR BLD: 9.4 %
NEUTROPHILS # BLD: 5.5 K/UL (ref 1.7–7.7)
NEUTROPHILS NFR BLD: 76.1 %
PERFORMED ON: ABNORMAL
PLATELET # BLD AUTO: 148 K/UL (ref 135–450)
PMV BLD AUTO: 7.9 FL (ref 5–10.5)
POTASSIUM SERPL-SCNC: 3.5 MMOL/L (ref 3.5–5.1)
PROT SERPL-MCNC: 6.4 G/DL (ref 6.4–8.2)
RBC # BLD AUTO: 4.37 M/UL (ref 4–5.2)
SODIUM SERPL-SCNC: 139 MMOL/L (ref 136–145)
WBC # BLD AUTO: 7.2 K/UL (ref 4–11)

## 2024-06-11 PROCEDURE — 6370000000 HC RX 637 (ALT 250 FOR IP): Performed by: NURSE PRACTITIONER

## 2024-06-11 PROCEDURE — 6360000002 HC RX W HCPCS: Performed by: NURSE PRACTITIONER

## 2024-06-11 PROCEDURE — 6370000000 HC RX 637 (ALT 250 FOR IP): Performed by: INTERNAL MEDICINE

## 2024-06-11 PROCEDURE — 93005 ELECTROCARDIOGRAM TRACING: CPT | Performed by: INTERNAL MEDICINE

## 2024-06-11 PROCEDURE — 85025 COMPLETE CBC W/AUTO DIFF WBC: CPT

## 2024-06-11 PROCEDURE — 6360000002 HC RX W HCPCS: Performed by: HOSPITALIST

## 2024-06-11 PROCEDURE — 6360000002 HC RX W HCPCS: Performed by: FAMILY MEDICINE

## 2024-06-11 PROCEDURE — 6370000000 HC RX 637 (ALT 250 FOR IP): Performed by: HOSPITALIST

## 2024-06-11 PROCEDURE — 6370000000 HC RX 637 (ALT 250 FOR IP): Performed by: FAMILY MEDICINE

## 2024-06-11 PROCEDURE — APPNB30 APP NON BILLABLE TIME 0-30 MINS: Performed by: NURSE PRACTITIONER

## 2024-06-11 PROCEDURE — 83735 ASSAY OF MAGNESIUM: CPT

## 2024-06-11 PROCEDURE — 1200000000 HC SEMI PRIVATE

## 2024-06-11 PROCEDURE — 2580000003 HC RX 258: Performed by: FAMILY MEDICINE

## 2024-06-11 PROCEDURE — 80053 COMPREHEN METABOLIC PANEL: CPT

## 2024-06-11 PROCEDURE — 36415 COLL VENOUS BLD VENIPUNCTURE: CPT

## 2024-06-11 RX ORDER — NALOXONE HYDROCHLORIDE 0.4 MG/ML
0.4 INJECTION, SOLUTION INTRAMUSCULAR; INTRAVENOUS; SUBCUTANEOUS PRN
Status: DISCONTINUED | OUTPATIENT
Start: 2024-06-11 | End: 2024-06-20 | Stop reason: HOSPADM

## 2024-06-11 RX ORDER — HYDROXYZINE HYDROCHLORIDE 10 MG/1
10 TABLET, FILM COATED ORAL 3 TIMES DAILY PRN
Status: DISCONTINUED | OUTPATIENT
Start: 2024-06-11 | End: 2024-06-20 | Stop reason: HOSPADM

## 2024-06-11 RX ADMIN — Medication 1 TABLET: at 08:10

## 2024-06-11 RX ADMIN — HYDROMORPHONE HYDROCHLORIDE 1 MG: 1 INJECTION, SOLUTION INTRAMUSCULAR; INTRAVENOUS; SUBCUTANEOUS at 22:13

## 2024-06-11 RX ADMIN — HYDROMORPHONE HYDROCHLORIDE 1 MG: 1 INJECTION, SOLUTION INTRAMUSCULAR; INTRAVENOUS; SUBCUTANEOUS at 06:05

## 2024-06-11 RX ADMIN — ACETAMINOPHEN 650 MG: 325 TABLET ORAL at 17:19

## 2024-06-11 RX ADMIN — ENOXAPARIN SODIUM 30 MG: 100 INJECTION SUBCUTANEOUS at 08:10

## 2024-06-11 RX ADMIN — METHOCARBAMOL 750 MG: 750 TABLET ORAL at 17:12

## 2024-06-11 RX ADMIN — ATORVASTATIN CALCIUM 40 MG: 40 TABLET, FILM COATED ORAL at 08:10

## 2024-06-11 RX ADMIN — HYDROMORPHONE HYDROCHLORIDE 1 MG: 1 INJECTION, SOLUTION INTRAMUSCULAR; INTRAVENOUS; SUBCUTANEOUS at 14:24

## 2024-06-11 RX ADMIN — DIAZEPAM 5 MG: 5 TABLET ORAL at 09:58

## 2024-06-11 RX ADMIN — SODIUM CHLORIDE, PRESERVATIVE FREE 10 ML: 5 INJECTION INTRAVENOUS at 08:11

## 2024-06-11 RX ADMIN — METHOCARBAMOL 1000 MG: 100 INJECTION INTRAMUSCULAR; INTRAVENOUS at 02:11

## 2024-06-11 RX ADMIN — PANTOPRAZOLE SODIUM 20 MG: 20 TABLET, DELAYED RELEASE ORAL at 08:10

## 2024-06-11 RX ADMIN — OXYCODONE HYDROCHLORIDE 5 MG: 5 TABLET ORAL at 04:59

## 2024-06-11 RX ADMIN — OXYCODONE HYDROCHLORIDE 5 MG: 5 TABLET ORAL at 21:00

## 2024-06-11 RX ADMIN — ACETAMINOPHEN 650 MG: 325 TABLET ORAL at 08:09

## 2024-06-11 RX ADMIN — CITALOPRAM 40 MG: 40 TABLET, FILM COATED ORAL at 21:00

## 2024-06-11 RX ADMIN — ACETAMINOPHEN 650 MG: 325 TABLET ORAL at 21:00

## 2024-06-11 RX ADMIN — ACETAMINOPHEN 650 MG: 325 TABLET ORAL at 13:27

## 2024-06-11 RX ADMIN — HYDROXYZINE HYDROCHLORIDE 10 MG: 10 TABLET, FILM COATED ORAL at 18:16

## 2024-06-11 RX ADMIN — METHOCARBAMOL 750 MG: 750 TABLET ORAL at 13:28

## 2024-06-11 RX ADMIN — HYDROMORPHONE HYDROCHLORIDE 1 MG: 1 INJECTION, SOLUTION INTRAMUSCULAR; INTRAVENOUS; SUBCUTANEOUS at 18:16

## 2024-06-11 RX ADMIN — METHOCARBAMOL 750 MG: 750 TABLET ORAL at 21:00

## 2024-06-11 RX ADMIN — ENOXAPARIN SODIUM 30 MG: 100 INJECTION SUBCUTANEOUS at 21:01

## 2024-06-11 RX ADMIN — HYDROMORPHONE HYDROCHLORIDE 1 MG: 1 INJECTION, SOLUTION INTRAMUSCULAR; INTRAVENOUS; SUBCUTANEOUS at 09:58

## 2024-06-11 ASSESSMENT — PAIN SCALES - GENERAL
PAINLEVEL_OUTOF10: 4
PAINLEVEL_OUTOF10: 8
PAINLEVEL_OUTOF10: 6
PAINLEVEL_OUTOF10: 0
PAINLEVEL_OUTOF10: 7
PAINLEVEL_OUTOF10: 7
PAINLEVEL_OUTOF10: 10
PAINLEVEL_OUTOF10: 7
PAINLEVEL_OUTOF10: 7
PAINLEVEL_OUTOF10: 5
PAINLEVEL_OUTOF10: 4
PAINLEVEL_OUTOF10: 0
PAINLEVEL_OUTOF10: 4
PAINLEVEL_OUTOF10: 9
PAINLEVEL_OUTOF10: 8
PAINLEVEL_OUTOF10: 6
PAINLEVEL_OUTOF10: 6

## 2024-06-11 ASSESSMENT — PAIN DESCRIPTION - ORIENTATION
ORIENTATION: LOWER;MID
ORIENTATION: LOWER
ORIENTATION: LOWER
ORIENTATION: LOWER;MID
ORIENTATION: LOWER
ORIENTATION: LOWER;MID

## 2024-06-11 ASSESSMENT — PAIN DESCRIPTION - PAIN TYPE
TYPE: ACUTE PAIN

## 2024-06-11 ASSESSMENT — PAIN DESCRIPTION - ONSET
ONSET: ON-GOING

## 2024-06-11 ASSESSMENT — PAIN DESCRIPTION - LOCATION
LOCATION: BACK

## 2024-06-11 ASSESSMENT — PAIN - FUNCTIONAL ASSESSMENT
PAIN_FUNCTIONAL_ASSESSMENT: PREVENTS OR INTERFERES SOME ACTIVE ACTIVITIES AND ADLS
PAIN_FUNCTIONAL_ASSESSMENT: PREVENTS OR INTERFERES WITH MANY ACTIVE NOT PASSIVE ACTIVITIES
PAIN_FUNCTIONAL_ASSESSMENT: PREVENTS OR INTERFERES SOME ACTIVE ACTIVITIES AND ADLS

## 2024-06-11 ASSESSMENT — PAIN DESCRIPTION - DESCRIPTORS
DESCRIPTORS: STABBING
DESCRIPTORS: ACHING
DESCRIPTORS: STABBING
DESCRIPTORS: ACHING;DISCOMFORT
DESCRIPTORS: ACHING
DESCRIPTORS: STABBING

## 2024-06-11 ASSESSMENT — PAIN DESCRIPTION - FREQUENCY
FREQUENCY: CONTINUOUS

## 2024-06-11 NOTE — CONSULTS
NEUROSURGERY CONSULT NOTE    PRETTY COVINGTON  [id number]   1960   6/10/2024    Requesting physician: Isiah Mahoney MD    Reason for consultation:L1 chance fracture    History of present illness: Pretty Covington is a 64 y.o. female who presents with lower back pain after a mechanical ground-level fall.  The patient lives independently with her sister in their home she states that she requires no assistive devices at baseline.  She was attending to affairs in her kitchen and lost her balance while using the refrigerator falling backwards on a seated position onto her bottom and then rolling backwards striking her head on the handle of the stove.  There was no medical prodrome or syncope.  Subsequent to the head injury there was no loss of consciousness.  The patient had difficulty standing up a neighbor came over and did assist her up and she was ambulatory though with severe lower back pain and therefore presents to the emergency department for evaluation.  Of note the patient is on Eliquis on a background of pulmonary embolism.  She took Tylenol prior to presentation. CT shows chance fracture of L1.       ROS:   GENERAL:  Denies fever or recent illness. Denies weight changes   EYES:  Denies vision change or diplopia  EARS:  Denies hearing loss  CARDIAC:  Denies chest pain  RESPIRATORY:  Denies shortness of breath  SKIN:  Denies rash or lesions   HEM:  Denies excessive bruising  PSYCH:  Denies anxiety or depression  NEURO:  Denies headache, numbness or tingling or lateralizing weakness   :  Denies urinary difficulty  GI: Denies nausea, vomiting, diarrhea or constipation  MUSCULOSKELETAL:  No arthralgias    Allergies   Allergen Reactions    Lisinopril Other (See Comments)     cough    Penicillins Other (See Comments)     Unsure of reaction--childhood    Sulfa Antibiotics Other (See Comments)     Unsure of reaction       Past Medical History:   Diagnosis Date    Asthma     Benign essential hypertension  11/14/2017    Benign head tremor 05/14/2014    Cholelithiasis     Coronary artery disease involving native coronary artery of native heart 08/31/2022    Disease of nasal cavity and sinuses     Fatty infiltration of liver     Hot flash, menopausal 07/22/2015    Hx of blood clots     lungs bilateral x 2    Hyperlipidemia     Hyperlipidemia associated with type 2 diabetes mellitus (HCC) 10/23/2015    Morbid obesity with BMI of 50.0-59.9, adult (HCC) 10/23/2015    No history of procedure 11/09/2015    no past colonoscopy    ELEAZAR on CPAP     Pulmonary embolism (HCC)     both lungs x 2    Type II or unspecified type diabetes mellitus without mention of complication, not stated as uncontrolled     Umbilical hernia 10/02/2013    Unspecified sleep apnea     Wears glasses         Past Surgical History:   Procedure Laterality Date    BREAST SURGERY Right 2015    benign tumor    CHOLECYSTECTOMY, LAPAROSCOPIC  08/02/2012    COLONOSCOPY  11/09/2015    cecal polyp    DILATION AND CURETTAGE OF UTERUS  03/13/2018    FOOT SURGERY Right 3/15/2023    OPEN REDUCTION INTERNAL FIXATION RIGHT 5TH METATARSAL performed by Arsh Siegel DPM at [facility] OR    HERNIA REPAIR  10/11/2013    lap umbilical    HYSTEROSCOPY  03/13/2018    and D&C       Social History     Occupational History    Not on file   Tobacco Use    Smoking status: Never    Smokeless tobacco: Never   Vaping Use    Vaping Use: Never used   Substance and Sexual Activity    Alcohol use: No     Alcohol/week: 0.0 standard drinks of alcohol    Drug use: No    Sexual activity: Not Currently     Partners: Male        Family History   Problem Relation Age of Onset    Hypertension Mother     Diabetes Mother     Heart Failure Father     Other Father         AAA    High Blood Pressure Sister     Diabetes Sister     High Blood Pressure Brother     Heart Attack Brother     Diabetes Brother     Kidney Cancer Brother         No outpatient medications have been marked as taking for the 6/10/24

## 2024-06-11 NOTE — CARE COORDINATION
CM following: CM met with pt and pt's sister at bedside. CM received fax with [facility] paperwork for pt however the first two pages of the fax were not legible and CM obtained verbal permission from the pt to reach out to Mayra Grimm in HR at pt's employer to request a new fax, which CM will do during business hours tomorrow. CM will continue to follow for discharge planning.  Electronically signed by MAXX Caruso on 6/11/2024 at 4:46 PM  [phone]

## 2024-06-11 NOTE — PROGRESS NOTES
4 Eyes Skin Assessment     NAME:  Pretty Covington  YOB: 1960  MEDICAL RECORD NUMBER:  [id number]    The patient is being assessed for  Admission    I agree that at least one RN has performed a thorough Head to Toe Skin Assessment on the patient. ALL assessment sites listed below have been assessed.      Areas assessed by both nurses:    Head, Face, Ears, Shoulders, Back, Chest, Arms, Elbows, Hands, Sacrum. Buttock, Coccyx, Ischium, and Legs. Feet and Heels, abdominal fold excoriation/skin tear        Does the Patient have a Wound? No noted wound(s)       Estuardo Prevention initiated by RN: Yes  Wound Care Orders initiated by RN: No    Pressure Injury (Stage 3,4, Unstageable, DTI, NWPT, and Complex wounds) if present, place Wound referral order by RN under : No    New Ostomies, if present place, Ostomy referral order under : No     Nurse 1 eSignature: Electronically signed by Jose Daniel Roberts RN on 6/11/24 at 7:45 PM EDT    **SHARE this note so that the co-signing nurse can place an eSignature**    Nurse 2 eSignature: Electronically signed by Mercedez Martinez RN on 6/11/24 at 7:55 PM EDT

## 2024-06-11 NOTE — PROGRESS NOTES
NEUROSURGERY PROGRESS NOTE    Patient Name: Pretty Covington YOB: 1960   Sex: Female Age: 64 yrs     Medical Record Number: [id number] Acct Number: [id number]   Room Number: 5528/5528-01 Hospital Day: Hospital Day: 2     Interval History:  Pt fell and has an unstable L1 fx    Subjective: Pt states she does have some back pain worse when she moves.    Objective:    VITAL SIGNS   BP (!) 161/82   Pulse 85   Temp 98.6 °F (37 °C) (Oral)   Resp 16   Ht 1.651 m (5' 5\")   Wt 115.7 kg (255 lb)   LMP 08/20/2014   SpO2 94%   BMI 42.43 kg/m²    Height Height: 165.1 cm (5' 5\")   Weight Weight - Scale: 115.7 kg (255 lb)        Allergies Allergies   Allergen Reactions    Lisinopril Other (See Comments)     cough    Penicillins Other (See Comments)     Unsure of reaction--childhood    Sulfa Antibiotics Other (See Comments)     Unsure of reaction      NPO Status ADULT DIET; Regular   Isolation No active isolations     LABS   Basic Metabolic Profile Recent Labs     06/10/24  0000 06/10/24  0634 06/11/24  0642    140 139    104 104   CO2 20* 24 23   BUN 18 16 14   CREATININE <0.5* <0.5* <0.5*   GLUCOSE 195* 136* 177*   MG  --   --  2.00      Complete Blood Count Recent Labs     06/10/24  0000 06/11/24  0642   WBC 11.6* 7.2   RBC 4.85 4.37      Coagulation Studies No results for input(s): \"INR\" in the last 72 hours.    Invalid input(s): \"PLATELETS\", \"PROA\", \"PT\", \"PTTA\", \"PTT\"     MEDICATIONS   Inpatient Medications     sodium chloride flush, 5-40 mL, IntraVENous, 2 times per day    atorvastatin, 40 mg, Oral, Daily    citalopram, 40 mg, Oral, Nightly    pantoprazole, 20 mg, Oral, Daily    therapeutic multivitamin-minerals, 1 tablet, Oral, Daily    insulin lispro, 0-4 Units, SubCUTAneous, TID WC    insulin lispro, 0-4 Units, SubCUTAneous,

## 2024-06-11 NOTE — PROGRESS NOTES
Pt A&Ox4. VSS on RA. No acute changes this shift. Pt remains on bedrest. Pain managed via medication per MAR. Tolerating PO diet. Denies needs at this time. Fall precautions in place, call light within reach.

## 2024-06-11 NOTE — PROGRESS NOTES
Neurosurgery Progress Note    Patient seen and examined on 06/11/24. No acute events overnight.  Neurologically intact on exam.  Notes significant low back pain with any movement of the body. Denies extension into the lower extremities.    A/P: 64-year-old woman with L1 fracture dislocation related to ankylosing spondylitis, JODI E    -Neuro stable  -Pain control with PO meds  -Custom TLSO ordered  -Hold anticoagulation pending surgical intervention  -Medical clearance for surgery  -Bedrest  -Will plan for T11-L3 D/F/F later this week.  -DVT ppx  -Will follow closely.        Ebenezer Campoverde MD, PhD  [place] [address] , [address]  [place], [place], [address] [phone] (c), [phone] (o)

## 2024-06-11 NOTE — PLAN OF CARE
Problem: Safety - Adult  Goal: Free from fall injury  6/10/2024 2246 by Yolanda Guzmán, RN  Note: Fall precautions in place. Bed alarm on, bed in lowest position, call light within reach, non slip socks, hourly rounding.      Problem: ABCDS Injury Assessment  Goal: Absence of physical injury  6/10/2024 2246 by Yolanda Guzmán, RN  Outcome: Progressing     Problem: Pain  Goal: Verbalizes/displays adequate comfort level or baseline comfort level  6/10/2024 2246 by Yolanda Guzmán, RN  Outcome: Progressing  Note: Pain managed via nonpharm measures and medication per MAR.
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
## 2024-06-11 NOTE — PROGRESS NOTES
Patient is alert and oriented x4. VSS on room air. Medications given per MAR, no side effects noted. Patient on bedrest at this time. Complaints of pain to back - pain being managed per MAR, no side effects noted. Voiding well via purewick, no bm this shift. Patient tolerating diet well.      Patient is currently resting in bed with bed alarm on for safety. Call light within reach and all fall precautions in place. Plan of care continues.    Electronically signed by Mercedez Martinez RN on 6/11/2024 at 5:24 PM

## 2024-06-11 NOTE — PLAN OF CARE
Problem: Safety - Adult  Goal: Free from fall injury  Outcome: Progressing   All fall precautions in place. Bed locked and in lowest position with alarm on. Overbed table and personal belonings within reach. Call light within reach and patient instructed to use call light for assistance. Non-skid socks on.    Problem: Discharge Planning  Goal: Discharge to home or other facility with appropriate resources  Outcome: Progressing   Pt involved in discharge planning. Barriers to discharge discussed with patient. Discharge learning needs identified. Discuss with patient any additional needed resources and transportation plans. Case management following plan of care.

## 2024-06-12 PROBLEM — I25.10 NONOCCLUSIVE CORONARY ATHEROSCLEROSIS OF NATIVE CORONARY ARTERY: Status: ACTIVE | Noted: 2024-06-12

## 2024-06-12 PROBLEM — Z01.810 PREOP CARDIOVASCULAR EXAM: Status: ACTIVE | Noted: 2024-06-12

## 2024-06-12 LAB
ALBUMIN SERPL-MCNC: 3.8 G/DL (ref 3.4–5)
ALBUMIN/GLOB SERPL: 1.4 {RATIO} (ref 1.1–2.2)
ALP SERPL-CCNC: 62 U/L (ref 40–129)
ALT SERPL-CCNC: 11 U/L (ref 10–40)
ANION GAP SERPL CALCULATED.3IONS-SCNC: 10 MMOL/L (ref 3–16)
AST SERPL-CCNC: 36 U/L (ref 15–37)
BASOPHILS # BLD: 0 K/UL (ref 0–0.2)
BASOPHILS NFR BLD: 0.4 %
BILIRUB SERPL-MCNC: 0.6 MG/DL (ref 0–1)
BUN SERPL-MCNC: 12 MG/DL (ref 7–20)
CALCIUM SERPL-MCNC: 9 MG/DL (ref 8.3–10.6)
CHLORIDE SERPL-SCNC: 103 MMOL/L (ref 99–110)
CO2 SERPL-SCNC: 25 MMOL/L (ref 21–32)
CREAT SERPL-MCNC: <0.5 MG/DL (ref 0.6–1.2)
DEPRECATED RDW RBC AUTO: 13.6 % (ref 12.4–15.4)
EKG ATRIAL RATE: 89 BPM
EKG DIAGNOSIS: NORMAL
EKG P AXIS: 45 DEGREES
EKG P-R INTERVAL: 136 MS
EKG Q-T INTERVAL: 348 MS
EKG QRS DURATION: 80 MS
EKG QTC CALCULATION (BAZETT): 423 MS
EKG R AXIS: 74 DEGREES
EKG T AXIS: -3 DEGREES
EKG VENTRICULAR RATE: 89 BPM
EOSINOPHIL # BLD: 0.1 K/UL (ref 0–0.6)
EOSINOPHIL NFR BLD: 1.7 %
GFR SERPLBLD CREATININE-BSD FMLA CKD-EPI: >90 ML/MIN/{1.73_M2}
GLUCOSE BLD-MCNC: 186 MG/DL (ref 70–99)
GLUCOSE BLD-MCNC: 210 MG/DL (ref 70–99)
GLUCOSE BLD-MCNC: 228 MG/DL (ref 70–99)
GLUCOSE BLD-MCNC: 254 MG/DL (ref 70–99)
GLUCOSE SERPL-MCNC: 192 MG/DL (ref 70–99)
HCT VFR BLD AUTO: 38.8 % (ref 36–48)
HGB BLD-MCNC: 13.6 G/DL (ref 12–16)
LYMPHOCYTES # BLD: 0.8 K/UL (ref 1–5.1)
LYMPHOCYTES NFR BLD: 12.8 %
MCH RBC QN AUTO: 31.4 PG (ref 26–34)
MCHC RBC AUTO-ENTMCNC: 35.1 G/DL (ref 31–36)
MCV RBC AUTO: 89.5 FL (ref 80–100)
MONOCYTES # BLD: 0.7 K/UL (ref 0–1.3)
MONOCYTES NFR BLD: 10.9 %
NEUTROPHILS # BLD: 4.9 K/UL (ref 1.7–7.7)
NEUTROPHILS NFR BLD: 74.2 %
PERFORMED ON: ABNORMAL
PLATELET # BLD AUTO: 142 K/UL (ref 135–450)
PMV BLD AUTO: 8.1 FL (ref 5–10.5)
POTASSIUM SERPL-SCNC: 3.7 MMOL/L (ref 3.5–5.1)
PROT SERPL-MCNC: 6.5 G/DL (ref 6.4–8.2)
RBC # BLD AUTO: 4.34 M/UL (ref 4–5.2)
SODIUM SERPL-SCNC: 138 MMOL/L (ref 136–145)
WBC # BLD AUTO: 6.6 K/UL (ref 4–11)

## 2024-06-12 PROCEDURE — 36415 COLL VENOUS BLD VENIPUNCTURE: CPT

## 2024-06-12 PROCEDURE — 2580000003 HC RX 258: Performed by: FAMILY MEDICINE

## 2024-06-12 PROCEDURE — 6360000002 HC RX W HCPCS: Performed by: FAMILY MEDICINE

## 2024-06-12 PROCEDURE — 6370000000 HC RX 637 (ALT 250 FOR IP): Performed by: INTERNAL MEDICINE

## 2024-06-12 PROCEDURE — APPNB30 APP NON BILLABLE TIME 0-30 MINS: Performed by: NURSE PRACTITIONER

## 2024-06-12 PROCEDURE — 6370000000 HC RX 637 (ALT 250 FOR IP): Performed by: FAMILY MEDICINE

## 2024-06-12 PROCEDURE — 1200000000 HC SEMI PRIVATE

## 2024-06-12 PROCEDURE — 6360000002 HC RX W HCPCS: Performed by: HOSPITALIST

## 2024-06-12 PROCEDURE — 93010 ELECTROCARDIOGRAM REPORT: CPT | Performed by: INTERNAL MEDICINE

## 2024-06-12 PROCEDURE — 6370000000 HC RX 637 (ALT 250 FOR IP): Performed by: HOSPITALIST

## 2024-06-12 PROCEDURE — 99223 1ST HOSP IP/OBS HIGH 75: CPT | Performed by: INTERNAL MEDICINE

## 2024-06-12 PROCEDURE — 6370000000 HC RX 637 (ALT 250 FOR IP): Performed by: NURSE PRACTITIONER

## 2024-06-12 PROCEDURE — 80053 COMPREHEN METABOLIC PANEL: CPT

## 2024-06-12 PROCEDURE — 85025 COMPLETE CBC W/AUTO DIFF WBC: CPT

## 2024-06-12 RX ADMIN — INSULIN LISPRO 1 UNITS: 100 INJECTION, SOLUTION INTRAVENOUS; SUBCUTANEOUS at 08:27

## 2024-06-12 RX ADMIN — OXYCODONE HYDROCHLORIDE 5 MG: 5 TABLET ORAL at 21:06

## 2024-06-12 RX ADMIN — METHOCARBAMOL 750 MG: 750 TABLET ORAL at 20:02

## 2024-06-12 RX ADMIN — ATORVASTATIN CALCIUM 40 MG: 40 TABLET, FILM COATED ORAL at 08:27

## 2024-06-12 RX ADMIN — CITALOPRAM 40 MG: 40 TABLET, FILM COATED ORAL at 20:02

## 2024-06-12 RX ADMIN — OXYCODONE HYDROCHLORIDE 5 MG: 5 TABLET ORAL at 03:47

## 2024-06-12 RX ADMIN — SODIUM CHLORIDE, PRESERVATIVE FREE 10 ML: 5 INJECTION INTRAVENOUS at 08:27

## 2024-06-12 RX ADMIN — ENOXAPARIN SODIUM 30 MG: 100 INJECTION SUBCUTANEOUS at 20:02

## 2024-06-12 RX ADMIN — ACETAMINOPHEN 650 MG: 325 TABLET ORAL at 13:11

## 2024-06-12 RX ADMIN — OXYCODONE HYDROCHLORIDE 5 MG: 5 TABLET ORAL at 16:46

## 2024-06-12 RX ADMIN — INSULIN LISPRO 2 UNITS: 100 INJECTION, SOLUTION INTRAVENOUS; SUBCUTANEOUS at 12:04

## 2024-06-12 RX ADMIN — PANTOPRAZOLE SODIUM 20 MG: 20 TABLET, DELAYED RELEASE ORAL at 08:27

## 2024-06-12 RX ADMIN — OXYCODONE HYDROCHLORIDE 5 MG: 5 TABLET ORAL at 13:11

## 2024-06-12 RX ADMIN — ACETAMINOPHEN 650 MG: 325 TABLET ORAL at 20:02

## 2024-06-12 RX ADMIN — HYDROMORPHONE HYDROCHLORIDE 1 MG: 1 INJECTION, SOLUTION INTRAMUSCULAR; INTRAVENOUS; SUBCUTANEOUS at 01:24

## 2024-06-12 RX ADMIN — ACETAMINOPHEN 650 MG: 325 TABLET ORAL at 08:27

## 2024-06-12 RX ADMIN — ACETAMINOPHEN 650 MG: 325 TABLET ORAL at 16:46

## 2024-06-12 RX ADMIN — DIAZEPAM 5 MG: 5 TABLET ORAL at 00:29

## 2024-06-12 RX ADMIN — HYDROMORPHONE HYDROCHLORIDE 1 MG: 1 INJECTION, SOLUTION INTRAMUSCULAR; INTRAVENOUS; SUBCUTANEOUS at 05:34

## 2024-06-12 RX ADMIN — ENOXAPARIN SODIUM 30 MG: 100 INJECTION SUBCUTANEOUS at 08:27

## 2024-06-12 RX ADMIN — OXYCODONE HYDROCHLORIDE 5 MG: 5 TABLET ORAL at 08:27

## 2024-06-12 RX ADMIN — HYDROXYZINE HYDROCHLORIDE 10 MG: 10 TABLET, FILM COATED ORAL at 11:04

## 2024-06-12 RX ADMIN — METHOCARBAMOL 750 MG: 750 TABLET ORAL at 08:27

## 2024-06-12 RX ADMIN — Medication 1 TABLET: at 08:27

## 2024-06-12 RX ADMIN — HYDROMORPHONE HYDROCHLORIDE 1 MG: 1 INJECTION, SOLUTION INTRAMUSCULAR; INTRAVENOUS; SUBCUTANEOUS at 19:12

## 2024-06-12 RX ADMIN — METHOCARBAMOL 750 MG: 750 TABLET ORAL at 13:11

## 2024-06-12 RX ADMIN — HYDROMORPHONE HYDROCHLORIDE 1 MG: 1 INJECTION, SOLUTION INTRAMUSCULAR; INTRAVENOUS; SUBCUTANEOUS at 23:40

## 2024-06-12 RX ADMIN — METHOCARBAMOL 750 MG: 750 TABLET ORAL at 16:46

## 2024-06-12 RX ADMIN — DIAZEPAM 5 MG: 5 TABLET ORAL at 05:55

## 2024-06-12 RX ADMIN — HYDROMORPHONE HYDROCHLORIDE 1 MG: 1 INJECTION, SOLUTION INTRAMUSCULAR; INTRAVENOUS; SUBCUTANEOUS at 11:03

## 2024-06-12 ASSESSMENT — PAIN DESCRIPTION - ONSET
ONSET: ON-GOING

## 2024-06-12 ASSESSMENT — PAIN DESCRIPTION - FREQUENCY
FREQUENCY: CONTINUOUS

## 2024-06-12 ASSESSMENT — PAIN DESCRIPTION - PAIN TYPE
TYPE: ACUTE PAIN

## 2024-06-12 ASSESSMENT — PAIN SCALES - GENERAL
PAINLEVEL_OUTOF10: 6
PAINLEVEL_OUTOF10: 0
PAINLEVEL_OUTOF10: 7
PAINLEVEL_OUTOF10: 7
PAINLEVEL_OUTOF10: 0
PAINLEVEL_OUTOF10: 7
PAINLEVEL_OUTOF10: 6
PAINLEVEL_OUTOF10: 7
PAINLEVEL_OUTOF10: 8
PAINLEVEL_OUTOF10: 10
PAINLEVEL_OUTOF10: 8
PAINLEVEL_OUTOF10: 7
PAINLEVEL_OUTOF10: 6
PAINLEVEL_OUTOF10: 0
PAINLEVEL_OUTOF10: 8
PAINLEVEL_OUTOF10: 9

## 2024-06-12 ASSESSMENT — PAIN DESCRIPTION - DESCRIPTORS
DESCRIPTORS: ACHING
DESCRIPTORS: NAGGING
DESCRIPTORS: ACHING;DISCOMFORT
DESCRIPTORS: NAGGING
DESCRIPTORS: ACHING
DESCRIPTORS: ACHING;DISCOMFORT
DESCRIPTORS: ACHING;DISCOMFORT
DESCRIPTORS: NAGGING;ACHING
DESCRIPTORS: ACHING
DESCRIPTORS: ACHING;DISCOMFORT

## 2024-06-12 ASSESSMENT — PAIN DESCRIPTION - LOCATION
LOCATION: BACK

## 2024-06-12 ASSESSMENT — PAIN - FUNCTIONAL ASSESSMENT
PAIN_FUNCTIONAL_ASSESSMENT: PREVENTS OR INTERFERES SOME ACTIVE ACTIVITIES AND ADLS

## 2024-06-12 ASSESSMENT — PAIN DESCRIPTION - ORIENTATION
ORIENTATION: LOWER
ORIENTATION: LOWER;MID
ORIENTATION: MID;LOWER
ORIENTATION: LOWER
ORIENTATION: MID;LOWER
ORIENTATION: LOWER

## 2024-06-12 NOTE — CARE COORDINATION
CM following: CM met with pt and pt's sister, Charleen, at bedside. CM obtained FMLA and short term disability paperwork from pt's employer and placed this on pt's chart along with notifying neurosurgery via [employer]. CM updated pt on above and also discussed the possible range of service needs at discharge including possible placement options. CM left pt with list of ARU and SNF options if needed. If ARU indicated pt would be most agreeable to [facility] or [facility]. If SNF there are several near the pt's home in [place] she would consider. CM will continue to follow for discharge planning.  Electronically signed by MAXX Caruso on 6/12/2024 at 2:41 PM  [phone]

## 2024-06-12 NOTE — CONSULTS
[facility]  Cardiology Inpatient Consult Service                                                                                          Pt Name: Pretty Covington  Age: 64 y.o.  Sex: female  : 1960  Location: 5528/5528-01    Referring Physician: Janet Villarreal MD      Reason for Consult:     Reason for Consultation/Chief Complaint:   Precardiovascular assessment/nonobstructive coronary disease/hypertension/hyperlipidemia      HPI:      Pretty Covington is a 64 y.o. female with a past medical history of nonobstructive coronary disease, hypertension, hyperlipidemia, presented to hospital after a fall complicated with lumbar fracture.  We are consulted for preop cardiovascular assessment given prior history of coronary disease.  She did have an angiogram in  that showed nonobstructive coronary disease.  Overall feels okay from a cardiovascular perspective.  From a cardiovascular perspective there are no contraindications to proceed with surgery.      Histories     Past Medical History:   has a past medical history of Asthma, Benign essential hypertension, Benign head tremor, Cholelithiasis, Coronary artery disease involving native coronary artery of native heart, Disease of nasal cavity and sinuses, Fatty infiltration of liver, Hot flash, menopausal, Hx of blood clots, Hyperlipidemia, Hyperlipidemia associated with type 2 diabetes mellitus (HCC), Morbid obesity with BMI of 50.0-59.9, adult (HCC), No history of procedure, ELEAZAR on CPAP, Pulmonary embolism (HCC), Type II or unspecified type diabetes mellitus without mention of complication, not stated as uncontrolled, Umbilical hernia, Unspecified sleep apnea, and Wears glasses.    Surgical History:   has a past surgical history that includes Cholecystectomy, laparoscopic (2012); hernia repair (10/11/2013); Colonoscopy (2015); Breast surgery (Right, ); hysteroscopy (2018); Dilation and curettage of uterus  161/94 (!) 158/52 (!) 154/109   Pulse: 88 84 88 87   Resp: 18 18 16 17   Temp: 98.4 °F (36.9 °C) 97.7 °F (36.5 °C) 98.4 °F (36.9 °C) 98.2 °F (36.8 °C)   TempSrc: Temporal Temporal Oral Oral   SpO2: 94% 95% 98% 93%   Weight:       Height:           Wt Readings from Last 3 Encounters:   06/11/24 115.7 kg (255 lb)   06/09/24 116.3 kg (256 lb 8 oz)   02/06/24 121.3 kg (267 lb 6.4 oz)       Objective:  Vital signs: (most recent): Blood pressure (!) 154/109, pulse 87, temperature 98.2 °F (36.8 °C), temperature source Oral, resp. rate 17, height 1.651 m (5' 5\"), weight 115.7 kg (255 lb), last menstrual period 08/20/2014, SpO2 93 %.          General Appearance:  Alert, cooperative, no distress, appears stated age Appropriate weight   Head:  Normocephalic, without obvious abnormality, atraumatic   Eyes:  PERRL, conjunctiva/corneas clear EOM intact  Ears normal   Throat no lesions       Nose: Nares normal, no drainage or sinus tenderness   Throat: Lips, mucosa, and tongue normal   Neck: Supple, symmetrical, trachea midline, no adenopathy, thyroid: not enlarged, symmetric, no tenderness/mass/nodules, no carotid bruit or JVD       Lungs:   Clear to auscultation bilaterally, respirations unlabored   Chest Wall:  No tenderness or deformity   Heart:  Regular rate and rhythm, S1, S2 normal, no murmur, rub or gallop PMI intact   Abdomen:   Soft, non-tender, bowel sounds active all four quadrants,  no masses, no organomegaly       Extremities: Extremities normal, atraumatic, no cyanosis or edema   Pulses: 2+ and symmetric   Skin: Skin color, texture, turgor normal, no rashes or lesions   Pysch: Normal mood and affect   Neurologic: Normal gross motor and sensory exam.  Cranial nerves intact        Labs:     Recent Labs     06/10/24  0000 06/10/24  0634 06/11/24  0642 06/12/24  0755    140 139 138   K 4.0 3.8 3.5 3.7   BUN 18 16 14 12   CREATININE <0.5* <0.5* <0.5* <0.5*    104 104 103   CO2 20* 24 23 25   GLUCOSE 195* 136*

## 2024-06-12 NOTE — PLAN OF CARE
Problem: Discharge Planning  Goal: Discharge to home or other facility with appropriate resources  Outcome: Progressing  Flowsheets (Taken 6/12/2024 1004)  Discharge to home or other facility with appropriate resources:   Identify barriers to discharge with patient and caregiver   Identify discharge learning needs (meds, wound care, etc)  Note: Pt plan to have surgery on Saturday, will have pt/ot evaluation for possible needs for discharge     Problem: Safety - Adult  Goal: Free from fall injury  6/12/2024 1004 by Susan Coyne RN  Outcome: Progressing  Flowsheets (Taken 6/12/2024 1004)  Free From Fall Injury:   Instruct family/caregiver on patient safety   Based on caregiver fall risk screen, instruct family/caregiver to ask for assistance with transferring infant if caregiver noted to have fall risk factors  Note: All safety measures in place      Problem: Pain  Goal: Verbalizes/displays adequate comfort level or baseline comfort level  6/12/2024 1004 by Susan Coyne RN  Outcome: Progressing  Flowsheets (Taken 6/12/2024 1004)  Verbalizes/displays adequate comfort level or baseline comfort level:   Encourage patient to monitor pain and request assistance   Assess pain using appropriate pain scale   Implement non-pharmacological measures as appropriate and evaluate response  Note: Pain controlled with prn pain medication

## 2024-06-12 NOTE — PLAN OF CARE
Problem: Safety - Adult  Goal: Free from fall injury  6/11/2024 2257 by Jeramy Bill RN  Outcome: Progressing    Problem: ABCDS Injury Assessment  Goal: Absence of physical injury  6/11/2024 2257 by Jeramy Bill RN  Outcome: Progressing    Problem: Pain  Goal: Verbalizes/displays adequate comfort level or baseline comfort level  6/11/2024 2257 by Jeramy Bill RN  Outcome: Progressing    Problem: Chronic Conditions and Co-morbidities  Goal: Patient's chronic conditions and co-morbidity symptoms are monitored and maintained or improved  6/11/2024 2257 by Jeramy Bill RN  Outcome: Progressing    Problem: Skin/Tissue Integrity  Goal: Absence of new skin breakdown  Description: 1.  Monitor for areas of redness and/or skin breakdown  2.  Assess vascular access sites hourly  3.  Every 4-6 hours minimum:  Change oxygen saturation probe site  4.  Every 4-6 hours:  If on nasal continuous positive airway pressure, respiratory therapy assess nares and determine need for appliance change or resting period.  Outcome: Progressing
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
## 2024-06-12 NOTE — PROGRESS NOTES
NEUROSURGERY PROGRESS NOTE    Patient Name: Pretty Covington YOB: 1960   Sex: Female Age: 64 yrs     Medical Record Number: [id number] Acct Number: [id number]   Room Number: 5528/5528-01 Hospital Day: Hospital Day: 3     Interval History:  Pt fell and has an unstable L1 fx    Subjective: Pt states she does have some back pain worse when she moves. Denies any radicular pain.    Objective:    VITAL SIGNS   BP (!) 154/109   Pulse 87   Temp 98.2 °F (36.8 °C) (Oral)   Resp 17   Ht 1.651 m (5' 5\")   Wt 115.7 kg (255 lb)   LMP 08/20/2014   SpO2 93%   BMI 42.43 kg/m²    Height Height: 165.1 cm (5' 5\")   Weight Weight - Scale: 115.7 kg (255 lb)        Allergies Allergies   Allergen Reactions    Lisinopril Other (See Comments)     cough    Penicillins Other (See Comments)     Unsure of reaction--childhood    Sulfa Antibiotics Other (See Comments)     Unsure of reaction      NPO Status ADULT DIET; Regular; 4 carb choices (60 gm/meal)   Isolation No active isolations     LABS   Basic Metabolic Profile Recent Labs     06/10/24  0634 06/11/24  0642 06/12/24  0755    139 138    104 103   CO2 24 23 25   BUN 16 14 12   CREATININE <0.5* <0.5* <0.5*   GLUCOSE 136* 177* 192*   MG  --  2.00  --         Complete Blood Count Recent Labs     06/10/24  0000 06/11/24  0642 06/12/24  0755   WBC 11.6* 7.2 6.6   RBC 4.85 4.37 4.34        Coagulation Studies No results for input(s): \"INR\" in the last 72 hours.    Invalid input(s): \"PLATELETS\", \"PROA\", \"PT\", \"PTTA\", \"PTT\"     MEDICATIONS   Inpatient Medications     sodium chloride flush, 5-40 mL, IntraVENous, 2 times per day    atorvastatin, 40 mg, Oral, Daily    citalopram, 40 mg, Oral, Nightly    pantoprazole, 20 mg, Oral, Daily    therapeutic multivitamin-minerals, 1 tablet, Oral, Daily    insulin

## 2024-06-12 NOTE — PROGRESS NOTES
Patient is alert and oriented. Vital signs are stable. Patient's pain is controlled with medication per MAR. Purewick is in place and has adequate urine output. Patient is on strict bedrest. Patient moved onto specialty mattress. Bed is in the lowest position. Bed alarm is activated. Call light is within reach. Will continue to monitor and reassess.

## 2024-06-13 ENCOUNTER — ANESTHESIA EVENT (OUTPATIENT)
Dept: OPERATING ROOM | Age: 64
End: 2024-06-13
Payer: COMMERCIAL

## 2024-06-13 LAB
ALBUMIN SERPL-MCNC: 3.6 G/DL (ref 3.4–5)
ALBUMIN/GLOB SERPL: 1.2 {RATIO} (ref 1.1–2.2)
ALP SERPL-CCNC: 70 U/L (ref 40–129)
ALT SERPL-CCNC: 12 U/L (ref 10–40)
ANION GAP SERPL CALCULATED.3IONS-SCNC: 12 MMOL/L (ref 3–16)
AST SERPL-CCNC: 35 U/L (ref 15–37)
BASOPHILS # BLD: 0 K/UL (ref 0–0.2)
BASOPHILS NFR BLD: 0.9 %
BILIRUB SERPL-MCNC: 0.6 MG/DL (ref 0–1)
BUN SERPL-MCNC: 14 MG/DL (ref 7–20)
CALCIUM SERPL-MCNC: 8.9 MG/DL (ref 8.3–10.6)
CHLORIDE SERPL-SCNC: 103 MMOL/L (ref 99–110)
CO2 SERPL-SCNC: 25 MMOL/L (ref 21–32)
CREAT SERPL-MCNC: <0.5 MG/DL (ref 0.6–1.2)
DEPRECATED RDW RBC AUTO: 13.8 % (ref 12.4–15.4)
EOSINOPHIL # BLD: 0.2 K/UL (ref 0–0.6)
EOSINOPHIL NFR BLD: 4 %
GFR SERPLBLD CREATININE-BSD FMLA CKD-EPI: >90 ML/MIN/{1.73_M2}
GLUCOSE BLD-MCNC: 186 MG/DL (ref 70–99)
GLUCOSE BLD-MCNC: 214 MG/DL (ref 70–99)
GLUCOSE BLD-MCNC: 216 MG/DL (ref 70–99)
GLUCOSE BLD-MCNC: 234 MG/DL (ref 70–99)
GLUCOSE SERPL-MCNC: 229 MG/DL (ref 70–99)
HCT VFR BLD AUTO: 39.3 % (ref 36–48)
HGB BLD-MCNC: 13.8 G/DL (ref 12–16)
LYMPHOCYTES # BLD: 0.9 K/UL (ref 1–5.1)
LYMPHOCYTES NFR BLD: 17.5 %
MCH RBC QN AUTO: 31.4 PG (ref 26–34)
MCHC RBC AUTO-ENTMCNC: 35 G/DL (ref 31–36)
MCV RBC AUTO: 89.7 FL (ref 80–100)
MONOCYTES # BLD: 0.5 K/UL (ref 0–1.3)
MONOCYTES NFR BLD: 9.9 %
NEUTROPHILS # BLD: 3.5 K/UL (ref 1.7–7.7)
NEUTROPHILS NFR BLD: 67.7 %
PERFORMED ON: ABNORMAL
PLATELET # BLD AUTO: 151 K/UL (ref 135–450)
PMV BLD AUTO: 8 FL (ref 5–10.5)
POTASSIUM SERPL-SCNC: 4.1 MMOL/L (ref 3.5–5.1)
PROT SERPL-MCNC: 6.5 G/DL (ref 6.4–8.2)
RBC # BLD AUTO: 4.38 M/UL (ref 4–5.2)
SODIUM SERPL-SCNC: 140 MMOL/L (ref 136–145)
WBC # BLD AUTO: 5.2 K/UL (ref 4–11)

## 2024-06-13 PROCEDURE — 85025 COMPLETE CBC W/AUTO DIFF WBC: CPT

## 2024-06-13 PROCEDURE — 6360000002 HC RX W HCPCS: Performed by: HOSPITALIST

## 2024-06-13 PROCEDURE — 2580000003 HC RX 258: Performed by: FAMILY MEDICINE

## 2024-06-13 PROCEDURE — 6370000000 HC RX 637 (ALT 250 FOR IP): Performed by: HOSPITALIST

## 2024-06-13 PROCEDURE — 6370000000 HC RX 637 (ALT 250 FOR IP): Performed by: INTERNAL MEDICINE

## 2024-06-13 PROCEDURE — 36415 COLL VENOUS BLD VENIPUNCTURE: CPT

## 2024-06-13 PROCEDURE — 2580000003 HC RX 258: Performed by: NURSE PRACTITIONER

## 2024-06-13 PROCEDURE — 6360000002 HC RX W HCPCS: Performed by: FAMILY MEDICINE

## 2024-06-13 PROCEDURE — 1200000000 HC SEMI PRIVATE

## 2024-06-13 PROCEDURE — 6370000000 HC RX 637 (ALT 250 FOR IP): Performed by: NURSE PRACTITIONER

## 2024-06-13 PROCEDURE — APPNB30 APP NON BILLABLE TIME 0-30 MINS: Performed by: NURSE PRACTITIONER

## 2024-06-13 PROCEDURE — 80053 COMPREHEN METABOLIC PANEL: CPT

## 2024-06-13 PROCEDURE — 6370000000 HC RX 637 (ALT 250 FOR IP): Performed by: FAMILY MEDICINE

## 2024-06-13 RX ORDER — SODIUM CHLORIDE 0.9 % (FLUSH) 0.9 %
5-40 SYRINGE (ML) INJECTION PRN
Status: DISCONTINUED | OUTPATIENT
Start: 2024-06-13 | End: 2024-06-14 | Stop reason: HOSPADM

## 2024-06-13 RX ORDER — SODIUM CHLORIDE 9 MG/ML
INJECTION, SOLUTION INTRAVENOUS CONTINUOUS
Status: DISCONTINUED | OUTPATIENT
Start: 2024-06-14 | End: 2024-06-14 | Stop reason: SDUPTHER

## 2024-06-13 RX ORDER — SODIUM CHLORIDE 9 MG/ML
INJECTION, SOLUTION INTRAVENOUS CONTINUOUS
Status: DISCONTINUED | OUTPATIENT
Start: 2024-06-13 | End: 2024-06-13

## 2024-06-13 RX ORDER — SODIUM CHLORIDE 0.9 % (FLUSH) 0.9 %
5-40 SYRINGE (ML) INJECTION EVERY 12 HOURS SCHEDULED
Status: DISCONTINUED | OUTPATIENT
Start: 2024-06-13 | End: 2024-06-14 | Stop reason: HOSPADM

## 2024-06-13 RX ORDER — SODIUM CHLORIDE 9 MG/ML
INJECTION, SOLUTION INTRAVENOUS PRN
Status: DISCONTINUED | OUTPATIENT
Start: 2024-06-13 | End: 2024-06-14 | Stop reason: HOSPADM

## 2024-06-13 RX ADMIN — OXYCODONE HYDROCHLORIDE 5 MG: 5 TABLET ORAL at 20:54

## 2024-06-13 RX ADMIN — ACETAMINOPHEN 650 MG: 325 TABLET ORAL at 20:54

## 2024-06-13 RX ADMIN — METHOCARBAMOL 750 MG: 750 TABLET ORAL at 20:54

## 2024-06-13 RX ADMIN — SODIUM CHLORIDE, PRESERVATIVE FREE 10 ML: 5 INJECTION INTRAVENOUS at 21:00

## 2024-06-13 RX ADMIN — ACETAMINOPHEN 650 MG: 325 TABLET ORAL at 08:50

## 2024-06-13 RX ADMIN — SODIUM CHLORIDE: 9 INJECTION, SOLUTION INTRAVENOUS at 23:39

## 2024-06-13 RX ADMIN — OXYCODONE HYDROCHLORIDE 5 MG: 5 TABLET ORAL at 13:30

## 2024-06-13 RX ADMIN — Medication 1 TABLET: at 08:50

## 2024-06-13 RX ADMIN — HYDROMORPHONE HYDROCHLORIDE 1 MG: 1 INJECTION, SOLUTION INTRAMUSCULAR; INTRAVENOUS; SUBCUTANEOUS at 05:17

## 2024-06-13 RX ADMIN — ENOXAPARIN SODIUM 30 MG: 100 INJECTION SUBCUTANEOUS at 08:50

## 2024-06-13 RX ADMIN — HYDROXYZINE HYDROCHLORIDE 10 MG: 10 TABLET, FILM COATED ORAL at 11:27

## 2024-06-13 RX ADMIN — SODIUM CHLORIDE, PRESERVATIVE FREE 10 ML: 5 INJECTION INTRAVENOUS at 20:56

## 2024-06-13 RX ADMIN — METHOCARBAMOL 750 MG: 750 TABLET ORAL at 08:50

## 2024-06-13 RX ADMIN — PANTOPRAZOLE SODIUM 20 MG: 20 TABLET, DELAYED RELEASE ORAL at 08:50

## 2024-06-13 RX ADMIN — SODIUM CHLORIDE, PRESERVATIVE FREE 10 ML: 5 INJECTION INTRAVENOUS at 20:57

## 2024-06-13 RX ADMIN — DIAZEPAM 5 MG: 5 TABLET ORAL at 11:27

## 2024-06-13 RX ADMIN — ATORVASTATIN CALCIUM 40 MG: 40 TABLET, FILM COATED ORAL at 08:50

## 2024-06-13 RX ADMIN — HYDROMORPHONE HYDROCHLORIDE 1 MG: 1 INJECTION, SOLUTION INTRAMUSCULAR; INTRAVENOUS; SUBCUTANEOUS at 11:27

## 2024-06-13 RX ADMIN — INSULIN LISPRO 1 UNITS: 100 INJECTION, SOLUTION INTRAVENOUS; SUBCUTANEOUS at 17:46

## 2024-06-13 RX ADMIN — ACETAMINOPHEN 650 MG: 325 TABLET ORAL at 13:31

## 2024-06-13 RX ADMIN — SODIUM CHLORIDE, PRESERVATIVE FREE 10 ML: 5 INJECTION INTRAVENOUS at 08:55

## 2024-06-13 RX ADMIN — CITALOPRAM 40 MG: 40 TABLET, FILM COATED ORAL at 20:54

## 2024-06-13 RX ADMIN — HYDROMORPHONE HYDROCHLORIDE 1 MG: 1 INJECTION, SOLUTION INTRAMUSCULAR; INTRAVENOUS; SUBCUTANEOUS at 16:47

## 2024-06-13 RX ADMIN — OXYCODONE HYDROCHLORIDE 5 MG: 5 TABLET ORAL at 09:02

## 2024-06-13 RX ADMIN — INSULIN LISPRO 1 UNITS: 100 INJECTION, SOLUTION INTRAVENOUS; SUBCUTANEOUS at 08:51

## 2024-06-13 RX ADMIN — SODIUM CHLORIDE, PRESERVATIVE FREE 10 ML: 5 INJECTION INTRAVENOUS at 08:56

## 2024-06-13 RX ADMIN — METHOCARBAMOL 750 MG: 750 TABLET ORAL at 13:30

## 2024-06-13 RX ADMIN — ACETAMINOPHEN 650 MG: 325 TABLET ORAL at 16:47

## 2024-06-13 RX ADMIN — METHOCARBAMOL 750 MG: 750 TABLET ORAL at 16:47

## 2024-06-13 RX ADMIN — OXYCODONE HYDROCHLORIDE 5 MG: 5 TABLET ORAL at 04:22

## 2024-06-13 ASSESSMENT — PAIN DESCRIPTION - LOCATION
LOCATION: BACK

## 2024-06-13 ASSESSMENT — PAIN SCALES - GENERAL
PAINLEVEL_OUTOF10: 4
PAINLEVEL_OUTOF10: 5
PAINLEVEL_OUTOF10: 3
PAINLEVEL_OUTOF10: 7
PAINLEVEL_OUTOF10: 5
PAINLEVEL_OUTOF10: 7
PAINLEVEL_OUTOF10: 5
PAINLEVEL_OUTOF10: 8
PAINLEVEL_OUTOF10: 5
PAINLEVEL_OUTOF10: 5
PAINLEVEL_OUTOF10: 6
PAINLEVEL_OUTOF10: 5
PAINLEVEL_OUTOF10: 8
PAINLEVEL_OUTOF10: 6
PAINLEVEL_OUTOF10: 8

## 2024-06-13 ASSESSMENT — PAIN DESCRIPTION - FREQUENCY
FREQUENCY: CONTINUOUS

## 2024-06-13 ASSESSMENT — PAIN DESCRIPTION - ORIENTATION
ORIENTATION: MID;LOWER
ORIENTATION: MID
ORIENTATION: MID;LOWER
ORIENTATION: MID;LOWER
ORIENTATION: MID

## 2024-06-13 ASSESSMENT — PAIN DESCRIPTION - ONSET
ONSET: ON-GOING

## 2024-06-13 ASSESSMENT — PAIN DESCRIPTION - PAIN TYPE
TYPE: ACUTE PAIN

## 2024-06-13 ASSESSMENT — PAIN DESCRIPTION - DESCRIPTORS
DESCRIPTORS: NAGGING;ACHING
DESCRIPTORS: NAGGING
DESCRIPTORS: ACHING
DESCRIPTORS: ACHING;SHARP;DISCOMFORT
DESCRIPTORS: STABBING
DESCRIPTORS: ACHING;SHARP;DISCOMFORT
DESCRIPTORS: ACHING;SHARP;DISCOMFORT

## 2024-06-13 ASSESSMENT — PAIN - FUNCTIONAL ASSESSMENT
PAIN_FUNCTIONAL_ASSESSMENT: PREVENTS OR INTERFERES SOME ACTIVE ACTIVITIES AND ADLS

## 2024-06-13 NOTE — PLAN OF CARE
Problem: Discharge Planning  Goal: Discharge to home or other facility with appropriate resources  6/13/2024 1958 by Brenda Marcos RN  Outcome: Progressing     Problem: Safety - Adult  Goal: Free from fall injury  6/13/2024 1958 by Brenda Marcos RN  Outcome: Progressing     Problem: ABCDS Injury Assessment  Goal: Absence of physical injury  6/13/2024 1958 by Brenda Marcos RN  Outcome: Progressing     Problem: Pain  Goal: Verbalizes/displays adequate comfort level or baseline comfort level  6/13/2024 1958 by Brenda Marcos RN  Outcome: Progressing     Problem: Chronic Conditions and Co-morbidities  Goal: Patient's chronic conditions and co-morbidity symptoms are monitored and maintained or improved  6/13/2024 1958 by Brenda Marcos RN  Outcome: Progressing
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
## 2024-06-13 NOTE — PROGRESS NOTES
Neurosurgery Progress Note    Patient seen and examined on 06/12/24. No acute events overnight.  Neurologically intact on exam.      A/P: 64-year-old woman with L1 fracture dislocation related to ankylosing spondylitis, JODI E    -Neuro stable  -Pain control with PO meds  -Custom TLSO ordered  -Hold anticoagulation pending surgical intervention  -Medical clearance for surgery  -Bedrest  -Will plan for T11-L3 D/F/F likely on Friday  -DVT ppx  -Will follow closely.      Ebenezer Campoverde MD, PhD  [address], [address]  [place], [place], [address] [phone] (c), [phone] (o)

## 2024-06-13 NOTE — PROGRESS NOTES
NEUROSURGERY PROGRESS NOTE    Patient Name: Pretty Covington YOB: 1960   Sex: Female Age: 64 yrs     Medical Record Number: [id number] Acct Number: [id number]   Room Number: 5528/5528-01 Hospital Day: Hospital Day: 4     Interval History:  Pt fell and has an unstable L1 fx, she is neurologically intact    Subjective: Pt has pain when she moves around in her back not down her legs.  Objective:    VITAL SIGNS   BP (!) 161/85   Pulse 85   Temp 97.9 °F (36.6 °C) (Axillary)   Resp 18   Ht 1.651 m (5' 5\")   Wt 115.7 kg (255 lb)   LMP 08/20/2014   SpO2 94%   BMI 42.43 kg/m²    Height Height: 165.1 cm (5' 5\")   Weight Weight - Scale: 115.7 kg (255 lb)        Allergies Allergies   Allergen Reactions    Lisinopril Other (See Comments)     cough    Penicillins Other (See Comments)     Unsure of reaction--childhood    Sulfa Antibiotics Other (See Comments)     Unsure of reaction      NPO Status ADULT DIET; Regular; 4 carb choices (60 gm/meal)   Isolation No active isolations     LABS   Basic Metabolic Profile Recent Labs     06/11/24  0642 06/12/24  0755    138    103   CO2 23 25   BUN 14 12   CREATININE <0.5* <0.5*   GLUCOSE 177* 192*   MG 2.00  --         Complete Blood Count Recent Labs     06/11/24  0642 06/12/24  0755 06/13/24  0705   WBC 7.2 6.6 5.2   RBC 4.37 4.34 4.38        Coagulation Studies No results for input(s): \"INR\" in the last 72 hours.    Invalid input(s): \"PLATELETS\", \"PROA\", \"PT\", \"PTTA\", \"PTT\"     MEDICATIONS   Inpatient Medications     sodium chloride flush, 5-40 mL, IntraVENous, 2 times per day    atorvastatin, 40 mg, Oral, Daily    citalopram, 40 mg, Oral, Nightly    pantoprazole, 20 mg, Oral, Daily    therapeutic multivitamin-minerals, 1 tablet, Oral, Daily    insulin lispro, 0-4 Units, SubCUTAneous, TID WC

## 2024-06-13 NOTE — PROGRESS NOTES
Pt is alert and oriented. VSS with the exception of elevated B/P. Medicated pt per orders for pain 7-8/10 on pain scale. Provided yanira-care. Washed abd pannus with soap and water and replaced inter-dry. Barrier cream to yanira-area. Replaced purwick, chucks pad and brief. Neuro checks WDL. No c/o numbness/tingling above baseline neuropathy. All safety measures in place. Will continue to monitor.

## 2024-06-13 NOTE — PROGRESS NOTES
Patient is A&Ox 4.     Vitals:    06/13/24 1549   BP: (!) 159/74   Pulse: 77   Resp: 16   Temp: 98 °F (36.7 °C)   SpO2: 94%      Patient has endorsed pain to lumbar spinal region managed well per MAR and non-pharm measures. Patient is tolerating PO diet. Tolerating on strict bed rest. Voiding via external urinary catheter. Patient updated on plan of care. Fall and safety precautions in place, call light within reach.

## 2024-06-13 NOTE — PLAN OF CARE
Problem: Safety - Adult  Goal: Free from fall injury  6/12/2024 2153 by Jose Daniel Roberts, RN  Outcome: Progressing   Pt remains free from injury during this shift. Pt is currently on bedrest. Pt is encourage to call for all assistance. Call light is in reach, bed alarm is activated for safety, bed locked and in lowest position. Will continue to monitor.    Problem: Pain  Goal: Verbalizes/displays adequate comfort level or baseline comfort level  6/12/2024 2153 by Jose Daniel Roberts, RN  Outcome: Progressing   Medicated pt per orders, please see e-Mar. Encourage pt to call if pain increases. Will continue to monitor.

## 2024-06-13 NOTE — CARE COORDINATION
CM following: CM met with pt at bedside. Pt is from home with sister, RASHEEDA. Pt is now planned for surgical intervention with neurosurgery on Friday at 3:30 pm. Pt pleased with surgery being earlier than previously expected. CM will continue to follow for discharge planning.  Electronically signed by MAXX Caruso on 6/13/2024 at 3:06 PM  [phone]

## 2024-06-13 NOTE — ANESTHESIA PRE PROCEDURE
>60 07/29/2022 08:17 PM    GFRAA >60 06/10/2011 09:23 PM    AGRATIO 1.2 06/13/2024 07:05 AM    LABGLOM >90 06/13/2024 07:05 AM    LABGLOM 72 04/17/2024 04:03 PM    GLUCOSE 229 06/13/2024 07:05 AM    GLUCOSE 165 04/10/2012 05:50 PM    PROT 6.7 04/10/2012 05:50 PM    CALCIUM 8.9 06/13/2024 07:05 AM    BILITOT 0.6 06/13/2024 07:05 AM    ALKPHOS 70 06/13/2024 07:05 AM    AST 35 06/13/2024 07:05 AM    ALT 12 06/13/2024 07:05 AM       POC Tests:   Recent Labs     06/13/24  1117   POCGLU 186*       Coags:   Lab Results   Component Value Date/Time    PROTIME 15.5 04/17/2024 04:03 PM    PROTIME 15.6 07/27/2012 12:00 AM    INR 1.21 04/17/2024 04:03 PM    APTT 30.1 03/15/2023 06:40 AM       HCG (If Applicable):   Lab Results   Component Value Date    PREGTESTUR Negative 10/11/2013        ABGs: No results found for: \"PHART\", \"PO2ART\", \"IXX5CWC\", \"KUV3SYL\", \"BEART\", \"L6IZMYIK\"     Type & Screen (If Applicable):  No results found for: \"LABABO\"    Drug/Infectious Status (If Applicable):  No results found for: \"HIV\", \"HEPCAB\"    COVID-19 Screening (If Applicable):   Lab Results   Component Value Date/Time    COVID19 Not Detected 07/27/2022 02:45 PM           Anesthesia Evaluation    Airway: Mallampati: II  TM distance: >3 FB   Neck ROM: full  Mouth opening: > = 3 FB   Dental:          Pulmonary:   (+)     sleep apnea: on CPAP,       asthma (rescure albuterol): allergic asthma,                            Cardiovascular:    (+) hypertension:, CAD (81 mg ASA):, CHF:    (-)  angina             ROS comment: Echo 07/2022 Summary   LV systolic function is normal with Ef estimated at 55%.   No regional wall motion abnormalities.   There is mild concentric left ventricular hypertrophy.   Grade I diastolic dysfunction with normal left ventricular filling pressure.   Inadequate tricuspid regurgitation jet to estimate systolic artery pressure   (SPAP).       Neuro/Psych:      (-) seizures and CVA           GI/Hepatic/Renal:        (-) liver

## 2024-06-13 NOTE — PLAN OF CARE
Problem: Safety - Adult  Goal: Free from fall injury  6/13/2024 1123 by Yessi Madden RN  Outcome: Progressing  Note: Patient is on strict bedrest, proper safety precautions have been instructed to patient. Bed alarm is on, call light is within reach.     Problem: Pain  Goal: Verbalizes/displays adequate comfort level or baseline comfort level  6/13/2024 1123 by Yessi Madden RN  Outcome: Progressing  Note: Patient is encouraged to monitor pain and request assistance. Appropriate pain scale is being used.

## 2024-06-14 ENCOUNTER — APPOINTMENT (OUTPATIENT)
Dept: GENERAL RADIOLOGY | Age: 64
End: 2024-06-14
Attending: FAMILY MEDICINE
Payer: COMMERCIAL

## 2024-06-14 ENCOUNTER — ANESTHESIA (OUTPATIENT)
Dept: OPERATING ROOM | Age: 64
End: 2024-06-14
Payer: COMMERCIAL

## 2024-06-14 LAB
ABO + RH BLD: NORMAL
ALBUMIN SERPL-MCNC: 3.7 G/DL (ref 3.4–5)
ALBUMIN/GLOB SERPL: 1.4 {RATIO} (ref 1.1–2.2)
ALP SERPL-CCNC: 72 U/L (ref 40–129)
ALT SERPL-CCNC: 13 U/L (ref 10–40)
ANION GAP SERPL CALCULATED.3IONS-SCNC: 12 MMOL/L (ref 3–16)
AST SERPL-CCNC: 34 U/L (ref 15–37)
BASOPHILS # BLD: 0 K/UL (ref 0–0.2)
BASOPHILS NFR BLD: 0.8 %
BILIRUB SERPL-MCNC: 0.6 MG/DL (ref 0–1)
BLD GP AB SCN SERPL QL: NORMAL
BUN SERPL-MCNC: 15 MG/DL (ref 7–20)
CALCIUM SERPL-MCNC: 8.9 MG/DL (ref 8.3–10.6)
CHLORIDE SERPL-SCNC: 103 MMOL/L (ref 99–110)
CO2 SERPL-SCNC: 27 MMOL/L (ref 21–32)
CREAT SERPL-MCNC: <0.5 MG/DL (ref 0.6–1.2)
DEPRECATED RDW RBC AUTO: 14 % (ref 12.4–15.4)
EOSINOPHIL # BLD: 0.2 K/UL (ref 0–0.6)
EOSINOPHIL NFR BLD: 4.4 %
GFR SERPLBLD CREATININE-BSD FMLA CKD-EPI: >90 ML/MIN/{1.73_M2}
GLUCOSE BLD-MCNC: 172 MG/DL (ref 70–99)
GLUCOSE BLD-MCNC: 201 MG/DL (ref 70–99)
GLUCOSE BLD-MCNC: 232 MG/DL (ref 70–99)
GLUCOSE BLD-MCNC: 233 MG/DL (ref 70–99)
GLUCOSE SERPL-MCNC: 236 MG/DL (ref 70–99)
HCT VFR BLD AUTO: 38.9 % (ref 36–48)
HGB BLD-MCNC: 13.5 G/DL (ref 12–16)
INR PPP: 1.09 (ref 0.85–1.15)
LYMPHOCYTES # BLD: 0.8 K/UL (ref 1–5.1)
LYMPHOCYTES NFR BLD: 18 %
MCH RBC QN AUTO: 31.2 PG (ref 26–34)
MCHC RBC AUTO-ENTMCNC: 34.8 G/DL (ref 31–36)
MCV RBC AUTO: 89.7 FL (ref 80–100)
MONOCYTES # BLD: 0.5 K/UL (ref 0–1.3)
MONOCYTES NFR BLD: 10.1 %
NEUTROPHILS # BLD: 3 K/UL (ref 1.7–7.7)
NEUTROPHILS NFR BLD: 66.7 %
PERFORMED ON: ABNORMAL
PLATELET # BLD AUTO: 170 K/UL (ref 135–450)
PMV BLD AUTO: 8.2 FL (ref 5–10.5)
POTASSIUM SERPL-SCNC: 3.7 MMOL/L (ref 3.5–5.1)
PROT SERPL-MCNC: 6.3 G/DL (ref 6.4–8.2)
PROTHROMBIN TIME: 14.3 SEC (ref 11.9–14.9)
RBC # BLD AUTO: 4.33 M/UL (ref 4–5.2)
SODIUM SERPL-SCNC: 142 MMOL/L (ref 136–145)
WBC # BLD AUTO: 4.5 K/UL (ref 4–11)

## 2024-06-14 PROCEDURE — 2580000003 HC RX 258

## 2024-06-14 PROCEDURE — 2500000003 HC RX 250 WO HCPCS: Performed by: NURSE ANESTHETIST, CERTIFIED REGISTERED

## 2024-06-14 PROCEDURE — C9290 INJ, BUPIVACAINE LIPOSOME: HCPCS | Performed by: NEUROLOGICAL SURGERY

## 2024-06-14 PROCEDURE — 6370000000 HC RX 637 (ALT 250 FOR IP): Performed by: PHYSICIAN ASSISTANT

## 2024-06-14 PROCEDURE — A4217 STERILE WATER/SALINE, 500 ML: HCPCS | Performed by: NEUROLOGICAL SURGERY

## 2024-06-14 PROCEDURE — 6360000002 HC RX W HCPCS: Performed by: PHYSICIAN ASSISTANT

## 2024-06-14 PROCEDURE — 86900 BLOOD TYPING SEROLOGIC ABO: CPT

## 2024-06-14 PROCEDURE — 6370000000 HC RX 637 (ALT 250 FOR IP): Performed by: ANESTHESIOLOGY

## 2024-06-14 PROCEDURE — 86850 RBC ANTIBODY SCREEN: CPT

## 2024-06-14 PROCEDURE — 6360000002 HC RX W HCPCS: Performed by: ANESTHESIOLOGY

## 2024-06-14 PROCEDURE — 1200000000 HC SEMI PRIVATE

## 2024-06-14 PROCEDURE — 36415 COLL VENOUS BLD VENIPUNCTURE: CPT

## 2024-06-14 PROCEDURE — 0RGA0K1 FUSION OF THORACOLUMBAR VERTEBRAL JOINT WITH NONAUTOLOGOUS TISSUE SUBSTITUTE, POSTERIOR APPROACH, POSTERIOR COLUMN, OPEN APPROACH: ICD-10-PCS | Performed by: NEUROLOGICAL SURGERY

## 2024-06-14 PROCEDURE — 3600000014 HC SURGERY LEVEL 4 ADDTL 15MIN: Performed by: NEUROLOGICAL SURGERY

## 2024-06-14 PROCEDURE — APPNB30 APP NON BILLABLE TIME 0-30 MINS: Performed by: NURSE PRACTITIONER

## 2024-06-14 PROCEDURE — 85025 COMPLETE CBC W/AUTO DIFF WBC: CPT

## 2024-06-14 PROCEDURE — 6360000002 HC RX W HCPCS

## 2024-06-14 PROCEDURE — 2709999900 HC NON-CHARGEABLE SUPPLY: Performed by: NEUROLOGICAL SURGERY

## 2024-06-14 PROCEDURE — 8E0WXBF COMPUTER ASSISTED PROCEDURE OF TRUNK REGION, WITH FLUOROSCOPY: ICD-10-PCS | Performed by: NEUROLOGICAL SURGERY

## 2024-06-14 PROCEDURE — C1713 ANCHOR/SCREW BN/BN,TIS/BN: HCPCS | Performed by: NEUROLOGICAL SURGERY

## 2024-06-14 PROCEDURE — 86901 BLOOD TYPING SEROLOGIC RH(D): CPT

## 2024-06-14 PROCEDURE — 0RG60K1 FUSION OF THORACIC VERTEBRAL JOINT WITH NONAUTOLOGOUS TISSUE SUBSTITUTE, POSTERIOR APPROACH, POSTERIOR COLUMN, OPEN APPROACH: ICD-10-PCS | Performed by: NEUROLOGICAL SURGERY

## 2024-06-14 PROCEDURE — 3700000000 HC ANESTHESIA ATTENDED CARE: Performed by: NEUROLOGICAL SURGERY

## 2024-06-14 PROCEDURE — 2500000003 HC RX 250 WO HCPCS

## 2024-06-14 PROCEDURE — 6370000000 HC RX 637 (ALT 250 FOR IP): Performed by: HOSPITALIST

## 2024-06-14 PROCEDURE — 2580000003 HC RX 258: Performed by: PHYSICIAN ASSISTANT

## 2024-06-14 PROCEDURE — 6360000002 HC RX W HCPCS: Performed by: NEUROLOGICAL SURGERY

## 2024-06-14 PROCEDURE — 72100 X-RAY EXAM L-S SPINE 2/3 VWS: CPT

## 2024-06-14 PROCEDURE — 6360000002 HC RX W HCPCS: Performed by: NURSE ANESTHETIST, CERTIFIED REGISTERED

## 2024-06-14 PROCEDURE — 6370000000 HC RX 637 (ALT 250 FOR IP): Performed by: NURSE PRACTITIONER

## 2024-06-14 PROCEDURE — 2780000010 HC IMPLANT OTHER: Performed by: NEUROLOGICAL SURGERY

## 2024-06-14 PROCEDURE — 2580000003 HC RX 258: Performed by: FAMILY MEDICINE

## 2024-06-14 PROCEDURE — 2720000010 HC SURG SUPPLY STERILE: Performed by: NEUROLOGICAL SURGERY

## 2024-06-14 PROCEDURE — 3700000001 HC ADD 15 MINUTES (ANESTHESIA): Performed by: NEUROLOGICAL SURGERY

## 2024-06-14 PROCEDURE — 6360000002 HC RX W HCPCS: Performed by: FAMILY MEDICINE

## 2024-06-14 PROCEDURE — 6370000000 HC RX 637 (ALT 250 FOR IP): Performed by: FAMILY MEDICINE

## 2024-06-14 PROCEDURE — 3600000004 HC SURGERY LEVEL 4 BASE: Performed by: NEUROLOGICAL SURGERY

## 2024-06-14 PROCEDURE — 2580000003 HC RX 258: Performed by: NEUROLOGICAL SURGERY

## 2024-06-14 PROCEDURE — 80053 COMPREHEN METABOLIC PANEL: CPT

## 2024-06-14 PROCEDURE — 85610 PROTHROMBIN TIME: CPT

## 2024-06-14 PROCEDURE — 2580000003 HC RX 258: Performed by: NURSE PRACTITIONER

## 2024-06-14 PROCEDURE — 7100000000 HC PACU RECOVERY - FIRST 15 MIN: Performed by: NEUROLOGICAL SURGERY

## 2024-06-14 PROCEDURE — 0SG10K1 FUSION OF 2 OR MORE LUMBAR VERTEBRAL JOINTS WITH NONAUTOLOGOUS TISSUE SUBSTITUTE, POSTERIOR APPROACH, POSTERIOR COLUMN, OPEN APPROACH: ICD-10-PCS | Performed by: NEUROLOGICAL SURGERY

## 2024-06-14 PROCEDURE — 7100000001 HC PACU RECOVERY - ADDTL 15 MIN: Performed by: NEUROLOGICAL SURGERY

## 2024-06-14 DEVICE — ROD 1553200500 5.5 TICP4 NS STR LN 500MM
Type: IMPLANTABLE DEVICE | Site: SPINE LUMBAR | Status: FUNCTIONAL
Brand: CD HORIZON® SPINAL SYSTEM

## 2024-06-14 DEVICE — DBM 7509141 MAGNIFUSE 1.75 X 10CM
Type: IMPLANTABLE DEVICE | Site: SPINE LUMBAR | Status: FUNCTIONAL
Brand: MAGNIFUSE® BONE GRAFT

## 2024-06-14 DEVICE — DBM 7509145 MAGNIFUSE 1.75 X 5 CM
Type: IMPLANTABLE DEVICE | Site: SPINE LUMBAR | Status: FUNCTIONAL
Brand: MAGNIFUSE® BONE GRAFT

## 2024-06-14 RX ORDER — METHOCARBAMOL 100 MG/ML
1000 INJECTION, SOLUTION INTRAMUSCULAR; INTRAVENOUS EVERY 8 HOURS PRN
Status: COMPLETED | OUTPATIENT
Start: 2024-06-14 | End: 2024-06-16

## 2024-06-14 RX ORDER — DEXAMETHASONE SODIUM PHOSPHATE 4 MG/ML
INJECTION, SOLUTION INTRA-ARTICULAR; INTRALESIONAL; INTRAMUSCULAR; INTRAVENOUS; SOFT TISSUE PRN
Status: DISCONTINUED | OUTPATIENT
Start: 2024-06-14 | End: 2024-06-14 | Stop reason: SDUPTHER

## 2024-06-14 RX ORDER — CEFAZOLIN SODIUM 1 G/3ML
INJECTION, POWDER, FOR SOLUTION INTRAMUSCULAR; INTRAVENOUS PRN
Status: DISCONTINUED | OUTPATIENT
Start: 2024-06-14 | End: 2024-06-14 | Stop reason: SDUPTHER

## 2024-06-14 RX ORDER — FENTANYL CITRATE 50 UG/ML
INJECTION, SOLUTION INTRAMUSCULAR; INTRAVENOUS PRN
Status: DISCONTINUED | OUTPATIENT
Start: 2024-06-14 | End: 2024-06-14 | Stop reason: SDUPTHER

## 2024-06-14 RX ORDER — ONDANSETRON 4 MG/1
4 TABLET, ORALLY DISINTEGRATING ORAL EVERY 8 HOURS PRN
Status: DISCONTINUED | OUTPATIENT
Start: 2024-06-14 | End: 2024-06-19 | Stop reason: SDUPTHER

## 2024-06-14 RX ORDER — OXYCODONE HYDROCHLORIDE 5 MG/1
10 TABLET ORAL EVERY 4 HOURS PRN
Status: DISCONTINUED | OUTPATIENT
Start: 2024-06-14 | End: 2024-06-19

## 2024-06-14 RX ORDER — OMEGA-3-ACID ETHYL ESTERS 1 G/1
1 CAPSULE, LIQUID FILLED ORAL 2 TIMES DAILY
Status: DISCONTINUED | OUTPATIENT
Start: 2024-06-14 | End: 2024-06-20 | Stop reason: HOSPADM

## 2024-06-14 RX ORDER — PROPOFOL 10 MG/ML
INJECTION, EMULSION INTRAVENOUS CONTINUOUS PRN
Status: DISCONTINUED | OUTPATIENT
Start: 2024-06-14 | End: 2024-06-14 | Stop reason: SDUPTHER

## 2024-06-14 RX ORDER — OXYCODONE HYDROCHLORIDE 5 MG/1
5 TABLET ORAL
Status: COMPLETED | OUTPATIENT
Start: 2024-06-14 | End: 2024-06-14

## 2024-06-14 RX ORDER — NALOXONE HYDROCHLORIDE 0.4 MG/ML
INJECTION, SOLUTION INTRAMUSCULAR; INTRAVENOUS; SUBCUTANEOUS PRN
Status: DISCONTINUED | OUTPATIENT
Start: 2024-06-14 | End: 2024-06-14 | Stop reason: HOSPADM

## 2024-06-14 RX ORDER — LOSARTAN POTASSIUM 25 MG/1
50 TABLET ORAL DAILY
Status: DISCONTINUED | OUTPATIENT
Start: 2024-06-14 | End: 2024-06-20 | Stop reason: HOSPADM

## 2024-06-14 RX ORDER — MEPERIDINE HYDROCHLORIDE 25 MG/ML
12.5 INJECTION INTRAMUSCULAR; INTRAVENOUS; SUBCUTANEOUS EVERY 5 MIN PRN
Status: DISCONTINUED | OUTPATIENT
Start: 2024-06-14 | End: 2024-06-14 | Stop reason: HOSPADM

## 2024-06-14 RX ORDER — HYDROMORPHONE HYDROCHLORIDE 2 MG/ML
INJECTION, SOLUTION INTRAMUSCULAR; INTRAVENOUS; SUBCUTANEOUS PRN
Status: DISCONTINUED | OUTPATIENT
Start: 2024-06-14 | End: 2024-06-14 | Stop reason: SDUPTHER

## 2024-06-14 RX ORDER — ONDANSETRON 2 MG/ML
4 INJECTION INTRAMUSCULAR; INTRAVENOUS
Status: COMPLETED | OUTPATIENT
Start: 2024-06-14 | End: 2024-06-14

## 2024-06-14 RX ORDER — SODIUM CHLORIDE 0.9 % (FLUSH) 0.9 %
5-40 SYRINGE (ML) INJECTION PRN
Status: DISCONTINUED | OUTPATIENT
Start: 2024-06-14 | End: 2024-06-14 | Stop reason: HOSPADM

## 2024-06-14 RX ORDER — TOPIRAMATE 100 MG/1
100 TABLET, FILM COATED ORAL NIGHTLY
Status: DISCONTINUED | OUTPATIENT
Start: 2024-06-14 | End: 2024-06-20 | Stop reason: HOSPADM

## 2024-06-14 RX ORDER — HYDROMORPHONE HYDROCHLORIDE 1 MG/ML
0.25 INJECTION, SOLUTION INTRAMUSCULAR; INTRAVENOUS; SUBCUTANEOUS
Status: DISCONTINUED | OUTPATIENT
Start: 2024-06-14 | End: 2024-06-20 | Stop reason: HOSPADM

## 2024-06-14 RX ORDER — HYDROMORPHONE HYDROCHLORIDE 1 MG/ML
0.5 INJECTION, SOLUTION INTRAMUSCULAR; INTRAVENOUS; SUBCUTANEOUS EVERY 5 MIN PRN
Status: COMPLETED | OUTPATIENT
Start: 2024-06-14 | End: 2024-06-14

## 2024-06-14 RX ORDER — SODIUM CHLORIDE 9 MG/ML
INJECTION, SOLUTION INTRAVENOUS PRN
Status: DISCONTINUED | OUTPATIENT
Start: 2024-06-14 | End: 2024-06-14 | Stop reason: HOSPADM

## 2024-06-14 RX ORDER — SENNA AND DOCUSATE SODIUM 50; 8.6 MG/1; MG/1
1 TABLET, FILM COATED ORAL 2 TIMES DAILY
Status: DISCONTINUED | OUTPATIENT
Start: 2024-06-14 | End: 2024-06-20 | Stop reason: HOSPADM

## 2024-06-14 RX ORDER — PROPOFOL 10 MG/ML
INJECTION, EMULSION INTRAVENOUS PRN
Status: DISCONTINUED | OUTPATIENT
Start: 2024-06-14 | End: 2024-06-14 | Stop reason: SDUPTHER

## 2024-06-14 RX ORDER — VANCOMYCIN HYDROCHLORIDE 1 G/20ML
INJECTION, POWDER, LYOPHILIZED, FOR SOLUTION INTRAVENOUS PRN
Status: DISCONTINUED | OUTPATIENT
Start: 2024-06-14 | End: 2024-06-14 | Stop reason: ALTCHOICE

## 2024-06-14 RX ORDER — SUCCINYLCHOLINE/SOD CL,ISO/PF 200MG/10ML
SYRINGE (ML) INTRAVENOUS PRN
Status: DISCONTINUED | OUTPATIENT
Start: 2024-06-14 | End: 2024-06-14 | Stop reason: SDUPTHER

## 2024-06-14 RX ORDER — METHOCARBAMOL 750 MG/1
750 TABLET, FILM COATED ORAL EVERY 8 HOURS PRN
Status: DISCONTINUED | OUTPATIENT
Start: 2024-06-14 | End: 2024-06-14

## 2024-06-14 RX ORDER — DIAZEPAM 5 MG/1
5 TABLET ORAL EVERY 6 HOURS PRN
Status: DISCONTINUED | OUTPATIENT
Start: 2024-06-14 | End: 2024-06-20 | Stop reason: HOSPADM

## 2024-06-14 RX ORDER — REMIFENTANIL HYDROCHLORIDE 1 MG/ML
INJECTION, POWDER, LYOPHILIZED, FOR SOLUTION INTRAVENOUS CONTINUOUS PRN
Status: DISCONTINUED | OUTPATIENT
Start: 2024-06-14 | End: 2024-06-14 | Stop reason: SDUPTHER

## 2024-06-14 RX ORDER — LABETALOL HYDROCHLORIDE 5 MG/ML
10 INJECTION, SOLUTION INTRAVENOUS
Status: DISCONTINUED | OUTPATIENT
Start: 2024-06-14 | End: 2024-06-14 | Stop reason: HOSPADM

## 2024-06-14 RX ORDER — SODIUM CHLORIDE 0.9 % (FLUSH) 0.9 %
5-40 SYRINGE (ML) INJECTION PRN
Status: DISCONTINUED | OUTPATIENT
Start: 2024-06-14 | End: 2024-06-16 | Stop reason: SDUPTHER

## 2024-06-14 RX ORDER — OXYCODONE HYDROCHLORIDE 5 MG/1
5 TABLET ORAL EVERY 4 HOURS PRN
Status: DISCONTINUED | OUTPATIENT
Start: 2024-06-14 | End: 2024-06-19

## 2024-06-14 RX ORDER — HYDROMORPHONE HYDROCHLORIDE 1 MG/ML
0.5 INJECTION, SOLUTION INTRAMUSCULAR; INTRAVENOUS; SUBCUTANEOUS
Status: DISCONTINUED | OUTPATIENT
Start: 2024-06-14 | End: 2024-06-20 | Stop reason: HOSPADM

## 2024-06-14 RX ORDER — SODIUM CHLORIDE 9 MG/ML
INJECTION, SOLUTION INTRAVENOUS PRN
Status: DISCONTINUED | OUTPATIENT
Start: 2024-06-14 | End: 2024-06-16 | Stop reason: SDUPTHER

## 2024-06-14 RX ORDER — ROCURONIUM BROMIDE 10 MG/ML
INJECTION, SOLUTION INTRAVENOUS PRN
Status: DISCONTINUED | OUTPATIENT
Start: 2024-06-14 | End: 2024-06-14 | Stop reason: SDUPTHER

## 2024-06-14 RX ORDER — HYDRALAZINE HYDROCHLORIDE 20 MG/ML
10 INJECTION INTRAMUSCULAR; INTRAVENOUS
Status: DISCONTINUED | OUTPATIENT
Start: 2024-06-14 | End: 2024-06-14 | Stop reason: HOSPADM

## 2024-06-14 RX ORDER — ONDANSETRON 2 MG/ML
4 INJECTION INTRAMUSCULAR; INTRAVENOUS EVERY 6 HOURS PRN
Status: DISCONTINUED | OUTPATIENT
Start: 2024-06-14 | End: 2024-06-19 | Stop reason: SDUPTHER

## 2024-06-14 RX ORDER — SODIUM CHLORIDE 0.9 % (FLUSH) 0.9 %
5-40 SYRINGE (ML) INJECTION EVERY 12 HOURS SCHEDULED
Status: DISCONTINUED | OUTPATIENT
Start: 2024-06-14 | End: 2024-06-16 | Stop reason: SDUPTHER

## 2024-06-14 RX ORDER — SODIUM CHLORIDE 9 MG/ML
INJECTION, SOLUTION INTRAVENOUS CONTINUOUS
Status: DISCONTINUED | OUTPATIENT
Start: 2024-06-14 | End: 2024-06-16

## 2024-06-14 RX ORDER — FENOFIBRATE 54 MG/1
54 TABLET ORAL DAILY
Status: DISCONTINUED | OUTPATIENT
Start: 2024-06-14 | End: 2024-06-16

## 2024-06-14 RX ORDER — SODIUM CHLORIDE, SODIUM LACTATE, POTASSIUM CHLORIDE, CALCIUM CHLORIDE 600; 310; 30; 20 MG/100ML; MG/100ML; MG/100ML; MG/100ML
INJECTION, SOLUTION INTRAVENOUS CONTINUOUS PRN
Status: DISCONTINUED | OUTPATIENT
Start: 2024-06-14 | End: 2024-06-14 | Stop reason: SDUPTHER

## 2024-06-14 RX ORDER — HEPARIN SODIUM 5000 [USP'U]/ML
5000 INJECTION, SOLUTION INTRAVENOUS; SUBCUTANEOUS EVERY 8 HOURS SCHEDULED
Status: DISCONTINUED | OUTPATIENT
Start: 2024-06-15 | End: 2024-06-20 | Stop reason: HOSPADM

## 2024-06-14 RX ORDER — ONDANSETRON 2 MG/ML
INJECTION INTRAMUSCULAR; INTRAVENOUS PRN
Status: DISCONTINUED | OUTPATIENT
Start: 2024-06-14 | End: 2024-06-14 | Stop reason: SDUPTHER

## 2024-06-14 RX ORDER — PROCHLORPERAZINE EDISYLATE 5 MG/ML
5 INJECTION INTRAMUSCULAR; INTRAVENOUS
Status: DISCONTINUED | OUTPATIENT
Start: 2024-06-14 | End: 2024-06-14 | Stop reason: HOSPADM

## 2024-06-14 RX ORDER — KETAMINE HCL IN NACL, ISO-OSM 20 MG/2 ML
SYRINGE (ML) INJECTION PRN
Status: DISCONTINUED | OUTPATIENT
Start: 2024-06-14 | End: 2024-06-14 | Stop reason: SDUPTHER

## 2024-06-14 RX ORDER — METHOCARBAMOL 100 MG/ML
INJECTION, SOLUTION INTRAMUSCULAR; INTRAVENOUS PRN
Status: DISCONTINUED | OUTPATIENT
Start: 2024-06-14 | End: 2024-06-14 | Stop reason: SDUPTHER

## 2024-06-14 RX ORDER — NALOXONE HYDROCHLORIDE 0.4 MG/ML
0.2 INJECTION, SOLUTION INTRAMUSCULAR; INTRAVENOUS; SUBCUTANEOUS PRN
Status: DISCONTINUED | OUTPATIENT
Start: 2024-06-14 | End: 2024-06-20 | Stop reason: HOSPADM

## 2024-06-14 RX ORDER — METHOCARBAMOL 750 MG/1
750 TABLET, FILM COATED ORAL EVERY 8 HOURS PRN
Status: COMPLETED | OUTPATIENT
Start: 2024-06-14 | End: 2024-06-16

## 2024-06-14 RX ORDER — MIDAZOLAM HYDROCHLORIDE 1 MG/ML
INJECTION INTRAMUSCULAR; INTRAVENOUS PRN
Status: DISCONTINUED | OUTPATIENT
Start: 2024-06-14 | End: 2024-06-14 | Stop reason: SDUPTHER

## 2024-06-14 RX ORDER — POLYETHYLENE GLYCOL 3350 17 G/17G
17 POWDER, FOR SOLUTION ORAL DAILY
Status: DISCONTINUED | OUTPATIENT
Start: 2024-06-14 | End: 2024-06-19

## 2024-06-14 RX ORDER — LIDOCAINE HYDROCHLORIDE 20 MG/ML
INJECTION, SOLUTION INTRAVENOUS PRN
Status: DISCONTINUED | OUTPATIENT
Start: 2024-06-14 | End: 2024-06-14 | Stop reason: SDUPTHER

## 2024-06-14 RX ORDER — SODIUM CHLORIDE 0.9 % (FLUSH) 0.9 %
5-40 SYRINGE (ML) INJECTION EVERY 12 HOURS SCHEDULED
Status: DISCONTINUED | OUTPATIENT
Start: 2024-06-14 | End: 2024-06-14 | Stop reason: HOSPADM

## 2024-06-14 RX ORDER — HYDROMORPHONE HYDROCHLORIDE 1 MG/ML
0.5 INJECTION, SOLUTION INTRAMUSCULAR; INTRAVENOUS; SUBCUTANEOUS EVERY 5 MIN PRN
Status: DISCONTINUED | OUTPATIENT
Start: 2024-06-14 | End: 2024-06-14 | Stop reason: HOSPADM

## 2024-06-14 RX ADMIN — PROPOFOL 100 MCG/KG/MIN: 10 INJECTION, EMULSION INTRAVENOUS at 15:43

## 2024-06-14 RX ADMIN — ACETAMINOPHEN 650 MG: 325 TABLET ORAL at 12:05

## 2024-06-14 RX ADMIN — OXYCODONE HYDROCHLORIDE 5 MG: 5 TABLET ORAL at 19:49

## 2024-06-14 RX ADMIN — INSULIN LISPRO 1 UNITS: 100 INJECTION, SOLUTION INTRAVENOUS; SUBCUTANEOUS at 08:41

## 2024-06-14 RX ADMIN — Medication 1 TABLET: at 08:40

## 2024-06-14 RX ADMIN — PANTOPRAZOLE SODIUM 20 MG: 20 TABLET, DELAYED RELEASE ORAL at 08:41

## 2024-06-14 RX ADMIN — Medication 20 MG: at 18:14

## 2024-06-14 RX ADMIN — HYDROMORPHONE HYDROCHLORIDE 1 MG: 2 INJECTION, SOLUTION INTRAMUSCULAR; INTRAVENOUS; SUBCUTANEOUS at 17:26

## 2024-06-14 RX ADMIN — OXYCODONE HYDROCHLORIDE 5 MG: 5 TABLET ORAL at 12:05

## 2024-06-14 RX ADMIN — HYDROMORPHONE HYDROCHLORIDE 1 MG: 1 INJECTION, SOLUTION INTRAMUSCULAR; INTRAVENOUS; SUBCUTANEOUS at 03:43

## 2024-06-14 RX ADMIN — ATORVASTATIN CALCIUM 40 MG: 40 TABLET, FILM COATED ORAL at 08:41

## 2024-06-14 RX ADMIN — CITALOPRAM 40 MG: 40 TABLET, FILM COATED ORAL at 20:48

## 2024-06-14 RX ADMIN — PROPOFOL 200 MG: 10 INJECTION, EMULSION INTRAVENOUS at 15:41

## 2024-06-14 RX ADMIN — HYDROMORPHONE HYDROCHLORIDE 0.5 MG: 1 INJECTION, SOLUTION INTRAMUSCULAR; INTRAVENOUS; SUBCUTANEOUS at 20:03

## 2024-06-14 RX ADMIN — METHOCARBAMOL 750 MG: 750 TABLET ORAL at 08:40

## 2024-06-14 RX ADMIN — SODIUM CHLORIDE, PRESERVATIVE FREE 10 ML: 5 INJECTION INTRAVENOUS at 08:44

## 2024-06-14 RX ADMIN — OXYCODONE HYDROCHLORIDE 5 MG: 5 TABLET ORAL at 07:20

## 2024-06-14 RX ADMIN — OXYCODONE HYDROCHLORIDE 10 MG: 5 TABLET ORAL at 23:15

## 2024-06-14 RX ADMIN — CEFAZOLIN SODIUM 3 G: 1 POWDER, FOR SOLUTION INTRAMUSCULAR; INTRAVENOUS at 15:45

## 2024-06-14 RX ADMIN — POLYETHYLENE GLYCOL 3350 17 G: 17 POWDER, FOR SOLUTION ORAL at 20:49

## 2024-06-14 RX ADMIN — SODIUM CHLORIDE: 9 INJECTION, SOLUTION INTRAVENOUS at 20:56

## 2024-06-14 RX ADMIN — TOPIRAMATE 100 MG: 100 TABLET, FILM COATED ORAL at 20:48

## 2024-06-14 RX ADMIN — SODIUM CHLORIDE, PRESERVATIVE FREE 10 ML: 5 INJECTION INTRAVENOUS at 08:45

## 2024-06-14 RX ADMIN — ONDANSETRON 4 MG: 2 INJECTION INTRAMUSCULAR; INTRAVENOUS at 17:23

## 2024-06-14 RX ADMIN — METHOCARBAMOL 750 MG: 750 TABLET ORAL at 12:05

## 2024-06-14 RX ADMIN — METHOCARBAMOL 750 MG: 750 TABLET ORAL at 20:48

## 2024-06-14 RX ADMIN — ACETAMINOPHEN 650 MG: 325 TABLET ORAL at 08:40

## 2024-06-14 RX ADMIN — LOSARTAN POTASSIUM 50 MG: 25 TABLET, FILM COATED ORAL at 20:48

## 2024-06-14 RX ADMIN — REMIFENTANIL HYDROCHLORIDE 0.1 MCG/KG/MIN: 1 INJECTION, POWDER, LYOPHILIZED, FOR SOLUTION INTRAVENOUS at 15:43

## 2024-06-14 RX ADMIN — ACETAMINOPHEN 650 MG: 325 TABLET ORAL at 20:48

## 2024-06-14 RX ADMIN — SUGAMMADEX 200 MG: 100 INJECTION, SOLUTION INTRAVENOUS at 19:04

## 2024-06-14 RX ADMIN — HYDROMORPHONE HYDROCHLORIDE 1 MG: 1 INJECTION, SOLUTION INTRAMUSCULAR; INTRAVENOUS; SUBCUTANEOUS at 08:52

## 2024-06-14 RX ADMIN — Medication 20 MG: at 18:40

## 2024-06-14 RX ADMIN — SODIUM CHLORIDE, SODIUM LACTATE, POTASSIUM CHLORIDE, CALCIUM CHLORIDE: 600; 310; 30; 20 INJECTION, SOLUTION INTRAVENOUS at 17:15

## 2024-06-14 RX ADMIN — SODIUM CHLORIDE, SODIUM LACTATE, POTASSIUM CHLORIDE, AND CALCIUM CHLORIDE: .6; .31; .03; .02 INJECTION, SOLUTION INTRAVENOUS at 15:31

## 2024-06-14 RX ADMIN — FENOFIBRATE 54 MG: 54 TABLET ORAL at 20:48

## 2024-06-14 RX ADMIN — DEXAMETHASONE SODIUM PHOSPHATE 4 MG: 4 INJECTION INTRA-ARTICULAR; INTRALESIONAL; INTRAMUSCULAR; INTRAVENOUS; SOFT TISSUE at 15:45

## 2024-06-14 RX ADMIN — WATER 2000 MG: 1 INJECTION INTRAMUSCULAR; INTRAVENOUS; SUBCUTANEOUS at 23:14

## 2024-06-14 RX ADMIN — HYDROMORPHONE HYDROCHLORIDE 0.5 MG: 1 INJECTION, SOLUTION INTRAMUSCULAR; INTRAVENOUS; SUBCUTANEOUS at 20:07

## 2024-06-14 RX ADMIN — SODIUM CHLORIDE, SODIUM LACTATE, POTASSIUM CHLORIDE, AND CALCIUM CHLORIDE: .6; .31; .03; .02 INJECTION, SOLUTION INTRAVENOUS at 17:35

## 2024-06-14 RX ADMIN — OMEGA-3-ACID ETHYL ESTERS 1 G: 1 CAPSULE, LIQUID FILLED ORAL at 20:48

## 2024-06-14 RX ADMIN — ONDANSETRON 4 MG: 2 INJECTION INTRAMUSCULAR; INTRAVENOUS at 19:50

## 2024-06-14 RX ADMIN — METHOCARBAMOL 1000 MG: 100 INJECTION, SOLUTION INTRAMUSCULAR; INTRAVENOUS at 17:23

## 2024-06-14 RX ADMIN — FENTANYL CITRATE 50 MCG: 50 INJECTION, SOLUTION INTRAMUSCULAR; INTRAVENOUS at 16:31

## 2024-06-14 RX ADMIN — MIDAZOLAM HYDROCHLORIDE 2 MG: 2 INJECTION, SOLUTION INTRAMUSCULAR; INTRAVENOUS at 15:41

## 2024-06-14 RX ADMIN — LIDOCAINE HYDROCHLORIDE 100 MG: 20 INJECTION, SOLUTION INTRAVENOUS at 15:41

## 2024-06-14 RX ADMIN — FENTANYL CITRATE 50 MCG: 50 INJECTION, SOLUTION INTRAMUSCULAR; INTRAVENOUS at 15:41

## 2024-06-14 RX ADMIN — INSULIN LISPRO 1 UNITS: 100 INJECTION, SOLUTION INTRAVENOUS; SUBCUTANEOUS at 12:05

## 2024-06-14 RX ADMIN — ROCURONIUM BROMIDE 30 MG: 10 INJECTION, SOLUTION INTRAVENOUS at 16:22

## 2024-06-14 RX ADMIN — HYDROMORPHONE HYDROCHLORIDE 0.5 MG: 1 INJECTION, SOLUTION INTRAMUSCULAR; INTRAVENOUS; SUBCUTANEOUS at 19:58

## 2024-06-14 RX ADMIN — SENNOSIDES AND DOCUSATE SODIUM 1 TABLET: 50; 8.6 TABLET ORAL at 20:48

## 2024-06-14 RX ADMIN — HYDROMORPHONE HYDROCHLORIDE 1 MG: 2 INJECTION, SOLUTION INTRAMUSCULAR; INTRAVENOUS; SUBCUTANEOUS at 17:40

## 2024-06-14 RX ADMIN — Medication 120 MG: at 15:41

## 2024-06-14 RX ADMIN — DEXMEDETOMIDINE HYDROCHLORIDE 4 MCG: 100 INJECTION, SOLUTION INTRAVENOUS at 18:27

## 2024-06-14 ASSESSMENT — PAIN DESCRIPTION - ONSET
ONSET: ON-GOING

## 2024-06-14 ASSESSMENT — PAIN SCALES - GENERAL
PAINLEVEL_OUTOF10: 10
PAINLEVEL_OUTOF10: 5
PAINLEVEL_OUTOF10: 0
PAINLEVEL_OUTOF10: 8
PAINLEVEL_OUTOF10: 8
PAINLEVEL_OUTOF10: 3
PAINLEVEL_OUTOF10: 4
PAINLEVEL_OUTOF10: 8
PAINLEVEL_OUTOF10: 6
PAINLEVEL_OUTOF10: 5
PAINLEVEL_OUTOF10: 5
PAINLEVEL_OUTOF10: 8
PAINLEVEL_OUTOF10: 6
PAINLEVEL_OUTOF10: 7
PAINLEVEL_OUTOF10: 10
PAINLEVEL_OUTOF10: 7
PAINLEVEL_OUTOF10: 6
PAINLEVEL_OUTOF10: 8

## 2024-06-14 ASSESSMENT — PAIN DESCRIPTION - DESCRIPTORS
DESCRIPTORS: ACHING
DESCRIPTORS: DISCOMFORT
DESCRIPTORS: THROBBING
DESCRIPTORS: ACHING
DESCRIPTORS: DISCOMFORT
DESCRIPTORS: ACHING;DISCOMFORT;SHARP
DESCRIPTORS: THROBBING
DESCRIPTORS: ACHING;DISCOMFORT;SHARP

## 2024-06-14 ASSESSMENT — PAIN - FUNCTIONAL ASSESSMENT
PAIN_FUNCTIONAL_ASSESSMENT: PREVENTS OR INTERFERES SOME ACTIVE ACTIVITIES AND ADLS

## 2024-06-14 ASSESSMENT — PAIN DESCRIPTION - LOCATION
LOCATION: BACK

## 2024-06-14 ASSESSMENT — PAIN DESCRIPTION - FREQUENCY
FREQUENCY: CONTINUOUS

## 2024-06-14 ASSESSMENT — PAIN DESCRIPTION - ORIENTATION
ORIENTATION: MID
ORIENTATION: LOWER
ORIENTATION: MID
ORIENTATION: LOWER
ORIENTATION: MID

## 2024-06-14 ASSESSMENT — PAIN DESCRIPTION - PAIN TYPE
TYPE: ACUTE PAIN
TYPE: SURGICAL PAIN
TYPE: ACUTE PAIN
TYPE: ACUTE PAIN
TYPE: SURGICAL PAIN
TYPE: SURGICAL PAIN;ACUTE PAIN
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
## 2024-06-14 NOTE — ANESTHESIA POSTPROCEDURE EVALUATION
Department of Anesthesiology  Postprocedure Note    Patient: Pretty Covington  MRN: [id number]  YOB: 1960  Date of evaluation: 6/14/2024    Procedure Summary       Date: 06/14/24 Room / Location: [address]    Anesthesia Start: 1531 Anesthesia Stop: 1931    Procedure: Thoracic 11-Lumbar 3 fixation and fusion (Spine Lumbar) Diagnosis:       Closed unstable burst fracture of first lumbar vertebra, initial encounter ([facility])      (Closed unstable burst fracture of first lumbar vertebra, initial encounter ([facility]) [S32.012A])    Surgeons: Ebenezer Campoverde MD Responsible Provider: Jony Drummond MD    Anesthesia Type: general ASA Status: 3            Anesthesia Type: No value filed.    Bing Phase I: Bing Score: 6    Bing Phase II:      Anesthesia Post Evaluation    Patient location during evaluation: PACU  Patient participation: complete - patient participated  Level of consciousness: awake and alert  Airway patency: patent  Nausea & Vomiting: no vomiting and no nausea  Cardiovascular status: hemodynamically stable  Respiratory status: acceptable  Hydration status: euvolemic  Multimodal analgesia pain management approach  Pain management: satisfactory to patient    No notable events documented.

## 2024-06-14 NOTE — FLOWSHEET NOTE
ADMISSION TO PACU     Patient: Pretty Covington    Procedure(s):  Thoracic 11-Lumbar 3 fixation and fusion    Level of consciousness: sedated, obtunded.   Nursing Assessment: oral airway in with face mask.  Unresponsive to pain.  VSS, albeit hypertensive.   Surg site to back with neg pressure drsg  1st PACU Vitals:   Vitals:    06/14/24 1930   BP: (!) 146/57   Pulse: 83   Resp: 23   Temp: 98.4 °F (36.9 °C)   SpO2: 98%     Pain upon arrival to PACU: none, unresponsive.   Recent Labs     06/14/24  1119 06/14/24  1445   POCGLU 201* 172*       Lab Results   Component Value Date/Time    HGB 13.5 06/14/2024 05:27 AM     06/14/2024 05:27 AM    K 3.7 06/14/2024 05:27 AM    MG 2.00 06/11/2024 06:42 AM    CREATININE <0.5 (L) 06/14/2024 05:27 AM         Intake/Output Summary (Last 24 hours) at 6/14/2024 1933  Last data filed at 6/14/2024 1925  Gross per 24 hour   Intake 2000 ml   Output 900 ml   Net 1100 ml

## 2024-06-14 NOTE — PROGRESS NOTES
NEUROSURGERY PROGRESS NOTE    Patient Name: Pretty Covington YOB: 1960   Sex: Female Age: 64 yrs     Medical Record Number: [id number] Acct Number: [id number]   Room Number: 5528/5528-01 Hospital Day: Hospital Day: 5     Interval History:  Pt fell and has an unstable L1 fx, she is neurologically intact    Subjective: she is so glad to be going to surgery  Objective:    VITAL SIGNS   BP (!) 151/77   Pulse 89   Temp 97.9 °F (36.6 °C) (Oral)   Resp 16   Ht 1.651 m (5' 5\")   Wt 115.7 kg (255 lb)   LMP 08/20/2014   SpO2 94%   BMI 42.43 kg/m²    Height Height: 165.1 cm (5' 5\")   Weight Weight - Scale: 115.7 kg (255 lb)        Allergies Allergies   Allergen Reactions    Lisinopril Other (See Comments)     cough    Penicillins Other (See Comments)     Unsure of reaction--childhood    Sulfa Antibiotics Other (See Comments)     Unsure of reaction      NPO Status Diet NPO Exceptions are: Sips of Water with Meds   Isolation No active isolations     LABS   Basic Metabolic Profile Recent Labs     06/12/24  0755 06/13/24  0705 06/14/24  0527    140 142    103 103   CO2 25 25 27   BUN 12 14 15   CREATININE <0.5* <0.5* <0.5*   GLUCOSE 192* 229* 236*        Complete Blood Count Recent Labs     06/12/24  0755 06/13/24  0705 06/14/24  0527   WBC 6.6 5.2 4.5   RBC 4.34 4.38 4.33        Coagulation Studies Recent Labs     06/14/24  0527   INR 1.09        MEDICATIONS   Inpatient Medications     sodium chloride flush, 5-40 mL, IntraVENous, 2 times per day    ceFAZolin (ANCEF) IVPB, 2,000 mg, IntraVENous, On Call to OR    sodium chloride flush, 5-40 mL, IntraVENous, 2 times per day    atorvastatin, 40 mg, Oral, Daily    citalopram, 40 mg, Oral, Nightly    pantoprazole, 20 mg, Oral, Daily    therapeutic multivitamin-minerals, 1 tablet, Oral, Daily     insulin lispro, 0-4 Units, SubCUTAneous, TID WC    insulin lispro, 0-4 Units, SubCUTAneous, Nightly    acetaminophen, 650 mg, Oral, 4x daily    [COMPLETED] methocarbamol, 1,000 mg, IntraVENous, Q8H **FOLLOWED BY** methocarbamol, 750 mg, Oral, 4x Daily    [Held by provider] enoxaparin, 30 mg, SubCUTAneous, BID   Infusions    sodium chloride      sodium chloride 100 mL/hr at 06/13/24 2339    sodium chloride      dextrose        Antibiotics   Recent Abx Admin        No antibiotic orders with administrations found.                     Neurologic Exam:  Mental status: awake and alert and oriented x4    Musculoskeletal:   Gait: Not tested   Tone: normal  Sensory: intact to all extremities  Motor strength: pain limited   Right  Left    Right  Left    Deltoid  5 5  Hip Flex  3 3   Biceps  5 5  Knee Extensors  4 4   Triceps  5 5  Knee Flexors  4 4   Wrist Ext  5 5  Ankle Dorsiflex.  5 5   Wrist Flex  5 5  Ankle Plantarflex.  5 5   Handgrip  5 5  Ext Mark Longus  5 5   Thumb Ext  5 5           Assessment   Patient is a 64 y.o. female s/p mechanical fall resulting in unstable L1 fracture extending into the posterior elements bilaterally. Neurologically intact.     Plan:  Neurologic exams frequency:: Q4H  Bed rest  Custom tlso from [facility] for after surgery  Muscle spasms: scheduled Robaxin and Valium prn  Pain control: Managed by medical team  Patient will require surgical fixation and fusion of the fracture given the instability recommended proceeding with T11-L3 fixation and fusion.    surgery today at 3:30.  Medical clearance gotten  Can have prophylactic lovenox but not therapeutic. May start therapeutic POD #7  Npo     Patient was seen with Dr. Campoverde who agrees with above assessment and plan.     Electronically signed by: LOYDA Noyola - CNP, 6/14/2024 12:42 PM   Neurosurgery Nurse Practitioner  [phone]

## 2024-06-14 NOTE — PROGRESS NOTES
Neurosurgery Progress Note    Patient seen and examined on 06/13/24. No acute events overnight.  Neurologically intact on exam.      A/P: 64-year-old woman with L1 fracture dislocation related to ankylosing spondylitis, JODI E    -Neuro stable  -Pain control with PO meds  -Custom TLSO ordered  -Hold anticoagulation pending surgical intervention  -Bedrest  -Will plan for T11-L3 D/F/F tomorrow.  Again discussed the particulars of the procedure as well as the risks and benefits with the patient.  All questions answered  -DVT ppx  -Will follow closely.      Ebenezer Campoverde MD, PhD  [address], [address]  [place], [place], [address] [phone] (c), [phone] (o)

## 2024-06-14 NOTE — PROGRESS NOTES
Pt A/Ox4, vss on RA. Pt voiding with Purewick and bedpan, 1 BM this shift. Pt NPO in anticipation for surgery on 6/14/24. Pts pain being managed via MAR. Pt has skin fold management in place with interdry under abdominal Pannus. Pt has NS infusing at 100mL/hr per NPO status. No acute events this shift. Pt resting in bed, bed alarm on, bed in lowest position, Pts call light within reach.

## 2024-06-14 NOTE — PROGRESS NOTES
1940: oral airway removed. Dr Campoverde at bedside. Pt oriented  x3. Strong hand  and push/pulls. Weaned to 2L NC and sating well. Paged 5T RN to come down to PACU for bedside report

## 2024-06-14 NOTE — OP NOTE
Operative Report    Patient Name: Pretty Covington  MRN: [id number]  : 1960  DOS: 2024    PRE-OPERATIVE DIAGNOSIS:   1. L1 fracture with distraction   2. Ossification of the anterior longitudinal ligament    POST-OPERATIVE DIAGNOSIS:   1. L1 fracture with distraction   2. Ossification of the anterior longitudinal ligament    SURGEON: Ebenezer Campoverde M.D., Ph.D.    ASSISTANT SURGEON: CHUCKIE Cole    Ms. Mtz served as assistant on this surgery due to the complex nature of the procedure and the lack of a resident or fellow assistant. She provided assistance with opening, instrumentation, and closing.     ANESTHESIA: GETA    ESTIMATED BLOOD LOSS:  300 mL.       OPERATIVE PROCEDURES PERFORMED:  1. T11, T12, L2, L3 bilateral pedicle screw fixation   2. T11-L3 bilateral posterolateral fusion with Magnifuse, and bone marrow aspirate  3. Computer-assisted stereotactic navigation with O-arm.  4. Intraoperative fluoroscopy  5. Intraoperative neuromonitoring (SSEP, EMG)  6. Placement of Prevena wound vac    INDICATIONS:  Ms. Covington is a 64 year old woman who presented with back pain following a fall. She was found to have a transverse partially distracted fracture through the L1 vertebral body in the setting of ankylosing spondylitis. Given these findings, surgical fixation and fusion was recommended. The patient understands the risks and benefits of the procedure including but not limited to bleeding, infection, worsened neurologic outcome, vascular injury, cerebral spinal fluid leak, pseudoarthrosis, need for additional procedures, fracture of hardware, anesthesia risks, and death.      PROCEDURES IN DETAIL:      Under universal protocol and informed consent, the patient was brought to the operating room. General anesthesia was induced and the patient was intubated. A Garcia catheter was placed. Neuromonitoring leads (SSEPs, MEPs, EMG) were placed. After pre-positioning baselines were obtained, the patient was

## 2024-06-14 NOTE — BRIEF OP NOTE
Brief Postoperative Note      Patient: Pretty Covington  YOB: 1960  MRN: [id number]    Date of Procedure: 6/14/2024    Pre-Op Diagnosis Codes:     * Closed unstable burst fracture of first lumbar vertebra, initial encounter ([facility]) [S32.012A]    Post-Op Diagnosis: Same       Procedure(s):  Thoracic 11-Lumbar 3 fixation and fusion    Surgeon(s):  Ebenezer Campoverde MD    Assistant:  Physician Assistant: Jessica Mtz PA-C    Anesthesia: General    Estimated Blood Loss (mL): 300     Complications: None    Specimens:   * No specimens in log *    Implants:  Implant Name Type Inv. Item Serial No.  Lot No. LRB No. Used Action   GRAFT BNE SUB [id number] POSTEROLATERAL MAGNIFUSE - [id number]  GRAFT BNE SUB [id number] POSTEROLATERAL MAGNIFUSE [id number] [employer] SPINAL[employer] TECH-WD  N/A 1 Implanted   GRAFT BNE SUB [id number] POSTEROLATERAL MAGNIFUSE - [id number]  GRAFT BNE SUB [id number] POSTEROLATERAL MAGNIFUSE [id number] [employer] SPINAL[employer] TECH-WD  N/A 1 Implanted         Drains:   Closed/Suction Drain Left Back Accordion (Active)   Site Description Clean, dry & intact 06/14/24 1855   Dressing Status New dressing applied;Other (Comment) 06/14/24 1855       Negative Pressure Wound Therapy Back Lower;Medial (Active)       Urinary Catheter 06/14/24 Garcia-Temperature (Active)       [REMOVED] External Urinary Catheter (Removed)   Site Assessment Clean,dry & intact 06/14/24 0800   Placement Replaced 06/13/24 2030   Securement Method Securing device (Describe) 06/13/24 2030   Catheter Care Catheter/Wick replaced 06/13/24 2030   Perineal Care Yes 06/13/24 2030   Suction 40 mmgHg continuous 06/13/24 2030   Urine Color Susana 06/13/24 2030   Urine Appearance Clear 06/13/24 2030   Urine Odor Malodorous 06/13/24 2030   Output (mL) 300 mL 06/13/24 2030       Findings:  Infection Present At Time Of Surgery (PATOS) (choose all levels that have infection present):  No infection present  Other Findings: Severe  fracture at L1, T11-L3 fixation    Electronically signed by Ebenezer Campoverde MD on 6/14/2024 at 6:59 PM

## 2024-06-14 NOTE — PROGRESS NOTES
Neurosurgery Progress Note    Patient seen and examined on 06/14/24. No acute events overnight.  Neurologically intact on exam.      A/P: 64-year-old woman with L1 fracture dislocation related to ankylosing spondylitis, JODI E    -Neuro stable  -Pain control with PO meds  -Custom TLSO ordered  -Hold anticoagulation pending surgical intervention  -Bedrest  -Will plan for T11-L3 D/F/F today  -NPO for procedures  -DVT ppx  -Will follow closely.      Ebenezer Campoverde MD, PhD  [address], [address]  [place], [place], [address] [phone] (c), [phone] (o)

## 2024-06-14 NOTE — CARE COORDINATION
CM following: CM met with pt and pt's sister, Charleen, at bedside. Plan is for surgery this PM and pt is glad to have it planned for today. Pt provided an additional form for completion by the physician for short term disability. This form has been placed on the pt's paper chart and CM has made neurosurgery aware via [employer]. CM will continue to follow for post-op evals, recs and discharge planning.  Electronically signed by MAXX Caruso on 6/14/2024 at 11:51 AM  [phone]

## 2024-06-14 NOTE — PLAN OF CARE
Problem: Pain  Goal: Verbalizes/displays adequate comfort level or baseline comfort level  Outcome: Progressing  Note: Patient encouraged to monitor for pain and request assistance. Appropriate pain scale is being used.     Problem: Safety - Adult  Goal: Free from fall injury  Outcome: Progressing  Note: Patient is free of falls this shift. Proper fall precautions are in place.

## 2024-06-15 ENCOUNTER — APPOINTMENT (OUTPATIENT)
Dept: GENERAL RADIOLOGY | Age: 64
End: 2024-06-15
Attending: FAMILY MEDICINE
Payer: COMMERCIAL

## 2024-06-15 LAB
ALBUMIN SERPL-MCNC: 3.1 G/DL (ref 3.4–5)
ALBUMIN/GLOB SERPL: 1.3 {RATIO} (ref 1.1–2.2)
ALP SERPL-CCNC: 59 U/L (ref 40–129)
ALT SERPL-CCNC: 176 U/L (ref 10–40)
ANION GAP SERPL CALCULATED.3IONS-SCNC: 14 MMOL/L (ref 3–16)
AST SERPL-CCNC: 554 U/L (ref 15–37)
BASOPHILS # BLD: 0 K/UL (ref 0–0.2)
BASOPHILS NFR BLD: 0 %
BILIRUB SERPL-MCNC: 0.4 MG/DL (ref 0–1)
BUN SERPL-MCNC: 25 MG/DL (ref 7–20)
CALCIUM SERPL-MCNC: 8.6 MG/DL (ref 8.3–10.6)
CHLORIDE SERPL-SCNC: 101 MMOL/L (ref 99–110)
CO2 SERPL-SCNC: 24 MMOL/L (ref 21–32)
CREAT SERPL-MCNC: 0.9 MG/DL (ref 0.6–1.2)
DEPRECATED RDW RBC AUTO: 13.9 % (ref 12.4–15.4)
EOSINOPHIL # BLD: 0 K/UL (ref 0–0.6)
EOSINOPHIL NFR BLD: 0 %
GFR SERPLBLD CREATININE-BSD FMLA CKD-EPI: 72 ML/MIN/{1.73_M2}
GLUCOSE BLD-MCNC: 279 MG/DL (ref 70–99)
GLUCOSE BLD-MCNC: 299 MG/DL (ref 70–99)
GLUCOSE BLD-MCNC: 320 MG/DL (ref 70–99)
GLUCOSE BLD-MCNC: 332 MG/DL (ref 70–99)
GLUCOSE SERPL-MCNC: 272 MG/DL (ref 70–99)
HCT VFR BLD AUTO: 30 % (ref 36–48)
HGB BLD-MCNC: 10.4 G/DL (ref 12–16)
LYMPHOCYTES # BLD: 0.6 K/UL (ref 1–5.1)
LYMPHOCYTES NFR BLD: 6 %
MCH RBC QN AUTO: 31.6 PG (ref 26–34)
MCHC RBC AUTO-ENTMCNC: 34.6 G/DL (ref 31–36)
MCV RBC AUTO: 91.2 FL (ref 80–100)
MONOCYTES # BLD: 0.8 K/UL (ref 0–1.3)
MONOCYTES NFR BLD: 8 %
NEUTROPHILS # BLD: 8.2 K/UL (ref 1.7–7.7)
NEUTROPHILS NFR BLD: 86 %
PERFORMED ON: ABNORMAL
PLATELET # BLD AUTO: 189 K/UL (ref 135–450)
PMV BLD AUTO: 8.4 FL (ref 5–10.5)
POTASSIUM SERPL-SCNC: 4.2 MMOL/L (ref 3.5–5.1)
PROT SERPL-MCNC: 5.5 G/DL (ref 6.4–8.2)
RBC # BLD AUTO: 3.29 M/UL (ref 4–5.2)
SODIUM SERPL-SCNC: 139 MMOL/L (ref 136–145)
WBC # BLD AUTO: 9.5 K/UL (ref 4–11)

## 2024-06-15 PROCEDURE — 97163 PT EVAL HIGH COMPLEX 45 MIN: CPT

## 2024-06-15 PROCEDURE — 1200000000 HC SEMI PRIVATE

## 2024-06-15 PROCEDURE — 85025 COMPLETE CBC W/AUTO DIFF WBC: CPT

## 2024-06-15 PROCEDURE — 97167 OT EVAL HIGH COMPLEX 60 MIN: CPT

## 2024-06-15 PROCEDURE — 6370000000 HC RX 637 (ALT 250 FOR IP): Performed by: PHYSICIAN ASSISTANT

## 2024-06-15 PROCEDURE — 6360000002 HC RX W HCPCS: Performed by: PHYSICIAN ASSISTANT

## 2024-06-15 PROCEDURE — 2580000003 HC RX 258: Performed by: PHYSICIAN ASSISTANT

## 2024-06-15 PROCEDURE — 6360000002 HC RX W HCPCS: Performed by: INTERNAL MEDICINE

## 2024-06-15 PROCEDURE — 36415 COLL VENOUS BLD VENIPUNCTURE: CPT

## 2024-06-15 PROCEDURE — 97116 GAIT TRAINING THERAPY: CPT

## 2024-06-15 PROCEDURE — 97530 THERAPEUTIC ACTIVITIES: CPT

## 2024-06-15 PROCEDURE — 6370000000 HC RX 637 (ALT 250 FOR IP): Performed by: INTERNAL MEDICINE

## 2024-06-15 PROCEDURE — 80053 COMPREHEN METABOLIC PANEL: CPT

## 2024-06-15 RX ORDER — INSULIN GLARGINE 100 [IU]/ML
5 INJECTION, SOLUTION SUBCUTANEOUS NIGHTLY
Status: DISCONTINUED | OUTPATIENT
Start: 2024-06-15 | End: 2024-06-16

## 2024-06-15 RX ORDER — HYDROMORPHONE HYDROCHLORIDE 1 MG/ML
0.25 INJECTION, SOLUTION INTRAMUSCULAR; INTRAVENOUS; SUBCUTANEOUS ONCE
Status: COMPLETED | OUTPATIENT
Start: 2024-06-15 | End: 2024-06-15

## 2024-06-15 RX ADMIN — SENNOSIDES AND DOCUSATE SODIUM 1 TABLET: 50; 8.6 TABLET ORAL at 09:11

## 2024-06-15 RX ADMIN — PANTOPRAZOLE SODIUM 20 MG: 20 TABLET, DELAYED RELEASE ORAL at 09:11

## 2024-06-15 RX ADMIN — ACETAMINOPHEN 650 MG: 325 TABLET ORAL at 15:52

## 2024-06-15 RX ADMIN — SODIUM CHLORIDE, PRESERVATIVE FREE 10 ML: 5 INJECTION INTRAVENOUS at 09:14

## 2024-06-15 RX ADMIN — CITALOPRAM 40 MG: 40 TABLET, FILM COATED ORAL at 21:13

## 2024-06-15 RX ADMIN — OXYCODONE HYDROCHLORIDE 10 MG: 5 TABLET ORAL at 21:14

## 2024-06-15 RX ADMIN — HYDROMORPHONE HYDROCHLORIDE 0.5 MG: 1 INJECTION, SOLUTION INTRAMUSCULAR; INTRAVENOUS; SUBCUTANEOUS at 04:13

## 2024-06-15 RX ADMIN — OMEGA-3-ACID ETHYL ESTERS 1 G: 1 CAPSULE, LIQUID FILLED ORAL at 09:21

## 2024-06-15 RX ADMIN — OXYCODONE HYDROCHLORIDE 10 MG: 5 TABLET ORAL at 15:51

## 2024-06-15 RX ADMIN — INSULIN LISPRO 3 UNITS: 100 INJECTION, SOLUTION INTRAVENOUS; SUBCUTANEOUS at 12:47

## 2024-06-15 RX ADMIN — TOPIRAMATE 100 MG: 100 TABLET, FILM COATED ORAL at 21:14

## 2024-06-15 RX ADMIN — WATER 2000 MG: 1 INJECTION INTRAMUSCULAR; INTRAVENOUS; SUBCUTANEOUS at 09:05

## 2024-06-15 RX ADMIN — WATER 2000 MG: 1 INJECTION INTRAMUSCULAR; INTRAVENOUS; SUBCUTANEOUS at 15:47

## 2024-06-15 RX ADMIN — HEPARIN SODIUM 5000 UNITS: 5000 INJECTION INTRAVENOUS; SUBCUTANEOUS at 21:15

## 2024-06-15 RX ADMIN — OXYCODONE HYDROCHLORIDE 10 MG: 5 TABLET ORAL at 11:01

## 2024-06-15 RX ADMIN — SENNOSIDES AND DOCUSATE SODIUM 1 TABLET: 50; 8.6 TABLET ORAL at 21:13

## 2024-06-15 RX ADMIN — DIAZEPAM 5 MG: 5 TABLET ORAL at 05:51

## 2024-06-15 RX ADMIN — ACETAMINOPHEN 650 MG: 325 TABLET ORAL at 12:46

## 2024-06-15 RX ADMIN — LOSARTAN POTASSIUM 50 MG: 25 TABLET, FILM COATED ORAL at 09:11

## 2024-06-15 RX ADMIN — SODIUM CHLORIDE, PRESERVATIVE FREE 5 ML: 5 INJECTION INTRAVENOUS at 22:48

## 2024-06-15 RX ADMIN — INSULIN LISPRO 2 UNITS: 100 INJECTION, SOLUTION INTRAVENOUS; SUBCUTANEOUS at 09:19

## 2024-06-15 RX ADMIN — OMEGA-3-ACID ETHYL ESTERS 1 G: 1 CAPSULE, LIQUID FILLED ORAL at 21:13

## 2024-06-15 RX ADMIN — OXYCODONE HYDROCHLORIDE 10 MG: 5 TABLET ORAL at 03:07

## 2024-06-15 RX ADMIN — INSULIN LISPRO 3 UNITS: 100 INJECTION, SOLUTION INTRAVENOUS; SUBCUTANEOUS at 16:52

## 2024-06-15 RX ADMIN — ATORVASTATIN CALCIUM 40 MG: 40 TABLET, FILM COATED ORAL at 09:10

## 2024-06-15 RX ADMIN — SODIUM CHLORIDE: 9 INJECTION, SOLUTION INTRAVENOUS at 10:40

## 2024-06-15 RX ADMIN — POLYETHYLENE GLYCOL 3350 17 G: 17 POWDER, FOR SOLUTION ORAL at 09:09

## 2024-06-15 RX ADMIN — ACETAMINOPHEN 650 MG: 325 TABLET ORAL at 21:16

## 2024-06-15 RX ADMIN — OXYCODONE HYDROCHLORIDE 10 MG: 5 TABLET ORAL at 07:25

## 2024-06-15 RX ADMIN — DIAZEPAM 5 MG: 5 TABLET ORAL at 18:39

## 2024-06-15 RX ADMIN — Medication 1 TABLET: at 09:10

## 2024-06-15 RX ADMIN — INSULIN GLARGINE 5 UNITS: 100 INJECTION, SOLUTION SUBCUTANEOUS at 21:15

## 2024-06-15 RX ADMIN — SODIUM CHLORIDE, PRESERVATIVE FREE 10 ML: 5 INJECTION INTRAVENOUS at 09:13

## 2024-06-15 RX ADMIN — SODIUM CHLORIDE, PRESERVATIVE FREE 10 ML: 5 INJECTION INTRAVENOUS at 21:15

## 2024-06-15 RX ADMIN — HYDROMORPHONE HYDROCHLORIDE 0.25 MG: 1 INJECTION, SOLUTION INTRAMUSCULAR; INTRAVENOUS; SUBCUTANEOUS at 12:11

## 2024-06-15 RX ADMIN — FENOFIBRATE 54 MG: 54 TABLET ORAL at 09:11

## 2024-06-15 RX ADMIN — HYDROMORPHONE HYDROCHLORIDE 0.5 MG: 1 INJECTION, SOLUTION INTRAMUSCULAR; INTRAVENOUS; SUBCUTANEOUS at 00:21

## 2024-06-15 RX ADMIN — ACETAMINOPHEN 650 MG: 325 TABLET ORAL at 09:09

## 2024-06-15 ASSESSMENT — PAIN DESCRIPTION - ONSET
ONSET: ON-GOING

## 2024-06-15 ASSESSMENT — PAIN SCALES - GENERAL
PAINLEVEL_OUTOF10: 5
PAINLEVEL_OUTOF10: 2
PAINLEVEL_OUTOF10: 10
PAINLEVEL_OUTOF10: 8
PAINLEVEL_OUTOF10: 8
PAINLEVEL_OUTOF10: 6
PAINLEVEL_OUTOF10: 4
PAINLEVEL_OUTOF10: 8
PAINLEVEL_OUTOF10: 2
PAINLEVEL_OUTOF10: 8
PAINLEVEL_OUTOF10: 8
PAINLEVEL_OUTOF10: 7
PAINLEVEL_OUTOF10: 3
PAINLEVEL_OUTOF10: 8

## 2024-06-15 ASSESSMENT — PAIN DESCRIPTION - FREQUENCY
FREQUENCY: CONTINUOUS

## 2024-06-15 ASSESSMENT — PAIN DESCRIPTION - PAIN TYPE
TYPE: SURGICAL PAIN

## 2024-06-15 ASSESSMENT — PAIN DESCRIPTION - LOCATION
LOCATION: BACK

## 2024-06-15 ASSESSMENT — PAIN DESCRIPTION - DESCRIPTORS
DESCRIPTORS: ACHING;DISCOMFORT;HEAVINESS;THROBBING
DESCRIPTORS: ACHING;DULL
DESCRIPTORS: ACHING
DESCRIPTORS: ACHING;THROBBING;TENDER;SORE
DESCRIPTORS: ACHING

## 2024-06-15 ASSESSMENT — PAIN DESCRIPTION - ORIENTATION
ORIENTATION: LOWER;MID
ORIENTATION: MID
ORIENTATION: LOWER
ORIENTATION: MID
ORIENTATION: MID;LOWER
ORIENTATION: MID
ORIENTATION: LOWER
ORIENTATION: MID

## 2024-06-15 ASSESSMENT — PAIN - FUNCTIONAL ASSESSMENT
PAIN_FUNCTIONAL_ASSESSMENT: PREVENTS OR INTERFERES WITH ALL ACTIVE AND SOME PASSIVE ACTIVITIES
PAIN_FUNCTIONAL_ASSESSMENT: PREVENTS OR INTERFERES SOME ACTIVE ACTIVITIES AND ADLS

## 2024-06-15 NOTE — PROGRESS NOTES
Neurosurgery Progress Note    6/15/2024 10:26 AM                               Pretty Covington                      LOS: 5 days             OPERATIVE PROCEDURES PERFORMED: POD 1  1. T11, T12, L2, L3 bilateral pedicle screw fixation   2. T11-L3 bilateral posterolateral fusion with Magnifuse, and bone marrow aspirate  Subjective:  No acute events overnight. Patient has no specific complaints this am.                                                 Physical Exam:    Temp (24hrs), Av °F (36.7 °C), Min:97.8 °F (36.6 °C), Max:98.4 °F (36.9 °C)      Musculoskeletal:   Gait: Not tested   Tone: normal  Sensory: intact to all extremities  Motor strength: pain limited    Right  Left      Right  Left    Deltoid  5 5   Hip Flex  3 3   Biceps  5 5   Knee Extensors  4 4   Triceps  5 5   Knee Flexors  4 4   Wrist Ext  5 5   Ankle Dorsiflex.  5 5   Wrist Flex  5 5   Ankle Plantarflex.  5 5   Handgrip  5 5   Ext Mark Longus  5 5   Thumb Ext  5 5              Neuro Exam      Labs:  Recent Labs     06/15/24  0914   WBC 9.5   HGB 10.4*   HCT 30.0*          Recent Labs     06/15/24  0914      K 4.2      CO2 24   BUN 25*   CREATININE 0.9   GLUCOSE 272*   CALCIUM 8.6       Recent Labs     24  0527   PROTIME 14.3   INR 1.09   Drain 50/50      Assessment and Plan:  Pt is a 64 y.o. year old female: POD 1  1. T11, T12, L2, L3 bilateral pedicle screw fixation   2. T11-L3 bilateral posterolateral fusion with Magnifuse, and bone marrow aspirate          Neurologic exams frequency:: Q4H  Custom tlso from [facility] for after surgery  Muscle spasms: scheduled Robaxin and Valium prn  Pain control: Managed by medical team  Can have prophylactic lovenox but not therapeutic. May start therapeutic POD #7         5. Neurologically stable        6. ADAT, mobilze         7. DVT prophylaxis, SCDs         8.PT/OT  Deedee Estrada PA-C

## 2024-06-15 NOTE — FLOWSHEET NOTE
PACU Transfer to Floor Note    Procedure(s):  Thoracic 11-Lumbar 3 fixation and fusion      Pt meets criteria as per Bing Score and ASPAN Standards to transfer to next phase of care.     Verbal report given to 5T RN on Pretty Covington being transferred to [facility]. Information from the following report(s) Nurse Handoff Report and Surgery Report was reviewed with the receiving nurse.  Opportunity for questions and clarification was provided.      Vitals:    06/14/24 2010   BP: (!) 148/71   Pulse: 86   Resp: 20   Temp: 98 °F (36.7 °C)   SpO2:        In: 2000 [I.V.:2000]  Out: 900       Pt transported by [facility] transport

## 2024-06-15 NOTE — PLAN OF CARE
Problem: Safety - Adult  Goal: Free from fall injury  6/14/2024 2038 by Rhonda Kline, RN  Outcome: Progressing  Note: All fall precautions in place and call light is within reach.      Problem: Pain  Goal: Verbalizes/displays adequate comfort level or baseline comfort level  6/14/2024 2038 by Rhonda Kline, RN  Outcome: Progressing  Note: Pt. Managing pain per MAR.

## 2024-06-15 NOTE — PROGRESS NOTES
Patient is A&Ox4 VSS this shift.Patient has endorsed pain to be managed per MAR and non-pharm measures. Patient is tolerating PO diet. Tolerating ambulation via krystin stedy x 2 assist. Voiding via external catheter Patient updated on plan of care. Fall and safety precautions in place, call light within reach.

## 2024-06-15 NOTE — PROGRESS NOTES
Occupational Therapy  Facility/Department: [facility] ORTHO/NEURO  Occupational Therapy Initial Assessment/tx    Name: Pretty Covington  : 1960  MRN: [id number]  Date of Service: 6/15/2024    Discharge Recommendations:  Subacute/Skilled Nursing Facility, IP Rehab  OT Equipment Recommendations  Other: defer to next level of care       Patient Diagnosis(es): There were no encounter diagnoses.  Past Medical History:  has a past medical history of Asthma, Benign essential hypertension, Benign head tremor, Cholelithiasis, Coronary artery disease involving native coronary artery of native heart, Disease of nasal cavity and sinuses, Fatty infiltration of liver, Hot flash, menopausal, Hx of blood clots, Hyperlipidemia, Hyperlipidemia associated with type 2 diabetes mellitus (HCC), Morbid obesity with BMI of 50.0-59.9, adult (HCC), No history of procedure, ELEAZAR on CPAP, Pulmonary embolism (HCC), Type II or unspecified type diabetes mellitus without mention of complication, not stated as uncontrolled, Umbilical hernia, Unspecified sleep apnea, and Wears glasses.  Past Surgical History:  has a past surgical history that includes Cholecystectomy, laparoscopic (2012); hernia repair (10/11/2013); Colonoscopy (2015); Breast surgery (Right, ); hysteroscopy (2018); Dilation and curettage of uterus (2018); and Foot surgery (Right, 3/15/2023).    Treatment Diagnosis: impaired ADLs and mobility      Assessment   Performance deficits / Impairments: Decreased functional mobility ;Decreased ADL status;Decreased endurance  Assessment: Pt is s/p L1 Chance fx-to OR 24 for Thoracic 11-Lumbar 3 fixation and fusion.  She is functioning significantly below baseline level, needing A of 2 for bed mobility and sit><stand transfers, dep with all LE ADLs and donning TLSO.  Prior to admission, pt resided with sister and was indep with ADLs, mobility, home mgt, and worked full time.  Pain is her most limiting factor.   and nieces       Objective        Pt has head tremor, reporting this is not new        Safety Devices  Type of Devices: Left in bed;Call light within reach;Nurse notified;Bed alarm in place  Balance  Sitting: Impaired (pt sat on side of bed ~10 minutes with CGA to Nohemi-leaning posteriorly when fatigued)  Standing: Impaired (mod A of 2 with rolling walker)     AROM:  (B shoulders not tested, elbows to hands=WFL)  ADL  Feeding: Independent;Beverage management        Bed mobility  Rolling to Right: 2 Person assistance;Maximum assistance (max A of 2)  Supine to Sit: 2 Person assistance;Moderate assistance;Maximum assistance (head of bed slightly elevated, use of bed rail, cues for log rolling, mod A+max A)  Sit to Supine: 2 Person assistance;Dependent/Total (dep of 2)  Scooting: Minimal assistance (to scoot to head of bed while seated)  Transfers  Sit to stand: 2 Person assistance;Moderate assistance (with walker, cues for hand placement, mod A of 2)  Stand to sit: 2 Person assistance;Moderate assistance (mod A of 2, cues for safe placement of body and hands)  Pt became nauseated and fatigued prior to trying krystin saab to stand.  Returned to supine position-dep of 2, Rn informed.  Vision  Vision: Impaired  Vision Exceptions: Wears glasses at all times  Hearing  Hearing: Within functional limits  Cognition  Overall Cognitive Status: WFL  Orientation  Overall Orientation Status: Within Functional Limits                  Education Given To: Patient  Education Provided: Role of Therapy;Plan of Care;Transfer Training  Education Method: Verbal;Demonstration  Barriers to Learning: None  Education Outcome: Verbalized understanding;Continued education needed                        G-Code     OutComes Score                                                  AM-PAC - ADL  AM-PAC Daily Activity - Inpatient   How much help is needed for putting on and taking off regular lower body clothing?: Total  How much help is needed for bathing  (which includes washing, rinsing, drying)?: A Lot  How much help is needed for toileting (which includes using toilet, bedpan, or urinal)?: Total  How much help is needed for putting on and taking off regular upper body clothing?: A Lot  How much help is needed for taking care of personal grooming?: A Little  How much help for eating meals?: None  AM-PAC Inpatient Daily Activity Raw Score: 13  AM-PAC Inpatient ADL T-Scale Score : 32.03  ADL Inpatient CMS 0-100% Score: 63.03  ADL Inpatient CMS G-Code Modifier : CL           Goals  Short Term Goals  Time Frame for Short Term Goals: discharge  Short Term Goal 1: pt to transfer to bedside commode with mod A of 2  Short Term Goal 2: pt to manage toileting with mod A  Short Term Goal 3: pt to don brief with use of adaptive equip and mod A  Short Term Goal 4: pt to don/doff TLSO with mod A  Short Term Goal 5: pt to stand at sink with CGA while doing grooming/hygiene tasks  Patient Goals   Patient goals : to be able to walk       Therapy Time   Individual Concurrent Group Co-treatment   Time In 1124         Time Out 1225         Minutes 61            Timed Code Treatment Minutes:     46    Total Treatment Minutes:   61    Rose Crouch, OTR/L [id number]

## 2024-06-15 NOTE — PROGRESS NOTES
4 Eyes Skin Assessment     NAME:  Pretty Covington  YOB: 1960  MEDICAL RECORD NUMBER:  [id number]    The patient is being assessed for  Post-Op Surgical    I agree that at least one RN has performed a thorough Head to Toe Skin Assessment on the patient. ALL assessment sites listed below have been assessed.      Areas assessed by both nurses:    Head, Face, Ears, Shoulders, Back, Chest, Arms, Elbows, Hands, Sacrum. Buttock, Coccyx, Ischium, Legs. Feet and Heels, and Under Medical Devices   abdominal fold excoriation/skin tear, incision to back      Does the Patient have a Wound? No noted wound(s)       Estuardo Prevention initiated by RN: No  Wound Care Orders initiated by RN: No    Pressure Injury (Stage 3,4, Unstageable, DTI, NWPT, and Complex wounds) if present, place Wound referral order by RN under : No    New Ostomies, if present place, Ostomy referral order under : No     Nurse 1 eSignature: Electronically signed by Rhonda Kline RN on 6/15/24 at 1:56 AM EDT    **SHARE this note so that the co-signing nurse can place an eSignature**    Nurse 2 eSignature: Electronically signed by Vonnie Chun RN on 6/15/24 at 5:06 AM EDT

## 2024-06-15 NOTE — PROGRESS NOTES
Physical Therapy  Facility/Department: [facility] ORTHO/NEURO  Physical Therapy Initial Assessment and Treatment    Name: Pretty Covington  : 1960  MRN: [id number]  Date of Service: 6/15/2024    Discharge Recommendations:  IP Rehab, Subacute/Skilled Nursing Facility, Patient able to tolerate 3 hours of therapy per day (Versus)   PT Equipment Recommendations  Other: Defer      Patient Diagnosis(es): There were no encounter diagnoses.  Past Medical History:  has a past medical history of Asthma, Benign essential hypertension, Benign head tremor, Cholelithiasis, Coronary artery disease involving native coronary artery of native heart, Disease of nasal cavity and sinuses, Fatty infiltration of liver, Hot flash, menopausal, Hx of blood clots, Hyperlipidemia, Hyperlipidemia associated with type 2 diabetes mellitus (HCC), Morbid obesity with BMI of 50.0-59.9, adult (HCC), No history of procedure, ELEAZAR on CPAP, Pulmonary embolism (HCC), Type II or unspecified type diabetes mellitus without mention of complication, not stated as uncontrolled, Umbilical hernia, Unspecified sleep apnea, and Wears glasses.  Past Surgical History:  has a past surgical history that includes Cholecystectomy, laparoscopic (2012); hernia repair (10/11/2013); Colonoscopy (2015); Breast surgery (Right, ); hysteroscopy (2018); Dilation and curettage of uterus (2018); and Foot surgery (Right, 3/15/2023).    Assessment   Assessment: Pt is 65 yo F admitted on 6/10/24 with closed fx of lumbar vertebral body.  Pt underwent fusion and has a custom TLSO.  At baseline, pt lives with her sister and is independent.  Presently, pt is pale and sleepy.  Pt moves with min assist to dependent assist of 2.  Pt with difficulty due to body habitus and double pannus.  Pt able to stand at walker with TLSO and heavy assist, but unable to tolerate steps due to pain.  Pt becomes nauseated and assisted BTB.  Pt with horizontal open wound along skin  Mobility Comments: Pt used log roll.  Pt's custom TLSO donned dependently in sitting.    Transfers  Sit to Stand: 2 Person Assistance;Moderate Assistance (At walker)  Stand to Sit: 2 Person Assistance;Moderate Assistance  Comment: Pt slow and effortful to obtain upright posture at walker.  Pt able to take 2 steps in place, but unable to ambulate due to c/o pain.  Attempted Cayla Stedy, but pt c/o nausea prior to using.    Balance  Sitting - Static: Fair;- (SBA to min assist due to posterior lean)  Standing - Static: Fair;- (At walker)    AM-PAC - Mobility  AM-PAC Basic Mobility - Inpatient   How much help is needed turning from your back to your side while in a flat bed without using bedrails?: A Lot  How much help is needed moving from lying on your back to sitting on the side of a flat bed without using bedrails?: A Lot  How much help is needed moving to and from a bed to a chair?: Total  How much help is needed standing up from a chair using your arms?: A Lot  How much help is needed walking in hospital room?: Total  How much help is needed climbing 3-5 steps with a railing?: Total  AM-PAC Inpatient Mobility Raw Score : 9  AM-PAC Inpatient T-Scale Score : 30.55  Mobility Inpatient CMS 0-100% Score: 81.38  Mobility Inpatient CMS G-Code Modifier : CM    Goals  Short Term Goals  Time Frame for Short Term Goals: By discharge  Short Term Goal 1: Pt will perform bed mobility with min assist using log roll  Short Term Goal 2: Pt will transfer sit to and from stand with min assist  Short Term Goal 3: Pt will ambulate 50 feet with TLSO, wheeled walker and min assist  Patient Goals   Patient Goals : Walk again    Education  Patient Education  Education Given To: Patient  Education Provided: Role of Therapy;Plan of Care;Precautions  Education Outcome: Verbalized understanding;Continued education needed    Therapy Time   Individual Concurrent Group Co-treatment   Time In 1124         Time Out 1225         Minutes 61

## 2024-06-15 NOTE — PLAN OF CARE
Problem: Pain  Goal: Verbalizes/displays adequate comfort level or baseline comfort level  6/15/2024 0738 by Susana Batista, RN  Outcome: Progressing   Pt endorses pain - administered per MAR    Problem: Safety - Adult  Goal: Free from fall injury  6/15/2024 0738 by Susana Batista, RN  Outcome: Progressing  Standard safety measures in place - call light within reach
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
## 2024-06-15 NOTE — PROGRESS NOTES
Clinical Pharmacy Note    Inpatient order for Jardiance (empagliflozin) was discontinued. Pt on 25 mg daily at home for diabetes indication     Only 10 mg daily may be ordered and only for indications of heart failure or CKD.  Empagliflozin may be re-ordered upon discharge for diabetes indication      Please call with any questions    Meghan Valdez  Pharm.D. BCPS  [phone] (Main Pharmacy)

## 2024-06-15 NOTE — PROGRESS NOTES
Pt. Oriented x4. VSS on 4L nasal cannula. Pt. Managing pain per MAR. Dressing to back CDI, wound vac plugged in. All fall precautions in place and call light is within reach.

## 2024-06-16 LAB
ALBUMIN SERPL-MCNC: 3 G/DL (ref 3.4–5)
ALBUMIN/GLOB SERPL: 1.3 {RATIO} (ref 1.1–2.2)
ALP SERPL-CCNC: 66 U/L (ref 40–129)
ALT SERPL-CCNC: 415 U/L (ref 10–40)
ANION GAP SERPL CALCULATED.3IONS-SCNC: 10 MMOL/L (ref 3–16)
AST SERPL-CCNC: 1937 U/L (ref 15–37)
BASOPHILS # BLD: 0 K/UL (ref 0–0.2)
BASOPHILS NFR BLD: 0.4 %
BILIRUB SERPL-MCNC: 0.7 MG/DL (ref 0–1)
BUN SERPL-MCNC: 31 MG/DL (ref 7–20)
CALCIUM SERPL-MCNC: 8.2 MG/DL (ref 8.3–10.6)
CHLORIDE SERPL-SCNC: 103 MMOL/L (ref 99–110)
CO2 SERPL-SCNC: 25 MMOL/L (ref 21–32)
CREAT SERPL-MCNC: 0.5 MG/DL (ref 0.6–1.2)
DEPRECATED RDW RBC AUTO: 13.9 % (ref 12.4–15.4)
EOSINOPHIL # BLD: 0 K/UL (ref 0–0.6)
EOSINOPHIL NFR BLD: 0.4 %
GFR SERPLBLD CREATININE-BSD FMLA CKD-EPI: >90 ML/MIN/{1.73_M2}
GLUCOSE BLD-MCNC: 257 MG/DL (ref 70–99)
GLUCOSE BLD-MCNC: 262 MG/DL (ref 70–99)
GLUCOSE BLD-MCNC: 289 MG/DL (ref 70–99)
GLUCOSE BLD-MCNC: 337 MG/DL (ref 70–99)
GLUCOSE SERPL-MCNC: 277 MG/DL (ref 70–99)
HCT VFR BLD AUTO: 24.5 % (ref 36–48)
HCT VFR BLD AUTO: 25 % (ref 36–48)
HGB BLD-MCNC: 8.6 G/DL (ref 12–16)
HGB BLD-MCNC: 8.7 G/DL (ref 12–16)
LYMPHOCYTES # BLD: 0.6 K/UL (ref 1–5.1)
LYMPHOCYTES NFR BLD: 7.7 %
MCH RBC QN AUTO: 31.9 PG (ref 26–34)
MCHC RBC AUTO-ENTMCNC: 35.5 G/DL (ref 31–36)
MCV RBC AUTO: 89.7 FL (ref 80–100)
MONOCYTES # BLD: 0.8 K/UL (ref 0–1.3)
MONOCYTES NFR BLD: 10.4 %
NEUTROPHILS # BLD: 6.5 K/UL (ref 1.7–7.7)
NEUTROPHILS NFR BLD: 81.1 %
PERFORMED ON: ABNORMAL
PLATELET # BLD AUTO: 129 K/UL (ref 135–450)
PMV BLD AUTO: 8.2 FL (ref 5–10.5)
POTASSIUM SERPL-SCNC: 4 MMOL/L (ref 3.5–5.1)
PROT SERPL-MCNC: 5.4 G/DL (ref 6.4–8.2)
RBC # BLD AUTO: 2.73 M/UL (ref 4–5.2)
SODIUM SERPL-SCNC: 138 MMOL/L (ref 136–145)
WBC # BLD AUTO: 8 K/UL (ref 4–11)

## 2024-06-16 PROCEDURE — 85025 COMPLETE CBC W/AUTO DIFF WBC: CPT

## 2024-06-16 PROCEDURE — 6370000000 HC RX 637 (ALT 250 FOR IP): Performed by: PHYSICIAN ASSISTANT

## 2024-06-16 PROCEDURE — 80053 COMPREHEN METABOLIC PANEL: CPT

## 2024-06-16 PROCEDURE — 2580000003 HC RX 258: Performed by: PHYSICIAN ASSISTANT

## 2024-06-16 PROCEDURE — 36415 COLL VENOUS BLD VENIPUNCTURE: CPT

## 2024-06-16 PROCEDURE — 85014 HEMATOCRIT: CPT

## 2024-06-16 PROCEDURE — 1200000000 HC SEMI PRIVATE

## 2024-06-16 PROCEDURE — 6370000000 HC RX 637 (ALT 250 FOR IP): Performed by: INTERNAL MEDICINE

## 2024-06-16 PROCEDURE — 6360000002 HC RX W HCPCS: Performed by: PHYSICIAN ASSISTANT

## 2024-06-16 PROCEDURE — 85018 HEMOGLOBIN: CPT

## 2024-06-16 RX ORDER — INSULIN LISPRO 100 [IU]/ML
0-8 INJECTION, SOLUTION INTRAVENOUS; SUBCUTANEOUS
Status: DISCONTINUED | OUTPATIENT
Start: 2024-06-16 | End: 2024-06-16

## 2024-06-16 RX ORDER — DEXTROSE MONOHYDRATE 100 MG/ML
INJECTION, SOLUTION INTRAVENOUS CONTINUOUS PRN
Status: DISCONTINUED | OUTPATIENT
Start: 2024-06-16 | End: 2024-06-16 | Stop reason: SDUPTHER

## 2024-06-16 RX ORDER — INSULIN GLARGINE 100 [IU]/ML
10 INJECTION, SOLUTION SUBCUTANEOUS NIGHTLY
Status: DISCONTINUED | OUTPATIENT
Start: 2024-06-16 | End: 2024-06-20 | Stop reason: HOSPADM

## 2024-06-16 RX ORDER — INSULIN LISPRO 100 [IU]/ML
0-8 INJECTION, SOLUTION INTRAVENOUS; SUBCUTANEOUS
Status: DISCONTINUED | OUTPATIENT
Start: 2024-06-16 | End: 2024-06-20 | Stop reason: HOSPADM

## 2024-06-16 RX ORDER — GLUCAGON 1 MG/ML
1 KIT INJECTION PRN
Status: DISCONTINUED | OUTPATIENT
Start: 2024-06-16 | End: 2024-06-20 | Stop reason: HOSPADM

## 2024-06-16 RX ORDER — TIRZEPATIDE 7.5 MG/.5ML
7.5 INJECTION, SOLUTION SUBCUTANEOUS WEEKLY
Status: ON HOLD | COMMUNITY

## 2024-06-16 RX ORDER — INSULIN LISPRO 100 [IU]/ML
0-4 INJECTION, SOLUTION INTRAVENOUS; SUBCUTANEOUS NIGHTLY
Status: DISCONTINUED | OUTPATIENT
Start: 2024-06-16 | End: 2024-06-20 | Stop reason: HOSPADM

## 2024-06-16 RX ORDER — ASCORBIC ACID 500 MG
500 TABLET ORAL DAILY
Status: ON HOLD | COMMUNITY

## 2024-06-16 RX ADMIN — TOPIRAMATE 100 MG: 100 TABLET, FILM COATED ORAL at 21:27

## 2024-06-16 RX ADMIN — OXYCODONE HYDROCHLORIDE 10 MG: 5 TABLET ORAL at 19:11

## 2024-06-16 RX ADMIN — OXYCODONE HYDROCHLORIDE 10 MG: 5 TABLET ORAL at 08:12

## 2024-06-16 RX ADMIN — HEPARIN SODIUM 5000 UNITS: 5000 INJECTION INTRAVENOUS; SUBCUTANEOUS at 05:29

## 2024-06-16 RX ADMIN — INSULIN GLARGINE 10 UNITS: 100 INJECTION, SOLUTION SUBCUTANEOUS at 21:26

## 2024-06-16 RX ADMIN — LOSARTAN POTASSIUM 50 MG: 25 TABLET, FILM COATED ORAL at 08:14

## 2024-06-16 RX ADMIN — SODIUM CHLORIDE, PRESERVATIVE FREE 10 ML: 5 INJECTION INTRAVENOUS at 08:24

## 2024-06-16 RX ADMIN — Medication 1 TABLET: at 08:14

## 2024-06-16 RX ADMIN — WATER 2000 MG: 1 INJECTION INTRAMUSCULAR; INTRAVENOUS; SUBCUTANEOUS at 00:22

## 2024-06-16 RX ADMIN — ACETAMINOPHEN 650 MG: 325 TABLET ORAL at 12:06

## 2024-06-16 RX ADMIN — SODIUM CHLORIDE, PRESERVATIVE FREE 10 ML: 5 INJECTION INTRAVENOUS at 08:25

## 2024-06-16 RX ADMIN — HEPARIN SODIUM 5000 UNITS: 5000 INJECTION INTRAVENOUS; SUBCUTANEOUS at 14:30

## 2024-06-16 RX ADMIN — POLYETHYLENE GLYCOL 3350 17 G: 17 POWDER, FOR SOLUTION ORAL at 08:15

## 2024-06-16 RX ADMIN — METHOCARBAMOL 750 MG: 750 TABLET ORAL at 03:50

## 2024-06-16 RX ADMIN — OXYCODONE HYDROCHLORIDE 10 MG: 5 TABLET ORAL at 14:28

## 2024-06-16 RX ADMIN — METHOCARBAMOL 750 MG: 750 TABLET ORAL at 13:47

## 2024-06-16 RX ADMIN — INSULIN LISPRO 4 UNITS: 100 INJECTION, SOLUTION INTRAVENOUS; SUBCUTANEOUS at 17:55

## 2024-06-16 RX ADMIN — SODIUM CHLORIDE, PRESERVATIVE FREE 10 ML: 5 INJECTION INTRAVENOUS at 21:28

## 2024-06-16 RX ADMIN — INSULIN LISPRO 3 UNITS: 100 INJECTION, SOLUTION INTRAVENOUS; SUBCUTANEOUS at 12:05

## 2024-06-16 RX ADMIN — SENNOSIDES AND DOCUSATE SODIUM 1 TABLET: 50; 8.6 TABLET ORAL at 21:27

## 2024-06-16 RX ADMIN — HYDROMORPHONE HYDROCHLORIDE 0.5 MG: 1 INJECTION, SOLUTION INTRAMUSCULAR; INTRAVENOUS; SUBCUTANEOUS at 21:27

## 2024-06-16 RX ADMIN — WATER 2000 MG: 1 INJECTION INTRAMUSCULAR; INTRAVENOUS; SUBCUTANEOUS at 17:41

## 2024-06-16 RX ADMIN — SENNOSIDES AND DOCUSATE SODIUM 1 TABLET: 50; 8.6 TABLET ORAL at 08:13

## 2024-06-16 RX ADMIN — HEPARIN SODIUM 5000 UNITS: 5000 INJECTION INTRAVENOUS; SUBCUTANEOUS at 21:27

## 2024-06-16 RX ADMIN — HYDROMORPHONE HYDROCHLORIDE 0.25 MG: 1 INJECTION, SOLUTION INTRAMUSCULAR; INTRAVENOUS; SUBCUTANEOUS at 12:08

## 2024-06-16 RX ADMIN — PANTOPRAZOLE SODIUM 20 MG: 20 TABLET, DELAYED RELEASE ORAL at 08:14

## 2024-06-16 RX ADMIN — HYDROMORPHONE HYDROCHLORIDE 0.5 MG: 1 INJECTION, SOLUTION INTRAMUSCULAR; INTRAVENOUS; SUBCUTANEOUS at 17:04

## 2024-06-16 RX ADMIN — WATER 2000 MG: 1 INJECTION INTRAMUSCULAR; INTRAVENOUS; SUBCUTANEOUS at 08:18

## 2024-06-16 RX ADMIN — INSULIN LISPRO 2 UNITS: 100 INJECTION, SOLUTION INTRAVENOUS; SUBCUTANEOUS at 08:17

## 2024-06-16 RX ADMIN — WATER 2000 MG: 1 INJECTION INTRAMUSCULAR; INTRAVENOUS; SUBCUTANEOUS at 23:30

## 2024-06-16 RX ADMIN — OMEGA-3-ACID ETHYL ESTERS 1 G: 1 CAPSULE, LIQUID FILLED ORAL at 08:15

## 2024-06-16 RX ADMIN — OXYCODONE HYDROCHLORIDE 10 MG: 5 TABLET ORAL at 23:29

## 2024-06-16 RX ADMIN — CITALOPRAM 40 MG: 40 TABLET, FILM COATED ORAL at 21:27

## 2024-06-16 RX ADMIN — ACETAMINOPHEN 650 MG: 325 TABLET ORAL at 08:13

## 2024-06-16 RX ADMIN — FENOFIBRATE 54 MG: 54 TABLET ORAL at 08:14

## 2024-06-16 RX ADMIN — DIAZEPAM 5 MG: 5 TABLET ORAL at 05:29

## 2024-06-16 RX ADMIN — OXYCODONE HYDROCHLORIDE 10 MG: 5 TABLET ORAL at 03:50

## 2024-06-16 RX ADMIN — OMEGA-3-ACID ETHYL ESTERS 1 G: 1 CAPSULE, LIQUID FILLED ORAL at 21:27

## 2024-06-16 ASSESSMENT — PAIN DESCRIPTION - PAIN TYPE
TYPE: SURGICAL PAIN

## 2024-06-16 ASSESSMENT — PAIN DESCRIPTION - ORIENTATION
ORIENTATION: MID;LOWER
ORIENTATION: LOWER;MID;UPPER
ORIENTATION: LOWER;MID;UPPER
ORIENTATION: LOWER
ORIENTATION: LOWER
ORIENTATION: LOWER;MID
ORIENTATION: LOWER
ORIENTATION: LOWER;MID;UPPER
ORIENTATION: LOWER

## 2024-06-16 ASSESSMENT — PAIN DESCRIPTION - ONSET
ONSET: ON-GOING

## 2024-06-16 ASSESSMENT — PAIN - FUNCTIONAL ASSESSMENT
PAIN_FUNCTIONAL_ASSESSMENT: PREVENTS OR INTERFERES SOME ACTIVE ACTIVITIES AND ADLS

## 2024-06-16 ASSESSMENT — PAIN SCALES - GENERAL
PAINLEVEL_OUTOF10: 5
PAINLEVEL_OUTOF10: 6
PAINLEVEL_OUTOF10: 7
PAINLEVEL_OUTOF10: 6
PAINLEVEL_OUTOF10: 5
PAINLEVEL_OUTOF10: 8
PAINLEVEL_OUTOF10: 5
PAINLEVEL_OUTOF10: 9
PAINLEVEL_OUTOF10: 7
PAINLEVEL_OUTOF10: 8
PAINLEVEL_OUTOF10: 5
PAINLEVEL_OUTOF10: 7
PAINLEVEL_OUTOF10: 7
PAINLEVEL_OUTOF10: 8
PAINLEVEL_OUTOF10: 8
PAINLEVEL_OUTOF10: 6
PAINLEVEL_OUTOF10: 5

## 2024-06-16 ASSESSMENT — PAIN DESCRIPTION - FREQUENCY
FREQUENCY: CONTINUOUS

## 2024-06-16 ASSESSMENT — PAIN DESCRIPTION - DESCRIPTORS
DESCRIPTORS: ACHING;TENDER
DESCRIPTORS: ACHING;TENDER;THROBBING
DESCRIPTORS: ACHING;DULL
DESCRIPTORS: ACHING;TENDER;THROBBING;SORE
DESCRIPTORS: ACHING;DULL
DESCRIPTORS: ACHING;DULL
DESCRIPTORS: SPASM

## 2024-06-16 ASSESSMENT — PAIN DESCRIPTION - LOCATION
LOCATION: BACK

## 2024-06-16 NOTE — PLAN OF CARE
Problem: Safety - Adult  Goal: Free from fall injury  6/16/2024 0736 by Susana Batista, RN  Outcome: Progressing   Standard safety measures in place - call light within reach    Problem: Pain  Goal: Verbalizes/displays adequate comfort level or baseline comfort level  6/16/2024 0736 by Susana Batista, RN  Outcome: Progressing   Pt endorses pain - administered per MAR

## 2024-06-16 NOTE — PROGRESS NOTES
Neurosurgery Progress Note    2024 11:03 AM                               Pretty Covington                      LOS: 6 days             OPERATIVE PROCEDURES PERFORMED: POD 2  1. T11, T12, L2, L3 bilateral pedicle screw fixation   2. T11-L3 bilateral posterolateral fusion with Magnifuse, and bone marrow aspirate  Subjective:  No acute events overnight. Patient has no specific complaints this am.                                                 Physical Exam:    Temp (24hrs), Av.9 °F (36.6 °C), Min:97 °F (36.1 °C), Max:98.9 °F (37.2 °C)      Neuro Exam  The patient is well-appearing and is in no acute distress.    Alert and oriented ×4, appropriate, conversant with normal mood and affect.  DTR 2+/4 symmetric. Eugenio sign neg BUE, Babinski sign is down-going BLE      Right  Left      Right  Left    Deltoid  5 5   Hip Flex  3 3   Biceps  5 5   Knee Extensors  4 4   Triceps  5 5   Knee Flexors  4 4   Wrist Ext  5 5   Ankle Dorsiflex.  5 5   Wrist Flex  5 5   Ankle Plantarflex.  5 5   Handgrip  5 5   Ext Mark Longus  5 5   Thumb Ext  5 5             Labs:  Recent Labs     24  0641   WBC 8.0   HGB 8.7*   HCT 24.5*   *       Recent Labs     24  0641      K 4.0      CO2 25   BUN 31*   CREATININE 0.5*   GLUCOSE 277*   CALCIUM 8.2*       Recent Labs     24  0527   PROTIME 14.3   INR 1.09           Assessment and Plan:  Pt is a 64 y.o. year old female: POD 2  1. T11, T12, L2, L3 bilateral pedicle screw fixation   2. T11-L3 bilateral posterolateral fusion with Magnifuse, and bone marrow aspirate           Neurologic exams frequency:: Q4H  Custom tlso from [facility] for after surgery  Muscle spasms: scheduled Robaxin and Valium prn  Pain control: Managed by medical team  Can have prophylactic lovenox but not therapeutic. May start therapeutic POD #7         5. Neurologically stable        6. ADAT, mobilze         7. DVT prophylaxis, SCDs         8.PT/OT  Neurologically stable  ADAT,

## 2024-06-16 NOTE — CONSULTS
Clinical Pharmacy Consult Note  Medication History     Admit Date: 6/10/2024    Pharmacy consulted to verify home medication list by Dr. Janet Villarreal.    List of of current medications patient is taking is complete. Home Medication list in EPIC updated to reflect changes noted below.    Source of information: I spoke to the patient, her family member, and viewed her dispense report.    Patient's home pharmacy: [facility] [id number] - [address] -  [phone] -  [phone]      Changes made to medication list:   Medications removed: (include reason, ex: therapy completed, patient no longer taking, etc.)  None  Medications added:   None  Medication doses adjusted:   None  Other notes:   None    Current Outpatient Medications   Medication Instructions    acetaminophen (TYLENOL) 500 mg, Oral, EVERY 6 HOURS PRN    albuterol sulfate HFA (PROVENTIL;VENTOLIN;PROAIR) 108 (90 Base) MCG/ACT inhaler USE 2 INHALATIONS BY MOUTH 4  TIMES DAILY AS NEEDED FOR  WHEEZING    apixaban (ELIQUIS) 5 mg, Oral, 2 TIMES DAILY    ascorbic acid (VITAMIN C) 500 mg, Oral, DAILY    Aspirin Low Dose 81 mg, Oral, DAILY    atorvastatin (LIPITOR) 40 mg, Oral, DAILY    citalopram (CELEXA) 40 MG tablet TAKE 1 TABLET BY MOUTH DAILY    empagliflozin (JARDIANCE) 25 MG tablet TAKE 1 TABLET BY MOUTH DAILY    fenofibric acid (TRILIPIX) 135 MG CPDR capsule TAKE 1 CAPSULE BY MOUTH DAILY    ferrous sulfate (FEROSUL) 325 (65 Fe) MG tablet TAKE 1 TABLET BY MOUTH EVERY MORNING    Icosapent Ethyl (VASCEPA) 1 g CAPS capsule TAKE 2 CAPSULES BY MOUTH  TWICE DAILY    losartan (COZAAR) 50 mg, Oral, DAILY    metFORMIN (GLUCOPHAGE-XR) 500 MG extended release tablet TAKE 2 TABLETS BY MOUTH IN THE  MORNING AND AT BEDTIME    Mounjaro 7.5 mg, SubCUTAneous, WEEKLY, (on Sundays)    Multiple Vitamins-Minerals (THERAPEUTIC MULTIVITAMIN-MINERALS) tablet 1 tablet, Oral, DAILY    pantoprazole (PROTONIX) 20 MG tablet TAKE 1 TABLET BY MOUTH  DAILY

## 2024-06-16 NOTE — PLAN OF CARE
Problem: Discharge Planning  Goal: Discharge to home or other facility with appropriate resources  Outcome: Progressing  Flowsheets (Taken 6/16/2024 0215)  Discharge to home or other facility with appropriate resources:   Identify barriers to discharge with patient and caregiver   Arrange for needed discharge resources and transportation as appropriate   Identify discharge learning needs (meds, wound care, etc)     Problem: Safety - Adult  Goal: Free from fall injury  Outcome: Progressing  Flowsheets (Taken 6/12/2024 1004 by Susan Coyne RN)  Free From Fall Injury:   Instruct family/caregiver on patient safety   Based on caregiver fall risk screen, instruct family/caregiver to ask for assistance with transferring infant if caregiver noted to have fall risk factors
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
## 2024-06-16 NOTE — PROGRESS NOTES
Patient is A&Ox4.VSS this shift. Patient has endorsed pain and managed per MAR and non-pharm measures. Patient is tolerating PO diet. Ambulating x2 via krystin stedy. Voiding via external catheter. Patient updated on plan of care. Fall and safety precautions in place, call light within reach.

## 2024-06-16 NOTE — PROGRESS NOTES
[facility] - Acute Rehab Unit   After review, this patient is felt to be:       [x]  Dr. Alfaro states this patient is appropriate for rehab.     []  Not appropriate for Acute Inpatient Rehab    []  Referral received and ARU reviewing patient      [facility]   Will notify CM/SW with further updates. Thank you for the referral.    Mis MADISON, OTR/L  Clinical Liaison- [facility]   (P): [phone]  (F): [phone]

## 2024-06-16 NOTE — PROGRESS NOTES
Pt aox4, VSS RA. Pt tolerating PO diet / fluids well, voiding via PW and incontinent briefs. Up x2 stedy.   Patient endorses pain to back. Pain being managed via medications. No acute events this shift.  All standard fall / safety precautions remain in place. Plan of care continues.

## 2024-06-17 ENCOUNTER — APPOINTMENT (OUTPATIENT)
Dept: GENERAL RADIOLOGY | Age: 64
End: 2024-06-17
Attending: FAMILY MEDICINE
Payer: COMMERCIAL

## 2024-06-17 LAB
ALBUMIN SERPL-MCNC: 2.7 G/DL (ref 3.4–5)
ALBUMIN/GLOB SERPL: 1.1 {RATIO} (ref 1.1–2.2)
ALP SERPL-CCNC: 67 U/L (ref 40–129)
ALT SERPL-CCNC: 272 U/L (ref 10–40)
ANION GAP SERPL CALCULATED.3IONS-SCNC: 9 MMOL/L (ref 3–16)
AST SERPL-CCNC: 586 U/L (ref 15–37)
BASOPHILS # BLD: 0 K/UL (ref 0–0.2)
BASOPHILS NFR BLD: 0.5 %
BILIRUB SERPL-MCNC: 0.7 MG/DL (ref 0–1)
BUN SERPL-MCNC: 20 MG/DL (ref 7–20)
CALCIUM SERPL-MCNC: 8.1 MG/DL (ref 8.3–10.6)
CHLORIDE SERPL-SCNC: 102 MMOL/L (ref 99–110)
CO2 SERPL-SCNC: 24 MMOL/L (ref 21–32)
CREAT SERPL-MCNC: <0.5 MG/DL (ref 0.6–1.2)
DEPRECATED RDW RBC AUTO: 14.4 % (ref 12.4–15.4)
EOSINOPHIL # BLD: 0.1 K/UL (ref 0–0.6)
EOSINOPHIL NFR BLD: 2.3 %
GFR SERPLBLD CREATININE-BSD FMLA CKD-EPI: >90 ML/MIN/{1.73_M2}
GLUCOSE BLD-MCNC: 246 MG/DL (ref 70–99)
GLUCOSE BLD-MCNC: 280 MG/DL (ref 70–99)
GLUCOSE BLD-MCNC: 289 MG/DL (ref 70–99)
GLUCOSE BLD-MCNC: 294 MG/DL (ref 70–99)
GLUCOSE SERPL-MCNC: 240 MG/DL (ref 70–99)
HCT VFR BLD AUTO: 23.8 % (ref 36–48)
HGB BLD-MCNC: 8 G/DL (ref 12–16)
LYMPHOCYTES # BLD: 0.7 K/UL (ref 1–5.1)
LYMPHOCYTES NFR BLD: 11.2 %
MCH RBC QN AUTO: 31.7 PG (ref 26–34)
MCHC RBC AUTO-ENTMCNC: 33.7 G/DL (ref 31–36)
MCV RBC AUTO: 94.1 FL (ref 80–100)
MONOCYTES # BLD: 0.8 K/UL (ref 0–1.3)
MONOCYTES NFR BLD: 11.7 %
NEUTROPHILS # BLD: 4.8 K/UL (ref 1.7–7.7)
NEUTROPHILS NFR BLD: 74.3 %
PERFORMED ON: ABNORMAL
PLATELET # BLD AUTO: 73 K/UL (ref 135–450)
PMV BLD AUTO: 8.4 FL (ref 5–10.5)
POTASSIUM SERPL-SCNC: 4 MMOL/L (ref 3.5–5.1)
PROT SERPL-MCNC: 5.2 G/DL (ref 6.4–8.2)
RBC # BLD AUTO: 2.53 M/UL (ref 4–5.2)
SODIUM SERPL-SCNC: 135 MMOL/L (ref 136–145)
WBC # BLD AUTO: 6.4 K/UL (ref 4–11)

## 2024-06-17 PROCEDURE — 80053 COMPREHEN METABOLIC PANEL: CPT

## 2024-06-17 PROCEDURE — 6370000000 HC RX 637 (ALT 250 FOR IP): Performed by: INTERNAL MEDICINE

## 2024-06-17 PROCEDURE — 2580000003 HC RX 258: Performed by: PHYSICIAN ASSISTANT

## 2024-06-17 PROCEDURE — 36415 COLL VENOUS BLD VENIPUNCTURE: CPT

## 2024-06-17 PROCEDURE — 6370000000 HC RX 637 (ALT 250 FOR IP): Performed by: PHYSICIAN ASSISTANT

## 2024-06-17 PROCEDURE — 1200000000 HC SEMI PRIVATE

## 2024-06-17 PROCEDURE — 97535 SELF CARE MNGMENT TRAINING: CPT

## 2024-06-17 PROCEDURE — 97116 GAIT TRAINING THERAPY: CPT

## 2024-06-17 PROCEDURE — 97530 THERAPEUTIC ACTIVITIES: CPT

## 2024-06-17 PROCEDURE — 85025 COMPLETE CBC W/AUTO DIFF WBC: CPT

## 2024-06-17 PROCEDURE — 6360000002 HC RX W HCPCS: Performed by: PHYSICIAN ASSISTANT

## 2024-06-17 RX ADMIN — LOSARTAN POTASSIUM 50 MG: 25 TABLET, FILM COATED ORAL at 08:24

## 2024-06-17 RX ADMIN — INSULIN LISPRO 4 UNITS: 100 INJECTION, SOLUTION INTRAVENOUS; SUBCUTANEOUS at 12:45

## 2024-06-17 RX ADMIN — TOPIRAMATE 100 MG: 100 TABLET, FILM COATED ORAL at 22:00

## 2024-06-17 RX ADMIN — OXYCODONE HYDROCHLORIDE 10 MG: 5 TABLET ORAL at 17:49

## 2024-06-17 RX ADMIN — HYDROMORPHONE HYDROCHLORIDE 0.5 MG: 1 INJECTION, SOLUTION INTRAMUSCULAR; INTRAVENOUS; SUBCUTANEOUS at 01:07

## 2024-06-17 RX ADMIN — HYDROMORPHONE HYDROCHLORIDE 0.5 MG: 1 INJECTION, SOLUTION INTRAMUSCULAR; INTRAVENOUS; SUBCUTANEOUS at 06:13

## 2024-06-17 RX ADMIN — INSULIN LISPRO 4 UNITS: 100 INJECTION, SOLUTION INTRAVENOUS; SUBCUTANEOUS at 16:42

## 2024-06-17 RX ADMIN — WATER 2000 MG: 1 INJECTION INTRAMUSCULAR; INTRAVENOUS; SUBCUTANEOUS at 16:43

## 2024-06-17 RX ADMIN — SENNOSIDES AND DOCUSATE SODIUM 1 TABLET: 50; 8.6 TABLET ORAL at 22:00

## 2024-06-17 RX ADMIN — OXYCODONE HYDROCHLORIDE 10 MG: 5 TABLET ORAL at 22:01

## 2024-06-17 RX ADMIN — HYDROMORPHONE HYDROCHLORIDE 0.5 MG: 1 INJECTION, SOLUTION INTRAMUSCULAR; INTRAVENOUS; SUBCUTANEOUS at 11:57

## 2024-06-17 RX ADMIN — OMEGA-3-ACID ETHYL ESTERS 1 G: 1 CAPSULE, LIQUID FILLED ORAL at 22:01

## 2024-06-17 RX ADMIN — SODIUM CHLORIDE, PRESERVATIVE FREE 10 ML: 5 INJECTION INTRAVENOUS at 08:24

## 2024-06-17 RX ADMIN — SODIUM CHLORIDE, PRESERVATIVE FREE 10 ML: 5 INJECTION INTRAVENOUS at 22:01

## 2024-06-17 RX ADMIN — PANTOPRAZOLE SODIUM 20 MG: 20 TABLET, DELAYED RELEASE ORAL at 08:23

## 2024-06-17 RX ADMIN — Medication 1 TABLET: at 08:24

## 2024-06-17 RX ADMIN — HEPARIN SODIUM 5000 UNITS: 5000 INJECTION INTRAVENOUS; SUBCUTANEOUS at 14:07

## 2024-06-17 RX ADMIN — OXYCODONE HYDROCHLORIDE 10 MG: 5 TABLET ORAL at 13:21

## 2024-06-17 RX ADMIN — SENNOSIDES AND DOCUSATE SODIUM 1 TABLET: 50; 8.6 TABLET ORAL at 08:23

## 2024-06-17 RX ADMIN — OMEGA-3-ACID ETHYL ESTERS 1 G: 1 CAPSULE, LIQUID FILLED ORAL at 08:23

## 2024-06-17 RX ADMIN — OXYCODONE HYDROCHLORIDE 10 MG: 5 TABLET ORAL at 08:23

## 2024-06-17 RX ADMIN — WATER 2000 MG: 1 INJECTION INTRAMUSCULAR; INTRAVENOUS; SUBCUTANEOUS at 08:26

## 2024-06-17 RX ADMIN — OXYCODONE HYDROCHLORIDE 10 MG: 5 TABLET ORAL at 03:50

## 2024-06-17 RX ADMIN — CITALOPRAM 40 MG: 40 TABLET, FILM COATED ORAL at 22:00

## 2024-06-17 RX ADMIN — INSULIN LISPRO 4 UNITS: 100 INJECTION, SOLUTION INTRAVENOUS; SUBCUTANEOUS at 08:23

## 2024-06-17 RX ADMIN — POLYETHYLENE GLYCOL 3350 17 G: 17 POWDER, FOR SOLUTION ORAL at 08:24

## 2024-06-17 RX ADMIN — HEPARIN SODIUM 5000 UNITS: 5000 INJECTION INTRAVENOUS; SUBCUTANEOUS at 06:36

## 2024-06-17 RX ADMIN — INSULIN GLARGINE 10 UNITS: 100 INJECTION, SOLUTION SUBCUTANEOUS at 22:00

## 2024-06-17 ASSESSMENT — PAIN DESCRIPTION - DESCRIPTORS
DESCRIPTORS: ACHING;DISCOMFORT
DESCRIPTORS: ACHING;DULL
DESCRIPTORS: DISCOMFORT
DESCRIPTORS: ACHING
DESCRIPTORS: DULL;ACHING
DESCRIPTORS: DISCOMFORT

## 2024-06-17 ASSESSMENT — PAIN SCALES - GENERAL
PAINLEVEL_OUTOF10: 0
PAINLEVEL_OUTOF10: 6
PAINLEVEL_OUTOF10: 7
PAINLEVEL_OUTOF10: 7
PAINLEVEL_OUTOF10: 6
PAINLEVEL_OUTOF10: 6
PAINLEVEL_OUTOF10: 4
PAINLEVEL_OUTOF10: 5
PAINLEVEL_OUTOF10: 6
PAINLEVEL_OUTOF10: 7
PAINLEVEL_OUTOF10: 8
PAINLEVEL_OUTOF10: 5
PAINLEVEL_OUTOF10: 10
PAINLEVEL_OUTOF10: 7
PAINLEVEL_OUTOF10: 5

## 2024-06-17 ASSESSMENT — PAIN DESCRIPTION - ORIENTATION
ORIENTATION: LOWER

## 2024-06-17 ASSESSMENT — PAIN - FUNCTIONAL ASSESSMENT
PAIN_FUNCTIONAL_ASSESSMENT: PREVENTS OR INTERFERES SOME ACTIVE ACTIVITIES AND ADLS
PAIN_FUNCTIONAL_ASSESSMENT: PREVENTS OR INTERFERES SOME ACTIVE ACTIVITIES AND ADLS
PAIN_FUNCTIONAL_ASSESSMENT: PREVENTS OR INTERFERES WITH MANY ACTIVE NOT PASSIVE ACTIVITIES
PAIN_FUNCTIONAL_ASSESSMENT: PREVENTS OR INTERFERES SOME ACTIVE ACTIVITIES AND ADLS

## 2024-06-17 ASSESSMENT — PAIN DESCRIPTION - PAIN TYPE
TYPE: SURGICAL PAIN

## 2024-06-17 ASSESSMENT — PAIN DESCRIPTION - FREQUENCY
FREQUENCY: CONTINUOUS

## 2024-06-17 ASSESSMENT — PAIN DESCRIPTION - LOCATION
LOCATION: BACK

## 2024-06-17 ASSESSMENT — PAIN DESCRIPTION - ONSET
ONSET: ON-GOING

## 2024-06-17 NOTE — CARE COORDINATION
-continue home gabapentin  - PT/OT, will likely need home health   CM following: CM met with pt at bedside. Pt is agreeable to a referral to [facility]. CM spoke with Anton with [facility] who can accept the referral pending precert approval and pain management with oral medications. Pt reports if precert for ARU not approved pt would look at [facility] as back up option. CM will continue to follow for discharge planning.  Electronically signed by MAXX Caruso on 6/17/2024 at 2:59 PM
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
## 2024-06-17 NOTE — PROGRESS NOTES
NEUROSURGERY POST-OP PROGRESS NOTE    Patient Name: Pretty Covington YOB: 1960   Sex: Female Age: 64 yrs     Medical Record Number: [id number] Acct Number: [id number]   Room Number: 5528/5528-01 Hospital Day: Hospital Day: 8     Interval History:  Post-operative Day# 3 s/p T11, T12, L2, L3 bilateral pedicle screw fixation   and T11-L3 bilateral posterolateral fusion with Magnifuse, and bone marrow aspirate by Dr. Campoverde    Subjective: Patient resting in bed at time of visit. She complains of incisional pain which is controlled with current pain regimen.     Objective:    VITAL SIGNS   /66   Pulse 91   Temp 98.8 °F (37.1 °C) (Oral)   Resp 16   Ht 1.651 m (5' 5\")   Wt 132.5 kg (292 lb)   LMP 08/20/2014   SpO2 92%   BMI 48.59 kg/m²    Height Height: 165.1 cm (5' 5\")   Weight Weight - Scale: 132.5 kg (292 lb)        Allergies Allergies   Allergen Reactions    Lisinopril Other (See Comments)     cough    Penicillins Other (See Comments)     Unsure of reaction--childhood    Sulfa Antibiotics Other (See Comments)     Unsure of reaction      NPO Status ADULT DIET; Regular   Isolation No active isolations     LABS   Basic Metabolic Profile Recent Labs     06/15/24  0914 06/16/24  0641 06/17/24  0801    138 135*    103 102   CO2 24 25 24   BUN 25* 31* 20   CREATININE 0.9 0.5* <0.5*   GLUCOSE 272* 277* 240*      Complete Blood Count Recent Labs     06/15/24  0914 06/16/24  0641 06/17/24  0801   WBC 9.5 8.0 6.4   RBC 3.29* 2.73* 2.53*      Coagulation Studies No results for input(s): \"INR\" in the last 72 hours.    Invalid input(s): \"PLATELETS\", \"PROA\", \"PT\", \"PTTA\", \"PTT\"     MEDICATIONS   Inpatient Medications     insulin lispro, 0-4 Units, SubCUTAneous, Nightly    insulin glargine, 10 Units, SubCUTAneous, Nightly    insulin  lispro, 0-8 Units, SubCUTAneous, TID WC    topiramate, 100 mg, Oral, Nightly    losartan, 50 mg, Oral, Daily    omega-3 acid ethyl esters, 1 g, Oral, BID    polyethylene glycol, 17 g, Oral, Daily    sennosides-docusate sodium, 1 tablet, Oral, BID    heparin (porcine), 5,000 Units, SubCUTAneous, 3 times per day    ceFAZolin, 2,000 mg, IntraVENous, Q8H    sodium chloride flush, 5-40 mL, IntraVENous, 2 times per day    [Held by provider] atorvastatin, 40 mg, Oral, Daily    citalopram, 40 mg, Oral, Nightly    pantoprazole, 20 mg, Oral, Daily    therapeutic multivitamin-minerals, 1 tablet, Oral, Daily    [Held by provider] enoxaparin, 30 mg, SubCUTAneous, BID   Infusions    sodium chloride      dextrose        Antibiotics   Recent Abx Admin                     ceFAZolin (ANCEF) 2,000 mg in sterile water 20 mL IV syringe (mg) 2,000 mg Given 06/17/24 0826     2,000 mg Given 06/16/24 2330     2,000 mg Given  1741                     Neurologic Exam:  Mental status: Drowsy, but awakens easily to name. Alert and oriented x4.    DTRs:    Right  Left    ankle clonus  Neg Neg   toes (babinski)  down down     Musculoskeletal:   Gait: Not tested   Tone: Normal  Sensory: Intact in all extremities to light touch  Motor strength:    Right  Left    Right  Left    Deltoid  5 5  Hip Flex  4- 4-   Biceps  5 5  Knee Extensors  4 4   Triceps  5 5  Knee Flexors  4 4   Wrist Ext  5 5  Ankle Dorsiflex.  5 5   Wrist Flex  5 5  Ankle Plantarflex.  5 5   Handgrip  5 5  Ext Mark Longus  5 5   Thumb Ext  5 5         Incision: Provena intact, clean and dry  Drain: 25cc (5/20) of sanguinous output past 24 hours     Respiratory:  Unlabored respiratory pattern    Abdomen:   Soft, ND   Last BM 6/13/24    Cardiovascular:  Warm, well perfused    Assessment   Patient is a 63 yo female who is POD#3 s/p T11, T12, L2, L3 bilateral pedicle screw fixation and T11-L3 bilateral posterolateral fusion with Magnifuse, and bone marrow aspirate per Dr. Campoverde.

## 2024-06-17 NOTE — CONSULTS
Pretty Covington  6/17/2024  [id number]    Chief Complaint: Closed fracture of lumbar vertebral body (HCC)    Subjective:   This is a 64-year-old female with a past medical history including:  Past Medical History:   Diagnosis Date    Asthma     Benign essential hypertension 11/14/2017    Benign head tremor 05/14/2014    Cholelithiasis     Coronary artery disease involving native coronary artery of native heart 08/31/2022    Disease of nasal cavity and sinuses     Fatty infiltration of liver     Hot flash, menopausal 07/22/2015    Hx of blood clots     lungs bilateral x 2    Hyperlipidemia     Hyperlipidemia associated with type 2 diabetes mellitus (HCC) 10/23/2015    Morbid obesity with BMI of 50.0-59.9, adult (HCC) 10/23/2015    No history of procedure 11/09/2015    no past colonoscopy    ELEAZAR on CPAP     Pulmonary embolism (HCC)     both lungs x 2    Type II or unspecified type diabetes mellitus without mention of complication, not stated as uncontrolled     Umbilical hernia 10/02/2013    Unspecified sleep apnea     Wears glasses      Who came into the hospital status post fall at home and she presented with back pain.  CT spine showed unstable L1 fracture which required intervention.  She is currently status post:  1. T11, T12, L2, L3 bilateral pedicle screw fixation   2. T11-L3 bilateral posterolateral fusion with Magnifuse, and bone marrow aspirate    She currently has some lower extremity weakness and will need intensive therapy before planned discharge home.  She has been healing well but continues to require TLSO when out of bed.  She also has multiple comorbidities.    ROS: No CP, SOB, dyspnea    Objective:  Patient Vitals for the past 24 hrs:   BP Temp Temp src Pulse Resp SpO2 Weight   06/17/24 0800 114/66 98.8 °F (37.1 °C) Oral 91 16 92 % --   06/17/24 0643 -- -- -- -- 14 -- --   06/17/24 0613 -- -- -- -- 14 -- --   06/17/24 0600 -- -- -- -- -- -- 132.5 kg (292 lb)   06/17/24 0420 -- -- -- -- 14 -- --

## 2024-06-17 NOTE — PROGRESS NOTES
Pt alert and oriented x4, VSS on room air. No acute changes this shift. Hemovac drain removed per orders, pt tolerated well. Voiding via purewick. Frequent turns with pillow support utilized to protect skin integrity. Pain meds per MAR. Fall precautions in place, call light within reach.

## 2024-06-17 NOTE — PROGRESS NOTES
Pt aox4, VSS RA. Pt tolerating PO fluids w/o complication has had little food intake. Pt voiding via external catheter and incontinent briefs. Assist x2 with stedy. Hemovac x1 remains in place  with little output. . Pain being managed via MAR.  No acute changes this shift. All standard fall / safety precautions remain in place. Plan of care continues.

## 2024-06-17 NOTE — PLAN OF CARE
Problem: Discharge Planning  Goal: Discharge to home or other facility with appropriate resources  6/16/2024 1959 by Мария Vinson RN  Outcome: Progressing  Flowsheets (Taken 6/16/2024 0215)  Discharge to home or other facility with appropriate resources:   Identify barriers to discharge with patient and caregiver   Arrange for needed discharge resources and transportation as appropriate   Identify discharge learning needs (meds, wound care, etc)       Problem: Safety - Adult  Goal: Free from fall injury  6/16/2024 1959 by Мария Vinson RN  Outcome: Progressing  Flowsheets (Taken 6/12/2024 1004 by Susan Coyne RN)  Free From Fall Injury:   Instruct family/caregiver on patient safety   Based on caregiver fall risk screen, instruct family/caregiver to ask for assistance with transferring infant if caregiver noted to have fall risk factors       Problem: ABCDS Injury Assessment  Goal: Absence of physical injury  6/16/2024 1959 by Мария Vinson RN  Outcome: Progressing  Flowsheets (Taken 6/16/2024 1959)  Absence of Physical Injury: Implement safety measures based on patient assessment       Problem: Pain  Goal: Verbalizes/displays adequate comfort level or baseline comfort level  6/16/2024 1959 by Мария Vinson RN  Outcome: Progressing  Flowsheets (Taken 6/12/2024 1004 by Susan Coyne RN)  Verbalizes/displays adequate comfort level or baseline comfort level:   Encourage patient to monitor pain and request assistance   Assess pain using appropriate pain scale   Implement non-pharmacological measures as appropriate and evaluate response

## 2024-06-17 NOTE — PROGRESS NOTES
Occupational Therapy  Facility/Department: [facility]T ORTHO/NEURO  Daily Treatment Note  NAME: Pretty Covington  : 1960  MRN: [id number]    Date of Service: 2024    Discharge Recommendations:  Subacute/Skilled Nursing Facility  OT Equipment Recommendations  Other: defer to next level of care      Patient Diagnosis(es): There were no encounter diagnoses.     Assessment    Assessment: Pt limited by anxiety and pain requiring increased time and encouragement for all tasks Pt able to complete bed mobility with assist x2. Stand completed from raised EOB to krystin stedy with Min A + Mod A. Pt unable to tolerate transfer to chair due to fatigue. Pt would benefit from inpt OT upon discharge. Continue OT per POC.  Discharge Recommendations: Subacute/Skilled Nursing Facility  Other: defer to next level of care      Plan   Occupational Therapy Plan  Times Per Week: 5-7  Current Treatment Recommendations: Balance training;Functional mobility training;Endurance training;Safety education & training;Self-Care / ADL     Restrictions  Position Activity Restriction  Other position/activity restrictions: Ambulate patient, consult orthotist, needs TLSO when OOB    Subjective   Subjective  Subjective: Pt met supine in bed requiring emotional support and encouragment throughout session. Pt with high pain with RN aware and pain med admin with pt repositioned to comfort at end of session.  Orientation  Overall Orientation Status: Within Functional Limits  Cognition  Overall Cognitive Status: WFL        Objective    Vitals     Bed Mobility Training  Bed Mobility Training: Yes  Rolling: Minimum assistance  Supine to Sit: Moderate assistance;Assist X2 (Pt requiring emotional support and assist x2 with rest break after return to supine.)  Sit to Supine: Moderate assistance;Assist X2  Scooting: Maximum assistance;Assist X2 (Pt able to complete minimal scooting with cues and increase time supine then requiring Max A x2 for complete

## 2024-06-17 NOTE — PLAN OF CARE
Problem: Discharge Planning  Goal: Discharge to home or other facility with appropriate resources  Outcome: Progressing     Problem: Safety - Adult  Goal: Free from fall injury  Outcome: Progressing   -fall precautions in place, call light within reach    Problem: ABCDS Injury Assessment  Goal: Absence of physical injury  Outcome: Progressing     Problem: Chronic Conditions and Co-morbidities  Goal: Patient's chronic conditions and co-morbidity symptoms are monitored and maintained or improved  Outcome: Progressing

## 2024-06-17 NOTE — PROGRESS NOTES
Physical Therapy  Facility/Department: [facility] ORTHO/NEURO  Physical Therapy Daily Treatment Note    Name: Pretty Covington  : 1960  MRN: [id number]  Date of Service: 2024    Discharge Recommendations:  Subacute/Skilled Nursing Facility   PT Equipment Recommendations  Other: Defer      Patient Diagnosis(es): There were no encounter diagnoses.  Past Medical History:  has a past medical history of Asthma, Benign essential hypertension, Benign head tremor, Cholelithiasis, Coronary artery disease involving native coronary artery of native heart, Disease of nasal cavity and sinuses, Fatty infiltration of liver, Hot flash, menopausal, Hx of blood clots, Hyperlipidemia, Hyperlipidemia associated with type 2 diabetes mellitus (HCC), Morbid obesity with BMI of 50.0-59.9, adult (HCC), No history of procedure, ELEAZAR on CPAP, Pulmonary embolism (HCC), Type II or unspecified type diabetes mellitus without mention of complication, not stated as uncontrolled, Umbilical hernia, Unspecified sleep apnea, and Wears glasses.  Past Surgical History:  has a past surgical history that includes Cholecystectomy, laparoscopic (2012); hernia repair (10/11/2013); Colonoscopy (2015); Breast surgery (Right, ); hysteroscopy (2018); Dilation and curettage of uterus (2018); Foot surgery (Right, 3/15/2023); and laminectomy (N/A, 2024).    Assessment   Assessment: Pt with continued limiting apprehension and significant back pain.  Nursing notified and pt medicated.  Pt tends to close her eyes during therapy and hold her breath.  Pt mobilized with assist of 2 OOB using log roll, and standing at Cayla Stedy with turtle shell TLSO in place.  Pt requests to lie down after standing.  Pt with body habitus of double pannus, requiring assist at times to position comfortably per pt request.  Pt with open sore between the 2 pannus, nursing inspected and wound care consult placed.  Rec SNF at d/c.  Will follow.  Treatment  Diagnosis: Decreased functional mobility  Therapy Prognosis: Good  Decision Making: High Complexity  Requires PT Follow-Up: Yes     Plan   Physical Therapy Plan  General Plan: 5-7 times per week  Current Treatment Recommendations: Balance training, Functional mobility training, Transfer training, Gait training, Safety education & training, Therapeutic activities  Safety Devices  Type of Devices: Left in bed, Call light within reach, Nurse notified, Bed alarm in place, All fall risk precautions in place     Restrictions  Position Activity Restriction  Other position/activity restrictions: Ambulate patient, consult orthotist, needs TLSO when OOB     Subjective   General  Chart Reviewed: Yes  Additional Pertinent Hx: Pt is 65 yo F admitted on 6/10/24 after a mechanical fall resulting in Chance type fracture at L1 extending into the posterior elements bilaterally. Widening of the disc space anteriorly at T8-9 with air in the disc space and this may relate to an acute ligamentous injury.  Pt underwent a 1. T11, T12, L2, L3 bilateral pedicle screw fixation   2. T11-L3 bilateral posterolateral fusion with Magnifuse, and bone marrow aspirate on 6/14/24.  H/o head tremor, ELEAZAR, PE, hernia.  Family / Caregiver Present: No  Referring Practitioner: Bibi VAZQUEZ  Diagnosis: Closed fx of lumbar vertebral body  Subjective  Subjective: Pt supine in bed and agreeable to PT.  Pt apprehensive and requiring cues for deep breathing, opening eyes, and talking her through each step.  8/10 back pain, nursing notified and pt medicated.    Social/Functional History  Social/Functional History  Lives With:  (sister)  Type of Home: House  Home Layout: One level  Home Access: Ramped entrance  Bathroom Shower/Tub: Walk-in shower  Bathroom Toilet: Handicap height (grab bar)  Bathroom Equipment: Hand-held shower  Has the patient had two or more falls in the past year or any fall with injury in the past year?: Yes  ADL Assistance:

## 2024-06-18 LAB
ALBUMIN SERPL-MCNC: 2.7 G/DL (ref 3.4–5)
ALBUMIN/GLOB SERPL: 1 {RATIO} (ref 1.1–2.2)
ALP SERPL-CCNC: 83 U/L (ref 40–129)
ALT SERPL-CCNC: 161 U/L (ref 10–40)
ANION GAP SERPL CALCULATED.3IONS-SCNC: 11 MMOL/L (ref 3–16)
AST SERPL-CCNC: 153 U/L (ref 15–37)
BASOPHILS # BLD: 0 K/UL (ref 0–0.2)
BASOPHILS NFR BLD: 0.2 %
BILIRUB SERPL-MCNC: 0.7 MG/DL (ref 0–1)
BUN SERPL-MCNC: 17 MG/DL (ref 7–20)
CALCIUM SERPL-MCNC: 8.4 MG/DL (ref 8.3–10.6)
CHLORIDE SERPL-SCNC: 103 MMOL/L (ref 99–110)
CO2 SERPL-SCNC: 25 MMOL/L (ref 21–32)
CREAT SERPL-MCNC: <0.5 MG/DL (ref 0.6–1.2)
DEPRECATED RDW RBC AUTO: 13.9 % (ref 12.4–15.4)
EOSINOPHIL # BLD: 0.1 K/UL (ref 0–0.6)
EOSINOPHIL NFR BLD: 2.6 %
GFR SERPLBLD CREATININE-BSD FMLA CKD-EPI: >90 ML/MIN/{1.73_M2}
GLUCOSE BLD-MCNC: 237 MG/DL (ref 70–99)
GLUCOSE BLD-MCNC: 248 MG/DL (ref 70–99)
GLUCOSE BLD-MCNC: 259 MG/DL (ref 70–99)
GLUCOSE BLD-MCNC: 285 MG/DL (ref 70–99)
GLUCOSE SERPL-MCNC: 236 MG/DL (ref 70–99)
HCT VFR BLD AUTO: 24 % (ref 36–48)
HGB BLD-MCNC: 8.4 G/DL (ref 12–16)
LYMPHOCYTES # BLD: 0.6 K/UL (ref 1–5.1)
LYMPHOCYTES NFR BLD: 11.2 %
MCH RBC QN AUTO: 32.2 PG (ref 26–34)
MCHC RBC AUTO-ENTMCNC: 35.2 G/DL (ref 31–36)
MCV RBC AUTO: 91.6 FL (ref 80–100)
MONOCYTES # BLD: 0.6 K/UL (ref 0–1.3)
MONOCYTES NFR BLD: 11 %
NEUTROPHILS # BLD: 4.1 K/UL (ref 1.7–7.7)
NEUTROPHILS NFR BLD: 75 %
PERFORMED ON: ABNORMAL
PLATELET # BLD AUTO: 153 K/UL (ref 135–450)
PMV BLD AUTO: 7.7 FL (ref 5–10.5)
POTASSIUM SERPL-SCNC: 4.2 MMOL/L (ref 3.5–5.1)
PROT SERPL-MCNC: 5.5 G/DL (ref 6.4–8.2)
RBC # BLD AUTO: 2.62 M/UL (ref 4–5.2)
SODIUM SERPL-SCNC: 139 MMOL/L (ref 136–145)
WBC # BLD AUTO: 5.5 K/UL (ref 4–11)

## 2024-06-18 PROCEDURE — 6370000000 HC RX 637 (ALT 250 FOR IP): Performed by: PHYSICIAN ASSISTANT

## 2024-06-18 PROCEDURE — 36415 COLL VENOUS BLD VENIPUNCTURE: CPT

## 2024-06-18 PROCEDURE — 85025 COMPLETE CBC W/AUTO DIFF WBC: CPT

## 2024-06-18 PROCEDURE — 2580000003 HC RX 258: Performed by: PHYSICIAN ASSISTANT

## 2024-06-18 PROCEDURE — 2060000000 HC ICU INTERMEDIATE R&B

## 2024-06-18 PROCEDURE — APPNB30 APP NON BILLABLE TIME 0-30 MINS: Performed by: NURSE PRACTITIONER

## 2024-06-18 PROCEDURE — 80053 COMPREHEN METABOLIC PANEL: CPT

## 2024-06-18 PROCEDURE — 6370000000 HC RX 637 (ALT 250 FOR IP): Performed by: INTERNAL MEDICINE

## 2024-06-18 PROCEDURE — 6360000002 HC RX W HCPCS: Performed by: PHYSICIAN ASSISTANT

## 2024-06-18 PROCEDURE — 6360000002 HC RX W HCPCS: Performed by: NURSE PRACTITIONER

## 2024-06-18 PROCEDURE — 99024 POSTOP FOLLOW-UP VISIT: CPT | Performed by: NURSE PRACTITIONER

## 2024-06-18 RX ORDER — METHOCARBAMOL 100 MG/ML
1000 INJECTION, SOLUTION INTRAMUSCULAR; INTRAVENOUS EVERY 8 HOURS
Status: COMPLETED | OUTPATIENT
Start: 2024-06-18 | End: 2024-06-19

## 2024-06-18 RX ORDER — METHOCARBAMOL 750 MG/1
750 TABLET, FILM COATED ORAL EVERY 6 HOURS SCHEDULED
Status: DISCONTINUED | OUTPATIENT
Start: 2024-06-19 | End: 2024-06-18

## 2024-06-18 RX ORDER — METHOCARBAMOL 750 MG/1
750 TABLET, FILM COATED ORAL 4 TIMES DAILY
Status: DISCONTINUED | OUTPATIENT
Start: 2024-06-19 | End: 2024-06-20 | Stop reason: HOSPADM

## 2024-06-18 RX ADMIN — INSULIN GLARGINE 10 UNITS: 100 INJECTION, SOLUTION SUBCUTANEOUS at 20:00

## 2024-06-18 RX ADMIN — INSULIN LISPRO 2 UNITS: 100 INJECTION, SOLUTION INTRAVENOUS; SUBCUTANEOUS at 12:01

## 2024-06-18 RX ADMIN — SENNOSIDES AND DOCUSATE SODIUM 1 TABLET: 50; 8.6 TABLET ORAL at 20:01

## 2024-06-18 RX ADMIN — METHOCARBAMOL 1000 MG: 100 INJECTION INTRAMUSCULAR; INTRAVENOUS at 14:00

## 2024-06-18 RX ADMIN — Medication 1 TABLET: at 08:07

## 2024-06-18 RX ADMIN — PANTOPRAZOLE SODIUM 20 MG: 20 TABLET, DELAYED RELEASE ORAL at 08:07

## 2024-06-18 RX ADMIN — DIAZEPAM 5 MG: 5 TABLET ORAL at 11:34

## 2024-06-18 RX ADMIN — WATER 2000 MG: 1 INJECTION INTRAMUSCULAR; INTRAVENOUS; SUBCUTANEOUS at 15:41

## 2024-06-18 RX ADMIN — HYDROMORPHONE HYDROCHLORIDE 0.5 MG: 1 INJECTION, SOLUTION INTRAMUSCULAR; INTRAVENOUS; SUBCUTANEOUS at 20:01

## 2024-06-18 RX ADMIN — OXYCODONE HYDROCHLORIDE 10 MG: 5 TABLET ORAL at 22:23

## 2024-06-18 RX ADMIN — HYDROMORPHONE HYDROCHLORIDE 0.5 MG: 1 INJECTION, SOLUTION INTRAMUSCULAR; INTRAVENOUS; SUBCUTANEOUS at 12:24

## 2024-06-18 RX ADMIN — HYDROMORPHONE HYDROCHLORIDE 0.5 MG: 1 INJECTION, SOLUTION INTRAMUSCULAR; INTRAVENOUS; SUBCUTANEOUS at 09:07

## 2024-06-18 RX ADMIN — METHOCARBAMOL 1000 MG: 100 INJECTION INTRAMUSCULAR; INTRAVENOUS at 22:24

## 2024-06-18 RX ADMIN — SODIUM CHLORIDE, PRESERVATIVE FREE 10 ML: 5 INJECTION INTRAVENOUS at 20:02

## 2024-06-18 RX ADMIN — OXYCODONE HYDROCHLORIDE 10 MG: 5 TABLET ORAL at 06:47

## 2024-06-18 RX ADMIN — INSULIN LISPRO 4 UNITS: 100 INJECTION, SOLUTION INTRAVENOUS; SUBCUTANEOUS at 17:01

## 2024-06-18 RX ADMIN — POLYETHYLENE GLYCOL 3350 17 G: 17 POWDER, FOR SOLUTION ORAL at 08:07

## 2024-06-18 RX ADMIN — SODIUM CHLORIDE, PRESERVATIVE FREE 10 ML: 5 INJECTION INTRAVENOUS at 08:07

## 2024-06-18 RX ADMIN — HEPARIN SODIUM 5000 UNITS: 5000 INJECTION INTRAVENOUS; SUBCUTANEOUS at 14:01

## 2024-06-18 RX ADMIN — LOSARTAN POTASSIUM 50 MG: 25 TABLET, FILM COATED ORAL at 08:07

## 2024-06-18 RX ADMIN — DIAZEPAM 5 MG: 5 TABLET ORAL at 18:20

## 2024-06-18 RX ADMIN — WATER 2000 MG: 1 INJECTION INTRAMUSCULAR; INTRAVENOUS; SUBCUTANEOUS at 08:07

## 2024-06-18 RX ADMIN — TOPIRAMATE 100 MG: 100 TABLET, FILM COATED ORAL at 20:01

## 2024-06-18 RX ADMIN — SENNOSIDES AND DOCUSATE SODIUM 1 TABLET: 50; 8.6 TABLET ORAL at 08:07

## 2024-06-18 RX ADMIN — OMEGA-3-ACID ETHYL ESTERS 1 G: 1 CAPSULE, LIQUID FILLED ORAL at 20:01

## 2024-06-18 RX ADMIN — INSULIN LISPRO 4 UNITS: 100 INJECTION, SOLUTION INTRAVENOUS; SUBCUTANEOUS at 08:06

## 2024-06-18 RX ADMIN — CITALOPRAM 40 MG: 40 TABLET, FILM COATED ORAL at 20:01

## 2024-06-18 RX ADMIN — OXYCODONE HYDROCHLORIDE 5 MG: 5 TABLET ORAL at 15:40

## 2024-06-18 RX ADMIN — HEPARIN SODIUM 5000 UNITS: 5000 INJECTION INTRAVENOUS; SUBCUTANEOUS at 22:24

## 2024-06-18 RX ADMIN — OMEGA-3-ACID ETHYL ESTERS 1 G: 1 CAPSULE, LIQUID FILLED ORAL at 08:07

## 2024-06-18 RX ADMIN — OXYCODONE HYDROCHLORIDE 10 MG: 5 TABLET ORAL at 10:56

## 2024-06-18 ASSESSMENT — PAIN DESCRIPTION - ORIENTATION
ORIENTATION: MID
ORIENTATION: LOWER
ORIENTATION: MID
ORIENTATION: LOWER
ORIENTATION: LOWER
ORIENTATION: MID

## 2024-06-18 ASSESSMENT — PAIN - FUNCTIONAL ASSESSMENT
PAIN_FUNCTIONAL_ASSESSMENT: PREVENTS OR INTERFERES SOME ACTIVE ACTIVITIES AND ADLS

## 2024-06-18 ASSESSMENT — PAIN SCALES - GENERAL
PAINLEVEL_OUTOF10: 6
PAINLEVEL_OUTOF10: 7
PAINLEVEL_OUTOF10: 0
PAINLEVEL_OUTOF10: 5
PAINLEVEL_OUTOF10: 7
PAINLEVEL_OUTOF10: 6
PAINLEVEL_OUTOF10: 6
PAINLEVEL_OUTOF10: 7
PAINLEVEL_OUTOF10: 8
PAINLEVEL_OUTOF10: 6
PAINLEVEL_OUTOF10: 2
PAINLEVEL_OUTOF10: 2
PAINLEVEL_OUTOF10: 8
PAINLEVEL_OUTOF10: 6

## 2024-06-18 ASSESSMENT — PAIN DESCRIPTION - PAIN TYPE
TYPE: SURGICAL PAIN

## 2024-06-18 ASSESSMENT — PAIN DESCRIPTION - FREQUENCY
FREQUENCY: CONTINUOUS

## 2024-06-18 ASSESSMENT — PAIN DESCRIPTION - DESCRIPTORS
DESCRIPTORS: ACHING

## 2024-06-18 ASSESSMENT — PAIN DESCRIPTION - ONSET
ONSET: ON-GOING

## 2024-06-18 ASSESSMENT — PAIN DESCRIPTION - LOCATION
LOCATION: BACK

## 2024-06-18 NOTE — PROGRESS NOTES
Patient is alert and oriented x4. VSS on RA. PRN pain medication given for pain to lower back. Up to chair x2 with stedy this shift. Patient had one large bowel movement this shift. Voiding via external catheter. Patient denies any pain or burning with urination. Tolerating PO diet appropriately. Wound vac in place. Call light within reach. Denies needs at this time. Plan of care to continue.

## 2024-06-18 NOTE — PROGRESS NOTES
NEUROSURGERY POST-OP PROGRESS NOTE    Patient Name: Pretty Covington YOB: 1960   Sex: Female Age: 64 yrs     Medical Record Number: [id number] Acct Number: [id number]   Room Number: 5528/5528-01 Hospital Day: Hospital Day: 9     Interval History:  Post-operative Day#4 s/p T11, T12, L2, L3 bilateral pedicle screw fixation   and T11-L3 bilateral posterolateral fusion with Magnifuse, and bone marrow aspirate by Dr. Campoverde    Subjective: Patient resting in bed at time of visit. She complains of incisional pain which is not controlled. She was able to sit on side of bed but pain was so bad she could not walk.    Allergies Allergies   Allergen Reactions    Lisinopril Other (See Comments)     cough    Penicillins Other (See Comments)     Unsure of reaction--childhood    Sulfa Antibiotics Other (See Comments)     Unsure of reaction      NPO Status ADULT DIET; Regular   Isolation No active isolations     LABS   Basic Metabolic Profile Recent Labs     06/16/24  0641 06/17/24  0801 06/18/24  0632    135* 139    102 103   CO2 25 24 25   BUN 31* 20 17   CREATININE 0.5* <0.5* <0.5*   GLUCOSE 277* 240* 236*        Complete Blood Count Recent Labs     06/16/24  0641 06/17/24  0801 06/18/24  0632   WBC 8.0 6.4 5.5   RBC 2.73* 2.53* 2.62*        Coagulation Studies No results for input(s): \"INR\" in the last 72 hours.    Invalid input(s): \"PLATELETS\", \"PROA\", \"PT\", \"PTTA\", \"PTT\"     MEDICATIONS   Inpatient Medications     insulin lispro, 0-4 Units, SubCUTAneous, Nightly    insulin glargine, 10 Units, SubCUTAneous, Nightly    insulin lispro, 0-8 Units, SubCUTAneous, TID WC    topiramate, 100 mg, Oral, Nightly    losartan, 50 mg, Oral, Daily    omega-3 acid ethyl esters, 1 g, Oral, BID    polyethylene glycol, 17 g, Oral, Daily    sennosides-docusate  Time out complete. Botox injection in LUE. 200 units

## 2024-06-18 NOTE — CARE COORDINATION
CM following: ABIEL spoke with Anton at [facility] who is submitting precert for the pt today. Pt is in agreement with [facility] as discharge plan if insurance will approve. Pt will need medical transport at time of discharge. CM will continue to follow for discharge planning.  Electronically signed by MAXX Caruso on 6/18/2024 at 1:40 PM  [phone]

## 2024-06-18 NOTE — PROGRESS NOTES
Pretty Covington  6/18/2024  [id number]    Chief Complaint: Closed fracture of lumbar vertebral body (HCC)    Subjective:   This is a 64-year-old female with a past medical history including:  Past Medical History:   Diagnosis Date    Asthma     Benign essential hypertension 11/14/2017    Benign head tremor 05/14/2014    Cholelithiasis     Coronary artery disease involving native coronary artery of native heart 08/31/2022    Disease of nasal cavity and sinuses     Fatty infiltration of liver     Hot flash, menopausal 07/22/2015    Hx of blood clots     lungs bilateral x 2    Hyperlipidemia     Hyperlipidemia associated with type 2 diabetes mellitus (HCC) 10/23/2015    Morbid obesity with BMI of 50.0-59.9, adult (HCC) 10/23/2015    No history of procedure 11/09/2015    no past colonoscopy    ELEAZAR on CPAP     Pulmonary embolism (HCC)     both lungs x 2    Type II or unspecified type diabetes mellitus without mention of complication, not stated as uncontrolled     Umbilical hernia 10/02/2013    Unspecified sleep apnea     Wears glasses      Who came into the hospital status post fall at home and she presented with back pain.  CT spine showed unstable L1 fracture which required intervention.  She is currently status post:  1. T11, T12, L2, L3 bilateral pedicle screw fixation   2. T11-L3 bilateral posterolateral fusion with Magnifuse, and bone marrow aspirate    She currently has some lower extremity weakness and will need intensive therapy before planned discharge home.  She has been healing well but continues to require TLSO when out of bed.  She also has multiple comorbidities.    Interval history: Seen in her room this morning.  No new complaints overnight.  She still wants to go to [facility] for rehab needs.  Awaiting [facility] approval.    ROS: No CP, SOB, dyspnea    Objective:  Patient Vitals for the past 24 hrs:   BP Temp Temp src Pulse Resp SpO2   06/18/24 1254 -- -- -- -- 16 --   06/18/24 1215  mellitus (HCC)    Morbid obesity with BMI of 50.0-59.9, adult (HCC)    Severe obstructive sleep apnea    Benign essential hypertension    Obesity, morbid, BMI 40.0-49.9 (HCC)    Chest pain    Shortness of breath    Hypertension    Type 2 diabetes mellitus without complication, without long-term current use of insulin (HCC)    Unstable angina (HCC)    Abnormal nuclear cardiac imaging test    Abnormal stress test    Coronary artery disease involving native coronary artery of native heart    Closed fracture of lumbar vertebral body (HCC)    Closed fracture of lumbar vertebra, unspecified fracture morphology, sequela    Nonocclusive coronary atherosclerosis of native coronary artery    Preop cardiovascular exam       Plan:   Doing well today awaiting insurance approval for ARU      Patient with new functional deficits and ongoing medical complexity. Demonstrates ability to tolerate 3 hours therapy/day and requires close physician oversight to manage acute medical issues. Pretty DENNIS Nadya is a good candidate for acute inpatient rehab when medically appropriate.    Thank you for this consult. Please contact me with any questions or concerns.      Palomo Alfaro D.O. M.P.H  PM&R  6/18/2024  2:06 PM      * This document was created using dictation software.  While all precautions were taken to ensure accuracy, errors may have occurred.  Please disregard any typographical errors.

## 2024-06-18 NOTE — PLAN OF CARE
Problem: Discharge Planning  Goal: Discharge to home or other facility with appropriate resources  Outcome: Progressing     Problem: Safety - Adult  Goal: Free from fall injury  Outcome: Progressing  All fall precautions in place. Bed locked and in lowest position with alarm on. Overbed table and personal belonings within reach. Call light within reach and patient instructed to use call light for assistance. Non-skid socks on.      Problem: ABCDS Injury Assessment  Goal: Absence of physical injury  Outcome: Progressing  Flowsheets (Taken 6/18/2024 0159)  Absence of Physical Injury: Implement safety measures based on patient assessment     Problem: Pain  Goal: Verbalizes/displays adequate comfort level or baseline comfort level  Outcome: Progressing  Pt endorsing pain to back. Being treated with PRN pain medication, rest, and frequent repositioning with pillow support for comfort and pressure relief. Pt reports some relief from pain with above interventions.      Problem: Chronic Conditions and Co-morbidities  Goal: Patient's chronic conditions and co-morbidity symptoms are monitored and maintained or improved  Outcome: Progressing     Problem: Skin/Tissue Integrity  Goal: Absence of new skin breakdown  Description: 1.  Monitor for areas of redness and/or skin breakdown  2.  Assess vascular access sites hourly  3.  Every 4-6 hours minimum:  Change oxygen saturation probe site  4.  Every 4-6 hours:  If on nasal continuous positive airway pressure, respiratory therapy assess nares and determine need for appliance change or resting period.  Outcome: Progressing
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
## 2024-06-18 NOTE — PROGRESS NOTES
Consulted for ICD in abdominal folds d/t excessive moisture. Wound care orders placed. Wound Care to sign off; re-consult for changes or deterioration.

## 2024-06-18 NOTE — PLAN OF CARE
Problem: Discharge Planning  Goal: Discharge to home or other facility with appropriate resources  6/18/2024 0757 by Viviane Patel, RN  Outcome: Progressing  Pt involved in discharge planning. Barriers to discharge discussed with pt. Discharge learning needs identified. Discussed with pt any additional needed resources and transportation plans.      Problem: Safety - Adult  Goal: Free from fall injury  6/18/2024 0757 by Viviane Patel, RN  Outcome: Progressing  All fall precautions in place. Bed locked and in lowest position with alarm on. Overbed table and personal belonings within reach. Call light within reach and patient instructed to use call light for assistance. Non-skid socks on.      Problem: Pain  Goal: Verbalizes/displays adequate comfort level or baseline comfort level  6/18/2024 0757 by Viviane Patel, RN  Outcome: Progressing  Pt endorsing pain to mid back. Being treated with PRN pain medication, rest, and frequent repositioning with pillow support for comfort and pressure relief. Pt reports some relief from pain with above interventions.
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
## 2024-06-18 NOTE — PROGRESS NOTES
VSS, afebrile. Alert and oriented. PRN pain medication given with relief noted. Subcu heparin held per NP d/t plt level of 73 and dropping H&H. All fall & safety precautions in place. Call light within reach. Continue current plan of care.

## 2024-06-19 LAB
ALBUMIN SERPL-MCNC: 3 G/DL (ref 3.4–5)
ALBUMIN/GLOB SERPL: 1.1 {RATIO} (ref 1.1–2.2)
ALP SERPL-CCNC: 91 U/L (ref 40–129)
ALT SERPL-CCNC: 99 U/L (ref 10–40)
ANION GAP SERPL CALCULATED.3IONS-SCNC: 11 MMOL/L (ref 3–16)
AST SERPL-CCNC: 61 U/L (ref 15–37)
BASOPHILS # BLD: 0 K/UL (ref 0–0.2)
BASOPHILS NFR BLD: 0.6 %
BILIRUB SERPL-MCNC: 0.7 MG/DL (ref 0–1)
BUN SERPL-MCNC: 16 MG/DL (ref 7–20)
CALCIUM SERPL-MCNC: 8.5 MG/DL (ref 8.3–10.6)
CHLORIDE SERPL-SCNC: 104 MMOL/L (ref 99–110)
CO2 SERPL-SCNC: 25 MMOL/L (ref 21–32)
CREAT SERPL-MCNC: <0.5 MG/DL (ref 0.6–1.2)
DEPRECATED RDW RBC AUTO: 14.1 % (ref 12.4–15.4)
EOSINOPHIL # BLD: 0.2 K/UL (ref 0–0.6)
EOSINOPHIL NFR BLD: 2.8 %
GFR SERPLBLD CREATININE-BSD FMLA CKD-EPI: >90 ML/MIN/{1.73_M2}
GLUCOSE BLD-MCNC: 189 MG/DL (ref 70–99)
GLUCOSE BLD-MCNC: 207 MG/DL (ref 70–99)
GLUCOSE BLD-MCNC: 216 MG/DL (ref 70–99)
GLUCOSE BLD-MCNC: 244 MG/DL (ref 70–99)
GLUCOSE SERPL-MCNC: 202 MG/DL (ref 70–99)
HCT VFR BLD AUTO: 25.3 % (ref 36–48)
HGB BLD-MCNC: 8.7 G/DL (ref 12–16)
LYMPHOCYTES # BLD: 0.8 K/UL (ref 1–5.1)
LYMPHOCYTES NFR BLD: 14.3 %
MCH RBC QN AUTO: 31.6 PG (ref 26–34)
MCHC RBC AUTO-ENTMCNC: 34.6 G/DL (ref 31–36)
MCV RBC AUTO: 91.3 FL (ref 80–100)
MONOCYTES # BLD: 0.5 K/UL (ref 0–1.3)
MONOCYTES NFR BLD: 9.2 %
NEUTROPHILS # BLD: 3.9 K/UL (ref 1.7–7.7)
NEUTROPHILS NFR BLD: 73.1 %
PERFORMED ON: ABNORMAL
PLATELET # BLD AUTO: 173 K/UL (ref 135–450)
PMV BLD AUTO: 7.7 FL (ref 5–10.5)
POTASSIUM SERPL-SCNC: 3.9 MMOL/L (ref 3.5–5.1)
PROT SERPL-MCNC: 5.7 G/DL (ref 6.4–8.2)
RBC # BLD AUTO: 2.77 M/UL (ref 4–5.2)
SODIUM SERPL-SCNC: 140 MMOL/L (ref 136–145)
WBC # BLD AUTO: 5.4 K/UL (ref 4–11)

## 2024-06-19 PROCEDURE — 85025 COMPLETE CBC W/AUTO DIFF WBC: CPT

## 2024-06-19 PROCEDURE — 6370000000 HC RX 637 (ALT 250 FOR IP): Performed by: NURSE PRACTITIONER

## 2024-06-19 PROCEDURE — 36415 COLL VENOUS BLD VENIPUNCTURE: CPT

## 2024-06-19 PROCEDURE — 6360000002 HC RX W HCPCS: Performed by: PHYSICIAN ASSISTANT

## 2024-06-19 PROCEDURE — 2580000003 HC RX 258: Performed by: PHYSICIAN ASSISTANT

## 2024-06-19 PROCEDURE — 97530 THERAPEUTIC ACTIVITIES: CPT

## 2024-06-19 PROCEDURE — 97116 GAIT TRAINING THERAPY: CPT

## 2024-06-19 PROCEDURE — 99024 POSTOP FOLLOW-UP VISIT: CPT | Performed by: NURSE PRACTITIONER

## 2024-06-19 PROCEDURE — 97110 THERAPEUTIC EXERCISES: CPT

## 2024-06-19 PROCEDURE — 80053 COMPREHEN METABOLIC PANEL: CPT

## 2024-06-19 PROCEDURE — 2060000000 HC ICU INTERMEDIATE R&B

## 2024-06-19 PROCEDURE — 6370000000 HC RX 637 (ALT 250 FOR IP): Performed by: PHYSICIAN ASSISTANT

## 2024-06-19 PROCEDURE — APPNB30 APP NON BILLABLE TIME 0-30 MINS: Performed by: NURSE PRACTITIONER

## 2024-06-19 PROCEDURE — 6360000002 HC RX W HCPCS: Performed by: NURSE PRACTITIONER

## 2024-06-19 PROCEDURE — 6370000000 HC RX 637 (ALT 250 FOR IP): Performed by: INTERNAL MEDICINE

## 2024-06-19 RX ORDER — OXYCODONE HYDROCHLORIDE 5 MG/1
5 TABLET ORAL EVERY 6 HOURS
Status: DISCONTINUED | OUTPATIENT
Start: 2024-06-19 | End: 2024-06-20 | Stop reason: HOSPADM

## 2024-06-19 RX ORDER — OXYCODONE HYDROCHLORIDE 5 MG/1
5 TABLET ORAL
Status: DISCONTINUED | OUTPATIENT
Start: 2024-06-19 | End: 2024-06-20 | Stop reason: HOSPADM

## 2024-06-19 RX ORDER — OXYCODONE HYDROCHLORIDE 5 MG/1
5 TABLET ORAL
Status: DISCONTINUED | OUTPATIENT
Start: 2024-06-19 | End: 2024-06-19 | Stop reason: DRUGHIGH

## 2024-06-19 RX ORDER — OXYCODONE HYDROCHLORIDE 5 MG/1
10 TABLET ORAL
Status: DISCONTINUED | OUTPATIENT
Start: 2024-06-19 | End: 2024-06-19 | Stop reason: DRUGHIGH

## 2024-06-19 RX ORDER — POLYETHYLENE GLYCOL 3350 17 G/17G
17 POWDER, FOR SOLUTION ORAL 2 TIMES DAILY
Status: DISCONTINUED | OUTPATIENT
Start: 2024-06-19 | End: 2024-06-20 | Stop reason: HOSPADM

## 2024-06-19 RX ORDER — HYDROMORPHONE HYDROCHLORIDE 1 MG/ML
1 INJECTION, SOLUTION INTRAMUSCULAR; INTRAVENOUS; SUBCUTANEOUS ONCE
Status: COMPLETED | OUTPATIENT
Start: 2024-06-19 | End: 2024-06-19

## 2024-06-19 RX ORDER — LIDOCAINE 4 G/G
1 PATCH TOPICAL DAILY
Status: DISCONTINUED | OUTPATIENT
Start: 2024-06-19 | End: 2024-06-20 | Stop reason: HOSPADM

## 2024-06-19 RX ADMIN — HYDROMORPHONE HYDROCHLORIDE 0.5 MG: 1 INJECTION, SOLUTION INTRAMUSCULAR; INTRAVENOUS; SUBCUTANEOUS at 05:38

## 2024-06-19 RX ADMIN — SODIUM CHLORIDE, PRESERVATIVE FREE 10 ML: 5 INJECTION INTRAVENOUS at 09:07

## 2024-06-19 RX ADMIN — HYDROMORPHONE HYDROCHLORIDE 0.5 MG: 1 INJECTION, SOLUTION INTRAMUSCULAR; INTRAVENOUS; SUBCUTANEOUS at 20:48

## 2024-06-19 RX ADMIN — OMEGA-3-ACID ETHYL ESTERS 1 G: 1 CAPSULE, LIQUID FILLED ORAL at 09:05

## 2024-06-19 RX ADMIN — HYDROMORPHONE HYDROCHLORIDE 0.5 MG: 1 INJECTION, SOLUTION INTRAMUSCULAR; INTRAVENOUS; SUBCUTANEOUS at 11:28

## 2024-06-19 RX ADMIN — HYDROMORPHONE HYDROCHLORIDE 0.5 MG: 1 INJECTION, SOLUTION INTRAMUSCULAR; INTRAVENOUS; SUBCUTANEOUS at 16:04

## 2024-06-19 RX ADMIN — LOSARTAN POTASSIUM 50 MG: 25 TABLET, FILM COATED ORAL at 09:06

## 2024-06-19 RX ADMIN — INSULIN GLARGINE 10 UNITS: 100 INJECTION, SOLUTION SUBCUTANEOUS at 20:55

## 2024-06-19 RX ADMIN — DIAZEPAM 5 MG: 5 TABLET ORAL at 23:06

## 2024-06-19 RX ADMIN — HEPARIN SODIUM 5000 UNITS: 5000 INJECTION INTRAVENOUS; SUBCUTANEOUS at 20:54

## 2024-06-19 RX ADMIN — HEPARIN SODIUM 5000 UNITS: 5000 INJECTION INTRAVENOUS; SUBCUTANEOUS at 14:08

## 2024-06-19 RX ADMIN — INSULIN LISPRO 2 UNITS: 100 INJECTION, SOLUTION INTRAVENOUS; SUBCUTANEOUS at 16:57

## 2024-06-19 RX ADMIN — DIAZEPAM 5 MG: 5 TABLET ORAL at 09:07

## 2024-06-19 RX ADMIN — INSULIN LISPRO 2 UNITS: 100 INJECTION, SOLUTION INTRAVENOUS; SUBCUTANEOUS at 12:01

## 2024-06-19 RX ADMIN — POLYETHYLENE GLYCOL 3350 17 G: 17 POWDER, FOR SOLUTION ORAL at 09:05

## 2024-06-19 RX ADMIN — SENNOSIDES AND DOCUSATE SODIUM 1 TABLET: 50; 8.6 TABLET ORAL at 20:54

## 2024-06-19 RX ADMIN — OXYCODONE HYDROCHLORIDE 10 MG: 5 TABLET ORAL at 09:06

## 2024-06-19 RX ADMIN — OXYCODONE HYDROCHLORIDE 5 MG: 5 TABLET ORAL at 18:10

## 2024-06-19 RX ADMIN — OXYCODONE HYDROCHLORIDE 10 MG: 5 TABLET ORAL at 14:07

## 2024-06-19 RX ADMIN — OMEGA-3-ACID ETHYL ESTERS 1 G: 1 CAPSULE, LIQUID FILLED ORAL at 20:54

## 2024-06-19 RX ADMIN — METHOCARBAMOL 1000 MG: 100 INJECTION INTRAMUSCULAR; INTRAVENOUS at 05:38

## 2024-06-19 RX ADMIN — OXYCODONE HYDROCHLORIDE 10 MG: 5 TABLET ORAL at 03:03

## 2024-06-19 RX ADMIN — METHOCARBAMOL 750 MG: 750 TABLET ORAL at 16:57

## 2024-06-19 RX ADMIN — HYDROMORPHONE HYDROCHLORIDE 1 MG: 1 INJECTION, SOLUTION INTRAMUSCULAR; INTRAVENOUS; SUBCUTANEOUS at 12:41

## 2024-06-19 RX ADMIN — PANTOPRAZOLE SODIUM 20 MG: 20 TABLET, DELAYED RELEASE ORAL at 09:08

## 2024-06-19 RX ADMIN — TOPIRAMATE 100 MG: 100 TABLET, FILM COATED ORAL at 20:54

## 2024-06-19 RX ADMIN — SENNOSIDES AND DOCUSATE SODIUM 1 TABLET: 50; 8.6 TABLET ORAL at 09:07

## 2024-06-19 RX ADMIN — HYDROMORPHONE HYDROCHLORIDE 0.5 MG: 1 INJECTION, SOLUTION INTRAMUSCULAR; INTRAVENOUS; SUBCUTANEOUS at 01:05

## 2024-06-19 RX ADMIN — HEPARIN SODIUM 5000 UNITS: 5000 INJECTION INTRAVENOUS; SUBCUTANEOUS at 05:38

## 2024-06-19 RX ADMIN — Medication 1 TABLET: at 09:07

## 2024-06-19 RX ADMIN — OXYCODONE HYDROCHLORIDE 5 MG: 5 TABLET ORAL at 23:06

## 2024-06-19 RX ADMIN — DIAZEPAM 5 MG: 5 TABLET ORAL at 16:50

## 2024-06-19 RX ADMIN — DIAZEPAM 5 MG: 5 TABLET ORAL at 01:14

## 2024-06-19 RX ADMIN — METHOCARBAMOL 750 MG: 750 TABLET ORAL at 20:54

## 2024-06-19 RX ADMIN — CITALOPRAM 40 MG: 40 TABLET, FILM COATED ORAL at 20:54

## 2024-06-19 ASSESSMENT — PAIN DESCRIPTION - DESCRIPTORS
DESCRIPTORS: ACHING;THROBBING
DESCRIPTORS: ACHING
DESCRIPTORS: ACHING;DISCOMFORT
DESCRIPTORS: ACHING
DESCRIPTORS: ACHING
DESCRIPTORS: THROBBING
DESCRIPTORS: ACHING
DESCRIPTORS: ACHING;THROBBING
DESCRIPTORS: ACHING

## 2024-06-19 ASSESSMENT — PAIN - FUNCTIONAL ASSESSMENT

## 2024-06-19 ASSESSMENT — PAIN DESCRIPTION - FREQUENCY
FREQUENCY: CONTINUOUS

## 2024-06-19 ASSESSMENT — PAIN DESCRIPTION - PAIN TYPE
TYPE: SURGICAL PAIN

## 2024-06-19 ASSESSMENT — PAIN DESCRIPTION - ORIENTATION
ORIENTATION: LOWER
ORIENTATION: MID
ORIENTATION: LOWER
ORIENTATION: LOWER
ORIENTATION: MID

## 2024-06-19 ASSESSMENT — PAIN DESCRIPTION - ONSET
ONSET: ON-GOING

## 2024-06-19 ASSESSMENT — PAIN SCALES - GENERAL
PAINLEVEL_OUTOF10: 8
PAINLEVEL_OUTOF10: 8
PAINLEVEL_OUTOF10: 3
PAINLEVEL_OUTOF10: 7
PAINLEVEL_OUTOF10: 2
PAINLEVEL_OUTOF10: 9
PAINLEVEL_OUTOF10: 9
PAINLEVEL_OUTOF10: 7
PAINLEVEL_OUTOF10: 7
PAINLEVEL_OUTOF10: 5
PAINLEVEL_OUTOF10: 9
PAINLEVEL_OUTOF10: 9
PAINLEVEL_OUTOF10: 7
PAINLEVEL_OUTOF10: 8
PAINLEVEL_OUTOF10: 7
PAINLEVEL_OUTOF10: 9
PAINLEVEL_OUTOF10: 2
PAINLEVEL_OUTOF10: 7
PAINLEVEL_OUTOF10: 8
PAINLEVEL_OUTOF10: 9

## 2024-06-19 ASSESSMENT — PAIN DESCRIPTION - LOCATION
LOCATION: BACK
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
## 2024-06-19 NOTE — PROGRESS NOTES
Physical Therapy    Daily Treatment Note    Discharge Recommendations:  IP Rehab vs Subacute/Skilled Nursing Facility  Equipment Needs:  Defer to next level of care    At baseline this patient was Independent with functional mobility & ADL's. The current hospitalization has resulted in the patient now being limited with all aspects of mobility, negatively impacting the patient's functional independence, ability to safely access their home environment, and ability to complete valued daily tasks and life roles. Pretty Covington is motivated for recovery and demonstrates the ability to tolerate inpatient rehabilitation 3 hours/day, 5 days/week for optimal return to functional independence, quality of life, and decreased caregiver burden.    Assessment:  Pt continues to be limited by pain, but showing progress with mobility.  Pt able to perform 2 small bouts of ambulation with Mod A and use of walker.  Chair follow required for safety.  Pt groggy after pain meds, needing frequent cues to attend to and initiate tasks.  Pt is well below independent baseline and would benefit from continued IP PT to increase strength and maximize functional independence.  Will continue to follow.       Chart Reviewed: Yes   Other position/activity restrictions: Ambulate patient, consult orthotist, needs TLSO when OOB   Additional Pertinent Hx: Pt is 63 yo F admitted on 6/10/24 after a mechanical fall resulting in Chance type fracture at L1 extending into the posterior elements bilaterally. Widening of the disc space anteriorly at T8-9 with air in the disc space and this may relate to an acute ligamentous injury.  Pt underwent a 1. T11, T12, L2, L3 bilateral pedicle screw fixation   2. T11-L3 bilateral posterolateral fusion with Magnifuse, and bone marrow aspirate on 6/14/24.  H/o head tremor, ELEAZAR, PE, hernia.      Diagnosis: Closed fx of lumbar vertebral body   Treatment Diagnosis: Decreased functional mobility      Subjective:  Pt found up  Transfer training, Gait training, Safety education & training, Therapeutic activities    Therapy Time    Individual  Concurrent  Group  Co-treatment    Time In  1115            Time Out  1157            Minutes  42            Timed Code Treatment Minutes:  42  Total Treatment Minutes:  42      If patient is discharged prior to next treatment, this note will serve as the discharge summary.    Dahlia Torres, PT

## 2024-06-19 NOTE — PLAN OF CARE
Problem: Discharge Planning  Goal: Discharge to home or other facility with appropriate resources  Outcome: Progressing  Pt involved in discharge planning. Barriers to discharge discussed with pt. Discharge learning needs identified. Discussed with pt any additional needed resources or transportation plans.      Problem: Safety - Adult  Goal: Free from fall injury  Outcome: Progressing  All fall precautions in place. Bed locked and in lowest position with alarm on. Overbed table and personal belonings within reach. Call light within reach and patient instructed to use call light for assistance. Non-skid socks on.      Problem: Pain  Goal: Verbalizes/displays adequate comfort level or baseline comfort level  Outcome: Progressing  Pt endorsing pain to lower back. Being treated with PRN pain medication, rest, and frequent repositioning with pillow support for comfort and pressure relief. Pt reports some relief from pain with above interventions.

## 2024-06-19 NOTE — CARE COORDINATION
CM following: Percert is pending for [facility]. CM will continue to follow for discharge planning.  Electronically signed by MAXX Caruso on 6/19/2024 at 5:15 PM  [phone]

## 2024-06-19 NOTE — PROGRESS NOTES
Pt is A/O x4. VSS on RMA. Pain is being managed by MAR. Pt is voiding adequately via purewick. Pt ambulates x2 stedy. Wound vac is in place and pt is tolerating PO diet and liquids. All needs met at this time and call light is within reach. Care is ongoing.

## 2024-06-19 NOTE — PROGRESS NOTES
Pretty Covington  6/19/2024  [id number]    Chief Complaint: Closed fracture of lumbar vertebral body (HCC)    Subjective:   This is a 64-year-old female with a past medical history including:  Past Medical History:   Diagnosis Date    Asthma     Benign essential hypertension 11/14/2017    Benign head tremor 05/14/2014    Cholelithiasis     Coronary artery disease involving native coronary artery of native heart 08/31/2022    Disease of nasal cavity and sinuses     Fatty infiltration of liver     Hot flash, menopausal 07/22/2015    Hx of blood clots     lungs bilateral x 2    Hyperlipidemia     Hyperlipidemia associated with type 2 diabetes mellitus (HCC) 10/23/2015    Morbid obesity with BMI of 50.0-59.9, adult (HCC) 10/23/2015    No history of procedure 11/09/2015    no past colonoscopy    ELEAZAR on CPAP     Pulmonary embolism (HCC)     both lungs x 2    Type II or unspecified type diabetes mellitus without mention of complication, not stated as uncontrolled     Umbilical hernia 10/02/2013    Unspecified sleep apnea     Wears glasses      Who came into the hospital status post fall at home and she presented with back pain.  CT spine showed unstable L1 fracture which required intervention.  She is currently status post:  1. T11, T12, L2, L3 bilateral pedicle screw fixation   2. T11-L3 bilateral posterolateral fusion with Magnifuse, and bone marrow aspirate    She currently has some lower extremity weakness and will need intensive therapy before planned discharge home.  She has been healing well but continues to require TLSO when out of bed.  She also has multiple comorbidities.    Interval history: No new complaints overnight plans to go to [facility].  Pre-CERT has been started.    ROS: No CP, SOB, dyspnea    Objective:  Patient Vitals for the past 24 hrs:   BP Temp Temp src Pulse Resp SpO2   06/19/24 1128 -- -- -- -- 16 --   06/19/24 0936 -- -- -- -- 16 --   06/19/24 0906 -- -- -- -- 16 --   06/19/24 0830 104/68  97.6 °F (36.4 °C) Oral 75 16 96 %   06/19/24 0447 (!) 145/75 97.1 °F (36.2 °C) Temporal 79 16 93 %   06/19/24 0155 136/71 97.1 °F (36.2 °C) Temporal 84 16 93 %   06/18/24 1945 115/63 97.7 °F (36.5 °C) Axillary 96 16 94 %   06/18/24 1610 -- -- -- -- 17 --   06/18/24 1535 133/75 98.2 °F (36.8 °C) Oral 92 16 93 %   06/18/24 1254 -- -- -- -- 16 --   06/18/24 1215 128/69 97.9 °F (36.6 °C) Oral 80 16 94 %       Gen: No distress, pleasant.   HEENT: Normocephalic, atraumatic.   CV: No audible murmurs, well perfused extremities  Resp: No respiratory distress. No increased WOB  Abd: Soft, nontender nondistended  Ext: No edema.  Weakness bilateral lower extremity  Neuro: Alert, oriented, appropriately interactive.     Laboratory data: Available via EMR.     Therapy progress:    PT    Rolling: Level of difficulty - A Little   Sit to Stand from a Chair: Level of difficulty - A Lot  Supine to Sit: Level of difficulty - A Lot     Bed to Chair: Physical Assistance Required - Total  Ambulate Across Room: Physical Assistance Required - Total  Ascend 3-5 Steps With HR: Physical Assistance Required - Total    OT    Eating: Physical Assistance Required - None  Grooming: Physical Assistance Required - A Little  LB Dressing: Physical Assistance Required - Total  UB Dressing: Physical Assistance Required - A Lot  Bathing: Physical Assistance Required - A Lot  Toileting: Physical Assistance Required - Total    SLP         Body mass index is 48.59 kg/m².    Assessment:  Patient Active Problem List   Diagnosis    Type 2 diabetes mellitus with other specified complication (HCC)    Hypertriglyceridemia    Pulmonary embolism (HCC)    Disease of nasal cavity and sinuses    Fatty infiltration of liver    Umbilical hernia    Benign head tremor    Hot flash, menopausal    Hyperlipidemia associated with type 2 diabetes mellitus (HCC)    Morbid obesity with BMI of 50.0-59.9, adult (HCC)    Severe obstructive sleep apnea    Benign essential

## 2024-06-19 NOTE — PROGRESS NOTES
NEUROSURGERY POST-OP PROGRESS NOTE    Patient Name: Pretty Covington YOB: 1960   Sex: Female Age: 64 yrs     Medical Record Number: [id number] Acct Number: [id number]   Room Number: 5528/5528-01 Hospital Day: Hospital Day: 10     Interval History:  Post-operative Day#5 s/p T11, T12, L2, L3 bilateral pedicle screw fixation   and T11-L3 bilateral posterolateral fusion with Magnifuse, and bone marrow aspirate by Dr. Campoverde    Subjective: Patient resting in bed at time of visit. She complains of incisional pain which is better.    Allergies Allergies   Allergen Reactions    Lisinopril Other (See Comments)     cough    Penicillins Other (See Comments)     Unsure of reaction--childhood    Sulfa Antibiotics Other (See Comments)     Unsure of reaction      NPO Status ADULT ORAL NUTRITION SUPPLEMENT; Dinner; Diabetic Oral Supplement  ADULT DIET; Regular; 4 carb choices (60 gm/meal)   Isolation No active isolations     LABS   Basic Metabolic Profile Recent Labs     06/17/24  0801 06/18/24  0632 06/19/24  0551   * 139 140    103 104   CO2 24 25 25   BUN 20 17 16   CREATININE <0.5* <0.5* <0.5*   GLUCOSE 240* 236* 202*        Complete Blood Count Recent Labs     06/17/24  0801 06/18/24  0632 06/19/24  0551   WBC 6.4 5.5 5.4   RBC 2.53* 2.62* 2.77*        Coagulation Studies No results for input(s): \"INR\" in the last 72 hours.    Invalid input(s): \"PLATELETS\", \"PROA\", \"PT\", \"PTTA\", \"PTT\"     MEDICATIONS   Inpatient Medications     [COMPLETED] methocarbamol, 1,000 mg, IntraVENous, Q8H **FOLLOWED BY** methocarbamol, 750 mg, Oral, 4x Daily    insulin lispro, 0-4 Units, SubCUTAneous, Nightly    insulin glargine, 10 Units, SubCUTAneous, Nightly    insulin lispro, 0-8 Units, SubCUTAneous, TID WC    topiramate, 100 mg, Oral, Nightly    losartan, 50 mg,  lovenox but not therapeutic. May start therapeutic POD #7.   GI Prophylaxis: Protonix  Bowel Regimen: Glycolax, Senokot  Pain control: Per primary team. Oxycodone, Dilaudid.  Valium and Robaxin for muscle spasms  Incisional Care: Continue Provena, plan to remove POD#7  Dispo Planning: Continue care on 5T. Dispo timing per primary team when medically stable, pending placement (precert placed for [facility]), pain control.    Patient was discussed with Dr. Campoverde who agrees with above assessment and plan.     Electronically signed by: Anton Hawkins, APRN - CNP, 6/19/2024 12:03 PM   Neurosurgery [facility]  [phone]      __________________________________________________________________    I saw and examined Pretty JEANNIE Covington on 06/19/24. I agree with the assessment and plan as detailed above.     Ebenezer Campoverde MD, PhD  [address], [address]  [place], [place], [address] [phone] (c), [phone] (o)

## 2024-06-19 NOTE — CARE COORDINATION
Jamesert is pending review for [facility].  Pretty Tapia RN

## 2024-06-20 ENCOUNTER — HOSPITAL ENCOUNTER (INPATIENT)
Age: 64
DRG: 560 | End: 2024-06-20
Attending: STUDENT IN AN ORGANIZED HEALTH CARE EDUCATION/TRAINING PROGRAM | Admitting: STUDENT IN AN ORGANIZED HEALTH CARE EDUCATION/TRAINING PROGRAM
Payer: COMMERCIAL

## 2024-06-20 ENCOUNTER — APPOINTMENT (OUTPATIENT)
Dept: GENERAL RADIOLOGY | Age: 64
End: 2024-06-20
Attending: FAMILY MEDICINE
Payer: COMMERCIAL

## 2024-06-20 VITALS
HEIGHT: 65 IN | TEMPERATURE: 97.9 F | OXYGEN SATURATION: 98 % | SYSTOLIC BLOOD PRESSURE: 127 MMHG | RESPIRATION RATE: 16 BRPM | HEART RATE: 84 BPM | BODY MASS INDEX: 48.65 KG/M2 | DIASTOLIC BLOOD PRESSURE: 63 MMHG | WEIGHT: 292 LBS

## 2024-06-20 DIAGNOSIS — S32.9XXS: Primary | ICD-10-CM

## 2024-06-20 LAB
ALBUMIN SERPL-MCNC: 3.1 G/DL (ref 3.4–5)
ALBUMIN/GLOB SERPL: 1.2 {RATIO} (ref 1.1–2.2)
ALP SERPL-CCNC: 97 U/L (ref 40–129)
ALT SERPL-CCNC: 58 U/L (ref 10–40)
ANION GAP SERPL CALCULATED.3IONS-SCNC: 15 MMOL/L (ref 3–16)
AST SERPL-CCNC: 30 U/L (ref 15–37)
BASOPHILS # BLD: 0 K/UL (ref 0–0.2)
BASOPHILS NFR BLD: 0.4 %
BILIRUB SERPL-MCNC: 0.9 MG/DL (ref 0–1)
BUN SERPL-MCNC: 13 MG/DL (ref 7–20)
CALCIUM SERPL-MCNC: 8.4 MG/DL (ref 8.3–10.6)
CHLORIDE SERPL-SCNC: 100 MMOL/L (ref 99–110)
CO2 SERPL-SCNC: 21 MMOL/L (ref 21–32)
CREAT SERPL-MCNC: <0.5 MG/DL (ref 0.6–1.2)
DEPRECATED RDW RBC AUTO: 14.3 % (ref 12.4–15.4)
EOSINOPHIL # BLD: 0.1 K/UL (ref 0–0.6)
EOSINOPHIL NFR BLD: 1.6 %
GFR SERPLBLD CREATININE-BSD FMLA CKD-EPI: >90 ML/MIN/{1.73_M2}
GLUCOSE BLD-MCNC: 227 MG/DL (ref 70–99)
GLUCOSE BLD-MCNC: 234 MG/DL (ref 70–99)
GLUCOSE SERPL-MCNC: 217 MG/DL (ref 70–99)
HCT VFR BLD AUTO: 26.5 % (ref 36–48)
HGB BLD-MCNC: 9.1 G/DL (ref 12–16)
LYMPHOCYTES # BLD: 0.6 K/UL (ref 1–5.1)
LYMPHOCYTES NFR BLD: 8.8 %
MCH RBC QN AUTO: 30.8 PG (ref 26–34)
MCHC RBC AUTO-ENTMCNC: 34.2 G/DL (ref 31–36)
MCV RBC AUTO: 90 FL (ref 80–100)
MONOCYTES # BLD: 0.9 K/UL (ref 0–1.3)
MONOCYTES NFR BLD: 13.5 %
NEUTROPHILS # BLD: 5.2 K/UL (ref 1.7–7.7)
NEUTROPHILS NFR BLD: 75.7 %
PERFORMED ON: ABNORMAL
PERFORMED ON: ABNORMAL
PLATELET # BLD AUTO: 129 K/UL (ref 135–450)
PMV BLD AUTO: 7.9 FL (ref 5–10.5)
POTASSIUM SERPL-SCNC: 3.9 MMOL/L (ref 3.5–5.1)
PROT SERPL-MCNC: 5.6 G/DL (ref 6.4–8.2)
RBC # BLD AUTO: 2.94 M/UL (ref 4–5.2)
SODIUM SERPL-SCNC: 136 MMOL/L (ref 136–145)
WBC # BLD AUTO: 6.9 K/UL (ref 4–11)

## 2024-06-20 PROCEDURE — 6370000000 HC RX 637 (ALT 250 FOR IP): Performed by: PHYSICIAN ASSISTANT

## 2024-06-20 PROCEDURE — 80053 COMPREHEN METABOLIC PANEL: CPT

## 2024-06-20 PROCEDURE — 2580000003 HC RX 258: Performed by: PHYSICIAN ASSISTANT

## 2024-06-20 PROCEDURE — 6370000000 HC RX 637 (ALT 250 FOR IP): Performed by: INTERNAL MEDICINE

## 2024-06-20 PROCEDURE — 1280000000 HC REHAB R&B

## 2024-06-20 PROCEDURE — 6370000000 HC RX 637 (ALT 250 FOR IP): Performed by: NURSE PRACTITIONER

## 2024-06-20 PROCEDURE — 6360000002 HC RX W HCPCS: Performed by: STUDENT IN AN ORGANIZED HEALTH CARE EDUCATION/TRAINING PROGRAM

## 2024-06-20 PROCEDURE — 85025 COMPLETE CBC W/AUTO DIFF WBC: CPT

## 2024-06-20 PROCEDURE — 36415 COLL VENOUS BLD VENIPUNCTURE: CPT

## 2024-06-20 PROCEDURE — 6360000002 HC RX W HCPCS: Performed by: PHYSICIAN ASSISTANT

## 2024-06-20 PROCEDURE — 6370000000 HC RX 637 (ALT 250 FOR IP): Performed by: STUDENT IN AN ORGANIZED HEALTH CARE EDUCATION/TRAINING PROGRAM

## 2024-06-20 RX ORDER — M-VIT,TX,IRON,MINS/CALC/FOLIC 27MG-0.4MG
1 TABLET ORAL DAILY
Status: CANCELLED | OUTPATIENT
Start: 2024-06-21

## 2024-06-20 RX ORDER — PANTOPRAZOLE SODIUM 20 MG/1
20 TABLET, DELAYED RELEASE ORAL DAILY
Status: DISCONTINUED | OUTPATIENT
Start: 2024-06-21 | End: 2024-07-06 | Stop reason: HOSPADM

## 2024-06-20 RX ORDER — METHOCARBAMOL 750 MG/1
750 TABLET, FILM COATED ORAL 4 TIMES DAILY
Qty: 40 TABLET | Refills: 0 | Status: ON HOLD | OUTPATIENT
Start: 2024-06-20 | End: 2024-06-20

## 2024-06-20 RX ORDER — LOSARTAN POTASSIUM 25 MG/1
50 TABLET ORAL DAILY
Status: CANCELLED | OUTPATIENT
Start: 2024-06-21

## 2024-06-20 RX ORDER — INSULIN LISPRO 100 [IU]/ML
0-4 INJECTION, SOLUTION INTRAVENOUS; SUBCUTANEOUS NIGHTLY
Status: CANCELLED | OUTPATIENT
Start: 2024-06-20

## 2024-06-20 RX ORDER — OXYCODONE HYDROCHLORIDE 5 MG/1
5 TABLET ORAL EVERY 6 HOURS
Status: CANCELLED | OUTPATIENT
Start: 2024-06-20

## 2024-06-20 RX ORDER — SENNA AND DOCUSATE SODIUM 50; 8.6 MG/1; MG/1
1 TABLET, FILM COATED ORAL 2 TIMES DAILY
Qty: 60 TABLET | Refills: 1 | Status: ON HOLD | OUTPATIENT
Start: 2024-06-20 | End: 2024-06-20

## 2024-06-20 RX ORDER — CITALOPRAM 40 MG/1
40 TABLET ORAL NIGHTLY
Status: CANCELLED | OUTPATIENT
Start: 2024-06-20

## 2024-06-20 RX ORDER — ENOXAPARIN SODIUM 100 MG/ML
30 INJECTION SUBCUTANEOUS ONCE
Qty: 1 EACH | Refills: 0 | Status: ON HOLD
Start: 2024-06-20 | End: 2024-06-20

## 2024-06-20 RX ORDER — INSULIN GLARGINE 100 [IU]/ML
10 INJECTION, SOLUTION SUBCUTANEOUS NIGHTLY
Status: CANCELLED | OUTPATIENT
Start: 2024-06-20

## 2024-06-20 RX ORDER — HYDROXYZINE HYDROCHLORIDE 10 MG/1
10 TABLET, FILM COATED ORAL 3 TIMES DAILY PRN
Status: CANCELLED | OUTPATIENT
Start: 2024-06-20

## 2024-06-20 RX ORDER — DIAZEPAM 5 MG/1
5 TABLET ORAL EVERY 6 HOURS PRN
Status: DISCONTINUED | OUTPATIENT
Start: 2024-06-20 | End: 2024-07-06 | Stop reason: HOSPADM

## 2024-06-20 RX ORDER — POLYETHYLENE GLYCOL 3350 17 G/17G
17 POWDER, FOR SOLUTION ORAL 2 TIMES DAILY
Status: DISCONTINUED | OUTPATIENT
Start: 2024-06-20 | End: 2024-06-22

## 2024-06-20 RX ORDER — HYDROXYZINE HYDROCHLORIDE 10 MG/1
10 TABLET, FILM COATED ORAL 3 TIMES DAILY PRN
Qty: 10 TABLET | Refills: 0 | Status: ON HOLD
Start: 2024-06-20 | End: 2024-06-20

## 2024-06-20 RX ORDER — INSULIN LISPRO 100 [IU]/ML
0-4 INJECTION, SOLUTION INTRAVENOUS; SUBCUTANEOUS NIGHTLY
Status: DISCONTINUED | OUTPATIENT
Start: 2024-06-20 | End: 2024-07-06 | Stop reason: HOSPADM

## 2024-06-20 RX ORDER — DIAZEPAM 5 MG/1
5 TABLET ORAL EVERY 6 HOURS PRN
Status: CANCELLED | OUTPATIENT
Start: 2024-06-20

## 2024-06-20 RX ORDER — NALOXONE HYDROCHLORIDE 0.4 MG/ML
0.2 INJECTION, SOLUTION INTRAMUSCULAR; INTRAVENOUS; SUBCUTANEOUS PRN
Status: DISCONTINUED | OUTPATIENT
Start: 2024-06-20 | End: 2024-07-06 | Stop reason: HOSPADM

## 2024-06-20 RX ORDER — METHOCARBAMOL 750 MG/1
750 TABLET, FILM COATED ORAL 4 TIMES DAILY
Status: CANCELLED | OUTPATIENT
Start: 2024-06-20

## 2024-06-20 RX ORDER — HEPARIN SODIUM 5000 [USP'U]/ML
5000 INJECTION, SOLUTION INTRAVENOUS; SUBCUTANEOUS EVERY 8 HOURS SCHEDULED
Status: DISCONTINUED | OUTPATIENT
Start: 2024-06-20 | End: 2024-06-21

## 2024-06-20 RX ORDER — OXYCODONE HYDROCHLORIDE 5 MG/1
5 TABLET ORAL EVERY 6 HOURS
Status: DISCONTINUED | OUTPATIENT
Start: 2024-06-20 | End: 2024-06-21

## 2024-06-20 RX ORDER — DIAZEPAM 5 MG/1
5 TABLET ORAL EVERY 6 HOURS PRN
Qty: 10 TABLET | Refills: 0 | Status: ON HOLD | OUTPATIENT
Start: 2024-06-20 | End: 2024-06-20

## 2024-06-20 RX ORDER — LIDOCAINE 4 G/G
1 PATCH TOPICAL DAILY
Status: DISCONTINUED | OUTPATIENT
Start: 2024-06-21 | End: 2024-06-27

## 2024-06-20 RX ORDER — OXYCODONE HYDROCHLORIDE 5 MG/1
5 TABLET ORAL EVERY 4 HOURS PRN
Qty: 8 TABLET | Refills: 0 | Status: ON HOLD
Start: 2024-06-20 | End: 2024-06-20

## 2024-06-20 RX ORDER — LIDOCAINE 4 G/G
1 PATCH TOPICAL DAILY
Qty: 7 EACH | Refills: 1 | Status: ON HOLD
Start: 2024-06-20

## 2024-06-20 RX ORDER — METHOCARBAMOL 750 MG/1
750 TABLET, FILM COATED ORAL 4 TIMES DAILY
Status: DISCONTINUED | OUTPATIENT
Start: 2024-06-20 | End: 2024-07-06 | Stop reason: HOSPADM

## 2024-06-20 RX ORDER — OXYCODONE HYDROCHLORIDE 5 MG/1
5 TABLET ORAL
Status: CANCELLED | OUTPATIENT
Start: 2024-06-20

## 2024-06-20 RX ORDER — INSULIN LISPRO 100 [IU]/ML
0-8 INJECTION, SOLUTION INTRAVENOUS; SUBCUTANEOUS
Status: DISCONTINUED | OUTPATIENT
Start: 2024-06-21 | End: 2024-07-06 | Stop reason: HOSPADM

## 2024-06-20 RX ORDER — POLYETHYLENE GLYCOL 3350 17 G/17G
17 POWDER, FOR SOLUTION ORAL 2 TIMES DAILY
Status: CANCELLED | OUTPATIENT
Start: 2024-06-20

## 2024-06-20 RX ORDER — GLUCAGON 1 MG/ML
1 KIT INJECTION PRN
Status: DISCONTINUED | OUTPATIENT
Start: 2024-06-20 | End: 2024-07-06 | Stop reason: HOSPADM

## 2024-06-20 RX ORDER — ONDANSETRON 4 MG/1
4 TABLET, ORALLY DISINTEGRATING ORAL EVERY 8 HOURS PRN
Status: CANCELLED | OUTPATIENT
Start: 2024-06-20

## 2024-06-20 RX ORDER — GLUCAGON 1 MG/ML
1 KIT INJECTION PRN
Status: CANCELLED | OUTPATIENT
Start: 2024-06-20

## 2024-06-20 RX ORDER — OMEGA-3-ACID ETHYL ESTERS 1 G/1
1 CAPSULE, LIQUID FILLED ORAL 2 TIMES DAILY
Status: DISCONTINUED | OUTPATIENT
Start: 2024-06-20 | End: 2024-07-06 | Stop reason: HOSPADM

## 2024-06-20 RX ORDER — SENNA AND DOCUSATE SODIUM 50; 8.6 MG/1; MG/1
1 TABLET, FILM COATED ORAL 2 TIMES DAILY
Status: CANCELLED | OUTPATIENT
Start: 2024-06-20

## 2024-06-20 RX ORDER — INSULIN LISPRO 100 [IU]/ML
0-8 INJECTION, SOLUTION INTRAVENOUS; SUBCUTANEOUS
Status: CANCELLED | OUTPATIENT
Start: 2024-06-20

## 2024-06-20 RX ORDER — ONDANSETRON 4 MG/1
4 TABLET, ORALLY DISINTEGRATING ORAL EVERY 8 HOURS PRN
Status: DISCONTINUED | OUTPATIENT
Start: 2024-06-20 | End: 2024-07-06 | Stop reason: HOSPADM

## 2024-06-20 RX ORDER — HEPARIN SODIUM 5000 [USP'U]/ML
5000 INJECTION, SOLUTION INTRAVENOUS; SUBCUTANEOUS EVERY 8 HOURS SCHEDULED
Status: CANCELLED | OUTPATIENT
Start: 2024-06-20

## 2024-06-20 RX ORDER — OXYCODONE HYDROCHLORIDE 5 MG/1
5 TABLET ORAL EVERY 6 HOURS
Qty: 9 TABLET | Refills: 0 | Status: ON HOLD | OUTPATIENT
Start: 2024-06-20 | End: 2024-06-20

## 2024-06-20 RX ORDER — OXYCODONE HYDROCHLORIDE 5 MG/1
5 TABLET ORAL
Status: DISCONTINUED | OUTPATIENT
Start: 2024-06-20 | End: 2024-06-21

## 2024-06-20 RX ORDER — OMEGA-3-ACID ETHYL ESTERS 1 G/1
1 CAPSULE, LIQUID FILLED ORAL 2 TIMES DAILY
Status: CANCELLED | OUTPATIENT
Start: 2024-06-20

## 2024-06-20 RX ORDER — LOSARTAN POTASSIUM 25 MG/1
50 TABLET ORAL DAILY
Status: DISCONTINUED | OUTPATIENT
Start: 2024-06-21 | End: 2024-07-06 | Stop reason: HOSPADM

## 2024-06-20 RX ORDER — DEXTROSE MONOHYDRATE 100 MG/ML
INJECTION, SOLUTION INTRAVENOUS CONTINUOUS PRN
Status: DISCONTINUED | OUTPATIENT
Start: 2024-06-20 | End: 2024-07-06 | Stop reason: HOSPADM

## 2024-06-20 RX ORDER — TOPIRAMATE 100 MG/1
100 TABLET, FILM COATED ORAL NIGHTLY
Status: DISCONTINUED | OUTPATIENT
Start: 2024-06-20 | End: 2024-07-06 | Stop reason: HOSPADM

## 2024-06-20 RX ORDER — POLYETHYLENE GLYCOL 3350 17 G/17G
17 POWDER, FOR SOLUTION ORAL 2 TIMES DAILY
Qty: 527 G | Refills: 1 | Status: ON HOLD | OUTPATIENT
Start: 2024-06-20 | End: 2024-06-20

## 2024-06-20 RX ORDER — TOPIRAMATE 100 MG/1
100 TABLET, FILM COATED ORAL NIGHTLY
Status: CANCELLED | OUTPATIENT
Start: 2024-06-20

## 2024-06-20 RX ORDER — HYDROXYZINE HYDROCHLORIDE 10 MG/1
10 TABLET, FILM COATED ORAL 3 TIMES DAILY PRN
Status: DISCONTINUED | OUTPATIENT
Start: 2024-06-20 | End: 2024-07-06 | Stop reason: HOSPADM

## 2024-06-20 RX ORDER — PANTOPRAZOLE SODIUM 20 MG/1
20 TABLET, DELAYED RELEASE ORAL DAILY
Status: CANCELLED | OUTPATIENT
Start: 2024-06-21

## 2024-06-20 RX ORDER — DEXTROSE MONOHYDRATE 100 MG/ML
INJECTION, SOLUTION INTRAVENOUS CONTINUOUS PRN
Status: CANCELLED | OUTPATIENT
Start: 2024-06-20

## 2024-06-20 RX ORDER — CITALOPRAM 20 MG/1
40 TABLET ORAL NIGHTLY
Status: DISCONTINUED | OUTPATIENT
Start: 2024-06-20 | End: 2024-07-06 | Stop reason: HOSPADM

## 2024-06-20 RX ORDER — SENNA AND DOCUSATE SODIUM 50; 8.6 MG/1; MG/1
1 TABLET, FILM COATED ORAL 2 TIMES DAILY
Status: DISCONTINUED | OUTPATIENT
Start: 2024-06-20 | End: 2024-07-02

## 2024-06-20 RX ORDER — NALOXONE HYDROCHLORIDE 0.4 MG/ML
0.2 INJECTION, SOLUTION INTRAMUSCULAR; INTRAVENOUS; SUBCUTANEOUS PRN
Status: CANCELLED | OUTPATIENT
Start: 2024-06-20

## 2024-06-20 RX ORDER — M-VIT,TX,IRON,MINS/CALC/FOLIC 27MG-0.4MG
1 TABLET ORAL DAILY
Status: DISCONTINUED | OUTPATIENT
Start: 2024-06-21 | End: 2024-07-06 | Stop reason: HOSPADM

## 2024-06-20 RX ORDER — INSULIN GLARGINE 100 [IU]/ML
10 INJECTION, SOLUTION SUBCUTANEOUS NIGHTLY
Status: DISCONTINUED | OUTPATIENT
Start: 2024-06-20 | End: 2024-06-21

## 2024-06-20 RX ORDER — LIDOCAINE 4 G/G
1 PATCH TOPICAL DAILY
Status: CANCELLED | OUTPATIENT
Start: 2024-06-21

## 2024-06-20 RX ORDER — ICOSAPENT ETHYL 1 G/1
2 CAPSULE ORAL 2 TIMES DAILY
Qty: 360 CAPSULE | Refills: 3 | Status: ON HOLD | OUTPATIENT
Start: 2024-06-24

## 2024-06-20 RX ADMIN — OMEGA-3-ACID ETHYL ESTERS 1 G: 1 CAPSULE, LIQUID FILLED ORAL at 21:07

## 2024-06-20 RX ADMIN — HEPARIN SODIUM 5000 UNITS: 5000 INJECTION INTRAVENOUS; SUBCUTANEOUS at 06:56

## 2024-06-20 RX ADMIN — TOPIRAMATE 100 MG: 100 TABLET, FILM COATED ORAL at 21:06

## 2024-06-20 RX ADMIN — OXYCODONE HYDROCHLORIDE 5 MG: 5 TABLET ORAL at 21:05

## 2024-06-20 RX ADMIN — METHOCARBAMOL 750 MG: 750 TABLET ORAL at 16:44

## 2024-06-20 RX ADMIN — HYDROMORPHONE HYDROCHLORIDE 0.5 MG: 1 INJECTION, SOLUTION INTRAMUSCULAR; INTRAVENOUS; SUBCUTANEOUS at 01:49

## 2024-06-20 RX ADMIN — OXYCODONE HYDROCHLORIDE 5 MG: 5 TABLET ORAL at 04:58

## 2024-06-20 RX ADMIN — CITALOPRAM HYDROBROMIDE 40 MG: 20 TABLET ORAL at 21:06

## 2024-06-20 RX ADMIN — OXYCODONE HYDROCHLORIDE 5 MG: 5 TABLET ORAL at 16:44

## 2024-06-20 RX ADMIN — Medication 1 TABLET: at 08:42

## 2024-06-20 RX ADMIN — PANTOPRAZOLE SODIUM 20 MG: 20 TABLET, DELAYED RELEASE ORAL at 08:43

## 2024-06-20 RX ADMIN — SENNOSIDES AND DOCUSATE SODIUM 1 TABLET: 50; 8.6 TABLET ORAL at 08:42

## 2024-06-20 RX ADMIN — SODIUM CHLORIDE, PRESERVATIVE FREE 10 ML: 5 INJECTION INTRAVENOUS at 08:42

## 2024-06-20 RX ADMIN — OXYCODONE HYDROCHLORIDE 5 MG: 5 TABLET ORAL at 11:37

## 2024-06-20 RX ADMIN — METHOCARBAMOL 750 MG: 750 TABLET ORAL at 12:20

## 2024-06-20 RX ADMIN — OXYCODONE HYDROCHLORIDE 5 MG: 5 TABLET ORAL at 00:37

## 2024-06-20 RX ADMIN — SENNOSIDES AND DOCUSATE SODIUM 1 TABLET: 50; 8.6 TABLET ORAL at 21:09

## 2024-06-20 RX ADMIN — HEPARIN SODIUM 5000 UNITS: 5000 INJECTION INTRAVENOUS; SUBCUTANEOUS at 21:09

## 2024-06-20 RX ADMIN — OXYCODONE HYDROCHLORIDE 5 MG: 5 TABLET ORAL at 06:56

## 2024-06-20 RX ADMIN — DIAZEPAM 5 MG: 5 TABLET ORAL at 14:20

## 2024-06-20 RX ADMIN — METHOCARBAMOL 750 MG: 750 TABLET ORAL at 08:42

## 2024-06-20 RX ADMIN — OMEGA-3-ACID ETHYL ESTERS 1 G: 1 CAPSULE, LIQUID FILLED ORAL at 08:43

## 2024-06-20 RX ADMIN — DIAZEPAM 5 MG: 5 TABLET ORAL at 21:04

## 2024-06-20 RX ADMIN — POLYETHYLENE GLYCOL 3350 17 G: 17 POWDER, FOR SOLUTION ORAL at 21:08

## 2024-06-20 RX ADMIN — LOSARTAN POTASSIUM 50 MG: 25 TABLET, FILM COATED ORAL at 08:42

## 2024-06-20 RX ADMIN — POLYETHYLENE GLYCOL 3350 17 G: 17 POWDER, FOR SOLUTION ORAL at 08:42

## 2024-06-20 RX ADMIN — METHOCARBAMOL 750 MG: 750 TABLET ORAL at 21:06

## 2024-06-20 RX ADMIN — OXYCODONE HYDROCHLORIDE 5 MG: 5 TABLET ORAL at 08:43

## 2024-06-20 RX ADMIN — INSULIN GLARGINE 10 UNITS: 100 INJECTION, SOLUTION SUBCUTANEOUS at 21:08

## 2024-06-20 RX ADMIN — INSULIN LISPRO 2 UNITS: 100 INJECTION, SOLUTION INTRAVENOUS; SUBCUTANEOUS at 08:50

## 2024-06-20 RX ADMIN — DIAZEPAM 5 MG: 5 TABLET ORAL at 04:59

## 2024-06-20 RX ADMIN — HEPARIN SODIUM 5000 UNITS: 5000 INJECTION INTRAVENOUS; SUBCUTANEOUS at 14:20

## 2024-06-20 ASSESSMENT — PAIN DESCRIPTION - LOCATION
LOCATION: BACK

## 2024-06-20 ASSESSMENT — PAIN SCALES - GENERAL
PAINLEVEL_OUTOF10: 7
PAINLEVEL_OUTOF10: 2
PAINLEVEL_OUTOF10: 4
PAINLEVEL_OUTOF10: 8
PAINLEVEL_OUTOF10: 4
PAINLEVEL_OUTOF10: 8
PAINLEVEL_OUTOF10: 7
PAINLEVEL_OUTOF10: 7
PAINLEVEL_OUTOF10: 0
PAINLEVEL_OUTOF10: 7
PAINLEVEL_OUTOF10: 8
PAINLEVEL_OUTOF10: 7
PAINLEVEL_OUTOF10: 8
PAINLEVEL_OUTOF10: 7
PAINLEVEL_OUTOF10: 0
PAINLEVEL_OUTOF10: 4
PAINLEVEL_OUTOF10: 4
PAINLEVEL_OUTOF10: 0
PAINLEVEL_OUTOF10: 5

## 2024-06-20 ASSESSMENT — PAIN DESCRIPTION - DESCRIPTORS
DESCRIPTORS: ACHING
DESCRIPTORS: STABBING
DESCRIPTORS: ACHING;SORE
DESCRIPTORS: ACHING
DESCRIPTORS: ACHING;SORE
DESCRIPTORS: ACHING
DESCRIPTORS: ACHING
DESCRIPTORS: ACHING;SORE
DESCRIPTORS: ACHING;DISCOMFORT
DESCRIPTORS: ACHING;DISCOMFORT

## 2024-06-20 ASSESSMENT — PAIN DESCRIPTION - ORIENTATION
ORIENTATION: LOWER
ORIENTATION: MID
ORIENTATION: MID;LOWER
ORIENTATION: MID
ORIENTATION: MID;LOWER
ORIENTATION: MID
ORIENTATION: LOWER
ORIENTATION: MID;LOWER

## 2024-06-20 ASSESSMENT — PAIN - FUNCTIONAL ASSESSMENT
PAIN_FUNCTIONAL_ASSESSMENT: PREVENTS OR INTERFERES SOME ACTIVE ACTIVITIES AND ADLS

## 2024-06-20 ASSESSMENT — PAIN DESCRIPTION - ONSET
ONSET: ON-GOING

## 2024-06-20 ASSESSMENT — PAIN DESCRIPTION - PAIN TYPE
TYPE: SURGICAL PAIN

## 2024-06-20 ASSESSMENT — PAIN DESCRIPTION - FREQUENCY
FREQUENCY: CONTINUOUS

## 2024-06-20 NOTE — PLAN OF CARE
Problem: Safety - Adult  Goal: Free from fall injury  Outcome: Progressing  Note: Pt will remain free of falls for the duration of the shift. Pt is A/O x4  and uses the call light appropriately for needs.  Pt is max assist with the krystin stedy. Bed alarm on, wheels locked, bed in lowest position, side rails up 2/4, nonskid socks on, call light and bedside table in reach.      Problem: Pain  Goal: Verbalizes/displays adequate comfort level or baseline comfort level  Outcome: Progressing  Note: Pt taking PO and IV pain medication to control pain     Problem: Musculoskeletal - Adult  Goal: Return mobility to safest level of function  Outcome: Not Progressing  Note: Pt requiring the krystin steady for transfer.   Goal: Maintain proper alignment of affected body part  Note: Special fitted hard brace inocente WEBBER

## 2024-06-20 NOTE — PROGRESS NOTES
NEUROSURGERY POST-OP PROGRESS NOTE    Patient Name: Pretty Covington YOB: 1960   Sex: Female Age: 64 yrs     Medical Record Number: [id number] Acct Number: [id number]   Room Number: 5528/5528-01 Hospital Day: Hospital Day: 11     Interval History:  Post-operative Day#6 s/p T11, T12, L2, L3 bilateral pedicle screw fixation   and T11-L3 bilateral posterolateral fusion with Magnifuse, and bone marrow aspirate by Dr. Campoverde    Subjective: Patient resting in bed at time of visit. She feels like her incisional pain is better since we scheduled her pain meds.    Allergies Allergies   Allergen Reactions    Lisinopril Other (See Comments)     cough    Penicillins Other (See Comments)     Unsure of reaction--childhood    Sulfa Antibiotics Other (See Comments)     Unsure of reaction      NPO Status ADULT ORAL NUTRITION SUPPLEMENT; Dinner; Diabetic Oral Supplement  ADULT DIET; Regular; 4 carb choices (60 gm/meal)   Isolation No active isolations     LABS   Basic Metabolic Profile Recent Labs     06/18/24  0632 06/19/24  0551 06/20/24  0825    140 136    104 100   CO2 25 25 21   BUN 17 16 13   CREATININE <0.5* <0.5* <0.5*   GLUCOSE 236* 202* 217*        Complete Blood Count Recent Labs     06/18/24  0632 06/19/24  0551 06/20/24  0825   WBC 5.5 5.4 6.9   RBC 2.62* 2.77* 2.94*        Coagulation Studies No results for input(s): \"INR\" in the last 72 hours.    Invalid input(s): \"PLATELETS\", \"PROA\", \"PT\", \"PTTA\", \"PTT\"     MEDICATIONS   Inpatient Medications     oxyCODONE, 5 mg, Oral, Q6H    lidocaine, 1 patch, TransDERmal, Daily    polyethylene glycol, 17 g, Oral, BID    [COMPLETED] methocarbamol, 1,000 mg, IntraVENous, Q8H **FOLLOWED BY** methocarbamol, 750 mg, Oral, 4x Daily    insulin lispro, 0-4 Units, SubCUTAneous, Nightly    insulin glargine,  10 Units, SubCUTAneous, Nightly    insulin lispro, 0-8 Units, SubCUTAneous, TID WC    topiramate, 100 mg, Oral, Nightly    losartan, 50 mg, Oral, Daily    omega-3 acid ethyl esters, 1 g, Oral, BID    sennosides-docusate sodium, 1 tablet, Oral, BID    heparin (porcine), 5,000 Units, SubCUTAneous, 3 times per day    sodium chloride flush, 5-40 mL, IntraVENous, 2 times per day    [Held by provider] atorvastatin, 40 mg, Oral, Daily    citalopram, 40 mg, Oral, Nightly    pantoprazole, 20 mg, Oral, Daily    therapeutic multivitamin-minerals, 1 tablet, Oral, Daily    [Held by provider] enoxaparin, 30 mg, SubCUTAneous, BID   Infusions    sodium chloride      dextrose        Antibiotics   Recent Abx Admin        No antibiotic orders with administrations found.                     Neurologic Exam:  Mental status: awake. Alert and oriented x4.    DTRs:    Right  Left    ankle clonus  Neg Neg   toes (babinski)  down down     Musculoskeletal:   Gait: Not tested   Tone: Normal  Sensory: Intact in all extremities to light touch  Motor strength:    Right  Left    Right  Left    Deltoid  5 5  Hip Flex  4- 4-   Biceps  5 5  Knee Extensors  4 4   Triceps  5 5  Knee Flexors  4 4   Wrist Ext  5 5  Ankle Dorsiflex.  5 5   Wrist Flex  5 5  Ankle Plantarflex.  5 5   Handgrip  5 5  Ext Mark Longus  5 5   Thumb Ext  5 5         Incision: Provena intact, clean and dry    Respiratory:  Unlabored respiratory pattern    Abdomen:   Soft, ND   Last BM 6/19/24    Cardiovascular:  Warm, well perfused    Assessment   Patient is a 65 yo female who is POD#6 s/p T11, T12, L2, L3 bilateral pedicle screw fixation and T11-L3 bilateral posterolateral fusion with Magnifuse, and bone marrow aspirate per Dr. Campoverde.     Plan:  Neurologic exam frequency: Q4H  Mobility: Activity as tolerated, PT/OT, AM-PAC score of 10  Garcia: Removed, voiding via external catheter   Brace:  custom TLSO when out of bed   Post-op thoracic and lumbar XR ordered  DVT Prophylaxis: SCDs

## 2024-06-20 NOTE — PROGRESS NOTES
[facility] Admission Assessment  [facility] Acute Rehab Unit    Patient:Pretty Covington     Rehab Dx/Hx: Late effect of fracture of lumbar vertebra [S32.9XXS]   :1960  MRN:[id number]  Date of Admit: 2024     Pain Effect on Sleep:  Over the past 5 days, how much of the time has pain made it hard for you to sleep at night?  []  0. Does not apply - I have not had any pain or hurting in the past 5 days  []  1. Rarely or not at all  [x]  2. Occasionally  []  3. Frequently  []  4. Almost constantly  []  8. Unable to answer    Over the past 5 days, how often have you limited your participation in rehabilitation therapy sessions due to pain?  []  0. Does not apply - I have not received rehabilitation therapy in the past 5 days  [x]  1. Rarely or not at all  []  2. Occasionally  []  3. Frequently  []  4. Almost constantly  []  8. Unable to answer    Pain Interference with Day-to-Day Activities:  Over the past 5 days, how often have you limited your day-to-day activities (excluding rehabilitation therapy session)?  []  1. Rarely or not at all  [x]  2. Occasionally  []  3. Frequently  []  4. Almost constantly  []  8. Unable to answer      High-Risk Drug Classes: Use and Indication   Is taking: Check if the pt is taking any medications by pharmacological classification  Indication noted: If column 1 is checked, check if there is an indication noted for all meds in the drug class Is taking  (check all that apply) Indication noted (check all that apply)   Antipsychotic [] []   Anticoagulant [x] [x]   Antibiotic [] []   Opioid [x] [x]   Antiplatelet [] []   Hypoglycemic (including insulin) [x] [x]   None of the above [] []     Special Treatments, Procedures, and Programs   Check all of the following treatments, procedures, and programs that apply on admission.  On admission (check all that apply)   Cancer Treatments    A1. Chemotherapy []   A2. IV []   A3. Oral []   A10. Other []   B1. Radiation []   Respiratory  Therapies    C1. Oxygen Therapy []   C2. Continuous []   C3. Intermittent []   C4. High-concentration []   D1. Suctioning []   D2. Scheduled []   D3. As needed []   E1. Tracheostomy Care []   F1. Invasive Mechanical Ventilator (ventilator or respirator) []   G1. Non-invasive Mechanical Ventilator []   G2. BiPAP []   G3. CPAP [x]   Other    H1. IV Medications []   H2. Vasoactive medications []   H3. Antibiotics []   H4. Anticoagulation []   H10. Other []   I1. Transfusions []   J1. Dialysis []   J2. Hemodialysis []   J3. Peritoneal dialysis []   O1. IV access []   O2. Peripheral []   O3. Midline []   O4. Central (PICC, tunneled, port) []   None of the above []     Signature:  Calvin Brandt RN

## 2024-06-20 NOTE — PROGRESS NOTES
Transport at bedside to take patient to [facility]. Wound vac switched to portable device. PIV x3 removed and dressings placed.     PRN oxy and scheduled methocarbamol administered prior to transport.     Report given to receiving staff at [facility]. All questions answered. Call back number given.     Patient transported with back brace, sister taking all other belongings.

## 2024-06-20 NOTE — DISCHARGE INSTR - COC
Continuity of Care Form    Patient Name: Pretty Covington   :  1960  MRN:  [id number]    Admit date:  6/10/2024  Discharge date:  ***    Code Status Order: Full Code   Advance Directives:   Advance Care Flowsheet Documentation       Date/Time Healthcare Directive Type of Healthcare Directive Copy in Chart Healthcare Agent Appointed Healthcare Agent's Name Healthcare Agent's Phone Number    24 1501 No, patient does not have an advance directive for healthcare treatment -- -- -- -- --            Admitting Physician:  Isiah Mahoney MD  PCP: Marialuisa Andujar MD    Discharging Nurse: ***  Discharging Hospital Unit/Room#: 5528/5528-01  Discharging Unit Phone Number: ***    Emergency Contact:   Extended Emergency Contact Information  Primary Emergency Contact: Charleen Cruz   [facility]  Home Phone: [phone]  Mobile Phone: [phone]  Relation: Brother/Sister  Secondary Emergency Contact: Nico Covington, [facility]  Home Phone: [phone]  Work Phone: [phone]  Mobile Phone: [phone]  Relation: Brother/Sister   needed? No    Past Surgical History:  Past Surgical History:   Procedure Laterality Date    BREAST SURGERY Right 2015    benign tumor    CHOLECYSTECTOMY, LAPAROSCOPIC  2012    COLONOSCOPY  2015    cecal polyp    DILATION AND CURETTAGE OF UTERUS  2018    FOOT SURGERY Right 3/15/2023    OPEN REDUCTION INTERNAL FIXATION RIGHT 5TH METATARSAL performed by Arsh Siegel DPM at [facility] OR    HERNIA REPAIR  10/11/2013    lap umbilical    HYSTEROSCOPY  2018    and D&C    LAMINECTOMY N/A 2024    Thoracic 11-Lumbar 3 fixation and fusion performed by Ebenezer Campoverde MD at [facility] OR       Immunization History:   Immunization History   Administered Date(s) Administered    COVID-19, PFIZER PURPLE top, DILUTE for use, (age 12 y+), 30mcg/0.3mL 2021, 2021    Influenza Vaccine, unspecified  6/20/2024    Intake/Output Summary (Last 24 hours) at 6/20/2024 1154  Last data filed at 6/20/2024 1138  Gross per 24 hour   Intake 430 ml   Output 1600 ml   Net -1170 ml     I/O last 3 completed shifts:  In: 600 [P.O.:600]  Out: 2350 [Urine:1350; Other:1000]    Safety Concerns:     At Risk for Falls    Impairments/Disabilities:      None    Nutrition Therapy:  Current Nutrition Therapy:   - Oral Diet:  General    Routes of Feeding: Oral  Liquids: No Restrictions  Daily Fluid Restriction: no  Last Modified Barium Swallow with Video (Video Swallowing Test): not done    Treatments at the Time of Hospital Discharge:   Respiratory Treatments: n/a  Oxygen Therapy:  is not on home oxygen therapy.  Ventilator:    - No ventilator support    Rehab Therapies: Physical Therapy and Occupational Therapy  Weight Bearing Status/Restrictions: No weight bearing restrictions  Other Medical Equipment (for information only, NOT a DME order):  walker  Other Treatments: n/a    Patient's personal belongings (please select all that are sent with patient):  None    RN SIGNATURE:  Electronically signed by Altagracia Winter RN on 6/20/24 at 4:26 PM EDT    CASE MANAGEMENT/SOCIAL WORK SECTION    Inpatient Status Date: 06/10/2024    Readmission Risk Assessment Score:  Readmission Risk              Risk of Unplanned Readmission:  16           Discharging to Facility/ Agency   Name: [facility]  Address: [address] [place] [address]  Phone: [phone]  Fax:[facility]    Dialysis Facility (if applicable)   Name:  Address:  Dialysis Schedule:  Phone:  Fax:    / signature: Electronically signed by MAXX Caruso on 6/20/24 at 11:55 AM EDT    PHYSICIAN SECTION    Prognosis: Fair    Condition at Discharge: Stable    Rehab Potential (if transferring to Rehab): Good    Recommended Labs or Other Treatments After Discharge: follow up with Nirali In 2-weeks. Call [phone] for an appt    CBC and CMP in 5-7 days    -Per

## 2024-06-20 NOTE — PROGRESS NOTES
Pretty Covington  6/20/2024  [id number]    Chief Complaint: Closed fracture of lumbar vertebral body (HCC)    Subjective:   This is a 64-year-old female with a past medical history including:  Past Medical History:   Diagnosis Date    Asthma     Benign essential hypertension 11/14/2017    Benign head tremor 05/14/2014    Cholelithiasis     Coronary artery disease involving native coronary artery of native heart 08/31/2022    Disease of nasal cavity and sinuses     Fatty infiltration of liver     Hot flash, menopausal 07/22/2015    Hx of blood clots     lungs bilateral x 2    Hyperlipidemia     Hyperlipidemia associated with type 2 diabetes mellitus (HCC) 10/23/2015    Morbid obesity with BMI of 50.0-59.9, adult (HCC) 10/23/2015    No history of procedure 11/09/2015    no past colonoscopy    ELEAZAR on CPAP     Pulmonary embolism (HCC)     both lungs x 2    Type II or unspecified type diabetes mellitus without mention of complication, not stated as uncontrolled     Umbilical hernia 10/02/2013    Unspecified sleep apnea     Wears glasses      Who came into the hospital status post fall at home and she presented with back pain.  CT spine showed unstable L1 fracture which required intervention.  She is currently status post:  1. T11, T12, L2, L3 bilateral pedicle screw fixation   2. T11-L3 bilateral posterolateral fusion with Magnifuse, and bone marrow aspirate    She currently has some lower extremity weakness and will need intensive therapy before planned discharge home.  She has been healing well but continues to require TLSO when out of bed.  She also has multiple comorbidities.    Interval history: Seen in her room this morning.  No new complaints overnight.  She has been approved to go to [place] rehab unit today.  Discussed what she will need over at the new rehab unit and will follow-up on Saturday.    ROS: No CP, SOB, dyspnea    Objective:  Patient Vitals for the past 24 hrs:   BP Temp Temp src Pulse Resp SpO2

## 2024-06-20 NOTE — PROGRESS NOTES
06/20/24 1510   Encounter Summary   Encounter Overview/Reason Initial Encounter   Service Provided For Patient and family together   Referral/Consult From Rounding   Last Encounter  06/20/24  ([facility])   Begin Time 1508   End Time  1510   Total Time Calculated 2 min     Student chaplain Tona De La Rosa

## 2024-06-20 NOTE — PLAN OF CARE
[facility] PATIENT TREATMENT PLAN  [facility]  [address] [place]  [address]  [phone]    Pretty Covington    : 1960  [facility] #: [id number]  MRN: [id number]   PHYSICIAN:  Estefani Gilliam MD  Primary Problem    Patient Active Problem List   Diagnosis    Type 2 diabetes mellitus with other specified complication (HCC)    Hypertriglyceridemia    Pulmonary embolism (HCC)    Disease of nasal cavity and sinuses    Fatty infiltration of liver    Umbilical hernia    Benign head tremor    Hot flash, menopausal    Hyperlipidemia associated with type 2 diabetes mellitus (HCC)    Morbid obesity with BMI of 50.0-59.9, adult (HCC)    Severe obstructive sleep apnea    Benign essential hypertension    Obesity, morbid, BMI 40.0-49.9 (HCC)    Chest pain    Shortness of breath    Hypertension    Type 2 diabetes mellitus without complication, without long-term current use of insulin (HCC)    Unstable angina (HCC)    Abnormal nuclear cardiac imaging test    Abnormal stress test    Coronary artery disease involving native coronary artery of native heart    Closed fracture of lumbar vertebral body (HCC)    Closed fracture of lumbar vertebra, unspecified fracture morphology, sequela    Nonocclusive coronary atherosclerosis of native coronary artery    Preop cardiovascular exam    Late effect of fracture of lumbar vertebra       Rehabilitation Diagnosis:     Late effect of fracture of lumbar vertebra [S32.9XXS]       ADMIT DATE:2024    Patient Goals: \"I just want to be able to go home independently again. Do you think that is a reasonable goal?\"     Admitting Impairments: Pt. Admitted d/t lumbar fracture resulting in Decreased functional mobility ;Decreased ADL status;Decreased endurance;Decreased strength;Decreased ROM   Barriers: level of assistance, availability of assistance, endurance, pain, comorbidities   Participation: good     CARE PLAN     NURSING:  Pretty Covington while on this unit  Pretty Covington has had the opportunity to include input with the therapy team.      I have reviewed this initial plan of care and agree with its contents:    Title   Name    Date    Time    Physician: Estefani Gilliam MD    Case Mgmt: Aretha Iraheta RN    OT: Luann Augustine OTR/L    PT: Kyra Garcia PT    RN: Calvin Brandt RN    ST: Arely Whiteside MS CCC-SLP    : Chuck Dill OTR/L    Other:

## 2024-06-20 NOTE — CARE COORDINATION
CM following: ABIEL spoke with Anton with [facility] who reports that precert is still pending. Anton uploaded updated clinicals from PT/OT yesterday for insurance review. ABIEL will continue to follow for discharge planning.  Electronically signed by MAXX Caruso on 6/20/2024 at 9:04 AM  [phone]
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
## 2024-06-20 NOTE — CARE COORDINATION
Case Management Assessment            Discharge Note                    Date / Time of Note: 6/20/2024 11:53 AM                  Discharge Note Completed by: MAXX Caruso    Patient Name: Pretty Covington   YOB: 1960  Diagnosis: Closed fracture of lumbar vertebral body (HCC) [S32.009A]  Closed fracture of lumbar vertebra, unspecified fracture morphology, sequela [S32.009S]   Date / Time: 6/10/2024  3:53 AM    Current PCP: Marialuisa Andujar MD  Clinic patient: No    Hospitalization in the last 30 days: No       Advance Directives:  Code Status: Full Code  [place] DNR form completed and on chart: Not Indicated    Financial:  Payor: BCBS / Plan: BCBS - [place] PPO / Product Type: *No Product type* /      Pharmacy:    [facility] - [place], [place] - [address] -  [phone] - F [phone]  [address]  [facility] [address]  Phone: [phone] Fax: [phone]    [facility] - [place], [place] - [address] [place] [address] - P [phone] - F [phone]  [address]  [facility] [address]  Phone: [phone] Fax: [phone]    [id number] - [place], [place] - [address] [facility] - P [phone] - F [phone]  [address] [facility] [address]  Phone: [phone] Fax: [phone]      Assistance purchasing medications?: Potential Assistance Purchasing Medications: No  Assistance provided by Case Management: None at this time    Does patient want to participate in local refill/ meds to beds program?:      Meds To Beds General Rules:  1. Can ONLY be done Monday- Friday between 8:30am-5pm  2. Prescription(s) must be in pharmacy by 3pm to be filled same day  3.Copy of patient's insurance/ prescription drug card and patient face sheet must be sent along with the prescription(s)  4. Cost of Rx cannot be added to hospital bill. If financial assistance is needed, please contact unit case  07/27/2022 02:45 PM       The Plan for Transition of Care is related to the following treatment goals of Closed fracture of lumbar vertebral body (HCC) [S32.009A]  Closed fracture of lumbar vertebra, unspecified fracture morphology, sequela [S32.009S]    The Patient and/or patient representative Pretty and her family were provided with a choice of provider and agrees with the discharge plan Yes    Freedom of choice list was provided with basic dialogue that supports the patient's individualized plan of care/goals and shares the quality data associated with the providers. Yes    Care Transitions patient: No    MAXX Caruso  [facility]  Case Management Department  Ph: [phone]  Fax: [phone]

## 2024-06-20 NOTE — PROGRESS NOTES
NURSING ASSESSMENT: ARU ADMISSION  [facility]    Patient:Pretty Covington     Rehab Dx/Hx: Late effect of fracture of lumbar vertebra [S32.9XXS]   :1960  MRN:[id number]  Date of Admit: 2024  Room #: 0152/0152-01    Subjective:   Patient admitted to [id number] from [facility] via stretcher.   Alert and oriented x4.  Oriented to room and call light system. Oriented to rehab routine and therapy schedules. Informed about care conferences and ordering of meals with PCA.    Drug / Medication Review:   Medications were reviewed by RN at time of admission  [x]  No potential or actual clinically significant medication issues were noted.   []  Potential or actual clinically significant medication issues were found and MD was notified. (Specified below if applicable)   []  Allergy to medication   []  Drug interactions (drug/drug, drug/food, drug/disease interactions)   []  Duplicate drug   []  Omission (drug missing from prescribed regimen)   []  Non adherence   []  Adverse reaction   []  Wrong patient, drug, dose, route, time error   []  Ineffective drug therapy    Section GG: Rolling Right and Left   []  Code 06, Independent: if the patient completes the activity by themself with no assistance from a helper.   []  Code 05, Setup or clean up assist: if the helper sets up or cleans up; patient completes activity   []  Code 04, Supervision or touching assist: If the helper provides verbal cues and/or touching/steadying and/or contact guard assistance as patient completes activity   []  Code 03, Partial/Moderate Assist: If the helper does LESS THAN HALF the effort. [place] lifts, holds, or supports trunk or limbs, but provides less than half the effort.   []  Code 02, Substantial/Maximal Assist: if the helper does MORE THAN HALF the effort. [place] lifts or holds trunk or limbs and provides more than half the effort.  [x]  Code 01,Dependent: If the helper does ALL of the effort. Patient does none of  the effort to complete the activity; or the assistance of two or more helpers is required for the patient to complete the activity   []  Code 07 Patient Refused   []  Code 09: Not applicable  []  Code 88: Not attempted due to medical condition or safety concerns: if the activity was not attempted due to medical condition or safety concerns, but the patient could perform the activity prior to the current illness, exacerbation, or injury.          Patient Mood Interview    Symptom Presence  0. No (enter 0 in column 2)  1. Yes (enter 0-3 in column 2)  9. No response (leave column 2 blank  Symptom Frequency  0. Never or 1 day  1. 2-6 days (several days)  2. 7-11 days (half or more days)  3. 12-14 days (nearly everyday) 1. Symptom Presence 2. Symptom Frequency    Enter scores in boxes   Little interest or pleasure in doing things   0 0   Feeling down, depressed, or hopeless   0 0   If either A or B is coded 2 or 3, CONTINUE asking the questions below.  If not, END the interview.     Trouble falling or staying asleep, or sleeping too much       Feeling tired or having little energy       Poor appetite or overeating       Feeling bad about yourself - or that you are a failure or have let yourself or your family down       Trouble concentrating on things, such as reading the newspaper or watching television       Moving or speaking so slowly that other people could have noticed.  Or the opposite- being so fidgety or restless that you have been moving around a lot more than usual.       Thoughts that you would be better off dead, or of hurting yourself in some way.       Total Severity: Add scores for all frequency responses in column 2       Social isolation (from [employer])  How often do you feel lonely or isolated from those around you?   [] 0. Never  [x] 1. Rarely  [] 2. Sometimes  [] 3. Often  [] 4. Always  [] 7. Patient declines to respond  [] 8. Patient unable to respond       Admission BIMS:  Number of word

## 2024-06-20 NOTE — DISCHARGE INSTR - DIET

## 2024-06-20 NOTE — CONSULTS
Clinical Pharmacy Progress Note    Pharmacy consulted on 6/19 by neurosurgery for pain recommendations. Plan is for discharge today.    Pharmacy will sign off. Please re-consult pharmacy if pain regimen recommendations are wanted in the future.    Please call with questions--  Sadie Mancera PharmD, [facility]  Wireless: [phone]   6/20/2024 2:42 PM

## 2024-06-21 LAB
ANION GAP SERPL CALCULATED.3IONS-SCNC: 13 MMOL/L (ref 3–16)
BASOPHILS # BLD: 0 K/UL (ref 0–0.2)
BASOPHILS NFR BLD: 0.2 %
BUN SERPL-MCNC: 12 MG/DL (ref 7–20)
CALCIUM SERPL-MCNC: 8.4 MG/DL (ref 8.3–10.6)
CHLORIDE SERPL-SCNC: 99 MMOL/L (ref 99–110)
CO2 SERPL-SCNC: 24 MMOL/L (ref 21–32)
CREAT SERPL-MCNC: <0.5 MG/DL (ref 0.6–1.2)
DEPRECATED RDW RBC AUTO: 14.1 % (ref 12.4–15.4)
EOSINOPHIL # BLD: 0.1 K/UL (ref 0–0.6)
EOSINOPHIL NFR BLD: 0.8 %
GFR SERPLBLD CREATININE-BSD FMLA CKD-EPI: >90 ML/MIN/{1.73_M2}
GLUCOSE BLD-MCNC: 216 MG/DL (ref 70–99)
GLUCOSE BLD-MCNC: 221 MG/DL (ref 70–99)
GLUCOSE BLD-MCNC: 222 MG/DL (ref 70–99)
GLUCOSE BLD-MCNC: 222 MG/DL (ref 70–99)
GLUCOSE BLD-MCNC: 224 MG/DL (ref 70–99)
GLUCOSE SERPL-MCNC: 205 MG/DL (ref 70–99)
HCT VFR BLD AUTO: 27.4 % (ref 36–48)
HGB BLD-MCNC: 9.3 G/DL (ref 12–16)
LYMPHOCYTES # BLD: 0.8 K/UL (ref 1–5.1)
LYMPHOCYTES NFR BLD: 11.1 %
MAGNESIUM SERPL-MCNC: 1.7 MG/DL (ref 1.8–2.4)
MCH RBC QN AUTO: 31.3 PG (ref 26–34)
MCHC RBC AUTO-ENTMCNC: 34 G/DL (ref 31–36)
MCV RBC AUTO: 92.2 FL (ref 80–100)
MONOCYTES # BLD: 0.8 K/UL (ref 0–1.3)
MONOCYTES NFR BLD: 11.6 %
NEUTROPHILS # BLD: 5.6 K/UL (ref 1.7–7.7)
NEUTROPHILS NFR BLD: 76.3 %
PERFORMED ON: ABNORMAL
PLATELET # BLD AUTO: 141 K/UL (ref 135–450)
PMV BLD AUTO: 7.9 FL (ref 5–10.5)
POTASSIUM SERPL-SCNC: 3.3 MMOL/L (ref 3.5–5.1)
RBC # BLD AUTO: 2.97 M/UL (ref 4–5.2)
SODIUM SERPL-SCNC: 136 MMOL/L (ref 136–145)
WBC # BLD AUTO: 7.3 K/UL (ref 4–11)

## 2024-06-21 PROCEDURE — 6360000002 HC RX W HCPCS: Performed by: STUDENT IN AN ORGANIZED HEALTH CARE EDUCATION/TRAINING PROGRAM

## 2024-06-21 PROCEDURE — 80048 BASIC METABOLIC PNL TOTAL CA: CPT

## 2024-06-21 PROCEDURE — 85025 COMPLETE CBC W/AUTO DIFF WBC: CPT

## 2024-06-21 PROCEDURE — 97166 OT EVAL MOD COMPLEX 45 MIN: CPT

## 2024-06-21 PROCEDURE — 1280000000 HC REHAB R&B

## 2024-06-21 PROCEDURE — 97530 THERAPEUTIC ACTIVITIES: CPT

## 2024-06-21 PROCEDURE — 6370000000 HC RX 637 (ALT 250 FOR IP): Performed by: STUDENT IN AN ORGANIZED HEALTH CARE EDUCATION/TRAINING PROGRAM

## 2024-06-21 PROCEDURE — 97162 PT EVAL MOD COMPLEX 30 MIN: CPT

## 2024-06-21 PROCEDURE — 97535 SELF CARE MNGMENT TRAINING: CPT

## 2024-06-21 PROCEDURE — 83735 ASSAY OF MAGNESIUM: CPT

## 2024-06-21 PROCEDURE — 92523 SPEECH SOUND LANG COMPREHEN: CPT

## 2024-06-21 RX ORDER — OXYCODONE HYDROCHLORIDE 5 MG/1
5 TABLET ORAL
Status: DISCONTINUED | OUTPATIENT
Start: 2024-06-21 | End: 2024-07-06 | Stop reason: HOSPADM

## 2024-06-21 RX ORDER — OXYCODONE HYDROCHLORIDE 5 MG/1
5 TABLET ORAL
Status: DISCONTINUED | OUTPATIENT
Start: 2024-06-21 | End: 2024-06-21

## 2024-06-21 RX ORDER — OXYCODONE HCL 10 MG/1
10 TABLET, FILM COATED, EXTENDED RELEASE ORAL EVERY 12 HOURS SCHEDULED
Status: DISCONTINUED | OUTPATIENT
Start: 2024-06-21 | End: 2024-07-06 | Stop reason: HOSPADM

## 2024-06-21 RX ORDER — ASCORBIC ACID 500 MG
500 TABLET ORAL DAILY
Status: DISCONTINUED | OUTPATIENT
Start: 2024-06-22 | End: 2024-07-06 | Stop reason: HOSPADM

## 2024-06-21 RX ORDER — ACETAMINOPHEN 500 MG
1000 TABLET ORAL EVERY 8 HOURS SCHEDULED
Status: DISCONTINUED | OUTPATIENT
Start: 2024-06-21 | End: 2024-06-21

## 2024-06-21 RX ORDER — LANOLIN ALCOHOL/MO/W.PET/CERES
400 CREAM (GRAM) TOPICAL DAILY
Status: DISCONTINUED | OUTPATIENT
Start: 2024-06-21 | End: 2024-07-03

## 2024-06-21 RX ORDER — FERROUS SULFATE 325(65) MG
325 TABLET ORAL
Status: DISCONTINUED | OUTPATIENT
Start: 2024-06-22 | End: 2024-07-06 | Stop reason: HOSPADM

## 2024-06-21 RX ORDER — OXYCODONE HYDROCHLORIDE 5 MG/1
10 TABLET ORAL
Status: DISCONTINUED | OUTPATIENT
Start: 2024-06-21 | End: 2024-07-06 | Stop reason: HOSPADM

## 2024-06-21 RX ORDER — ACETAMINOPHEN 325 MG/1
650 TABLET ORAL EVERY 4 HOURS PRN
Status: DISCONTINUED | OUTPATIENT
Start: 2024-06-21 | End: 2024-07-06 | Stop reason: HOSPADM

## 2024-06-21 RX ORDER — ARIPIPRAZOLE 5 MG/1
5 TABLET ORAL DAILY
Status: DISCONTINUED | OUTPATIENT
Start: 2024-06-21 | End: 2024-07-06 | Stop reason: HOSPADM

## 2024-06-21 RX ORDER — INSULIN GLARGINE 100 [IU]/ML
12 INJECTION, SOLUTION SUBCUTANEOUS NIGHTLY
Status: DISCONTINUED | OUTPATIENT
Start: 2024-06-21 | End: 2024-07-06 | Stop reason: HOSPADM

## 2024-06-21 RX ORDER — MECOBALAMIN 5000 MCG
5 TABLET,DISINTEGRATING ORAL NIGHTLY PRN
Status: DISCONTINUED | OUTPATIENT
Start: 2024-06-21 | End: 2024-07-06 | Stop reason: HOSPADM

## 2024-06-21 RX ORDER — ATORVASTATIN CALCIUM 40 MG/1
40 TABLET, FILM COATED ORAL DAILY
Status: DISCONTINUED | OUTPATIENT
Start: 2024-06-22 | End: 2024-07-06 | Stop reason: HOSPADM

## 2024-06-21 RX ORDER — POTASSIUM CHLORIDE 20 MEQ/1
20 TABLET, EXTENDED RELEASE ORAL ONCE
Status: COMPLETED | OUTPATIENT
Start: 2024-06-21 | End: 2024-06-21

## 2024-06-21 RX ORDER — HYDRALAZINE HYDROCHLORIDE 10 MG/1
10 TABLET, FILM COATED ORAL EVERY 6 HOURS PRN
Status: DISCONTINUED | OUTPATIENT
Start: 2024-06-21 | End: 2024-07-06 | Stop reason: HOSPADM

## 2024-06-21 RX ORDER — ASPIRIN 81 MG/1
81 TABLET, CHEWABLE ORAL DAILY
Status: DISCONTINUED | OUTPATIENT
Start: 2024-06-21 | End: 2024-07-06 | Stop reason: HOSPADM

## 2024-06-21 RX ORDER — ALBUTEROL SULFATE 90 UG/1
2 AEROSOL, METERED RESPIRATORY (INHALATION) EVERY 6 HOURS PRN
Status: DISCONTINUED | OUTPATIENT
Start: 2024-06-21 | End: 2024-07-06 | Stop reason: HOSPADM

## 2024-06-21 RX ORDER — OXYCODONE HYDROCHLORIDE 5 MG/1
5 TABLET ORAL ONCE
Status: DISCONTINUED | OUTPATIENT
Start: 2024-06-21 | End: 2024-06-21 | Stop reason: SDUPTHER

## 2024-06-21 RX ADMIN — PANTOPRAZOLE SODIUM 20 MG: 20 TABLET, DELAYED RELEASE ORAL at 07:51

## 2024-06-21 RX ADMIN — OXYCODONE HYDROCHLORIDE 5 MG: 5 TABLET ORAL at 06:06

## 2024-06-21 RX ADMIN — METHOCARBAMOL 750 MG: 750 TABLET ORAL at 17:12

## 2024-06-21 RX ADMIN — ASPIRIN 81 MG 81 MG: 81 TABLET ORAL at 10:55

## 2024-06-21 RX ADMIN — METHOCARBAMOL 750 MG: 750 TABLET ORAL at 20:37

## 2024-06-21 RX ADMIN — APIXABAN 5 MG: 5 TABLET, FILM COATED ORAL at 20:37

## 2024-06-21 RX ADMIN — DIAZEPAM 5 MG: 5 TABLET ORAL at 20:37

## 2024-06-21 RX ADMIN — DIAZEPAM 5 MG: 5 TABLET ORAL at 02:56

## 2024-06-21 RX ADMIN — INSULIN LISPRO 2 UNITS: 100 INJECTION, SOLUTION INTRAVENOUS; SUBCUTANEOUS at 13:17

## 2024-06-21 RX ADMIN — INSULIN GLARGINE 12 UNITS: 100 INJECTION, SOLUTION SUBCUTANEOUS at 20:37

## 2024-06-21 RX ADMIN — OXYCODONE 10 MG: 5 TABLET ORAL at 08:35

## 2024-06-21 RX ADMIN — POLYETHYLENE GLYCOL 3350 17 G: 17 POWDER, FOR SOLUTION ORAL at 07:51

## 2024-06-21 RX ADMIN — OXYCODONE 10 MG: 5 TABLET ORAL at 17:12

## 2024-06-21 RX ADMIN — INSULIN LISPRO 2 UNITS: 100 INJECTION, SOLUTION INTRAVENOUS; SUBCUTANEOUS at 10:55

## 2024-06-21 RX ADMIN — LOSARTAN POTASSIUM 50 MG: 25 TABLET, FILM COATED ORAL at 07:51

## 2024-06-21 RX ADMIN — INSULIN LISPRO 2 UNITS: 100 INJECTION, SOLUTION INTRAVENOUS; SUBCUTANEOUS at 17:12

## 2024-06-21 RX ADMIN — Medication 400 MG: at 10:54

## 2024-06-21 RX ADMIN — HEPARIN SODIUM 5000 UNITS: 5000 INJECTION INTRAVENOUS; SUBCUTANEOUS at 06:05

## 2024-06-21 RX ADMIN — OXYCODONE 10 MG: 5 TABLET ORAL at 13:16

## 2024-06-21 RX ADMIN — SENNOSIDES AND DOCUSATE SODIUM 1 TABLET: 50; 8.6 TABLET ORAL at 20:37

## 2024-06-21 RX ADMIN — OXYCODONE HYDROCHLORIDE 5 MG: 5 TABLET ORAL at 02:57

## 2024-06-21 RX ADMIN — METHOCARBAMOL 750 MG: 750 TABLET ORAL at 13:16

## 2024-06-21 RX ADMIN — OMEGA-3-ACID ETHYL ESTERS 1 G: 1 CAPSULE, LIQUID FILLED ORAL at 20:37

## 2024-06-21 RX ADMIN — OXYCODONE HYDROCHLORIDE 10 MG: 10 TABLET, FILM COATED, EXTENDED RELEASE ORAL at 20:37

## 2024-06-21 RX ADMIN — SENNOSIDES AND DOCUSATE SODIUM 1 TABLET: 50; 8.6 TABLET ORAL at 07:51

## 2024-06-21 RX ADMIN — POTASSIUM CHLORIDE 20 MEQ: 1500 TABLET, EXTENDED RELEASE ORAL at 07:51

## 2024-06-21 RX ADMIN — TOPIRAMATE 100 MG: 100 TABLET, FILM COATED ORAL at 20:37

## 2024-06-21 RX ADMIN — CITALOPRAM HYDROBROMIDE 40 MG: 20 TABLET ORAL at 20:37

## 2024-06-21 RX ADMIN — METHOCARBAMOL 750 MG: 750 TABLET ORAL at 07:51

## 2024-06-21 RX ADMIN — METFORMIN HYDROCHLORIDE 500 MG: 500 TABLET ORAL at 17:12

## 2024-06-21 RX ADMIN — POLYETHYLENE GLYCOL 3350 17 G: 17 POWDER, FOR SOLUTION ORAL at 20:37

## 2024-06-21 RX ADMIN — DIAZEPAM 5 MG: 5 TABLET ORAL at 13:16

## 2024-06-21 RX ADMIN — OMEGA-3-ACID ETHYL ESTERS 1 G: 1 CAPSULE, LIQUID FILLED ORAL at 07:51

## 2024-06-21 RX ADMIN — OXYCODONE HYDROCHLORIDE 5 MG: 5 TABLET ORAL at 00:04

## 2024-06-21 RX ADMIN — ACETAMINOPHEN 1000 MG: 500 TABLET ORAL at 08:35

## 2024-06-21 RX ADMIN — Medication 1 TABLET: at 07:51

## 2024-06-21 ASSESSMENT — PAIN DESCRIPTION - ORIENTATION
ORIENTATION: MID

## 2024-06-21 ASSESSMENT — PAIN DESCRIPTION - LOCATION
LOCATION: BACK;INCISION
LOCATION: BACK
LOCATION: BACK;INCISION
LOCATION: BACK
LOCATION: BACK;INCISION
LOCATION: BACK

## 2024-06-21 ASSESSMENT — PAIN - FUNCTIONAL ASSESSMENT
PAIN_FUNCTIONAL_ASSESSMENT: PREVENTS OR INTERFERES SOME ACTIVE ACTIVITIES AND ADLS

## 2024-06-21 ASSESSMENT — PAIN SCALES - GENERAL
PAINLEVEL_OUTOF10: 7
PAINLEVEL_OUTOF10: 8
PAINLEVEL_OUTOF10: 9
PAINLEVEL_OUTOF10: 7
PAINLEVEL_OUTOF10: 8
PAINLEVEL_OUTOF10: 4
PAINLEVEL_OUTOF10: 8
PAINLEVEL_OUTOF10: 7
PAINLEVEL_OUTOF10: 6
PAINLEVEL_OUTOF10: 4
PAINLEVEL_OUTOF10: 7
PAINLEVEL_OUTOF10: 5

## 2024-06-21 ASSESSMENT — PAIN DESCRIPTION - DESCRIPTORS
DESCRIPTORS: ACHING;SORE
DESCRIPTORS: ACHING;DISCOMFORT
DESCRIPTORS: ACHING;SORE
DESCRIPTORS: ACHING;DISCOMFORT
DESCRIPTORS: ACHING;DISCOMFORT;STABBING
DESCRIPTORS: ACHING;DISCOMFORT
DESCRIPTORS: ACHING;SORE
DESCRIPTORS: ACHING;DISCOMFORT

## 2024-06-21 ASSESSMENT — PAIN DESCRIPTION - FREQUENCY: FREQUENCY: CONTINUOUS

## 2024-06-21 ASSESSMENT — PAIN DESCRIPTION - ONSET: ONSET: ON-GOING

## 2024-06-21 NOTE — PROGRESS NOTES
Speech Language Pathology  Facility/Department: [facility]  Initial Speech/Language/Cognitive Assessment       NAME:Pretty Covington  : 1960 (64 y.o.)   MRN: [id number]  ROOM: 0152/0152-01  ADMISSION DATE: 2024  PATIENT DIAGNOSIS(ES): Late effect of fracture of lumbar vertebra [S32.9XXS]  Patient Active Problem List    Diagnosis Date Noted    Coronary artery disease involving native coronary artery of native heart 2022    Unstable angina (HCC) 2022    Abnormal nuclear cardiac imaging test 2022    Abnormal stress test 2022    Chest pain 2022    Shortness of breath 2022    Hypertension 2022    Type 2 diabetes mellitus without complication, without long-term current use of insulin (HCC) 2022    Late effect of fracture of lumbar vertebra 2024    Nonocclusive coronary atherosclerosis of native coronary artery 2024    Preop cardiovascular exam 2024    Closed fracture of lumbar vertebral body (HCC) 06/10/2024    Closed fracture of lumbar vertebra, unspecified fracture morphology, sequela 06/10/2024    Obesity, morbid, BMI 40.0-49.9 (HCC) 2019    Benign essential hypertension 2017    Severe obstructive sleep apnea 2016    Hyperlipidemia associated with type 2 diabetes mellitus (HCC) 10/23/2015    Morbid obesity with BMI of 50.0-59.9, adult (HCC) 10/23/2015    Hot flash, menopausal 2015    Benign head tremor 2014    Umbilical hernia 10/02/2013    Fatty infiltration of liver     Type 2 diabetes mellitus with other specified complication (HCC)     Hypertriglyceridemia     Pulmonary embolism (HCC)     Disease of nasal cavity and sinuses      Past Medical History:   Diagnosis Date    Asthma     Benign essential hypertension 2017    Benign head tremor 2014    Cholelithiasis     Coronary artery disease involving native coronary artery of native heart 2022    Disease of nasal cavity and sinuses     Fatty

## 2024-06-21 NOTE — PROGRESS NOTES
Occupational Therapy  Facility/Department: [facility]  Rehabilitation Occupational Therapy Evaluation & Treatment       Date: 24  Patient Name: Pretty Covington       Room: 0152/0152-01  MRN: [id number]  Account: [id number]   : 1960  (64 y.o.) Gender: female     Restrictions  Restrictions/Precautions: ROM Restrictions  Other position/activity restrictions: Ambulate patient; needs TLSO when OOB  Required Braces or Orthoses  Spinal: Thoracic Lumbar Sacral Orthotics  Required Braces or Orthoses?: Yes    Subjective  Subjective: Pt received for OT session resting in bed, agreeable to session and pleasant throughout. Motivated for therapy and getting back to being IND, increased fatigue and weakness throughout session.     Vitals  Temp: 98.7 °F (37.1 °C)  Pulse: 82  Respirations: 18  BP: 129/70  Oxygen Therapy  SpO2: 94 %     Objective   Cognition  Overall Cognitive Status: WFL  Orientation  Overall Orientation Status: Within Functional Limits  Orientation Level: Oriented to place;Oriented X4;Oriented to time;Oriented to situation;Oriented to person        Fine Motor Skills  Coordination  Movements Are Fluid And Coordinated: Yes (head tremors at baseline; all other movements coordinated)     Functional Mobility  Balance  Sitting Balance: Independent  Standing Balance: Minimal assistance (in stedy with use of bars and CGA)  Transfers  Sit to stand: Maximum assistance  Stand to sit: Moderate assistance  Toilet Transfers  Toilet - Technique:  (totalA stedy this date d/t urgency and decreased endurance. pain and fatigue)  Equipment Used: Grab bars (would benefit from raised toilet seat for transfers)  Toilet Transfer: Maximum assistance;Dependent/Total  Toilet Transfers Comments: totalA stedy; maxA sit<>stand from toilet  Shower Transfers  Shower - Transfer From: Other (sponge bath at EOB d/t wound vac and decreased activity tolerance this date.)  Social/Functional History  Lives With: Family (sister)  Type of Home:

## 2024-06-21 NOTE — CONSULTS
Comprehensive Nutrition Assessment    Type and Reason for Visit:  Initial, Consult, Positive Nutrition Screen (MST=2: weight loss, decreased appetite)    Nutrition Recommendations/Plan:   Continue carb control diet - monitor need for liberalization if PO intakes remain poor   Continue Glucerna with meals - monitor acceptance   Encourage PO and protein intakes for healing   Monitor diet education needs when appropriate   Monitor nutrition adequacy, pertinent labs, bowel habits, wt changes, and clinical progress     Malnutrition Assessment:  Malnutrition Status:  At risk for malnutrition (Comment) (06/21/24 1310)    Context:  Acute Illness     Findings of the 6 clinical characteristics of malnutrition:  Energy Intake:  Mild decrease in energy intake (Comment)    Nutrition Assessment:    New ARU pt admitted with fracture of lumbar vertebra s/p fall. S/p T11, T12, L2, and L3 fixation on 6/14.  Hx of DM, CAD, and fatty liver. Consulted for oral nutrition supplements. Positive nutrition screen for weight loss and poor appetite. Difficult to assess weight loss d/t labile weight hx per EMR. Pt unavailable at time of visit, with other providers. PO intakes decreased x2 days, will monitor for improvement. Currently ordered Glucerna, will monitor tolerance.    Nutrition Related Findings:    Active BS. BM on 6/20. K 3.3. Mg 1.70. -234 past 24 hrs. No recent A1c. Wound Type: Surgical Incision, Wound Vac       Current Nutrition Intake & Therapies:    Average Meal Intake: 0%, 1-25%, 51-75%, %  Average Supplements Intake: None Ordered  ADULT DIET; Regular; 4 carb choices (60 gm/meal)  ADULT ORAL NUTRITION SUPPLEMENT; Dinner, Breakfast, Lunch; Diabetic Oral Supplement    Anthropometric Measures:  Height: 165.1 cm (5' 5\")  Ideal Body Weight (IBW): 125 lbs (57 kg)       Current Body Weight: 123.4 kg (272 lb), 217.6 % IBW. Weight Source: Bed Scale  Current BMI (kg/m2): 45.3  Usual Body Weight: 116.1 kg (256 lb) (standing

## 2024-06-21 NOTE — CONSULTS
[facility] Ostomy Continence Nurse  Consult Note       NAME:  Pretty Covington  MEDICAL RECORD NUMBER:  [id number]  AGE: 64 y.o.   GENDER: female  : 1960  TODAY'S DATE:  2024    Subjective; Yes, I was just given a bath.     Reason for WOCN Evaluation and Assessment:     Pt has Prevena wound vac to medial back surgical incision, cracked, open areas within the panniculus     Surgery to back on 24, prevena  NPWT applied.   7th day today, removed Prevena, cleansed incision with normal saline, noted small gap at bottom of incision.  Covered incision with Alginate ag, foam.  Small abrasion red at lateral right back r/t placement of Prevena for suction.      Pretty Covington is a 64 y.o. female referred by:   [] Physician  [x] Nursing  [] Other:     Wound Identification:  Wound Type: MASD & surgerical  Contributing Factors: diabetes, poor glucose control, chronic pressure, decreased mobility, shear force, and obesity    Wound History: 24 Dr. Campoverde Thoracic 11-Lumbar 3 fixation and fusion,   Current Wound Care Treatment:  Prevena     Patient Goal of Care:  [x] Wound Healing  [] Odor Control   [] Palliative Care  [x] Pain Control   [] Other:         PAST MEDICAL HISTORY        Diagnosis Date    Asthma     Benign essential hypertension 2017    Benign head tremor 2014    Cholelithiasis     Coronary artery disease involving native coronary artery of native heart 2022    Disease of nasal cavity and sinuses     Fatty infiltration of liver     Hot flash, menopausal 2015    Hx of blood clots     lungs bilateral x 2    Hyperlipidemia     Hyperlipidemia associated with type 2 diabetes mellitus (HCC) 10/23/2015    Morbid obesity with BMI of 50.0-59.9, adult (HCC) 10/23/2015    No history of procedure 2015    no past colonoscopy    ELEAZAR on CPAP     Pulmonary embolism (HCC)     both lungs x 2    Type II or unspecified type diabetes mellitus without mention of complication, not stated as  Verbal Understanding  [] Demonstrated understanding       [] No evidence of learning  [] Refused teaching         [] N/A       Electronically signed by Noni Brooks RN, BSN on 6/21/2024 at 10:03 AM

## 2024-06-21 NOTE — CONSULTS
PSYCHIATRIC CONSULTATION  2024  6:29 PM   2024    HPI:   The patient is a 64-year-old woman with a past medical history of hypertension, hyperlipidemia, type 2 diabetes mellitus, premature family history of coronary artery disease (brother age 50 known CAD), hypertriglyceridemia (on Trilipix and Vascepa), pulmonary embolism x 2 () on Eliquis (previously on warfarin), obstructive sleep apnea (on CPAP) who presented with back pain following a fall who was admitted for a lumbar vertebral body fracture. Psychiatry was consulted for her anxiety and questions about adjusting her medication regimen.    The patient states that she takes Celexa 40 mg for anxiety at home, and has for the last 20 years. Patient's anxiety and depression has become worse since her admission for fracturing in her back. Patient states that she was doing so well until this happened, and now she feels like her world collapsed. She is willing to try a new medication for her increased anxiety/depression. She denies suicidal ideation.    PAST PSYCHIATRIC HISTORY  Patient has a history of anxiety and depression controlled with Celexa. Patient was never hospitalized for a psychiatric condition.    FAMILY PSYCHIATRIC HISTORY  There is no evidence of a family psychiatric history in the chart.    SOCIAL HISTORY  The patient grew up in [place]. She states that she went to school but after graduation she went back home to help out around the house. She states that she never left her childhood home. Patient states that her father depended on her to clean up around the house.  The patient's mother was wheelchair-bound due to bilateral tibial tendon ruptures; she was not a candidate for surgical repair. Patient states that when her father , she felt like there was nothing left to do except die. She states that she was never suicidal, and that this feeling subsided with time. Her father  from a ruptured aorta 20 years ago. The

## 2024-06-21 NOTE — CARE COORDINATION
Case Management ARU Admission Assessment   [facility]    Patient:Pretty Covington     Rehab Dx/Hx: Late effect of fracture of lumbar vertebra [S32.9XXS]   :1960  MRN:[id number]  Date of Admit: 2024  Room #: 0152/0152-01       Objective:  met with the patient to complete initial assessment and review the role of Case Management while on the ARU. Patient educated on team conferences. Discussed family training with the patient/family on how it is encouraged on the unit. Order for \"discharge planning\" has been addressed.          Family Present: no   Primary : [facility] Decision Maker:   Primary Decision Maker: CruzCharleen - Brother/Sister - [phone]   Current PCP:  Marialuisa Andujar,       Admit date:  2024   Insurance: [employer]    Precert required for SNF:  [] No       [x] Yes   3 Night stay required: [x] No       [] Yes   Admitting diagnosis: Late effect of fracture of lumbar vertebra [S32.9XXS]       Current home situation: Independent PLOF. Lives with family in a one level home with ramped entrance and walk-n-shower.   DME at home: [] Walker     [] Cane       [] RTS        [] BSC      [] Shower Chair        [] O2       [] HHN     [x] CPAP       [] BiPap      [] Hospital Bed       [] W/C        [] Other:    Medical concerns requiring training: Restrictions  Restrictions/Precautions: ROM Restrictions  Other position/activity restrictions: Ambulate patient; needs TLSO when OOB  Required Braces or Orthoses  Spinal: Thoracic Lumbar Sacral Orthotics  Required Braces or Orthoses?: Yes       Medication concerns:  Require financial assistance with meds? [x] No       [] If yes, please explain:   [] Yes              Services prior to admission: none   Preference for HHC or OP Therapy: [] Home health       [] Outpatient       [x] No preference   Patient's goal(s): \"I just want to be able to go home independently again. Do you think that is a  reasonable goal?\"    Working or volunteering PTA? yes       Transportation needs: family   Has lack of transportation kept you from medical appointments, meetings, work, or ADL's? [] Yes, it has kept me from medical appointments or from getting my meds  [] Yes, It has kept me from non-medical meetings, appointments, work, or getting things I need  [x] No  [] Pt unable to respond  [] Pt declines to respond     How often do you need to have someone help you when you read instructions, pamphlets, or other written material from your doctor or pharmacy? [] Never                             [] Always  [x] Rarely                            [] Patient unable to respond  [] Sometimes                     [] Pt declines to respond  [] Often   Active with: [] Senior services        [] [place] on aging         [] Other:         Dialysis Facility (if applicable) Name:  Address:  Dialysis Schedule:  Phone:  Fax:       Support system at home and in the community: family   Caregiver physical ability: [x] Cue patient for safety  [] Physical lift/lower: [] Light [] Moderate [] Heavy  [x] Cook / Clean  [x] Transport   Who will be the one to contact for family training: Charleen   24 hour care on discharge?: yes   What level does the patient need to be at to return home:  Mod I   Discharge plan:  Continue to assess pending progress;24 hour supervision or assist;Home with Home health    Barriers to discharge: level of assistance, availability of assistance, endurance, pain, comorbidities        Ethnicity: Are you of , /a, or Canadian origin?  [x] No, not of , /a, or Canadian origin  [] Yes, Gabonese, Gabonese American, Chicano/a  [] Yes, Djib[place]ian  [] Yes, Cas  [] Yes, another , , or Canadian origin  [] Pt unable to respond  [] Pt declines to respond     Race: [x] White                                                [] Jordanian              []   [] Black or

## 2024-06-21 NOTE — PLAN OF CARE
Cognitive-linguistic evaluation completed this date.    Arely Whiteside M.S. CCC-SLP  Speech-language pathologist  [phone]

## 2024-06-21 NOTE — H&P
Patient: Pretty Covington  [id number]  Date: 6/21/2024      Chief Complaint: back pain    History of Present Illness/Hospital Course:  Pretty Covington is a 64 year old female with a past medical history significant for HTN, CAD, HLD, DM2, PE on AC, ELEAZAR on CPAP, and morbid obesity who presented to [facility] on 6/9/24 with back pain after a fall. CT head and and C-spine were negative for acute process. CT L spine showed chance type transverse fracture seen at L1 extending from the vertebral body anteriorly into the posterior elements bilaterally. She was transferred to [facility] for Neurosurgical evaluation. On 6/14 she underwent T11-L3 fixation and fusion. Post operative course was notable for acute blood loss anemia, acute elevation in liver enzymes, and pain. She was admitted to [facility] on 6/20/24 due to functional deficits below her baseline. Today Pretty is seen in her room with nursing present. She reports continued back pain. Discussed scheduling tylenol and increasing PRN oxycodone. She also reports feeling anxious and does take citalopram at home. Discussed consulting Psychiatry and she is agreeable. She lives with her sister in a one level house with a ramp to enter.     Prior Level of Function:  Independent for self care, indoor mobility, stairs, functional cognition     Current Level of Function:  Dependent for toileting hygiene, roll left and right, sit to lying, sit to stand     has a past medical history of Asthma, Benign essential hypertension, Benign head tremor, Cholelithiasis, Coronary artery disease involving native coronary artery of native heart, Disease of nasal cavity and sinuses, Fatty infiltration of liver, Hot flash, menopausal, Hx of blood clots, Hyperlipidemia, Hyperlipidemia associated with type 2 diabetes mellitus (HCC), Morbid obesity with BMI of 50.0-59.9, adult (HCC), No history of procedure, ELEAZAR on CPAP, Pulmonary embolism (HCC), Type II or unspecified type diabetes mellitus without mention of  Results   Component Value Date    WBC 7.3 06/21/2024    HGB 9.3 (L) 06/21/2024    HCT 27.4 (L) 06/21/2024    MCV 92.2 06/21/2024     06/21/2024     Lab Results   Component Value Date    INR 1.09 06/14/2024    INR 1.21 (H) 04/17/2024    INR 1.06 06/29/2023    PROTIME 14.3 06/14/2024    PROTIME 15.5 (H) 04/17/2024    PROTIME 13.8 06/29/2023     Lab Results   Component Value Date    CREATININE <0.5 (L) 06/21/2024    BUN 12 06/21/2024     06/21/2024    K 3.3 (L) 06/21/2024    CL 99 06/21/2024    CO2 24 06/21/2024     Lab Results   Component Value Date    ALT 58 (H) 06/20/2024    AST 30 06/20/2024    ALKPHOS 97 06/20/2024    BILITOT 0.9 06/20/2024       Most recent echocardiogram revealed EF 55%.    Most recent EKG revealed NSR.     IMAGING    MRI Thoracic and Lumbar Spine 6/10/24  1. There is an acute transverse fracture extending through the L1 vertebral body and posterior elements as detailed above. The fracture is distracted and involves 3 columns suggesting fracture  instability. Since several segments of the spine are fused   above and below the fracture, this is considered to represent a chalk stick fracture related to diffuse idiopathic skeletal hyperostosis.  2. There is moderate dextro scoliosis of the lumbar spine with associated advanced lumbar spondylosis resulting in varying degrees of canal and foraminal stenosis as detailed above. The central canal stenosis is severe at the L3-L4 disc space level and   moderate to severe at the L2-L3 disc space level.    The above laboratory data have been reviewed.   The above imaging data have been reviewed.   The above medical testing have been reviewed.     Body mass index is 45.34 kg/m².    Barriers to Discharge: level of assistance, availability of assistance, endurance, pain, comorbidities    POST ADMISSION PHYSICIAN EVALUATION  The patient has agreed to being admitted to our comprehensive inpatient rehabilitation facility consisting of at least 180  minutes of therapy a day, 5 out of 7 days a week.     The patient/family has a good understanding of our discharge process. The patient has potential to make improvement and is in need of at least two of the following multidisciplinary therapies including but not limited to physical, occupational, respiratory, and speech, nutritional services, wound care, and prosthetics and orthotics. Given the patients complex condition and risk of further medical complications, rehabilitation services cannot be safely provided at a lower level of care such as a skilled nursing facility. I have compared the patients medical and functional status at the time of the preadmission screening and the same on this date, and there are no significant changes.     By signing this document, I acknowledge that I have personally performed a full physical examination on this patient within 24 hours of admission to this inpatient rehabilitation facility and have determined the patient to be able to tolerate the above course of treatment at an intensive level for a reasonable period of time. I will be completing a detailed individualized Plan of Care for this patient by day four of the patients stay based upon the Preadmission Screen, this Post-Admission Evaluation, and the therapy evaluations.    Rehabilitation Diagnosis:  Orthopedic, 8.9, Other Orthopedic    Assessment and Plan:  Pretty Covington is a 64 year old female with a past medical history significant for HTN, CAD, HLD, DM2, PE on AC, ELEAZAR on CPAP, and morbid obesity who presented to [facility] and a transfer from [facility] with back pain after a fall, found to have L1 chance fracture s/p T11-L3 fixation and fusion. Post operative course was notable for acute blood loss anemia, acute elevation in liver enzymes, and pain. She was admitted to [facility] on 6/20/24 due to functional deficits below her baseline.     Closed Fracture of the Lumbar Vertebral Body, unstable L1 fracture extending into

## 2024-06-21 NOTE — PROGRESS NOTES
Physical Therapy  Facility/Department: [facility]  Rehabilitation Physical Therapy Initial Assessment    NAME: Pretty Covington  : 1960 (64 y.o.)  MRN: [id number]  CODE STATUS: Full Code    Date of Service: 24      Past Medical History:   Diagnosis Date    Asthma     Benign essential hypertension 2017    Benign head tremor 2014    Cholelithiasis     Coronary artery disease involving native coronary artery of native heart 2022    Disease of nasal cavity and sinuses     Fatty infiltration of liver     Hot flash, menopausal 2015    Hx of blood clots     lungs bilateral x 2    Hyperlipidemia     Hyperlipidemia associated with type 2 diabetes mellitus (HCC) 10/23/2015    Morbid obesity with BMI of 50.0-59.9, adult (HCC) 10/23/2015    No history of procedure 2015    no past colonoscopy    ELEAZAR on CPAP     Pulmonary embolism (HCC)     both lungs x 2    Type II or unspecified type diabetes mellitus without mention of complication, not stated as uncontrolled     Umbilical hernia 10/02/2013    Unspecified sleep apnea     Wears glasses      Past Surgical History:   Procedure Laterality Date    BREAST SURGERY Right 2015    benign tumor    CHOLECYSTECTOMY, LAPAROSCOPIC  2012    COLONOSCOPY  2015    cecal polyp    DILATION AND CURETTAGE OF UTERUS  2018    FOOT SURGERY Right 3/15/2023    OPEN REDUCTION INTERNAL FIXATION RIGHT 5TH METATARSAL performed by Arsh Siegel DPM at [facility] OR    HERNIA REPAIR  10/11/2013    lap umbilical    HYSTEROSCOPY  2018    and D&C    LAMINECTOMY N/A 2024    Thoracic 11-Lumbar 3 fixation and fusion performed by Ebenezer Campoverde MD at [facility] OR       Chart Reviewed: Yes  Patient assessed for rehabilitation services?: Yes  Additional Pertinent Hx: T11-L3 bilateral posterolateral fusion with hx of head tremor  Family / Caregiver Present: No  Diagnosis: Closed fx of lumbar vertebral body    Restrictions:  Restrictions/Precautions: Fall  HR)  Transfers  Sit to Stand: Maximum Assistance;2 Person Assistance (Max A x2 from EOB to stedy and RW)  Stand to Sit: Moderate Assistance;2 Person Assistance (Mod A x 2 from stedy, RW)  Balance  Posture: Fair  Sitting - Static: Fair;-  Sitting - Dynamic: Poor  Standing - Static: Poor  Standing - Dynamic: Poor    Environmental Mobility  Ambulation  WB Status: WBAT  Wheelchair Activities  Wheelchair Type: Standard  Wheelchair Cushion: Standard  Propulsion: Yes  Propulsion 1  Propulsion: Manual  Level: Level Tile  Level of Assistance: Stand by assistance  Description/ Details: uses B UE and B LE to propel  Distance: 40 ft + 60 ft    PT Exercises  Exercise Treatment: 2 x10 B long arc quads seated in w/c with supervision. 2# ankle weights donned on B LE.    ASSESSMENT  Vitals  Pulse: 84  SpO2: 94 %  O2 Device: None (Room air)  BP: 132/72  MAP (Calculated): 92  BP Location: Left upper arm    Activity Tolerance  Activity Tolerance: Patient limited by pain;Patient tolerated treatment well    Assessment  Assessment: Pt seen this session for PT initial evaluation. Per Dr. Gilliam \"Pretty Covington is a 64 year old female with a past medical history significant for HTN, CAD, HLD, DM2, PE on AC, ELEAZAR on CPAP, and morbid obesity who presented to [facility] on 6/9/24 with back pain after a fall. CT head and and C-spine were negative for acute process. CT L spine showed chance type transverse fracture seen at L1 extending from the vertebral body anteriorly into the posterior elements bilaterally. She was transferred to [facility] for Neurosurgical evaluation. On 6/14 she underwent T11-L3 fixation and fusion. Post operative course was notable for acute blood loss anemia, acute elevation in liver enzymes, and pain. She was admitted to [facility] on 6/20/24 due to functional deficits below her baseline.\" Pt previously independent with ambulation and transfers without AD. Pt performs bed mobility with SBA, HOB flat, HR's, and increased time to  complete was noted. Pt performs x3 total STS this session. X2 STS were from EOB and w/c up to stedy with Max A x2. Pt performs x1 STS from w/c up to RW with Max A x2 and cues to sequence. Pt completes LE exercises seated in w/c, further detail above. Pt required mod cues for sequencing and initiating transfers. Pt would benefit from continued skilled PT to improve LE strength, endurance, and assist with return to PLOF.   Treatment Diagnosis: Decreased functional mobility  Therapy Prognosis: Good  Decision Making: Medium Complexity  Discharge Recommendations: Continue to assess pending progress;24 hour supervision or assist;Home with Home health PT  PT D/C Equipment  Other: CTA for RW  PT Equipment Recommendations  Equipment Needed: Yes  Other: CTA for RW      GOALS  Patient Goals   Patient Goals : \"I want to get back to normal\"  Short Term Goals  Time Frame for Short Term Goals: 7/1  Short Term Goal 1: Pt will perfofrm bed mobility with Mod I.  Short Term Goal 2: Pt will perform functional transfers with Mod A and LRAD.  Short Term Goal 3: Pt will ambulate 50 ft with TLSO, LRAD, and Mod A.  Short Term Goal 4: Pt will propel w/c for 150 ft with Mod I with B UE.  Long Term Goals  Time Frame for Long Term Goals : 7/11  Long Term Goal 1: Pt will ambulate 150 ft with TLSO, LRAD and Mod I.  Long Term Goal 2: Pt will perform functional transfers with Mod I and LRAD.  Long Term Goal 3: Pt will complete car transfer with Mod I and LRAD.  Long Term Goal 4: Pt will complete 6\" curb step with LRAD and Mod I.    PLAN OF CARE  Frequency: 1-2 treatment sessions per day, 5-7 days per week  Physical Therapy Plan  General Plan: 5-7 times per week  Current Treatment Recommendations: Balance training;Functional mobility training;Transfer training;Gait training;Safety education & training;Therapeutic activities  Safety Devices  Type of Devices: Call light within reach;Gait belt;Bed alarm in place;Left in bed;Nurse

## 2024-06-22 LAB
GLUCOSE BLD-MCNC: 176 MG/DL (ref 70–99)
GLUCOSE BLD-MCNC: 206 MG/DL (ref 70–99)
GLUCOSE BLD-MCNC: 207 MG/DL (ref 70–99)
GLUCOSE BLD-MCNC: 234 MG/DL (ref 70–99)
PERFORMED ON: ABNORMAL

## 2024-06-22 PROCEDURE — 97110 THERAPEUTIC EXERCISES: CPT

## 2024-06-22 PROCEDURE — 6370000000 HC RX 637 (ALT 250 FOR IP)

## 2024-06-22 PROCEDURE — 1280000000 HC REHAB R&B

## 2024-06-22 PROCEDURE — 97530 THERAPEUTIC ACTIVITIES: CPT

## 2024-06-22 PROCEDURE — 97112 NEUROMUSCULAR REEDUCATION: CPT

## 2024-06-22 PROCEDURE — 97535 SELF CARE MNGMENT TRAINING: CPT

## 2024-06-22 PROCEDURE — 6370000000 HC RX 637 (ALT 250 FOR IP): Performed by: STUDENT IN AN ORGANIZED HEALTH CARE EDUCATION/TRAINING PROGRAM

## 2024-06-22 RX ORDER — LACTULOSE 10 G/15ML
20 SOLUTION ORAL 3 TIMES DAILY
Status: DISCONTINUED | OUTPATIENT
Start: 2024-06-22 | End: 2024-06-27

## 2024-06-22 RX ADMIN — METHOCARBAMOL 750 MG: 750 TABLET ORAL at 12:06

## 2024-06-22 RX ADMIN — OXYCODONE 10 MG: 5 TABLET ORAL at 13:28

## 2024-06-22 RX ADMIN — Medication 5 MG: at 20:44

## 2024-06-22 RX ADMIN — TOPIRAMATE 100 MG: 100 TABLET, FILM COATED ORAL at 20:45

## 2024-06-22 RX ADMIN — ASPIRIN 81 MG 81 MG: 81 TABLET ORAL at 07:54

## 2024-06-22 RX ADMIN — DIAZEPAM 5 MG: 5 TABLET ORAL at 20:45

## 2024-06-22 RX ADMIN — FERROUS SULFATE TAB 325 MG (65 MG ELEMENTAL FE) 325 MG: 325 (65 FE) TAB at 07:53

## 2024-06-22 RX ADMIN — ATORVASTATIN CALCIUM 40 MG: 40 TABLET, FILM COATED ORAL at 07:55

## 2024-06-22 RX ADMIN — METFORMIN HYDROCHLORIDE 500 MG: 500 TABLET ORAL at 16:47

## 2024-06-22 RX ADMIN — SENNOSIDES AND DOCUSATE SODIUM 1 TABLET: 50; 8.6 TABLET ORAL at 20:45

## 2024-06-22 RX ADMIN — CITALOPRAM HYDROBROMIDE 40 MG: 20 TABLET ORAL at 20:45

## 2024-06-22 RX ADMIN — SENNOSIDES AND DOCUSATE SODIUM 1 TABLET: 50; 8.6 TABLET ORAL at 07:55

## 2024-06-22 RX ADMIN — INSULIN LISPRO 2 UNITS: 100 INJECTION, SOLUTION INTRAVENOUS; SUBCUTANEOUS at 12:06

## 2024-06-22 RX ADMIN — LOSARTAN POTASSIUM 50 MG: 25 TABLET, FILM COATED ORAL at 07:53

## 2024-06-22 RX ADMIN — APIXABAN 5 MG: 5 TABLET, FILM COATED ORAL at 20:45

## 2024-06-22 RX ADMIN — ARIPIPRAZOLE 5 MG: 5 TABLET ORAL at 07:53

## 2024-06-22 RX ADMIN — APIXABAN 5 MG: 5 TABLET, FILM COATED ORAL at 07:54

## 2024-06-22 RX ADMIN — OXYCODONE HYDROCHLORIDE 10 MG: 10 TABLET, FILM COATED, EXTENDED RELEASE ORAL at 20:44

## 2024-06-22 RX ADMIN — OMEGA-3-ACID ETHYL ESTERS 1 G: 1 CAPSULE, LIQUID FILLED ORAL at 20:44

## 2024-06-22 RX ADMIN — Medication 400 MG: at 07:55

## 2024-06-22 RX ADMIN — OXYCODONE 10 MG: 5 TABLET ORAL at 02:49

## 2024-06-22 RX ADMIN — POLYETHYLENE GLYCOL 3350 17 G: 17 POWDER, FOR SOLUTION ORAL at 07:55

## 2024-06-22 RX ADMIN — OXYCODONE HYDROCHLORIDE 10 MG: 10 TABLET, FILM COATED, EXTENDED RELEASE ORAL at 06:55

## 2024-06-22 RX ADMIN — METHOCARBAMOL 750 MG: 750 TABLET ORAL at 07:53

## 2024-06-22 RX ADMIN — METHOCARBAMOL 750 MG: 750 TABLET ORAL at 20:44

## 2024-06-22 RX ADMIN — Medication 1 TABLET: at 07:55

## 2024-06-22 RX ADMIN — INSULIN LISPRO 2 UNITS: 100 INJECTION, SOLUTION INTRAVENOUS; SUBCUTANEOUS at 06:55

## 2024-06-22 RX ADMIN — METFORMIN HYDROCHLORIDE 500 MG: 500 TABLET ORAL at 07:54

## 2024-06-22 RX ADMIN — OMEGA-3-ACID ETHYL ESTERS 1 G: 1 CAPSULE, LIQUID FILLED ORAL at 07:53

## 2024-06-22 RX ADMIN — METHOCARBAMOL 750 MG: 750 TABLET ORAL at 16:47

## 2024-06-22 RX ADMIN — PANTOPRAZOLE SODIUM 20 MG: 20 TABLET, DELAYED RELEASE ORAL at 07:55

## 2024-06-22 RX ADMIN — INSULIN LISPRO 2 UNITS: 100 INJECTION, SOLUTION INTRAVENOUS; SUBCUTANEOUS at 16:47

## 2024-06-22 RX ADMIN — OXYCODONE HYDROCHLORIDE AND ACETAMINOPHEN 500 MG: 500 TABLET ORAL at 07:54

## 2024-06-22 RX ADMIN — INSULIN GLARGINE 12 UNITS: 100 INJECTION, SOLUTION SUBCUTANEOUS at 20:43

## 2024-06-22 ASSESSMENT — PAIN DESCRIPTION - ORIENTATION
ORIENTATION: MID

## 2024-06-22 ASSESSMENT — PAIN SCALES - GENERAL
PAINLEVEL_OUTOF10: 7
PAINLEVEL_OUTOF10: 8
PAINLEVEL_OUTOF10: 7
PAINLEVEL_OUTOF10: 8
PAINLEVEL_OUTOF10: 7

## 2024-06-22 ASSESSMENT — PAIN DESCRIPTION - FREQUENCY: FREQUENCY: CONTINUOUS

## 2024-06-22 ASSESSMENT — PAIN SCALES - WONG BAKER
WONGBAKER_NUMERICALRESPONSE: HURTS A LITTLE BIT
WONGBAKER_NUMERICALRESPONSE: HURTS A LITTLE BIT
WONGBAKER_NUMERICALRESPONSE: NO HURT
WONGBAKER_NUMERICALRESPONSE: HURTS A LITTLE BIT

## 2024-06-22 ASSESSMENT — PAIN DESCRIPTION - DESCRIPTORS
DESCRIPTORS: STABBING;DISCOMFORT
DESCRIPTORS: STABBING;DISCOMFORT
DESCRIPTORS: ACHING;DISCOMFORT

## 2024-06-22 ASSESSMENT — PAIN DESCRIPTION - LOCATION
LOCATION: BACK
LOCATION: BACK;INCISION

## 2024-06-22 ASSESSMENT — PAIN DESCRIPTION - ONSET: ONSET: ON-GOING

## 2024-06-22 ASSESSMENT — PAIN - FUNCTIONAL ASSESSMENT
PAIN_FUNCTIONAL_ASSESSMENT: PREVENTS OR INTERFERES SOME ACTIVE ACTIVITIES AND ADLS

## 2024-06-22 ASSESSMENT — PAIN DESCRIPTION - PAIN TYPE: TYPE: SURGICAL PAIN

## 2024-06-22 NOTE — PROGRESS NOTES
Department of Physical Medicine & Rehabilitation  Progress Note    Patient Identification:  Pretty Covington  [id number]  : 1960  Admit date: 2024    Chief Complaint: Late effect of fracture of lumbar vertebra    Subjective:   No acute events overnight. Patient seen this am.  She is complaining of some constipation overnight but is overall feeling a little bit better.  She is working with therapy this morning.  No new pain this morning.  She is continuing to use her TLSO during therapy.    ROS: No f/c, n/v, cp     Objective:  Patient Vitals for the past 24 hrs:   BP Temp Temp src Pulse Resp SpO2 Weight   24 0730 134/61 98.3 °F (36.8 °C) Oral 75 16 94 % --   24 0535 -- -- -- -- -- -- 121.4 kg (267 lb 10.2 oz)   24 2107 -- -- -- -- 16 -- --   24 2030 (!) 149/74 98.5 °F (36.9 °C) Oral 88 16 95 % --   24 1712 -- -- -- -- 16 -- --   24 1404 132/72 -- -- 84 -- 94 % --   24 1346 -- -- -- -- 16 -- --   24 1316 -- -- -- -- 18 -- --     Const: Alert. No distress, pleasant.   HEENT: Normocephalic, atraumatic. Normal sclera/conjunctiva. MMM.   CV: Regular rate and rhythm.   Resp: No respiratory distress. Lungs CTAB.   Abd: Soft, nontender, nondistended, NABS+   Ext: No edema.   Neuro: Alert, oriented, appropriately interactive.   Psych: Cooperative, appropriate mood and affect    Laboratory data: Available via EMR.   Last 24 hour lab  Recent Results (from the past 24 hour(s))   POCT Glucose    Collection Time: 24  3:57 PM   Result Value Ref Range    POC Glucose 216 (H) 70 - 99 mg/dl    Performed on ACCU-CHEK    POCT Glucose    Collection Time: 24  7:28 PM   Result Value Ref Range    POC Glucose 222 (H) 70 - 99 mg/dl    Performed on ACCU-CHEK    POCT Glucose    Collection Time: 24  6:08 AM   Result Value Ref Range    POC Glucose 207 (H) 70 - 99 mg/dl    Performed on ACCU-CHEK    POCT Glucose    Collection Time: 24 11:00 AM   Result Value Ref Range     POC Glucose 234 (H) 70 - 99 mg/dl    Performed on ACCU-CHEK        Therapy progress:  PT  Required Braces or Orthoses  Spinal: Thoracic Lumbar Sacral Orthotics  Position Activity Restriction  Spinal Precautions: No Bending, No Lifting, No Twisting  Other position/activity restrictions: Ambulate patient; needs TLSO when OOB  Objective     Sit to Stand: Maximum Assistance, 2 Person Assistance (Max A x2 from EOB to stedy and RW)  Stand to Sit: Moderate Assistance, 2 Person Assistance (Mod A x 2 from stedy, RW)     OT  PT Equipment Recommendations  Equipment Needed: Yes  Other: CTA for RW  Toilet - Technique:  (totalA stedy this date d/t urgency and decreased endurance. pain and fatigue)  Equipment Used: Grab bars (would benefit from raised toilet seat for transfers)  Toilet Transfers Comments: totalA stedy; maxA sit<>stand from toilet  Assessment        SLP          Body mass index is 44.54 kg/m².    Assessment and Plan:  Pretty Covington is a 64 year old female with a past medical history significant for HTN, CAD, HLD, DM2, PE on AC, ELEAZAR on CPAP, and morbid obesity who presented to [facility] and a transfer from [facility] with back pain after a fall, found to have L1 chance fracture s/p T11-L3 fixation and fusion. Post operative course was notable for acute blood loss anemia, acute elevation in liver enzymes, and pain. She was admitted to [facility] on 6/20/24 due to functional deficits below her baseline.      Closed Fracture of the Lumbar Vertebral Body, unstable L1 fracture extending into the posterior elements bilaterally  - s/p T11-L3 fixation and fusion 6/14  - TLSO brace when OOB  - multimodal pain control  - PT, OT     Pain  - tylenol PRN, avoiding scheduled due to having elevated LFTs during acute stay  - change oxycodone every 6 hours to Oxcontin 10 mg q12 hours  - oxycodone 5-10 mg q3 hours PRN for mod to severe pain  - PRN valium for muscle spasms      Hypokalemia  - replace PRN     Hypomagnesemia  - started  daily replacement     Acute blood loss anemia  - monitor and transfuse for Hb<7     Elevated liver enzymes, resolved  - during acute stay, hepatocellular injury pattern, concern that this was related to medications  - avoid nephrotoxins: discontinue scheduled tylenol, ok for PRN tylenol, fenofibrate discontinued, lipitor held during acute stay  - resume lipitor and monitor liver function per OSH     DM2  - home regimen: metformin, jardiance, mounjaro   - lantus plus SSI  - resume home metformin     History of PE  - ok to resume Eliquis 6/21 per Neurosurgery     CAD  - asa  - resuming statin and monitoring LFTs     HTN  - losartan     HLD  - home regimen: lipitor, fenofibrate, vascepa  - can consider resuming lipitor and monitor LFTs  - on Lovaza since 6/14, substituted for home Vascepa     Thyroid nodule  - incidental finding on neck CT  - needs outpatient ultrasound with PCP     Mood disorder  - on celexa at home  - consult Psychiatry, appreciate assistance      Morbid obesity  - BMI 45  - complicating assessment and treatment. Placing patient at risk for multiple co-morbidities as well as death and contribuing to the patient's presentation.  - encourage lifestyle modification     Rehab Progress: Improving  Anticipated Dispo: home  Services/DME: LAYLA  ELOS: LAYLA Alfaro D.O. M.P.H  PM&R  6/22/2024  11:17 AM

## 2024-06-22 NOTE — PROGRESS NOTES
Occupational Therapy  Facility/Department: [facility]  Rehabilitation Occupational Therapy Daily Treatment Note    Date: 24  Patient Name: Pretty Covington       Room: 0152/0152-01  MRN: [id number]  Account: [id number]   : 1960  (64 y.o.) Gender: female                    Past Medical History:  has a past medical history of Asthma, Benign essential hypertension, Benign head tremor, Cholelithiasis, Coronary artery disease involving native coronary artery of native heart, Disease of nasal cavity and sinuses, Fatty infiltration of liver, Hot flash, menopausal, Hx of blood clots, Hyperlipidemia, Hyperlipidemia associated with type 2 diabetes mellitus (HCC), Morbid obesity with BMI of 50.0-59.9, adult (HCC), No history of procedure, ELEAZAR on CPAP, Pulmonary embolism (HCC), Type II or unspecified type diabetes mellitus without mention of complication, not stated as uncontrolled, Umbilical hernia, Unspecified sleep apnea, and Wears glasses.  Past Surgical History:   has a past surgical history that includes Cholecystectomy, laparoscopic (2012); hernia repair (10/11/2013); Colonoscopy (2015); Breast surgery (Right, ); hysteroscopy (2018); Dilation and curettage of uterus (2018); Foot surgery (Right, 3/15/2023); and laminectomy (N/A, 2024).    Restrictions  Restrictions/Precautions: Fall Risk, General Precautions, ROM Restrictions, Surgical Protocols  Other position/activity restrictions: Ambulate patient; needs TLSO when OOB  Required Braces or Orthoses  Spinal: Thoracic Lumbar Sacral Orthotics  Spinal Other: TLSO when OOB  Required Braces or Orthoses?: Yes    Subjective  Subjective: Pt in bed, in pain, resting, no feeling hungry, pain 8/10 in back, aching, repositioned and distraction, able to continue with therapy, agreeable to OT session and requesting to use bathroom, /64, HR 85, O2 sats 96% on RA.  Restrictions/Precautions: Fall Risk;General Precautions;ROM  Restrictions;Surgical Protocols             Objective     Cognition  Overall Cognitive Status: WFL  Orientation  Overall Orientation Status: Within Functional Limits         ADL  Toileting  Assistance Level: Dependent  Skilled Clinical Factors: total assist for bowel hygiene and gown management, while standing in Cayla Ragland  Toilet Transfers  Equipment: Drop-arm bedside commode  Additional Factors: Set-up;Verbal cues;Cues for hand placement;Increased time to complete;With handrails  Assistance Level: Dependent  Skilled Clinical Factors: max A to stand from BSC over toilet to Cayla Ragland          Functional Mobility  Activity: To/From bathroom  Assistance Level: Dependent  Skilled Clinical Factors: use of Cayla Stedy to/from bathroom  Bed Mobility  Overall Assistance Level: Minimal Assistance  Additional Factors: Set-up;Verbal cues;Increased time to complete;Head of bed raised;With handrails  Sit to Supine  Assistance Level: Minimal assistance  Supine to Sit  Assistance Level: Stand by assist  Transfers  Surface: To bed;From bed;Drop arm bedside commode  Additional Factors: Set-up;Verbal cues;Hand placement cues;Increased time to complete;With handrails  Device: Lift equipment  Sit to Stand  Assistance Level: Maximum assistance  Skilled Clinical Factors: max A to stand from bed/BSC to Cayla Ragland, CGA to stand from paddles of Cayla Ragland  Stand to Sit  Assistance Level: Minimal assistance     Second Session:   Pt performed x15 reps with 2# of the following BUE exercises:  []Grasp/release  [x]Wrist flexion/extension  [x]Forearm pronation/supination   [x]Elbow flexion/extension  [x]Shoulder flexion/extension  [x]Chest press  [x]Flexed Elbow Shoulder Abduction/Adduction  []Scapular elevation/retraction     Assessment  Assessment  Assessment: First Session: Pt agreeable to OT session. Pt demonstrated fair ability to completed bed mobility while adhering to spinal precautions with SBA for supine>sit and min A for sit>supine.  Pt continues to require total assist for TLSO doff/don and max A for sit<>stands to Cayla Stedy. Pt demonstrated mild improvement in sit<>stands from paddles of Cayla Stedy with min A and CGA for static standing in Cayla Stedy for up to ~30 seconds during bowel hygiene. Continue POC.  Second Session: Pt requested to use toilet at start of session. Pt stood from recliner to Cayla Stedy with max A and required total assist for toileting due to bowel incontinence and inability to assist with hygiene or clothing management. Pt required max A to stand from [facility] [place] toilet to Cayla Stedy. Pt demonstrated good strength for BUE therex with 2# weight for 15 reps as listed above. Pt stood from 22inch mat table with Cayla Stedy with max A for sit<>stands but then CGA/SBA for static standing, including able to adjust/lift feet in stance. Pt stood for 4 times at EOM for 45, 50, 65, and 80 seconds. Pt assisted back to bed with min A for LLE into bed for sit>supine. Continue POC.  Activity Tolerance: Patient limited by pain;Patient limited by endurance  Discharge Recommendations: Continue to assess pending progress;24 hour supervision or assist;Home with Home health OT;S Level 1  OT Equipment Recommendations  Equipment Needed: Yes  Mobility Devices: ADL Assistive Devices  ADL Assistive Devices: Shower Chair with back;Toileting - 3-in-1 Commode  Safety Devices  Safety Devices in place: Yes  Type of devices: Gait belt;Bed alarm in place;Call light within reach;Left in bed;Nurse notified    Patient Education  Education  Education Given To: Patient  Education Provided: Role of Therapy;ADL Function;Plan of Care;Precautions;Safety;Transfer Training;Equipment  Education Provided Comments: role of OT, spinal precautions, brace wearing schedule and doff/donning, safety with transfers, ADL retraining  Education Method: Demonstration;Verbal  Barriers to Learning: Cognition  Education Outcome: Verbalized understanding;Continued education

## 2024-06-22 NOTE — PROGRESS NOTES
Physical Therapy  Facility/Department: [facility]  Rehabilitation Physical Therapy Treatment Note    NAME: Pretty Covington  : 1960 (64 y.o.)  MRN: [id number]  CODE STATUS: Full Code    Date of Service: 24       Restrictions:  Restrictions/Precautions: Fall Risk, General Precautions, ROM Restrictions, Surgical Protocols  Required Braces or Orthoses  Spinal: Thoracic Lumbar Sacral Orthotics  Spinal Other: TLSO when OOB  Position Activity Restriction  Spinal Precautions: No Bending, No Lifting, No Twisting  Other position/activity restrictions: Ambulate patient; needs TLSO when OOB     SUBJECTIVE  Subjective  Subjective: pt supine in bed upon arrival, motivated to participate  Pain: back pain not formally rated, not limiting        Post Treatment Pain Screening : same, pain with movement          OBJECTIVE  Cognition  Overall Cognitive Status: WFL  Orientation  Overall Orientation Status: Within Functional Limits    Functional Mobility  Bed Mobility  Overall Assistance Level: Stand By Assist  Additional Factors: Increased time to complete  Roll Left  Assistance Level: Stand by assist  Skilled Clinical Factors: verbal cues to use R LE to assist  Supine to Sit  Assistance Level: Contact guard assist  Skilled Clinical Factors: CGA for preventitive safety to avoid slipping off of mattress eob  Scooting  Assistance Level: Contact guard assist  Skilled Clinical Factors: pt able to scoot hips forward in w/c, requires CGA at eob for trunk anterior shift  Balance  Sitting Balance: Stand by assistance  Standing Balance: Minimal assistance  Standing Balance  Time: 5 mins in // bars with B UE support  Activity: standing tolerance outside of stedy , lateral weight shifting lifting opposite LE x 10 reps  Comments: max time pt able to tolerate due to fatigue and feeling B LE weakness  Transfers  Surface: From bed;Wheelchair  Additional Factors: Verbal cues;Hand placement cues;Increased time to complete  Device: Lift  will propel w/c for 150 ft with Mod I with B UE.  Long Term Goals  Time Frame for Long Term Goals : 7/11  Long Term Goal 1: Pt will ambulate 150 ft with TLSO, LRAD and Mod I.  Long Term Goal 2: Pt will perform functional transfers with Mod I and LRAD.  Long Term Goal 3: Pt will complete car transfer with Mod I and LRAD.  Long Term Goal 4: Pt will complete 6\" curb step with LRAD and Mod I.    PLAN OF CARE/SAFETY  Physical Therapy Plan  General Plan: 5-7 times per week  Current Treatment Recommendations: Balance training;Functional mobility training;Transfer training;Gait training;Safety education & training;Therapeutic activities  Safety Devices  Type of Devices: All fall risk precautions in place;Call light within reach;Gait belt;Patient at risk for falls (left on commode with PCA and RN aware)    EDUCATION  Education  Education Given To: Patient  Education Provided: Role of Therapy;ADL Function;Plan of Care;Mobility Training;Equipment;Precautions;Transfer Training;Safety;Energy Conservation;Fall Prevention Strategies  Education Provided Comments: educated regarding benefit of transfer with stedy outweighing risk of not following spinal precautions  Education Method: Demonstration;Verbal  Barriers to Learning: None  Education Outcome: Verbalized understanding;Continued education needed        Therapy Time   Individual Concurrent Group Co-treatment   Time In 0930         Time Out 1030         Minutes 60           Timed Code Treatment Minutes: 60 Minutes         SECOND SESSION:   Pt sleeping in bed upon arrival, motivated to participate, pt tolerated supine therex B LE ankle DF/PF, quad sets, SLR, heel slides, hip/knee extension with resistance, glute sets x 10-15 reps; pt tolerated transfer training supine to sit with supervision to SBA, this PT guarded pt from getting too close to side of bed prior to sitting up, pt requires assist with L LE to place foot flat on floor to come to complete sitting position, pt

## 2024-06-22 NOTE — PROGRESS NOTES
Pt requested siderails x4 at HS for safety to prevent sliding off bed r/t having specialty mattress.

## 2024-06-23 LAB
GLUCOSE BLD-MCNC: 192 MG/DL (ref 70–99)
GLUCOSE BLD-MCNC: 196 MG/DL (ref 70–99)
GLUCOSE BLD-MCNC: 198 MG/DL (ref 70–99)
GLUCOSE BLD-MCNC: 201 MG/DL (ref 70–99)
PERFORMED ON: ABNORMAL

## 2024-06-23 PROCEDURE — 1280000000 HC REHAB R&B

## 2024-06-23 PROCEDURE — 6370000000 HC RX 637 (ALT 250 FOR IP): Performed by: PHYSICAL MEDICINE & REHABILITATION

## 2024-06-23 PROCEDURE — 6370000000 HC RX 637 (ALT 250 FOR IP): Performed by: STUDENT IN AN ORGANIZED HEALTH CARE EDUCATION/TRAINING PROGRAM

## 2024-06-23 PROCEDURE — 6370000000 HC RX 637 (ALT 250 FOR IP)

## 2024-06-23 RX ORDER — POTASSIUM CHLORIDE 20 MEQ/1
20 TABLET, EXTENDED RELEASE ORAL 2 TIMES DAILY WITH MEALS
Status: DISCONTINUED | OUTPATIENT
Start: 2024-06-23 | End: 2024-06-26

## 2024-06-23 RX ADMIN — APIXABAN 5 MG: 5 TABLET, FILM COATED ORAL at 20:08

## 2024-06-23 RX ADMIN — ATORVASTATIN CALCIUM 40 MG: 40 TABLET, FILM COATED ORAL at 08:28

## 2024-06-23 RX ADMIN — APIXABAN 5 MG: 5 TABLET, FILM COATED ORAL at 08:27

## 2024-06-23 RX ADMIN — DIAZEPAM 5 MG: 5 TABLET ORAL at 20:08

## 2024-06-23 RX ADMIN — PANTOPRAZOLE SODIUM 20 MG: 20 TABLET, DELAYED RELEASE ORAL at 08:28

## 2024-06-23 RX ADMIN — METFORMIN HYDROCHLORIDE 500 MG: 500 TABLET ORAL at 08:28

## 2024-06-23 RX ADMIN — INSULIN GLARGINE 12 UNITS: 100 INJECTION, SOLUTION SUBCUTANEOUS at 20:08

## 2024-06-23 RX ADMIN — ARIPIPRAZOLE 5 MG: 5 TABLET ORAL at 08:31

## 2024-06-23 RX ADMIN — OXYCODONE HYDROCHLORIDE AND ACETAMINOPHEN 500 MG: 500 TABLET ORAL at 08:27

## 2024-06-23 RX ADMIN — TOPIRAMATE 100 MG: 100 TABLET, FILM COATED ORAL at 20:08

## 2024-06-23 RX ADMIN — OMEGA-3-ACID ETHYL ESTERS 1 G: 1 CAPSULE, LIQUID FILLED ORAL at 08:28

## 2024-06-23 RX ADMIN — OXYCODONE HYDROCHLORIDE 10 MG: 10 TABLET, FILM COATED, EXTENDED RELEASE ORAL at 20:08

## 2024-06-23 RX ADMIN — CITALOPRAM HYDROBROMIDE 40 MG: 20 TABLET ORAL at 20:08

## 2024-06-23 RX ADMIN — METHOCARBAMOL 750 MG: 750 TABLET ORAL at 18:24

## 2024-06-23 RX ADMIN — METFORMIN HYDROCHLORIDE 500 MG: 500 TABLET ORAL at 18:24

## 2024-06-23 RX ADMIN — Medication 1 TABLET: at 08:28

## 2024-06-23 RX ADMIN — SENNOSIDES AND DOCUSATE SODIUM 1 TABLET: 50; 8.6 TABLET ORAL at 08:28

## 2024-06-23 RX ADMIN — OXYCODONE 10 MG: 5 TABLET ORAL at 18:24

## 2024-06-23 RX ADMIN — FERROUS SULFATE TAB 325 MG (65 MG ELEMENTAL FE) 325 MG: 325 (65 FE) TAB at 08:27

## 2024-06-23 RX ADMIN — OMEGA-3-ACID ETHYL ESTERS 1 G: 1 CAPSULE, LIQUID FILLED ORAL at 20:08

## 2024-06-23 RX ADMIN — METHOCARBAMOL 750 MG: 750 TABLET ORAL at 08:27

## 2024-06-23 RX ADMIN — METHOCARBAMOL 750 MG: 750 TABLET ORAL at 13:27

## 2024-06-23 RX ADMIN — ASPIRIN 81 MG 81 MG: 81 TABLET ORAL at 08:28

## 2024-06-23 RX ADMIN — POTASSIUM CHLORIDE 20 MEQ: 1500 TABLET, EXTENDED RELEASE ORAL at 18:24

## 2024-06-23 RX ADMIN — OXYCODONE 10 MG: 5 TABLET ORAL at 13:27

## 2024-06-23 RX ADMIN — OXYCODONE 10 MG: 5 TABLET ORAL at 04:04

## 2024-06-23 RX ADMIN — INSULIN LISPRO 2 UNITS: 100 INJECTION, SOLUTION INTRAVENOUS; SUBCUTANEOUS at 07:00

## 2024-06-23 RX ADMIN — LOSARTAN POTASSIUM 50 MG: 25 TABLET, FILM COATED ORAL at 08:27

## 2024-06-23 RX ADMIN — Medication 5 MG: at 20:08

## 2024-06-23 RX ADMIN — LACTULOSE 20 G: 20 SOLUTION ORAL at 08:28

## 2024-06-23 RX ADMIN — LACTULOSE 20 G: 20 SOLUTION ORAL at 15:47

## 2024-06-23 RX ADMIN — Medication 400 MG: at 08:27

## 2024-06-23 RX ADMIN — OXYCODONE HYDROCHLORIDE 10 MG: 10 TABLET, FILM COATED, EXTENDED RELEASE ORAL at 08:27

## 2024-06-23 RX ADMIN — METHOCARBAMOL 750 MG: 750 TABLET ORAL at 20:08

## 2024-06-23 ASSESSMENT — PAIN DESCRIPTION - LOCATION
LOCATION: BACK

## 2024-06-23 ASSESSMENT — PAIN DESCRIPTION - DESCRIPTORS
DESCRIPTORS: ACHING
DESCRIPTORS: ACHING
DESCRIPTORS: ACHING;DISCOMFORT;SPASM
DESCRIPTORS: ACHING;DISCOMFORT
DESCRIPTORS: ACHING;DISCOMFORT

## 2024-06-23 ASSESSMENT — PAIN SCALES - GENERAL
PAINLEVEL_OUTOF10: 7
PAINLEVEL_OUTOF10: 8
PAINLEVEL_OUTOF10: 7
PAINLEVEL_OUTOF10: 7
PAINLEVEL_OUTOF10: 8
PAINLEVEL_OUTOF10: 2

## 2024-06-23 ASSESSMENT — PAIN DESCRIPTION - ORIENTATION
ORIENTATION: MID

## 2024-06-23 ASSESSMENT — PAIN SCALES - WONG BAKER
WONGBAKER_NUMERICALRESPONSE: NO HURT
WONGBAKER_NUMERICALRESPONSE: NO HURT

## 2024-06-23 ASSESSMENT — PAIN DESCRIPTION - PAIN TYPE
TYPE: SURGICAL PAIN

## 2024-06-23 ASSESSMENT — PAIN DESCRIPTION - ONSET
ONSET: ON-GOING

## 2024-06-23 ASSESSMENT — PAIN DESCRIPTION - FREQUENCY
FREQUENCY: CONTINUOUS

## 2024-06-23 NOTE — PLAN OF CARE
Problem: Safety - Adult  Goal: Free from fall injury  6/22/2024 2215 by Monse Mo, RN  Outcome: Progressing  6/22/2024 1016 by Aretha Latif RN  Outcome: Progressing     Problem: Pain  Goal: Verbalizes/displays adequate comfort level or baseline comfort level  Outcome: Progressing     Problem: Skin/Tissue Integrity  Goal: Absence of new skin breakdown  Description: 1.  Monitor for areas of redness and/or skin breakdown  2.  Assess vascular access sites hourly  3.  Every 4-6 hours minimum:  Change oxygen saturation probe site  4.  Every 4-6 hours:  If on nasal continuous positive airway pressure, respiratory therapy assess nares and determine need for appliance change or resting period.  Outcome: Progressing     Problem: ABCDS Injury Assessment  Goal: Absence of physical injury  Outcome: Progressing     Problem: Chronic Conditions and Co-morbidities  Goal: Patient's chronic conditions and co-morbidity symptoms are monitored and maintained or improved  Outcome: Progressing

## 2024-06-23 NOTE — PROGRESS NOTES
Department of Physical Medicine & Rehabilitation  Progress Note    Patient Identification:  Pretty Covington  [id number]  : 1960  Admit date: 2024    Chief Complaint: Late effect of fracture of lumbar vertebra    Subjective:   Patient seen this AM.  Patient states her back pain is under control with her current medication.  She is resting today and states she is ready to restart rehab therapies tomorrow on Monday.   She is continuing to use her TLSO during therapy.  We will recheck her labs tomorrow morning.    ROS: No f/c, n/v, cp     Objective:  Patient Vitals for the past 24 hrs:   BP Temp Temp src Pulse Resp SpO2 Weight   24 1327 -- -- -- -- 18 -- --   24 0819 135/63 98 °F (36.7 °C) Oral 82 18 93 % --   24 0608 -- -- -- -- -- -- 114 kg (251 lb 5.2 oz)   24 0434 -- -- -- -- 16 -- --   24 0404 -- -- -- -- 16 -- --   24 2114 -- -- -- -- 16 -- --   24 2030 135/63 98.1 °F (36.7 °C) Oral 85 16 94 % --   24 1358 -- -- -- -- 16 -- --       Const: Alert. No distress, pleasant.   HEENT: Normocephalic, atraumatic. Normal sclera/conjunctiva. MMM.   CV: Regular rate and rhythm.   Resp: No respiratory distress. Lungs CTAB.   Abd: Soft, nontender, nondistended, NABS+   Ext: No edema.   Neuro: Alert, oriented, appropriately interactive.   Psych: Cooperative, appropriate mood and affect    Laboratory data: Available via EMR.   Last 24 hour lab  Recent Results (from the past 24 hour(s))   POCT Glucose    Collection Time: 24  3:54 PM   Result Value Ref Range    POC Glucose 206 (H) 70 - 99 mg/dl    Performed on ACCU-CHEK    POCT Glucose    Collection Time: 24  7:14 PM   Result Value Ref Range    POC Glucose 176 (H) 70 - 99 mg/dl    Performed on ACCU-CHEK    POCT Glucose    Collection Time: 24  6:06 AM   Result Value Ref Range    POC Glucose 201 (H) 70 - 99 mg/dl    Performed on ACCU-CHEK    POCT Glucose    Collection Time: 24 11:09 AM   Result Value  Ref Range    POC Glucose 198 (H) 70 - 99 mg/dl    Performed on ACCU-CHEK        Therapy progress:  PT  Required Braces or Orthoses  Spinal: Thoracic Lumbar Sacral Orthotics  Spinal Other: TLSO when OOB  Position Activity Restriction  Spinal Precautions: No Bending, No Lifting, No Twisting  Other position/activity restrictions: Ambulate patient; needs TLSO when OOB  Objective     Sit to Stand: Maximum Assistance, 2 Person Assistance (Max A x2 from EOB to stedy and RW)  Stand to Sit: Moderate Assistance, 2 Person Assistance (Mod A x 2 from stedy, RW)     OT  PT Equipment Recommendations  Equipment Needed: Yes  Other: CTA for RW  Toilet - Technique:  (totalA stedy this date d/t urgency and decreased endurance. pain and fatigue)  Equipment Used: Grab bars (would benefit from raised toilet seat for transfers)  Toilet Transfers Comments: totalA stedy; maxA sit<>stand from toilet  Assessment        SLP          Body mass index is 41.82 kg/m².    Assessment and Plan:  Pretty Covington is a 64 year old female with a past medical history significant for HTN, CAD, HLD, DM2, PE on AC, ELEAZAR on CPAP, and morbid obesity who presented to [facility] and a transfer from [facility] with back pain after a fall, found to have L1 chance fracture s/p T11-L3 fixation and fusion. Post operative course was notable for acute blood loss anemia, acute elevation in liver enzymes, and pain. She was admitted to [facility] on 6/20/24 due to functional deficits below her baseline.      Closed Fracture of the Lumbar Vertebral Body, unstable L1 fracture extending into the posterior elements bilaterally  - s/p T11-L3 fixation and fusion 6/14  - TLSO brace when OOB  - multimodal pain control  - PT, OT     Pain  - tylenol PRN, avoiding scheduled due to having elevated LFTs during acute stay  - change oxycodone every 6 hours to Oxcontin 10 mg q12 hours  - oxycodone 5-10 mg q3 hours PRN for mod to severe pain  - PRN valium for muscle spasms      Hypokalemia  -  replace PRN     Hypomagnesemia  - started daily replacement     Acute blood loss anemia  - monitor and transfuse for Hb<7     Elevated liver enzymes, resolved  - during acute stay, hepatocellular injury pattern, concern that this was related to medications  - avoid nephrotoxins: discontinue scheduled tylenol, ok for PRN tylenol, fenofibrate discontinued, lipitor held during acute stay  - resume lipitor and monitor liver function per OSH     DM2  - home regimen: metformin, jardiance, mounjaro   - lantus plus SSI  - resume home metformin     History of PE  - ok to resume Eliquis 6/21 per Neurosurgery     CAD  - asa  - resuming statin and monitoring LFTs     HTN  - losartan     HLD  - home regimen: lipitor, fenofibrate, vascepa  - can consider resuming lipitor and monitor LFTs  - on Lovaza since 6/14, substituted for home Vascepa     Thyroid nodule  - incidental finding on neck CT  - needs outpatient ultrasound with PCP     Mood disorder  - on celexa at home  - consult Psychiatry, appreciate assistance      Morbid obesity  - BMI 45  - complicating assessment and treatment. Placing patient at risk for multiple co-morbidities as well as death and contribuing to the patient's presentation.  - encourage lifestyle modification     Rehab Progress: Improving  Anticipated Dispo: home  Services/DME: LAYLA  ELOS: LAYLA Alfaro MD  PM&R  6/23/2024  1:50 PM

## 2024-06-24 LAB
ALBUMIN SERPL-MCNC: 3.1 G/DL (ref 3.4–5)
ALP SERPL-CCNC: 111 U/L (ref 40–129)
ALT SERPL-CCNC: 17 U/L (ref 10–40)
ANION GAP SERPL CALCULATED.3IONS-SCNC: 12 MMOL/L (ref 3–16)
AST SERPL-CCNC: 17 U/L (ref 15–37)
BASOPHILS # BLD: 0.1 K/UL (ref 0–0.2)
BASOPHILS NFR BLD: 0.7 %
BILIRUB DIRECT SERPL-MCNC: 0.3 MG/DL (ref 0–0.3)
BILIRUB INDIRECT SERPL-MCNC: 0.6 MG/DL (ref 0–1)
BILIRUB SERPL-MCNC: 0.9 MG/DL (ref 0–1)
BUN SERPL-MCNC: 14 MG/DL (ref 7–20)
CALCIUM SERPL-MCNC: 9 MG/DL (ref 8.3–10.6)
CHLORIDE SERPL-SCNC: 106 MMOL/L (ref 99–110)
CO2 SERPL-SCNC: 23 MMOL/L (ref 21–32)
CREAT SERPL-MCNC: <0.5 MG/DL (ref 0.6–1.2)
DEPRECATED RDW RBC AUTO: 14.8 % (ref 12.4–15.4)
EOSINOPHIL # BLD: 0.2 K/UL (ref 0–0.6)
EOSINOPHIL NFR BLD: 2.3 %
GFR SERPLBLD CREATININE-BSD FMLA CKD-EPI: >90 ML/MIN/{1.73_M2}
GLUCOSE BLD-MCNC: 159 MG/DL (ref 70–99)
GLUCOSE BLD-MCNC: 174 MG/DL (ref 70–99)
GLUCOSE BLD-MCNC: 179 MG/DL (ref 70–99)
GLUCOSE BLD-MCNC: 179 MG/DL (ref 70–99)
GLUCOSE SERPL-MCNC: 178 MG/DL (ref 70–99)
HCT VFR BLD AUTO: 28 % (ref 36–48)
HGB BLD-MCNC: 9.5 G/DL (ref 12–16)
LYMPHOCYTES # BLD: 0.9 K/UL (ref 1–5.1)
LYMPHOCYTES NFR BLD: 11.7 %
MAGNESIUM SERPL-MCNC: 1.8 MG/DL (ref 1.8–2.4)
MAGNESIUM SERPL-MCNC: 1.9 MG/DL (ref 1.8–2.4)
MCH RBC QN AUTO: 31.4 PG (ref 26–34)
MCHC RBC AUTO-ENTMCNC: 34.1 G/DL (ref 31–36)
MCV RBC AUTO: 92.1 FL (ref 80–100)
MONOCYTES # BLD: 0.7 K/UL (ref 0–1.3)
MONOCYTES NFR BLD: 8.9 %
NEUTROPHILS # BLD: 6.1 K/UL (ref 1.7–7.7)
NEUTROPHILS NFR BLD: 76.4 %
PERFORMED ON: ABNORMAL
PLATELET # BLD AUTO: 300 K/UL (ref 135–450)
PMV BLD AUTO: 7.1 FL (ref 5–10.5)
POTASSIUM SERPL-SCNC: 3.4 MMOL/L (ref 3.5–5.1)
PROT SERPL-MCNC: 5.9 G/DL (ref 6.4–8.2)
RBC # BLD AUTO: 3.04 M/UL (ref 4–5.2)
SODIUM SERPL-SCNC: 141 MMOL/L (ref 136–145)
WBC # BLD AUTO: 8 K/UL (ref 4–11)

## 2024-06-24 PROCEDURE — 6370000000 HC RX 637 (ALT 250 FOR IP): Performed by: PHYSICAL MEDICINE & REHABILITATION

## 2024-06-24 PROCEDURE — 1280000000 HC REHAB R&B

## 2024-06-24 PROCEDURE — 97530 THERAPEUTIC ACTIVITIES: CPT

## 2024-06-24 PROCEDURE — 80048 BASIC METABOLIC PNL TOTAL CA: CPT

## 2024-06-24 PROCEDURE — 80076 HEPATIC FUNCTION PANEL: CPT

## 2024-06-24 PROCEDURE — 36415 COLL VENOUS BLD VENIPUNCTURE: CPT

## 2024-06-24 PROCEDURE — 97535 SELF CARE MNGMENT TRAINING: CPT

## 2024-06-24 PROCEDURE — 85025 COMPLETE CBC W/AUTO DIFF WBC: CPT

## 2024-06-24 PROCEDURE — 6370000000 HC RX 637 (ALT 250 FOR IP)

## 2024-06-24 PROCEDURE — 83735 ASSAY OF MAGNESIUM: CPT

## 2024-06-24 PROCEDURE — 6370000000 HC RX 637 (ALT 250 FOR IP): Performed by: STUDENT IN AN ORGANIZED HEALTH CARE EDUCATION/TRAINING PROGRAM

## 2024-06-24 PROCEDURE — 97110 THERAPEUTIC EXERCISES: CPT

## 2024-06-24 PROCEDURE — 97542 WHEELCHAIR MNGMENT TRAINING: CPT

## 2024-06-24 PROCEDURE — 97116 GAIT TRAINING THERAPY: CPT

## 2024-06-24 RX ORDER — METFORMIN HYDROCHLORIDE 500 MG/1
TABLET, EXTENDED RELEASE ORAL
Qty: 360 TABLET | Refills: 1 | Status: SHIPPED | OUTPATIENT
Start: 2024-06-24

## 2024-06-24 RX ADMIN — OMEGA-3-ACID ETHYL ESTERS 1 G: 1 CAPSULE, LIQUID FILLED ORAL at 09:17

## 2024-06-24 RX ADMIN — APIXABAN 5 MG: 5 TABLET, FILM COATED ORAL at 09:19

## 2024-06-24 RX ADMIN — OMEGA-3-ACID ETHYL ESTERS 1 G: 1 CAPSULE, LIQUID FILLED ORAL at 21:41

## 2024-06-24 RX ADMIN — POTASSIUM CHLORIDE 20 MEQ: 1500 TABLET, EXTENDED RELEASE ORAL at 16:46

## 2024-06-24 RX ADMIN — OXYCODONE 10 MG: 5 TABLET ORAL at 12:03

## 2024-06-24 RX ADMIN — LOSARTAN POTASSIUM 50 MG: 25 TABLET, FILM COATED ORAL at 09:17

## 2024-06-24 RX ADMIN — METHOCARBAMOL 750 MG: 750 TABLET ORAL at 09:18

## 2024-06-24 RX ADMIN — OXYCODONE 10 MG: 5 TABLET ORAL at 05:36

## 2024-06-24 RX ADMIN — DIAZEPAM 5 MG: 5 TABLET ORAL at 21:53

## 2024-06-24 RX ADMIN — SENNOSIDES AND DOCUSATE SODIUM 1 TABLET: 50; 8.6 TABLET ORAL at 09:18

## 2024-06-24 RX ADMIN — SENNOSIDES AND DOCUSATE SODIUM 1 TABLET: 50; 8.6 TABLET ORAL at 21:41

## 2024-06-24 RX ADMIN — CITALOPRAM HYDROBROMIDE 40 MG: 20 TABLET ORAL at 21:41

## 2024-06-24 RX ADMIN — PANTOPRAZOLE SODIUM 20 MG: 20 TABLET, DELAYED RELEASE ORAL at 09:19

## 2024-06-24 RX ADMIN — METFORMIN HYDROCHLORIDE 500 MG: 500 TABLET ORAL at 09:17

## 2024-06-24 RX ADMIN — ATORVASTATIN CALCIUM 40 MG: 40 TABLET, FILM COATED ORAL at 09:18

## 2024-06-24 RX ADMIN — METHOCARBAMOL 750 MG: 750 TABLET ORAL at 12:04

## 2024-06-24 RX ADMIN — OXYCODONE HYDROCHLORIDE AND ACETAMINOPHEN 500 MG: 500 TABLET ORAL at 09:18

## 2024-06-24 RX ADMIN — ASPIRIN 81 MG 81 MG: 81 TABLET ORAL at 09:19

## 2024-06-24 RX ADMIN — Medication 400 MG: at 09:19

## 2024-06-24 RX ADMIN — INSULIN GLARGINE 12 UNITS: 100 INJECTION, SOLUTION SUBCUTANEOUS at 21:42

## 2024-06-24 RX ADMIN — OXYCODONE HYDROCHLORIDE 10 MG: 10 TABLET, FILM COATED, EXTENDED RELEASE ORAL at 21:41

## 2024-06-24 RX ADMIN — Medication 1 TABLET: at 09:17

## 2024-06-24 RX ADMIN — TOPIRAMATE 100 MG: 100 TABLET, FILM COATED ORAL at 21:41

## 2024-06-24 RX ADMIN — ARIPIPRAZOLE 5 MG: 5 TABLET ORAL at 09:19

## 2024-06-24 RX ADMIN — OXYCODONE HYDROCHLORIDE 10 MG: 10 TABLET, FILM COATED, EXTENDED RELEASE ORAL at 09:18

## 2024-06-24 RX ADMIN — METHOCARBAMOL 750 MG: 750 TABLET ORAL at 21:41

## 2024-06-24 RX ADMIN — APIXABAN 5 MG: 5 TABLET, FILM COATED ORAL at 21:41

## 2024-06-24 RX ADMIN — METHOCARBAMOL 750 MG: 750 TABLET ORAL at 16:46

## 2024-06-24 RX ADMIN — OXYCODONE 10 MG: 5 TABLET ORAL at 16:45

## 2024-06-24 RX ADMIN — POTASSIUM CHLORIDE 20 MEQ: 1500 TABLET, EXTENDED RELEASE ORAL at 09:17

## 2024-06-24 RX ADMIN — METFORMIN HYDROCHLORIDE 500 MG: 500 TABLET ORAL at 16:45

## 2024-06-24 RX ADMIN — FERROUS SULFATE TAB 325 MG (65 MG ELEMENTAL FE) 325 MG: 325 (65 FE) TAB at 09:19

## 2024-06-24 ASSESSMENT — PAIN DESCRIPTION - ORIENTATION
ORIENTATION: POSTERIOR;LOWER
ORIENTATION: POSTERIOR;LOWER
ORIENTATION: MID
ORIENTATION: LOWER;POSTERIOR
ORIENTATION: LOWER;POSTERIOR

## 2024-06-24 ASSESSMENT — PAIN DESCRIPTION - DESCRIPTORS
DESCRIPTORS: STABBING;ACHING
DESCRIPTORS: ACHING;STABBING
DESCRIPTORS: ACHING;DISCOMFORT
DESCRIPTORS: ACHING;STABBING
DESCRIPTORS: ACHING;STABBING

## 2024-06-24 ASSESSMENT — PAIN SCALES - WONG BAKER
WONGBAKER_NUMERICALRESPONSE: NO HURT
WONGBAKER_NUMERICALRESPONSE: NO HURT

## 2024-06-24 ASSESSMENT — PAIN - FUNCTIONAL ASSESSMENT
PAIN_FUNCTIONAL_ASSESSMENT: PREVENTS OR INTERFERES WITH MANY ACTIVE NOT PASSIVE ACTIVITIES
PAIN_FUNCTIONAL_ASSESSMENT: PREVENTS OR INTERFERES SOME ACTIVE ACTIVITIES AND ADLS

## 2024-06-24 ASSESSMENT — PAIN DESCRIPTION - LOCATION
LOCATION: BACK

## 2024-06-24 ASSESSMENT — PAIN SCALES - GENERAL
PAINLEVEL_OUTOF10: 8
PAINLEVEL_OUTOF10: 7
PAINLEVEL_OUTOF10: 7
PAINLEVEL_OUTOF10: 10
PAINLEVEL_OUTOF10: 7
PAINLEVEL_OUTOF10: 8

## 2024-06-24 NOTE — CARE COORDINATION
[facility] Case Management/Follow up:    Chart reviewed. IP day #: 4  Consultants Following: Psychiatry, Wound Care   Discharge date: TBD  Therapy recommendations: Continue to assess pending progress;24 hour supervision or assist;Home with Home health;S Level 1   DME needed:Shower Chair with back;Toileting - 3-in-1 Commode, CTA for    Services set up: LAYLA Iraheta RN

## 2024-06-24 NOTE — PLAN OF CARE
Problem: Safety - Adult  Goal: Free from fall injury  6/23/2024 2252 by Monse Mo RN  Outcome: Progressing  6/23/2024 2252 by Monse Mo RN  Outcome: Progressing     Problem: Pain  Goal: Verbalizes/displays adequate comfort level or baseline comfort level  6/23/2024 2252 by Monse Mo RN  Outcome: Progressing  6/23/2024 2252 by Monse Mo RN  Outcome: Progressing     Problem: Skin/Tissue Integrity  Goal: Absence of new skin breakdown  Description: 1.  Monitor for areas of redness and/or skin breakdown  2.  Assess vascular access sites hourly  3.  Every 4-6 hours minimum:  Change oxygen saturation probe site  4.  Every 4-6 hours:  If on nasal continuous positive airway pressure, respiratory therapy assess nares and determine need for appliance change or resting period.  6/23/2024 2252 by Monse Mo RN  Outcome: Progressing  6/23/2024 2252 by Monse Mo RN  Outcome: Progressing

## 2024-06-24 NOTE — PROGRESS NOTES
Department of Physical Medicine & Rehabilitation  Progress Note    Patient Identification:  Pretty Covington  [id number]  : 1960  Admit date: 2024    Chief Complaint: Late effect of fracture of lumbar vertebra    Subjective:   Patient seen this AM.  Patient did well over the weekend, with no problems noted.  Patient states her back pain is under control with her current medication. She is continuing to use her TLSO during therapy.  Repeat labs this morning look good and are stable, but do show slight decrease in her K+ level, and patient started on supplements.      ROS: No f/c, n/v, cp     Objective:  Patient Vitals for the past 24 hrs:   BP Temp Temp src Pulse Resp SpO2 Weight   24 0606 -- -- -- -- 16 -- --   24 0536 -- -- -- -- 16 -- 121 kg (266 lb 12.1 oz)   24 2038 -- -- -- -- 16 -- --   24 2000 129/60 98 °F (36.7 °C) Oral 87 16 95 % --   24 1824 -- -- -- -- 18 -- --   24 1357 -- -- -- -- 18 -- --   24 1327 -- -- -- -- 18 -- --       Const: Alert. No distress, pleasant.   HEENT: Normocephalic, atraumatic. Normal sclera/conjunctiva. MMM.   CV: Regular rate and rhythm.   Resp: No respiratory distress. Lungs CTAB.   Abd: Soft, nontender, nondistended, NABS+   Ext: No edema.   Neuro: Alert, oriented, appropriately interactive.   Psych: Cooperative, appropriate mood and affect    Laboratory data: Available via EMR.   Last 24 hour lab  Recent Results (from the past 24 hour(s))   POCT Glucose    Collection Time: 24 11:09 AM   Result Value Ref Range    POC Glucose 198 (H) 70 - 99 mg/dl    Performed on ACCU-CHEK    POCT Glucose    Collection Time: 24  4:07 PM   Result Value Ref Range    POC Glucose 196 (H) 70 - 99 mg/dl    Performed on ACCU-CHEK    POCT Glucose    Collection Time: 24  7:09 PM   Result Value Ref Range    POC Glucose 192 (H) 70 - 99 mg/dl    Performed on ACCU-CHEK    Hepatic Function Panel    Collection Time: 24  5:58 AM   Result  Value Ref Range    Total Protein 5.9 (L) 6.4 - 8.2 g/dL    Albumin 3.1 (L) 3.4 - 5.0 g/dL    Alkaline Phosphatase 111 40 - 129 U/L    ALT 17 10 - 40 U/L    AST 17 15 - 37 U/L    Total Bilirubin 0.9 0.0 - 1.0 mg/dL    Bilirubin, Direct 0.3 0.0 - 0.3 mg/dL    Bilirubin, Indirect 0.6 0.0 - 1.0 mg/dL   Magnesium    Collection Time: 06/24/24  5:58 AM   Result Value Ref Range    Magnesium 1.80 1.80 - 2.40 mg/dL   Basic Metabolic Panel w/ Reflex to MG    Collection Time: 06/24/24  5:58 AM   Result Value Ref Range    Sodium 141 136 - 145 mmol/L    Potassium reflex Magnesium 3.4 (L) 3.5 - 5.1 mmol/L    Chloride 106 99 - 110 mmol/L    CO2 23 21 - 32 mmol/L    Anion Gap 12 3 - 16    Glucose 178 (H) 70 - 99 mg/dL    BUN 14 7 - 20 mg/dL    Creatinine <0.5 (L) 0.6 - 1.2 mg/dL    Est, Glom Filt Rate >90 >60    Calcium 9.0 8.3 - 10.6 mg/dL   CBC with Auto Differential    Collection Time: 06/24/24  5:58 AM   Result Value Ref Range    WBC 8.0 4.0 - 11.0 K/uL    RBC 3.04 (L) 4.00 - 5.20 M/uL    Hemoglobin 9.5 (L) 12.0 - 16.0 g/dL    Hematocrit 28.0 (L) 36.0 - 48.0 %    MCV 92.1 80.0 - 100.0 fL    MCH 31.4 26.0 - 34.0 pg    MCHC 34.1 31.0 - 36.0 g/dL    RDW 14.8 12.4 - 15.4 %    Platelets 300 135 - 450 K/uL    MPV 7.1 5.0 - 10.5 fL    Neutrophils % 76.4 %    Lymphocytes % 11.7 %    Monocytes % 8.9 %    Eosinophils % 2.3 %    Basophils % 0.7 %    Neutrophils Absolute 6.1 1.7 - 7.7 K/uL    Lymphocytes Absolute 0.9 (L) 1.0 - 5.1 K/uL    Monocytes Absolute 0.7 0.0 - 1.3 K/uL    Eosinophils Absolute 0.2 0.0 - 0.6 K/uL    Basophils Absolute 0.1 0.0 - 0.2 K/uL   Magnesium    Collection Time: 06/24/24  5:58 AM   Result Value Ref Range    Magnesium 1.90 1.80 - 2.40 mg/dL   POCT Glucose    Collection Time: 06/24/24  6:08 AM   Result Value Ref Range    POC Glucose 179 (H) 70 - 99 mg/dl    Performed on ACCU-CHEK        Therapy progress:  PT  Required Braces or Orthoses  Spinal: Thoracic Lumbar Sacral Orthotics  Spinal Other: TLSO when

## 2024-06-24 NOTE — PROGRESS NOTES
Occupational Therapy  Facility/Department: [facility]  Rehabilitation Occupational Therapy Daily Treatment Note    Date: 24  Patient Name: Pretty Covington       Room: 0152/0152-01  MRN: [id number]  Account: [id number]   : 1960  (64 y.o.) Gender: female                    Past Medical History:  has a past medical history of Asthma, Benign essential hypertension, Benign head tremor, Cholelithiasis, Coronary artery disease involving native coronary artery of native heart, Disease of nasal cavity and sinuses, Fatty infiltration of liver, Hot flash, menopausal, Hx of blood clots, Hyperlipidemia, Hyperlipidemia associated with type 2 diabetes mellitus (HCC), Morbid obesity with BMI of 50.0-59.9, adult (HCC), No history of procedure, ELEAZAR on CPAP, Pulmonary embolism (HCC), Type II or unspecified type diabetes mellitus without mention of complication, not stated as uncontrolled, Umbilical hernia, Unspecified sleep apnea, and Wears glasses.  Past Surgical History:   has a past surgical history that includes Cholecystectomy, laparoscopic (2012); hernia repair (10/11/2013); Colonoscopy (2015); Breast surgery (Right, ); hysteroscopy (2018); Dilation and curettage of uterus (2018); Foot surgery (Right, 3/15/2023); and laminectomy (N/A, 2024).    Restrictions  Restrictions/Precautions: Fall Risk, General Precautions, ROM Restrictions, Surgical Protocols  Other position/activity restrictions: Ambulate patient; needs TLSO when OOB, can be removed for hygiene per surgeon's office on   Required Braces or Orthoses  Spinal: Thoracic Lumbar Sacral Orthotics  Spinal Other: TLSO when OOB  Required Braces or Orthoses?: Yes    Subjective  Subjective: Pt in w/c working with PT, pleasant, pain high at 9/10 in back, sore/aching, /56, HR 75, O2 sats 97% on RA, agreeable to OT session, able to continue with therapy with repositioning and distraction.  Restrictions/Precautions: Fall  Method: Demonstration;Verbal  Barriers to Learning: Cognition  Education Outcome: Verbalized understanding;Continued education needed    Plan  Occupational Therapy Plan  Times Per Week: 5-7x/week  Current Treatment Recommendations: Balance training;Functional mobility training;Endurance training;Safety education & training;Self-Care / ADL;Strengthening;ROM;Pain management;Positioning;Equipment evaluation, education, & procurement;Patient/Caregiver education & training;Home management training;Wheelchair mobility training    Goals  Short Term Goals  Time Frame for Short Term Goals: 14 days (7/4/2024): unless otherwise noted  Short Term Goal 1: Pt will complete transfers w/ LRD and minAx1.  Short Term Goal 2: Pt will complete toileting w/ modA and AE PRN.  Short Term Goal 3: Pt will don TLSO w/ Nohemi.  Short Term Goal 4: Pt will complete FB bathing w/ modA.  Short Term Goal 5: Pt will stand at sink to complete grooming tasks w/ SBA for ~3mins.  Long Term Goals  Time Frame for Long Term Goals : 21 days (7/11/2024): unless otherwise noted  Long Term Goal 1: Pt will complete transfers w/ LRD and SPV.  Long Term Goal 2: Pt will complete toileting w/ SBA and AE PRN.  Long Term Goal 3: Pt will don TLSO w/ increased time and mod. I.  Long Term Goal 4: Pt will complete FB bathing w/ Nohemi and AE PRN.  Long Term Goal 5: Pt will stand at sink to complete grooming tasks w/ SPV for ~6mins.    Therapy Time   Individual Concurrent Group Co-treatment   Time In 1000         Time Out 1030         Minutes 30         Timed Code Treatment Minutes: 30 Minutes   Second Session Therapy Time:   Individual Concurrent Group Co-treatment   Time In 1230         Time Out 1330         Minutes 60           Timed Code Treatment Minutes:  60 Minutes    Total Treatment Minutes:  90 minutes      Isiah Lynch, OT

## 2024-06-24 NOTE — PROGRESS NOTES
Department of Psychiatry  Consultant Progress Note    Chief Complaint: follow-up   Patient's chart was reviewed and collaborated with  about the treatment plan.    SUBJECTIVE:  Patient is feeling better. Suicidal ideation: denies suicidal ideation  Patient denies/has side effects to medications.  Patient states that she is feeling better and she is currently working with occupational therapy.  ROS: Patient has new complaints: no  Sleeping adequately:  Yes  Appetite adequate: yes  Attending groups: N/A  Visitors:no    OBJECTIVE    Physical  VITALS:  /70   Pulse 91   Temp 97.9 °F (36.6 °C) (Oral)   Resp 18   Ht 1.651 m (5' 5\")   Wt 121 kg (266 lb 12.1 oz)   LMP 08/20/2014   SpO2 97%   BMI 44.39 kg/m²   Patients appearance well-appearing, in chair, and working with occupational therapy.    Musculoskeletal  Patient gait did not assess  STRENGTH: muscle bulk was normal  TONE normal    Mental Status Examination:   No current loose associations  Concentration Intact   Thoughts are within normal limits  Insight and Judgement: normal insight and judgment  Knowledge: good  Language: 0 - no aphasia, normal  Speech: appropriate  Mood within normal limits, affect congruent with mood  Orientation: oriented to person, place, and situation  Hallucinations Absent   Thought Processes: Goal-Directed  Memory intact  Suicidal ideations no plan  Homicidal ideations no      Data  Labs:   Admission on 06/20/2024   Component Date Value Ref Range Status    Sodium 06/21/2024 136  136 - 145 mmol/L Final    Potassium reflex Magnesium 06/21/2024 3.3 (L)  3.5 - 5.1 mmol/L Final    Chloride 06/21/2024 99  99 - 110 mmol/L Final    CO2 06/21/2024 24  21 - 32 mmol/L Final    Anion Gap 06/21/2024 13  3 - 16 Final    Glucose 06/21/2024 205 (H)  70 - 99 mg/dL Final    BUN 06/21/2024 12  7 - 20 mg/dL Final    Creatinine 06/21/2024 <0.5 (L)  0.6 - 1.2 mg/dL Final    Est, Glom Filt Rate 06/21/2024 >90  >60 Final    Comment:  Glom Filt Rate 06/24/2024 >90  >60 Final    Comment: Pediatric calculator link  https://www.kidney.org/professionals/kdoqi/gfr_calculatorped  Effective Oct 3, 2022  These results are not intended for use in patients  <18 years of age.  eGFR results are calculated without  a race factor using the 2021 CKD-EPI equation.  Careful  clinical correlation is recommended, particularly when  comparing to results calculated using previous equations.  The CKD-EPI equation is less accurate in patients with  extremes of muscle mass, extra-renal metabolism of  creatinine, excessive creatinine ingestion, or following  therapy that affects renal tubular secretion.      Calcium 06/24/2024 9.0  8.3 - 10.6 mg/dL Final    WBC 06/24/2024 8.0  4.0 - 11.0 K/uL Final    RBC 06/24/2024 3.04 (L)  4.00 - 5.20 M/uL Final    Hemoglobin 06/24/2024 9.5 (L)  12.0 - 16.0 g/dL Final    Hematocrit 06/24/2024 28.0 (L)  36.0 - 48.0 % Final    MCV 06/24/2024 92.1  80.0 - 100.0 fL Final    MCH 06/24/2024 31.4  26.0 - 34.0 pg Final    MCHC 06/24/2024 34.1  31.0 - 36.0 g/dL Final    RDW 06/24/2024 14.8  12.4 - 15.4 % Final    Platelets 06/24/2024 300  135 - 450 K/uL Final    MPV 06/24/2024 7.1  5.0 - 10.5 fL Final    Neutrophils % 06/24/2024 76.4  % Final    Lymphocytes % 06/24/2024 11.7  % Final    Monocytes % 06/24/2024 8.9  % Final    Eosinophils % 06/24/2024 2.3  % Final    Basophils % 06/24/2024 0.7  % Final    Neutrophils Absolute 06/24/2024 6.1  1.7 - 7.7 K/uL Final    Lymphocytes Absolute 06/24/2024 0.9 (L)  1.0 - 5.1 K/uL Final    Monocytes Absolute 06/24/2024 0.7  0.0 - 1.3 K/uL Final    Eosinophils Absolute 06/24/2024 0.2  0.0 - 0.6 K/uL Final    Basophils Absolute 06/24/2024 0.1  0.0 - 0.2 K/uL Final    POC Glucose 06/23/2024 192 (H)  70 - 99 mg/dl Final    Performed on 06/23/2024 ACCU-CHEK   Final    POC Glucose 06/24/2024 179 (H)  70 - 99 mg/dl Final    Performed on 06/24/2024 ACCU-CHEK   Final    Magnesium 06/24/2024 1.90  1.80 - 2.40 mg/dL

## 2024-06-24 NOTE — PROGRESS NOTES
Physical Therapy  Facility/Department: [facility]  Rehabilitation Physical Therapy Treatment Note    NAME: Pretty Covington  : 1960 (64 y.o.)  MRN: [id number]  CODE STATUS: Full Code    Date of Service: 24       Restrictions:  Restrictions/Precautions: Fall Risk, General Precautions, ROM Restrictions, Surgical Protocols  Required Braces or Orthoses  Spinal: Thoracic Lumbar Sacral Orthotics  Spinal Other: TLSO when OOB  Position Activity Restriction  Spinal Precautions: No Bending, No Lifting, No Twisting  Other position/activity restrictions: Ambulate patient; needs TLSO when OOB     SUBJECTIVE  Subjective  Subjective: pt found in bed  Pain: 7/10 pain low back       OBJECTIVE  Cognition  Overall Cognitive Status: WFL  Orientation  Overall Orientation Status: Within Functional Limits  Orientation Level: Oriented to place;Oriented X4;Oriented to time;Oriented to situation;Oriented to person    Functional Mobility  Roll Left  Assistance Level: Contact guard assist;Stand by assist  Skilled Clinical Factors: x 2 reps to L  Roll Right  Assistance Level: Contact guard assist;Stand by assist  Skilled Clinical Factors: x 2 reps to R    Increased time spent in side-lying to assist with pericare  Pt left in bed on bedpan with PCA present    Second session    Pt found in bed, agreeable to session    Supine to sit with HOB elevated and log rolling technique, min A    Sit  to stand EOB to STEDY with mod A of 2 and pt pulling on STEDY cross bar   TD via STEDy to wc    Sit to stand wc to STEDY with increased time and mod a of 2  Wc mobility x 160 ft with supv progressing to mod ind with use of BUE and BLE    Pt dons 2# weights BLE, completed 15 reps of BLE seated munir LING    Pt in gym with OT at end of session  ASSESSMENT/PROGRESS TOWARDS GOALS  Vital Signs  Pulse: 75  Heart Rate Source: Monitor  BP: (!) 113/56  BP Location: Left upper arm  MAP (Calculated): 75  SpO2: 97 %  O2 Device: None (Room  Term Goals  Time Frame for Short Term Goals: 7/1  Short Term Goal 1: Pt will perfofrm bed mobility with Mod I.  Short Term Goal 2: Pt will perform functional transfers with Mod A and LRAD.  Short Term Goal 3: Pt will ambulate 50 ft with TLSO, LRAD, and Mod A.  Short Term Goal 4: Pt will propel w/c for 150 ft with Mod I with B UE.  Long Term Goals  Time Frame for Long Term Goals : 7/11  Long Term Goal 1: Pt will ambulate 150 ft with TLSO, LRAD and Mod I.  Long Term Goal 2: Pt will perform functional transfers with Mod I and LRAD.  Long Term Goal 3: Pt will complete car transfer with Mod I and LRAD.  Long Term Goal 4: Pt will complete 6\" curb step with LRAD and Mod I.    PLAN OF CARE/SAFETY  Physical Therapy Plan  General Plan: 5-7 times per week  Current Treatment Recommendations: Balance training;Functional mobility training;Transfer training;Gait training;Safety education & training;Therapeutic activities  Safety Devices  Type of Devices: All fall risk precautions in place;Call light within reach;Gait belt;Patient at risk for falls    EDUCATION  Education  Education Given To: Patient  Education Provided: Role of Therapy;ADL Function;Plan of Care;Mobility Training;Equipment;Precautions;Transfer Training;Safety;Energy Conservation;Fall Prevention Strategies  Education Provided Comments: educated regarding benefit of transfer with stedy outweighing risk of not following spinal precautions  Education Method: Demonstration  Barriers to Learning: None  Education Outcome: Verbalized understanding;Continued education needed        Therapy Time   Individual Concurrent Group Co-treatment   Time In 0800         Time Out 0830         Minutes 30           Timed Code Treatment Minutes: 30 Minutes     Second Session Therapy Time:   Individual Concurrent Group Co-treatment   Time In 0930         Time Out 1000         Minutes 30           Timed Code Treatment Minutes:  30    Total Treatment Minutes:  60    Anika Vázquez, PT,

## 2024-06-25 LAB
GLUCOSE BLD-MCNC: 128 MG/DL (ref 70–99)
GLUCOSE BLD-MCNC: 138 MG/DL (ref 70–99)
GLUCOSE BLD-MCNC: 172 MG/DL (ref 70–99)
GLUCOSE BLD-MCNC: 218 MG/DL (ref 70–99)
PERFORMED ON: ABNORMAL

## 2024-06-25 PROCEDURE — 6370000000 HC RX 637 (ALT 250 FOR IP)

## 2024-06-25 PROCEDURE — 97110 THERAPEUTIC EXERCISES: CPT

## 2024-06-25 PROCEDURE — 97535 SELF CARE MNGMENT TRAINING: CPT

## 2024-06-25 PROCEDURE — 97530 THERAPEUTIC ACTIVITIES: CPT

## 2024-06-25 PROCEDURE — 1280000000 HC REHAB R&B

## 2024-06-25 PROCEDURE — 6370000000 HC RX 637 (ALT 250 FOR IP): Performed by: STUDENT IN AN ORGANIZED HEALTH CARE EDUCATION/TRAINING PROGRAM

## 2024-06-25 PROCEDURE — 6370000000 HC RX 637 (ALT 250 FOR IP): Performed by: PHYSICAL MEDICINE & REHABILITATION

## 2024-06-25 PROCEDURE — 97116 GAIT TRAINING THERAPY: CPT

## 2024-06-25 RX ADMIN — TOPIRAMATE 100 MG: 100 TABLET, FILM COATED ORAL at 21:24

## 2024-06-25 RX ADMIN — OMEGA-3-ACID ETHYL ESTERS 1 G: 1 CAPSULE, LIQUID FILLED ORAL at 10:07

## 2024-06-25 RX ADMIN — APIXABAN 5 MG: 5 TABLET, FILM COATED ORAL at 10:06

## 2024-06-25 RX ADMIN — METFORMIN HYDROCHLORIDE 500 MG: 500 TABLET ORAL at 17:14

## 2024-06-25 RX ADMIN — ARIPIPRAZOLE 5 MG: 5 TABLET ORAL at 10:19

## 2024-06-25 RX ADMIN — OXYCODONE 10 MG: 5 TABLET ORAL at 07:15

## 2024-06-25 RX ADMIN — INSULIN LISPRO 2 UNITS: 100 INJECTION, SOLUTION INTRAVENOUS; SUBCUTANEOUS at 11:49

## 2024-06-25 RX ADMIN — Medication 1 TABLET: at 10:06

## 2024-06-25 RX ADMIN — METHOCARBAMOL 750 MG: 750 TABLET ORAL at 17:15

## 2024-06-25 RX ADMIN — OXYCODONE HYDROCHLORIDE 10 MG: 10 TABLET, FILM COATED, EXTENDED RELEASE ORAL at 21:24

## 2024-06-25 RX ADMIN — OXYCODONE HYDROCHLORIDE 10 MG: 10 TABLET, FILM COATED, EXTENDED RELEASE ORAL at 10:06

## 2024-06-25 RX ADMIN — INSULIN GLARGINE 12 UNITS: 100 INJECTION, SOLUTION SUBCUTANEOUS at 21:25

## 2024-06-25 RX ADMIN — METHOCARBAMOL 750 MG: 750 TABLET ORAL at 07:16

## 2024-06-25 RX ADMIN — PANTOPRAZOLE SODIUM 20 MG: 20 TABLET, DELAYED RELEASE ORAL at 10:06

## 2024-06-25 RX ADMIN — LOSARTAN POTASSIUM 50 MG: 25 TABLET, FILM COATED ORAL at 10:06

## 2024-06-25 RX ADMIN — SENNOSIDES AND DOCUSATE SODIUM 1 TABLET: 50; 8.6 TABLET ORAL at 21:24

## 2024-06-25 RX ADMIN — FERROUS SULFATE TAB 325 MG (65 MG ELEMENTAL FE) 325 MG: 325 (65 FE) TAB at 10:06

## 2024-06-25 RX ADMIN — POTASSIUM CHLORIDE 20 MEQ: 1500 TABLET, EXTENDED RELEASE ORAL at 10:06

## 2024-06-25 RX ADMIN — SENNOSIDES AND DOCUSATE SODIUM 1 TABLET: 50; 8.6 TABLET ORAL at 10:06

## 2024-06-25 RX ADMIN — METHOCARBAMOL 750 MG: 750 TABLET ORAL at 13:07

## 2024-06-25 RX ADMIN — OMEGA-3-ACID ETHYL ESTERS 1 G: 1 CAPSULE, LIQUID FILLED ORAL at 21:24

## 2024-06-25 RX ADMIN — ATORVASTATIN CALCIUM 40 MG: 40 TABLET, FILM COATED ORAL at 10:06

## 2024-06-25 RX ADMIN — POTASSIUM CHLORIDE 20 MEQ: 1500 TABLET, EXTENDED RELEASE ORAL at 17:14

## 2024-06-25 RX ADMIN — METHOCARBAMOL 750 MG: 750 TABLET ORAL at 21:24

## 2024-06-25 RX ADMIN — Medication 400 MG: at 10:06

## 2024-06-25 RX ADMIN — METFORMIN HYDROCHLORIDE 500 MG: 500 TABLET ORAL at 10:06

## 2024-06-25 RX ADMIN — APIXABAN 5 MG: 5 TABLET, FILM COATED ORAL at 21:23

## 2024-06-25 RX ADMIN — ASPIRIN 81 MG 81 MG: 81 TABLET ORAL at 10:06

## 2024-06-25 RX ADMIN — CITALOPRAM HYDROBROMIDE 40 MG: 20 TABLET ORAL at 21:24

## 2024-06-25 RX ADMIN — OXYCODONE HYDROCHLORIDE AND ACETAMINOPHEN 500 MG: 500 TABLET ORAL at 10:06

## 2024-06-25 ASSESSMENT — PAIN DESCRIPTION - ORIENTATION
ORIENTATION: MID;LOWER
ORIENTATION: LOWER;MID

## 2024-06-25 ASSESSMENT — PAIN - FUNCTIONAL ASSESSMENT: PAIN_FUNCTIONAL_ASSESSMENT: PREVENTS OR INTERFERES SOME ACTIVE ACTIVITIES AND ADLS

## 2024-06-25 ASSESSMENT — PAIN DESCRIPTION - DESCRIPTORS
DESCRIPTORS: STABBING
DESCRIPTORS: STABBING

## 2024-06-25 ASSESSMENT — PAIN DESCRIPTION - LOCATION
LOCATION: BACK
LOCATION: BACK

## 2024-06-25 ASSESSMENT — PAIN SCALES - GENERAL
PAINLEVEL_OUTOF10: 7
PAINLEVEL_OUTOF10: 7
PAINLEVEL_OUTOF10: 3

## 2024-06-25 ASSESSMENT — PAIN SCALES - WONG BAKER
WONGBAKER_NUMERICALRESPONSE: NO HURT
WONGBAKER_NUMERICALRESPONSE: NO HURT

## 2024-06-25 ASSESSMENT — PAIN DESCRIPTION - FREQUENCY: FREQUENCY: CONTINUOUS

## 2024-06-25 ASSESSMENT — PAIN DESCRIPTION - PAIN TYPE: TYPE: SURGICAL PAIN

## 2024-06-25 ASSESSMENT — PAIN DESCRIPTION - ONSET: ONSET: ON-GOING

## 2024-06-25 NOTE — PLAN OF CARE
Problem: Safety - Adult  Goal: Free from fall injury  6/25/2024 0922 by Ofe Abraham, RN  Outcome: Progressing  6/24/2024 2339 by Dariana Klein RN  Outcome: Progressing     Problem: Pain  Goal: Verbalizes/displays adequate comfort level or baseline comfort level  Outcome: Progressing

## 2024-06-25 NOTE — PROGRESS NOTES
Department of Physical Medicine & Rehabilitation  Progress Note    Patient Identification:  Pretty Covington  [id number]  : 1960  Admit date: 2024    Chief Complaint: Late effect of fracture of lumbar vertebra    Subjective:   Patient seen this AM.  Patient thinks she is making progress in her therapies with her strength and balance.   Patient states her back pain is under control with her current medication. She is continuing to use her TLSO during therapy, but she is now able to remove the TLSO during bathing activities with OT. Patient just finished bathing with OT this AM.  Patient now on potassium supplements. We will recheck her labs tomorrow morning.  Patient was seen by psychiatry yesterday, and they note that her mental status has improved with her treatment of Abilify and Celexa.       ROS: No f/c, n/v, cp     Objective:  Patient Vitals for the past 24 hrs:   BP Temp Temp src Pulse Resp SpO2 Weight   24 0745 -- -- -- -- 18 -- --   24 0545 -- -- -- -- -- -- 120.5 kg (265 lb 10.5 oz)   24 2211 -- -- -- -- 18 -- --   24 2130 131/70 98.3 °F (36.8 °C) Oral 87 18 98 % --   24 1715 -- -- -- -- 18 -- --   24 1233 -- -- -- -- 18 -- --   24 1230 114/70 -- -- 91 -- 97 % --   24 0948 -- -- -- -- 18 -- --   24 0912 (!) 113/56 -- -- 75 -- 97 % --       Const: Alert. No distress, pleasant.   HEENT: Normocephalic, atraumatic. Normal sclera/conjunctiva. MMM.   CV: Regular rate and rhythm.   Resp: No respiratory distress. Lungs CTAB.   Abd: Soft, nontender, nondistended, NABS+   Ext: No edema.   Neuro: Alert, oriented, appropriately interactive.   Psych: Cooperative, appropriate mood and affect    Laboratory data: Available via EMR.   Last 24 hour lab  Recent Results (from the past 24 hour(s))   POCT Glucose    Collection Time: 24 11:09 AM   Result Value Ref Range    POC Glucose 179 (H) 70 - 99 mg/dl    Performed on ACCU-CHEK    POCT Glucose    Collection

## 2024-06-25 NOTE — PROGRESS NOTES
Physical Therapy  Facility/Department: [facility]  Rehabilitation Physical Therapy Treatment Note    NAME: Pretty Covington  : 1960 (64 y.o.)  MRN: [id number]  CODE STATUS: Full Code    Date of Service: 24       Restrictions:  Restrictions/Precautions: Fall Risk, General Precautions, ROM Restrictions, Surgical Protocols  Required Braces or Orthoses  Spinal: Thoracic Lumbar Sacral Orthotics  Spinal Other: TLSO when OOB  Position Activity Restriction  Spinal Precautions: No Bending, No Lifting, No Twisting  Other position/activity restrictions: Ambulate patient; needs TLSO when OOB, can be removed for hygiene per surgeon's office on      SUBJECTIVE  Subjective  Subjective: pt found on commode with OT presnet  Pain: 7/10 pain low back       OBJECTIVE  Cognition  Overall Cognitive Status: WFL  Orientation  Overall Orientation Status: Within Functional Limits  Orientation Level: Oriented to place;Oriented X4;Oriented to time;Oriented to situation;Oriented to person    Functional Mobility  Balance  Standing Balance: Minimal assistance  Standing Balance  Activity: CGA-min a for static standing balance at start and end of session for commode use (pericare , clothing management with BUE support (pt incontinent of bowel and bladder at end of session)  Sit to Stand  Assistance Level: Contact guard assist  Skilled Clinical Factors: from comomde and wc x 3 reps to RW  Stand to Sit  Assistance Level: Contact guard assist  Stand Pivot  Assistance Level: Contact guard assist  Skilled Clinical Factors: wc to commode      Environmental Mobility  Ambulation  Surface: Level surface  Device: Rolling walker  Distance: 15 ft from bathroom to w/c + 47 ft in hallway+ 20 ft up ramp  Assistance Level: Contact guard assist  Gait Deviations: Slow arturo;Narrow base of support;Unsteady gait;Decreased weight shift bilateral  Skilled Clinical Factors: cues for wide THERESA, cues for trunk/hip extension    Pt on commode with instructions  RW    Goals  Patient Goals   Patient Goals : \"I want to get back to normal\"  Short Term Goals  Time Frame for Short Term Goals: 7/1  Short Term Goal 1: Pt will perfofrm bed mobility with Mod I.  Short Term Goal 2: Pt will perform functional transfers with Mod A and LRAD.  Short Term Goal 3: Pt will ambulate 50 ft with TLSO, LRAD, and Mod A.  Short Term Goal 4: Pt will propel w/c for 150 ft with Mod I with B UE.  Long Term Goals  Time Frame for Long Term Goals : 7/11  Long Term Goal 1: Pt will ambulate 150 ft with TLSO, LRAD and Mod I.  Long Term Goal 2: Pt will perform functional transfers with Mod I and LRAD.  Long Term Goal 3: Pt will complete car transfer with Mod I and LRAD.  Long Term Goal 4: Pt will complete 6\" curb step with LRAD and Mod I.    PLAN OF CARE/SAFETY  Physical Therapy Plan  General Plan: 5-7 times per week  Current Treatment Recommendations: Balance training;Functional mobility training;Transfer training;Gait training;Safety education & training;Therapeutic activities  Safety Devices  Type of Devices: All fall risk precautions in place;Call light within reach;Gait belt;Patient at risk for falls    EDUCATION  Education  Education Given To: Patient  Education Provided: Role of Therapy;ADL Function;Plan of Care;Mobility Training;Equipment;Precautions;Transfer Training;Safety;Energy Conservation;Fall Prevention Strategies  Education Provided Comments: educated regarding benefit of transfer with stedy outweighing risk of not following spinal precautions  Education Method: Demonstration  Barriers to Learning: None  Education Outcome: Verbalized understanding;Continued education needed        Therapy Time   Individual Concurrent Group Co-treatment   Time In 1350     0900   Time Out 1500     0930   Minutes 70     30     Timed Code Treatment Minutes: 100 Minutes       Anika Vázquez PT, 06/25/24 at 3:05 PM

## 2024-06-25 NOTE — PROGRESS NOTES
Department of Psychiatry  Consultant Progress Note    Chief Complaint: follow-up   Patient's chart was reviewed and collaborated with  about the treatment plan.    SUBJECTIVE:  Patient is feeling better. Suicidal ideation: denies suicidal ideation  Patient denies/has side effects to medications.  Patient states that she is doing well.  She does not have any questions at this time.  ROS: Patient has new complaints: no  Sleeping adequately:  Yes  Appetite adequate: yes  Attending groups: She is attending occupational therapy and physical therapy  Visitors:no    OBJECTIVE    Physical  VITALS:  /64   Pulse 100   Temp 98.3 °F (36.8 °C) (Oral)   Resp 18   Ht 1.651 m (5' 5\")   Wt 120.5 kg (265 lb 10.5 oz)   LMP 08/20/2014   SpO2 98%   BMI 44.21 kg/m²   Patients appearance well-appearing, hospital attire, and in chair.    Musculoskeletal  Patient gait did not assess  STRENGTH: muscle bulk was normal  TONE normal    Mental Status Examination:   No current loose associations  Concentration Intact   Thoughts are within normal limits  Insight and Judgement: normal insight and judgment  Knowledge: good  Language: 0 - no aphasia, normal  Speech: appropriate  Mood within normal limits, affect congruent with mood  Orientation: oriented to person, place, situation, day of week, month of year, and year  Hallucinations Absent   Thought Processes: Goal-Directed  Memory intact  Suicidal ideations no plan  Homicidal ideations no      Data  Labs:   Admission on 06/20/2024   Component Date Value Ref Range Status    Sodium 06/21/2024 136  136 - 145 mmol/L Final    Potassium reflex Magnesium 06/21/2024 3.3 (L)  3.5 - 5.1 mmol/L Final    Chloride 06/21/2024 99  99 - 110 mmol/L Final    CO2 06/21/2024 24  21 - 32 mmol/L Final    Anion Gap 06/21/2024 13  3 - 16 Final    Glucose 06/21/2024 205 (H)  70 - 99 mg/dL Final    BUN 06/21/2024 12  7 - 20 mg/dL Final    Creatinine 06/21/2024 <0.5 (L)  0.6 - 1.2 mg/dL Final     ACCU-CHEK   Final    Magnesium 06/24/2024 1.90  1.80 - 2.40 mg/dL Final    POC Glucose 06/24/2024 179 (H)  70 - 99 mg/dl Final    Performed on 06/24/2024 ACCU-CHEK   Final    POC Glucose 06/24/2024 159 (H)  70 - 99 mg/dl Final    Performed on 06/24/2024 ACCU-CHEK   Final    POC Glucose 06/24/2024 174 (H)  70 - 99 mg/dl Final    Performed on 06/24/2024 ACCU-CHEK   Final    POC Glucose 06/25/2024 172 (H)  70 - 99 mg/dl Final    Performed on 06/25/2024 ACCU-CHEK   Final    POC Glucose 06/25/2024 218 (H)  70 - 99 mg/dl Final    Performed on 06/25/2024 ACCU-CHEK   Final            Medications  Current Facility-Administered Medications: potassium chloride (KLOR-CON M) extended release tablet 20 mEq, 20 mEq, Oral, BID WC  lactulose (CHRONULAC) 10 GM/15ML solution 20 g, 20 g, Oral, TID  SMOG Enema, 330 mL, Rectal, Once  oxyCODONE (ROXICODONE) immediate release tablet 10 mg, 10 mg, Oral, Q3H PRN  magnesium oxide (MAG-OX) tablet 400 mg, 400 mg, Oral, Daily  insulin glargine (LANTUS) injection vial 12 Units, 12 Units, SubCUTAneous, Nightly  acetaminophen (TYLENOL) tablet 650 mg, 650 mg, Oral, Q4H PRN  oxyCODONE (ROXICODONE) immediate release tablet 5 mg, 5 mg, Oral, Q3H PRN  albuterol sulfate HFA (PROVENTIL;VENTOLIN;PROAIR) 108 (90 Base) MCG/ACT inhaler 2 puff, 2 puff, Inhalation, Q6H PRN  apixaban (ELIQUIS) tablet 5 mg, 5 mg, Oral, BID  ascorbic acid (VITAMIN C) tablet 500 mg, 500 mg, Oral, Daily  aspirin chewable tablet 81 mg, 81 mg, Oral, Daily  atorvastatin (LIPITOR) tablet 40 mg, 40 mg, Oral, Daily  ferrous sulfate (IRON 325) tablet 325 mg, 325 mg, Oral, Daily with breakfast  metFORMIN (GLUCOPHAGE) tablet 500 mg, 500 mg, Oral, BID WC  oxyCODONE (OXYCONTIN) extended release tablet 10 mg, 10 mg, Oral, 2 times per day  hydrALAZINE (APRESOLINE) tablet 10 mg, 10 mg, Oral, Q6H PRN  melatonin disintegrating tablet 5 mg, 5 mg, Oral, Nightly PRN  ARIPiprazole (ABILIFY) tablet 5 mg, 5 mg, Oral, Daily  citalopram (CELEXA) tablet 40

## 2024-06-25 NOTE — PATIENT CARE CONFERENCE
[facility]  Inpatient Rehabilitation  Weekly Team Conference Note    Date: 2024  Patient Name: Pretty Covington        MRN: [id number]    : 1960  (64 y.o.)  Gender: female      Diagnosis: Closed fx of lumbar vertebral body    Rehabilitation team conference was held today under the direction of Palomo Alfaro MD      Interventions to be utilized toward barriers to discharge, per discipline:  NURSING  Nursing observed barriers to dc: Pain, Decreased endurance, Upper extremity weakness, Lower extremity weakness, and Medication managment  Nursing interventions: assist with ADL's, increase strength/endurance to improve balance and mobility, pain control/medication management   Family Education: No  Fall Risk:  Yes    Stay with me?: No    PHYSICAL THERAPY  Physical therapy observed barriers to dc:    Baseline: lives with family in 1 level home with ramped entrance. Mod ind/ ind with mobility, works full time    Current level:max a bed mobility, max A functional transfers, TD gait in // bar up to 10 ft, mod ind wc mobility    Barriers to DC: pain, limited assist at home, level of assist for transfers   Needs in order to achieve dc home/next level of care: pt should function at SBA level or better with mobility. Rec home with 24/7 supv/assist and family training. DME: manual w/c for safety with household mobility, RW for safety with transfers. Rec family training.      Physical therapy interventions:   Current Treatment Recommendations: Balance training, Functional mobility training, Transfer training, Gait training, Safety education & training, Therapeutic activities    PT Goals:            Short Term Goals  Time Frame for Short Term Goals:   Short Term Goal 1: Pt will perfofrm bed mobility with Mod I.  Short Term Goal 2: Pt will perform functional transfers with Mod A and LRAD.  Short Term Goal 3: Pt will ambulate 50 ft with TLSO, LRAD, and Mod A.  Short Term Goal 4: Pt will propel w/c

## 2024-06-25 NOTE — PROGRESS NOTES
Occupational Therapy  Facility/Department: [facility]  Rehabilitation Occupational Therapy Daily Treatment Note    Date: 24  Patient Name: Pretty Covington       Room: 0152/0152-01  MRN: [id number]  Account: [id number]   : 1960  (64 y.o.) Gender: female            Pt was bathed during OT session this date. Pt was Showered with soap/water with Minimal Assist.          Past Medical History:  has a past medical history of Asthma, Benign essential hypertension, Benign head tremor, Cholelithiasis, Coronary artery disease involving native coronary artery of native heart, Disease of nasal cavity and sinuses, Fatty infiltration of liver, Hot flash, menopausal, Hx of blood clots, Hyperlipidemia, Hyperlipidemia associated with type 2 diabetes mellitus (HCC), Morbid obesity with BMI of 50.0-59.9, adult (HCC), No history of procedure, ELEAZAR on CPAP, Pulmonary embolism (HCC), Type II or unspecified type diabetes mellitus without mention of complication, not stated as uncontrolled, Umbilical hernia, Unspecified sleep apnea, and Wears glasses.  Past Surgical History:   has a past surgical history that includes Cholecystectomy, laparoscopic (2012); hernia repair (10/11/2013); Colonoscopy (2015); Breast surgery (Right, ); hysteroscopy (2018); Dilation and curettage of uterus (2018); Foot surgery (Right, 3/15/2023); and laminectomy (N/A, 2024).    Restrictions  Restrictions/Precautions: Fall Risk, General Precautions, ROM Restrictions, Surgical Protocols  Other position/activity restrictions: Ambulate patient; needs TLSO when OOB, can be removed for hygiene per surgeon's office on   Required Braces or Orthoses  Spinal: Thoracic Lumbar Sacral Orthotics  Spinal Other: TLSO when OOB  Required Braces or Orthoses?: Yes    Subjective  Subjective: Pt in bed, pleasant, looking forward to shower but nervous, pain 8/10 in back, sore/aching, able to continue with therapy with repositioning and  placement;Verbal cues;With handrails  Assistance Level: Minimal assistance  Skilled Clinical Factors: use of RW to walk to/from shower, min A to manage RW over shower lip          Functional Mobility  Device: Rolling walker  Activity: To/From bathroom;To/From therapy gym  Assistance Level: Minimal assistance  Skilled Clinical Factors: CGA to walk with RW, min A to manage RW over shower lip, standing for 2:15 and 2:05 for mobility in hallway for 47 feet and 20 feet up ramp respectively  Bed Mobility  Overall Assistance Level: Contact Guard Assist  Additional Factors: Set-up;Verbal cues;Increased time to complete;With handrails;Head of bed flat  Roll Left  Assistance Level: Stand by assist  Supine to Sit  Assistance Level: Contact guard assist  Scooting  Assistance Level: Stand by assist  Transfers  Surface: Wheelchair;To chair with arms;From chair with arms;Bedside commode;From bed  Additional Factors: Set-up;Verbal cues;Hand placement cues;Increased time to complete;With handrails  Device: Walker  Sit to Stand  Assistance Level: Contact guard assist  Stand to Sit  Assistance Level: Contact guard assist  Bed To/From Chair  Technique: Stand step  Assistance Level: Contact guard assist  Stand Pivot  Assistance Level: Contact guard assist         Assessment  Assessment  Assessment: Pt agreeable to OT session. Pt demonstrated much improved LE strength to complete mobility to/from bathroom and in hallway up to 47 feet with CGA and use of RW. Pt demonstrated fair ability to complete bathing with use of a/e and min A for groin/buttocks. Pt demonstrated difficulty with LB dressing and toileting still due to body habitus. Pt demonstrated difficulty with use of reacher and assist for don/doff of TLSO. Pt seen for 30 minute co-treat with PT due to increased pain, weakness in LEs, and heavy assist level previous dates. Pt completed mobility out of bathroom and in hallway up to 47 feet with CGA and mod VCs for dual tasking of  serial additions. Pt demonstrated fair standing tolerance to stand for ~2 minutes x2 for mobility and limited by pain as well as fatigue. PT assisted with mobility while OT assisted with dual task and w/c follow. Pt will no longer require co-treat as pt has progressed to needing 1 person assist for mobility. Continue POC.  Activity Tolerance: Patient limited by pain;Patient limited by endurance  Discharge Recommendations: 24 hour supervision or assist;Home with Home health OT;S Level 1  OT Equipment Recommendations  Equipment Needed: Yes  Mobility Devices: ADL Assistive Devices  ADL Assistive Devices: Shower Chair with back;Toileting - 3-in-1 Commode  Safety Devices  Safety Devices in place: Yes  Type of devices: Gait belt;Call light within reach;Nurse notified;Left in chair    Patient Education  Education  Education Given To: Patient  Education Provided: Role of Therapy;ADL Function;Plan of Care;Precautions;Safety;Transfer Training;Equipment;DME/Home Modifications;Mobility Training  Education Provided Comments: role of OT, spinal precautions, brace wearing schedule and doff/donning, safety with transfers, ADL retraining, use of a/e  Education Method: Demonstration;Verbal  Barriers to Learning: Cognition  Education Outcome: Verbalized understanding;Continued education needed    Plan  Occupational Therapy Plan  Times Per Week: 5-7x/week  Current Treatment Recommendations: Balance training;Functional mobility training;Endurance training;Safety education & training;Self-Care / ADL;Strengthening;ROM;Pain management;Positioning;Equipment evaluation, education, & procurement;Patient/Caregiver education & training;Home management training;Wheelchair mobility training    Goals  Short Term Goals  Time Frame for Short Term Goals: 14 days (7/4/2024): unless otherwise noted  Short Term Goal 1: Pt will complete transfers w/ LRD and minAx1. GOAL MET 6/25/24 Pt completed transfers with min A.  Short Term Goal 2: Pt will complete

## 2024-06-26 LAB
ANION GAP SERPL CALCULATED.3IONS-SCNC: 10 MMOL/L (ref 3–16)
BASOPHILS # BLD: 0 K/UL (ref 0–0.2)
BASOPHILS NFR BLD: 0.8 %
BUN SERPL-MCNC: 14 MG/DL (ref 7–20)
CALCIUM SERPL-MCNC: 8.7 MG/DL (ref 8.3–10.6)
CHLORIDE SERPL-SCNC: 106 MMOL/L (ref 99–110)
CO2 SERPL-SCNC: 24 MMOL/L (ref 21–32)
CREAT SERPL-MCNC: <0.5 MG/DL (ref 0.6–1.2)
DEPRECATED RDW RBC AUTO: 14.6 % (ref 12.4–15.4)
EOSINOPHIL # BLD: 0.2 K/UL (ref 0–0.6)
EOSINOPHIL NFR BLD: 3.2 %
GFR SERPLBLD CREATININE-BSD FMLA CKD-EPI: >90 ML/MIN/{1.73_M2}
GLUCOSE BLD-MCNC: 163 MG/DL (ref 70–99)
GLUCOSE BLD-MCNC: 171 MG/DL (ref 70–99)
GLUCOSE BLD-MCNC: 186 MG/DL (ref 70–99)
GLUCOSE BLD-MCNC: 227 MG/DL (ref 70–99)
GLUCOSE SERPL-MCNC: 161 MG/DL (ref 70–99)
HCT VFR BLD AUTO: 28.7 % (ref 36–48)
HGB BLD-MCNC: 9.6 G/DL (ref 12–16)
LYMPHOCYTES # BLD: 1 K/UL (ref 1–5.1)
LYMPHOCYTES NFR BLD: 19.5 %
MCH RBC QN AUTO: 30.6 PG (ref 26–34)
MCHC RBC AUTO-ENTMCNC: 33.5 G/DL (ref 31–36)
MCV RBC AUTO: 91.3 FL (ref 80–100)
MONOCYTES # BLD: 0.5 K/UL (ref 0–1.3)
MONOCYTES NFR BLD: 10.1 %
NEUTROPHILS # BLD: 3.4 K/UL (ref 1.7–7.7)
NEUTROPHILS NFR BLD: 66.4 %
PERFORMED ON: ABNORMAL
PLATELET # BLD AUTO: 333 K/UL (ref 135–450)
PMV BLD AUTO: 6.8 FL (ref 5–10.5)
POTASSIUM SERPL-SCNC: 3.8 MMOL/L (ref 3.5–5.1)
RBC # BLD AUTO: 3.15 M/UL (ref 4–5.2)
SODIUM SERPL-SCNC: 140 MMOL/L (ref 136–145)
WBC # BLD AUTO: 5.2 K/UL (ref 4–11)

## 2024-06-26 PROCEDURE — 97530 THERAPEUTIC ACTIVITIES: CPT

## 2024-06-26 PROCEDURE — 97112 NEUROMUSCULAR REEDUCATION: CPT

## 2024-06-26 PROCEDURE — 97535 SELF CARE MNGMENT TRAINING: CPT

## 2024-06-26 PROCEDURE — 1280000000 HC REHAB R&B

## 2024-06-26 PROCEDURE — 80048 BASIC METABOLIC PNL TOTAL CA: CPT

## 2024-06-26 PROCEDURE — 97110 THERAPEUTIC EXERCISES: CPT

## 2024-06-26 PROCEDURE — 6370000000 HC RX 637 (ALT 250 FOR IP)

## 2024-06-26 PROCEDURE — 85025 COMPLETE CBC W/AUTO DIFF WBC: CPT

## 2024-06-26 PROCEDURE — 6370000000 HC RX 637 (ALT 250 FOR IP): Performed by: STUDENT IN AN ORGANIZED HEALTH CARE EDUCATION/TRAINING PROGRAM

## 2024-06-26 PROCEDURE — 97116 GAIT TRAINING THERAPY: CPT

## 2024-06-26 PROCEDURE — 36415 COLL VENOUS BLD VENIPUNCTURE: CPT

## 2024-06-26 PROCEDURE — 6370000000 HC RX 637 (ALT 250 FOR IP): Performed by: PHYSICAL MEDICINE & REHABILITATION

## 2024-06-26 RX ORDER — POTASSIUM CHLORIDE 20 MEQ/1
20 TABLET, EXTENDED RELEASE ORAL
Status: DISCONTINUED | OUTPATIENT
Start: 2024-06-27 | End: 2024-07-06 | Stop reason: HOSPADM

## 2024-06-26 RX ADMIN — SENNOSIDES AND DOCUSATE SODIUM 1 TABLET: 50; 8.6 TABLET ORAL at 07:58

## 2024-06-26 RX ADMIN — OMEGA-3-ACID ETHYL ESTERS 1 G: 1 CAPSULE, LIQUID FILLED ORAL at 07:58

## 2024-06-26 RX ADMIN — Medication 1 TABLET: at 07:58

## 2024-06-26 RX ADMIN — OXYCODONE 10 MG: 5 TABLET ORAL at 16:08

## 2024-06-26 RX ADMIN — ASPIRIN 81 MG 81 MG: 81 TABLET ORAL at 07:58

## 2024-06-26 RX ADMIN — METHOCARBAMOL 750 MG: 750 TABLET ORAL at 14:00

## 2024-06-26 RX ADMIN — TOPIRAMATE 100 MG: 100 TABLET, FILM COATED ORAL at 20:53

## 2024-06-26 RX ADMIN — POTASSIUM CHLORIDE 20 MEQ: 1500 TABLET, EXTENDED RELEASE ORAL at 07:58

## 2024-06-26 RX ADMIN — LOSARTAN POTASSIUM 50 MG: 25 TABLET, FILM COATED ORAL at 07:58

## 2024-06-26 RX ADMIN — INSULIN LISPRO 2 UNITS: 100 INJECTION, SOLUTION INTRAVENOUS; SUBCUTANEOUS at 17:40

## 2024-06-26 RX ADMIN — DIAZEPAM 5 MG: 5 TABLET ORAL at 20:53

## 2024-06-26 RX ADMIN — OXYCODONE HYDROCHLORIDE AND ACETAMINOPHEN 500 MG: 500 TABLET ORAL at 07:58

## 2024-06-26 RX ADMIN — METHOCARBAMOL 750 MG: 750 TABLET ORAL at 20:53

## 2024-06-26 RX ADMIN — OXYCODONE 10 MG: 5 TABLET ORAL at 02:20

## 2024-06-26 RX ADMIN — Medication 400 MG: at 07:58

## 2024-06-26 RX ADMIN — SENNOSIDES AND DOCUSATE SODIUM 1 TABLET: 50; 8.6 TABLET ORAL at 20:52

## 2024-06-26 RX ADMIN — METHOCARBAMOL 750 MG: 750 TABLET ORAL at 17:40

## 2024-06-26 RX ADMIN — LACTULOSE 20 G: 20 SOLUTION ORAL at 15:00

## 2024-06-26 RX ADMIN — APIXABAN 5 MG: 5 TABLET, FILM COATED ORAL at 07:58

## 2024-06-26 RX ADMIN — OXYCODONE HYDROCHLORIDE 10 MG: 10 TABLET, FILM COATED, EXTENDED RELEASE ORAL at 09:38

## 2024-06-26 RX ADMIN — FERROUS SULFATE TAB 325 MG (65 MG ELEMENTAL FE) 325 MG: 325 (65 FE) TAB at 07:59

## 2024-06-26 RX ADMIN — APIXABAN 5 MG: 5 TABLET, FILM COATED ORAL at 20:53

## 2024-06-26 RX ADMIN — METFORMIN HYDROCHLORIDE 500 MG: 500 TABLET ORAL at 17:40

## 2024-06-26 RX ADMIN — METFORMIN HYDROCHLORIDE 500 MG: 500 TABLET ORAL at 07:58

## 2024-06-26 RX ADMIN — ARIPIPRAZOLE 5 MG: 5 TABLET ORAL at 08:00

## 2024-06-26 RX ADMIN — INSULIN GLARGINE 12 UNITS: 100 INJECTION, SOLUTION SUBCUTANEOUS at 20:52

## 2024-06-26 RX ADMIN — METHOCARBAMOL 750 MG: 750 TABLET ORAL at 07:58

## 2024-06-26 RX ADMIN — OMEGA-3-ACID ETHYL ESTERS 1 G: 1 CAPSULE, LIQUID FILLED ORAL at 20:52

## 2024-06-26 RX ADMIN — OXYCODONE HYDROCHLORIDE 10 MG: 10 TABLET, FILM COATED, EXTENDED RELEASE ORAL at 20:52

## 2024-06-26 RX ADMIN — OXYCODONE 10 MG: 5 TABLET ORAL at 06:48

## 2024-06-26 RX ADMIN — Medication 5 MG: at 20:52

## 2024-06-26 RX ADMIN — PANTOPRAZOLE SODIUM 20 MG: 20 TABLET, DELAYED RELEASE ORAL at 07:58

## 2024-06-26 RX ADMIN — ATORVASTATIN CALCIUM 40 MG: 40 TABLET, FILM COATED ORAL at 07:59

## 2024-06-26 RX ADMIN — CITALOPRAM HYDROBROMIDE 40 MG: 20 TABLET ORAL at 20:53

## 2024-06-26 ASSESSMENT — PAIN DESCRIPTION - DESCRIPTORS
DESCRIPTORS: ACHING;DISCOMFORT
DESCRIPTORS: ACHING;DISCOMFORT
DESCRIPTORS: ACHING;DISCOMFORT;STABBING
DESCRIPTORS: ACHING;DISCOMFORT
DESCRIPTORS: ACHING;DISCOMFORT;SORE

## 2024-06-26 ASSESSMENT — PAIN SCALES - GENERAL
PAINLEVEL_OUTOF10: 8
PAINLEVEL_OUTOF10: 0
PAINLEVEL_OUTOF10: 4
PAINLEVEL_OUTOF10: 7
PAINLEVEL_OUTOF10: 7
PAINLEVEL_OUTOF10: 6
PAINLEVEL_OUTOF10: 0
PAINLEVEL_OUTOF10: 7
PAINLEVEL_OUTOF10: 9
PAINLEVEL_OUTOF10: 0
PAINLEVEL_OUTOF10: 0

## 2024-06-26 ASSESSMENT — PAIN DESCRIPTION - ORIENTATION
ORIENTATION: LOWER
ORIENTATION: MID
ORIENTATION: MID
ORIENTATION: LOWER;MID
ORIENTATION: MID

## 2024-06-26 ASSESSMENT — PAIN - FUNCTIONAL ASSESSMENT
PAIN_FUNCTIONAL_ASSESSMENT: ACTIVITIES ARE NOT PREVENTED
PAIN_FUNCTIONAL_ASSESSMENT: PREVENTS OR INTERFERES SOME ACTIVE ACTIVITIES AND ADLS
PAIN_FUNCTIONAL_ASSESSMENT: ACTIVITIES ARE NOT PREVENTED

## 2024-06-26 ASSESSMENT — PAIN SCALES - WONG BAKER
WONGBAKER_NUMERICALRESPONSE: NO HURT
WONGBAKER_NUMERICALRESPONSE: HURTS A LITTLE BIT
WONGBAKER_NUMERICALRESPONSE: NO HURT

## 2024-06-26 ASSESSMENT — PAIN DESCRIPTION - ONSET
ONSET: GRADUAL
ONSET: GRADUAL

## 2024-06-26 ASSESSMENT — PAIN DESCRIPTION - LOCATION
LOCATION: BACK

## 2024-06-26 ASSESSMENT — PAIN DESCRIPTION - PAIN TYPE
TYPE: ACUTE PAIN;SURGICAL PAIN
TYPE: SURGICAL PAIN

## 2024-06-26 ASSESSMENT — PAIN DESCRIPTION - FREQUENCY
FREQUENCY: INTERMITTENT
FREQUENCY: INTERMITTENT

## 2024-06-26 NOTE — PROGRESS NOTES
Department of Psychiatry  Consultant Progress Note    Chief Complaint: follow-up   Patient's chart was reviewed and collaborated with  about the treatment plan.    SUBJECTIVE:  Patient is feeling better. Suicidal ideation: denies suicidal ideation  Patient denies side effects to medications. Patient states that she has her ups and her downs, but she is doing good.  ROS: Patient has new complaints: no  Sleeping adequately:  Yes  Appetite adequate: yes  Attending groups: N/A  Visitors:no    OBJECTIVE    Physical  VITALS:  BP (!) 143/78   Pulse (!) 103   Temp 97.7 °F (36.5 °C) (Oral)   Resp 16   Ht 1.651 m (5' 5\")   Wt 120.8 kg (266 lb 5.1 oz)   LMP 08/20/2014   SpO2 96%   BMI 44.32 kg/m²   Patients appearance hospital attire and in chair.    Musculoskeletal  Patient gait did not assess  STRENGTH: muscle bulk was normal  TONE normal    Mental Status Examination:   No current loose associations  Concentration Intact   Thoughts are within normal limits  Insight and Judgement: normal insight and judgment  Knowledge: good  Language: 0 - no aphasia, normal  Speech: appropriate  Mood within normal limits, affect congruent with mood  Orientation: oriented to person, place, situation, day of week, month of year, and year  Hallucinations Absent   Thought Processes: Goal-Directed  Memory intact  Suicidal ideations no plan  Homicidal ideations no      Data  Labs:   Admission on 06/20/2024   Component Date Value Ref Range Status    Sodium 06/21/2024 136  136 - 145 mmol/L Final    Potassium reflex Magnesium 06/21/2024 3.3 (L)  3.5 - 5.1 mmol/L Final    Chloride 06/21/2024 99  99 - 110 mmol/L Final    CO2 06/21/2024 24  21 - 32 mmol/L Final    Anion Gap 06/21/2024 13  3 - 16 Final    Glucose 06/21/2024 205 (H)  70 - 99 mg/dL Final    BUN 06/21/2024 12  7 - 20 mg/dL Final    Creatinine 06/21/2024 <0.5 (L)  0.6 - 1.2 mg/dL Final    Est, Glom Filt Rate 06/21/2024 >90  >60 Final    Comment: Pediatric calculator  link  https://www.kidney.org/professionals/kdoqi/gfr_calculatorped  Effective Oct 3, 2022  These results are not intended for use in patients  <18 years of age.  eGFR results are calculated without  a race factor using the 2021 CKD-EPI equation.  Careful  clinical correlation is recommended, particularly when  comparing to results calculated using previous equations.  The CKD-EPI equation is less accurate in patients with  extremes of muscle mass, extra-renal metabolism of  creatinine, excessive creatinine ingestion, or following  therapy that affects renal tubular secretion.      Calcium 06/21/2024 8.4  8.3 - 10.6 mg/dL Final    WBC 06/21/2024 7.3  4.0 - 11.0 K/uL Final    RBC 06/21/2024 2.97 (L)  4.00 - 5.20 M/uL Final    Hemoglobin 06/21/2024 9.3 (L)  12.0 - 16.0 g/dL Final    Hematocrit 06/21/2024 27.4 (L)  36.0 - 48.0 % Final    MCV 06/21/2024 92.2  80.0 - 100.0 fL Final    MCH 06/21/2024 31.3  26.0 - 34.0 pg Final    MCHC 06/21/2024 34.0  31.0 - 36.0 g/dL Final    RDW 06/21/2024 14.1  12.4 - 15.4 % Final    Platelets 06/21/2024 141  135 - 450 K/uL Final    MPV 06/21/2024 7.9  5.0 - 10.5 fL Final    Neutrophils % 06/21/2024 76.3  % Final    Lymphocytes % 06/21/2024 11.1  % Final    Monocytes % 06/21/2024 11.6  % Final    Eosinophils % 06/21/2024 0.8  % Final    Basophils % 06/21/2024 0.2  % Final    Neutrophils Absolute 06/21/2024 5.6  1.7 - 7.7 K/uL Final    Lymphocytes Absolute 06/21/2024 0.8 (L)  1.0 - 5.1 K/uL Final    Monocytes Absolute 06/21/2024 0.8  0.0 - 1.3 K/uL Final    Eosinophils Absolute 06/21/2024 0.1  0.0 - 0.6 K/uL Final    Basophils Absolute 06/21/2024 0.0  0.0 - 0.2 K/uL Final    POC Glucose 06/20/2024 234 (H)  70 - 99 mg/dl Final    Performed on 06/20/2024 ACCU-CHEK   Final    POC Glucose 06/21/2024 221 (H)  70 - 99 mg/dl Final    Performed on 06/21/2024 ACCU-CHEK   Final    Magnesium 06/21/2024 1.70 (L)  1.80 - 2.40 mg/dL Final    POC Glucose 06/21/2024 222 (H)  70 - 99 mg/dl Final     81 mg, 81 mg, Oral, Daily  atorvastatin (LIPITOR) tablet 40 mg, 40 mg, Oral, Daily  ferrous sulfate (IRON 325) tablet 325 mg, 325 mg, Oral, Daily with breakfast  metFORMIN (GLUCOPHAGE) tablet 500 mg, 500 mg, Oral, BID WC  oxyCODONE (OXYCONTIN) extended release tablet 10 mg, 10 mg, Oral, 2 times per day  hydrALAZINE (APRESOLINE) tablet 10 mg, 10 mg, Oral, Q6H PRN  melatonin disintegrating tablet 5 mg, 5 mg, Oral, Nightly PRN  ARIPiprazole (ABILIFY) tablet 5 mg, 5 mg, Oral, Daily  citalopram (CELEXA) tablet 40 mg, 40 mg, Oral, Nightly  dextrose 10 % infusion, , IntraVENous, Continuous PRN  dextrose bolus 10% 125 mL, 125 mL, IntraVENous, PRN **OR** dextrose bolus 10% 250 mL, 250 mL, IntraVENous, PRN  diazePAM (VALIUM) tablet 5 mg, 5 mg, Oral, Q6H PRN  glucagon injection 1 mg, 1 mg, SubCUTAneous, PRN  glucose chewable tablet 16 g, 4 tablet, Oral, PRN  hydrOXYzine HCl (ATARAX) tablet 10 mg, 10 mg, Oral, TID PRN  insulin lispro (HUMALOG,ADMELOG) injection vial 0-4 Units, 0-4 Units, SubCUTAneous, Nightly  insulin lispro (HUMALOG,ADMELOG) injection vial 0-8 Units, 0-8 Units, SubCUTAneous, TID WC  lidocaine 4 % external patch 1 patch, 1 patch, TransDERmal, Daily  losartan (COZAAR) tablet 50 mg, 50 mg, Oral, Daily  methocarbamol (ROBAXIN) tablet 750 mg, 750 mg, Oral, 4x Daily  naloxone (NARCAN) injection 0.2 mg, 0.2 mg, IntraVENous, PRN  omega-3 acid ethyl esters (LOVAZA) capsule 1 g, 1 g, Oral, BID  pantoprazole (PROTONIX) tablet 20 mg, 20 mg, Oral, Daily  ondansetron (ZOFRAN-ODT) disintegrating tablet 4 mg, 4 mg, Oral, Q8H PRN  sennosides-docusate sodium (SENOKOT-S) 8.6-50 MG tablet 1 tablet, 1 tablet, Oral, BID  topiramate (TOPAMAX) tablet 100 mg, 100 mg, Oral, Nightly  therapeutic multivitamin-minerals 1 tablet, 1 tablet, Oral, Daily    ASSESSMENT AND PLAN    PRIMARY PSYCH DIAGNOSIS: Major depressive disorder, recurrent dysthymia    Principal Problem:    Late effect of fracture of lumbar vertebra  Resolved Problems:    *

## 2024-06-26 NOTE — CARE COORDINATION
Weekly team care conference with interdisciplinary team on: 6/26/24    Chart reviewed for length of stay. IP day # 6  Unit: ARU   Diagnosis and current status as per MD progress: Late effect of fracture of lumbar vertebra   Consultants Following: Psychiatry   Planned Discharge Date: 07/06/24  Durable medical equipment needed: CTA for RW, Shower Chair with back   Discharge Plan: : 24 hour supervision or assist;Home with Home health OT;S Level 1     Will need family training with sister set up early next week.    Aretha Iraheta RN

## 2024-06-26 NOTE — PROGRESS NOTES
Occupational Therapy  Facility/Department: [facility]  Rehabilitation Occupational Therapy Daily Treatment Note    Date: 24  Patient Name: Pretty Covington       Room: 0152/0152-01  MRN: [id number]  Account: [id number]   : 1960  (64 y.o.) Gender: female                    Past Medical History:  has a past medical history of Asthma, Benign essential hypertension, Benign head tremor, Cholelithiasis, Coronary artery disease involving native coronary artery of native heart, Disease of nasal cavity and sinuses, Fatty infiltration of liver, Hot flash, menopausal, Hx of blood clots, Hyperlipidemia, Hyperlipidemia associated with type 2 diabetes mellitus (HCC), Morbid obesity with BMI of 50.0-59.9, adult (HCC), No history of procedure, ELEAZAR on CPAP, Pulmonary embolism (HCC), Type II or unspecified type diabetes mellitus without mention of complication, not stated as uncontrolled, Umbilical hernia, Unspecified sleep apnea, and Wears glasses.  Past Surgical History:   has a past surgical history that includes Cholecystectomy, laparoscopic (2012); hernia repair (10/11/2013); Colonoscopy (2015); Breast surgery (Right, ); hysteroscopy (2018); Dilation and curettage of uterus (2018); Foot surgery (Right, 3/15/2023); and laminectomy (N/A, 2024).    Restrictions  Restrictions/Precautions: Fall Risk, General Precautions, ROM Restrictions, Surgical Protocols  Other position/activity restrictions: Ambulate patient; needs TLSO when OOB, can be removed for hygiene per surgeon's office on   Required Braces or Orthoses  Spinal: Thoracic Lumbar Sacral Orthotics  Spinal Other: TLSO when OOB  Required Braces or Orthoses?: Yes    Subjective  Subjective: Pt on toilet, finishing with PT, pain 8/10 in back, sore/aching, agreeable to OT session and able to continue with therapy with repositioning and distraction, /78, , O2 sats 96% on RA.  Restrictions/Precautions: Fall Risk;General

## 2024-06-26 NOTE — PLAN OF CARE
Problem: Discharge Planning  Goal: Discharge to home or other facility with appropriate resources  Outcome: Progressing     Problem: Safety - Adult  Goal: Free from fall injury  6/26/2024 1256 by Aretha Latif, RN  Outcome: Progressing  6/25/2024 2318 by Maranda Crouch, RN  Outcome: Progressing  Note: Pt. Free from falls or self injury at this time. Falls risk protocol maintained. Call light within reach.

## 2024-06-26 NOTE — PROGRESS NOTES
Physical Therapy  Facility/Department: [facility]  Rehabilitation Physical Therapy Treatment Note    NAME: Pretty Covington  : 1960 (64 y.o.)  MRN: [id number]  CODE STATUS: Full Code    Date of Service: 24       Restrictions:  Restrictions/Precautions: Fall Risk, General Precautions, ROM Restrictions, Surgical Protocols  Required Braces or Orthoses  Spinal: Thoracic Lumbar Sacral Orthotics  Spinal Other: TLSO when OOB  Position Activity Restriction  Spinal Precautions: No Bending, No Lifting, No Twisting  Other position/activity restrictions: Ambulate patient; needs TLSO when OOB, can be removed for hygiene per surgeon's office on      SUBJECTIVE  Subjective  Subjective: Pt supine in bed upon arrival, agreeable to PT tx  Pain: 7/10 pain low back             OBJECTIVE  Cognition  Overall Cognitive Status: WFL  Orientation  Overall Orientation Status: Within Functional Limits  Orientation Level: Oriented to place;Oriented X4;Oriented to time;Oriented to situation;Oriented to person    Functional Mobility  Roll Left  Assistance Level: Stand by assist;Contact guard assist  Skilled Clinical Factors: HOB flat  Roll Right  Assistance Level: Stand by assist;Contact guard assist  Skilled Clinical Factors: HOB flat  Supine to Sit  Assistance Level: Contact guard assist;Stand by assist  Skilled Clinical Factors: SBA-CGA for safety; pt tends to demo slipping off matress at EOB  Standing Balance  Time: 5 mins with B UE on RW over commode  Activity: CGA for static standing balance over commode with RW to perform pericare and brief management  Sit to Stand  Assistance Level: Contact guard assist  Skilled Clinical Factors: from commode, w/c, EOB up to RW  Stand to Sit  Assistance Level: Contact guard assist  Skilled Clinical Factors: cueing for hand placement for enhanced safety      Environmental Mobility  Ambulation  Surface: Level surface  Device: Rolling walker  Distance: 10 ft + 10 ft + 75 ft + 50 ft  Activity:  will perform functional transfers with Mod I and LRAD.  Long Term Goal 3: Pt will complete car transfer with Mod I and LRAD.  Long Term Goal 4: Pt will complete 6\" curb step with LRAD and Mod I.    PLAN OF CARE/SAFETY  Physical Therapy Plan  General Plan: 5-7 times per week  Current Treatment Recommendations: Balance training;Functional mobility training;Transfer training;Gait training;Safety education & training;Therapeutic activities  Safety Devices  Type of Devices: All fall risk precautions in place;Call light within reach;Gait belt;Patient at risk for falls;Chair alarm in place;Left in chair (left in w/c)  Restraints  Restraints Initially in Place: No    EDUCATION  Education  Education Given To: Patient  Education Provided: Mobility Training;Equipment;Precautions;Transfer Training;Safety  Education Provided Comments: safe transfer training, safe gait and mobility, importance of OOB movement  Education Method: Verbal  Barriers to Learning: None  Education Outcome: Verbalized understanding;Demonstrated understanding        Therapy Time   Individual Concurrent Group Co-treatment   Time In 0800         Time Out 0900         Minutes 60           Second Session Therapy Time:   Individual Concurrent Group Co-treatment   Time In 0930         Time Out 1000         Minutes 30           Timed Code Treatment Minutes: 90 Minutes       JULIO CÉSAR Abdullahi, 06/26/24 at 10:13 AM       This session was completed by the student physical therapist with supervision from Kyra Garcia PT, DPT and documentation was reviewed.

## 2024-06-26 NOTE — PROGRESS NOTES
Department of Physical Medicine & Rehabilitation  Progress Note    Patient Identification:  Pretty Covington  [id number]  : 1960  Admit date: 2024    Chief Complaint: Late effect of fracture of lumbar vertebra    Subjective:   Patient seen this AM.  The patient was discussed in the rehabilitation conference today with the entire rehabilitation team which includes PT, OT, speech, nursing, dietary, social service, and the rehab unit manager, to determine how much progress the patient has made over the past week of therapies, and when the patient will be able to return home safely.  With the progress the patient has made over the past week, we think the patient will be able to return home safely on .  Patient is now able to ambulate 75 feet with a rolling walker with contact-guard assist, and is contact-guard assist for transfers.  Patient needs min assist for upper body bathing and dressing and max assist for lower body activities.  Patient will need a rolling walker at discharge.  Repeat labs today look good and are stable.      ROS: No f/c, n/v, cp     Objective:  Patient Vitals for the past 24 hrs:   BP Temp Temp src Pulse Resp SpO2 Weight   24 1008 -- -- -- -- 16 -- --   24 0904 (!) 143/78 -- -- (!) 103 -- 96 % --   24 0745 (!) 143/78 97.7 °F (36.5 °C) Oral (!) 104 16 96 % --   24 0718 -- -- -- -- 16 -- --   24 0615 -- -- -- -- -- -- 120.8 kg (266 lb 5.1 oz)   24 0220 -- -- -- -- 16 -- --   24 2154 -- -- -- -- 18 -- --   24 2115 126/74 98.1 °F (36.7 °C) Oral 85 18 94 % --       Const: Alert. No distress, pleasant.   HEENT: Normocephalic, atraumatic. Normal sclera/conjunctiva. MMM.   CV: Regular rate and rhythm.   Resp: No respiratory distress. Lungs CTAB.   Abd: Soft, nontender, nondistended, NABS+   Ext: No edema.   Neuro: Alert, oriented, appropriately interactive.   Psych: Cooperative, appropriate mood and affect    Laboratory data: Available via EMR.    Last 24 hour lab  Recent Results (from the past 24 hour(s))   POCT Glucose    Collection Time: 06/25/24  4:03 PM   Result Value Ref Range    POC Glucose 128 (H) 70 - 99 mg/dl    Performed on ACCU-CHEK    POCT Glucose    Collection Time: 06/25/24  7:23 PM   Result Value Ref Range    POC Glucose 138 (H) 70 - 99 mg/dl    Performed on ACCU-CHEK    POCT Glucose    Collection Time: 06/26/24  6:06 AM   Result Value Ref Range    POC Glucose 163 (H) 70 - 99 mg/dl    Performed on ACCU-CHEK    Basic Metabolic Panel w/ Reflex to MG    Collection Time: 06/26/24  6:18 AM   Result Value Ref Range    Sodium 140 136 - 145 mmol/L    Potassium reflex Magnesium 3.8 3.5 - 5.1 mmol/L    Chloride 106 99 - 110 mmol/L    CO2 24 21 - 32 mmol/L    Anion Gap 10 3 - 16    Glucose 161 (H) 70 - 99 mg/dL    BUN 14 7 - 20 mg/dL    Creatinine <0.5 (L) 0.6 - 1.2 mg/dL    Est, Glom Filt Rate >90 >60    Calcium 8.7 8.3 - 10.6 mg/dL   CBC with Auto Differential    Collection Time: 06/26/24  6:18 AM   Result Value Ref Range    WBC 5.2 4.0 - 11.0 K/uL    RBC 3.15 (L) 4.00 - 5.20 M/uL    Hemoglobin 9.6 (L) 12.0 - 16.0 g/dL    Hematocrit 28.7 (L) 36.0 - 48.0 %    MCV 91.3 80.0 - 100.0 fL    MCH 30.6 26.0 - 34.0 pg    MCHC 33.5 31.0 - 36.0 g/dL    RDW 14.6 12.4 - 15.4 %    Platelets 333 135 - 450 K/uL    MPV 6.8 5.0 - 10.5 fL    Neutrophils % 66.4 %    Lymphocytes % 19.5 %    Monocytes % 10.1 %    Eosinophils % 3.2 %    Basophils % 0.8 %    Neutrophils Absolute 3.4 1.7 - 7.7 K/uL    Lymphocytes Absolute 1.0 1.0 - 5.1 K/uL    Monocytes Absolute 0.5 0.0 - 1.3 K/uL    Eosinophils Absolute 0.2 0.0 - 0.6 K/uL    Basophils Absolute 0.0 0.0 - 0.2 K/uL   POCT Glucose    Collection Time: 06/26/24 11:03 AM   Result Value Ref Range    POC Glucose 186 (H) 70 - 99 mg/dl    Performed on ACCU-CHEK        Therapy progress:  PT  Required Braces or Orthoses  Spinal: Thoracic Lumbar Sacral Orthotics  Spinal Other: TLSO when OOB  Position Activity Restriction  Spinal

## 2024-06-27 LAB
GLUCOSE BLD-MCNC: 149 MG/DL (ref 70–99)
GLUCOSE BLD-MCNC: 167 MG/DL (ref 70–99)
GLUCOSE BLD-MCNC: 181 MG/DL (ref 70–99)
GLUCOSE BLD-MCNC: 196 MG/DL (ref 70–99)
PERFORMED ON: ABNORMAL

## 2024-06-27 PROCEDURE — 6370000000 HC RX 637 (ALT 250 FOR IP): Performed by: STUDENT IN AN ORGANIZED HEALTH CARE EDUCATION/TRAINING PROGRAM

## 2024-06-27 PROCEDURE — 97116 GAIT TRAINING THERAPY: CPT

## 2024-06-27 PROCEDURE — 97112 NEUROMUSCULAR REEDUCATION: CPT

## 2024-06-27 PROCEDURE — 6370000000 HC RX 637 (ALT 250 FOR IP)

## 2024-06-27 PROCEDURE — 97110 THERAPEUTIC EXERCISES: CPT

## 2024-06-27 PROCEDURE — 97535 SELF CARE MNGMENT TRAINING: CPT

## 2024-06-27 PROCEDURE — 6370000000 HC RX 637 (ALT 250 FOR IP): Performed by: PHYSICAL MEDICINE & REHABILITATION

## 2024-06-27 PROCEDURE — 1280000000 HC REHAB R&B

## 2024-06-27 PROCEDURE — 97530 THERAPEUTIC ACTIVITIES: CPT

## 2024-06-27 RX ADMIN — SENNOSIDES AND DOCUSATE SODIUM 1 TABLET: 50; 8.6 TABLET ORAL at 08:55

## 2024-06-27 RX ADMIN — Medication 400 MG: at 08:55

## 2024-06-27 RX ADMIN — APIXABAN 5 MG: 5 TABLET, FILM COATED ORAL at 20:38

## 2024-06-27 RX ADMIN — INSULIN GLARGINE 12 UNITS: 100 INJECTION, SOLUTION SUBCUTANEOUS at 20:37

## 2024-06-27 RX ADMIN — LOSARTAN POTASSIUM 50 MG: 25 TABLET, FILM COATED ORAL at 08:55

## 2024-06-27 RX ADMIN — Medication 5 MG: at 20:37

## 2024-06-27 RX ADMIN — ARIPIPRAZOLE 5 MG: 5 TABLET ORAL at 08:56

## 2024-06-27 RX ADMIN — CITALOPRAM HYDROBROMIDE 40 MG: 20 TABLET ORAL at 20:38

## 2024-06-27 RX ADMIN — METHOCARBAMOL 750 MG: 750 TABLET ORAL at 08:54

## 2024-06-27 RX ADMIN — METHOCARBAMOL 750 MG: 750 TABLET ORAL at 14:00

## 2024-06-27 RX ADMIN — OXYCODONE HYDROCHLORIDE 10 MG: 10 TABLET, FILM COATED, EXTENDED RELEASE ORAL at 08:55

## 2024-06-27 RX ADMIN — APIXABAN 5 MG: 5 TABLET, FILM COATED ORAL at 08:55

## 2024-06-27 RX ADMIN — TOPIRAMATE 100 MG: 100 TABLET, FILM COATED ORAL at 20:37

## 2024-06-27 RX ADMIN — FERROUS SULFATE TAB 325 MG (65 MG ELEMENTAL FE) 325 MG: 325 (65 FE) TAB at 08:55

## 2024-06-27 RX ADMIN — METFORMIN HYDROCHLORIDE 1000 MG: 500 TABLET ORAL at 17:08

## 2024-06-27 RX ADMIN — ASPIRIN 81 MG 81 MG: 81 TABLET ORAL at 08:55

## 2024-06-27 RX ADMIN — OXYCODONE HYDROCHLORIDE AND ACETAMINOPHEN 500 MG: 500 TABLET ORAL at 08:55

## 2024-06-27 RX ADMIN — OMEGA-3-ACID ETHYL ESTERS 1 G: 1 CAPSULE, LIQUID FILLED ORAL at 08:54

## 2024-06-27 RX ADMIN — SENNOSIDES AND DOCUSATE SODIUM 1 TABLET: 50; 8.6 TABLET ORAL at 20:38

## 2024-06-27 RX ADMIN — OXYCODONE 10 MG: 5 TABLET ORAL at 06:28

## 2024-06-27 RX ADMIN — METHOCARBAMOL 750 MG: 750 TABLET ORAL at 20:38

## 2024-06-27 RX ADMIN — POTASSIUM CHLORIDE 20 MEQ: 1500 TABLET, EXTENDED RELEASE ORAL at 08:55

## 2024-06-27 RX ADMIN — OMEGA-3-ACID ETHYL ESTERS 1 G: 1 CAPSULE, LIQUID FILLED ORAL at 20:37

## 2024-06-27 RX ADMIN — METFORMIN HYDROCHLORIDE 500 MG: 500 TABLET ORAL at 08:55

## 2024-06-27 RX ADMIN — OXYCODONE 10 MG: 5 TABLET ORAL at 14:02

## 2024-06-27 RX ADMIN — DIAZEPAM 5 MG: 5 TABLET ORAL at 20:38

## 2024-06-27 RX ADMIN — METHOCARBAMOL 750 MG: 750 TABLET ORAL at 17:08

## 2024-06-27 RX ADMIN — PANTOPRAZOLE SODIUM 20 MG: 20 TABLET, DELAYED RELEASE ORAL at 08:55

## 2024-06-27 RX ADMIN — ATORVASTATIN CALCIUM 40 MG: 40 TABLET, FILM COATED ORAL at 08:55

## 2024-06-27 RX ADMIN — OXYCODONE HYDROCHLORIDE 10 MG: 10 TABLET, FILM COATED, EXTENDED RELEASE ORAL at 20:38

## 2024-06-27 RX ADMIN — OXYCODONE 10 MG: 5 TABLET ORAL at 11:03

## 2024-06-27 RX ADMIN — Medication 1 TABLET: at 08:55

## 2024-06-27 ASSESSMENT — PAIN DESCRIPTION - ORIENTATION
ORIENTATION: LOWER;MID
ORIENTATION: MID;LOWER
ORIENTATION: LOWER;MID

## 2024-06-27 ASSESSMENT — PAIN DESCRIPTION - DESCRIPTORS
DESCRIPTORS: ACHING;DISCOMFORT;SORE
DESCRIPTORS: ACHING;DISCOMFORT
DESCRIPTORS: ACHING;DISCOMFORT;SORE
DESCRIPTORS: ACHING;DISCOMFORT;SORE

## 2024-06-27 ASSESSMENT — PAIN SCALES - GENERAL
PAINLEVEL_OUTOF10: 7
PAINLEVEL_OUTOF10: 7
PAINLEVEL_OUTOF10: 5
PAINLEVEL_OUTOF10: 7
PAINLEVEL_OUTOF10: 6
PAINLEVEL_OUTOF10: 7
PAINLEVEL_OUTOF10: 7
PAINLEVEL_OUTOF10: 5
PAINLEVEL_OUTOF10: 6
PAINLEVEL_OUTOF10: 7

## 2024-06-27 ASSESSMENT — PAIN DESCRIPTION - ONSET
ONSET: GRADUAL

## 2024-06-27 ASSESSMENT — PAIN DESCRIPTION - FREQUENCY
FREQUENCY: INTERMITTENT

## 2024-06-27 ASSESSMENT — PAIN DESCRIPTION - LOCATION
LOCATION: BACK

## 2024-06-27 ASSESSMENT — PAIN - FUNCTIONAL ASSESSMENT
PAIN_FUNCTIONAL_ASSESSMENT: PREVENTS OR INTERFERES SOME ACTIVE ACTIVITIES AND ADLS

## 2024-06-27 ASSESSMENT — PAIN DESCRIPTION - PAIN TYPE
TYPE: ACUTE PAIN;SURGICAL PAIN

## 2024-06-27 ASSESSMENT — PAIN SCALES - WONG BAKER
WONGBAKER_NUMERICALRESPONSE: NO HURT
WONGBAKER_NUMERICALRESPONSE: NO HURT

## 2024-06-27 NOTE — CARE COORDINATION
CM spoke to patient face to face in patients room. CM discussed DCP including dc date of 7/6/24, therapy recommendations of 24 hr sup/assist home w/home care and DME needed. Writer asked if she had a preference of home care, patient stated [facility]. CM acknowledged and will send referral. CM discussed family training and if her sister Charleen can come in for the training. Patient called sister via cell phone and we proceeded with a cell phone conference about family training. Charleen stated that she can be here on 7/01 @ 1300. CM acknowledged and will update team.    Aretha Iraheta RN

## 2024-06-27 NOTE — PLAN OF CARE
Problem: Discharge Planning  Goal: Discharge to home or other facility with appropriate resources  Outcome: Progressing     Problem: Safety - Adult  Goal: Free from fall injury  Outcome: Progressing     Problem: Pain  Goal: Verbalizes/displays adequate comfort level or baseline comfort level  Recent Flowsheet Documentation  Taken 6/27/2024 0845 by Aretha Latif RN  Verbalizes/displays adequate comfort level or baseline comfort level:   Assess pain using appropriate pain scale   Administer analgesics based on type and severity of pain and evaluate response   Encourage patient to monitor pain and request assistance  6/26/2024 2875 by Noemi Maradiaga, RN  Outcome: Progressing

## 2024-06-27 NOTE — PROGRESS NOTES
Physical Therapy  Facility/Department: [facility]  Rehabilitation Physical Therapy Treatment Note    NAME: Pretty Covington  : 1960 (64 y.o.)  MRN: [id number]  CODE STATUS: Full Code    Date of Service: 24       Restrictions:  Restrictions/Precautions: Fall Risk, General Precautions, ROM Restrictions, Surgical Protocols  Required Braces or Orthoses  Spinal: Thoracic Lumbar Sacral Orthotics  Spinal Other: TLSO when OOB  Position Activity Restriction  Spinal Precautions: No Bending, No Lifting, No Twisting  Other position/activity restrictions: Ambulate patient; needs TLSO when OOB, can be removed for hygiene per surgeon's office on      SUBJECTIVE  Subjective  Subjective: pt found in w/c, reports fatigue  Pain: 7-8/10 pain in back        OBJECTIVE  Cognition  Overall Cognitive Status: [facility]  Cognition Comment: sleepy throughout session - have IV pain medication  Orientation  Overall Orientation Status: Within Functional Limits  Orientation Level: Oriented to place;Oriented X4;Oriented to time;Oriented to situation;Oriented to person    Functional Mobility  Sit to Stand  Assistance Level: Contact guard assist;Stand by assist  Skilled Clinical Factors: from w/c to RW x 3 reps  Stand to Sit  Assistance Level: Contact guard assist;Stand by assist      Environmental Mobility  Wheelchair  Surface: Level surface  Device: Standard wheelchair  Assistance Level for Propulsion: Modified independent  Propulsion Method: Right upper extremity;Left upper extremity  Propulsion Distance: 200 ft with rests as needed      Neuromuscular Education  NDT Treatment: Standing  Neuromuscular Comments: pt dons 2# weights BLE, completed 2x1.5 min BLE alternating foot taps onto 4\" step with BUE support on RW and CGA for balance. cues for trunk/hip extension. pt then standing for 3 min 15 sec with shoulder width THERESA-> picks up cone from table on L side with LUE-> place to RUE in midline during unsupported standing-> place to target on R

## 2024-06-27 NOTE — PROGRESS NOTES
Physical Therapy  Facility/Department: [facility]  Rehabilitation Physical Therapy Treatment Note    NAME: Pretty Covington  : 1960 (64 y.o.)  MRN: [id number]  CODE STATUS: Full Code    Date of Service: 24       Restrictions:  Restrictions/Precautions: Fall Risk, General Precautions, ROM Restrictions, Surgical Protocols  Required Braces or Orthoses  Spinal: Thoracic Lumbar Sacral Orthotics  Spinal Other: TLSO when OOB  Position Activity Restriction  Spinal Precautions: No Bending, No Lifting, No Twisting  Other position/activity restrictions: Ambulate patient; needs TLSO when OOB, can be removed for hygiene per surgeon's office on      SUBJECTIVE  Subjective  Subjective: Pt seated on commode on arrival, agreeable to PT  Pain: no numerical value report of pain          OBJECTIVE  Cognition  Overall Cognitive Status: WFL  Orientation  Overall Orientation Status: Within Functional Limits  Orientation Level: Oriented to place;Oriented X4;Oriented to time;Oriented to situation;Oriented to person    Functional Mobility  Sit to Stand  Assistance Level: Contact guard assist  Skilled Clinical Factors: from commode, w/c up to RW  Stand to Sit  Assistance Level: Contact guard assist  Skilled Clinical Factors: cueing for hand placement behind for enhanced safety  Stand Pivot  Assistance Level: Contact guard assist  Skilled Clinical Factors: wc to commode, uses grab bars to stand  Car Transfer  Assistance Level: Stand by assist;Contact guard assist  Skilled Clinical Factors: SBA-CGA with RW, cues and demonstration provided for proper sequncing and to increase safety      Environmental Mobility  Ambulation  Surface: Level surface  Device: Rolling walker  Distance: 55 ft  Activity: Within Room;Within Unit  Activity Comments: Pt ambulates with RW and CGA for 55 ft, weaving cones in B directions. Pt demos decreased arturo, narrow THERESA, decreaed B step length/clearance. Cueing throughout to bring THERESA closer to RW,  with TLSO, LRAD, and Mod A. - MET 6/27 CGA w RW  Short Term Goal 4: Pt will propel w/c for 150 ft with Mod I with B UE.  Long Term Goals  Time Frame for Long Term Goals : 7/11  Long Term Goal 1: Pt will ambulate 150 ft with TLSO, LRAD and Mod I.  Long Term Goal 2: Pt will perform functional transfers with Mod I and LRAD.  Long Term Goal 3: Pt will complete car transfer with Mod I and LRAD.  Long Term Goal 4: Pt will complete 6\" curb step with LRAD and Mod I.    PLAN OF CARE/SAFETY  Physical Therapy Plan  General Plan: 5-7 times per week  Current Treatment Recommendations: Balance training;Functional mobility training;Transfer training;Gait training;Safety education & training;Therapeutic activities  Safety Devices  Type of Devices: All fall risk precautions in place;Call light within reach;Gait belt;Patient at risk for falls;Chair alarm in place;Left in chair  Restraints  Restraints Initially in Place: No    EDUCATION  Education  Education Given To: Patient  Education Provided: Mobility Training;Equipment;Precautions;Transfer Training;Safety  Education Provided Comments: safe functional transfer training, more advanced mobility and gait training  Education Method: Verbal  Barriers to Learning: None  Education Outcome: Verbalized understanding;Demonstrated understanding        Therapy Time   Individual Concurrent Group Co-treatment   Time In 0930         Time Out 1030         Minutes 60           Timed Code Treatment Minutes: 60 Minutes       JULIO CÉSAR Abdullahi, 06/27/24 at 11:53 AM       This session was completed by the student physical therapist with supervision from Kyra Garcia PT, DPT and documentation was reviewed.

## 2024-06-27 NOTE — PROGRESS NOTES
Occupational Therapy  Facility/Department: [facility]  Rehabilitation Occupational Therapy Daily Treatment Note    Date: 24  Patient Name: Pretty Covington       Room: 0152/0152-01  MRN: [id number]  Account: [id number]   : 1960  (64 y.o.) Gender: female                    Past Medical History:  has a past medical history of Asthma, Benign essential hypertension, Benign head tremor, Cholelithiasis, Coronary artery disease involving native coronary artery of native heart, Disease of nasal cavity and sinuses, Fatty infiltration of liver, Hot flash, menopausal, Hx of blood clots, Hyperlipidemia, Hyperlipidemia associated with type 2 diabetes mellitus (HCC), Morbid obesity with BMI of 50.0-59.9, adult (HCC), No history of procedure, ELEAZAR on CPAP, Pulmonary embolism (HCC), Type II or unspecified type diabetes mellitus without mention of complication, not stated as uncontrolled, Umbilical hernia, Unspecified sleep apnea, and Wears glasses.  Past Surgical History:   has a past surgical history that includes Cholecystectomy, laparoscopic (2012); hernia repair (10/11/2013); Colonoscopy (2015); Breast surgery (Right, ); hysteroscopy (2018); Dilation and curettage of uterus (2018); Foot surgery (Right, 3/15/2023); and laminectomy (N/A, 2024).    Restrictions  Restrictions/Precautions: Fall Risk, General Precautions, ROM Restrictions, Surgical Protocols  Other position/activity restrictions: Ambulate patient; needs TLSO when OOB, can be removed for hygiene per surgeon's office on   Required Braces or Orthoses  Spinal: Thoracic Lumbar Sacral Orthotics  Spinal Other: TLSO when OOB  Required Braces or Orthoses?: Yes    Subjective  Subjective: Pt in w/c, pleasant, 7/10 in back, sore/aching, agreeable to OT session and able to continue with therapy with respositioning and distraction, /70, HR 77, O2 sats 97% on RA.  Restrictions/Precautions: Fall Risk;General Precautions;ROM

## 2024-06-27 NOTE — PROGRESS NOTES
Comprehensive Nutrition Assessment    Type and Reason for Visit:  Reassess    Nutrition Recommendations/Plan:   Continue carb control diet   Modify Glucerna to BID - monitor acceptance   Encourage PO intakes as tolerated   Monitor nutrition adequacy, pertinent labs, bowel habits, wt changes, and clinical progress     Malnutrition Assessment:  Malnutrition Status:  At risk for malnutrition (06/27/24 1238)    Context:  Acute Illness     Findings of the 6 clinical characteristics of malnutrition:  Energy Intake:  Mild decrease in energy intake   Weight Loss:   (difficult to assess d/t labile weights per EMR)     Body Fat Loss:  No significant body fat loss     Muscle Mass Loss:  No significant muscle mass loss      Nutrition Assessment:    Follow up: Pt remains nutritionally at risk AEB variable PO intakes. Pt reports appetite fair. Reports family bringing in some food most days. Encouraged PO intakes for healing, pt verbalized understanding. Reports not drinking ONS, but agreeable to drink if not eating well. RD to decrease ONS to BID. Weights trending down this admission, but also labile. Difficult to assess actual weight loss. Declined diet education needs. Will continue to monitor.    Nutrition Related Findings:    +Metformin. Labs reviewed. Active BS. BM on 6/26. Wound Type: Surgical Incision, Moisture Associate Skin Damage       Current Nutrition Intake & Therapies:    Average Meal Intake: 1-25%, 51-75%, %  Average Supplements Intake: 0%  ADULT DIET; Regular; 4 carb choices (60 gm/meal)  ADULT ORAL NUTRITION SUPPLEMENT; Dinner, Breakfast, Lunch; Diabetic Oral Supplement    Anthropometric Measures:  Height: 165.1 cm (5' 5\")  Ideal Body Weight (IBW): 125 lbs (57 kg)    Admission Body Weight: 123.4 kg (272 lb) (bed scale)  Current Body Weight: 120.2 kg (265 lb), 217.6 % IBW. Weight Source: Bed Scale  Current BMI (kg/m2): 44.1  Usual Body Weight: 116.1 kg (256 lb) (standing scale on 6/9/24)  % Weight Change

## 2024-06-27 NOTE — CARE COORDINATION
ABIEL spoke to [facility] about referral they do not take [facility]. ABIEL updated patient and is okay with [facility] referral>accepted.    Aretha Iraheta RN

## 2024-06-27 NOTE — PROGRESS NOTES
Department of Physical Medicine & Rehabilitation  Progress Note    Patient Identification:  Pretty Covington  [id number]  : 1960  Admit date: 2024    Chief Complaint: Late effect of fracture of lumbar vertebra    Subjective:   No acute events overnight. Today Pretty is seen in the gym with therapy. She reports continued back pain. She feels that pain medications are helping. She feels that she is making progress with therapies.     ROS: No f/c, n/v, cp     Objective:  Patient Vitals for the past 24 hrs:   BP Temp Temp src Pulse Resp SpO2 Weight   24 1103 -- -- -- -- 16 -- --   24 0925 -- -- -- -- 16 -- --   24 0845 129/62 98.1 °F (36.7 °C) Oral 94 16 97 % --   24 0732 120/70 -- -- 77 -- 97 % --   24 0600 -- -- -- -- -- -- 120.5 kg (265 lb 10.5 oz)   24 2045 132/62 98.3 °F (36.8 °C) Oral 89 18 95 % --   24 1638 -- -- -- -- 16 -- --   24 1608 -- -- -- -- 16 -- --     Const: Alert. No distress, pleasant.   HEENT: Normocephalic, atraumatic. Normal sclera/conjunctiva. MMM.   CV: extremities well perfused  Resp: No respiratory distress.   Abd: Soft, nontender, nondistended  Ext: No edema.   Neuro: Alert, oriented, appropriately interactive.   Psych: Cooperative, appropriate mood and affect    Laboratory data: Available via EMR.   Last 24 hour lab  Recent Results (from the past 24 hour(s))   POCT Glucose    Collection Time: 24  4:02 PM   Result Value Ref Range    POC Glucose 227 (H) 70 - 99 mg/dl    Performed on ACCU-CHEK    POCT Glucose    Collection Time: 24  8:09 PM   Result Value Ref Range    POC Glucose 171 (H) 70 - 99 mg/dl    Performed on ACCU-CHEK    POCT Glucose    Collection Time: 24  5:45 AM   Result Value Ref Range    POC Glucose 149 (H) 70 - 99 mg/dl    Performed on ACCU-CHEK    POCT Glucose    Collection Time: 24 11:01 AM   Result Value Ref Range    POC Glucose 181 (H) 70 - 99 mg/dl    Performed on ACCU-CHEK        Therapy  progress:  PT  Required Braces or Orthoses  Spinal: Thoracic Lumbar Sacral Orthotics  Spinal Other: TLSO when OOB  Position Activity Restriction  Spinal Precautions: No Bending, No Lifting, No Twisting  Other position/activity restrictions: Ambulate patient; needs TLSO when OOB, can be removed for hygiene per surgeon's office on 6/24  Objective     Sit to Stand: Maximum Assistance, 2 Person Assistance (Max A x2 from EOB to stedy and RW)  Stand to Sit: Moderate Assistance, 2 Person Assistance (Mod A x 2 from stedy, RW)     OT  PT Equipment Recommendations  Equipment Needed: Yes  Other: CTA for RW  Toilet - Technique:  (totalA stedy this date d/t urgency and decreased endurance. pain and fatigue)  Equipment Used: Grab bars (would benefit from raised toilet seat for transfers)  Toilet Transfers Comments: totalA stedy; maxA sit<>stand from toilet  Assessment        SLP          Body mass index is 44.21 kg/m².    Assessment and Plan:  Pretty Covington is a 64 year old female with a past medical history significant for HTN, CAD, HLD, DM2, PE on AC, ELEAZAR on CPAP, and morbid obesity who presented to [facility] and a transfer from [facility] with back pain after a fall, found to have L1 chance fracture s/p T11-L3 fixation and fusion. Post operative course was notable for acute blood loss anemia, acute elevation in liver enzymes, and pain. She was admitted to [facility] on 6/20/24 due to functional deficits below her baseline.      Closed Fracture of the Lumbar Vertebral Body, unstable L1 fracture extending into the posterior elements bilaterally  - s/p T11-L3 fixation and fusion 6/14  - TLSO brace when OOB  - multimodal pain control  - PT, OT     Pain  - tylenol PRN, avoiding scheduled due to having elevated LFTs during acute stay  - changed oxycodone every 6 hours to Oxcontin 10 mg q12 hours  - oxycodone 5-10 mg q3 hours PRN for mod to severe pain  - PRN valium for muscle spasms      Hypokalemia  - replace PRN     Hypomagnesemia  -

## 2024-06-28 LAB
ANION GAP SERPL CALCULATED.3IONS-SCNC: 12 MMOL/L (ref 3–16)
BASOPHILS # BLD: 0.1 K/UL (ref 0–0.2)
BASOPHILS NFR BLD: 1.4 %
BUN SERPL-MCNC: 13 MG/DL (ref 7–20)
CALCIUM SERPL-MCNC: 8.8 MG/DL (ref 8.3–10.6)
CHLORIDE SERPL-SCNC: 105 MMOL/L (ref 99–110)
CO2 SERPL-SCNC: 23 MMOL/L (ref 21–32)
CREAT SERPL-MCNC: <0.5 MG/DL (ref 0.6–1.2)
DEPRECATED RDW RBC AUTO: 15.2 % (ref 12.4–15.4)
EOSINOPHIL # BLD: 0.2 K/UL (ref 0–0.6)
EOSINOPHIL NFR BLD: 4.1 %
GFR SERPLBLD CREATININE-BSD FMLA CKD-EPI: >90 ML/MIN/{1.73_M2}
GLUCOSE BLD-MCNC: 149 MG/DL (ref 70–99)
GLUCOSE BLD-MCNC: 158 MG/DL (ref 70–99)
GLUCOSE BLD-MCNC: 160 MG/DL (ref 70–99)
GLUCOSE BLD-MCNC: 196 MG/DL (ref 70–99)
GLUCOSE SERPL-MCNC: 143 MG/DL (ref 70–99)
HCT VFR BLD AUTO: 29.6 % (ref 36–48)
HGB BLD-MCNC: 9.8 G/DL (ref 12–16)
LYMPHOCYTES # BLD: 1.1 K/UL (ref 1–5.1)
LYMPHOCYTES NFR BLD: 23.7 %
MCH RBC QN AUTO: 30.3 PG (ref 26–34)
MCHC RBC AUTO-ENTMCNC: 33.1 G/DL (ref 31–36)
MCV RBC AUTO: 91.5 FL (ref 80–100)
MONOCYTES # BLD: 0.5 K/UL (ref 0–1.3)
MONOCYTES NFR BLD: 10.6 %
NEUTROPHILS # BLD: 2.8 K/UL (ref 1.7–7.7)
NEUTROPHILS NFR BLD: 60.2 %
PERFORMED ON: ABNORMAL
PLATELET # BLD AUTO: 324 K/UL (ref 135–450)
PMV BLD AUTO: 7 FL (ref 5–10.5)
POTASSIUM SERPL-SCNC: 3.9 MMOL/L (ref 3.5–5.1)
RBC # BLD AUTO: 3.23 M/UL (ref 4–5.2)
SODIUM SERPL-SCNC: 140 MMOL/L (ref 136–145)
WBC # BLD AUTO: 4.6 K/UL (ref 4–11)

## 2024-06-28 PROCEDURE — 6370000000 HC RX 637 (ALT 250 FOR IP)

## 2024-06-28 PROCEDURE — 97112 NEUROMUSCULAR REEDUCATION: CPT

## 2024-06-28 PROCEDURE — 97110 THERAPEUTIC EXERCISES: CPT

## 2024-06-28 PROCEDURE — 80048 BASIC METABOLIC PNL TOTAL CA: CPT

## 2024-06-28 PROCEDURE — 97116 GAIT TRAINING THERAPY: CPT

## 2024-06-28 PROCEDURE — 97530 THERAPEUTIC ACTIVITIES: CPT

## 2024-06-28 PROCEDURE — 6370000000 HC RX 637 (ALT 250 FOR IP): Performed by: PHYSICAL MEDICINE & REHABILITATION

## 2024-06-28 PROCEDURE — 85025 COMPLETE CBC W/AUTO DIFF WBC: CPT

## 2024-06-28 PROCEDURE — 6370000000 HC RX 637 (ALT 250 FOR IP): Performed by: STUDENT IN AN ORGANIZED HEALTH CARE EDUCATION/TRAINING PROGRAM

## 2024-06-28 PROCEDURE — 97535 SELF CARE MNGMENT TRAINING: CPT

## 2024-06-28 PROCEDURE — 36415 COLL VENOUS BLD VENIPUNCTURE: CPT

## 2024-06-28 PROCEDURE — 1280000000 HC REHAB R&B

## 2024-06-28 RX ADMIN — ARIPIPRAZOLE 5 MG: 5 TABLET ORAL at 09:05

## 2024-06-28 RX ADMIN — METHOCARBAMOL 750 MG: 750 TABLET ORAL at 20:06

## 2024-06-28 RX ADMIN — Medication 400 MG: at 08:42

## 2024-06-28 RX ADMIN — OXYCODONE 10 MG: 5 TABLET ORAL at 11:20

## 2024-06-28 RX ADMIN — POTASSIUM CHLORIDE 20 MEQ: 1500 TABLET, EXTENDED RELEASE ORAL at 08:41

## 2024-06-28 RX ADMIN — METHOCARBAMOL 750 MG: 750 TABLET ORAL at 11:20

## 2024-06-28 RX ADMIN — CITALOPRAM HYDROBROMIDE 40 MG: 20 TABLET ORAL at 20:07

## 2024-06-28 RX ADMIN — METFORMIN HYDROCHLORIDE 1000 MG: 500 TABLET ORAL at 08:42

## 2024-06-28 RX ADMIN — DIAZEPAM 5 MG: 5 TABLET ORAL at 20:07

## 2024-06-28 RX ADMIN — OMEGA-3-ACID ETHYL ESTERS 1 G: 1 CAPSULE, LIQUID FILLED ORAL at 20:06

## 2024-06-28 RX ADMIN — Medication 5 MG: at 20:06

## 2024-06-28 RX ADMIN — OMEGA-3-ACID ETHYL ESTERS 1 G: 1 CAPSULE, LIQUID FILLED ORAL at 08:41

## 2024-06-28 RX ADMIN — METHOCARBAMOL 750 MG: 750 TABLET ORAL at 08:41

## 2024-06-28 RX ADMIN — LOSARTAN POTASSIUM 50 MG: 25 TABLET, FILM COATED ORAL at 08:41

## 2024-06-28 RX ADMIN — APIXABAN 5 MG: 5 TABLET, FILM COATED ORAL at 20:07

## 2024-06-28 RX ADMIN — INSULIN GLARGINE 12 UNITS: 100 INJECTION, SOLUTION SUBCUTANEOUS at 20:05

## 2024-06-28 RX ADMIN — FERROUS SULFATE TAB 325 MG (65 MG ELEMENTAL FE) 325 MG: 325 (65 FE) TAB at 08:42

## 2024-06-28 RX ADMIN — APIXABAN 5 MG: 5 TABLET, FILM COATED ORAL at 08:42

## 2024-06-28 RX ADMIN — Medication 1 TABLET: at 08:41

## 2024-06-28 RX ADMIN — OXYCODONE HYDROCHLORIDE AND ACETAMINOPHEN 500 MG: 500 TABLET ORAL at 08:41

## 2024-06-28 RX ADMIN — ASPIRIN 81 MG 81 MG: 81 TABLET ORAL at 08:42

## 2024-06-28 RX ADMIN — METHOCARBAMOL 750 MG: 750 TABLET ORAL at 17:21

## 2024-06-28 RX ADMIN — OXYCODONE HYDROCHLORIDE 10 MG: 10 TABLET, FILM COATED, EXTENDED RELEASE ORAL at 08:41

## 2024-06-28 RX ADMIN — TOPIRAMATE 100 MG: 100 TABLET, FILM COATED ORAL at 20:06

## 2024-06-28 RX ADMIN — ATORVASTATIN CALCIUM 40 MG: 40 TABLET, FILM COATED ORAL at 08:41

## 2024-06-28 RX ADMIN — PANTOPRAZOLE SODIUM 20 MG: 20 TABLET, DELAYED RELEASE ORAL at 08:42

## 2024-06-28 RX ADMIN — OXYCODONE HYDROCHLORIDE 10 MG: 10 TABLET, FILM COATED, EXTENDED RELEASE ORAL at 20:06

## 2024-06-28 RX ADMIN — METFORMIN HYDROCHLORIDE 1000 MG: 500 TABLET ORAL at 17:20

## 2024-06-28 RX ADMIN — OXYCODONE 10 MG: 5 TABLET ORAL at 17:23

## 2024-06-28 ASSESSMENT — PAIN SCALES - GENERAL
PAINLEVEL_OUTOF10: 6
PAINLEVEL_OUTOF10: 7
PAINLEVEL_OUTOF10: 7
PAINLEVEL_OUTOF10: 4
PAINLEVEL_OUTOF10: 6
PAINLEVEL_OUTOF10: 8

## 2024-06-28 ASSESSMENT — PAIN DESCRIPTION - DESCRIPTORS
DESCRIPTORS: STABBING;THROBBING
DESCRIPTORS: ACHING;DISCOMFORT;SORE
DESCRIPTORS: STABBING;THROBBING
DESCRIPTORS: ACHING

## 2024-06-28 ASSESSMENT — PAIN DESCRIPTION - PAIN TYPE: TYPE: ACUTE PAIN;SURGICAL PAIN

## 2024-06-28 ASSESSMENT — PAIN DESCRIPTION - ORIENTATION
ORIENTATION: POSTERIOR;LOWER
ORIENTATION: MID;LOWER

## 2024-06-28 ASSESSMENT — PAIN DESCRIPTION - LOCATION
LOCATION: BACK

## 2024-06-28 ASSESSMENT — PAIN SCALES - WONG BAKER: WONGBAKER_NUMERICALRESPONSE: NO HURT

## 2024-06-28 ASSESSMENT — PAIN - FUNCTIONAL ASSESSMENT: PAIN_FUNCTIONAL_ASSESSMENT: ACTIVITIES ARE NOT PREVENTED

## 2024-06-28 ASSESSMENT — PAIN DESCRIPTION - FREQUENCY: FREQUENCY: INTERMITTENT

## 2024-06-28 ASSESSMENT — PAIN DESCRIPTION - ONSET: ONSET: GRADUAL

## 2024-06-28 NOTE — PROGRESS NOTES
Physical Therapy  Facility/Department: [facility]  Rehabilitation Physical Therapy Treatment Note    NAME: Pretty Covington  : 1960 (64 y.o.)  MRN: [id number]  CODE STATUS: Full Code    Date of Service: 24       Restrictions:  Restrictions/Precautions: Fall Risk, General Precautions, ROM Restrictions, Surgical Protocols  Required Braces or Orthoses  Spinal: Thoracic Lumbar Sacral Orthotics  Spinal Other: TLSO when OOB  Position Activity Restriction  Spinal Precautions: No Bending, No Lifting, No Twisting  Other position/activity restrictions: Ambulate patient; needs TLSO when OOB, can be removed for hygiene per surgeon's office on      SUBJECTIVE  Subjective  Subjective: pt found in w/c  Pain: 6/10 pain in back          OBJECTIVE  Cognition  Overall Cognitive Status: WFL  Orientation  Overall Orientation Status: Within Functional Limits  Orientation Level: Oriented to place;Oriented X4;Oriented to time;Oriented to situation;Oriented to person    Functional Mobility  Balance  Standing Balance: Stand by assistance  Standing Balance  Activity: sBA for static standing while therapist assisted to fix/manage brief and pants  Sit to Stand  Assistance Level: Contact guard assist  Skilled Clinical Factors: pt appears to struggle with transitioning UEs from wc to RW, instability but no overt LOB  Stand to Sit  Assistance Level: Contact guard assist      Environmental Mobility  Ambulation  Surface: Level surface  Device: Rolling walker  Distance: 101 ft + 40 ft  Assistance Level: Contact guard assist  Gait Deviations: Slow arturo;Narrow base of support;Unsteady gait;Decreased weight shift bilateral  Skilled Clinical Factors: cues to remain within RW THERESA and upright posture      PT Exercises  Exercise Treatment: x 5 sit to stands from wc to RW for increased LE strengthening    Pt in gym with next PT for continued therapy  ASSESSMENT/PROGRESS TOWARDS GOALS  Vital Signs  Pulse: 75  Heart Rate Source: Monitor  BP:       Required assistance with yanira care following BM in standing, SBA for balance              Assistance given for donning pants/briefs   Transfers back to WC with RW and SBA      Pt left in WC, call light and other needs left within reach         Goals  Patient Goals   Patient Goals : \"I want to get back to normal\"  Short Term Goals  Time Frame for Short Term Goals: 7/1  Short Term Goal 1: Pt will perfofrm bed mobility with Mod I.  Short Term Goal 2: Pt will perform functional transfers with Mod A and LRAD. - MET 6/27 CGA w RW  Short Term Goal 3: Pt will ambulate 50 ft with TLSO, LRAD, and Mod A. - MET 6/27 CGA w RW  Short Term Goal 4: Pt will propel w/c for 150 ft with Mod I with B UE.  Long Term Goals  Time Frame for Long Term Goals : 7/11  Long Term Goal 1: Pt will ambulate 150 ft with TLSO, LRAD and Mod I.  Long Term Goal 2: Pt will perform functional transfers with Mod I and LRAD.  Long Term Goal 3: Pt will complete car transfer with Mod I and LRAD.  Long Term Goal 4: Pt will complete 6\" curb step with LRAD and Mod I.    PLAN OF CARE/SAFETY  Physical Therapy Plan  General Plan: 5-7 times per week  Current Treatment Recommendations: Balance training;Functional mobility training;Transfer training;Gait training;Safety education & training;Therapeutic activities  Safety Devices  Type of Devices: All fall risk precautions in place;Call light within reach;Gait belt;Patient at risk for falls;Chair alarm in place;Left in chair    EDUCATION  Education  Education Given To: Patient  Education Provided: Mobility Training;Equipment;Precautions;Transfer Training;Safety  Education Provided Comments: safe functional transfer training, more advanced mobility and gait training  Barriers to Learning: None  Education Outcome: Verbalized understanding;Demonstrated understanding        Therapy Time   Individual Concurrent Group Co-treatment   Time In 0900         Time Out 0930         Minutes 30           Timed Code Treatment  Minutes: 30 Minutes       Anika Vázquez PT, 06/28/24 at 1:00 PM       Second Session Therapy Time: Tommy Green PT, DPT for second session only    Individual Concurrent Group Co-treatment   Time In 0930         Time Out 1030         Minutes 60           Timed Code Treatment Minutes:  60    Total Treatment Minutes:  90

## 2024-06-28 NOTE — PROGRESS NOTES
Occupational Therapy  Facility/Department: [facility]  Rehabilitation Occupational Therapy Daily Treatment Note    Date: 24  Patient Name: Pretty Covington       Room: 0152/0152-01  MRN: [id number]  Account: [id number]   : 1960  (64 y.o.) Gender: female                  Past Medical History:  has a past medical history of Asthma, Benign essential hypertension, Benign head tremor, Cholelithiasis, Coronary artery disease involving native coronary artery of native heart, Disease of nasal cavity and sinuses, Fatty infiltration of liver, Hot flash, menopausal, Hx of blood clots, Hyperlipidemia, Hyperlipidemia associated with type 2 diabetes mellitus (HCC), Morbid obesity with BMI of 50.0-59.9, adult (HCC), No history of procedure, ELEAZAR on CPAP, Pulmonary embolism (HCC), Type II or unspecified type diabetes mellitus without mention of complication, not stated as uncontrolled, Umbilical hernia, Unspecified sleep apnea, and Wears glasses.  Past Surgical History:   has a past surgical history that includes Cholecystectomy, laparoscopic (2012); hernia repair (10/11/2013); Colonoscopy (2015); Breast surgery (Right, ); hysteroscopy (2018); Dilation and curettage of uterus (2018); Foot surgery (Right, 3/15/2023); and laminectomy (N/A, 2024).    Restrictions  Restrictions/Precautions: Fall Risk, General Precautions, ROM Restrictions, Surgical Protocols  Other position/activity restrictions: Ambulate patient; needs TLSO when OOB, can be removed for hygiene per surgeon's office on   Required Braces or Orthoses  Spinal: Thoracic Lumbar Sacral Orthotics  Spinal Other: TLSO when OOB  Required Braces or Orthoses?: Yes    Subjective  Subjective: Pt in w/c, pleasant, 8/10 pain in back, sore/aching, agreeable to OT session and able to continue with therapy. RN administered pain medication. /59, HR 90, O2 sats 96% on RA.  Restrictions/Precautions: Fall Risk;General Precautions;ROM  complete FB bathing w/ modA. GOAL MET 6/25/24 Pt completed FB bathing with min A.  Short Term Goal 5: Pt will stand at sink to complete grooming tasks w/ SBA for ~3mins.  Long Term Goals  Time Frame for Long Term Goals : 21 days (7/11/2024): unless otherwise noted  Long Term Goal 1: Pt will complete transfers w/ LRD and SPV.  Long Term Goal 2: Pt will complete toileting w/ SBA and AE PRN.  Long Term Goal 3: Pt will don TLSO w/ increased time and mod. I.  Long Term Goal 4: Pt will complete FB bathing w/ Nohemi and AE PRN.  Long Term Goal 5: Pt will stand at sink to complete grooming tasks w/ SPV for ~6mins.      Therapy Time   Individual Concurrent Group Co-treatment   Time In 1100         Time Out 1130         Minutes 30         Timed Code Treatment Minutes: 30 Minutes     Second Sessio  n Therapy Time:   Individual Concurrent Group Co-treatment   Time In 1230        Time Out 1330        Minutes 60          Timed Code Treatment Minutes:  60 Minutes    Total Treatment Minutes:  90 minutes       Nakita Harris, OT

## 2024-06-28 NOTE — CARE COORDINATION
[facility] Case Management/Follow up:    Chart reviewed. IP day #: 8  Consultants Following: Psychiatry   Discharge date: 07/06/24  Therapy recommendations: 24 hour supervision or assist;Home with Home health   DME needed: CORE[employer], Shower Chair with back   Services set up:  [facility]    Family training set up for 07/01 @ 1300.    Aretha Iraheta RN

## 2024-06-28 NOTE — PLAN OF CARE
Problem: Safety - Adult  Goal: Free from fall injury  Outcome: Progressing     Problem: Safety - Adult  Goal: Free from fall injury  Outcome: Progressing     Problem: Pain  Goal: Verbalizes/displays adequate comfort level or baseline comfort level  6/28/2024 1011 by Calvin Brandt, RN  Outcome: Progressing  6/27/2024 2359 by Noemi Maradiaga, RN  Outcome: Progressing

## 2024-06-28 NOTE — PROGRESS NOTES
Department of Physical Medicine & Rehabilitation  Progress Note    Patient Identification:  Pretty Covington  [id number]  : 1960  Admit date: 2024    Chief Complaint: Late effect of fracture of lumbar vertebra    Subjective:   No acute events overnight. Today Pretty is seen with therapy. She reports that she is feeling well today. She notes that her pain is improving. She denies other acute complaints at this time.     ROS: No f/c, n/v, cp     Objective:  Patient Vitals for the past 24 hrs:   BP Temp Temp src Pulse Resp SpO2 Weight   24 1150 -- -- -- -- 18 -- --   24 0943 -- -- -- -- 18 -- --   24 0911 -- -- -- -- 18 -- --   24 0830 135/80 98 °F (36.7 °C) Oral 75 16 96 % --   24 0606 -- -- -- -- -- -- 117.6 kg (259 lb 3.2 oz)   24 2030 135/76 98.1 °F (36.7 °C) Oral 90 18 97 % --   24 1432 -- -- -- -- 16 -- --   24 1402 -- -- -- -- 16 -- --   24 1249 116/68 -- -- 93 -- 97 % --     Const: Alert. No distress, pleasant.   HEENT: Normocephalic, atraumatic. Normal sclera/conjunctiva. MMM.   CV: RRR  Resp: No respiratory distress. Lungs CTAB  Abd: Soft, nontender, nondistended  Ext: No edema.   Neuro: Alert, oriented, appropriately interactive.   Psych: Cooperative, appropriate mood and affect    Laboratory data: Available via EMR.   Last 24 hour lab  Recent Results (from the past 24 hour(s))   POCT Glucose    Collection Time: 24  3:57 PM   Result Value Ref Range    POC Glucose 167 (H) 70 - 99 mg/dl    Performed on ACCU-CHEK    POCT Glucose    Collection Time: 24  7:37 PM   Result Value Ref Range    POC Glucose 196 (H) 70 - 99 mg/dl    Performed on ACCU-CHEK    POCT Glucose    Collection Time: 24  5:49 AM   Result Value Ref Range    POC Glucose 149 (H) 70 - 99 mg/dl    Performed on ACCU-CHEK    Basic Metabolic Panel w/ Reflex to MG    Collection Time: 24  6:47 AM   Result Value Ref Range    Sodium 140 136 - 145 mmol/L    Potassium reflex  Comments: Zakiya saab; maxA sit<>stand from toilet  Assessment        SLP          Body mass index is 43.13 kg/m².    Assessment and Plan:  Pretty Covington is a 64 year old female with a past medical history significant for HTN, CAD, HLD, DM2, PE on AC, ELEAZAR on CPAP, and morbid obesity who presented to [facility] and a transfer from [facility] with back pain after a fall, found to have L1 chance fracture s/p T11-L3 fixation and fusion. Post operative course was notable for acute blood loss anemia, acute elevation in liver enzymes, and pain. She was admitted to [facility] on 6/20/24 due to functional deficits below her baseline.      Closed Fracture of the Lumbar Vertebral Body, unstable L1 fracture extending into the posterior elements bilaterally  - s/p T11-L3 fixation and fusion 6/14  - TLSO brace when OOB  - multimodal pain control  - PT, OT     Pain  - tylenol PRN, avoiding scheduled due to having elevated LFTs during acute stay  - changed oxycodone every 6 hours to Oxcontin 10 mg q12 hours  - oxycodone 5-10 mg q3 hours PRN for mod to severe pain  - PRN valium for muscle spasms      Hypokalemia  - replace PRN     Hypomagnesemia  - started daily replacement     Acute blood loss anemia  - Hb stable  - monitor and transfuse for Hb<7     Elevated liver enzymes, resolved  - during acute stay, hepatocellular injury pattern, concern that this was related to medications  - avoid nephrotoxins: discontinue scheduled tylenol, ok for PRN tylenol, fenofibrate discontinued, lipitor held during acute stay  - resumed lipitor and monitoring liver function per OSH     DM2  - home regimen: metformin, jardiance, mounjaro   - lantus plus SSI  - resumed home metformin, increased to 1000 mg BID     History of PE  - ok to resume Eliquis 6/21 per Neurosurgery     CAD  - asa  - resumed statin and monitoring LFTs     HTN  - losartan     HLD  - home regimen: lipitor, fenofibrate, vascepa  - can consider resuming lipitor and monitor LFTs  - on  Lovaza since 6/14, substituted for home Vascepa     Thyroid nodule  - incidental finding on neck CT  - needs outpatient ultrasound with PCP     Mood disorder  - on celexa at home  - Psychiatry consulted, appreciate assistance      Morbid obesity  - BMI 45  - complicating assessment and treatment. Placing patient at risk for multiple co-morbidities as well as death and contribuing to the patient's presentation.  - encourage lifestyle modification     Rehab Progress: progressed to mod I for WC mobility  Anticipated Dispo: home  Services/DME: Rolling walker, outpatient therapy.  ELOS: 7/6    Estefani Gilliam MD  PM&R  6/28/2024  11:54 AM

## 2024-06-29 LAB
GLUCOSE BLD-MCNC: 152 MG/DL (ref 70–99)
GLUCOSE BLD-MCNC: 160 MG/DL (ref 70–99)
GLUCOSE BLD-MCNC: 161 MG/DL (ref 70–99)
GLUCOSE BLD-MCNC: 178 MG/DL (ref 70–99)
PERFORMED ON: ABNORMAL

## 2024-06-29 PROCEDURE — 6370000000 HC RX 637 (ALT 250 FOR IP): Performed by: STUDENT IN AN ORGANIZED HEALTH CARE EDUCATION/TRAINING PROGRAM

## 2024-06-29 PROCEDURE — 6370000000 HC RX 637 (ALT 250 FOR IP)

## 2024-06-29 PROCEDURE — 1280000000 HC REHAB R&B

## 2024-06-29 PROCEDURE — 6370000000 HC RX 637 (ALT 250 FOR IP): Performed by: PHYSICAL MEDICINE & REHABILITATION

## 2024-06-29 RX ADMIN — OXYCODONE HYDROCHLORIDE AND ACETAMINOPHEN 500 MG: 500 TABLET ORAL at 08:49

## 2024-06-29 RX ADMIN — POTASSIUM CHLORIDE 20 MEQ: 1500 TABLET, EXTENDED RELEASE ORAL at 08:49

## 2024-06-29 RX ADMIN — Medication 5 MG: at 21:09

## 2024-06-29 RX ADMIN — ASPIRIN 81 MG 81 MG: 81 TABLET ORAL at 08:49

## 2024-06-29 RX ADMIN — ATORVASTATIN CALCIUM 40 MG: 40 TABLET, FILM COATED ORAL at 08:48

## 2024-06-29 RX ADMIN — PANTOPRAZOLE SODIUM 20 MG: 20 TABLET, DELAYED RELEASE ORAL at 08:49

## 2024-06-29 RX ADMIN — OXYCODONE 10 MG: 5 TABLET ORAL at 10:49

## 2024-06-29 RX ADMIN — OMEGA-3-ACID ETHYL ESTERS 1 G: 1 CAPSULE, LIQUID FILLED ORAL at 21:09

## 2024-06-29 RX ADMIN — INSULIN GLARGINE 12 UNITS: 100 INJECTION, SOLUTION SUBCUTANEOUS at 21:18

## 2024-06-29 RX ADMIN — METHOCARBAMOL 750 MG: 750 TABLET ORAL at 13:36

## 2024-06-29 RX ADMIN — TOPIRAMATE 100 MG: 100 TABLET, FILM COATED ORAL at 21:09

## 2024-06-29 RX ADMIN — OXYCODONE 10 MG: 5 TABLET ORAL at 14:17

## 2024-06-29 RX ADMIN — Medication 1 TABLET: at 08:48

## 2024-06-29 RX ADMIN — METHOCARBAMOL 750 MG: 750 TABLET ORAL at 21:10

## 2024-06-29 RX ADMIN — OXYCODONE HYDROCHLORIDE 10 MG: 10 TABLET, FILM COATED, EXTENDED RELEASE ORAL at 21:09

## 2024-06-29 RX ADMIN — APIXABAN 5 MG: 5 TABLET, FILM COATED ORAL at 21:10

## 2024-06-29 RX ADMIN — OXYCODONE 10 MG: 5 TABLET ORAL at 17:24

## 2024-06-29 RX ADMIN — DIAZEPAM 5 MG: 5 TABLET ORAL at 21:10

## 2024-06-29 RX ADMIN — OXYCODONE HYDROCHLORIDE 10 MG: 10 TABLET, FILM COATED, EXTENDED RELEASE ORAL at 08:49

## 2024-06-29 RX ADMIN — FERROUS SULFATE TAB 325 MG (65 MG ELEMENTAL FE) 325 MG: 325 (65 FE) TAB at 08:49

## 2024-06-29 RX ADMIN — CITALOPRAM HYDROBROMIDE 40 MG: 20 TABLET ORAL at 21:10

## 2024-06-29 RX ADMIN — LOSARTAN POTASSIUM 50 MG: 25 TABLET, FILM COATED ORAL at 08:48

## 2024-06-29 RX ADMIN — ARIPIPRAZOLE 5 MG: 5 TABLET ORAL at 08:49

## 2024-06-29 RX ADMIN — METFORMIN HYDROCHLORIDE 1000 MG: 500 TABLET ORAL at 17:24

## 2024-06-29 RX ADMIN — Medication 400 MG: at 08:48

## 2024-06-29 RX ADMIN — METHOCARBAMOL 750 MG: 750 TABLET ORAL at 17:24

## 2024-06-29 RX ADMIN — APIXABAN 5 MG: 5 TABLET, FILM COATED ORAL at 08:48

## 2024-06-29 RX ADMIN — METFORMIN HYDROCHLORIDE 1000 MG: 500 TABLET ORAL at 08:48

## 2024-06-29 RX ADMIN — METHOCARBAMOL 750 MG: 750 TABLET ORAL at 08:48

## 2024-06-29 RX ADMIN — OMEGA-3-ACID ETHYL ESTERS 1 G: 1 CAPSULE, LIQUID FILLED ORAL at 08:48

## 2024-06-29 ASSESSMENT — PAIN DESCRIPTION - DESCRIPTORS
DESCRIPTORS: ACHING;DISCOMFORT
DESCRIPTORS: ACHING;DISCOMFORT
DESCRIPTORS: ACHING

## 2024-06-29 ASSESSMENT — PAIN DESCRIPTION - LOCATION
LOCATION: BACK

## 2024-06-29 ASSESSMENT — PAIN DESCRIPTION - FREQUENCY
FREQUENCY: INTERMITTENT
FREQUENCY: CONTINUOUS
FREQUENCY: INTERMITTENT

## 2024-06-29 ASSESSMENT — PAIN SCALES - GENERAL
PAINLEVEL_OUTOF10: 2
PAINLEVEL_OUTOF10: 7
PAINLEVEL_OUTOF10: 0
PAINLEVEL_OUTOF10: 7
PAINLEVEL_OUTOF10: 8
PAINLEVEL_OUTOF10: 6
PAINLEVEL_OUTOF10: 7

## 2024-06-29 ASSESSMENT — PAIN - FUNCTIONAL ASSESSMENT
PAIN_FUNCTIONAL_ASSESSMENT: ACTIVITIES ARE NOT PREVENTED

## 2024-06-29 ASSESSMENT — PAIN DESCRIPTION - PAIN TYPE
TYPE: ACUTE PAIN;SURGICAL PAIN
TYPE: ACUTE PAIN;SURGICAL PAIN

## 2024-06-29 ASSESSMENT — PAIN DESCRIPTION - ORIENTATION
ORIENTATION: MID;LOWER
ORIENTATION: MID
ORIENTATION: LOWER

## 2024-06-29 ASSESSMENT — PAIN DESCRIPTION - ONSET: ONSET: ON-GOING

## 2024-06-29 NOTE — PROGRESS NOTES
Department of Physical Medicine & Rehabilitation  Progress Note    Patient Identification:  Pretty Covington  [id number]  : 1960  Admit date: 2024    Chief Complaint: Late effect of fracture of lumbar vertebra    Subjective:   No acute events overnight. Today Pretty is seen in her room, resting in bed. She reports back pain. She denies other acute complaints at this time.     ROS: No f/c, n/v, cp     Objective:  Patient Vitals for the past 24 hrs:   BP Temp Temp src Pulse Resp SpO2 Weight   24 0843 (!) 121/58 98.2 °F (36.8 °C) Oral 81 18 95 % --   24 0600 -- -- -- -- -- -- 117.4 kg (258 lb 13.1 oz)   24 1945 (!) 153/79 98.3 °F (36.8 °C) Oral 89 18 97 % --   24 1250 135/80 -- -- 75 -- 96 % --   24 1150 -- -- -- -- 18 -- --   24 0943 -- -- -- -- 18 -- --     Const: Alert. No distress, pleasant.   HEENT: Normocephalic, atraumatic. Normal sclera/conjunctiva. MMM.   CV: extremities well perfused  Resp: No respiratory distress.  Abd: Soft, nontender, nondistended  Ext: No edema.   Neuro: Alert, oriented, appropriately interactive.   Psych: Cooperative, appropriate mood and affect    Laboratory data: Available via EMR.   Last 24 hour lab  Recent Results (from the past 24 hour(s))   POCT Glucose    Collection Time: 24 10:46 AM   Result Value Ref Range    POC Glucose 160 (H) 70 - 99 mg/dl    Performed on ACCU-CHEK    POCT Glucose    Collection Time: 24  3:33 PM   Result Value Ref Range    POC Glucose 196 (H) 70 - 99 mg/dl    Performed on ACCU-CHEK    POCT Glucose    Collection Time: 24  7:24 PM   Result Value Ref Range    POC Glucose 158 (H) 70 - 99 mg/dl    Performed on ACCU-CHEK    POCT Glucose    Collection Time: 24  5:52 AM   Result Value Ref Range    POC Glucose 152 (H) 70 - 99 mg/dl    Performed on ACCU-CHEK        Therapy progress:  PT  Required Braces or Orthoses  Spinal: Thoracic Lumbar Sacral Orthotics  Spinal Other: TLSO when OOB  Position

## 2024-06-30 VITALS
HEIGHT: 65 IN | HEART RATE: 79 BPM | DIASTOLIC BLOOD PRESSURE: 67 MMHG | SYSTOLIC BLOOD PRESSURE: 114 MMHG | RESPIRATION RATE: 18 BRPM | BODY MASS INDEX: 43.3 KG/M2 | TEMPERATURE: 97.7 F | OXYGEN SATURATION: 98 % | WEIGHT: 259.9 LBS

## 2024-06-30 LAB
GLUCOSE BLD-MCNC: 158 MG/DL (ref 70–99)
GLUCOSE BLD-MCNC: 172 MG/DL (ref 70–99)
GLUCOSE BLD-MCNC: 194 MG/DL (ref 70–99)
GLUCOSE BLD-MCNC: 197 MG/DL (ref 70–99)
PERFORMED ON: ABNORMAL

## 2024-06-30 PROCEDURE — 6370000000 HC RX 637 (ALT 250 FOR IP)

## 2024-06-30 PROCEDURE — 6370000000 HC RX 637 (ALT 250 FOR IP): Performed by: PHYSICAL MEDICINE & REHABILITATION

## 2024-06-30 PROCEDURE — 6370000000 HC RX 637 (ALT 250 FOR IP): Performed by: STUDENT IN AN ORGANIZED HEALTH CARE EDUCATION/TRAINING PROGRAM

## 2024-06-30 PROCEDURE — 1280000000 HC REHAB R&B

## 2024-06-30 RX ADMIN — METFORMIN HYDROCHLORIDE 1000 MG: 500 TABLET ORAL at 16:45

## 2024-06-30 RX ADMIN — METHOCARBAMOL 750 MG: 750 TABLET ORAL at 16:45

## 2024-06-30 RX ADMIN — APIXABAN 5 MG: 5 TABLET, FILM COATED ORAL at 08:53

## 2024-06-30 RX ADMIN — CITALOPRAM HYDROBROMIDE 40 MG: 20 TABLET ORAL at 20:13

## 2024-06-30 RX ADMIN — ASPIRIN 81 MG 81 MG: 81 TABLET ORAL at 08:53

## 2024-06-30 RX ADMIN — OXYCODONE 10 MG: 5 TABLET ORAL at 12:54

## 2024-06-30 RX ADMIN — Medication 5 MG: at 20:13

## 2024-06-30 RX ADMIN — OMEGA-3-ACID ETHYL ESTERS 1 G: 1 CAPSULE, LIQUID FILLED ORAL at 20:13

## 2024-06-30 RX ADMIN — ATORVASTATIN CALCIUM 40 MG: 40 TABLET, FILM COATED ORAL at 08:52

## 2024-06-30 RX ADMIN — OMEGA-3-ACID ETHYL ESTERS 1 G: 1 CAPSULE, LIQUID FILLED ORAL at 08:52

## 2024-06-30 RX ADMIN — OXYCODONE HYDROCHLORIDE 10 MG: 10 TABLET, FILM COATED, EXTENDED RELEASE ORAL at 20:13

## 2024-06-30 RX ADMIN — Medication 1 TABLET: at 08:52

## 2024-06-30 RX ADMIN — METFORMIN HYDROCHLORIDE 1000 MG: 500 TABLET ORAL at 08:52

## 2024-06-30 RX ADMIN — PANTOPRAZOLE SODIUM 20 MG: 20 TABLET, DELAYED RELEASE ORAL at 08:52

## 2024-06-30 RX ADMIN — METHOCARBAMOL 750 MG: 750 TABLET ORAL at 13:39

## 2024-06-30 RX ADMIN — OXYCODONE HYDROCHLORIDE AND ACETAMINOPHEN 500 MG: 500 TABLET ORAL at 08:53

## 2024-06-30 RX ADMIN — DIAZEPAM 5 MG: 5 TABLET ORAL at 20:13

## 2024-06-30 RX ADMIN — APIXABAN 5 MG: 5 TABLET, FILM COATED ORAL at 20:13

## 2024-06-30 RX ADMIN — Medication 400 MG: at 08:52

## 2024-06-30 RX ADMIN — ARIPIPRAZOLE 5 MG: 5 TABLET ORAL at 08:53

## 2024-06-30 RX ADMIN — INSULIN GLARGINE 12 UNITS: 100 INJECTION, SOLUTION SUBCUTANEOUS at 20:13

## 2024-06-30 RX ADMIN — TOPIRAMATE 100 MG: 100 TABLET, FILM COATED ORAL at 20:13

## 2024-06-30 RX ADMIN — OXYCODONE 10 MG: 5 TABLET ORAL at 16:45

## 2024-06-30 RX ADMIN — LOSARTAN POTASSIUM 50 MG: 25 TABLET, FILM COATED ORAL at 08:53

## 2024-06-30 RX ADMIN — POTASSIUM CHLORIDE 20 MEQ: 1500 TABLET, EXTENDED RELEASE ORAL at 08:53

## 2024-06-30 RX ADMIN — OXYCODONE HYDROCHLORIDE 10 MG: 10 TABLET, FILM COATED, EXTENDED RELEASE ORAL at 08:53

## 2024-06-30 RX ADMIN — METHOCARBAMOL 750 MG: 750 TABLET ORAL at 08:53

## 2024-06-30 RX ADMIN — FERROUS SULFATE TAB 325 MG (65 MG ELEMENTAL FE) 325 MG: 325 (65 FE) TAB at 08:53

## 2024-06-30 RX ADMIN — METHOCARBAMOL 750 MG: 750 TABLET ORAL at 20:13

## 2024-06-30 ASSESSMENT — PAIN SCALES - GENERAL
PAINLEVEL_OUTOF10: 0
PAINLEVEL_OUTOF10: 2
PAINLEVEL_OUTOF10: 7
PAINLEVEL_OUTOF10: 2
PAINLEVEL_OUTOF10: 2
PAINLEVEL_OUTOF10: 8

## 2024-06-30 ASSESSMENT — PAIN DESCRIPTION - ONSET
ONSET: ON-GOING

## 2024-06-30 ASSESSMENT — PAIN DESCRIPTION - ORIENTATION
ORIENTATION: LOWER

## 2024-06-30 ASSESSMENT — PAIN - FUNCTIONAL ASSESSMENT
PAIN_FUNCTIONAL_ASSESSMENT: ACTIVITIES ARE NOT PREVENTED

## 2024-06-30 ASSESSMENT — PAIN DESCRIPTION - PAIN TYPE
TYPE: ACUTE PAIN;SURGICAL PAIN

## 2024-06-30 ASSESSMENT — PAIN DESCRIPTION - DESCRIPTORS
DESCRIPTORS: ACHING
DESCRIPTORS: ACHING;DISCOMFORT
DESCRIPTORS: ACHING
DESCRIPTORS: ACHING

## 2024-06-30 ASSESSMENT — PAIN DESCRIPTION - LOCATION
LOCATION: BACK

## 2024-06-30 ASSESSMENT — PAIN DESCRIPTION - FREQUENCY
FREQUENCY: CONTINUOUS

## 2024-06-30 NOTE — PROGRESS NOTES
Department of Physical Medicine & Rehabilitation  Progress Note    Patient Identification:  Pretty Covington  [id number]  : 1960  Admit date: 2024    Chief Complaint: Late effect of fracture of lumbar vertebra    Subjective:   No acute events overnight. Today Pretty is seen in her room washing up. She reports continued pain, but notes that it is manageable. She and her sister have questions about equipment for discharge. Discussed that we can ask therapy tomorrow.     ROS: No f/c, n/v, cp     Objective:  Patient Vitals for the past 24 hrs:   BP Temp Temp src Pulse Resp SpO2 Weight   24 0847 123/68 97.8 °F (36.6 °C) Oral 84 18 94 % --   24 0600 -- -- -- -- -- -- 117.9 kg (259 lb 14.4 oz)   24 2139 -- -- -- -- 18 -- --   24 2100 133/72 97.7 °F (36.5 °C) Oral 87 18 94 % --   24 1724 -- -- -- -- 18 -- --   24 1119 -- -- -- -- 16 -- --   24 1049 -- -- -- -- 18 -- --     Const: Alert. No distress, pleasant. Seated up in wheelchair  HEENT: Normocephalic, atraumatic. Normal sclera/conjunctiva. MMM.   CV: extremities well perfused  Resp: No respiratory distress.  Abd: Soft, nondistended  Ext: No edema.   Neuro: Alert, oriented, appropriately interactive.   Psych: Cooperative, appropriate mood and affect    Laboratory data: Available via EMR.   Last 24 hour lab  Recent Results (from the past 24 hour(s))   POCT Glucose    Collection Time: 24 11:27 AM   Result Value Ref Range    POC Glucose 178 (H) 70 - 99 mg/dl    Performed on ACCU-CHEK    POCT Glucose    Collection Time: 24  3:54 PM   Result Value Ref Range    POC Glucose 161 (H) 70 - 99 mg/dl    Performed on ACCU-CHEK    POCT Glucose    Collection Time: 24  9:17 PM   Result Value Ref Range    POC Glucose 160 (H) 70 - 99 mg/dl    Performed on ACCU-CHEK    POCT Glucose    Collection Time: 24  5:54 AM   Result Value Ref Range    POC Glucose 194 (H) 70 - 99 mg/dl    Performed on ACCU-CHEK        Therapy

## 2024-07-01 LAB
ALBUMIN SERPL-MCNC: 3.3 G/DL (ref 3.4–5)
ALP SERPL-CCNC: 100 U/L (ref 40–129)
ALT SERPL-CCNC: 8 U/L (ref 10–40)
ANION GAP SERPL CALCULATED.3IONS-SCNC: 10 MMOL/L (ref 3–16)
AST SERPL-CCNC: 17 U/L (ref 15–37)
BASOPHILS # BLD: 0.1 K/UL (ref 0–0.2)
BASOPHILS NFR BLD: 1.5 %
BILIRUB DIRECT SERPL-MCNC: <0.2 MG/DL (ref 0–0.3)
BILIRUB INDIRECT SERPL-MCNC: ABNORMAL MG/DL (ref 0–1)
BILIRUB SERPL-MCNC: 0.4 MG/DL (ref 0–1)
BUN SERPL-MCNC: 13 MG/DL (ref 7–20)
CALCIUM SERPL-MCNC: 9.2 MG/DL (ref 8.3–10.6)
CHLORIDE SERPL-SCNC: 107 MMOL/L (ref 99–110)
CO2 SERPL-SCNC: 28 MMOL/L (ref 21–32)
CREAT SERPL-MCNC: <0.5 MG/DL (ref 0.6–1.2)
DEPRECATED RDW RBC AUTO: 15.6 % (ref 12.4–15.4)
EOSINOPHIL # BLD: 0.1 K/UL (ref 0–0.6)
EOSINOPHIL NFR BLD: 3.5 %
GFR SERPLBLD CREATININE-BSD FMLA CKD-EPI: >90 ML/MIN/{1.73_M2}
GLUCOSE BLD-MCNC: 122 MG/DL (ref 70–99)
GLUCOSE BLD-MCNC: 137 MG/DL (ref 70–99)
GLUCOSE BLD-MCNC: 169 MG/DL (ref 70–99)
GLUCOSE BLD-MCNC: 193 MG/DL (ref 70–99)
GLUCOSE SERPL-MCNC: 142 MG/DL (ref 70–99)
HCT VFR BLD AUTO: 29.3 % (ref 36–48)
HGB BLD-MCNC: 9.8 G/DL (ref 12–16)
LYMPHOCYTES # BLD: 1.1 K/UL (ref 1–5.1)
LYMPHOCYTES NFR BLD: 28.3 %
MCH RBC QN AUTO: 30.7 PG (ref 26–34)
MCHC RBC AUTO-ENTMCNC: 33.6 G/DL (ref 31–36)
MCV RBC AUTO: 91.4 FL (ref 80–100)
MONOCYTES # BLD: 0.4 K/UL (ref 0–1.3)
MONOCYTES NFR BLD: 10.1 %
NEUTROPHILS # BLD: 2.2 K/UL (ref 1.7–7.7)
NEUTROPHILS NFR BLD: 56.6 %
PERFORMED ON: ABNORMAL
PLATELET # BLD AUTO: 268 K/UL (ref 135–450)
PMV BLD AUTO: 6.9 FL (ref 5–10.5)
POTASSIUM SERPL-SCNC: 4.2 MMOL/L (ref 3.5–5.1)
PROT SERPL-MCNC: 6.2 G/DL (ref 6.4–8.2)
RBC # BLD AUTO: 3.2 M/UL (ref 4–5.2)
SODIUM SERPL-SCNC: 145 MMOL/L (ref 136–145)
WBC # BLD AUTO: 3.8 K/UL (ref 4–11)

## 2024-07-01 PROCEDURE — 6370000000 HC RX 637 (ALT 250 FOR IP): Performed by: PHYSICAL MEDICINE & REHABILITATION

## 2024-07-01 PROCEDURE — 97110 THERAPEUTIC EXERCISES: CPT

## 2024-07-01 PROCEDURE — 97530 THERAPEUTIC ACTIVITIES: CPT

## 2024-07-01 PROCEDURE — 80048 BASIC METABOLIC PNL TOTAL CA: CPT

## 2024-07-01 PROCEDURE — 97535 SELF CARE MNGMENT TRAINING: CPT

## 2024-07-01 PROCEDURE — 80076 HEPATIC FUNCTION PANEL: CPT

## 2024-07-01 PROCEDURE — 6370000000 HC RX 637 (ALT 250 FOR IP)

## 2024-07-01 PROCEDURE — 36415 COLL VENOUS BLD VENIPUNCTURE: CPT

## 2024-07-01 PROCEDURE — 85025 COMPLETE CBC W/AUTO DIFF WBC: CPT

## 2024-07-01 PROCEDURE — 97116 GAIT TRAINING THERAPY: CPT

## 2024-07-01 PROCEDURE — 6370000000 HC RX 637 (ALT 250 FOR IP): Performed by: STUDENT IN AN ORGANIZED HEALTH CARE EDUCATION/TRAINING PROGRAM

## 2024-07-01 PROCEDURE — 1280000000 HC REHAB R&B

## 2024-07-01 RX ORDER — ACETAMINOPHEN 500 MG
1000 TABLET ORAL EVERY 8 HOURS SCHEDULED
Status: DISCONTINUED | OUTPATIENT
Start: 2024-07-01 | End: 2024-07-06 | Stop reason: HOSPADM

## 2024-07-01 RX ADMIN — Medication 1 TABLET: at 08:01

## 2024-07-01 RX ADMIN — OXYCODONE 10 MG: 5 TABLET ORAL at 12:17

## 2024-07-01 RX ADMIN — METHOCARBAMOL 750 MG: 750 TABLET ORAL at 08:03

## 2024-07-01 RX ADMIN — ASPIRIN 81 MG 81 MG: 81 TABLET ORAL at 08:01

## 2024-07-01 RX ADMIN — ARIPIPRAZOLE 5 MG: 5 TABLET ORAL at 08:02

## 2024-07-01 RX ADMIN — FERROUS SULFATE TAB 325 MG (65 MG ELEMENTAL FE) 325 MG: 325 (65 FE) TAB at 08:01

## 2024-07-01 RX ADMIN — Medication 400 MG: at 08:00

## 2024-07-01 RX ADMIN — OMEGA-3-ACID ETHYL ESTERS 1 G: 1 CAPSULE, LIQUID FILLED ORAL at 19:49

## 2024-07-01 RX ADMIN — CITALOPRAM HYDROBROMIDE 40 MG: 20 TABLET ORAL at 19:49

## 2024-07-01 RX ADMIN — POTASSIUM CHLORIDE 20 MEQ: 1500 TABLET, EXTENDED RELEASE ORAL at 08:01

## 2024-07-01 RX ADMIN — APIXABAN 5 MG: 5 TABLET, FILM COATED ORAL at 19:48

## 2024-07-01 RX ADMIN — Medication 5 MG: at 19:48

## 2024-07-01 RX ADMIN — OXYCODONE HYDROCHLORIDE AND ACETAMINOPHEN 500 MG: 500 TABLET ORAL at 08:01

## 2024-07-01 RX ADMIN — METFORMIN HYDROCHLORIDE 1000 MG: 500 TABLET ORAL at 08:01

## 2024-07-01 RX ADMIN — APIXABAN 5 MG: 5 TABLET, FILM COATED ORAL at 08:01

## 2024-07-01 RX ADMIN — TOPIRAMATE 100 MG: 100 TABLET, FILM COATED ORAL at 19:48

## 2024-07-01 RX ADMIN — ATORVASTATIN CALCIUM 40 MG: 40 TABLET, FILM COATED ORAL at 08:01

## 2024-07-01 RX ADMIN — ACETAMINOPHEN 1000 MG: 500 TABLET ORAL at 12:17

## 2024-07-01 RX ADMIN — INSULIN GLARGINE 12 UNITS: 100 INJECTION, SOLUTION SUBCUTANEOUS at 19:47

## 2024-07-01 RX ADMIN — METHOCARBAMOL 750 MG: 750 TABLET ORAL at 12:17

## 2024-07-01 RX ADMIN — OMEGA-3-ACID ETHYL ESTERS 1 G: 1 CAPSULE, LIQUID FILLED ORAL at 08:01

## 2024-07-01 RX ADMIN — LOSARTAN POTASSIUM 50 MG: 25 TABLET, FILM COATED ORAL at 08:02

## 2024-07-01 RX ADMIN — OXYCODONE HYDROCHLORIDE 10 MG: 10 TABLET, FILM COATED, EXTENDED RELEASE ORAL at 19:48

## 2024-07-01 RX ADMIN — METHOCARBAMOL 750 MG: 750 TABLET ORAL at 16:33

## 2024-07-01 RX ADMIN — METHOCARBAMOL 750 MG: 750 TABLET ORAL at 19:48

## 2024-07-01 RX ADMIN — ACETAMINOPHEN 1000 MG: 500 TABLET ORAL at 21:04

## 2024-07-01 RX ADMIN — PANTOPRAZOLE SODIUM 20 MG: 20 TABLET, DELAYED RELEASE ORAL at 08:01

## 2024-07-01 RX ADMIN — OXYCODONE HYDROCHLORIDE 10 MG: 10 TABLET, FILM COATED, EXTENDED RELEASE ORAL at 08:01

## 2024-07-01 RX ADMIN — DIAZEPAM 5 MG: 5 TABLET ORAL at 19:49

## 2024-07-01 RX ADMIN — METFORMIN HYDROCHLORIDE 1000 MG: 500 TABLET ORAL at 16:33

## 2024-07-01 ASSESSMENT — PAIN DESCRIPTION - PAIN TYPE: TYPE: ACUTE PAIN;SURGICAL PAIN

## 2024-07-01 ASSESSMENT — PAIN DESCRIPTION - LOCATION
LOCATION: BACK
LOCATION: BACK

## 2024-07-01 ASSESSMENT — PAIN DESCRIPTION - DESCRIPTORS
DESCRIPTORS: STABBING;ACHING
DESCRIPTORS: ACHING;DISCOMFORT;SORE

## 2024-07-01 ASSESSMENT — PAIN - FUNCTIONAL ASSESSMENT
PAIN_FUNCTIONAL_ASSESSMENT: PREVENTS OR INTERFERES SOME ACTIVE ACTIVITIES AND ADLS
PAIN_FUNCTIONAL_ASSESSMENT: ACTIVITIES ARE NOT PREVENTED

## 2024-07-01 ASSESSMENT — PAIN SCALES - WONG BAKER: WONGBAKER_NUMERICALRESPONSE: NO HURT

## 2024-07-01 ASSESSMENT — PAIN SCALES - GENERAL
PAINLEVEL_OUTOF10: 7
PAINLEVEL_OUTOF10: 7
PAINLEVEL_OUTOF10: 8
PAINLEVEL_OUTOF10: 6

## 2024-07-01 ASSESSMENT — PAIN DESCRIPTION - FREQUENCY: FREQUENCY: INTERMITTENT

## 2024-07-01 ASSESSMENT — PAIN DESCRIPTION - ORIENTATION
ORIENTATION: LOWER;POSTERIOR
ORIENTATION: MID;LOWER

## 2024-07-01 ASSESSMENT — PAIN DESCRIPTION - ONSET: ONSET: GRADUAL

## 2024-07-01 NOTE — PROGRESS NOTES
Collection Time: 07/01/24  6:29 AM   Result Value Ref Range    Sodium 145 136 - 145 mmol/L    Potassium reflex Magnesium 4.2 3.5 - 5.1 mmol/L    Chloride 107 99 - 110 mmol/L    CO2 28 21 - 32 mmol/L    Anion Gap 10 3 - 16    Glucose 142 (H) 70 - 99 mg/dL    BUN 13 7 - 20 mg/dL    Creatinine <0.5 (L) 0.6 - 1.2 mg/dL    Est, Glom Filt Rate >90 >60    Calcium 9.2 8.3 - 10.6 mg/dL   CBC with Auto Differential    Collection Time: 07/01/24  6:29 AM   Result Value Ref Range    WBC 3.8 (L) 4.0 - 11.0 K/uL    RBC 3.20 (L) 4.00 - 5.20 M/uL    Hemoglobin 9.8 (L) 12.0 - 16.0 g/dL    Hematocrit 29.3 (L) 36.0 - 48.0 %    MCV 91.4 80.0 - 100.0 fL    MCH 30.7 26.0 - 34.0 pg    MCHC 33.6 31.0 - 36.0 g/dL    RDW 15.6 (H) 12.4 - 15.4 %    Platelets 268 135 - 450 K/uL    MPV 6.9 5.0 - 10.5 fL    Neutrophils % 56.6 %    Lymphocytes % 28.3 %    Monocytes % 10.1 %    Eosinophils % 3.5 %    Basophils % 1.5 %    Neutrophils Absolute 2.2 1.7 - 7.7 K/uL    Lymphocytes Absolute 1.1 1.0 - 5.1 K/uL    Monocytes Absolute 0.4 0.0 - 1.3 K/uL    Eosinophils Absolute 0.1 0.0 - 0.6 K/uL    Basophils Absolute 0.1 0.0 - 0.2 K/uL   Hepatic Function Panel    Collection Time: 07/01/24  6:29 AM   Result Value Ref Range    Total Protein 6.2 (L) 6.4 - 8.2 g/dL    Albumin 3.3 (L) 3.4 - 5.0 g/dL    Alkaline Phosphatase 100 40 - 129 U/L    ALT 8 (L) 10 - 40 U/L    AST 17 15 - 37 U/L    Total Bilirubin 0.4 0.0 - 1.0 mg/dL    Bilirubin, Direct <0.2 0.0 - 0.3 mg/dL    Bilirubin, Indirect see below 0.0 - 1.0 mg/dL       Therapy progress:  PT  Required Braces or Orthoses  Spinal: Thoracic Lumbar Sacral Orthotics  Spinal Other: TLSO when OOB  Position Activity Restriction  Spinal Precautions: No Bending, No Lifting, No Twisting  Other position/activity restrictions: Ambulate patient; needs TLSO when OOB, can be removed for hygiene per surgeon's office on 6/24  Objective     Sit to Stand: Maximum Assistance, 2 Person Assistance (Max A x2 from EOB to stedy and  resumed lipitor and monitoring liver function per OSH     DM2  - home regimen: metformin, jardiance, mounjaro   - lantus plus SSI  - resumed home metformin, increased to 1000 mg BID     History of PE  - ok to resume Eliquis 6/21 per Neurosurgery     CAD  - asa  - resumed statin and monitoring LFTs     HTN  - losartan     HLD  - home regimen: lipitor, fenofibrate, vascepa  - can consider resuming lipitor and monitor LFTs  - on Lovaza since 6/14, substituted for home Vascepa     Thyroid nodule  - incidental finding on neck CT  - needs outpatient ultrasound with PCP     Mood disorder  - on celexa at home  - Psychiatry consulted, appreciate assistance. Started abilify 5 mg daily     Morbid obesity  - BMI 45  - complicating assessment and treatment. Placing patient at risk for multiple co-morbidities as well as death and contribuing to the patient's presentation.  - encourage lifestyle modification     Rehab Progress: pending therapies today  Anticipated Dispo: home  Services/DME: Rolling walker, outpatient therapy.  ELOS: 7/6    Estefani Gilliam MD  PM&R  7/1/2024  9:47 AM

## 2024-07-01 NOTE — PROGRESS NOTES
Occupational Therapy  Facility/Department: [facility]  Rehabilitation Occupational Therapy Daily Treatment Note    Date: 24  Patient Name: Pretty Covington       Room: 0152/0152-01  MRN: [id number]  Account: [id number]   : 1960  (64 y.o.) Gender: female                    Past Medical History:  has a past medical history of Asthma, Benign essential hypertension, Benign head tremor, Cholelithiasis, Coronary artery disease involving native coronary artery of native heart, Disease of nasal cavity and sinuses, Fatty infiltration of liver, Hot flash, menopausal, Hx of blood clots, Hyperlipidemia, Hyperlipidemia associated with type 2 diabetes mellitus (HCC), Morbid obesity with BMI of 50.0-59.9, adult (HCC), No history of procedure, ELEAZAR on CPAP, Pulmonary embolism (HCC), Type II or unspecified type diabetes mellitus without mention of complication, not stated as uncontrolled, Umbilical hernia, Unspecified sleep apnea, and Wears glasses.  Past Surgical History:   has a past surgical history that includes Cholecystectomy, laparoscopic (2012); hernia repair (10/11/2013); Colonoscopy (2015); Breast surgery (Right, ); hysteroscopy (2018); Dilation and curettage of uterus (2018); Foot surgery (Right, 3/15/2023); and laminectomy (N/A, 2024).    Restrictions  Restrictions/Precautions: Fall Risk, General Precautions, ROM Restrictions, Surgical Protocols  Other position/activity restrictions: Ambulate patient; needs TLSO when OOB, can be removed for hygiene per surgeon's office on   Required Braces or Orthoses  Spinal: Thoracic Lumbar Sacral Orthotics  Spinal Other: TLSO when OOB  Required Braces or Orthoses?: Yes    Subjective  Subjective: Pt seated in wc with family present, agreeable to OT tx/family training. Vitals WFL, denies pain at rest  Restrictions/Precautions: Fall Risk;General Precautions;ROM Restrictions;Surgical Protocols             Objective     Cognition  Overall

## 2024-07-01 NOTE — PLAN OF CARE
Problem: Safety - Adult  Goal: Free from fall injury  7/1/2024 1258 by Calvin Brandt RN  Outcome: Progressing  6/30/2024 2342 by Maranda Crouch RN  Outcome: Progressing  Note: Pt. Free from falls or self injury at this time. Falls risk protocol maintained. Call light within reach.      Problem: Pain  Goal: Verbalizes/displays adequate comfort level or baseline comfort level  7/1/2024 1258 by Calvin Brandt RN  Outcome: Progressing  6/30/2024 2342 by Maranda Crouch RN  Outcome: Progressing  Note: Pain assessment completed. Nonpharmacological methods offered prior to and during times of pain. Medicated per orders as necessary.

## 2024-07-01 NOTE — CARE COORDINATION
[facility] Case Management/Follow up:     Chart reviewed. IP day #: 11  Consultants Following: Psychiatry   Discharge date: 07/06/24  Therapy recommendations: 24 hour supervision or assist;Home with Home health   DME needed: COREY, Shower Chair with back   Services set up:  [facility] following     Family training set up for 07/01 @ 1300.     Aretha Iraheta RN   No

## 2024-07-01 NOTE — PROGRESS NOTES
Physical Therapy  Facility/Department: [facility]  Rehabilitation Physical Therapy Treatment Note    NAME: Pretty Covington  : 1960 (64 y.o.)  MRN: [id number]  CODE STATUS: Full Code    Date of Service: 24       Restrictions:  Restrictions/Precautions: Fall Risk, General Precautions, ROM Restrictions, Surgical Protocols  Required Braces or Orthoses  Spinal: Thoracic Lumbar Sacral Orthotics  Spinal Other: TLSO when OOB  Position Activity Restriction  Spinal Precautions: No Bending, No Lifting, No Twisting  Other position/activity restrictions: Ambulate patient; needs TLSO when OOB, can be removed for hygiene per surgeon's office on      SUBJECTIVE  Subjective  Subjective: pt found in w/c       OBJECTIVE  Cognition  Overall Cognitive Status: WFL  Orientation  Overall Orientation Status: Within Functional Limits    Functional Mobility  Sit to Stand  Assistance Level: Stand by assist  Skilled Clinical Factors: from w/c  Stand to Sit  Assistance Level: Stand by assist      Environmental Mobility  Ambulation  Surface: Level surface  Device: Rolling walker  Distance: 182 ft  Assistance Level: Contact guard assist;Stand by assist  Gait Deviations: Slow arturo;Narrow base of support;Unsteady gait;Decreased weight shift bilateral  Skilled Clinical Factors: cues to remain within RW THERESA and upright posture    Pt ascended/descended 2\" step to ascend/descend curb step with BUE support to step onto standing scale.    PT Exercises  Exercise Treatment: pt dons 1.5# weights, completed 15 reps of BLE seated ankle pumps, LAQ, marches, hip abduction with TKE    Pt in w/c at end of session with call light/needs within reach and alarm donned    2ND SESSION (FAMILY TRAINING):  Therapeutic Activity:  Supine <> sit without bedrail CGA, with bedrail Mod I - recommended bedrail for home bed  Car transfer Mod I  Wheelchair mobility 150ft with BUE/BLE as needed and Mod I    Therapeutic Exercise:  15 reps with 1.5# ankle weights: LAQ,

## 2024-07-02 LAB
GLUCOSE BLD-MCNC: 132 MG/DL (ref 70–99)
GLUCOSE BLD-MCNC: 145 MG/DL (ref 70–99)
GLUCOSE BLD-MCNC: 167 MG/DL (ref 70–99)
GLUCOSE BLD-MCNC: 178 MG/DL (ref 70–99)
PERFORMED ON: ABNORMAL

## 2024-07-02 PROCEDURE — 97530 THERAPEUTIC ACTIVITIES: CPT

## 2024-07-02 PROCEDURE — 1280000000 HC REHAB R&B

## 2024-07-02 PROCEDURE — 6370000000 HC RX 637 (ALT 250 FOR IP)

## 2024-07-02 PROCEDURE — 97110 THERAPEUTIC EXERCISES: CPT

## 2024-07-02 PROCEDURE — 6370000000 HC RX 637 (ALT 250 FOR IP): Performed by: STUDENT IN AN ORGANIZED HEALTH CARE EDUCATION/TRAINING PROGRAM

## 2024-07-02 PROCEDURE — 97535 SELF CARE MNGMENT TRAINING: CPT

## 2024-07-02 PROCEDURE — 97116 GAIT TRAINING THERAPY: CPT

## 2024-07-02 PROCEDURE — 6370000000 HC RX 637 (ALT 250 FOR IP): Performed by: PHYSICAL MEDICINE & REHABILITATION

## 2024-07-02 RX ORDER — LOPERAMIDE HYDROCHLORIDE 2 MG/1
2 CAPSULE ORAL 4 TIMES DAILY PRN
Status: DISCONTINUED | OUTPATIENT
Start: 2024-07-02 | End: 2024-07-06 | Stop reason: HOSPADM

## 2024-07-02 RX ADMIN — CITALOPRAM HYDROBROMIDE 40 MG: 20 TABLET ORAL at 19:40

## 2024-07-02 RX ADMIN — ATORVASTATIN CALCIUM 40 MG: 40 TABLET, FILM COATED ORAL at 07:37

## 2024-07-02 RX ADMIN — TOPIRAMATE 100 MG: 100 TABLET, FILM COATED ORAL at 19:40

## 2024-07-02 RX ADMIN — INSULIN GLARGINE 12 UNITS: 100 INJECTION, SOLUTION SUBCUTANEOUS at 19:39

## 2024-07-02 RX ADMIN — OXYCODONE 10 MG: 5 TABLET ORAL at 12:52

## 2024-07-02 RX ADMIN — ACETAMINOPHEN 1000 MG: 500 TABLET ORAL at 19:50

## 2024-07-02 RX ADMIN — Medication 400 MG: at 07:36

## 2024-07-02 RX ADMIN — METFORMIN HYDROCHLORIDE 1000 MG: 500 TABLET ORAL at 07:36

## 2024-07-02 RX ADMIN — Medication 5 MG: at 19:40

## 2024-07-02 RX ADMIN — METHOCARBAMOL 750 MG: 750 TABLET ORAL at 07:35

## 2024-07-02 RX ADMIN — ACETAMINOPHEN 1000 MG: 500 TABLET ORAL at 12:52

## 2024-07-02 RX ADMIN — ACETAMINOPHEN 1000 MG: 500 TABLET ORAL at 06:21

## 2024-07-02 RX ADMIN — APIXABAN 5 MG: 5 TABLET, FILM COATED ORAL at 19:40

## 2024-07-02 RX ADMIN — METFORMIN HYDROCHLORIDE 1000 MG: 500 TABLET ORAL at 16:31

## 2024-07-02 RX ADMIN — Medication 1 TABLET: at 07:35

## 2024-07-02 RX ADMIN — OMEGA-3-ACID ETHYL ESTERS 1 G: 1 CAPSULE, LIQUID FILLED ORAL at 07:35

## 2024-07-02 RX ADMIN — LOSARTAN POTASSIUM 50 MG: 25 TABLET, FILM COATED ORAL at 07:36

## 2024-07-02 RX ADMIN — METHOCARBAMOL 750 MG: 750 TABLET ORAL at 19:39

## 2024-07-02 RX ADMIN — OXYCODONE HYDROCHLORIDE 10 MG: 10 TABLET, FILM COATED, EXTENDED RELEASE ORAL at 07:37

## 2024-07-02 RX ADMIN — ARIPIPRAZOLE 5 MG: 5 TABLET ORAL at 07:35

## 2024-07-02 RX ADMIN — PANTOPRAZOLE SODIUM 20 MG: 20 TABLET, DELAYED RELEASE ORAL at 07:37

## 2024-07-02 RX ADMIN — OXYCODONE HYDROCHLORIDE 10 MG: 10 TABLET, FILM COATED, EXTENDED RELEASE ORAL at 19:39

## 2024-07-02 RX ADMIN — METHOCARBAMOL 750 MG: 750 TABLET ORAL at 12:52

## 2024-07-02 RX ADMIN — ASPIRIN 81 MG 81 MG: 81 TABLET ORAL at 07:37

## 2024-07-02 RX ADMIN — APIXABAN 5 MG: 5 TABLET, FILM COATED ORAL at 07:36

## 2024-07-02 RX ADMIN — OXYCODONE HYDROCHLORIDE AND ACETAMINOPHEN 500 MG: 500 TABLET ORAL at 07:36

## 2024-07-02 RX ADMIN — POTASSIUM CHLORIDE 20 MEQ: 1500 TABLET, EXTENDED RELEASE ORAL at 07:36

## 2024-07-02 RX ADMIN — DIAZEPAM 5 MG: 5 TABLET ORAL at 19:40

## 2024-07-02 RX ADMIN — METHOCARBAMOL 750 MG: 750 TABLET ORAL at 16:31

## 2024-07-02 RX ADMIN — OMEGA-3-ACID ETHYL ESTERS 1 G: 1 CAPSULE, LIQUID FILLED ORAL at 19:40

## 2024-07-02 RX ADMIN — FERROUS SULFATE TAB 325 MG (65 MG ELEMENTAL FE) 325 MG: 325 (65 FE) TAB at 07:36

## 2024-07-02 ASSESSMENT — PAIN SCALES - GENERAL
PAINLEVEL_OUTOF10: 7
PAINLEVEL_OUTOF10: 4
PAINLEVEL_OUTOF10: 6
PAINLEVEL_OUTOF10: 5
PAINLEVEL_OUTOF10: 6
PAINLEVEL_OUTOF10: 4
PAINLEVEL_OUTOF10: 7

## 2024-07-02 ASSESSMENT — PAIN DESCRIPTION - FREQUENCY
FREQUENCY: INTERMITTENT

## 2024-07-02 ASSESSMENT — PAIN DESCRIPTION - PAIN TYPE
TYPE: ACUTE PAIN;SURGICAL PAIN

## 2024-07-02 ASSESSMENT — PAIN DESCRIPTION - DESCRIPTORS
DESCRIPTORS: ACHING;DISCOMFORT;SORE
DESCRIPTORS: ACHING
DESCRIPTORS: ACHING
DESCRIPTORS: ACHING;DISCOMFORT;SORE

## 2024-07-02 ASSESSMENT — PAIN DESCRIPTION - ORIENTATION
ORIENTATION: LOWER;MID
ORIENTATION: LOWER;MID
ORIENTATION: MID;LOWER
ORIENTATION: LOWER;MID

## 2024-07-02 ASSESSMENT — PAIN DESCRIPTION - ONSET
ONSET: GRADUAL
ONSET: ON-GOING

## 2024-07-02 ASSESSMENT — PAIN - FUNCTIONAL ASSESSMENT
PAIN_FUNCTIONAL_ASSESSMENT: ACTIVITIES ARE NOT PREVENTED
PAIN_FUNCTIONAL_ASSESSMENT: PREVENTS OR INTERFERES SOME ACTIVE ACTIVITIES AND ADLS

## 2024-07-02 ASSESSMENT — PAIN DESCRIPTION - LOCATION
LOCATION: BACK

## 2024-07-02 NOTE — PLAN OF CARE
Problem: Safety - Adult  Goal: Free from fall injury  Outcome: Progressing     Problem: Pain  Goal: Verbalizes/displays adequate comfort level or baseline comfort level  7/2/2024 0752 by Calvin Brandt, RN  Outcome: Progressing  7/1/2024 2155 by Noemi Maradiaga, RN  Outcome: Progressing     Problem: Skin/Tissue Integrity  Goal: Absence of new skin breakdown  Description: 1.  Monitor for areas of redness and/or skin breakdown  2.  Assess vascular access sites hourly  3.  Every 4-6 hours minimum:  Change oxygen saturation probe site  4.  Every 4-6 hours:  If on nasal continuous positive airway pressure, respiratory therapy assess nares and determine need for appliance change or resting period.  Outcome: Progressing

## 2024-07-02 NOTE — PROGRESS NOTES
Physical Therapy  Facility/Department: [facility]  Rehabilitation Physical Therapy Treatment Note    NAME: Pretty Covington  : 1960 (64 y.o.)  MRN: [id number]  CODE STATUS: Full Code    Date of Service: 24       Restrictions:  Restrictions/Precautions: Fall Risk, General Precautions, ROM Restrictions, Surgical Protocols  Required Braces or Orthoses  Spinal: Thoracic Lumbar Sacral Orthotics  Spinal Other: TLSO when OOB  Position Activity Restriction  Spinal Precautions: No Bending, No Lifting, No Twisting  Other position/activity restrictions: Ambulate patient; needs TLSO when OOB, can be removed for hygiene per surgeon's office on      SUBJECTIVE  Subjective  Subjective: Patient is agreeable to PT session.  Pain: 6/10 pain in back       OBJECTIVE  Cognition  Overall Cognitive Status: WFL  Orientation  Overall Orientation Status: Within Functional Limits  Orientation Level: Oriented X4    Functional Mobility  Transfers  Surface: Wheelchair;Drop arm bedside commode  Sit to Stand  Assistance Level: Modified independent  Stand to Sit  Assistance Level: Modified independent      Environmental Mobility  Ambulation  Surface: Level surface  Device: Rolling walker  Distance: 150ft, 100ft  Activity: Within Unit  Activity Comments: After 150ft pt reporting and demonstrating BM incontinence, RN present to assist to don brief. Ambulated further to patient's room for further personal care and hygiene. Notified RN of incontinence and further BM at patient's toilet. Ambulated 100ft back to patient's wheelchair.  Additional Factors: Verbal cues;Hand placement cues;Increased time to complete  Assistance Level: Supervision  Gait Deviations: Slow arturo;Wide base of support  Skilled Clinical Factors: cues for upright posture and improved proximity to walker  Wheelchair  Assistance Level for Propulsion: Modified independent  Propulsion Method: Bilateral lower extremities;Bilateral upper extremities  Propulsion Quality: Slow

## 2024-07-02 NOTE — PROGRESS NOTES
Occupational Therapy  Facility/Department: [facility]  Rehabilitation Occupational Therapy Daily Treatment Note    Date: 24  Patient Name: Pretty Covington       Room: 0152/0152-01  MRN: [id number]  Account: [id number]   : 1960  (64 y.o.) Gender: female            Pt was bathed during OT session this date. Pt was Showered with soap/water with Contact Guard Assistance.          Past Medical History:  has a past medical history of Asthma, Benign essential hypertension, Benign head tremor, Cholelithiasis, Coronary artery disease involving native coronary artery of native heart, Disease of nasal cavity and sinuses, Fatty infiltration of liver, Hot flash, menopausal, Hx of blood clots, Hyperlipidemia, Hyperlipidemia associated with type 2 diabetes mellitus (HCC), Morbid obesity with BMI of 50.0-59.9, adult (HCC), No history of procedure, ELEAZAR on CPAP, Pulmonary embolism (HCC), Type II or unspecified type diabetes mellitus without mention of complication, not stated as uncontrolled, Umbilical hernia, Unspecified sleep apnea, and Wears glasses.  Past Surgical History:   has a past surgical history that includes Cholecystectomy, laparoscopic (2012); hernia repair (10/11/2013); Colonoscopy (2015); Breast surgery (Right, ); hysteroscopy (2018); Dilation and curettage of uterus (2018); Foot surgery (Right, 3/15/2023); and laminectomy (N/A, 2024).    Restrictions  Restrictions/Precautions: Fall Risk, General Precautions, ROM Restrictions, Surgical Protocols  Other position/activity restrictions: Ambulate patient; needs TLSO when OOB, can be removed for hygiene per surgeon's office on   Required Braces or Orthoses  Spinal: Thoracic Lumbar Sacral Orthotics  Spinal Other: TLSO when OOB  Required Braces or Orthoses?: Yes    Subjective  Subjective: Pt on toilet, pleasant, pain 4/10 in back, aching, agreeable to OT session and shower, able to continue with therapy with distraction and  during drying self, SBA for majority of session and with mobility with RW; standing for 6 minutes and 4 minutes for mobility around unit to identify birds with 1 VC for locating bird, SPV/SBA for balance  Transfers  Surface: Wheelchair;Bedside commode;From chair with arms;To chair with arms  Additional Factors: Set-up;Increased time to complete;With handrails  Device: Walker  Sit to Stand  Assistance Level: Supervision  Stand to Sit  Assistance Level: Supervision   OT Exercises  Resistive Exercises: 3# weight for 20 reps of elbow flexion, shoulder flexion, chest press, elbow extension, wrist flexion, wrist extension, and supination/pronation with BUEs     Assessment  Assessment  Assessment: Pt agreeable to OT session. Pt demonstrated improved strength and balance for ADLs with CGA for bathing and min A for LB dressing and UB dressing. Pt completed mobility to/from bathroom and around unit with SPV/SBA. Pt demonstrated good tolerance for up to 6 minutes in stance. Pt demonstrated good strength for BUE HEP and given handout for HEP. Pt and therapist discussed use of toilet aid to assist with toilet hygiene. Pt declined, reporting comfort with sister helping, sister able to assist, and not needing to use a/e after released from spinal precautions. Continue POC.  Activity Tolerance: Patient tolerated treatment well  Discharge Recommendations: 24 hour supervision or assist;Home with Home health OT;S Level 1  OT Equipment Recommendations  Equipment Needed: Yes  Mobility Devices: ADL Assistive Devices  ADL Assistive Devices: Shower Chair with back  Safety Devices  Safety Devices in place: Yes  Type of devices: Gait belt;Call light within reach;Left in chair;Nurse notified    Patient Education  Education  Education Given To: Patient  Education Provided: Role of Therapy;ADL Function;Plan of Care;Precautions;Safety;Transfer Training;Equipment;DME/Home Modifications;Mobility Training;Home Exercise Program;Family

## 2024-07-02 NOTE — PROGRESS NOTES
Staples removed from mid back, 23 staples. Area reinforced with steristrips. No drainage, erythema or dehiscence.

## 2024-07-02 NOTE — PROGRESS NOTES
Department of Physical Medicine & Rehabilitation  Progress Note    Patient Identification:  Pretty Covington  [id number]  : 1960  Admit date: 2024    Chief Complaint: Late effect of fracture of lumbar vertebra    Subjective:   No acute events overnight. Today Pretty is seen in her room. She reports that family training went well yesterday and that therapy was able to answer her and her sister's questions. She reports episode of fecal incontinence today with therapy. She reports that she occasionally has this problem since she had her gallbladder out.     Labs reviewed.    ROS: No f/c, n/v, cp     Objective:  Patient Vitals for the past 24 hrs:   BP Temp Temp src Pulse Resp SpO2 Weight   24 0816 (!) 143/63 -- -- 72 -- -- --   24 0807 -- -- -- -- 18 -- --   24 0730 127/72 98.3 °F (36.8 °C) Oral 75 18 95 % --   24 0600 -- -- -- -- -- -- 116.9 kg (257 lb 11.2 oz)   24 1930 135/75 97.8 °F (36.6 °C) Oral 83 18 96 % --   24 1247 -- -- -- -- 18 -- --   24 1137 (!) 128/58 -- -- 83 -- 94 % --   24 1000 -- -- -- -- -- -- 117.3 kg (258 lb 8 oz)     Const: Alert. No distress, pleasant. Seated up in wheelchair  HEENT: Normocephalic, atraumatic. Normal sclera/conjunctiva. MMM.   CV: extremities well perfused  Resp: No respiratory distress. On room air  Abd: Soft, nondistended  Ext: No edema.   Neuro: Alert, oriented, appropriately interactive. Speech fluent  Psych: Cooperative, appropriate mood and affect    Laboratory data: Available via EMR.   Last 24 hour lab  Recent Results (from the past 24 hour(s))   POCT Glucose    Collection Time: 24 10:56 AM   Result Value Ref Range    POC Glucose 169 (H) 70 - 99 mg/dl    Performed on ACCU-CHEK    POCT Glucose    Collection Time: 24  4:03 PM   Result Value Ref Range    POC Glucose 137 (H) 70 - 99 mg/dl    Performed on ACCU-CHEK    POCT Glucose    Collection Time: 24  7:25 PM   Result Value Ref Range    POC Glucose  193 (H) 70 - 99 mg/dl    Performed on ACCU-CHEK    POCT Glucose    Collection Time: 07/02/24  6:16 AM   Result Value Ref Range    POC Glucose 132 (H) 70 - 99 mg/dl    Performed on ACCU-CHEK        Therapy progress:  PT  Required Braces or Orthoses  Spinal: Thoracic Lumbar Sacral Orthotics  Spinal Other: TLSO when OOB  Position Activity Restriction  Spinal Precautions: No Bending, No Lifting, No Twisting  Other position/activity restrictions: Ambulate patient; needs TLSO when OOB, can be removed for hygiene per surgeon's office on 6/24  Objective     Sit to Stand: Maximum Assistance, 2 Person Assistance (Max A x2 from EOB to stedy and RW)  Stand to Sit: Moderate Assistance, 2 Person Assistance (Mod A x 2 from stedy, RW)     OT  PT Equipment Recommendations  Equipment Needed: Yes  Mobility Devices: Walker  Walker: Rolling  Other: CTA for RW  Toilet - Technique:  (totalA stedy this date d/t urgency and decreased endurance. pain and fatigue)  Equipment Used: Grab bars (would benefit from raised toilet seat for transfers)  Toilet Transfers Comments: totalA stedy; maxA sit<>stand from toilet  Assessment        SLP          Body mass index is 42.88 kg/m².    Assessment and Plan:  Pretty Covington is a 64 year old female with a past medical history significant for HTN, CAD, HLD, DM2, PE on AC, ELEAZAR on CPAP, and morbid obesity who presented to [facility] and a transfer from [facility] with back pain after a fall, found to have L1 chance fracture s/p T11-L3 fixation and fusion. Post operative course was notable for acute blood loss anemia, acute elevation in liver enzymes, and pain. She was admitted to [facility] on 6/20/24 due to functional deficits below her baseline.      Closed Fracture of the Lumbar Vertebral Body, unstable L1 fracture extending into the posterior elements bilaterally  - s/p T11-L3 fixation and fusion 6/14  - TLSO brace when OOB  - multimodal pain control  - PT, OT     Pain  - tylenol PRN, avoiding scheduled due

## 2024-07-03 LAB
ALBUMIN SERPL-MCNC: 3.4 G/DL (ref 3.4–5)
ALP SERPL-CCNC: 93 U/L (ref 40–129)
ALT SERPL-CCNC: 7 U/L (ref 10–40)
ANION GAP SERPL CALCULATED.3IONS-SCNC: 11 MMOL/L (ref 3–16)
AST SERPL-CCNC: 16 U/L (ref 15–37)
BASOPHILS # BLD: 0.1 K/UL (ref 0–0.2)
BASOPHILS NFR BLD: 1.9 %
BILIRUB DIRECT SERPL-MCNC: <0.2 MG/DL (ref 0–0.3)
BILIRUB INDIRECT SERPL-MCNC: ABNORMAL MG/DL (ref 0–1)
BILIRUB SERPL-MCNC: 0.4 MG/DL (ref 0–1)
BUN SERPL-MCNC: 14 MG/DL (ref 7–20)
CALCIUM SERPL-MCNC: 8.8 MG/DL (ref 8.3–10.6)
CHLORIDE SERPL-SCNC: 105 MMOL/L (ref 99–110)
CO2 SERPL-SCNC: 24 MMOL/L (ref 21–32)
CREAT SERPL-MCNC: <0.5 MG/DL (ref 0.6–1.2)
DEPRECATED RDW RBC AUTO: 15.7 % (ref 12.4–15.4)
EOSINOPHIL # BLD: 0.1 K/UL (ref 0–0.6)
EOSINOPHIL NFR BLD: 2.9 %
GFR SERPLBLD CREATININE-BSD FMLA CKD-EPI: >90 ML/MIN/{1.73_M2}
GLUCOSE BLD-MCNC: 150 MG/DL (ref 70–99)
GLUCOSE BLD-MCNC: 161 MG/DL (ref 70–99)
GLUCOSE BLD-MCNC: 182 MG/DL (ref 70–99)
GLUCOSE BLD-MCNC: 200 MG/DL (ref 70–99)
GLUCOSE SERPL-MCNC: 124 MG/DL (ref 70–99)
HCT VFR BLD AUTO: 29.1 % (ref 36–48)
HGB BLD-MCNC: 9.9 G/DL (ref 12–16)
LYMPHOCYTES # BLD: 1 K/UL (ref 1–5.1)
LYMPHOCYTES NFR BLD: 27.7 %
MAGNESIUM SERPL-MCNC: 1.7 MG/DL (ref 1.8–2.4)
MCH RBC QN AUTO: 31.2 PG (ref 26–34)
MCHC RBC AUTO-ENTMCNC: 33.9 G/DL (ref 31–36)
MCV RBC AUTO: 91.9 FL (ref 80–100)
MONOCYTES # BLD: 0.4 K/UL (ref 0–1.3)
MONOCYTES NFR BLD: 10.5 %
NEUTROPHILS # BLD: 2.1 K/UL (ref 1.7–7.7)
NEUTROPHILS NFR BLD: 57 %
PERFORMED ON: ABNORMAL
PLATELET # BLD AUTO: 237 K/UL (ref 135–450)
PMV BLD AUTO: 7.4 FL (ref 5–10.5)
POTASSIUM SERPL-SCNC: 3.7 MMOL/L (ref 3.5–5.1)
PROT SERPL-MCNC: 5.8 G/DL (ref 6.4–8.2)
RBC # BLD AUTO: 3.17 M/UL (ref 4–5.2)
SODIUM SERPL-SCNC: 140 MMOL/L (ref 136–145)
WBC # BLD AUTO: 3.7 K/UL (ref 4–11)

## 2024-07-03 PROCEDURE — 97116 GAIT TRAINING THERAPY: CPT

## 2024-07-03 PROCEDURE — 97530 THERAPEUTIC ACTIVITIES: CPT

## 2024-07-03 PROCEDURE — 80076 HEPATIC FUNCTION PANEL: CPT

## 2024-07-03 PROCEDURE — 1280000000 HC REHAB R&B

## 2024-07-03 PROCEDURE — 97535 SELF CARE MNGMENT TRAINING: CPT

## 2024-07-03 PROCEDURE — 83735 ASSAY OF MAGNESIUM: CPT

## 2024-07-03 PROCEDURE — 6370000000 HC RX 637 (ALT 250 FOR IP)

## 2024-07-03 PROCEDURE — 80048 BASIC METABOLIC PNL TOTAL CA: CPT

## 2024-07-03 PROCEDURE — 97110 THERAPEUTIC EXERCISES: CPT

## 2024-07-03 PROCEDURE — 36415 COLL VENOUS BLD VENIPUNCTURE: CPT

## 2024-07-03 PROCEDURE — 85025 COMPLETE CBC W/AUTO DIFF WBC: CPT

## 2024-07-03 PROCEDURE — 99232 SBSQ HOSP IP/OBS MODERATE 35: CPT | Performed by: PSYCHIATRY & NEUROLOGY

## 2024-07-03 PROCEDURE — 6370000000 HC RX 637 (ALT 250 FOR IP): Performed by: PHYSICAL MEDICINE & REHABILITATION

## 2024-07-03 PROCEDURE — 6370000000 HC RX 637 (ALT 250 FOR IP): Performed by: STUDENT IN AN ORGANIZED HEALTH CARE EDUCATION/TRAINING PROGRAM

## 2024-07-03 RX ORDER — LANOLIN ALCOHOL/MO/W.PET/CERES
400 CREAM (GRAM) TOPICAL 2 TIMES DAILY
Status: DISCONTINUED | OUTPATIENT
Start: 2024-07-03 | End: 2024-07-06 | Stop reason: HOSPADM

## 2024-07-03 RX ADMIN — METFORMIN HYDROCHLORIDE 1000 MG: 500 TABLET ORAL at 16:05

## 2024-07-03 RX ADMIN — OMEGA-3-ACID ETHYL ESTERS 1 G: 1 CAPSULE, LIQUID FILLED ORAL at 21:31

## 2024-07-03 RX ADMIN — Medication 400 MG: at 21:31

## 2024-07-03 RX ADMIN — APIXABAN 5 MG: 5 TABLET, FILM COATED ORAL at 21:31

## 2024-07-03 RX ADMIN — METFORMIN HYDROCHLORIDE 1000 MG: 500 TABLET ORAL at 08:24

## 2024-07-03 RX ADMIN — ACETAMINOPHEN 1000 MG: 500 TABLET ORAL at 06:22

## 2024-07-03 RX ADMIN — OMEGA-3-ACID ETHYL ESTERS 1 G: 1 CAPSULE, LIQUID FILLED ORAL at 08:24

## 2024-07-03 RX ADMIN — METHOCARBAMOL 750 MG: 750 TABLET ORAL at 08:24

## 2024-07-03 RX ADMIN — PANTOPRAZOLE SODIUM 20 MG: 20 TABLET, DELAYED RELEASE ORAL at 08:25

## 2024-07-03 RX ADMIN — ASPIRIN 81 MG 81 MG: 81 TABLET ORAL at 08:24

## 2024-07-03 RX ADMIN — POTASSIUM CHLORIDE 20 MEQ: 1500 TABLET, EXTENDED RELEASE ORAL at 08:24

## 2024-07-03 RX ADMIN — OXYCODONE 10 MG: 5 TABLET ORAL at 18:27

## 2024-07-03 RX ADMIN — INSULIN GLARGINE 12 UNITS: 100 INJECTION, SOLUTION SUBCUTANEOUS at 21:32

## 2024-07-03 RX ADMIN — Medication 400 MG: at 08:25

## 2024-07-03 RX ADMIN — OXYCODONE HYDROCHLORIDE 10 MG: 10 TABLET, FILM COATED, EXTENDED RELEASE ORAL at 21:31

## 2024-07-03 RX ADMIN — ATORVASTATIN CALCIUM 40 MG: 40 TABLET, FILM COATED ORAL at 08:24

## 2024-07-03 RX ADMIN — INSULIN LISPRO 2 UNITS: 100 INJECTION, SOLUTION INTRAVENOUS; SUBCUTANEOUS at 13:40

## 2024-07-03 RX ADMIN — METHOCARBAMOL 750 MG: 750 TABLET ORAL at 16:05

## 2024-07-03 RX ADMIN — CITALOPRAM HYDROBROMIDE 40 MG: 20 TABLET ORAL at 21:30

## 2024-07-03 RX ADMIN — Medication 1 TABLET: at 08:23

## 2024-07-03 RX ADMIN — ACETAMINOPHEN 1000 MG: 500 TABLET ORAL at 21:31

## 2024-07-03 RX ADMIN — OXYCODONE 10 MG: 5 TABLET ORAL at 09:41

## 2024-07-03 RX ADMIN — ACETAMINOPHEN 1000 MG: 500 TABLET ORAL at 13:40

## 2024-07-03 RX ADMIN — LOSARTAN POTASSIUM 50 MG: 25 TABLET, FILM COATED ORAL at 08:24

## 2024-07-03 RX ADMIN — ARIPIPRAZOLE 5 MG: 5 TABLET ORAL at 08:23

## 2024-07-03 RX ADMIN — OXYCODONE HYDROCHLORIDE 10 MG: 10 TABLET, FILM COATED, EXTENDED RELEASE ORAL at 08:25

## 2024-07-03 RX ADMIN — Medication 5 MG: at 21:30

## 2024-07-03 RX ADMIN — METHOCARBAMOL 750 MG: 750 TABLET ORAL at 21:30

## 2024-07-03 RX ADMIN — OXYCODONE HYDROCHLORIDE AND ACETAMINOPHEN 500 MG: 500 TABLET ORAL at 08:23

## 2024-07-03 RX ADMIN — METHOCARBAMOL 750 MG: 750 TABLET ORAL at 13:39

## 2024-07-03 RX ADMIN — DIAZEPAM 5 MG: 5 TABLET ORAL at 21:31

## 2024-07-03 RX ADMIN — FERROUS SULFATE TAB 325 MG (65 MG ELEMENTAL FE) 325 MG: 325 (65 FE) TAB at 08:24

## 2024-07-03 RX ADMIN — TOPIRAMATE 100 MG: 100 TABLET, FILM COATED ORAL at 21:31

## 2024-07-03 RX ADMIN — APIXABAN 5 MG: 5 TABLET, FILM COATED ORAL at 08:25

## 2024-07-03 RX ADMIN — OXYCODONE 10 MG: 5 TABLET ORAL at 13:39

## 2024-07-03 ASSESSMENT — PAIN DESCRIPTION - ONSET: ONSET: GRADUAL

## 2024-07-03 ASSESSMENT — PAIN SCALES - GENERAL
PAINLEVEL_OUTOF10: 7
PAINLEVEL_OUTOF10: 2
PAINLEVEL_OUTOF10: 5
PAINLEVEL_OUTOF10: 7
PAINLEVEL_OUTOF10: 7
PAINLEVEL_OUTOF10: 3
PAINLEVEL_OUTOF10: 8
PAINLEVEL_OUTOF10: 7
PAINLEVEL_OUTOF10: 2
PAINLEVEL_OUTOF10: 2

## 2024-07-03 ASSESSMENT — PAIN DESCRIPTION - PAIN TYPE: TYPE: ACUTE PAIN;SURGICAL PAIN

## 2024-07-03 ASSESSMENT — PAIN - FUNCTIONAL ASSESSMENT: PAIN_FUNCTIONAL_ASSESSMENT: PREVENTS OR INTERFERES SOME ACTIVE ACTIVITIES AND ADLS

## 2024-07-03 ASSESSMENT — PAIN DESCRIPTION - ORIENTATION: ORIENTATION: MID;LOWER

## 2024-07-03 ASSESSMENT — PAIN DESCRIPTION - LOCATION
LOCATION: BACK
LOCATION: BACK

## 2024-07-03 ASSESSMENT — PAIN DESCRIPTION - DESCRIPTORS: DESCRIPTORS: ACHING;DISCOMFORT

## 2024-07-03 ASSESSMENT — PAIN DESCRIPTION - FREQUENCY: FREQUENCY: INTERMITTENT

## 2024-07-03 NOTE — PROGRESS NOTES
Occupational Therapy  Facility/Department: [facility]  Rehabilitation Occupational Therapy Daily Treatment Note    Date: 7/3/24  Patient Name: Pretty Covington       Room: 0152/0152-01  MRN: [id number]  Account: [id number]   : 1960  (64 y.o.) Gender: female                  Past Medical History:  has a past medical history of Asthma, Benign essential hypertension, Benign head tremor, Cholelithiasis, Coronary artery disease involving native coronary artery of native heart, Disease of nasal cavity and sinuses, Fatty infiltration of liver, Hot flash, menopausal, Hx of blood clots, Hyperlipidemia, Hyperlipidemia associated with type 2 diabetes mellitus (HCC), Morbid obesity with BMI of 50.0-59.9, adult (HCC), No history of procedure, ELEAZAR on CPAP, Pulmonary embolism (HCC), Type II or unspecified type diabetes mellitus without mention of complication, not stated as uncontrolled, Umbilical hernia, Unspecified sleep apnea, and Wears glasses.  Past Surgical History:   has a past surgical history that includes Cholecystectomy, laparoscopic (2012); hernia repair (10/11/2013); Colonoscopy (2015); Breast surgery (Right, ); hysteroscopy (2018); Dilation and curettage of uterus (2018); Foot surgery (Right, 3/15/2023); and laminectomy (N/A, 2024).    Restrictions  Restrictions/Precautions: Fall Risk, General Precautions, ROM Restrictions, Surgical Protocols  Other position/activity restrictions: Ambulate patient; needs TLSO when OOB, can be removed for hygiene per surgeon's office on   Required Braces or Orthoses  Spinal: Thoracic Lumbar Sacral Orthotics  Spinal Other: TLSO when OOB  Required Braces or Orthoses?: Yes    Subjective  Subjective: pt agreeable to OT session, no complaints  Restrictions/Precautions: Fall Risk;General Precautions;ROM Restrictions;Surgical Protocols             Objective             ADL  Upper Extremity Dressing  Assistance Level: Stand by assist  Skilled Clinical  well  Discharge Recommendations: 24 hour supervision or assist;Home with Home health OT;S Level 1  OT Equipment Recommendations  Equipment Needed: Yes  Walker: Rolling  ADL Assistive Devices: Shower Chair with back  Other: family able to purchase shower chair  Safety Devices  Safety Devices in place: Yes  Type of devices: Gait belt;Call light within reach;Left in chair;Nurse notified    Patient Education  Education  Education Given To: Patient  Education Provided: Role of Therapy;ADL Function;Plan of Care;Precautions;Safety;Transfer Training;Equipment;DME/Home Modifications;Mobility Training;Home Exercise Program;Family Education  Education Provided Comments: don/ff TSLO, sternal precautions  Education Method: Demonstration;Verbal  Barriers to Learning: Cognition  Education Outcome: Verbalized understanding;Continued education needed;Demonstrated understanding    Plan  Occupational Therapy Plan  Times Per Week: 5-7x/week  Times Per Day: Once a day  Current Treatment Recommendations: Balance training;Functional mobility training;Endurance training;Safety education & training;Self-Care / ADL;Strengthening;ROM;Pain management;Positioning;Equipment evaluation, education, & procurement;Patient/Caregiver education & training;Home management training;Wheelchair mobility training    Goals  Patient Goals   Patient goals : \"I just want to be able to go home independently again. Do you think that is a reasonable goal?\"  Short Term Goals  Time Frame for Short Term Goals: 14 days (7/4/2024): unless otherwise noted  Short Term Goal 1: Pt will complete transfers w/ LRD and minAx1. GOAL MET 6/25/24 Pt completed transfers with min A.  Short Term Goal 2: Pt will complete toileting w/ modA and AE PRN. progressing 7/2 total assist  Short Term Goal 3: Pt will don TLSO w/ Nohemi.- MET 7/01 pt min A  Short Term Goal 4: Pt will complete FB bathing w/ modA. GOAL MET 6/25/24 Pt completed FB bathing with min A.  Short Term Goal 5: Pt will

## 2024-07-03 NOTE — PROGRESS NOTES
Comprehensive Nutrition Assessment    Type and Reason for Visit:  Reassess    Nutrition Recommendations/Plan:   Continue carb control diet   Discontinue Glucerna   Monitor nutrition adequacy, pertinent labs, bowel habits, wt changes, and clinical progress     Malnutrition Assessment:  Malnutrition Status:  At risk for malnutrition (Comment) (06/27/24 1238)    Context:  Acute Illness     Findings of the 6 clinical characteristics of malnutrition:  Energy Intake:  Mild decrease in energy intake (Comment)  Weight Loss:   (difficult to assess d/t labile weights per EMR)     Body Fat Loss:  No significant body fat loss     Muscle Mass Loss:  No significant muscle mass loss      Nutrition Assessment:    Follow up: Pt nutritionally improving AEB good appetite and PO intakes. Pt reports appetite improving since previous visit. PO intakes of % per EMR. Not drinking ONS anymore, will d/c. Denies any further nutrition questions or concerns. Will monitor.    Nutrition Related Findings:    +Metformin. Labs reviewed. Active BS. BM on 7/2. +1 pitting BLE edema. -178 past 24 hrs. Wound Type: Surgical Incision, Moisture Associate Skin Damage       Current Nutrition Intake & Therapies:    Average Meal Intake: %  Average Supplements Intake: 0%  ADULT DIET; Regular; 4 carb choices (60 gm/meal)  ADULT ORAL NUTRITION SUPPLEMENT; Breakfast, Lunch; Diabetic Oral Supplement    Anthropometric Measures:  Height: 165.1 cm (5' 5\")  Ideal Body Weight (IBW): 125 lbs (57 kg)    Admission Body Weight: 123.4 kg (272 lb) (bed scale)  Current Body Weight: 117 kg (258 lb), 217.6 % IBW. Weight Source: Bed Scale  Current BMI (kg/m2): 42.9  Usual Body Weight: 116.1 kg (256 lb) (standing scale on 6/9/24)  % Weight Change (Calculated): 6.3                    BMI Categories: Obese Class 3 (BMI 40.0 or greater)    Estimated Daily Nutrient Needs:  Energy Requirements Based On: Kcal/kg (25-30)  Weight Used for Energy Requirements:

## 2024-07-03 NOTE — PLAN OF CARE
Patient remains free from falls and injuries.  Call light within reach.  Patient has non skid socks on and requires minimal assistance with transfers when using wheelchair.  Patient is compliant with asking staff for help with transfers. Tani Reyes RN

## 2024-07-03 NOTE — PROGRESS NOTES
Department of Psychiatry  Consultant Progress Note  Chief Complaint: follow-up mood.  States she is in a much better place with regard to her current medical problems than when she first got here.  Discussed grief reaction and her progress through that process.  She feels the abilify was/is helpful and discussed staying on that a year then re-assessing.    Patient's chart was reviewed and collaborated with  about the treatment plan..  SUBJECTIVE:    Patient is feeling better. Suicidal ideation: denies suicidal ideation  Patient denies    ROS: Patient has new complaints: no  Sleeping adequately:  Yes  Appetite adequate: yes  Attending groups: yes  Visitors:no    OBJECTIVE    Physical  VITALS:  BP (!) 123/59   Pulse 74   Temp 98.6 °F (37 °C) (Oral)   Resp 16   Ht 1.651 m (5' 5\")   Wt 117.3 kg (258 lb 11.2 oz)   LMP 08/20/2014   SpO2 96%   BMI 43.05 kg/m²   Patients appearance street clothes and in chair.    Musculoskeletal  Patient gait not ambulating independently   STRENGTH: muscle bulk was normal TONE normal    Mental Status Examination:   No current loose associations  ConcentrationIntact   Thoughts are within normal limits  Insight and Judgement normal insight and judgment  Knowledge: good  Language: 0 - no aphasia, normal  Speech: appropriate   Mood euthymic, affect congruent with mood  Orientation: oriented to person, place, time/date, and situation   Hallucinations Absent   Thought Processes: Goal-Directed  Memory intact  suicidal ideations no plan  Homicidal ideations no           Data  Labs:   No results displayed because visit has over 200 results.               Medications  Current Facility-Administered Medications: magnesium oxide (MAG-OX) tablet 400 mg, 400 mg, Oral, BID  loperamide (IMODIUM) capsule 2 mg, 2 mg, Oral, 4x Daily PRN  acetaminophen (TYLENOL) tablet 1,000 mg, 1,000 mg, Oral, 3 times per day  metFORMIN (GLUCOPHAGE) tablet 1,000 mg, 1,000 mg, Oral, BID   potassium

## 2024-07-03 NOTE — PROGRESS NOTES
weekly - 3 sets - 10 reps  - (Not performed due to fatigue: Sit to Stand with Counter Support  - 1 x daily - 7 x weekly - 3 sets - 10 reps)    2ND SESSION:  Gait Traininft, 100ft, 150ft with RW and Mod I progressing to supervision past 50ft d/t fatigue, VC to maintain upright posture  Therapeutic Activity:  Commode transfer with Mod I using grab bar, Max assist for hygiene  Seated in w/c at end of PT session with alarm activated and all needs within reach.      ASSESSMENT/PROGRESS TOWARDS GOALS  Vital Signs  Pulse: 74  BP: (!) 123/59  BP Location: Right upper arm  MAP (Calculated): 80    Assessment  Assessment: Patient participated in 60 minute PT session focusing on LE strengthening and HEP review. Patient received pain medication at beginning of PT session. Performed multiple sit to stands to practice standing exercises with mod I to walker. Frequent rest breaks needed d/t pain and fatigue. Patient will benefit from continued skilled PT services focusing on maximizing safe and independent mobility needed for discharge home. Recommend 24/7 supervision with home health PT at discharge.  Activity Tolerance: Patient tolerated treatment well  Discharge Recommendations: 24 hour supervision or assist;Home with Home health PT    Goals  Patient Goals   Patient Goals : \"I want to get back to normal\"  Short Term Goals  Time Frame for Short Term Goals:   Short Term Goal 1: Pt will perfofrm bed mobility with Mod I. - MET   Short Term Goal 2: Pt will perform functional transfers with Mod A and LRAD. - MET  CGA w RW  Short Term Goal 3: Pt will ambulate 50 ft with TLSO, LRAD, and Mod A. - MET  CGA w RW  Short Term Goal 4: Pt will propel w/c for 150 ft with Mod I with B UE. - MET   Long Term Goals  Time Frame for Long Term Goals :   Long Term Goal 1: Pt will ambulate 150 ft with TLSO, LRAD and Mod I.  Long Term Goal 2: Pt will perform functional transfers with Mod I and LRAD.  Long Term Goal 3: Pt will

## 2024-07-03 NOTE — PROGRESS NOTES
Department of Physical Medicine & Rehabilitation  Progress Note    Patient Identification:  Pretty Covington  [id number]  : 1960  Admit date: 2024    Chief Complaint: Late effect of fracture of lumbar vertebra    Subjective:   No acute events overnight. Today Pretty is seen with therapies. She reports some pain today, but has been taking much less oxycodone. She denies other acute complaints at this time.     Labs reviewed.    ROS: No f/c, n/v, cp     Objective:  Patient Vitals for the past 24 hrs:   BP Temp Temp src Pulse Resp SpO2 Weight   24 0810 (!) 123/59 98.6 °F (37 °C) Oral 74 16 96 % --   24 0600 -- -- -- -- -- -- 117.3 kg (258 lb 11.2 oz)   24 1915 (!) 122/58 98.5 °F (36.9 °C) Oral 83 18 96 % --   24 1322 -- -- -- -- 18 -- --     Const: Alert. No distress, pleasant.   HEENT: Normocephalic, atraumatic. Normal sclera/conjunctiva. MMM.   CV: extremities well perfused  Resp: No respiratory distress. On room air  Abd: Soft, nondistended  Ext: No edema.   Neuro: Alert, oriented, appropriately interactive. Speech fluent  Psych: Cooperative, appropriate mood and affect    Laboratory data: Available via EMR.   Last 24 hour lab  Recent Results (from the past 24 hour(s))   POCT Glucose    Collection Time: 24  4:04 PM   Result Value Ref Range    POC Glucose 178 (H) 70 - 99 mg/dl    Performed on ACCU-CHEK    POCT Glucose    Collection Time: 24  7:20 PM   Result Value Ref Range    POC Glucose 145 (H) 70 - 99 mg/dl    Performed on ACCU-CHEK    Hepatic Function Panel    Collection Time: 24  6:10 AM   Result Value Ref Range    Total Protein 5.8 (L) 6.4 - 8.2 g/dL    Albumin 3.4 3.4 - 5.0 g/dL    Alkaline Phosphatase 93 40 - 129 U/L    ALT 7 (L) 10 - 40 U/L    AST 16 15 - 37 U/L    Total Bilirubin 0.4 0.0 - 1.0 mg/dL    Bilirubin, Direct <0.2 0.0 - 0.3 mg/dL    Bilirubin, Indirect see below 0.0 - 1.0 mg/dL   CBC with Auto Differential    Collection Time: 24  6:10 AM  per OSH     DM2  - home regimen: metformin, jardiance, mounjaro   - lantus plus SSI  - resumed home metformin, increased to 1000 mg BID     History of PE  - ok to resume Eliquis 6/21 per Neurosurgery     CAD  - asa  - resumed statin and monitoring LFTs     HTN  - losartan     HLD  - home regimen: lipitor, fenofibrate, vascepa  - can consider resuming lipitor and monitor LFTs  - on Lovaza since 6/14, substituted for home Vascepa     Thyroid nodule  - incidental finding on neck CT  - needs outpatient ultrasound with PCP     Mood disorder  - on celexa at home  - Psychiatry consulted, appreciate assistance. Started abilify 5 mg daily     Morbid obesity  - BMI 45  - complicating assessment and treatment. Placing patient at risk for multiple co-morbidities as well as death and contribuing to the patient's presentation.  - encourage lifestyle modification     Rehab Progress: demonstrated improved strength and balance for ADLs with CGA for bathing and min A for LB dressing and UB dressing   Anticipated Dispo: home  Services:  PT, OT, nursing  ELOS: 7/6    Interdisciplinary team conference was held today with entire rehab treatment team including PT, OT, SLP (if applicable), Dietician, RN, and SW. Discussion focused on progress toward rehab goals and discharge planning. Making progress. Barriers include level of assistance, endurance, comorbidities. We as a medical team, and I as the physician , made a plan to work on these barriers to facilitate safe discharge. Plan will be presented to patient/family (if available).       Estefani Gilliam MD  PM&R  7/3/2024  12:24 PM

## 2024-07-04 LAB
GLUCOSE BLD-MCNC: 142 MG/DL (ref 70–99)
GLUCOSE BLD-MCNC: 157 MG/DL (ref 70–99)
GLUCOSE BLD-MCNC: 157 MG/DL (ref 70–99)
GLUCOSE BLD-MCNC: 265 MG/DL (ref 70–99)
PERFORMED ON: ABNORMAL

## 2024-07-04 PROCEDURE — 1280000000 HC REHAB R&B

## 2024-07-04 PROCEDURE — 6370000000 HC RX 637 (ALT 250 FOR IP)

## 2024-07-04 PROCEDURE — 6370000000 HC RX 637 (ALT 250 FOR IP): Performed by: PHYSICAL MEDICINE & REHABILITATION

## 2024-07-04 PROCEDURE — 6370000000 HC RX 637 (ALT 250 FOR IP): Performed by: STUDENT IN AN ORGANIZED HEALTH CARE EDUCATION/TRAINING PROGRAM

## 2024-07-04 RX ADMIN — METHOCARBAMOL 750 MG: 750 TABLET ORAL at 21:07

## 2024-07-04 RX ADMIN — FERROUS SULFATE TAB 325 MG (65 MG ELEMENTAL FE) 325 MG: 325 (65 FE) TAB at 09:25

## 2024-07-04 RX ADMIN — PANTOPRAZOLE SODIUM 20 MG: 20 TABLET, DELAYED RELEASE ORAL at 09:26

## 2024-07-04 RX ADMIN — OXYCODONE 10 MG: 5 TABLET ORAL at 21:11

## 2024-07-04 RX ADMIN — METFORMIN HYDROCHLORIDE 1000 MG: 500 TABLET ORAL at 16:26

## 2024-07-04 RX ADMIN — APIXABAN 5 MG: 5 TABLET, FILM COATED ORAL at 09:26

## 2024-07-04 RX ADMIN — LOSARTAN POTASSIUM 50 MG: 25 TABLET, FILM COATED ORAL at 09:26

## 2024-07-04 RX ADMIN — OXYCODONE HYDROCHLORIDE AND ACETAMINOPHEN 500 MG: 500 TABLET ORAL at 09:26

## 2024-07-04 RX ADMIN — OXYCODONE HYDROCHLORIDE 10 MG: 10 TABLET, FILM COATED, EXTENDED RELEASE ORAL at 09:25

## 2024-07-04 RX ADMIN — ARIPIPRAZOLE 5 MG: 5 TABLET ORAL at 09:25

## 2024-07-04 RX ADMIN — Medication 400 MG: at 09:26

## 2024-07-04 RX ADMIN — Medication 5 MG: at 21:13

## 2024-07-04 RX ADMIN — ACETAMINOPHEN 1000 MG: 500 TABLET ORAL at 06:21

## 2024-07-04 RX ADMIN — INSULIN GLARGINE 12 UNITS: 100 INJECTION, SOLUTION SUBCUTANEOUS at 21:13

## 2024-07-04 RX ADMIN — METHOCARBAMOL 750 MG: 750 TABLET ORAL at 09:26

## 2024-07-04 RX ADMIN — METHOCARBAMOL 750 MG: 750 TABLET ORAL at 13:48

## 2024-07-04 RX ADMIN — APIXABAN 5 MG: 5 TABLET, FILM COATED ORAL at 21:08

## 2024-07-04 RX ADMIN — METFORMIN HYDROCHLORIDE 1000 MG: 500 TABLET ORAL at 09:26

## 2024-07-04 RX ADMIN — CITALOPRAM HYDROBROMIDE 40 MG: 20 TABLET ORAL at 21:07

## 2024-07-04 RX ADMIN — ACETAMINOPHEN 1000 MG: 500 TABLET ORAL at 13:49

## 2024-07-04 RX ADMIN — OMEGA-3-ACID ETHYL ESTERS 1 G: 1 CAPSULE, LIQUID FILLED ORAL at 21:07

## 2024-07-04 RX ADMIN — ATORVASTATIN CALCIUM 40 MG: 40 TABLET, FILM COATED ORAL at 09:26

## 2024-07-04 RX ADMIN — Medication 400 MG: at 21:08

## 2024-07-04 RX ADMIN — ASPIRIN 81 MG 81 MG: 81 TABLET ORAL at 09:26

## 2024-07-04 RX ADMIN — POTASSIUM CHLORIDE 20 MEQ: 1500 TABLET, EXTENDED RELEASE ORAL at 09:26

## 2024-07-04 RX ADMIN — METHOCARBAMOL 750 MG: 750 TABLET ORAL at 16:26

## 2024-07-04 RX ADMIN — TOPIRAMATE 100 MG: 100 TABLET, FILM COATED ORAL at 21:07

## 2024-07-04 RX ADMIN — DIAZEPAM 5 MG: 5 TABLET ORAL at 21:07

## 2024-07-04 RX ADMIN — OMEGA-3-ACID ETHYL ESTERS 1 G: 1 CAPSULE, LIQUID FILLED ORAL at 09:26

## 2024-07-04 RX ADMIN — Medication 1 TABLET: at 09:26

## 2024-07-04 RX ADMIN — OXYCODONE HYDROCHLORIDE 10 MG: 10 TABLET, FILM COATED, EXTENDED RELEASE ORAL at 21:07

## 2024-07-04 ASSESSMENT — PAIN DESCRIPTION - FREQUENCY: FREQUENCY: CONTINUOUS

## 2024-07-04 ASSESSMENT — PAIN SCALES - GENERAL
PAINLEVEL_OUTOF10: 6
PAINLEVEL_OUTOF10: 0
PAINLEVEL_OUTOF10: 7
PAINLEVEL_OUTOF10: 3
PAINLEVEL_OUTOF10: 4

## 2024-07-04 ASSESSMENT — PAIN DESCRIPTION - ONSET: ONSET: ON-GOING

## 2024-07-04 ASSESSMENT — PAIN DESCRIPTION - LOCATION
LOCATION: BACK
LOCATION: BACK

## 2024-07-04 ASSESSMENT — PAIN DESCRIPTION - PAIN TYPE: TYPE: ACUTE PAIN

## 2024-07-04 ASSESSMENT — PAIN DESCRIPTION - DESCRIPTORS: DESCRIPTORS: ACHING

## 2024-07-04 ASSESSMENT — PAIN DESCRIPTION - ORIENTATION: ORIENTATION: MID

## 2024-07-04 NOTE — PROGRESS NOTES
replacement to BID  - check mag on Fri     Acute blood loss anemia  - Hb stable  - monitor and transfuse for Hb<7     Elevated liver enzymes, resolved  - during acute stay, hepatocellular injury pattern, concern that this was related to medications  - avoid nephrotoxins: discontinue scheduled tylenol, ok for PRN tylenol, fenofibrate discontinued, lipitor held during acute stay  - resumed lipitor and monitoring liver function per OSH     DM2  - home regimen: metformin, jardiance, mounjaro   - lantus plus SSI  - resumed home metformin, increased to 1000 mg BID     History of PE  - ok to resume Eliquis 6/21 per Neurosurgery     CAD  - asa  - resumed statin and monitoring LFTs     HTN  - losartan     HLD  - home regimen: lipitor, fenofibrate, vascepa  - can consider resuming lipitor and monitor LFTs  - on Lovaza since 6/14, substituted for home Vascepa     Thyroid nodule  - incidental finding on neck CT  - needs outpatient ultrasound with PCP     Mood disorder  - on celexa at home  - Psychiatry consulted, appreciate assistance. Started abilify 5 mg daily     Morbid obesity  - BMI 45  - complicating assessment and treatment. Placing patient at risk for multiple co-morbidities as well as death and contribuing to the patient's presentation.  - encourage lifestyle modification     Rehab Progress: demonstrated improved independence in donning TSLO overhead   Anticipated Dispo: home  Services:  PT, OT, nursing  ELOS: 7/6    Estefani Gilliam MD  PM&R  7/4/2024  10:32 AM

## 2024-07-04 NOTE — PLAN OF CARE
Problem: Safety - Adult  Goal: Free from fall injury  Outcome: Progressing  Flowsheets (Taken 7/4/2024 1507)  Free From Fall Injury:   Instruct family/caregiver on patient safety   Based on caregiver fall risk screen, instruct family/caregiver to ask for assistance with transferring infant if caregiver noted to have fall risk factors     Problem: Pain  Goal: Verbalizes/displays adequate comfort level or baseline comfort level  Outcome: Progressing  Flowsheets (Taken 7/4/2024 1507)  Verbalizes/displays adequate comfort level or baseline comfort level:   Encourage patient to monitor pain and request assistance   Administer analgesics based on type and severity of pain and evaluate response   Consider cultural and social influences on pain and pain management   Assess pain using appropriate pain scale   Implement non-pharmacological measures as appropriate and evaluate response   Notify Licensed Independent Practitioner if interventions unsuccessful or patient reports new pain

## 2024-07-05 LAB
ANION GAP SERPL CALCULATED.3IONS-SCNC: 11 MMOL/L (ref 3–16)
BASOPHILS # BLD: 0.1 K/UL (ref 0–0.2)
BASOPHILS NFR BLD: 1.3 %
BUN SERPL-MCNC: 16 MG/DL (ref 7–20)
CALCIUM SERPL-MCNC: 8.4 MG/DL (ref 8.3–10.6)
CHLORIDE SERPL-SCNC: 106 MMOL/L (ref 99–110)
CO2 SERPL-SCNC: 24 MMOL/L (ref 21–32)
CREAT SERPL-MCNC: <0.5 MG/DL (ref 0.6–1.2)
DEPRECATED RDW RBC AUTO: 15.9 % (ref 12.4–15.4)
EOSINOPHIL # BLD: 0.1 K/UL (ref 0–0.6)
EOSINOPHIL NFR BLD: 2.6 %
GFR SERPLBLD CREATININE-BSD FMLA CKD-EPI: >90 ML/MIN/{1.73_M2}
GLUCOSE BLD-MCNC: 138 MG/DL (ref 70–99)
GLUCOSE BLD-MCNC: 162 MG/DL (ref 70–99)
GLUCOSE BLD-MCNC: 162 MG/DL (ref 70–99)
GLUCOSE BLD-MCNC: 192 MG/DL (ref 70–99)
GLUCOSE SERPL-MCNC: 126 MG/DL (ref 70–99)
HCT VFR BLD AUTO: 28.4 % (ref 36–48)
HGB BLD-MCNC: 9.7 G/DL (ref 12–16)
LYMPHOCYTES # BLD: 1.1 K/UL (ref 1–5.1)
LYMPHOCYTES NFR BLD: 27.4 %
MAGNESIUM SERPL-MCNC: 1.8 MG/DL (ref 1.8–2.4)
MCH RBC QN AUTO: 31.2 PG (ref 26–34)
MCHC RBC AUTO-ENTMCNC: 34 G/DL (ref 31–36)
MCV RBC AUTO: 91.8 FL (ref 80–100)
MONOCYTES # BLD: 0.4 K/UL (ref 0–1.3)
MONOCYTES NFR BLD: 10.4 %
NEUTROPHILS # BLD: 2.4 K/UL (ref 1.7–7.7)
NEUTROPHILS NFR BLD: 58.3 %
PERFORMED ON: ABNORMAL
PLATELET # BLD AUTO: 182 K/UL (ref 135–450)
PMV BLD AUTO: 7.6 FL (ref 5–10.5)
POTASSIUM SERPL-SCNC: 4 MMOL/L (ref 3.5–5.1)
RBC # BLD AUTO: 3.09 M/UL (ref 4–5.2)
SODIUM SERPL-SCNC: 141 MMOL/L (ref 136–145)
WBC # BLD AUTO: 4.1 K/UL (ref 4–11)

## 2024-07-05 PROCEDURE — 80048 BASIC METABOLIC PNL TOTAL CA: CPT

## 2024-07-05 PROCEDURE — 97542 WHEELCHAIR MNGMENT TRAINING: CPT

## 2024-07-05 PROCEDURE — 1280000000 HC REHAB R&B

## 2024-07-05 PROCEDURE — 97530 THERAPEUTIC ACTIVITIES: CPT

## 2024-07-05 PROCEDURE — 85025 COMPLETE CBC W/AUTO DIFF WBC: CPT

## 2024-07-05 PROCEDURE — 6370000000 HC RX 637 (ALT 250 FOR IP): Performed by: STUDENT IN AN ORGANIZED HEALTH CARE EDUCATION/TRAINING PROGRAM

## 2024-07-05 PROCEDURE — 6370000000 HC RX 637 (ALT 250 FOR IP): Performed by: PHYSICAL MEDICINE & REHABILITATION

## 2024-07-05 PROCEDURE — 97110 THERAPEUTIC EXERCISES: CPT

## 2024-07-05 PROCEDURE — 83735 ASSAY OF MAGNESIUM: CPT

## 2024-07-05 PROCEDURE — 97116 GAIT TRAINING THERAPY: CPT

## 2024-07-05 PROCEDURE — 36415 COLL VENOUS BLD VENIPUNCTURE: CPT

## 2024-07-05 PROCEDURE — 6370000000 HC RX 637 (ALT 250 FOR IP)

## 2024-07-05 PROCEDURE — 97535 SELF CARE MNGMENT TRAINING: CPT

## 2024-07-05 RX ORDER — METHOCARBAMOL 750 MG/1
750 TABLET, FILM COATED ORAL 4 TIMES DAILY
Qty: 40 TABLET | Refills: 0 | Status: SHIPPED | OUTPATIENT
Start: 2024-07-05 | End: 2024-07-15

## 2024-07-05 RX ORDER — LANOLIN ALCOHOL/MO/W.PET/CERES
400 CREAM (GRAM) TOPICAL 2 TIMES DAILY
Qty: 60 TABLET | Refills: 1 | Status: SHIPPED | OUTPATIENT
Start: 2024-07-05

## 2024-07-05 RX ORDER — ARIPIPRAZOLE 5 MG/1
5 TABLET ORAL DAILY
Qty: 30 TABLET | Refills: 1 | Status: SHIPPED | OUTPATIENT
Start: 2024-07-06

## 2024-07-05 RX ORDER — OXYCODONE HYDROCHLORIDE 5 MG/1
5 TABLET ORAL EVERY 6 HOURS PRN
Qty: 28 TABLET | Refills: 0 | Status: SHIPPED | OUTPATIENT
Start: 2024-07-05 | End: 2024-07-12

## 2024-07-05 RX ORDER — DIAZEPAM 5 MG/1
5 TABLET ORAL EVERY 8 HOURS PRN
Qty: 21 TABLET | Refills: 0 | Status: SHIPPED | OUTPATIENT
Start: 2024-07-05 | End: 2024-07-12

## 2024-07-05 RX ADMIN — POTASSIUM CHLORIDE 20 MEQ: 1500 TABLET, EXTENDED RELEASE ORAL at 07:34

## 2024-07-05 RX ADMIN — OXYCODONE 10 MG: 5 TABLET ORAL at 13:02

## 2024-07-05 RX ADMIN — METHOCARBAMOL 750 MG: 750 TABLET ORAL at 07:33

## 2024-07-05 RX ADMIN — OXYCODONE HYDROCHLORIDE AND ACETAMINOPHEN 500 MG: 500 TABLET ORAL at 07:34

## 2024-07-05 RX ADMIN — CITALOPRAM HYDROBROMIDE 40 MG: 20 TABLET ORAL at 20:21

## 2024-07-05 RX ADMIN — OMEGA-3-ACID ETHYL ESTERS 1 G: 1 CAPSULE, LIQUID FILLED ORAL at 07:33

## 2024-07-05 RX ADMIN — Medication 400 MG: at 07:33

## 2024-07-05 RX ADMIN — ATORVASTATIN CALCIUM 40 MG: 40 TABLET, FILM COATED ORAL at 07:33

## 2024-07-05 RX ADMIN — OXYCODONE HYDROCHLORIDE 10 MG: 10 TABLET, FILM COATED, EXTENDED RELEASE ORAL at 20:21

## 2024-07-05 RX ADMIN — OMEGA-3-ACID ETHYL ESTERS 1 G: 1 CAPSULE, LIQUID FILLED ORAL at 20:21

## 2024-07-05 RX ADMIN — METFORMIN HYDROCHLORIDE 1000 MG: 500 TABLET ORAL at 07:33

## 2024-07-05 RX ADMIN — ARIPIPRAZOLE 5 MG: 5 TABLET ORAL at 07:32

## 2024-07-05 RX ADMIN — ACETAMINOPHEN 1000 MG: 500 TABLET ORAL at 13:01

## 2024-07-05 RX ADMIN — METHOCARBAMOL 750 MG: 750 TABLET ORAL at 13:02

## 2024-07-05 RX ADMIN — METHOCARBAMOL 750 MG: 750 TABLET ORAL at 17:43

## 2024-07-05 RX ADMIN — FERROUS SULFATE TAB 325 MG (65 MG ELEMENTAL FE) 325 MG: 325 (65 FE) TAB at 07:33

## 2024-07-05 RX ADMIN — TOPIRAMATE 100 MG: 100 TABLET, FILM COATED ORAL at 20:21

## 2024-07-05 RX ADMIN — APIXABAN 5 MG: 5 TABLET, FILM COATED ORAL at 20:21

## 2024-07-05 RX ADMIN — OXYCODONE 10 MG: 5 TABLET ORAL at 06:57

## 2024-07-05 RX ADMIN — PANTOPRAZOLE SODIUM 20 MG: 20 TABLET, DELAYED RELEASE ORAL at 07:34

## 2024-07-05 RX ADMIN — METHOCARBAMOL 750 MG: 750 TABLET ORAL at 20:21

## 2024-07-05 RX ADMIN — Medication 400 MG: at 20:21

## 2024-07-05 RX ADMIN — ACETAMINOPHEN 1000 MG: 500 TABLET ORAL at 20:21

## 2024-07-05 RX ADMIN — Medication 5 MG: at 20:21

## 2024-07-05 RX ADMIN — APIXABAN 5 MG: 5 TABLET, FILM COATED ORAL at 07:33

## 2024-07-05 RX ADMIN — Medication 1 TABLET: at 07:33

## 2024-07-05 RX ADMIN — LOSARTAN POTASSIUM 50 MG: 25 TABLET, FILM COATED ORAL at 07:33

## 2024-07-05 RX ADMIN — METFORMIN HYDROCHLORIDE 1000 MG: 500 TABLET ORAL at 17:43

## 2024-07-05 RX ADMIN — OXYCODONE HYDROCHLORIDE 10 MG: 10 TABLET, FILM COATED, EXTENDED RELEASE ORAL at 07:34

## 2024-07-05 RX ADMIN — ASPIRIN 81 MG 81 MG: 81 TABLET ORAL at 07:33

## 2024-07-05 RX ADMIN — INSULIN GLARGINE 12 UNITS: 100 INJECTION, SOLUTION SUBCUTANEOUS at 20:21

## 2024-07-05 ASSESSMENT — PAIN SCALES - GENERAL
PAINLEVEL_OUTOF10: 7
PAINLEVEL_OUTOF10: 5
PAINLEVEL_OUTOF10: 6
PAINLEVEL_OUTOF10: 7
PAINLEVEL_OUTOF10: 7

## 2024-07-05 ASSESSMENT — PAIN DESCRIPTION - LOCATION
LOCATION: BACK
LOCATION: BACK

## 2024-07-05 NOTE — DISCHARGE INSTR - COC
Continuity of Care Form    Patient Name: Pretty Covington   :  1960  MRN:  [id number]    Admit date:  2024  Discharge date:  Anticipating Dc on 24    Code Status Order: Full Code   Advance Directives:     Admitting Physician:  Estefani Gilliam MD  PCP: Marialuisa Andujar MD    Discharging Nurse: Shanika MENEZES  Discharging Hospital Unit/Room#: 0152/0152-01  Discharging Unit Phone Number: [phone]    Emergency Contact:   Extended Emergency Contact Information  Primary Emergency Contact: Charleen Cruz   [facility]  Home Phone: [phone]  Mobile Phone: [phone]  Relation: Brother/Sister  Secondary Emergency Contact: Nico Covington, [facility]  Home Phone: [phone]  Work Phone: [phone]  Mobile Phone: [phone]  Relation: Brother/Sister   needed? No    Past Surgical History:  Past Surgical History:   Procedure Laterality Date    BREAST SURGERY Right     benign tumor    CHOLECYSTECTOMY, LAPAROSCOPIC  2012    COLONOSCOPY  2015    cecal polyp    DILATION AND CURETTAGE OF UTERUS  2018    FOOT SURGERY Right 3/15/2023    OPEN REDUCTION INTERNAL FIXATION RIGHT 5TH METATARSAL performed by Arsh Siegel DPM at [facility] OR    HERNIA REPAIR  10/11/2013    lap umbilical    HYSTEROSCOPY  2018    and D&C    LAMINECTOMY N/A 2024    Thoracic 11-Lumbar 3 fixation and fusion performed by Ebenezer Campoverde MD at [facility] OR       Immunization History:   Immunization History   Administered Date(s) Administered    COVID-19, PFIZER PURPLE top, DILUTE for use, (age 12 y+), 30mcg/0.3mL 2021, 2021    Influenza Vaccine, unspecified formulation 10/28/2016, 2017, 10/05/2018    Influenza Virus Vaccine 2012, 10/15/2014, 02/15/2016, 2017, 10/05/2018, 10/23/2020, 10/12/2021, 2024    Influenza Whole 10/11/2010, 10/15/2014, 02/15/2016    Influenza, FLUARIX, FLULAVAL, FLUZONE (age 6 mo+) AND

## 2024-07-05 NOTE — PROGRESS NOTES
Occupational Therapy  Discharge Summary     Name:Pretty Covington  MRN:[id number]  :1960  Treatment Diagnosis: Decreased functional mobility  Diagnosis: Closed fx of lumbar vertebral body    Restrictions/Precautions  Restrictions/Precautions: Fall Risk, General Precautions, ROM Restrictions, Surgical Protocols  Required Braces or Orthoses?: Yes           Position Activity Restriction  Spinal Precautions: No Bending, No Lifting, No Twisting  Other position/activity restrictions: Ambulate patient; needs TLSO when OOB, can be removed for hygiene per surgeon's office on      Goals:   Short Term Goals  Time Frame for Short Term Goals: 14 days (2024): unless otherwise noted  Short Term Goal 1: Pt will complete transfers w/ LRD and minAx1. GOAL MET 24 Pt completed transfers with min A.  Short Term Goal 2: Pt will complete toileting w/ modA and AE PRN. GOAL MET 24 Pt completed toileting with mod A.  Short Term Goal 3: Pt will don TLSO w/ Nohemi.- MET  pt min A  Short Term Goal 4: Pt will complete FB bathing w/ modA. GOAL MET 24 Pt completed FB bathing with min A.  Short Term Goal 5: Pt will stand at sink to complete grooming tasks w/ SBA for ~3mins. GOAL MET 24 Pt completed grooming at sink for 4 minutes with SPV.  Long Term Goals  Time Frame for Long Term Goals : 21 days (2024): unless otherwise noted  Long Term Goal 1: Pt will complete transfers w/ LRD and SPV. GOAL MET 24 Pt completed transfers with mod I.  Long Term Goal 2: Pt will complete toileting w/ SBA and AE PRN. Goal Not Met Pt requires mod A for toileting. Family able to assist as needed.  Long Term Goal 3: Pt will don TLSO w/ increased time and mod I. Goal Not Met. Pt continues to require cues for proper donning of TLSO.  Long Term Goal 4: Pt will complete FB bathing w/ Nohemi and AE PRN. GOAL MET 24 Pt completed FB bathing with CGA.  Long Term Goal 5: Pt will stand at sink to complete grooming tasks w/ SPV for

## 2024-07-05 NOTE — PROGRESS NOTES
Physical Therapy  Discharge Summary    Name:Pretty Covington  MRN:[id number]  :1960  Treatment Diagnosis: Decreased functional mobility  Diagnosis: Closed fx of lumbar vertebral body    Restrictions/Precautions  Restrictions/Precautions: Fall Risk, General Precautions, ROM Restrictions, Surgical Protocols  Required Braces or Orthoses?: Yes           Position Activity Restriction  Spinal Precautions: No Bending, No Lifting, No Twisting  Other position/activity restrictions: Ambulate patient; needs TLSO when OOB, can be removed for hygiene per surgeon's office on      Goals:                  Short Term Goals  Time Frame for Short Term Goals:   Short Term Goal 1: Pt will perfofrm bed mobility with Mod I. - MET   Short Term Goal 2: Pt will perform functional transfers with Mod A and LRAD. - MET  CGA w RW  Short Term Goal 3: Pt will ambulate 50 ft with TLSO, LRAD, and Mod A. - MET  CGA w RW  Short Term Goal 4: Pt will propel w/c for 150 ft with Mod I with B UE. - MET             Long Term Goals  Time Frame for Long Term Goals :   Long Term Goal 1: Pt will ambulate 150 ft with TLSO, LRAD and Mod I. - MET   Long Term Goal 2: Pt will perform functional transfers with Mod I and LRAD. - MET   Long Term Goal 3: Pt will complete car transfer with Mod I and LRAD. - GOAL MET   Long Term Goal 4: Pt will complete 6\" curb step with LRAD and Mod I. - MET     Pt. Met 4/4 short term goals and 4/4 long term goals.     Pt. Currently ambulates 180 feet with RW and mod ind   Up/down 0 steps with - NA   Up/down curb step with RW with mod ind   Sit to/from stand with mod ind   Bed mobility with ind  Recommend HHPT in order to continue progress to full independence  Pt. Safe to return home with assistance from sister.   Provided pt. with HEP  Electronically signed by Arely Mcallister PT on 2024 at 3:47 PM

## 2024-07-05 NOTE — PLAN OF CARE
Patient remains free from falls and injuries.  Call light within reach.  Patient has non skid footwear on.  Bed/chair alarms on at all times.  Patient has been compliant with asking staff to help with transfers. Tani Reyes RN

## 2024-07-05 NOTE — PROGRESS NOTES
Department of Physical Medicine & Rehabilitation  Progress Note    Patient Identification:  Pretty Covington  [id number]  : 1960  Admit date: 2024    Chief Complaint: Late effect of fracture of lumbar vertebra    Subjective:   No acute events overnight. Today Pretty is seen in her room. She reports feeling tired today, but otherwise endorses feeling well. She is looking forward to discharge home tomorrow.     Labs reviewed.    ROS: No f/c, n/v, cp     Objective:  Patient Vitals for the past 24 hrs:   BP Temp Temp src Pulse Resp SpO2 Weight   24 0730 134/76 97.2 °F (36.2 °C) Oral 76 17 97 % --   24 0115 -- -- -- -- -- -- 119.7 kg (263 lb 12.8 oz)   24 2106 (!) 148/73 97.3 °F (36.3 °C) Oral 96 18 94 % --     Const: Alert. No distress, pleasant. TLSO on  HEENT: Normocephalic, atraumatic. Normal sclera/conjunctiva. MMM.   CV: RRR  Resp: No respiratory distress. Lungs CTAB  Abd: Soft, nondistended  Ext: No edema.   Neuro: Alert, oriented, appropriately interactive. Speech fluent  Psych: Cooperative, appropriate mood and affect    Laboratory data: Available via EMR.   Last 24 hour lab  Recent Results (from the past 24 hour(s))   POCT Glucose    Collection Time: 24  4:12 PM   Result Value Ref Range    POC Glucose 157 (H) 70 - 99 mg/dl    Performed on ACCU-CHEK    POCT Glucose    Collection Time: 24  7:14 PM   Result Value Ref Range    POC Glucose 265 (H) 70 - 99 mg/dl    Performed on ACCU-CHEK    CBC with Auto Differential    Collection Time: 24  6:07 AM   Result Value Ref Range    WBC 4.1 4.0 - 11.0 K/uL    RBC 3.09 (L) 4.00 - 5.20 M/uL    Hemoglobin 9.7 (L) 12.0 - 16.0 g/dL    Hematocrit 28.4 (L) 36.0 - 48.0 %    MCV 91.8 80.0 - 100.0 fL    MCH 31.2 26.0 - 34.0 pg    MCHC 34.0 31.0 - 36.0 g/dL    RDW 15.9 (H) 12.4 - 15.4 %    Platelets 182 135 - 450 K/uL    MPV 7.6 5.0 - 10.5 fL    Neutrophils % 58.3 %    Lymphocytes % 27.4 %    Monocytes % 10.4 %    Eosinophils % 2.6 %

## 2024-07-05 NOTE — PROGRESS NOTES
Occupational Therapy  Facility/Department: [facility]  Rehabilitation Occupational Therapy Daily Treatment Note    Date: 24  Patient Name: Pretty Covington       Room: 0152/0152-01  MRN: [id number]  Account: [id number]   : 1960  (64 y.o.) Gender: female            Pt was bathed during OT session this date. Pt was Showered with soap/water with Independent.          Past Medical History:  has a past medical history of Asthma, Benign essential hypertension, Benign head tremor, Cholelithiasis, Coronary artery disease involving native coronary artery of native heart, Disease of nasal cavity and sinuses, Fatty infiltration of liver, Hot flash, menopausal, Hx of blood clots, Hyperlipidemia, Hyperlipidemia associated with type 2 diabetes mellitus (HCC), Morbid obesity with BMI of 50.0-59.9, adult (HCC), No history of procedure, ELEAZAR on CPAP, Pulmonary embolism (HCC), Type II or unspecified type diabetes mellitus without mention of complication, not stated as uncontrolled, Umbilical hernia, Unspecified sleep apnea, and Wears glasses.  Past Surgical History:   has a past surgical history that includes Cholecystectomy, laparoscopic (2012); hernia repair (10/11/2013); Colonoscopy (2015); Breast surgery (Right, ); hysteroscopy (2018); Dilation and curettage of uterus (2018); Foot surgery (Right, 3/15/2023); and laminectomy (N/A, 2024).    Restrictions  Restrictions/Precautions: Fall Risk, General Precautions, ROM Restrictions, Surgical Protocols  Other position/activity restrictions: Ambulate patient; needs TLSO when OOB, can be removed for hygiene per surgeon's office on   Required Braces or Orthoses  Spinal: Thoracic Lumbar Sacral Orthotics  Spinal Other: TLSO when OOB  Required Braces or Orthoses?: Yes    Subjective  Subjective: Pt in w/c, pleasant, agreeable to OT session and shower, pain 4/10 in back, aching, able to continue with therapy with repositioning and distraction, BP

## 2024-07-05 NOTE — CARE COORDINATION
Case Management Discharge Summary  [facility] - Acute Rehab Unit    Patient:Pretty Covington     Rehab Dx/Hx: Late effect of fracture of lumbar vertebra [S32.9XXS]       Today's Date: 7/5/2024    Discharge to:  Anticipating Dc on 7/06:Home w/sister and HHC   Pre-certification completed:  [x] No       [] Yes     [] N/A   Hospital Exemption Notification (HENS) completed:   No       [] Yes     [] N/A   IMM given:  N/a       New Durable Medical Equipment ordered/agency:  [facility]   Transportation:  Private:sister       Confirmed discharge plan with:  Patient: [] No       [x] Yes  Family:  [] No       [x] Yes      Name: Charleen Cruz (Brother/Sister)  Contact number: [phone]  Facility/Agency, name:  [facility]  [address] [place], [place] [address]  [phone]  KLARISSA/AVS faxed  RN, name: Tani       Has lack of transportation kept you from medical appointments, meetings, work, or ADL's? [] Yes, it has kept me from medical appointments or from getting my meds  [] Yes, It has kept me from non-medical meetings, appointments, work, or getting things I need  [x] No  [] Pt unable to respond  [] Pt declines to respond     How often do you need to have someone help you when you read instructions, pamphlets, or other written material from your doctor or pharmacy? [] Never                             [] Always  [x] Rarely                            [] Patient declines to respond  [] Sometimes                    [] Patient unable to respond  [] Often       Note: Discharging nurse to complete KLARISSA, reconcile AVS, and place final copy with patient's discharge packet. RN to ensure that written prescriptions for  Level II medications are sent with patient to the facility as per protocol.          Signature: Aretha Iraheta RN

## 2024-07-05 NOTE — PROGRESS NOTES
with bed mobility, functional tarnsfers and household gait distances. pt can continue to benefit from HHPT to continue to progress endurance, strength, balance. DME: RW  Activity Tolerance: Patient tolerated treatment well  Discharge Recommendations: 24 hour supervision or assist;Home with Home health PT  PT Equipment Recommendations  Equipment Needed: Yes  Walker: Rolling    Goals  Patient Goals   Patient Goals : \"I want to get back to normal\"  Short Term Goals  Time Frame for Short Term Goals: 7/1  Short Term Goal 1: Pt will perfofrm bed mobility with Mod I. - MET 7/1  Short Term Goal 2: Pt will perform functional transfers with Mod A and LRAD. - MET 6/27 CGA w RW  Short Term Goal 3: Pt will ambulate 50 ft with TLSO, LRAD, and Mod A. - MET 6/27 CGA w RW  Short Term Goal 4: Pt will propel w/c for 150 ft with Mod I with B UE. - MET 7/1  Long Term Goals  Time Frame for Long Term Goals : 7/11  Long Term Goal 1: Pt will ambulate 150 ft with TLSO, LRAD and Mod I.  Long Term Goal 2: Pt will perform functional transfers with Mod I and LRAD.  Long Term Goal 3: Pt will complete car transfer with Mod I and LRAD. - GOAL MET 7/1  Long Term Goal 4: Pt will complete 6\" curb step with LRAD and Mod I.    PLAN OF CARE/SAFETY  Physical Therapy Plan  General Plan: 5-7 times per week  Current Treatment Recommendations: Balance training;Functional mobility training;Transfer training;Gait training;Safety education & training;Therapeutic activities  Safety Devices  Type of Devices: All fall risk precautions in place;Call light within reach;Gait belt;Patient at risk for falls;Chair alarm in place;Left in chair    EDUCATION  Education  Education Given To: Patient  Education Provided: Mobility Training;Equipment;Precautions;Transfer Training;Safety  Education Provided Comments: safe functional transfer training, more advanced mobility and gait training  Education Method: Verbal;Printed Information/Hand-outs  Barriers to Learning:  None  Education Outcome: Verbalized understanding;Demonstrated understanding        Therapy Time   Individual Concurrent Group Co-treatment   Time In 1000         Time Out 1030         Minutes 30           Timed Code Treatment Minutes: 30 Minutes       Anika Vázquez, PT, 07/05/24 at 11:40 AM       SECOND SESSION:   Pt motivated to participate, RW has been delivered to room, no bariatric which is what pt has used while in ARU, trial with both walkers, decided on RW not bariatric as pt is able to keep tyler inside frame of RW and pt states her sister is going to have to lift the walker in the car, pt transferred supine to and from sit with ind, rolling ind, sit to stand with ind from bed, recliner, commode, and w/c, pt ambulated 10ft across uneven surface with mod ind, pt negotiated 6\" curb step with RW with mod ind, pt ambulated with RW with mod ind for 30ft, pt propelled w/c with ind for 150ft on level surface, pt picked up 10 bean bags from floor in standing position with reacher with ind, answered pt's questions regarding transfer home, provided therapeutic listening as pt is pleased with progress and states feels ready to go home and excited about enjoying the simple things like going out to eat, pt plans on acquiring a transport chair for community use    Second Session Therapy Time:   Individual Concurrent Group Co-treatment   Time In 1300         Time Out 1400         Minutes 60           Timed Code Treatment Minutes:  60    Total Treatment Minutes:   90    Arely Mcallister, PT 7/05/24 at 3:46

## 2024-07-06 VITALS
HEART RATE: 91 BPM | WEIGHT: 263.8 LBS | DIASTOLIC BLOOD PRESSURE: 73 MMHG | SYSTOLIC BLOOD PRESSURE: 153 MMHG | RESPIRATION RATE: 18 BRPM | OXYGEN SATURATION: 95 % | BODY MASS INDEX: 43.95 KG/M2 | HEIGHT: 65 IN | TEMPERATURE: 98.3 F

## 2024-07-06 LAB
GLUCOSE BLD-MCNC: 137 MG/DL (ref 70–99)
PERFORMED ON: ABNORMAL

## 2024-07-06 PROCEDURE — 6370000000 HC RX 637 (ALT 250 FOR IP)

## 2024-07-06 PROCEDURE — 6370000000 HC RX 637 (ALT 250 FOR IP): Performed by: PHYSICAL MEDICINE & REHABILITATION

## 2024-07-06 PROCEDURE — 6370000000 HC RX 637 (ALT 250 FOR IP): Performed by: STUDENT IN AN ORGANIZED HEALTH CARE EDUCATION/TRAINING PROGRAM

## 2024-07-06 RX ADMIN — PANTOPRAZOLE SODIUM 20 MG: 20 TABLET, DELAYED RELEASE ORAL at 08:58

## 2024-07-06 RX ADMIN — ACETAMINOPHEN 1000 MG: 500 TABLET ORAL at 05:21

## 2024-07-06 RX ADMIN — Medication 400 MG: at 08:58

## 2024-07-06 RX ADMIN — ASPIRIN 81 MG 81 MG: 81 TABLET ORAL at 08:58

## 2024-07-06 RX ADMIN — METHOCARBAMOL 750 MG: 750 TABLET ORAL at 08:58

## 2024-07-06 RX ADMIN — OXYCODONE HYDROCHLORIDE AND ACETAMINOPHEN 500 MG: 500 TABLET ORAL at 08:58

## 2024-07-06 RX ADMIN — OXYCODONE HYDROCHLORIDE 10 MG: 10 TABLET, FILM COATED, EXTENDED RELEASE ORAL at 08:59

## 2024-07-06 RX ADMIN — Medication 1 TABLET: at 08:58

## 2024-07-06 RX ADMIN — METFORMIN HYDROCHLORIDE 1000 MG: 500 TABLET ORAL at 08:58

## 2024-07-06 RX ADMIN — ARIPIPRAZOLE 5 MG: 5 TABLET ORAL at 08:59

## 2024-07-06 RX ADMIN — OMEGA-3-ACID ETHYL ESTERS 1 G: 1 CAPSULE, LIQUID FILLED ORAL at 08:58

## 2024-07-06 RX ADMIN — FERROUS SULFATE TAB 325 MG (65 MG ELEMENTAL FE) 325 MG: 325 (65 FE) TAB at 08:58

## 2024-07-06 RX ADMIN — LOSARTAN POTASSIUM 50 MG: 25 TABLET, FILM COATED ORAL at 08:58

## 2024-07-06 RX ADMIN — ATORVASTATIN CALCIUM 40 MG: 40 TABLET, FILM COATED ORAL at 08:58

## 2024-07-06 RX ADMIN — APIXABAN 5 MG: 5 TABLET, FILM COATED ORAL at 08:58

## 2024-07-06 RX ADMIN — POTASSIUM CHLORIDE 20 MEQ: 1500 TABLET, EXTENDED RELEASE ORAL at 08:58

## 2024-07-06 ASSESSMENT — PAIN DESCRIPTION - FREQUENCY: FREQUENCY: CONTINUOUS

## 2024-07-06 ASSESSMENT — PAIN - FUNCTIONAL ASSESSMENT: PAIN_FUNCTIONAL_ASSESSMENT: PREVENTS OR INTERFERES SOME ACTIVE ACTIVITIES AND ADLS

## 2024-07-06 ASSESSMENT — PAIN SCALES - GENERAL
PAINLEVEL_OUTOF10: 8
PAINLEVEL_OUTOF10: 0

## 2024-07-06 ASSESSMENT — PAIN DESCRIPTION - ORIENTATION: ORIENTATION: MID

## 2024-07-06 ASSESSMENT — PAIN DESCRIPTION - ONSET: ONSET: ON-GOING

## 2024-07-06 ASSESSMENT — PAIN DESCRIPTION - LOCATION: LOCATION: BACK

## 2024-07-06 ASSESSMENT — PAIN DESCRIPTION - PAIN TYPE: TYPE: ACUTE PAIN

## 2024-07-06 ASSESSMENT — PAIN DESCRIPTION - DESCRIPTORS: DESCRIPTORS: ACHING

## 2024-07-06 NOTE — PROGRESS NOTES
[facility] Discharge Assessment  [facility] Acute Rehab Unit    Patient:Pretty Coivngton     Rehab Dx/Hx: Late effect of fracture of lumbar vertebra [S32.9XXS]   :1960  MRN:[id number]  Date of Admit: 2024     Pain Effect on Sleep:  Over the past 5 days, how much of the time has pain made it hard for you to sleep at night?  []  0. Does not apply - I have not had any pain or hurting in the past 5 days  [x]  1. Rarely or not at all  []  2. Occasionally  []  3. Frequently  []  4. Almost constantly  []  8. Unable to answer    Over the past 5 days, how often have you limited your participation in rehabilitation therapy sessions due to pain?  []  0. Does not apply - I have not received rehabilitation therapy in the past 5 days  [x]  1. Rarely or not at all  []  2. Occasionally  []  3. Frequently  []  4. Almost constantly  []  8. Unable to answer    Pain Interference with Day-to-Day Activities:  Over the past 5 days, how often have you limited your day-to-day activities (excluding rehabilitation therapy session)?  []  1. Rarely or not at all  [x]  2. Occasionally  []  3. Frequently  []  4. Almost constantly  []  8. Unable to answer      High-Risk Drug Classes: Use and Indication   Is taking: Check if the pt is taking any medications by pharmacological classification  Indication noted: If column 1 is checked, check if there is an indication noted for all meds in the drug class Is taking  (check all that apply) Indication noted (check all that apply)   Antipsychotic [x] [x]   Anticoagulant [x] [x]   Antibiotic [] []   Opioid [x] [x]   Antiplatelet [] []   Hypoglycemic (including insulin) [x] [x]   None of the above [] []     Special Treatments, Procedures, and Programs   Check all of the following treatments, procedures, and programs that apply on discharge.  On discharge (check all that apply)   Cancer Treatments    A1. Chemotherapy []   A2. IV []   A3. Oral []   A10. Other []   B1. Radiation []   Respiratory  Therapies    C1. Oxygen Therapy []   C2. Continuous []   C3. Intermittent []   C4. High-concentration []   D1. Suctioning []   D2. Scheduled []   D3. As needed []   E1. Tracheostomy Care []   F1. Invasive Mechanical Ventilator (ventilator or respirator) []   G1. Non-invasive Mechanical Ventilator []   G2. BiPAP []   G3. CPAP []   Other    H1. IV Medications []   H2. Vasoactive medications []   H3. Antibiotics []   H4. Anticoagulation []   H10. Other []   I1. Transfusions []   J1. Dialysis []   J2. Hemodialysis []   J3. Peritoneal dialysis []   O1. IV access []   O2. Peripheral []   O3. Midline []   O4. Central (PICC, tunneled, port) []   None of the above [x]     Signature:  Shanika MENEZES RN

## 2024-07-06 NOTE — PROGRESS NOTES
ACUTE REHAB UNIT: DISCHARGE  [facility]    Patient:Pretty Covington     Rehab Dx/Hx: Late effect of fracture of lumbar vertebra [S32.9XXS]   :1960  MRN:[id number]  Date of Admit: 2024   Date of Discharge: 2024    Subjective:   Order for patient discharge.  Reviewed discharge summary (AVS) with patient and sister including medications, physical instructions (safety) and diet. Pt in stable condition.     Reconciled Medication List - Patient:   Medications were reviewed by RN at time of discharge with patient and/or family:  []  Via EMR   []  Via health information exchange  [x]  Verbal (e.g. in person, telephone, video conferencing)  [x]  Paper-based (e.g. fax, copies, printouts)   []  Other Methods (e.g. texting, email, CDs)    Reconciled Medication List - Subsequent provider:   [] No, current reconciled medication list not provided to the subsequent provider.  [x] Yes, current reconciled medication list provided to the subsequent provider.   [] Via EMR    [] Via health information exchange  [] Verbal (e.g. in person, telephone, video conferencing)  [x] Paper-based (e.g. fax, copies, printouts)   [] Other Methods (e.g. texting, email, CDs)    Discharge disposition:  Pt was discharged via:  [x]  Wheelchair  []  Stretcher  []  Safe ambulation and use of assistive device  []  Other:     Pt was discharged to:  [x]  Private car  []  Transportation service to next destination  []  Other:     Discharge destination:  [x]  Home  []  Skilled Nursing Facility  []  Long Term Care  [x]  Other: Home health care        Discharge BIMS: 15  Number of word repeated after first attempt:  []  0. None []  1. One []  2. Two [x]  3. Three    Able to report correct year:  []  0. Missed by >5 years, or no answer  []  1. Missed by 2-5 years  []  2. Missed by 1 year  [x]  3. Correct    Able to report correct month:   []  0. Missed by >1 month, or no answer  []  1. Missed by 6 days to 1 month  [x]  2.  Accurate within 5 days    Able to report correct day of the week:  []  0. Incorrect or no answer  [x]  1. Correct    Recall:   Able to recall \"sock\":  []  0. No could not recall  []  1. Yes, after cueing  [x]  2. Yes, no cue required  Able to recall \"blue\":  []  0. No could not recall  []  1. Yes, after cueing  [x]  2. Yes, no cue required  Able to recall \"bed\":  []  0. No could not recall  []  1. Yes, after cueing  [x]  2. Yes, no cue required         Patient Mood Interview0    Symptom Presence  0. No (enter 0 in column 2)  1. Yes (enter 0-3 in column 2)  9. No response (leave column 2 blank  Symptom Frequency  0. Never or 1 day  1. 2-6 days (several days)  2. 7-11 days (half or more days)  3. 12-14 days (nearly everyday) 1. Symptom Presence 2. Symptom Frequency    Enter scores in boxes   Little interest or pleasure in doing things   0 0   Feeling down, depressed, or hopeless   0 0   If either A or B is coded 2 or 3, CONTINUE asking the questions below.  If not, END the interview.     Trouble falling or staying asleep, or sleeping too much       Feeling tired or having little energy       Poor appetite or overeating       Feeling bad about yourself - or that you are a failure or have let yourself or your family down       Trouble concentrating on things, such as reading the newspaper or watching television       Moving or speaking so slowly that other people could have noticed.  Or the opposite- being so fidgety or restless that you have been moving around a lot more than usual.       Thoughts that you would be better off dead, or of hurting yourself in some way.       Total Severity: Add scores for all frequency responses in column 2    0   Social isolation (from [employer])  How often do you feel lonely or isolated from those around you?  [] 0. Never  [x] 1. Rarely  [] 2. Sometimes  [] 3. Often  [] 4. Always  [] 7. Patient declines to respond  [] 8. Patient unable to respond.           Discharging Nurse

## 2024-07-08 ENCOUNTER — TELEPHONE (OUTPATIENT)
Dept: INTERNAL MEDICINE CLINIC | Age: 64
End: 2024-07-08

## 2024-07-08 RX ORDER — CITALOPRAM 40 MG/1
TABLET ORAL
Qty: 90 TABLET | Refills: 0 | Status: SHIPPED | OUTPATIENT
Start: 2024-07-08

## 2024-07-08 RX ORDER — TOPIRAMATE 100 MG/1
TABLET, FILM COATED ORAL
Qty: 90 TABLET | Refills: 0 | Status: SHIPPED | OUTPATIENT
Start: 2024-07-08

## 2024-07-08 NOTE — TELEPHONE ENCOUNTER
----- Message from Marialuisa Andujar MD sent at 7/8/2024  1:38 PM EDT -----  Contact: [phone] [address]  ----- Message -----  From: Alaina Weeks MA  Sent: 7/8/2024  11:56 AM EDT  To: MD Radha Norman with [facility] home health called and just needs a verbal as to if you are willing to sign home care orders for this patient, they have orders nursing, PT and OT. Please advise      [facility] - [place], [place] - [address] [phone] - F [phone]  [address] [place] [address]  Phone: [phone]  Fax: [phone]

## 2024-07-12 PROBLEM — Z01.810 PREOP CARDIOVASCULAR EXAM: Status: RESOLVED | Noted: 2024-06-12 | Resolved: 2024-07-12

## 2024-07-15 RX ORDER — EMPAGLIFLOZIN 25 MG/1
TABLET, FILM COATED ORAL
Qty: 90 TABLET | Refills: 3 | Status: SHIPPED | OUTPATIENT
Start: 2024-07-15

## 2024-07-17 ENCOUNTER — OFFICE VISIT (OUTPATIENT)
Dept: INTERNAL MEDICINE CLINIC | Age: 64
End: 2024-07-17

## 2024-07-17 VITALS
SYSTOLIC BLOOD PRESSURE: 120 MMHG | BODY MASS INDEX: 43.9 KG/M2 | RESPIRATION RATE: 12 BRPM | DIASTOLIC BLOOD PRESSURE: 80 MMHG | HEIGHT: 65 IN | HEART RATE: 70 BPM

## 2024-07-17 DIAGNOSIS — E11.9 TYPE 2 DIABETES MELLITUS WITHOUT COMPLICATION, WITHOUT LONG-TERM CURRENT USE OF INSULIN (HCC): ICD-10-CM

## 2024-07-17 DIAGNOSIS — S32.9XXS: ICD-10-CM

## 2024-07-17 DIAGNOSIS — E78.5 HYPERLIPIDEMIA ASSOCIATED WITH TYPE 2 DIABETES MELLITUS (HCC): ICD-10-CM

## 2024-07-17 DIAGNOSIS — I27.82 OTHER CHRONIC PULMONARY EMBOLISM WITHOUT ACUTE COR PULMONALE (HCC): ICD-10-CM

## 2024-07-17 DIAGNOSIS — E11.69 HYPERLIPIDEMIA ASSOCIATED WITH TYPE 2 DIABETES MELLITUS (HCC): ICD-10-CM

## 2024-07-17 DIAGNOSIS — I10 PRIMARY HYPERTENSION: ICD-10-CM

## 2024-07-17 DIAGNOSIS — Z09 HOSPITAL DISCHARGE FOLLOW-UP: Primary | ICD-10-CM

## 2024-07-17 LAB
ALBUMIN SERPL-MCNC: 4.1 G/DL (ref 3.4–5)
ALBUMIN/GLOB SERPL: 1.9 {RATIO} (ref 1.1–2.2)
ALP SERPL-CCNC: 90 U/L (ref 40–129)
ALT SERPL-CCNC: 6 U/L (ref 10–40)
ANION GAP SERPL CALCULATED.3IONS-SCNC: 14 MMOL/L (ref 3–16)
AST SERPL-CCNC: 21 U/L (ref 15–37)
BASOPHILS # BLD: 0.1 K/UL (ref 0–0.2)
BASOPHILS NFR BLD: 1.7 %
BILIRUB SERPL-MCNC: 0.3 MG/DL (ref 0–1)
BUN SERPL-MCNC: 19 MG/DL (ref 7–20)
CALCIUM SERPL-MCNC: 9.5 MG/DL (ref 8.3–10.6)
CHLORIDE SERPL-SCNC: 104 MMOL/L (ref 99–110)
CHOLEST SERPL-MCNC: 85 MG/DL (ref 0–199)
CO2 SERPL-SCNC: 24 MMOL/L (ref 21–32)
CREAT SERPL-MCNC: <0.5 MG/DL (ref 0.6–1.2)
DEPRECATED RDW RBC AUTO: 18 % (ref 12.4–15.4)
EOSINOPHIL # BLD: 0.2 K/UL (ref 0–0.6)
EOSINOPHIL NFR BLD: 4.4 %
EST. AVERAGE GLUCOSE BLD GHB EST-MCNC: 99.7 MG/DL
GFR SERPLBLD CREATININE-BSD FMLA CKD-EPI: >90 ML/MIN/{1.73_M2}
GLUCOSE SERPL-MCNC: 135 MG/DL (ref 70–99)
HBA1C MFR BLD: 5.1 %
HCT VFR BLD AUTO: 34.3 % (ref 36–48)
HDLC SERPL-MCNC: 31 MG/DL (ref 40–60)
HGB BLD-MCNC: 11.5 G/DL (ref 12–16)
LDLC SERPL CALC-MCNC: -3 MG/DL
LYMPHOCYTES # BLD: 1 K/UL (ref 1–5.1)
LYMPHOCYTES NFR BLD: 28.7 %
MAGNESIUM SERPL-MCNC: 2 MG/DL (ref 1.8–2.4)
MCH RBC QN AUTO: 31.6 PG (ref 26–34)
MCHC RBC AUTO-ENTMCNC: 33.5 G/DL (ref 31–36)
MCV RBC AUTO: 94.5 FL (ref 80–100)
MONOCYTES # BLD: 0.4 K/UL (ref 0–1.3)
MONOCYTES NFR BLD: 10.2 %
NEUTROPHILS # BLD: 1.9 K/UL (ref 1.7–7.7)
NEUTROPHILS NFR BLD: 55 %
PLATELET # BLD AUTO: 169 K/UL (ref 135–450)
PMV BLD AUTO: 8.6 FL (ref 5–10.5)
POTASSIUM SERPL-SCNC: 4.2 MMOL/L (ref 3.5–5.1)
PROT SERPL-MCNC: 6.3 G/DL (ref 6.4–8.2)
RBC # BLD AUTO: 3.63 M/UL (ref 4–5.2)
SODIUM SERPL-SCNC: 142 MMOL/L (ref 136–145)
TRIGL SERPL-MCNC: 283 MG/DL (ref 0–150)
URATE SERPL-MCNC: 4.1 MG/DL (ref 2.6–6)
VLDLC SERPL CALC-MCNC: 57 MG/DL
WBC # BLD AUTO: 3.4 K/UL (ref 4–11)

## 2024-07-17 PROCEDURE — 99495 TRANSJ CARE MGMT MOD F2F 14D: CPT | Performed by: INTERNAL MEDICINE

## 2024-07-17 PROCEDURE — 1111F DSCHRG MED/CURRENT MED MERGE: CPT | Performed by: INTERNAL MEDICINE

## 2024-07-17 NOTE — PROGRESS NOTES
Post-Discharge Transitional Care  Follow Up      Pretty Covington   YOB: 1960    Date of Office Visit:  7/17/2024  Date of Hospital Admission: 6/20/24  Date of Hospital Discharge: 7/6/24  Risk of hospital readmission (high >=14%. Medium >=10%) :Readmission Risk Score: 17      Care management risk score Rising risk (score 2-5) and Complex Care (Scores >=6): No Risk Score On File     Non face to face  following discharge, date last encounter closed (first attempt may have been earlier): *No documented post hospital discharge outreach found in the last 14 days    Call initiated 2 business days of discharge: *No response recorded in the last 14 days    ASSESSMENT/PLAN:   Hospital discharge follow-up  Type 2 diabetes mellitus without complication, without long-term current use of insulin (HCC)  -     Comprehensive Metabolic Panel; Future  -     CBC with Auto Differential; Future  -     Hemoglobin A1C; Future  -     Lipid Panel; Future  -     Uric Acid; Future  -     Magnesium; Future  Primary hypertension  Hyperlipidemia associated with type 2 diabetes mellitus (HCC)  Other chronic pulmonary embolism without acute cor pulmonale (HCC)  Late effect of fracture of lumbar vertebra      -Hospital discharge follow up  - Lumbar vertebra fracture. Had surgery. In a brace. See neuro surgery in follow up.  - DM type 2 controlled. Check labs  - HTN well controlled  - HLD check labs  - PE. On Eliquis  -     Medical Decision Making: moderate complexity  No follow-ups on file.           Subjective:   HPI:  Follow up of Hospital problems/diagnosis(es):     Closed traumatic fracture of lumbar vertebral body, unstable L1 fracture extending into the posterior elements bilaterally.   -Fracture sec to fall with osteopenia (noted on imaging)  - Was in the OR 6/14/24: s/p T11, T12, L2, L3 bilateral pedicle screw fixation   and T11-L3 bilateral posterolateral fusion with Magnifuse, and bone marrow aspirate by Dr. Nirali Escobar

## 2024-07-23 ENCOUNTER — TELEPHONE (OUTPATIENT)
Dept: INTERNAL MEDICINE CLINIC | Age: 64
End: 2024-07-23

## 2024-07-23 RX ORDER — LANOLIN ALCOHOL/MO/W.PET/CERES
400 CREAM (GRAM) TOPICAL 2 TIMES DAILY
Qty: 180 TABLET | Refills: 1 | Status: SHIPPED | OUTPATIENT
Start: 2024-07-23

## 2024-07-23 RX ORDER — ARIPIPRAZOLE 5 MG/1
5 TABLET ORAL DAILY
Qty: 90 TABLET | Refills: 1 | Status: SHIPPED | OUTPATIENT
Start: 2024-07-23

## 2024-07-23 NOTE — TELEPHONE ENCOUNTER
----- Message from Marialuisa Andujar MD sent at 7/23/2024  1:07 PM EDT -----  Contact: [phone]  ok  ----- Message -----  From: Alaina Weeks MA  Sent: 7/23/2024   8:59 AM EDT  To: Marialuisa Andujar MD    Patient states that she was recently in the hospital and was given 2 new medications. She has completed those and is in need of refills of these medications and wants to know if you would be willing to write for the medications listed below.     ARIPiprazole (ABILIFY) 5 MG tablet   magnesium oxide (MAG-OX) 400 (240 Mg) MG tablet       Please advise    [facility] - [address] [phone] - F [phone]  [address] [address]  Phone: [phone]  Fax: [phone]

## 2024-08-16 ENCOUNTER — TELEPHONE (OUTPATIENT)
Dept: INTERNAL MEDICINE CLINIC | Age: 64
End: 2024-08-16

## 2024-08-16 RX ORDER — METHOCARBAMOL 750 MG/1
750 TABLET, FILM COATED ORAL 4 TIMES DAILY
Qty: 40 TABLET | Refills: 0 | Status: SHIPPED | OUTPATIENT
Start: 2024-08-16 | End: 2024-08-26

## 2024-08-16 NOTE — TELEPHONE ENCOUNTER
----- Message from Dr. Marialuisa Andujar MD sent at 8/16/2024  2:37 PM EDT -----  Contact: [phone]  Okay to refill Robaxin  ----- Message -----  From: Alaina Weeks MA  Sent: 8/15/2024  11:38 AM EDT  To: Marialuisa Andujar MD    Patient states that she had been on methocarbamol (ROBAXIN) 750 MG tablet  from her back surgery and that all the medications from that are being managed by you now. She is wanting a refill of that medication and it has never been written by you previously. She wants to know if she can have a refill, or if you do not want her on that medication any longer? Please advise.       [facility] mail order pharmacy

## 2024-08-19 RX ORDER — LOSARTAN POTASSIUM 50 MG/1
50 TABLET ORAL DAILY
Qty: 90 TABLET | Refills: 3 | Status: SHIPPED | OUTPATIENT
Start: 2024-08-19

## 2024-08-19 RX ORDER — PANTOPRAZOLE SODIUM 20 MG/1
TABLET, DELAYED RELEASE ORAL
Qty: 90 TABLET | Refills: 3 | Status: SHIPPED | OUTPATIENT
Start: 2024-08-19

## 2024-08-19 RX ORDER — ICOSAPENT ETHYL 1 G/1
2 CAPSULE ORAL 2 TIMES DAILY
Qty: 360 CAPSULE | Refills: 3 | Status: SHIPPED | OUTPATIENT
Start: 2024-08-19

## 2024-08-20 ENCOUNTER — OFFICE VISIT (OUTPATIENT)
Dept: INTERNAL MEDICINE CLINIC | Age: 64
End: 2024-08-20

## 2024-08-20 VITALS
DIASTOLIC BLOOD PRESSURE: 80 MMHG | SYSTOLIC BLOOD PRESSURE: 110 MMHG | BODY MASS INDEX: 42.49 KG/M2 | WEIGHT: 255 LBS | HEIGHT: 65 IN | RESPIRATION RATE: 12 BRPM | HEART RATE: 70 BPM

## 2024-08-20 DIAGNOSIS — F32.1 CURRENT MODERATE EPISODE OF MAJOR DEPRESSIVE DISORDER WITHOUT PRIOR EPISODE (HCC): Primary | ICD-10-CM

## 2024-08-20 PROCEDURE — 99213 OFFICE O/P EST LOW 20 MIN: CPT | Performed by: INTERNAL MEDICINE

## 2024-08-20 PROCEDURE — 3079F DIAST BP 80-89 MM HG: CPT | Performed by: INTERNAL MEDICINE

## 2024-08-20 PROCEDURE — 3074F SYST BP LT 130 MM HG: CPT | Performed by: INTERNAL MEDICINE

## 2024-08-20 RX ORDER — DULOXETIN HYDROCHLORIDE 30 MG/1
CAPSULE, DELAYED RELEASE ORAL
Qty: 46 CAPSULE | Refills: 0 | Status: SHIPPED | OUTPATIENT
Start: 2024-08-20 | End: 2024-09-19

## 2024-08-20 ASSESSMENT — ENCOUNTER SYMPTOMS
RHINORRHEA: 0
VOMITING: 0
NAUSEA: 0
SHORTNESS OF BREATH: 0
ABDOMINAL PAIN: 0
WHEEZING: 0

## 2024-08-20 NOTE — PROGRESS NOTES
Subjective   Patient ID: Pretty Covington is a 64 y.o. female.    HPI    Patient is here for follow up.    She has been depressed with having to wear a brace. She is not back to work. She is due to see Neurosurgery on 9/6/24.     Review of Systems   Constitutional:  Negative for appetite change and fatigue.   HENT:  Negative for postnasal drip and rhinorrhea.    Respiratory:  Negative for shortness of breath and wheezing.    Cardiovascular:  Negative for chest pain and palpitations.   Gastrointestinal:  Negative for abdominal pain, nausea and vomiting.   Musculoskeletal:  Negative for joint swelling.   Skin:  Negative for rash.   Neurological:  Negative for light-headedness.   Psychiatric/Behavioral:  Positive for dysphoric mood and sleep disturbance.      There are no changes to past medical history, family history, social history or review of systems(except as noted in the history section) since prior note (all reviewed with patient).    Current Outpatient Medications   Medication Instructions    acetaminophen (TYLENOL) 500 mg, Oral, EVERY 6 HOURS PRN    albuterol sulfate HFA (PROVENTIL;VENTOLIN;PROAIR) 108 (90 Base) MCG/ACT inhaler USE 2 INHALATIONS BY MOUTH 4  TIMES DAILY AS NEEDED FOR  WHEEZING    apixaban (ELIQUIS) 5 mg, Oral, 2 TIMES DAILY    ARIPiprazole (ABILIFY) 5 mg, Oral, DAILY    ascorbic acid (VITAMIN C) 500 mg, Oral, DAILY    Aspirin Low Dose 81 mg, Oral, DAILY    atorvastatin (LIPITOR) 40 mg, Oral, DAILY    DULoxetine (CYMBALTA) 30 MG extended release capsule Take 1 capsule by mouth daily for 14 days, THEN 2 capsules daily for 16 days.    ferrous sulfate (FEROSUL) 325 (65 Fe) MG tablet TAKE 1 TABLET BY MOUTH EVERY MORNING    Icosapent Ethyl (VASCEPA) 1 g CAPS capsule 2 capsules, Oral, 2 TIMES DAILY    JARDIANCE 25 MG tablet TAKE 1 TABLET BY MOUTH DAILY    losartan (COZAAR) 50 mg, Oral, DAILY    magnesium oxide (MAG-OX) 400 mg, Oral, 2 TIMES DAILY    metFORMIN (GLUCOPHAGE-XR) 500 MG extended release

## 2024-09-03 RX ORDER — ASPIRIN 81 MG/1
TABLET ORAL
Qty: 90 TABLET | Refills: 1 | Status: SHIPPED | OUTPATIENT
Start: 2024-09-03

## 2024-09-05 RX ORDER — TOPIRAMATE 100 MG/1
TABLET, FILM COATED ORAL
Qty: 90 TABLET | Refills: 3 | Status: SHIPPED | OUTPATIENT
Start: 2024-09-05

## 2024-09-06 ENCOUNTER — TELEPHONE (OUTPATIENT)
Dept: INTERNAL MEDICINE CLINIC | Age: 64
End: 2024-09-06

## 2024-09-06 RX ORDER — METHOCARBAMOL 750 MG/1
750 TABLET, FILM COATED ORAL 4 TIMES DAILY
Qty: 40 TABLET | Refills: 0 | Status: SHIPPED | OUTPATIENT
Start: 2024-09-06 | End: 2024-09-16

## 2024-09-06 NOTE — TELEPHONE ENCOUNTER
----- Message from Dr. Marialuisa Andujar MD sent at 9/6/2024  1:22 PM EDT -----  Contact: patient [phone]  Does she feel like she still needs it.  If she does okay to refill  ----- Message -----  From: Rose Crowe  Sent: 9/6/2024  11:59 AM EDT  To: Marialuisa Andujar MD    Patient wanting to know if you want her to continue taking methocarbamol (ROBAXIN) 750 MG tablet, or discontinue?  If she needs to continue, she will need a refill.  Please advise        [facility] - [facility] [address] [place] [address] -  [phone] - F [phone]  [address] [place] [address]  Phone: [phone]  Fax: [phone]

## 2024-09-12 ENCOUNTER — HOSPITAL ENCOUNTER (OUTPATIENT)
Dept: PHYSICAL THERAPY | Age: 64
Setting detail: THERAPIES SERIES
Discharge: HOME | End: 2024-09-12
Payer: COMMERCIAL

## 2024-09-12 PROCEDURE — 97110 THERAPEUTIC EXERCISES: CPT | Performed by: SPECIALIST

## 2024-09-12 PROCEDURE — 97161 PT EVAL LOW COMPLEX 20 MIN: CPT | Performed by: SPECIALIST

## 2024-09-12 PROCEDURE — 97112 NEUROMUSCULAR REEDUCATION: CPT | Performed by: SPECIALIST

## 2024-09-13 ENCOUNTER — HOSPITAL ENCOUNTER (OUTPATIENT)
Dept: PHYSICAL THERAPY | Age: 64
Setting detail: THERAPIES SERIES
End: 2024-09-13
Payer: COMMERCIAL

## 2024-09-17 ENCOUNTER — HOSPITAL ENCOUNTER (OUTPATIENT)
Dept: PHYSICAL THERAPY | Age: 64
Setting detail: THERAPIES SERIES
Discharge: HOME | End: 2024-09-17
Payer: COMMERCIAL

## 2024-09-17 PROCEDURE — 97110 THERAPEUTIC EXERCISES: CPT

## 2024-09-17 PROCEDURE — 97140 MANUAL THERAPY 1/> REGIONS: CPT

## 2024-09-17 PROCEDURE — 97112 NEUROMUSCULAR REEDUCATION: CPT

## 2024-09-18 ENCOUNTER — TELEPHONE (OUTPATIENT)
Dept: INTERNAL MEDICINE CLINIC | Age: 64
End: 2024-09-18

## 2024-09-18 RX ORDER — DULOXETIN HYDROCHLORIDE 60 MG/1
60 CAPSULE, DELAYED RELEASE ORAL DAILY
Qty: 90 CAPSULE | Refills: 0 | Status: SHIPPED | OUTPATIENT
Start: 2024-09-18

## 2024-09-18 RX ORDER — METHOCARBAMOL 750 MG/1
750 TABLET, FILM COATED ORAL 2 TIMES DAILY
Qty: 180 TABLET | Refills: 0 | Status: SHIPPED | OUTPATIENT
Start: 2024-09-18

## 2024-09-20 ENCOUNTER — HOSPITAL ENCOUNTER (OUTPATIENT)
Dept: PHYSICAL THERAPY | Age: 64
Setting detail: THERAPIES SERIES
Discharge: HOME | End: 2024-09-20
Payer: COMMERCIAL

## 2024-09-20 PROCEDURE — 97112 NEUROMUSCULAR REEDUCATION: CPT

## 2024-09-20 PROCEDURE — 97110 THERAPEUTIC EXERCISES: CPT

## 2024-09-20 PROCEDURE — 97140 MANUAL THERAPY 1/> REGIONS: CPT

## 2024-09-24 ENCOUNTER — HOSPITAL ENCOUNTER (OUTPATIENT)
Dept: PHYSICAL THERAPY | Age: 64
Setting detail: THERAPIES SERIES
Discharge: HOME | End: 2024-09-24
Payer: COMMERCIAL

## 2024-09-24 PROCEDURE — 97112 NEUROMUSCULAR REEDUCATION: CPT | Performed by: PHYSICAL THERAPY ASSISTANT

## 2024-09-24 PROCEDURE — 97110 THERAPEUTIC EXERCISES: CPT | Performed by: PHYSICAL THERAPY ASSISTANT

## 2024-09-24 PROCEDURE — 97140 MANUAL THERAPY 1/> REGIONS: CPT | Performed by: PHYSICAL THERAPY ASSISTANT

## 2024-09-26 ENCOUNTER — HOSPITAL ENCOUNTER (OUTPATIENT)
Dept: PHYSICAL THERAPY | Age: 64
Setting detail: THERAPIES SERIES
Discharge: HOME | End: 2024-09-26
Payer: COMMERCIAL

## 2024-09-26 ENCOUNTER — TELEPHONE (OUTPATIENT)
Dept: INTERNAL MEDICINE CLINIC | Age: 64
End: 2024-09-26

## 2024-09-26 PROCEDURE — 97140 MANUAL THERAPY 1/> REGIONS: CPT

## 2024-09-26 PROCEDURE — 97110 THERAPEUTIC EXERCISES: CPT

## 2024-09-26 PROCEDURE — 97112 NEUROMUSCULAR REEDUCATION: CPT

## 2024-09-26 RX ORDER — TRAZODONE HYDROCHLORIDE 50 MG/1
50 TABLET, FILM COATED ORAL NIGHTLY PRN
Qty: 30 TABLET | Refills: 0 | Status: SHIPPED | OUTPATIENT
Start: 2024-09-26

## 2024-09-27 ENCOUNTER — TELEPHONE (OUTPATIENT)
Dept: INTERNAL MEDICINE CLINIC | Age: 64
End: 2024-09-27

## 2024-09-30 ENCOUNTER — TELEPHONE (OUTPATIENT)
Dept: INTERNAL MEDICINE CLINIC | Age: 64
End: 2024-09-30

## 2024-09-30 NOTE — TELEPHONE ENCOUNTER
----- Message from MA Sudha BIRMINGHAM sent at 9/30/2024  9:51 AM EDT -----  Contact: Charleen [phone]  Waiting for Dr. Andujar to sign. Pt requesting excuse to be faxed to her work at [phone].  ----- Message -----  From: Marialuisa Andujar MD  Sent: 9/29/2024   9:25 AM EDT  To: Sudha Marcos    sure  ----- Message -----  From: Sudha Marcos  Sent: 9/27/2024   2:05 PM EDT  To: Marialuisa Andujar MD    Charleen states pt's work suggested that she only work every other day. Are you able to do work excuse for this?  ----- Message -----  From: Marialuisa Andujar MD  Sent: 9/27/2024   1:33 PM EDT  To: Sudha Marcos    It could be due to Abilify.  It also could be due to Cymbalta.  We can stop the Abilify and see if symptoms get better.  ----- Message -----  From: Rose Crowe  Sent: 9/26/2024   3:15 PM EDT  To: Marialuisa Andujar MD    Sister states she is having to pick patient up from work early, due to patient in fog, jittery, can't concentrate.  Caller wanting to know if it is from lack of sleep, or the change in the antidepressant?  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  Please advise        [facility] [id number] - [place], [place] - [address] [facility] - P [phone] - F [phone]

## 2024-10-02 ENCOUNTER — HOSPITAL ENCOUNTER (OUTPATIENT)
Dept: PHYSICAL THERAPY | Age: 64
Setting detail: THERAPIES SERIES
Discharge: HOME | End: 2024-10-02
Payer: COMMERCIAL

## 2024-10-02 PROCEDURE — 97110 THERAPEUTIC EXERCISES: CPT

## 2024-10-02 PROCEDURE — 97112 NEUROMUSCULAR REEDUCATION: CPT

## 2024-10-02 PROCEDURE — 97140 MANUAL THERAPY 1/> REGIONS: CPT

## 2024-10-02 NOTE — FLOWSHEET NOTE
[facility]- Outpatient Rehabilitation and Therapy [address], [place] [address] office: [phone] fax: [phone]      Physical Therapy: TREATMENT/PROGRESS NOTE   Patient: Pretty Covington (64 y.o. female)   Examination Date: 10/02/2024   :  1960 MRN: [id number]   Visit #: 6   Insurance Allowable Auth Needed   25 (then auth after) [x]Yes    []No    Insurance: Payor: BCBS / Plan: BCBS - [place] PPO / Product Type: *No Product type* /   Insurance ID: [id number] - ([place] [employer])  Secondary Insurance (if applicable):    Treatment Diagnosis:   M54.9 (ICD-10-CM) - Back pain    Medical Diagnosis:  Back pain [M54.9]   Referring Physician: Ebenezer Campoverde MD  PCP: Marialuisa Andujar MD     Plan of care signed (Y/N):     Date of Patient follow up with Physician:      Plan of Care Report: NO  POC update due: (10 visits /OR AUTH LIMITS, whichever is less)  2024                                             Medical History:  Comorbidities:  Diabetes (Type I or II)  Hypertension  Osteoarthritis  Relevant Medical History: NA                                         Precautions/ Contra-indications:           Latex allergy:  NO  Pacemaker:    NO  Contraindications for Manipulation: recent surgical history (relative)  Date of Surgery: 24  Other:T11-L3 fixation and fusion    Red Flags:  None    Suicide Screening:   The patient did not verbalize a primary behavioral concern, suicidal ideation, suicidal intent, or demonstrate suicidal behaviors.    Preferred Language for Healthcare:   [x] English       [] other:    SUBJECTIVE EXAMINATION     Patient stated complaint: Pt states she is doing better, decreased pain complaints over the weekend. Pt states she was taken off her antidepressant which has helped with the fogginess.      Test used Initial score  9/12/24 10/02/2024   Pain Summary VAS 0-5 0-1/10   Functional questionnaire Modified Oswestry 44%    Other:

## 2024-10-03 ENCOUNTER — HOSPITAL ENCOUNTER (OUTPATIENT)
Dept: PHYSICAL THERAPY | Age: 64
Setting detail: THERAPIES SERIES
Discharge: HOME | End: 2024-10-03
Payer: COMMERCIAL

## 2024-10-03 PROCEDURE — 97110 THERAPEUTIC EXERCISES: CPT | Performed by: PHYSICAL THERAPY ASSISTANT

## 2024-10-03 PROCEDURE — 97112 NEUROMUSCULAR REEDUCATION: CPT | Performed by: PHYSICAL THERAPY ASSISTANT

## 2024-10-03 PROCEDURE — 97140 MANUAL THERAPY 1/> REGIONS: CPT | Performed by: PHYSICAL THERAPY ASSISTANT

## 2024-10-03 NOTE — FLOWSHEET NOTE
[facility]- Outpatient Rehabilitation and Therapy [address], [place] [address] office: [phone] fax: [phone]      Physical Therapy: TREATMENT/PROGRESS NOTE   Patient: Pretty Covington (64 y.o. female)   Examination Date: 10/03/2024   :  1960 MRN: [id number]   Visit #: 7   Insurance Allowable Auth Needed   25 (then auth after) [x]Yes    []No    Insurance: Payor: BCBS / Plan: BCBS [place] PPO / Product Type: *No Product type* /   Insurance ID: [id number] - ([facility])  Secondary Insurance (if applicable):    Treatment Diagnosis:   M54.9 (ICD-10-CM) - Back pain    Medical Diagnosis:  Back pain [M54.9]   Referring Physician: Ebenezer Campoverde MD  PCP: Marialuisa Andujar MD     Plan of care signed (Y/N):     Date of Patient follow up with Physician:      Plan of Care Report: NO  POC update due: (10 visits /OR AUTH LIMITS, whichever is less)  2024                                             Medical History:  Comorbidities:  Diabetes (Type I or II)  Hypertension  Osteoarthritis  Relevant Medical History: NA                                         Precautions/ Contra-indications:           Latex allergy:  NO  Pacemaker:    NO  Contraindications for Manipulation: recent surgical history (relative)  Date of Surgery: 24  Other:T11-L3 fixation and fusion    Red Flags:  None    Suicide Screening:   The patient did not verbalize a primary behavioral concern, suicidal ideation, suicidal intent, or demonstrate suicidal behaviors.    Preferred Language for Healthcare:   [x] English       [] other:    SUBJECTIVE EXAMINATION     Patient stated complaint: Feeling much better - sleeping better and able to sleep without a sleeping aid.  Notes that she still has back pain but it is much less significant and manageable with Tylenol.       Test used Initial score  9/12/24 10/03/2024   Pain Summary VAS 0-5 0-1/10   Functional questionnaire Modified Oswestry 44%

## 2024-10-07 ENCOUNTER — TELEPHONE (OUTPATIENT)
Dept: INTERNAL MEDICINE CLINIC | Age: 64
End: 2024-10-07

## 2024-10-07 NOTE — TELEPHONE ENCOUNTER
----- Message from Dr. Marialuisa Andujar MD sent at 10/7/2024  3:44 PM EDT -----  Contact: SIDDHARTH [phone]  Give it two more weeks  ----- Message -----  From: Mendez Sinclair  Sent: 10/7/2024   3:30 PM EDT  To: Marialuisa Andujar MD    Patient states she stopped taking the below medication following her last appointment on 9/30 as she was having some trouble with mind clarity. Pt also states she has stopped taking sleep aids and has noticed she has been getting more sleep. Patient feels she has noticed some improvement or a more clear mind but not fully. Pt would like to know if there is another medication that could be causing this type of brain fog, confusion, inability to focus especially at work or if she should take a little more time not taking the below medication. Please advise     ARIPiprazole (ABILIFY) 5 MG tablet

## 2024-10-08 ENCOUNTER — HOSPITAL ENCOUNTER (OUTPATIENT)
Dept: PHYSICAL THERAPY | Age: 64
Setting detail: THERAPIES SERIES
Discharge: HOME | End: 2024-10-08
Payer: COMMERCIAL

## 2024-10-08 PROCEDURE — 97110 THERAPEUTIC EXERCISES: CPT

## 2024-10-08 PROCEDURE — 97140 MANUAL THERAPY 1/> REGIONS: CPT

## 2024-10-08 PROCEDURE — 97112 NEUROMUSCULAR REEDUCATION: CPT

## 2024-10-08 NOTE — PLAN OF CARE
[facility]- Outpatient Rehabilitation and Therapy [address], [place] [address] office: [phone] fax: [phone]    Physical Therapy Re-Certification Plan of Care    Dear Ebenezer Campoverde MD  ,    We had the pleasure of treating the following patient for physical therapy services at [facility]. A summary of our findings can be found in the updated assessment below.  This includes our plan of care.  If you have any questions or concerns regarding these findings, please do not hesitate to contact me at the office phone number checked above.  Thank you for the referral.     Physician Signature:________________________________Date:__________________  By signing above (or electronic signature), therapist's plan is approved by physician      Functional Outcome: Modified Oswestry  Pretty Covington 1960 continues to present with functional deficits in ROM, joint mobility, strength symmetry, and flexibility  limiting ability with walking on even ground, walking on uneven ground, managing community ambulation, navigate curbs/steps, transitions between positions, managing bed mobility, reaching overhead, and ADLs .  During therapy this date, patient required visual cueing and verbal cueing for exercise progression, improving proper muscle recruitment and activation/motor control patterns, promoting relaxation, increasing ROM, reduce/eliminate soft tissue swelling/inflammation/restriction, improving soft tissue extensibility, and allowing for proper ROM. Patient will continue to benefit from ongoing evaluation and advanced clinical decision from a Physical Therapist to improve muscle strength, neuromuscular control, endurance, normalization of gait, functional mobility, ADL status, proper body mechanics, and safety awareness to safely return to OF without symptoms or restrictions.    Overall Response to Treatment:  Patient is responding well

## 2024-10-11 ENCOUNTER — HOSPITAL ENCOUNTER (OUTPATIENT)
Dept: PHYSICAL THERAPY | Age: 64
Setting detail: THERAPIES SERIES
Discharge: HOME | End: 2024-10-11
Payer: COMMERCIAL

## 2024-10-11 PROCEDURE — 97112 NEUROMUSCULAR REEDUCATION: CPT

## 2024-10-11 PROCEDURE — 97140 MANUAL THERAPY 1/> REGIONS: CPT

## 2024-10-11 PROCEDURE — 97110 THERAPEUTIC EXERCISES: CPT

## 2024-10-11 NOTE — FLOWSHEET NOTE
[facility]- Outpatient Rehabilitation and Therapy [address], [place] [address] office: [phone] fax: [phone]    Physical Therapy: TREATMENT/PROGRESS NOTE   Patient: Pretty Covington (64 y.o. female)   Examination Date: 10/11/2024   :  1960 MRN: [id number]   Visit #: 9   Insurance Allowable Auth Needed   25 (then auth after) [x]Yes    []No    Insurance: Payor: BCBS / Plan[place]PO / Product Type: *No Product type* /   Insurance ID: [id number] - ([facility])  Secondary Insurance (if applicable):    Treatment Diagnosis:   M54.9 (ICD-10-CM) - Back pain    Medical Diagnosis:  Back pain [M54.9]   Referring Physician: Ebenezer Campoverde MD  PCP: Marialuisa Andujar MD     Plan of care signed (Y/N):     Date of Patient follow up with Physician:      Plan of Care Report: NO  POC update due: (10 visits /OR AUTH LIMITS, whichever is less)  24                                             Medical History:  Comorbidities:  Diabetes (Type I or II)  Hypertension  Osteoarthritis  Relevant Medical History: NA                                         Precautions/ Contra-indications:           Latex allergy:  NO  Pacemaker:    NO  Contraindications for Manipulation: recent surgical history (relative)  Date of Surgery: 24  Other:T11-L3 fixation and fusion    Red Flags:  None    Suicide Screening:   The patient did not verbalize a primary behavioral concern, suicidal ideation, suicidal intent, or demonstrate suicidal behaviors.    Preferred Language for Healthcare:   [x] English       [] other:    SUBJECTIVE EXAMINATION     Patient stated complaint: Pt states she has been doing better with HEP and with decreased pain complaints. Pt states she has been feeling better since previous visit.      Test used Initial score  9/12/24 10/11/2024   Pain Summary VAS 0-5 0-1/10   Functional questionnaire Modified Oswestry 44% 17/50  34%   Other:   Hip MMT  Flex 4+/5

## 2024-10-15 ENCOUNTER — HOSPITAL ENCOUNTER (OUTPATIENT)
Dept: PHYSICAL THERAPY | Age: 64
Setting detail: THERAPIES SERIES
Discharge: HOME | End: 2024-10-15
Payer: COMMERCIAL

## 2024-10-15 PROCEDURE — 97140 MANUAL THERAPY 1/> REGIONS: CPT

## 2024-10-15 PROCEDURE — 97110 THERAPEUTIC EXERCISES: CPT

## 2024-10-15 PROCEDURE — 97112 NEUROMUSCULAR REEDUCATION: CPT

## 2024-10-15 NOTE — FLOWSHEET NOTE
[facility]- Outpatient Rehabilitation and Therapy [address], [place] [address] office: [phone] fax: [phone]    Physical Therapy: TREATMENT/PROGRESS NOTE   Patient: Pretty Covington (64 y.o. female)   Examination Date: 10/15/2024   :  1960 MRN: [id number]   Visit #: 10   Insurance Allowable Auth Needed   25 (then auth after) [x]Yes    []No    Insurance: Payor: BCBS / Plan: BC[place]PO / Product Type: *No Product type* /   Insurance ID: [id number] - ([facility])  Secondary Insurance (if applicable):    Treatment Diagnosis:   M54.9 (ICD-10-CM) - Back pain    Medical Diagnosis:  Back pain [M54.9]   Referring Physician: Ebenezer Campoverde MD  PCP: Marialuisa Andujar MD     Plan of care signed (Y/N):     Date of Patient follow up with Physician:      Plan of Care Report: NO  POC update due: (10 visits /OR AUTH LIMITS, whichever is less)  24                                             Medical History:  Comorbidities:  Diabetes (Type I or II)  Hypertension  Osteoarthritis  Relevant Medical History: NA                                         Precautions/ Contra-indications:           Latex allergy:  NO  Pacemaker:    NO  Contraindications for Manipulation: recent surgical history (relative)  Date of Surgery: 24  Other:T11-L3 fixation and fusion    Red Flags:  None    Suicide Screening:   The patient did not verbalize a primary behavioral concern, suicidal ideation, suicidal intent, or demonstrate suicidal behaviors.    Preferred Language for Healthcare:   [x] English       [] other:    SUBJECTIVE EXAMINATION     Patient stated complaint: Pt states she did well over the weekend, pt states she has been doing better with decreased pain complaints. Pt states she is compliant with HEP. Pt will d/c NV with HEP.      Test used Initial score  9/12/24 10/15/2024   Pain Summary VAS 0-5 0-1/10   Functional questionnaire Modified Oswestry 44%   34%

## 2024-10-16 ENCOUNTER — HOSPITAL ENCOUNTER (OUTPATIENT)
Dept: PHYSICAL THERAPY | Age: 64
Setting detail: THERAPIES SERIES
Discharge: HOME | End: 2024-10-16
Payer: COMMERCIAL

## 2024-10-16 PROCEDURE — 97140 MANUAL THERAPY 1/> REGIONS: CPT

## 2024-10-16 PROCEDURE — 97110 THERAPEUTIC EXERCISES: CPT

## 2024-10-16 PROCEDURE — 97112 NEUROMUSCULAR REEDUCATION: CPT

## 2024-10-16 NOTE — DISCHARGE SUMMARY
Standing Hip ABD    2 x 15    Mini Squats   20 x     FSU  4\"   X15   R, L            LAQ 2#  20 x                          Manual Intervention (77206)  TIME     PROM B Hips / HS   8'                                 NMR re-education (68181) resistance Sets/time Reps CUES NEEDED   Tandem stance  10\" 3 x ea    Stand B hip abd/ ext/ hamstring curls 2#  20x ea                         Therapeutic Activity (07752)  Sets/time                                          Modalities:    No modalities applied this session    Education/Home Exercise Program:  [id number]       ASSESSMENT     Today's Assessment: TRANSITION TO HEP/DC FORMAL PT: Patient is currently independent with symptom management and home exercise program. Patient reports understanding of the importance of continued compliance with home exercise program after discharge.  Patient has reached 5/5 long term goals and should reach all additional goals with compliance to home exercise program upon discharge.       Medical Necessity Documentation:  I certify that this patient meets the below criteria necessary for medical necessity for care and/or justification of therapy services:  The patient has functional impairments and/or activity limitations and would benefit from continued outpatient therapy services to address the deficits outlined in the patients goals    Return to Play: NA    Prognosis for POC: [x] Good [] Fair  [] Poor    Patient requires continued skilled intervention: [x] Yes  [] No      CHARGE CAPTURE     PT CHARGE GRID   CPT Code (TIMED) minutes # CPT Code (UNTIMED) #     Therex (09851)  28 2  EVAL:LOW (69224 - Typically 20 minutes face-to-face)     Neuromusc. Re-ed (78548) 15 1  Re-Eval (27413)     Manual (44915) 13 1  Estim Unattended (94025)     Ther. Act (00159)    Mech. Traction (46391)     Gait (30949)    Dry Needle 1-2 muscle (20560)     Aquatic Therex (07661)    Dry Needle 3+ muscle (20561)     Iontophoresis (64148)    VASO (65398)     Ultrasound

## 2024-10-28 SDOH — ECONOMIC STABILITY: FOOD INSECURITY: WITHIN THE PAST 12 MONTHS, THE FOOD YOU BOUGHT JUST DIDN'T LAST AND YOU DIDN'T HAVE MONEY TO GET MORE.: NEVER TRUE

## 2024-10-28 SDOH — ECONOMIC STABILITY: FOOD INSECURITY: WITHIN THE PAST 12 MONTHS, YOU WORRIED THAT YOUR FOOD WOULD RUN OUT BEFORE YOU GOT MONEY TO BUY MORE.: NEVER TRUE

## 2024-10-28 SDOH — ECONOMIC STABILITY: INCOME INSECURITY: HOW HARD IS IT FOR YOU TO PAY FOR THE VERY BASICS LIKE FOOD, HOUSING, MEDICAL CARE, AND HEATING?: NOT HARD AT ALL

## 2024-10-30 ENCOUNTER — OFFICE VISIT (OUTPATIENT)
Dept: INTERNAL MEDICINE CLINIC | Age: 64
End: 2024-10-30

## 2024-10-30 VITALS
RESPIRATION RATE: 12 BRPM | BODY MASS INDEX: 42.99 KG/M2 | DIASTOLIC BLOOD PRESSURE: 70 MMHG | HEIGHT: 65 IN | WEIGHT: 258 LBS | SYSTOLIC BLOOD PRESSURE: 110 MMHG | HEART RATE: 70 BPM

## 2024-10-30 DIAGNOSIS — E11.9 TYPE 2 DIABETES MELLITUS WITHOUT COMPLICATION, WITHOUT LONG-TERM CURRENT USE OF INSULIN (HCC): ICD-10-CM

## 2024-10-30 DIAGNOSIS — G47.33 SEVERE OBSTRUCTIVE SLEEP APNEA: ICD-10-CM

## 2024-10-30 DIAGNOSIS — E78.5 HYPERLIPIDEMIA ASSOCIATED WITH TYPE 2 DIABETES MELLITUS (HCC): ICD-10-CM

## 2024-10-30 DIAGNOSIS — E11.69 HYPERLIPIDEMIA ASSOCIATED WITH TYPE 2 DIABETES MELLITUS (HCC): ICD-10-CM

## 2024-10-30 DIAGNOSIS — I25.10 NONOCCLUSIVE CORONARY ATHEROSCLEROSIS OF NATIVE CORONARY ARTERY: ICD-10-CM

## 2024-10-30 DIAGNOSIS — F32.1 CURRENT MODERATE EPISODE OF MAJOR DEPRESSIVE DISORDER WITHOUT PRIOR EPISODE (HCC): ICD-10-CM

## 2024-10-30 DIAGNOSIS — E66.01 OBESITY, MORBID, BMI 40.0-49.9: ICD-10-CM

## 2024-10-30 DIAGNOSIS — S32.009S: ICD-10-CM

## 2024-10-30 DIAGNOSIS — K76.0 FATTY INFILTRATION OF LIVER: ICD-10-CM

## 2024-10-30 DIAGNOSIS — E11.9 TYPE 2 DIABETES MELLITUS WITHOUT COMPLICATION, WITHOUT LONG-TERM CURRENT USE OF INSULIN (HCC): Primary | ICD-10-CM

## 2024-10-30 DIAGNOSIS — I10 PRIMARY HYPERTENSION: ICD-10-CM

## 2024-10-30 PROBLEM — R94.39 ABNORMAL STRESS TEST: Status: RESOLVED | Noted: 2022-07-29 | Resolved: 2024-10-30

## 2024-10-30 PROBLEM — I20.0 UNSTABLE ANGINA (HCC): Status: RESOLVED | Noted: 2022-07-29 | Resolved: 2024-10-30

## 2024-10-30 PROBLEM — R93.1 ABNORMAL NUCLEAR CARDIAC IMAGING TEST: Status: RESOLVED | Noted: 2022-07-29 | Resolved: 2024-10-30

## 2024-10-30 PROBLEM — R06.02 SHORTNESS OF BREATH: Status: RESOLVED | Noted: 2022-07-27 | Resolved: 2024-10-30

## 2024-10-30 PROBLEM — R07.9 CHEST PAIN: Status: RESOLVED | Noted: 2022-07-27 | Resolved: 2024-10-30

## 2024-10-30 LAB
BASOPHILS # BLD: 0.1 K/UL (ref 0–0.2)
BASOPHILS NFR BLD: 1.5 %
BILIRUBIN, POC: NORMAL
BLOOD URINE, POC: NORMAL
CLARITY, POC: NORMAL
COLOR, POC: NORMAL
CREAT UR-MCNC: 56.8 MG/DL (ref 28–259)
DEPRECATED RDW RBC AUTO: 15.2 % (ref 12.4–15.4)
EOSINOPHIL # BLD: 0.2 K/UL (ref 0–0.6)
EOSINOPHIL NFR BLD: 4.9 %
EST. AVERAGE GLUCOSE BLD GHB EST-MCNC: 137 MG/DL
GLUCOSE URINE, POC: NORMAL MG/DL
HBA1C MFR BLD: 6.4 %
HCT VFR BLD AUTO: 41.7 % (ref 36–48)
HGB BLD-MCNC: 14 G/DL (ref 12–16)
KETONES, POC: NORMAL MG/DL
LEUKOCYTE EST, POC: NORMAL
LYMPHOCYTES # BLD: 1.3 K/UL (ref 1–5.1)
LYMPHOCYTES NFR BLD: 26.1 %
MCH RBC QN AUTO: 31.4 PG (ref 26–34)
MCHC RBC AUTO-ENTMCNC: 33.6 G/DL (ref 31–36)
MCV RBC AUTO: 93.3 FL (ref 80–100)
MICROALBUMIN UR DL<=1MG/L-MCNC: <1.2 MG/DL
MICROALBUMIN/CREAT UR: NORMAL MG/G (ref 0–30)
MONOCYTES # BLD: 0.4 K/UL (ref 0–1.3)
MONOCYTES NFR BLD: 8.4 %
NEUTROPHILS # BLD: 3 K/UL (ref 1.7–7.7)
NEUTROPHILS NFR BLD: 59.1 %
NITRITE, POC: NORMAL
PH, POC: NORMAL
PLATELET # BLD AUTO: 182 K/UL (ref 135–450)
PMV BLD AUTO: 8.7 FL (ref 5–10.5)
PROTEIN, POC: NORMAL MG/DL
RBC # BLD AUTO: 4.47 M/UL (ref 4–5.2)
SPECIFIC GRAVITY, POC: NORMAL
UROBILINOGEN, POC: NORMAL MG/DL
WBC # BLD AUTO: 5 K/UL (ref 4–11)

## 2024-10-30 PROCEDURE — 3044F HG A1C LEVEL LT 7.0%: CPT | Performed by: INTERNAL MEDICINE

## 2024-10-30 PROCEDURE — 99214 OFFICE O/P EST MOD 30 MIN: CPT | Performed by: INTERNAL MEDICINE

## 2024-10-30 PROCEDURE — 3074F SYST BP LT 130 MM HG: CPT | Performed by: INTERNAL MEDICINE

## 2024-10-30 PROCEDURE — 3078F DIAST BP <80 MM HG: CPT | Performed by: INTERNAL MEDICINE

## 2024-10-30 PROCEDURE — 81002 URINALYSIS NONAUTO W/O SCOPE: CPT | Performed by: INTERNAL MEDICINE

## 2024-10-30 ASSESSMENT — ENCOUNTER SYMPTOMS
SHORTNESS OF BREATH: 0
ABDOMINAL PAIN: 0
NAUSEA: 0
COUGH: 0
VOMITING: 0
WHEEZING: 0
RHINORRHEA: 0
BACK PAIN: 1

## 2024-10-30 NOTE — PROGRESS NOTES
Subjective:      Patient ID: Pretty Covington is a 64 y.o. female.    HPI    Patient is here for follow up of diabetes, HTN, depression, pulmonary embolism and hyperlipidemia.    She had a compression fracture of her back and has recovered from it. Doing well. Not in a brace anymore. She is back to work full time. She uses Tylenol if she has any discomfort.     Diabetes Mellitus Type 2: Current symptoms/problems include none.    Medication compliance:  compliant most of the time  Diabetic diet compliance:  compliant most of the time,  Weight trend: decreasing  Current exercise: no regular exercise    Home blood sugar records: fasting range: low 150 mg/dl  Any episodes of hypoglycemia? no  Eye exam current (within one year): yes   reports that she has never smoked. She has never used smokeless tobacco.   Daily Aspirin? No: n/a  Known diabetic complications: none    Hypertension:  Blood pressure typically runs normal outside of the office.   She is adherent to a low sodium diet. Patient denies headache, chest pain, dry cough.  Antihypertensive medication side effects: no medication side effects noted.  Use of agents associated with hypertension: none.     Hyperlipidemia:  No new myalgias or GI upset on atorvastatin (Lipitor).       Lab Results   Component Value Date    LABA1C 5.1 07/17/2024    LABA1C 6.3 02/06/2024    LABA1C 6.1 10/25/2023     Lab Results   Component Value Date    CREATININE <0.5 (L) 07/17/2024     Lab Results   Component Value Date    ALT 6 (L) 07/17/2024    AST 21 07/17/2024     No components found for: \"CHLPL\"  Lab Results   Component Value Date    TRIG 283 (H) 07/17/2024     Lab Results   Component Value Date    HDL 31 (L) 07/17/2024     Lab Results   Component Value Date/Time    LDLDIRECT 36 10/25/2023 08:35 AM      She is compliant with Eliquis.  She has history of recurrent PE.    She has ELEAZAR. She is on CPAP. She is compliant.    She has benign head tremor. It is about the same.         Review of

## 2024-11-14 RX ORDER — METFORMIN HYDROCHLORIDE 500 MG/1
TABLET, EXTENDED RELEASE ORAL
Qty: 360 TABLET | Refills: 0 | Status: SHIPPED | OUTPATIENT
Start: 2024-11-14

## 2024-11-18 RX ORDER — APIXABAN 5 MG/1
5 TABLET, FILM COATED ORAL 2 TIMES DAILY
Qty: 180 TABLET | Refills: 1 | Status: SHIPPED | OUTPATIENT
Start: 2024-11-18

## 2024-11-25 RX ORDER — DULOXETIN HYDROCHLORIDE 60 MG/1
60 CAPSULE, DELAYED RELEASE ORAL DAILY
Qty: 90 CAPSULE | Refills: 3 | Status: SHIPPED | OUTPATIENT
Start: 2024-11-25

## 2024-12-03 ENCOUNTER — ANESTHESIA EVENT (OUTPATIENT)
Dept: ENDOSCOPY | Age: 64
End: 2024-12-03
Payer: COMMERCIAL

## 2024-12-03 RX ORDER — SODIUM CHLORIDE 0.9 % (FLUSH) 0.9 %
5-40 SYRINGE (ML) INJECTION PRN
Status: CANCELLED | OUTPATIENT
Start: 2024-12-03

## 2024-12-03 RX ORDER — SODIUM CHLORIDE 0.9 % (FLUSH) 0.9 %
5-40 SYRINGE (ML) INJECTION EVERY 12 HOURS SCHEDULED
Status: CANCELLED | OUTPATIENT
Start: 2024-12-03

## 2024-12-03 RX ORDER — SODIUM CHLORIDE, SODIUM LACTATE, POTASSIUM CHLORIDE, CALCIUM CHLORIDE 600; 310; 30; 20 MG/100ML; MG/100ML; MG/100ML; MG/100ML
INJECTION, SOLUTION INTRAVENOUS CONTINUOUS
Status: CANCELLED | OUTPATIENT
Start: 2024-12-03

## 2024-12-03 RX ORDER — SODIUM CHLORIDE 9 MG/ML
INJECTION, SOLUTION INTRAVENOUS PRN
Status: CANCELLED | OUTPATIENT
Start: 2024-12-03

## 2024-12-11 ENCOUNTER — HOSPITAL ENCOUNTER (OUTPATIENT)
Age: 64
Setting detail: OUTPATIENT SURGERY
Discharge: HOME | End: 2024-12-11
Attending: INTERNAL MEDICINE | Admitting: INTERNAL MEDICINE
Payer: COMMERCIAL

## 2024-12-11 ENCOUNTER — ANESTHESIA (OUTPATIENT)
Dept: ENDOSCOPY | Age: 64
End: 2024-12-11
Payer: COMMERCIAL

## 2024-12-11 VITALS
HEIGHT: 65 IN | HEART RATE: 83 BPM | SYSTOLIC BLOOD PRESSURE: 151 MMHG | DIASTOLIC BLOOD PRESSURE: 57 MMHG | OXYGEN SATURATION: 98 % | WEIGHT: 255 LBS | TEMPERATURE: 97.4 F | RESPIRATION RATE: 17 BRPM | BODY MASS INDEX: 42.49 KG/M2

## 2024-12-11 DIAGNOSIS — K31.7 GASTRIC POLYP: ICD-10-CM

## 2024-12-11 LAB
GLUCOSE BLD-MCNC: 167 MG/DL (ref 70–99)
GLUCOSE BLD-MCNC: 177 MG/DL (ref 70–99)
PERFORMED ON: ABNORMAL
PERFORMED ON: ABNORMAL

## 2024-12-11 PROCEDURE — 2709999900 HC NON-CHARGEABLE SUPPLY: Performed by: INTERNAL MEDICINE

## 2024-12-11 PROCEDURE — 88305 TISSUE EXAM BY PATHOLOGIST: CPT

## 2024-12-11 PROCEDURE — 88342 IMHCHEM/IMCYTCHM 1ST ANTB: CPT

## 2024-12-11 PROCEDURE — 6360000002 HC RX W HCPCS

## 2024-12-11 PROCEDURE — 3700000000 HC ANESTHESIA ATTENDED CARE: Performed by: INTERNAL MEDICINE

## 2024-12-11 PROCEDURE — 7100000010 HC PHASE II RECOVERY - FIRST 15 MIN: Performed by: INTERNAL MEDICINE

## 2024-12-11 PROCEDURE — 2580000003 HC RX 258

## 2024-12-11 PROCEDURE — 7100000011 HC PHASE II RECOVERY - ADDTL 15 MIN: Performed by: INTERNAL MEDICINE

## 2024-12-11 PROCEDURE — 3609013500 HC EGD REMOVAL TUMOR POLYP/OTHER LESION SNARE TECH: Performed by: INTERNAL MEDICINE

## 2024-12-11 RX ORDER — MEPERIDINE HYDROCHLORIDE 25 MG/ML
12.5 INJECTION INTRAMUSCULAR; INTRAVENOUS; SUBCUTANEOUS EVERY 5 MIN PRN
Status: DISCONTINUED | OUTPATIENT
Start: 2024-12-11 | End: 2024-12-11 | Stop reason: HOSPADM

## 2024-12-11 RX ORDER — SODIUM CHLORIDE 0.9 % (FLUSH) 0.9 %
5-40 SYRINGE (ML) INJECTION PRN
Status: DISCONTINUED | OUTPATIENT
Start: 2024-12-11 | End: 2024-12-11 | Stop reason: HOSPADM

## 2024-12-11 RX ORDER — DIPHENHYDRAMINE HYDROCHLORIDE 50 MG/ML
12.5 INJECTION INTRAMUSCULAR; INTRAVENOUS
Status: DISCONTINUED | OUTPATIENT
Start: 2024-12-11 | End: 2024-12-11 | Stop reason: HOSPADM

## 2024-12-11 RX ORDER — OXYCODONE HYDROCHLORIDE 5 MG/1
10 TABLET ORAL PRN
Status: DISCONTINUED | OUTPATIENT
Start: 2024-12-11 | End: 2024-12-11 | Stop reason: HOSPADM

## 2024-12-11 RX ORDER — PROPOFOL 10 MG/ML
INJECTION, EMULSION INTRAVENOUS
Status: DISCONTINUED | OUTPATIENT
Start: 2024-12-11 | End: 2024-12-11 | Stop reason: SDUPTHER

## 2024-12-11 RX ORDER — OXYCODONE HYDROCHLORIDE 5 MG/1
5 TABLET ORAL PRN
Status: DISCONTINUED | OUTPATIENT
Start: 2024-12-11 | End: 2024-12-11 | Stop reason: HOSPADM

## 2024-12-11 RX ORDER — SODIUM CHLORIDE 9 MG/ML
INJECTION, SOLUTION INTRAMUSCULAR; INTRAVENOUS; SUBCUTANEOUS
Status: DISCONTINUED | OUTPATIENT
Start: 2024-12-11 | End: 2024-12-11 | Stop reason: SDUPTHER

## 2024-12-11 RX ORDER — NALOXONE HYDROCHLORIDE 0.4 MG/ML
INJECTION, SOLUTION INTRAMUSCULAR; INTRAVENOUS; SUBCUTANEOUS PRN
Status: DISCONTINUED | OUTPATIENT
Start: 2024-12-11 | End: 2024-12-11 | Stop reason: HOSPADM

## 2024-12-11 RX ORDER — SODIUM CHLORIDE 9 MG/ML
INJECTION, SOLUTION INTRAVENOUS PRN
Status: DISCONTINUED | OUTPATIENT
Start: 2024-12-11 | End: 2024-12-11 | Stop reason: HOSPADM

## 2024-12-11 RX ORDER — LIDOCAINE HYDROCHLORIDE 20 MG/ML
INJECTION, SOLUTION INFILTRATION; PERINEURAL
Status: DISCONTINUED | OUTPATIENT
Start: 2024-12-11 | End: 2024-12-11 | Stop reason: SDUPTHER

## 2024-12-11 RX ORDER — ONDANSETRON 2 MG/ML
4 INJECTION INTRAMUSCULAR; INTRAVENOUS
Status: DISCONTINUED | OUTPATIENT
Start: 2024-12-11 | End: 2024-12-11 | Stop reason: HOSPADM

## 2024-12-11 RX ORDER — ATORVASTATIN CALCIUM 40 MG/1
40 TABLET, FILM COATED ORAL DAILY
Qty: 45 TABLET | Refills: 0 | Status: SHIPPED | OUTPATIENT
Start: 2024-12-11

## 2024-12-11 RX ORDER — SODIUM CHLORIDE 0.9 % (FLUSH) 0.9 %
5-40 SYRINGE (ML) INJECTION EVERY 12 HOURS SCHEDULED
Status: DISCONTINUED | OUTPATIENT
Start: 2024-12-11 | End: 2024-12-11 | Stop reason: HOSPADM

## 2024-12-11 RX ORDER — LABETALOL HYDROCHLORIDE 5 MG/ML
5 INJECTION, SOLUTION INTRAVENOUS EVERY 10 MIN PRN
Status: DISCONTINUED | OUTPATIENT
Start: 2024-12-11 | End: 2024-12-11 | Stop reason: HOSPADM

## 2024-12-11 RX ADMIN — LIDOCAINE HYDROCHLORIDE 50 MG: 20 INJECTION, SOLUTION INFILTRATION; PERINEURAL at 10:42

## 2024-12-11 RX ADMIN — PROPOFOL 100 MG: 10 INJECTION, EMULSION INTRAVENOUS at 10:42

## 2024-12-11 RX ADMIN — SODIUM CHLORIDE 10 ML: 9 INJECTION INTRAMUSCULAR; INTRAVENOUS; SUBCUTANEOUS at 10:52

## 2024-12-11 RX ADMIN — PROPOFOL 50 MG: 10 INJECTION, EMULSION INTRAVENOUS at 10:44

## 2024-12-11 RX ADMIN — PROPOFOL 30 MG: 10 INJECTION, EMULSION INTRAVENOUS at 10:47

## 2024-12-11 ASSESSMENT — PAIN - FUNCTIONAL ASSESSMENT
PAIN_FUNCTIONAL_ASSESSMENT: 0-10
PAIN_FUNCTIONAL_ASSESSMENT: 0-10

## 2024-12-11 NOTE — ANESTHESIA POSTPROCEDURE EVALUATION
Department of Anesthesiology  Postprocedure Note    Patient: Pretty Covington  MRN: [id number]  YOB: 1960  Date of evaluation: 12/11/2024    Procedure Summary       Date: 12/11/24 Room / Location: [address]    Anesthesia Start: 1036 Anesthesia Stop: 1057    Procedure: ESOPHAGOGASTRODUODENOSCOPY POLYP SNARE Diagnosis:       Gastric polyp      (Gastric polyp [K31.7])    Surgeons: Ken Salazar MD Responsible Provider: Tracy Lancaster MD    Anesthesia Type: MAC ASA Status: 3            Anesthesia Type: MAC    Bing Phase I: Bing Score: 10    Bing Phase II: Bing Score: 10    Anesthesia Post Evaluation    Comments: Postoperative Anesthesia Note    Name:    Pretty Covington  MRN:      [id number]    Patient Vitals in the past 12 hrs:  12/11/24 1121, BP:(!) 151/57, Temp:97.4 °F (36.3 °C), Temp src:Infrared, Pulse:83, Resp:17, SpO2:98 %  12/11/24 1053, BP:139/78, Temp:97.1 °F (36.2 °C), Temp src:Infrared, Pulse:79, Resp:17, SpO2:97 %  12/11/24 0914, BP:129/72, Temp:96.9 °F (36.1 °C), Temp src:Temporal, Pulse:88, Resp:18, SpO2:97 %, Height:1.651 m (5' 5\"), Weight:115.7 kg (255 lb)     LABS:    CBC  Lab Results       Component                Value               Date/Time                  WBC                      5.0                 10/30/2024 08:55 AM        HGB                      14.0                10/30/2024 08:55 AM        HCT                      41.7                10/30/2024 08:55 AM        PLT                      182                 10/30/2024 08:55 AM   RENAL  Lab Results       Component                Value               Date/Time                  NA                       142                 07/17/2024 10:29 AM        K                        4.2                 07/17/2024 10:29 AM        K                        4.2                 07/01/2024 06:29 AM        CL                       104                 07/17/2024 10:29 AM        CO2

## 2024-12-11 NOTE — ANESTHESIA PRE PROCEDURE
Department of Anesthesiology  Preprocedure Note       Name:  Pretty Covington   Age:  64 y.o.  :  1960                                          MRN:  [id number]         Date:  2024      Surgeon: Surgeon(s):  Ken Salazar MD    Procedure: Procedure(s):  EGD W/ANES. (10:15)    Medications prior to admission:   Prior to Admission medications    Medication Sig Start Date End Date Taking? Authorizing Provider   DULoxetine (CYMBALTA) 60 MG extended release capsule TAKE 1 CAPSULE BY MOUTH DAILY 24  Yes Marialuisa Andujar MD   metFORMIN (GLUCOPHAGE-XR) 500 MG extended release tablet TAKE 2 TABLETS BY MOUTH IN THE  MORNING AND AT BEDTIME 24  Yes Marialuisa Andujar MD   methocarbamol (ROBAXIN-750) 750 MG tablet Take 1 tablet by mouth in the morning and at bedtime 24  Yes Marialuisa Andujar MD   topiramate (TOPAMAX) 100 MG tablet TAKE 1 TABLET BY MOUTH AT NIGHT 24  Yes Marialuisa Andujar MD   pantoprazole (PROTONIX) 20 MG tablet TAKE 1 TABLET BY MOUTH DAILY 24  Yes Marialuisa Andujar MD   Icosapent Ethyl (VASCEPA) 1 g CAPS capsule TAKE 2 CAPSULES BY MOUTH TWICE  DAILY 24  Yes Marialuisa Andujar MD   losartan (COZAAR) 50 MG tablet TAKE 1 TABLET BY MOUTH DAILY 24  Yes Marialuisa Andujar MD   magnesium oxide (MAG-OX) 400 (240 Mg) MG tablet Take 1 tablet by mouth 2 times daily 24  Yes Marialuisa Andujar MD   ascorbic acid (VITAMIN C) 500 MG tablet Take 1 tablet by mouth daily   Yes Pieter Madison MD   albuterol sulfate HFA (PROVENTIL;VENTOLIN;PROAIR) 108 (90 Base) MCG/ACT inhaler USE 2 INHALATIONS BY MOUTH 4  TIMES DAILY AS NEEDED FOR  WHEEZING 23  Yes Lazaro Yung MD   ferrous sulfate (FEROSUL) 325 (65 Fe) MG tablet TAKE 1 TABLET BY MOUTH EVERY MORNING 10/19/22  Yes Marialuisa Andujar MD   Multiple Vitamins-Minerals (THERAPEUTIC MULTIVITAMIN-MINERALS) tablet Take 1 tablet by mouth daily   Yes

## 2024-12-11 NOTE — H&P
History and Physical / Pre-Sedation Assessment    Patient:  Pretty Covington   :   1960     Intended Procedure:  EGD    HPI: 64 year old female with gastric polyp    Past Medical History:   Diagnosis Date    Asthma     Benign essential hypertension 2017    Benign head tremor 2014    Cholelithiasis     Coronary artery disease involving native coronary artery of native heart 2022    Disease of nasal cavity and sinuses     Fatty infiltration of liver     Hot flash, menopausal 2015    Hx of blood clots     lungs bilateral x 2    Hyperlipidemia     Hyperlipidemia associated with type 2 diabetes mellitus (HCC) 10/23/2015    Morbid obesity with BMI of 50.0-59.9, adult 10/23/2015    No history of procedure 2015    no past colonoscopy    ELEAZAR on CPAP     Pulmonary embolism (HCC)     both lungs x 2    Type II or unspecified type diabetes mellitus without mention of complication, not stated as uncontrolled     Umbilical hernia 10/02/2013    Unspecified sleep apnea     Wears glasses      Past Surgical History:   Procedure Laterality Date    BREAST SURGERY Right 2015    benign tumor    CHOLECYSTECTOMY, LAPAROSCOPIC  2012    COLONOSCOPY  2015    cecal polyp    DILATION AND CURETTAGE OF UTERUS  2018    FOOT SURGERY Right 3/15/2023    OPEN REDUCTION INTERNAL FIXATION RIGHT 5TH METATARSAL performed by Arsh Siegel DPM at [facility] OR    HERNIA REPAIR  10/11/2013    lap umbilical    HYSTEROSCOPY  2018    and D&C    LAMINECTOMY N/A 2024    Thoracic 11-Lumbar 3 fixation and fusion performed by Ebenezer Campoverde MD at [facility] OR       Medications reviewed  Prior to Admission medications    Medication Sig Start Date End Date Taking? Authorizing Provider   DULoxetine (CYMBALTA) 60 MG extended release capsule TAKE 1 CAPSULE BY MOUTH DAILY 24  Yes Marialuisa Andujar MD   metFORMIN (GLUCOPHAGE-XR) 500 MG extended release tablet TAKE 2 TABLETS BY MOUTH IN THE  MORNING AND AT

## 2024-12-11 NOTE — TELEPHONE ENCOUNTER
Last Office Visit: 1/22/2024 Provider: GEE  Is provider OOT? No    Next Office Visit: 1/22/2025 Provider: GEE  **If no OV, when does pt need to be seen? N/a  **Has patient already had 30 day supply? No    LAST LABS:   Lipid:   Lab Results   Component Value Date    CHOL 85 07/17/2024    TRIG 283 (H) 07/17/2024    HDL 31 (L) 07/17/2024    LDL -3 (L) 07/17/2024    VLDL 57 07/17/2024    CHOLHDLRATIO 6.1 (H) 10/27/2011     Liver:   Lab Results   Component Value Date    ALT 6 (L) 07/17/2024    AST 21 07/17/2024    ALKPHOS 90 07/17/2024    BILITOT 0.3 07/17/2024      **Check Care Everywhere? no - pt has updated labs in chart  **Lab orders needed? no - pt has updated labs in chart    Is encounter provider correct?   Yes  Does refill dosage match last filled?   Yes  Changes to script from Tele Encounters or LOV Plan?   no  Did you adjust dispensed amount or refills to reflect last and upcoming OV?  Yes    Requested Prescriptions     Pending Prescriptions Disp Refills    atorvastatin (LIPITOR) 40 MG tablet [Pharmacy Med Name: Atorvastatin Calcium 40 MG Oral Tablet] 90 tablet 3     Sig: TAKE 1 TABLET BY MOUTH DAILY

## 2024-12-11 NOTE — DISCHARGE INSTRUCTIONS
PATIENT INSTRUCTIONS  POST-SEDATION    Pretty Covington    Resume Eliquis tomorrow       IMMEDIATELY FOLLOWING PROCEDURE:    Do not drive or operate machinery for the first twenty four hours after surgery.     Do not make any important decisions for twenty four hours after surgery or while taking narcotic pain medications or sedatives.     You should NOT BE LEFT UNATTENDED OR ALONE. A responsible adult should be with you for the rest of the day of your procedure and also during the night for your protection and safety.    You may experience some light headedness. Rest at home with activity as tolerated. You may not need to go to bed, but it is important to rest for the next 24 hours. You should not engage in athletic sports such as basketball, volleyball, jogging, skating, or activities requiring refined motor skills for 24 hours.   If you develop intractable nausea and vomiting or a severe headache please notify your doctor immediately.   You are not expected to have any fever, but if you feel warm, take your temperature. If you have a fever 101 degrees or higher, call your doctor.     If you have had an Endoscopy:   *Eat lightly for your first meal and gradually resume your normal / prescribed diet. DO NOT eat or drink until your gag reflex returns.   *If you have a sore throat you may use lozenges, or salt water gargles.   *If you have had a colonoscopy, do not expect a normal bowel movement for approximately three days due to the cleansing of the large intestine prior to colonoscopy.    ONCE YOU ARE HOME, IF YOU SHOULD HAVE:  Difficulty in breathing, persistent nausea or vomiting, bleeding you feel is excessive, or pain that is unusual, increased abdominal bloating, or any swelling, fever / chills, call your physician. If you cannot contact your physician, but feel that your signs and symptoms need a physician's attention, go to the Emergency Department.      FOLLOW-UP:    Please follow up with Dr. Andujar as

## 2024-12-16 ENCOUNTER — TELEPHONE (OUTPATIENT)
Dept: INTERNAL MEDICINE CLINIC | Age: 64
End: 2024-12-16

## 2024-12-16 RX ORDER — FENOFIBRIC ACID 135 MG/1
135 CAPSULE, DELAYED RELEASE ORAL DAILY
Qty: 90 CAPSULE | Refills: 0 | Status: SHIPPED | OUTPATIENT
Start: 2024-12-16

## 2024-12-16 NOTE — TELEPHONE ENCOUNTER
----- Message from Dr. Marialuisa Andujar MD sent at 12/16/2024  2:02 PM EST -----  Contact: patient [phone]  Okay to refill  ----- Message -----  From: Rose Crowe  Sent: 12/16/2024  10:28 AM EST  To: Marialuisa Andujar MD    Patient wanting to know if you wanted her to continue taking fenofibric acid (TRILIPIX) 135 MG CPDR capsule?  If so, please send refill to the pharmacy below.  Please advise        [facility] - [place], [place] - [address] -  [phone] -  [phone]

## 2024-12-26 RX ORDER — ATORVASTATIN CALCIUM 40 MG/1
40 TABLET, FILM COATED ORAL DAILY
Qty: 45 TABLET | Refills: 7 | Status: SHIPPED | OUTPATIENT
Start: 2024-12-26

## 2025-01-13 SDOH — ECONOMIC STABILITY: FOOD INSECURITY: WITHIN THE PAST 12 MONTHS, YOU WORRIED THAT YOUR FOOD WOULD RUN OUT BEFORE YOU GOT MONEY TO BUY MORE.: NEVER TRUE

## 2025-01-13 SDOH — ECONOMIC STABILITY: FOOD INSECURITY: WITHIN THE PAST 12 MONTHS, THE FOOD YOU BOUGHT JUST DIDN'T LAST AND YOU DIDN'T HAVE MONEY TO GET MORE.: NEVER TRUE

## 2025-01-13 SDOH — ECONOMIC STABILITY: INCOME INSECURITY: IN THE LAST 12 MONTHS, WAS THERE A TIME WHEN YOU WERE NOT ABLE TO PAY THE MORTGAGE OR RENT ON TIME?: NO

## 2025-01-13 SDOH — ECONOMIC STABILITY: TRANSPORTATION INSECURITY
IN THE PAST 12 MONTHS, HAS THE LACK OF TRANSPORTATION KEPT YOU FROM MEDICAL APPOINTMENTS OR FROM GETTING MEDICATIONS?: NO

## 2025-01-13 ASSESSMENT — PATIENT HEALTH QUESTIONNAIRE - PHQ9
5. POOR APPETITE OR OVEREATING: NOT AT ALL
4. FEELING TIRED OR HAVING LITTLE ENERGY: NOT AT ALL
5. POOR APPETITE OR OVEREATING: NOT AT ALL
9. THOUGHTS THAT YOU WOULD BE BETTER OFF DEAD, OR OF HURTING YOURSELF: NOT AT ALL
2. FEELING DOWN, DEPRESSED OR HOPELESS: NOT AT ALL
SUM OF ALL RESPONSES TO PHQ QUESTIONS 1-9: 0
6. FEELING BAD ABOUT YOURSELF - OR THAT YOU ARE A FAILURE OR HAVE LET YOURSELF OR YOUR FAMILY DOWN: NOT AT ALL
8. MOVING OR SPEAKING SO SLOWLY THAT OTHER PEOPLE COULD HAVE NOTICED. OR THE OPPOSITE - BEING SO FIDGETY OR RESTLESS THAT YOU HAVE BEEN MOVING AROUND A LOT MORE THAN USUAL: NOT AT ALL
SUM OF ALL RESPONSES TO PHQ QUESTIONS 1-9: 0
SUM OF ALL RESPONSES TO PHQ QUESTIONS 1-9: 0
9. THOUGHTS THAT YOU WOULD BE BETTER OFF DEAD, OR OF HURTING YOURSELF: NOT AT ALL
1. LITTLE INTEREST OR PLEASURE IN DOING THINGS: NOT AT ALL
7. TROUBLE CONCENTRATING ON THINGS, SUCH AS READING THE NEWSPAPER OR WATCHING TELEVISION: NOT AT ALL
3. TROUBLE FALLING OR STAYING ASLEEP: NOT AT ALL
7. TROUBLE CONCENTRATING ON THINGS, SUCH AS READING THE NEWSPAPER OR WATCHING TELEVISION: NOT AT ALL
2. FEELING DOWN, DEPRESSED OR HOPELESS: NOT AT ALL
8. MOVING OR SPEAKING SO SLOWLY THAT OTHER PEOPLE COULD HAVE NOTICED. OR THE OPPOSITE, BEING SO FIGETY OR RESTLESS THAT YOU HAVE BEEN MOVING AROUND A LOT MORE THAN USUAL: NOT AT ALL
SUM OF ALL RESPONSES TO PHQ9 QUESTIONS 1 & 2: 0
4. FEELING TIRED OR HAVING LITTLE ENERGY: NOT AT ALL
SUM OF ALL RESPONSES TO PHQ QUESTIONS 1-9: 0
1. LITTLE INTEREST OR PLEASURE IN DOING THINGS: NOT AT ALL
SUM OF ALL RESPONSES TO PHQ QUESTIONS 1-9: 0
10. IF YOU CHECKED OFF ANY PROBLEMS, HOW DIFFICULT HAVE THESE PROBLEMS MADE IT FOR YOU TO DO YOUR WORK, TAKE CARE OF THINGS AT HOME, OR GET ALONG WITH OTHER PEOPLE: NOT DIFFICULT AT ALL
6. FEELING BAD ABOUT YOURSELF - OR THAT YOU ARE A FAILURE OR HAVE LET YOURSELF OR YOUR FAMILY DOWN: NOT AT ALL
10. IF YOU CHECKED OFF ANY PROBLEMS, HOW DIFFICULT HAVE THESE PROBLEMS MADE IT FOR YOU TO DO YOUR WORK, TAKE CARE OF THINGS AT HOME, OR GET ALONG WITH OTHER PEOPLE: NOT DIFFICULT AT ALL
3. TROUBLE FALLING OR STAYING ASLEEP: NOT AT ALL

## 2025-01-15 ENCOUNTER — OFFICE VISIT (OUTPATIENT)
Dept: INTERNAL MEDICINE CLINIC | Age: 65
End: 2025-01-15

## 2025-01-15 VITALS
WEIGHT: 261 LBS | HEIGHT: 65 IN | BODY MASS INDEX: 43.49 KG/M2 | DIASTOLIC BLOOD PRESSURE: 84 MMHG | SYSTOLIC BLOOD PRESSURE: 122 MMHG | HEART RATE: 94 BPM | RESPIRATION RATE: 16 BRPM

## 2025-01-15 DIAGNOSIS — I10 PRIMARY HYPERTENSION: ICD-10-CM

## 2025-01-15 DIAGNOSIS — E78.1 HYPERTRIGLYCERIDEMIA: ICD-10-CM

## 2025-01-15 DIAGNOSIS — E66.01 OBESITY, MORBID, BMI 40.0-49.9: ICD-10-CM

## 2025-01-15 DIAGNOSIS — E11.9 TYPE 2 DIABETES MELLITUS WITHOUT COMPLICATION, WITHOUT LONG-TERM CURRENT USE OF INSULIN (HCC): ICD-10-CM

## 2025-01-15 DIAGNOSIS — E78.5 HYPERLIPIDEMIA ASSOCIATED WITH TYPE 2 DIABETES MELLITUS (HCC): ICD-10-CM

## 2025-01-15 DIAGNOSIS — E11.69 HYPERLIPIDEMIA ASSOCIATED WITH TYPE 2 DIABETES MELLITUS (HCC): ICD-10-CM

## 2025-01-15 DIAGNOSIS — E11.9 TYPE 2 DIABETES MELLITUS WITHOUT COMPLICATION, WITHOUT LONG-TERM CURRENT USE OF INSULIN (HCC): Primary | ICD-10-CM

## 2025-01-15 DIAGNOSIS — I25.10 NONOCCLUSIVE CORONARY ATHEROSCLEROSIS OF NATIVE CORONARY ARTERY: ICD-10-CM

## 2025-01-15 DIAGNOSIS — I27.82 OTHER CHRONIC PULMONARY EMBOLISM WITHOUT ACUTE COR PULMONALE (HCC): ICD-10-CM

## 2025-01-15 DIAGNOSIS — G47.33 SEVERE OBSTRUCTIVE SLEEP APNEA: ICD-10-CM

## 2025-01-15 DIAGNOSIS — K76.0 FATTY INFILTRATION OF LIVER: ICD-10-CM

## 2025-01-15 DIAGNOSIS — G25.0 BENIGN HEAD TREMOR: ICD-10-CM

## 2025-01-15 LAB
ALBUMIN SERPL-MCNC: 4.4 G/DL (ref 3.4–5)
ALBUMIN/GLOB SERPL: 2 {RATIO} (ref 1.1–2.2)
ALP SERPL-CCNC: 100 U/L (ref 40–129)
ALT SERPL-CCNC: 11 U/L (ref 10–40)
ANION GAP SERPL CALCULATED.3IONS-SCNC: 15 MMOL/L (ref 3–16)
AST SERPL-CCNC: 24 U/L (ref 15–37)
BASOPHILS # BLD: 0.1 K/UL (ref 0–0.2)
BASOPHILS NFR BLD: 1.3 %
BILIRUB SERPL-MCNC: 0.3 MG/DL (ref 0–1)
BUN SERPL-MCNC: 21 MG/DL (ref 7–20)
CALCIUM SERPL-MCNC: 9.9 MG/DL (ref 8.3–10.6)
CHLORIDE SERPL-SCNC: 106 MMOL/L (ref 99–110)
CO2 SERPL-SCNC: 20 MMOL/L (ref 21–32)
CREAT SERPL-MCNC: 0.7 MG/DL (ref 0.6–1.2)
DEPRECATED RDW RBC AUTO: 14.3 % (ref 12.4–15.4)
EOSINOPHIL # BLD: 0.1 K/UL (ref 0–0.6)
EOSINOPHIL NFR BLD: 2.2 %
GFR SERPLBLD CREATININE-BSD FMLA CKD-EPI: >90 ML/MIN/{1.73_M2}
GLUCOSE SERPL-MCNC: 207 MG/DL (ref 70–99)
HCT VFR BLD AUTO: 44.8 % (ref 36–48)
HGB BLD-MCNC: 15.1 G/DL (ref 12–16)
LYMPHOCYTES # BLD: 1.1 K/UL (ref 1–5.1)
LYMPHOCYTES NFR BLD: 25.6 %
MCH RBC QN AUTO: 31.6 PG (ref 26–34)
MCHC RBC AUTO-ENTMCNC: 33.6 G/DL (ref 31–36)
MCV RBC AUTO: 94.1 FL (ref 80–100)
MONOCYTES # BLD: 0.4 K/UL (ref 0–1.3)
MONOCYTES NFR BLD: 8.4 %
NEUTROPHILS # BLD: 2.8 K/UL (ref 1.7–7.7)
NEUTROPHILS NFR BLD: 62.5 %
PLATELET # BLD AUTO: 153 K/UL (ref 135–450)
PMV BLD AUTO: 9 FL (ref 5–10.5)
POTASSIUM SERPL-SCNC: 4.5 MMOL/L (ref 3.5–5.1)
PROT SERPL-MCNC: 6.6 G/DL (ref 6.4–8.2)
RBC # BLD AUTO: 4.76 M/UL (ref 4–5.2)
SODIUM SERPL-SCNC: 141 MMOL/L (ref 136–145)
TSH SERPL DL<=0.005 MIU/L-ACNC: 1.59 UIU/ML (ref 0.27–4.2)
WBC # BLD AUTO: 4.4 K/UL (ref 4–11)

## 2025-01-15 PROCEDURE — 3074F SYST BP LT 130 MM HG: CPT | Performed by: NURSE PRACTITIONER

## 2025-01-15 PROCEDURE — 3079F DIAST BP 80-89 MM HG: CPT | Performed by: NURSE PRACTITIONER

## 2025-01-15 PROCEDURE — 99214 OFFICE O/P EST MOD 30 MIN: CPT | Performed by: NURSE PRACTITIONER

## 2025-01-15 RX ORDER — CETIRIZINE HYDROCHLORIDE 5 MG/1
5 TABLET ORAL DAILY
COMMUNITY

## 2025-01-15 ASSESSMENT — ENCOUNTER SYMPTOMS
ABDOMINAL PAIN: 0
COUGH: 0
RHINORRHEA: 0
BACK PAIN: 0
NAUSEA: 0
SHORTNESS OF BREATH: 0
WHEEZING: 0
VOMITING: 0

## 2025-01-15 NOTE — PROGRESS NOTES
Subjective:      Patient ID: Pretty Covington is a 64 y.o. female.    HPI    Patient is here for follow up of diabetes, HTN, depression, pulmonary embolism and hyperlipidemia.  Does not check BS. Denies numbness/tingling in hands and feet. Patient states that she feels her vision has got worse. She is due to see eye doctor in March.   Patient does not check B/P at home. Denies chest pain, shortness of breath, and dizziness.   No feelings depression/ anxiety  Back is doing okay.     Diabetes Mellitus Type 2: Current symptoms/problems include none.    Medication compliance:  compliant most of the time  Diabetic diet compliance:  compliant most of the time,  Weight trend: decreasing  Current exercise: no regular exercise    Home blood sugar records: fasting range: low 150 mg/dl  Any episodes of hypoglycemia? no  Eye exam current (within one year): yes   reports that she has never smoked. She has never used smokeless tobacco.   Daily Aspirin? No: n/a  Known diabetic complications: none    Hypertension:  Blood pressure typically runs normal outside of the office.   She is adherent to a low sodium diet. Patient denies headache, chest pain, dry cough.  Antihypertensive medication side effects: no medication side effects noted.  Use of agents associated with hypertension: none.     Hyperlipidemia:  No new myalgias or GI upset on atorvastatin (Lipitor).       Lab Results   Component Value Date    LABA1C 6.4 10/30/2024    LABA1C 5.1 07/17/2024    LABA1C 6.3 02/06/2024     Lab Results   Component Value Date    CREATININE <0.5 (L) 07/17/2024     Lab Results   Component Value Date    ALT 6 (L) 07/17/2024    AST 21 07/17/2024     No components found for: \"CHLPL\"  Lab Results   Component Value Date    TRIG 283 (H) 07/17/2024     Lab Results   Component Value Date    HDL 31 (L) 07/17/2024     Lab Results   Component Value Date/Time    LDLDIRECT 36 10/25/2023 08:35 AM      She is compliant with Eliquis.  She has history of recurrent

## 2025-01-16 LAB
EST. AVERAGE GLUCOSE BLD GHB EST-MCNC: 145.6 MG/DL
HBA1C MFR BLD: 6.7 %

## 2025-01-22 ENCOUNTER — OFFICE VISIT (OUTPATIENT)
Dept: CARDIOLOGY CLINIC | Age: 65
End: 2025-01-22
Payer: COMMERCIAL

## 2025-01-22 VITALS
DIASTOLIC BLOOD PRESSURE: 60 MMHG | HEART RATE: 68 BPM | OXYGEN SATURATION: 97 % | HEIGHT: 65 IN | WEIGHT: 261.2 LBS | SYSTOLIC BLOOD PRESSURE: 132 MMHG | BODY MASS INDEX: 43.52 KG/M2

## 2025-01-22 DIAGNOSIS — E78.1 HYPERTRIGLYCERIDEMIA: ICD-10-CM

## 2025-01-22 DIAGNOSIS — I10 PRIMARY HYPERTENSION: Primary | ICD-10-CM

## 2025-01-22 DIAGNOSIS — E66.01 OBESITY, MORBID, BMI 40.0-49.9: ICD-10-CM

## 2025-01-22 DIAGNOSIS — G47.33 SEVERE OBSTRUCTIVE SLEEP APNEA: ICD-10-CM

## 2025-01-22 DIAGNOSIS — I25.118 CORONARY ARTERY DISEASE OF NATIVE ARTERY OF NATIVE HEART WITH STABLE ANGINA PECTORIS (HCC): ICD-10-CM

## 2025-01-22 PROCEDURE — 99214 OFFICE O/P EST MOD 30 MIN: CPT | Performed by: INTERNAL MEDICINE

## 2025-01-22 PROCEDURE — 3075F SYST BP GE 130 - 139MM HG: CPT | Performed by: INTERNAL MEDICINE

## 2025-01-22 PROCEDURE — 3078F DIAST BP <80 MM HG: CPT | Performed by: INTERNAL MEDICINE

## 2025-01-22 RX ORDER — ASPIRIN 81 MG/1
81 TABLET ORAL DAILY
Qty: 90 TABLET | Refills: 3 | Status: SHIPPED | OUTPATIENT
Start: 2025-01-22

## 2025-01-22 RX ORDER — ATORVASTATIN CALCIUM 40 MG/1
40 TABLET, FILM COATED ORAL DAILY
Qty: 90 TABLET | Refills: 3 | Status: SHIPPED | OUTPATIENT
Start: 2025-01-22

## 2025-01-22 NOTE — PATIENT INSTRUCTIONS
Plan:  Labs reviewed in epic and discussed with patient.  Current medications reviewed.  Refills given as warranted.  Try to keep your legs elevated at night.   Continue to follow with your pcp for your yearly blood work.  You will be due in July for cholesterol.  No cardiac testing at this time.     Follow up with me in 1 year.

## 2025-01-22 NOTE — PROGRESS NOTES
[facility]   Cardiac Consultation    Referring Provider:  Marialuisa Andujar MD     Chief Complaint   Patient presents with    Follow-up    Hypertension    Coronary Artery Disease    Hyperlipidemia      Subjective: Ms. Covington is here today for cardiology follow up; c/o fall with back fracture    History of Present Illness:   Pretty Covington is a 64 y.o. female who has PMH HTN, HLD, DM II, +premature FH CAD (brother age 50 known CAD), hypertriglyceridemia (on trilipix and vascepa), PE x 2 (2007/2006) on eliquis (prior warfarin) and ELEAZAR on CPAP.              Admitted [facility] 7/27/22 with c/o SOB, fatigue and CP. Prior testing: ECHO 7/28/22 EF=55%; no WMA; mild LVH; grade I DD normal LV filling pressure (EF=55-60% in 6/11). Karyn nuc 7/29/22 moderate sized inferior reversible defect c/w ischemia territory mid RCA. EF=66%. C 7/29/22 mild-mod NOCAD; LVEDP 15 mmHg.   On 3/15/23 fracture fragment removal of right 5th proximal phalanx.  Presented to ED 6/29/23 CP and SOB. Suresh and EKG unremarkable and sent home.  EKG 6/11/24 NSR 89bpm; PRWP; NST change (no sig change 6/30/23).    Today, she reports falling and breaking her back in June and underwent surgery and sent to rehab. Dr. Campoverde did her surgery and she is now not feeling pain and doing well. Patient denies current edema, chest pain, shortness of breath, palpitations, dizziness or syncope.  Patient is taking all cardiac medications as prescribed and tolerates them well.      Weight today is 261# (down 7# from 1/24)     Patient is vaccinated against Covid. Pfizer 2/2    Past Medical History:   has a past medical history of Asthma, Benign essential hypertension, Benign head tremor, Cholelithiasis, Coronary artery disease involving native coronary artery of native heart, Disease of nasal cavity and sinuses, Fatty infiltration of liver, Hot flash, menopausal, Hx of blood clots, Hyperlipidemia, Hyperlipidemia associated with type 2 diabetes mellitus (HCC),

## 2025-02-03 ENCOUNTER — TELEPHONE (OUTPATIENT)
Dept: INTERNAL MEDICINE CLINIC | Age: 65
End: 2025-02-03

## 2025-02-03 DIAGNOSIS — E11.69 TYPE 2 DIABETES MELLITUS WITH OTHER SPECIFIED COMPLICATION, WITHOUT LONG-TERM CURRENT USE OF INSULIN (HCC): Primary | ICD-10-CM

## 2025-02-03 RX ORDER — METHOCARBAMOL 750 MG/1
750 TABLET, FILM COATED ORAL 2 TIMES DAILY
Qty: 180 TABLET | Refills: 0 | Status: SHIPPED | OUTPATIENT
Start: 2025-02-03

## 2025-02-03 RX ORDER — TIRZEPATIDE 10 MG/.5ML
10 INJECTION, SOLUTION SUBCUTANEOUS WEEKLY
Qty: 6 ML | Refills: 0 | Status: SHIPPED | OUTPATIENT
Start: 2025-02-03

## 2025-02-03 NOTE — TELEPHONE ENCOUNTER
----- Message from Dr. Marialuisa Andujar MD sent at 2/3/2025  2:03 PM EST -----  Contact: SIDDHARTH [phone]  Ok 10 mg on Mounjaro    Refill Methocarbamol  ----- Message -----  From: Mendez Sinclair  Sent: 2/3/2025  11:52 AM EST  To: Marialuisa Andujar MD    Patient states she was increased to 10 MG for the below script (Mounjaro), pt is now out of medication and is in of a new script for 10 MG sent to the [facility] pharmacy. Pt is also asking if she is to continue taking methocarbamol 750 MG for her back. Pt has not been experiencing back pain currently, but she is unsure if it is because she is taking medication or if the back pain has subsided all together. If pt is to continue the methocarbamol, she is in need of a refill to the [facility] pharmacy as well. Please advise     Tirzepatide (MOUNJARO) 7.5 MG/0.5ML SOPN SC injection     methocarbamol (ROBAXIN-750) 750 MG tablet     [facility] - [place], [place] - [address] -  [phone] - F [phone]  [facility] [facility] [address]  Phone: [phone]  Fax: [phone]

## 2025-02-06 ENCOUNTER — OFFICE VISIT (OUTPATIENT)
Dept: PULMONOLOGY | Age: 65
End: 2025-02-06
Payer: COMMERCIAL

## 2025-02-06 VITALS
DIASTOLIC BLOOD PRESSURE: 70 MMHG | OXYGEN SATURATION: 97 % | HEART RATE: 101 BPM | SYSTOLIC BLOOD PRESSURE: 127 MMHG | RESPIRATION RATE: 17 BRPM | BODY MASS INDEX: 43.85 KG/M2 | WEIGHT: 263.2 LBS | HEIGHT: 65 IN

## 2025-02-06 DIAGNOSIS — G47.33 SEVERE OBSTRUCTIVE SLEEP APNEA: Primary | ICD-10-CM

## 2025-02-06 DIAGNOSIS — R06.09 DYSPNEA ON EXERTION: ICD-10-CM

## 2025-02-06 DIAGNOSIS — I10 HYPERTENSION, UNSPECIFIED TYPE: ICD-10-CM

## 2025-02-06 PROCEDURE — 3078F DIAST BP <80 MM HG: CPT | Performed by: INTERNAL MEDICINE

## 2025-02-06 PROCEDURE — 99214 OFFICE O/P EST MOD 30 MIN: CPT | Performed by: INTERNAL MEDICINE

## 2025-02-06 PROCEDURE — 3074F SYST BP LT 130 MM HG: CPT | Performed by: INTERNAL MEDICINE

## 2025-02-06 ASSESSMENT — SLEEP AND FATIGUE QUESTIONNAIRES
HOW LIKELY ARE YOU TO NOD OFF OR FALL ASLEEP WHILE SITTING AND TALKING TO SOMEONE: WOULD NEVER DOZE
HOW LIKELY ARE YOU TO NOD OFF OR FALL ASLEEP IN A CAR, WHILE STOPPED FOR A FEW MINUTES IN TRAFFIC: WOULD NEVER DOZE
ESS TOTAL SCORE: 9
HOW LIKELY ARE YOU TO NOD OFF OR FALL ASLEEP WHILE SITTING QUIETLY AFTER LUNCH WITHOUT ALCOHOL: WOULD NEVER DOZE
HOW LIKELY ARE YOU TO NOD OFF OR FALL ASLEEP WHILE SITTING INACTIVE IN A PUBLIC PLACE: WOULD NEVER DOZE
HOW LIKELY ARE YOU TO NOD OFF OR FALL ASLEEP WHILE SITTING AND READING: HIGH CHANCE OF DOZING
HOW LIKELY ARE YOU TO NOD OFF OR FALL ASLEEP WHEN YOU ARE A PASSENGER IN A CAR FOR AN HOUR WITHOUT A BREAK: MODERATE CHANCE OF DOZING
HOW LIKELY ARE YOU TO NOD OFF OR FALL ASLEEP WHILE LYING DOWN TO REST IN THE AFTERNOON WHEN CIRCUMSTANCES PERMIT: HIGH CHANCE OF DOZING
HOW LIKELY ARE YOU TO NOD OFF OR FALL ASLEEP WHILE WATCHING TV: SLIGHT CHANCE OF DOZING

## 2025-02-06 NOTE — PROGRESS NOTES
[facility]                                                                CHIEF COMPLAINT: ELEAZAR      HPI:   Patient is doing very well after changing the mask to full face mask. Patient is using CPAP 8-9  hrs/night. Using humidifier. No snoring on CPAP. The pressure is well tolerated. The mask is comfortable. No significant daytime sleepiness. No nodding off when driving. Bedtime is 10:30 pm and rise time is 7 am. Sleep onset is 15 minutes.  ESS is 9.   Not needing to use the rescue inhaler- Albuterol     From prior visit:   Seasonal allergy worse as she got olders: trees, pollens, mold       Past Medical History:   Diagnosis Date    Asthma     Benign essential hypertension 11/14/2017    Benign head tremor 05/14/2014    Cholelithiasis     Coronary artery disease involving native coronary artery of native heart 08/31/2022    Disease of nasal cavity and sinuses     Fatty infiltration of liver     Hot flash, menopausal 07/22/2015    Hx of blood clots     lungs bilateral x 2    Hyperlipidemia     Hyperlipidemia associated with type 2 diabetes mellitus (HCC) 10/23/2015    Morbid obesity with BMI of 50.0-59.9, adult 10/23/2015    No history of procedure 11/09/2015    no past colonoscopy    ELEAZAR on CPAP     Pulmonary embolism (HCC)     both lungs x 2    Type II or unspecified type diabetes mellitus without mention of complication, not stated as uncontrolled     Umbilical hernia 10/02/2013    Unspecified sleep apnea     Wears glasses        Past Surgical History:        Procedure Laterality Date    BREAST SURGERY Right 2015    benign tumor    CHOLECYSTECTOMY, LAPAROSCOPIC  08/02/2012    COLONOSCOPY  11/09/2015    cecal polyp    DILATION AND CURETTAGE OF UTERUS  03/13/2018    FOOT SURGERY Right 3/15/2023    OPEN REDUCTION INTERNAL FIXATION RIGHT 5TH METATARSAL performed by Arsh Siegel DPM at [facility] OR    HERNIA REPAIR  10/11/2013    lap umbilical

## 2025-02-10 RX ORDER — METFORMIN HYDROCHLORIDE 500 MG/1
TABLET, EXTENDED RELEASE ORAL
Qty: 360 TABLET | Refills: 0 | Status: SHIPPED | OUTPATIENT
Start: 2025-02-10

## 2025-02-10 RX ORDER — FENOFIBRIC ACID 135 MG/1
135 CAPSULE, DELAYED RELEASE ORAL DAILY
Qty: 90 CAPSULE | Refills: 0 | Status: SHIPPED | OUTPATIENT
Start: 2025-02-10

## 2025-03-25 ENCOUNTER — HOSPITAL ENCOUNTER (OUTPATIENT)
Dept: MAMMOGRAPHY | Age: 65
Discharge: HOME | End: 2025-03-25
Payer: COMMERCIAL

## 2025-03-25 DIAGNOSIS — Z12.39 SCREENING BREAST EXAMINATION: ICD-10-CM

## 2025-03-25 PROCEDURE — 77063 BREAST TOMOSYNTHESIS BI: CPT

## 2025-03-26 ENCOUNTER — RESULTS FOLLOW-UP (OUTPATIENT)
Dept: INTERNAL MEDICINE CLINIC | Age: 65
End: 2025-03-26

## 2025-03-31 ENCOUNTER — TELEPHONE (OUTPATIENT)
Dept: INTERNAL MEDICINE CLINIC | Age: 65
End: 2025-03-31

## 2025-03-31 NOTE — TELEPHONE ENCOUNTER
----- Message from Dr. Casandra Madrigal MD sent at 3/31/2025 11:45 AM EDT -----  Contact: patient [phone]  ok  ----- Message -----  From: Sudha Marcos  Sent: 3/31/2025  11:18 AM EDT  To: Casandra Madrigal MD      ----- Message -----  From: Rose Crowe  Sent: 3/31/2025  10:59 AM EDT  To: LOYDA Adams - CNP    Patient requesting her Tirzepatide (MOUNJARO) 10 MG/0.5ML SOAJ be reduced back to the 7.5 MG?  Patient states she has tried the increased amount for 6 weeks now, and she is having stomach issues, severe diarrhea, and now dry heaving.  Please advise          [facility] - [place], [place] - [address] [phone] - F [phone]

## 2025-04-14 RX ORDER — APIXABAN 5 MG/1
5 TABLET, FILM COATED ORAL 2 TIMES DAILY
Qty: 180 TABLET | Refills: 1 | Status: SHIPPED | OUTPATIENT
Start: 2025-04-14

## 2025-04-14 RX ORDER — FENOFIBRIC ACID 135 MG/1
135 CAPSULE, DELAYED RELEASE ORAL DAILY
Qty: 90 CAPSULE | Refills: 1 | Status: SHIPPED | OUTPATIENT
Start: 2025-04-14

## 2025-04-17 ENCOUNTER — OFFICE VISIT (OUTPATIENT)
Dept: INTERNAL MEDICINE CLINIC | Age: 65
End: 2025-04-17

## 2025-04-17 VITALS
RESPIRATION RATE: 14 BRPM | BODY MASS INDEX: 43.65 KG/M2 | DIASTOLIC BLOOD PRESSURE: 70 MMHG | HEIGHT: 65 IN | WEIGHT: 262 LBS | SYSTOLIC BLOOD PRESSURE: 130 MMHG | HEART RATE: 96 BPM

## 2025-04-17 DIAGNOSIS — G25.0 BENIGN HEAD TREMOR: ICD-10-CM

## 2025-04-17 DIAGNOSIS — I25.10 NONOCCLUSIVE CORONARY ATHEROSCLEROSIS OF NATIVE CORONARY ARTERY: ICD-10-CM

## 2025-04-17 DIAGNOSIS — I10 PRIMARY HYPERTENSION: ICD-10-CM

## 2025-04-17 DIAGNOSIS — E11.69 HYPERLIPIDEMIA ASSOCIATED WITH TYPE 2 DIABETES MELLITUS (HCC): ICD-10-CM

## 2025-04-17 DIAGNOSIS — E78.5 HYPERLIPIDEMIA ASSOCIATED WITH TYPE 2 DIABETES MELLITUS: ICD-10-CM

## 2025-04-17 DIAGNOSIS — E78.5 HYPERLIPIDEMIA ASSOCIATED WITH TYPE 2 DIABETES MELLITUS (HCC): ICD-10-CM

## 2025-04-17 DIAGNOSIS — E78.1 HYPERTRIGLYCERIDEMIA: ICD-10-CM

## 2025-04-17 DIAGNOSIS — E11.9 TYPE 2 DIABETES MELLITUS WITHOUT COMPLICATION, WITHOUT LONG-TERM CURRENT USE OF INSULIN (HCC): ICD-10-CM

## 2025-04-17 DIAGNOSIS — K76.0 FATTY INFILTRATION OF LIVER: ICD-10-CM

## 2025-04-17 DIAGNOSIS — E66.01 OBESITY, MORBID, BMI 40.0-49.9: ICD-10-CM

## 2025-04-17 DIAGNOSIS — E11.9 TYPE 2 DIABETES MELLITUS WITHOUT COMPLICATION, WITHOUT LONG-TERM CURRENT USE OF INSULIN: Primary | ICD-10-CM

## 2025-04-17 DIAGNOSIS — G47.33 SEVERE OBSTRUCTIVE SLEEP APNEA: ICD-10-CM

## 2025-04-17 DIAGNOSIS — E11.69 HYPERLIPIDEMIA ASSOCIATED WITH TYPE 2 DIABETES MELLITUS: ICD-10-CM

## 2025-04-17 DIAGNOSIS — I27.82 OTHER CHRONIC PULMONARY EMBOLISM WITHOUT ACUTE COR PULMONALE: ICD-10-CM

## 2025-04-17 LAB
ALBUMIN SERPL-MCNC: 4.5 G/DL (ref 3.4–5)
ALBUMIN/GLOB SERPL: 2 {RATIO} (ref 1.1–2.2)
ALP SERPL-CCNC: 89 U/L (ref 40–129)
ALT SERPL-CCNC: 14 U/L (ref 10–40)
ANION GAP SERPL CALCULATED.3IONS-SCNC: 15 MMOL/L (ref 3–16)
AST SERPL-CCNC: 27 U/L (ref 15–37)
BASOPHILS # BLD: 0.1 K/UL (ref 0–0.2)
BASOPHILS NFR BLD: 1.5 %
BILIRUB SERPL-MCNC: 0.5 MG/DL (ref 0–1)
BUN SERPL-MCNC: 23 MG/DL (ref 7–20)
CALCIUM SERPL-MCNC: 10 MG/DL (ref 8.3–10.6)
CHLORIDE SERPL-SCNC: 104 MMOL/L (ref 99–110)
CHOLEST SERPL-MCNC: 122 MG/DL (ref 0–199)
CO2 SERPL-SCNC: 22 MMOL/L (ref 21–32)
CREAT SERPL-MCNC: 0.7 MG/DL (ref 0.6–1.2)
DEPRECATED RDW RBC AUTO: 15.1 % (ref 12.4–15.4)
EOSINOPHIL # BLD: 0.3 K/UL (ref 0–0.6)
EOSINOPHIL NFR BLD: 5.2 %
EST. AVERAGE GLUCOSE BLD GHB EST-MCNC: 154.2 MG/DL
GFR SERPLBLD CREATININE-BSD FMLA CKD-EPI: >90 ML/MIN/{1.73_M2}
GLUCOSE SERPL-MCNC: 207 MG/DL (ref 70–99)
HBA1C MFR BLD: 7 %
HCT VFR BLD AUTO: 42 % (ref 36–48)
HDLC SERPL-MCNC: 31 MG/DL (ref 40–60)
HGB BLD-MCNC: 14.4 G/DL (ref 12–16)
LDLC SERPL CALC-MCNC: ABNORMAL MG/DL
LDLC SERPL-MCNC: 42 MG/DL
LYMPHOCYTES # BLD: 1.3 K/UL (ref 1–5.1)
LYMPHOCYTES NFR BLD: 25.8 %
MCH RBC QN AUTO: 32.1 PG (ref 26–34)
MCHC RBC AUTO-ENTMCNC: 34.3 G/DL (ref 31–36)
MCV RBC AUTO: 93.8 FL (ref 80–100)
MONOCYTES # BLD: 0.4 K/UL (ref 0–1.3)
MONOCYTES NFR BLD: 9.1 %
NEUTROPHILS # BLD: 2.8 K/UL (ref 1.7–7.7)
NEUTROPHILS NFR BLD: 58.4 %
PLATELET # BLD AUTO: 170 K/UL (ref 135–450)
PMV BLD AUTO: 9.4 FL (ref 5–10.5)
POTASSIUM SERPL-SCNC: 4.6 MMOL/L (ref 3.5–5.1)
PROT SERPL-MCNC: 6.8 G/DL (ref 6.4–8.2)
RBC # BLD AUTO: 4.48 M/UL (ref 4–5.2)
SODIUM SERPL-SCNC: 141 MMOL/L (ref 136–145)
TRIGL SERPL-MCNC: 410 MG/DL (ref 0–150)
VLDLC SERPL CALC-MCNC: ABNORMAL MG/DL
WBC # BLD AUTO: 4.8 K/UL (ref 4–11)

## 2025-04-17 PROCEDURE — 3075F SYST BP GE 130 - 139MM HG: CPT | Performed by: NURSE PRACTITIONER

## 2025-04-17 PROCEDURE — 99214 OFFICE O/P EST MOD 30 MIN: CPT | Performed by: NURSE PRACTITIONER

## 2025-04-17 PROCEDURE — 3078F DIAST BP <80 MM HG: CPT | Performed by: NURSE PRACTITIONER

## 2025-04-17 PROCEDURE — 3044F HG A1C LEVEL LT 7.0%: CPT | Performed by: NURSE PRACTITIONER

## 2025-04-17 RX ORDER — METHOCARBAMOL 750 MG/1
750 TABLET, FILM COATED ORAL 2 TIMES DAILY
Qty: 180 TABLET | Refills: 1 | Status: SHIPPED | OUTPATIENT
Start: 2025-04-17

## 2025-04-17 ASSESSMENT — ENCOUNTER SYMPTOMS
WHEEZING: 0
VOMITING: 0
RHINORRHEA: 0
COUGH: 0
BACK PAIN: 0
SHORTNESS OF BREATH: 0
ABDOMINAL PAIN: 0
NAUSEA: 0

## 2025-04-17 NOTE — PROGRESS NOTES
Subjective:      Patient ID: Pretty Covington is a 65 y.o. female.    HPI    Patient is here for follow up of diabetes, HTN, depression, pulmonary embolism and hyperlipidemia.  Does not check BS.   Patient does not check B/P at home. Denies chest pain, shortness of breath, and dizziness.   No feelings depression/anxiety  Back is doing okay. Has appointment in June    Diabetes Mellitus Type 2: Current symptoms/problems include none.    Medication compliance:  compliant most of the time  Diabetic diet compliance:  compliant most of the time,  Weight trend: decreasing  Current exercise: no regular exercise  Cannot tolerate higher than 7.5 mg Mounjaro.     Home blood sugar records: fasting range: low 150 mg/dl  Any episodes of hypoglycemia? no  Eye exam current (within one year): yes   reports that she has never smoked. She has never used smokeless tobacco.   Daily Aspirin? No: n/a  Known diabetic complications: none    Hypertension:  Blood pressure typically runs normal outside of the office.   She is adherent to a low sodium diet. Patient denies headache, chest pain, dry cough.  Antihypertensive medication side effects: no medication side effects noted.  Use of agents associated with hypertension: none.     Hyperlipidemia:  No new myalgias or GI upset on atorvastatin (Lipitor).       Lab Results   Component Value Date    LABA1C 6.7 01/15/2025    LABA1C 6.4 10/30/2024    LABA1C 5.1 07/17/2024     Lab Results   Component Value Date    CREATININE 0.7 01/15/2025     Lab Results   Component Value Date    ALT 11 01/15/2025    AST 24 01/15/2025     No components found for: \"CHLPL\"  Lab Results   Component Value Date    TRIG 283 (H) 07/17/2024     Lab Results   Component Value Date    HDL 31 (L) 07/17/2024     Lab Results   Component Value Date/Time    LDLDIRECT 36 10/25/2023 08:35 AM      She is compliant with Eliquis.  She has history of recurrent PE.    She has ELEAZAR. She is on CPAP. She is compliant.    She has benign head

## 2025-04-18 ENCOUNTER — RESULTS FOLLOW-UP (OUTPATIENT)
Dept: INTERNAL MEDICINE CLINIC | Age: 65
End: 2025-04-18

## 2025-04-26 ENCOUNTER — APPOINTMENT (OUTPATIENT)
Dept: GENERAL RADIOLOGY | Age: 65
End: 2025-04-26
Payer: COMMERCIAL

## 2025-04-26 ENCOUNTER — APPOINTMENT (OUTPATIENT)
Dept: CT IMAGING | Age: 65
End: 2025-04-26
Payer: COMMERCIAL

## 2025-04-26 ENCOUNTER — HOSPITAL ENCOUNTER (EMERGENCY)
Age: 65
Discharge: HOME | End: 2025-04-26
Attending: EMERGENCY MEDICINE
Payer: COMMERCIAL

## 2025-04-26 VITALS
HEART RATE: 83 BPM | DIASTOLIC BLOOD PRESSURE: 122 MMHG | RESPIRATION RATE: 18 BRPM | HEIGHT: 65 IN | TEMPERATURE: 98 F | BODY MASS INDEX: 44.42 KG/M2 | WEIGHT: 266.6 LBS | OXYGEN SATURATION: 99 % | SYSTOLIC BLOOD PRESSURE: 154 MMHG

## 2025-04-26 DIAGNOSIS — S52.124A CLOSED NONDISPLACED FRACTURE OF HEAD OF RIGHT RADIUS, INITIAL ENCOUNTER: Primary | ICD-10-CM

## 2025-04-26 DIAGNOSIS — S00.83XA CONTUSION OF FACE, INITIAL ENCOUNTER: ICD-10-CM

## 2025-04-26 DIAGNOSIS — S00.11XA: ICD-10-CM

## 2025-04-26 DIAGNOSIS — S01.512A LACERATION OF ORAL CAVITY, INITIAL ENCOUNTER: ICD-10-CM
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
## 2025-04-26 DIAGNOSIS — S09.90XA CLOSED HEAD INJURY, INITIAL ENCOUNTER: ICD-10-CM

## 2025-04-26 PROCEDURE — 99284 EMERGENCY DEPT VISIT MOD MDM: CPT

## 2025-04-26 PROCEDURE — 70450 CT HEAD/BRAIN W/O DYE: CPT

## 2025-04-26 PROCEDURE — 29105 APPLICATION LONG ARM SPLINT: CPT

## 2025-04-26 PROCEDURE — 70486 CT MAXILLOFACIAL W/O DYE: CPT

## 2025-04-26 PROCEDURE — 73080 X-RAY EXAM OF ELBOW: CPT

## 2025-04-26 PROCEDURE — 6370000000 HC RX 637 (ALT 250 FOR IP): Performed by: EMERGENCY MEDICINE

## 2025-04-26 RX ORDER — OXYCODONE AND ACETAMINOPHEN 5; 325 MG/1; MG/1
1 TABLET ORAL ONCE
Refills: 0 | Status: COMPLETED | OUTPATIENT
Start: 2025-04-26 | End: 2025-04-26

## 2025-04-26 RX ORDER — OXYCODONE AND ACETAMINOPHEN 5; 325 MG/1; MG/1
1 TABLET ORAL EVERY 6 HOURS PRN
Qty: 12 TABLET | Refills: 0 | Status: SHIPPED | OUTPATIENT
Start: 2025-04-26 | End: 2025-04-29

## 2025-04-26 RX ADMIN — OXYCODONE HYDROCHLORIDE AND ACETAMINOPHEN 1 TABLET: 5; 325 TABLET ORAL at 10:39

## 2025-04-26 ASSESSMENT — PAIN DESCRIPTION - ONSET
ONSET: ON-GOING
ONSET: SUDDEN
ONSET: ON-GOING

## 2025-04-26 ASSESSMENT — PAIN DESCRIPTION - DESCRIPTORS
DESCRIPTORS: ACHING
DESCRIPTORS: PATIENT UNABLE TO DESCRIBE
DESCRIPTORS: ACHING
DESCRIPTORS_2: ACHING
DESCRIPTORS_2: ACHING
DESCRIPTORS: ACHING
DESCRIPTORS_2: ACHING

## 2025-04-26 ASSESSMENT — PAIN - FUNCTIONAL ASSESSMENT
PAIN_FUNCTIONAL_ASSESSMENT: PREVENTS OR INTERFERES SOME ACTIVE ACTIVITIES AND ADLS
PAIN_FUNCTIONAL_ASSESSMENT_SITE2: ACTIVITIES ARE NOT PREVENTED
PAIN_FUNCTIONAL_ASSESSMENT_SITE2: PREVENTS OR INTERFERES SOME ACTIVE ACTIVITIES AND ADLS
PAIN_FUNCTIONAL_ASSESSMENT: 0-10
PAIN_FUNCTIONAL_ASSESSMENT: 0-10
PAIN_FUNCTIONAL_ASSESSMENT: ACTIVITIES ARE NOT PREVENTED
PAIN_FUNCTIONAL_ASSESSMENT_SITE2: PREVENTS OR INTERFERES SOME ACTIVE ACTIVITIES AND ADLS
PAIN_FUNCTIONAL_ASSESSMENT: ACTIVITIES ARE NOT PREVENTED

## 2025-04-26 ASSESSMENT — ENCOUNTER SYMPTOMS
CHEST TIGHTNESS: 0
BACK PAIN: 0
SHORTNESS OF BREATH: 0
VOMITING: 0
ABDOMINAL PAIN: 0
COUGH: 0
RHINORRHEA: 0
DIARRHEA: 0

## 2025-04-26 ASSESSMENT — PAIN DESCRIPTION - LOCATION
LOCATION: FACE
LOCATION_2: ELBOW
LOCATION_2: ELBOW
LOCATION: FACE
LOCATION: FACE
LOCATION: FACE;ELBOW
LOCATION_2: ELBOW

## 2025-04-26 ASSESSMENT — PAIN DESCRIPTION - ORIENTATION
ORIENTATION_2: RIGHT
ORIENTATION_2: RIGHT
ORIENTATION: RIGHT
ORIENTATION: RIGHT
ORIENTATION_2: RIGHT
ORIENTATION: RIGHT

## 2025-04-26 ASSESSMENT — PAIN DESCRIPTION - PAIN TYPE
TYPE: ACUTE PAIN

## 2025-04-26 ASSESSMENT — PAIN SCALES - GENERAL
PAINLEVEL_OUTOF10: 3
PAINLEVEL_OUTOF10: 4
PAINLEVEL_OUTOF10: 2
PAINLEVEL_OUTOF10: 5

## 2025-04-26 ASSESSMENT — PAIN DESCRIPTION - FREQUENCY
FREQUENCY: CONTINUOUS

## 2025-04-26 ASSESSMENT — PAIN DESCRIPTION - INTENSITY
RATING_2: 4
RATING_2: 4
RATING_2: 3

## 2025-04-26 NOTE — ED PROVIDER NOTES
Emergency Department Attending Physician Note  Location: [facility] EMERGENCY DEPARTMENT  4/26/2025       Pt Name: Pretty Covington  MRN: [id number]  Birthdate 1960    Date of evaluation: 4/26/2025  Provider: NAS GIBSON DO  PCP: Marialuisa Andujar MD    Note Started: 9:47 AM EDT 4/26/25    CHIEF COMPLAINT  Chief Complaint   Patient presents with    Fall     Pt tripped and fell hitting face and right elbow on concrete. Denies LOC.         ROOM: 7    HISTORY OF PRESENT ILLNESS:  History obtained by patient. Limitations to history : None.     Pretty Covington is a 65 y.o. female with a significant PMHx of asthma, hyperlipidemia, hypertension, previous PE, currently on Eliquis, history of back surgery within the last year, and multiple comorbidities listed below, here in the emergency department today with concerns of right elbow pain, and facial pain after a fall.  Patient tripped and fell, landing on her right elbow and on her face.  She has seen on her right upper eyelid, and pain on the superior orbital rim of the right eye, and pain in her mouth and pointing to the frontal maxilla, below her nose.  She said the bleeding from her mouth is stopped.  Denies any dental pain.  No jaw pain.  Additionally, says it hurts in her right elbow, and it hurts to move it.  No syncope.  No headache.  No neck pain.  Was having a complete and she just tripped and fell.  With her history of back surgery, she has no low back pain, numbness, weakness, tingling in her legs, she says she walks without difficulty at her baseline.  No recent illnesses.  No chest pain or shortness of breath.      Nursing Notes were all reviewed and agreed with or any disagreements were addressed in the HPI.      MEDICAL HISTORY  Past Medical History:   Diagnosis Date    Asthma     Benign essential hypertension 11/14/2017    Benign head tremor 05/14/2014    Cholelithiasis     Coronary artery disease involving native coronary artery of  initial encounter  S01.512A       4. Traumatic ecchymosis of right eyebrow, initial encounter  S00.11XA       5. Contusion of face, initial encounter  S00.83XA       6. Closed head injury, initial encounter  S09.90XA             DISPOSITION:  I discussed the results, including any incidental findings, with patient and through shared decision making;   Disposition today from the ED will be: Discharge to home in good condition.   Questions answered. They are agreeable to plan and express understanding of plan.     Social Determinants : None      CRITICAL CARE:   Total critical care time is 00 minutes, which excludes separately billable procedures and updating family. Time spent is specifically for management of the presenting complaint and symptoms initially, direct bedside care, reevaluation, review of records, and consultation.  There was a high probability of clinically significant life-threatening deterioration in the patient's condition, which required my urgent intervention.     _____________________________________    DISCHARGE MEDICATIONS (if applicable):  Discharge Medication List as of 4/26/2025 12:19 PM          DISCONTINUED MEDICATIONS:  Discharge Medication List as of 4/26/2025 12:19 PM               DISPOSITION REFERRAL (if applicable):  [facility] Emergency Department  [address] [place] [address]  [phone]    If symptoms worsen, As needed    Marialuisa Andujar MD  [address], [address]  [place] [id number]  [phone]    Schedule an appointment as soon as possible for a visit       Amor Ramires MD  [address] [facility] [place] [address] [phone]    Schedule an appointment as soon as possible for a visit         _____________________________________      Susana BROTHERS DO, ([facility]) am the primary attending of record and contributed the majority of evaluation and treatment of emergent care for this encounter.     This chart was generated in

## 2025-04-26 NOTE — ED NOTES
1222  A long arm splint was applied to the patient's right arm.  The splinting process was explained to the patient, whom verbalized understanding and consent.    Measurements were taken on the patient's left arm. A stocking was applied to the right arm from wrist to just distal of the right shoulder. Padding was applied. The splint was measured, moistened, and applied. The splint was secured with two Ace wraps. Patient has good capillary refill, movement, and sensation in the fingertips of the right hand, before and after splint application.    Care of the splint, and the need for follow up with an orthopedic specialist, was explained to the patient, whom verbalized understanding.    Splint was checked by Dr. Alvarado.

## 2025-04-30 ENCOUNTER — OFFICE VISIT (OUTPATIENT)
Dept: ORTHOPEDIC SURGERY | Age: 65
End: 2025-04-30

## 2025-04-30 VITALS — WEIGHT: 266.54 LBS | HEIGHT: 65 IN | BODY MASS INDEX: 44.41 KG/M2

## 2025-04-30 DIAGNOSIS — S52.121A CLOSED DISPLACED FRACTURE OF HEAD OF RIGHT RADIUS, INITIAL ENCOUNTER: Primary | ICD-10-CM

## 2025-04-30 RX ORDER — HYDROCODONE BITARTRATE AND ACETAMINOPHEN 5; 325 MG/1; MG/1
1 TABLET ORAL EVERY 6 HOURS PRN
Qty: 20 TABLET | Refills: 0 | Status: SHIPPED | OUTPATIENT
Start: 2025-04-30 | End: 2025-05-05

## 2025-04-30 NOTE — PROGRESS NOTES
HERNIA REPAIR  10/11/2013    lap umbilical    HYSTEROSCOPY  03/13/2018    and D&C    LAMINECTOMY N/A 6/14/2024    Thoracic 11-Lumbar 3 fixation and fusion performed by Ebenezer Campoverde MD at [facility] OR    UPPER GASTROINTESTINAL ENDOSCOPY N/A 12/11/2024    ESOPHAGOGASTRODUODENOSCOPY POLYP SNARE performed by Ken Salazar MD at [facility] ENDOSCOPY       Social History     Socioeconomic History    Marital status:      Spouse name: Not on file    Number of children: Not on file    Years of education: Not on file    Highest education level: Not on file   Occupational History    Not on file   Tobacco Use    Smoking status: Never    Smokeless tobacco: Never   Vaping Use    Vaping status: Never Used   Substance and Sexual Activity    Alcohol use: No     Alcohol/week: 0.0 standard drinks of alcohol    Drug use: No    Sexual activity: Not Currently     Partners: Male   Other Topics Concern    Not on file   Social History Narrative    Not on file     Social Drivers of Health     Financial Resource Strain: Not on file   Food Insecurity: Not on file (1/13/2025)   Transportation Needs: No Transportation Needs (6/20/2024)    PRAPARE - Transportation     Lack of Transportation (Medical): No     Lack of Transportation (Non-Medical): No   Physical Activity: Not on file   Stress: Not on file   Social Connections: Not on file   Intimate Partner Violence: Not on file   Housing Stability: Low Risk  (1/13/2025)    Housing Stability Vital Sign     Unable to Pay for Housing in the Last Year: No     Number of Times Moved in the Last Year: 0     Homeless in the Last Year: No       Family History   Problem Relation Age of Onset    Hypertension Mother     Diabetes Mother     Heart Failure Father     Other Father         AAA    High Blood Pressure Sister     Diabetes Sister     High Blood Pressure Brother     Heart Attack Brother     Diabetes Brother     Kidney Cancer Brother        Current Outpatient Medications on File Prior

## 2025-05-05 RX ORDER — METFORMIN HYDROCHLORIDE 500 MG/1
1000 TABLET, EXTENDED RELEASE ORAL 2 TIMES DAILY
Qty: 360 TABLET | Refills: 3 | Status: SHIPPED | OUTPATIENT
Start: 2025-05-05

## 2025-05-28 RX ORDER — EMPAGLIFLOZIN 25 MG/1
25 TABLET, FILM COATED ORAL DAILY
Qty: 90 TABLET | Refills: 3 | Status: SHIPPED | OUTPATIENT
Start: 2025-05-28

## 2025-06-09 RX ORDER — TIRZEPATIDE 7.5 MG/.5ML
INJECTION, SOLUTION SUBCUTANEOUS
Qty: 6 ML | Refills: 3 | Status: SHIPPED | OUTPATIENT
Start: 2025-06-09

## 2025-06-17 ENCOUNTER — OFFICE VISIT (OUTPATIENT)
Dept: ORTHOPEDIC SURGERY | Age: 65
End: 2025-06-17

## 2025-06-17 DIAGNOSIS — S52.121A CLOSED DISPLACED FRACTURE OF HEAD OF RIGHT RADIUS, INITIAL ENCOUNTER: Primary | ICD-10-CM

## 2025-06-17 PROCEDURE — 99024 POSTOP FOLLOW-UP VISIT: CPT | Performed by: ORTHOPAEDIC SURGERY

## 2025-06-17 NOTE — PROGRESS NOTES
CHIEF COMPLAINT: Right elbow pain/ minimally displaced radial head fracture.    DATE OF INJURY: 4/26/2025, DOT: 4/30/2025    HISTORY:  Ms. Covington is a 65 y.o.  female right handed with multiple comorbidities who presents today for f/u evaluation of a right elbow injury. The patient reports that this injury occurred when she fell.  She was first seen and evaluated in [facility] ER, when she was x-rayed and splinted, and asked to f/u with Orthopedics. Pain lateral right elbow is achy, rated 2/10 and radiate to forearm. Movement makes the pain worse, and resting makes the pain better. No numbness or tingling sensation.  The patient denies any other injuries. She works as an . Denies smoking.     Past Medical History:   Diagnosis Date    Asthma     Benign essential hypertension 11/14/2017    Benign head tremor 05/14/2014    Cholelithiasis     Coronary artery disease involving native coronary artery of native heart 08/31/2022    Disease of nasal cavity and sinuses     Fatty infiltration of liver     Hot flash, menopausal 07/22/2015    Hx of blood clots     lungs bilateral x 2    Hyperlipidemia     Hyperlipidemia associated with type 2 diabetes mellitus (HCC) 10/23/2015    Morbid obesity with BMI of 50.0-59.9, adult (HCC) 10/23/2015    No history of procedure 11/09/2015    no past colonoscopy    ELEAZRA on CPAP     Pulmonary embolism (HCC)     both lungs x 2    Type II or unspecified type diabetes mellitus without mention of complication, not stated as uncontrolled     Umbilical hernia 10/02/2013    Unspecified sleep apnea     Wears glasses        Past Surgical History:   Procedure Laterality Date    BREAST SURGERY Right 2015    benign tumor    CHOLECYSTECTOMY, LAPAROSCOPIC  08/02/2012    COLONOSCOPY  11/09/2015    cecal polyp    DILATION AND CURETTAGE OF UTERUS  03/13/2018    FOOT SURGERY Right 3/15/2023    OPEN REDUCTION INTERNAL FIXATION RIGHT 5TH METATARSAL performed by Arsh Siegel DPM at [facility] OR

## 2025-06-19 RX ORDER — ICOSAPENT ETHYL 1 G/1
2 CAPSULE ORAL 2 TIMES DAILY
Qty: 360 CAPSULE | Refills: 3 | Status: SHIPPED | OUTPATIENT
Start: 2025-06-19

## 2025-06-19 RX ORDER — LOSARTAN POTASSIUM 50 MG/1
50 TABLET ORAL DAILY
Qty: 90 TABLET | Refills: 3 | Status: SHIPPED | OUTPATIENT
Start: 2025-06-19

## 2025-06-19 RX ORDER — PANTOPRAZOLE SODIUM 20 MG/1
20 TABLET, DELAYED RELEASE ORAL DAILY
Qty: 90 TABLET | Refills: 3 | Status: SHIPPED | OUTPATIENT
Start: 2025-06-19

## 2025-07-07 RX ORDER — TOPIRAMATE 100 MG/1
100 TABLET, FILM COATED ORAL NIGHTLY
Qty: 90 TABLET | Refills: 3 | Status: SHIPPED | OUTPATIENT
Start: 2025-07-07

## 2025-07-29 ENCOUNTER — OFFICE VISIT (OUTPATIENT)
Dept: INTERNAL MEDICINE CLINIC | Age: 65
End: 2025-07-29

## 2025-07-29 VITALS
HEART RATE: 70 BPM | HEIGHT: 65 IN | RESPIRATION RATE: 12 BRPM | DIASTOLIC BLOOD PRESSURE: 80 MMHG | SYSTOLIC BLOOD PRESSURE: 130 MMHG | WEIGHT: 256 LBS | BODY MASS INDEX: 42.65 KG/M2

## 2025-07-29 DIAGNOSIS — F32.1 CURRENT MODERATE EPISODE OF MAJOR DEPRESSIVE DISORDER WITHOUT PRIOR EPISODE (HCC): Primary | ICD-10-CM

## 2025-07-29 PROCEDURE — 3075F SYST BP GE 130 - 139MM HG: CPT | Performed by: INTERNAL MEDICINE

## 2025-07-29 PROCEDURE — 3079F DIAST BP 80-89 MM HG: CPT | Performed by: INTERNAL MEDICINE

## 2025-07-29 PROCEDURE — 99213 OFFICE O/P EST LOW 20 MIN: CPT | Performed by: INTERNAL MEDICINE

## 2025-07-29 RX ORDER — BUPROPION HYDROCHLORIDE 150 MG/1
150 TABLET ORAL EVERY MORNING
Qty: 30 TABLET | Refills: 1 | Status: SHIPPED | OUTPATIENT
Start: 2025-07-29

## 2025-07-29 NOTE — PROGRESS NOTES
Subjective   Patient ID: Pretty Covington is a 65 y.o. female.    HPI    Patient is here for follow up     She has been feeling depressed. Her mind feels foggy. Everything irritates her.   No chest pain. Is working full time. Sometimes has issues with her boss.  Her great nephew whom she helped raise went into the army. That has also depressed.    Review of Systems   See HPI    There are no changes to past medical history, family history, social history or review of systems(except as noted in the history section) since prior note (all reviewed with patient).    Current Outpatient Medications   Medication Instructions    acetaminophen (TYLENOL) 500 mg, EVERY 6 HOURS PRN    albuterol sulfate HFA (PROVENTIL;VENTOLIN;PROAIR) 108 (90 Base) MCG/ACT inhaler USE 2 INHALATIONS BY MOUTH 4  TIMES DAILY AS NEEDED FOR  WHEEZING    ascorbic acid (VITAMIN C) 500 mg, DAILY    aspirin (ASPIRIN LOW DOSE) 81 mg, Oral, DAILY    atorvastatin (LIPITOR) 40 mg, Oral, DAILY    buPROPion (WELLBUTRIN XL) 150 mg, Oral, EVERY MORNING    cetirizine (ZYRTEC) 5 mg, DAILY    DULoxetine (CYMBALTA) 60 mg, Oral, DAILY    Eliquis 5 mg, Oral, 2 TIMES DAILY    fenofibric acid (TRILIPIX) 135 mg, Oral, DAILY    ferrous sulfate (FEROSUL) 325 (65 Fe) MG tablet TAKE 1 TABLET BY MOUTH EVERY MORNING    Icosapent Ethyl (VASCEPA) 1 g CAPS capsule 2 capsules, Oral, 2 TIMES DAILY    Jardiance 25 mg, Oral, DAILY    losartan (COZAAR) 50 mg, Oral, DAILY    magnesium oxide (MAG-OX) 400 mg, Oral, 2 TIMES DAILY    metFORMIN (GLUCOPHAGE-XR) 1,000 mg, Oral, 2 TIMES DAILY    methocarbamol (ROBAXIN-750) 750 mg, Oral, 2 times daily    MOUNJARO 7.5 MG/0.5ML SOAJ pen INJECT THE CONTENTS OF ONE PEN  SUBCUTANEOUSLY WEEKLY AS  DIRECTED    Multiple Vitamins-Minerals (THERAPEUTIC MULTIVITAMIN-MINERALS) tablet 1 tablet, DAILY    pantoprazole (PROTONIX) 20 mg, Oral, DAILY    topiramate (TOPAMAX) 100 mg, Oral, NIGHTLY         /80 (BP Site: Left Upper Arm, Patient Position:

## 2025-08-18 ENCOUNTER — TELEPHONE (OUTPATIENT)
Dept: INTERNAL MEDICINE CLINIC | Age: 65
End: 2025-08-18

## 2025-08-18 ENCOUNTER — OFFICE VISIT (OUTPATIENT)
Dept: INTERNAL MEDICINE CLINIC | Age: 65
End: 2025-08-18

## 2025-08-18 VITALS
HEIGHT: 65 IN | SYSTOLIC BLOOD PRESSURE: 130 MMHG | HEART RATE: 70 BPM | WEIGHT: 251 LBS | BODY MASS INDEX: 41.82 KG/M2 | RESPIRATION RATE: 12 BRPM | DIASTOLIC BLOOD PRESSURE: 80 MMHG

## 2025-08-18 DIAGNOSIS — I10 BENIGN ESSENTIAL HYPERTENSION: ICD-10-CM

## 2025-08-18 DIAGNOSIS — E11.69 TYPE 2 DIABETES MELLITUS WITH OTHER SPECIFIED COMPLICATION, WITHOUT LONG-TERM CURRENT USE OF INSULIN (HCC): ICD-10-CM

## 2025-08-18 DIAGNOSIS — E11.69 HYPERLIPIDEMIA ASSOCIATED WITH TYPE 2 DIABETES MELLITUS (HCC): Primary | ICD-10-CM

## 2025-08-18 DIAGNOSIS — E78.5 HYPERLIPIDEMIA ASSOCIATED WITH TYPE 2 DIABETES MELLITUS (HCC): ICD-10-CM

## 2025-08-18 DIAGNOSIS — G25.0 BENIGN HEAD TREMOR: ICD-10-CM

## 2025-08-18 DIAGNOSIS — E11.69 HYPERLIPIDEMIA ASSOCIATED WITH TYPE 2 DIABETES MELLITUS (HCC): ICD-10-CM

## 2025-08-18 DIAGNOSIS — E66.01 MORBID OBESITY (HCC): ICD-10-CM

## 2025-08-18 DIAGNOSIS — E11.9 TYPE 2 DIABETES MELLITUS WITHOUT COMPLICATION, WITHOUT LONG-TERM CURRENT USE OF INSULIN (HCC): Primary | ICD-10-CM

## 2025-08-18 DIAGNOSIS — K76.0 FATTY INFILTRATION OF LIVER: ICD-10-CM

## 2025-08-18 DIAGNOSIS — I25.118 CORONARY ARTERY DISEASE OF NATIVE ARTERY OF NATIVE HEART WITH STABLE ANGINA PECTORIS: ICD-10-CM

## 2025-08-18 DIAGNOSIS — E78.5 HYPERLIPIDEMIA ASSOCIATED WITH TYPE 2 DIABETES MELLITUS (HCC): Primary | ICD-10-CM

## 2025-08-18 DIAGNOSIS — E11.9 TYPE 2 DIABETES MELLITUS WITHOUT COMPLICATION, WITHOUT LONG-TERM CURRENT USE OF INSULIN (HCC): ICD-10-CM

## 2025-08-18 DIAGNOSIS — G47.33 SEVERE OBSTRUCTIVE SLEEP APNEA: ICD-10-CM

## 2025-08-18 DIAGNOSIS — M81.0 POSTMENOPAUSAL BONE LOSS: ICD-10-CM

## 2025-08-18 LAB
BILIRUBIN, POC: NORMAL
BLOOD URINE, POC: NORMAL
CLARITY, POC: NORMAL
COLOR, POC: NORMAL
CREAT UR-MCNC: 60.5 MG/DL (ref 28–259)
EST. AVERAGE GLUCOSE BLD GHB EST-MCNC: 137 MG/DL
GLUCOSE URINE, POC: NORMAL MG/DL
HBA1C MFR BLD: 6.4 %
KETONES, POC: NORMAL MG/DL
LEUKOCYTE EST, POC: NORMAL
MICROALBUMIN UR DL<=1MG/L-MCNC: <1.2 MG/DL
MICROALBUMIN/CREAT UR: NORMAL MG/G (ref 0–30)
NITRITE, POC: NORMAL
PH, POC: NORMAL
PROTEIN, POC: NORMAL MG/DL
SPECIFIC GRAVITY, POC: NORMAL
UROBILINOGEN, POC: NORMAL MG/DL

## 2025-08-18 PROCEDURE — 99214 OFFICE O/P EST MOD 30 MIN: CPT | Performed by: INTERNAL MEDICINE

## 2025-08-18 PROCEDURE — 3051F HG A1C>EQUAL 7.0%<8.0%: CPT | Performed by: INTERNAL MEDICINE

## 2025-08-18 PROCEDURE — 3075F SYST BP GE 130 - 139MM HG: CPT | Performed by: INTERNAL MEDICINE

## 2025-08-18 PROCEDURE — 3079F DIAST BP 80-89 MM HG: CPT | Performed by: INTERNAL MEDICINE

## 2025-08-18 PROCEDURE — 81002 URINALYSIS NONAUTO W/O SCOPE: CPT | Performed by: INTERNAL MEDICINE

## 2025-08-18 RX ORDER — BUPROPION HYDROCHLORIDE 150 MG/1
150 TABLET ORAL EVERY MORNING
Qty: 90 TABLET | Refills: 1 | Status: SHIPPED | OUTPATIENT
Start: 2025-08-18 | End: 2026-02-14

## 2025-08-18 ASSESSMENT — ENCOUNTER SYMPTOMS
NAUSEA: 0
ABDOMINAL PAIN: 0
WHEEZING: 0
VOMITING: 0
COUGH: 0
RHINORRHEA: 0
BACK PAIN: 1
SHORTNESS OF BREATH: 0

## 2025-08-19 ENCOUNTER — OFFICE VISIT (OUTPATIENT)
Dept: ORTHOPEDIC SURGERY | Age: 65
End: 2025-08-19
Payer: COMMERCIAL

## 2025-08-19 ENCOUNTER — HOSPITAL ENCOUNTER (OUTPATIENT)
Dept: LAB | Age: 65
Discharge: HOME | End: 2025-08-19
Payer: COMMERCIAL

## 2025-08-19 VITALS — HEIGHT: 65 IN | WEIGHT: 251 LBS | BODY MASS INDEX: 41.82 KG/M2

## 2025-08-19 DIAGNOSIS — E11.69 TYPE 2 DIABETES MELLITUS WITH OTHER SPECIFIED COMPLICATION, WITHOUT LONG-TERM CURRENT USE OF INSULIN (HCC): ICD-10-CM

## 2025-08-19 DIAGNOSIS — S52.121A CLOSED DISPLACED FRACTURE OF HEAD OF RIGHT RADIUS, INITIAL ENCOUNTER: Primary | ICD-10-CM

## 2025-08-19 DIAGNOSIS — E78.5 HYPERLIPIDEMIA ASSOCIATED WITH TYPE 2 DIABETES MELLITUS (HCC): ICD-10-CM

## 2025-08-19 DIAGNOSIS — E11.69 HYPERLIPIDEMIA ASSOCIATED WITH TYPE 2 DIABETES MELLITUS (HCC): ICD-10-CM

## 2025-08-19 LAB
ANION GAP SERPL CALCULATED.3IONS-SCNC: 14 MMOL/L (ref 3–16)
BUN SERPL-MCNC: 18 MG/DL (ref 7–20)
CALCIUM SERPL-MCNC: 9.4 MG/DL (ref 8.3–10.6)
CHLORIDE SERPL-SCNC: 108 MMOL/L (ref 99–110)
CHOLEST SERPL-MCNC: 98 MG/DL (ref 0–199)
CO2 SERPL-SCNC: 21 MMOL/L (ref 21–32)
CREAT SERPL-MCNC: <0.5 MG/DL (ref 0.6–1.2)
GFR SERPLBLD CREATININE-BSD FMLA CKD-EPI: >90 ML/MIN/{1.73_M2}
GLUCOSE SERPL-MCNC: 163 MG/DL (ref 70–99)
HDLC SERPL-MCNC: 32 MG/DL (ref 40–60)
LDLC SERPL CALC-MCNC: 18 MG/DL
POTASSIUM SERPL-SCNC: 4 MMOL/L (ref 3.5–5.1)
SODIUM SERPL-SCNC: 143 MMOL/L (ref 136–145)
TRIGL SERPL-MCNC: 242 MG/DL (ref 0–150)
VLDLC SERPL CALC-MCNC: 48 MG/DL

## 2025-08-19 PROCEDURE — 99213 OFFICE O/P EST LOW 20 MIN: CPT | Performed by: ORTHOPAEDIC SURGERY

## 2025-08-19 PROCEDURE — 36415 COLL VENOUS BLD VENIPUNCTURE: CPT

## 2025-08-19 PROCEDURE — 80048 BASIC METABOLIC PNL TOTAL CA: CPT

## 2025-08-19 PROCEDURE — 80061 LIPID PANEL: CPT

## 2025-08-22 ENCOUNTER — HOSPITAL ENCOUNTER (OUTPATIENT)
Dept: GENERAL RADIOLOGY | Age: 65
Discharge: HOME | End: 2025-08-22
Attending: INTERNAL MEDICINE
Payer: COMMERCIAL

## 2025-08-22 DIAGNOSIS — M81.0 POSTMENOPAUSAL BONE LOSS: ICD-10-CM

## 2025-08-22 PROCEDURE — 77080 DXA BONE DENSITY AXIAL: CPT

## (undated) DEVICE — BLADE SAW SM W10XL18.5MM THK4MM OSC

## (undated) DEVICE — PROBE 8225101 5PK STD PRASS FL TIP ROHS

## (undated) DEVICE — BLANKET WRM W29.9XL79.1IN UP BODY FORC AIR MISTRAL-AIR

## (undated) DEVICE — UNDERGLOVE SURG SZ 8 BLU LTX FREE SYN POLYISOPRENE POLYMER

## (undated) DEVICE — DRAPE MICSCP W54XL150IN W/ 4 BINOC GLS LENS LEICA

## (undated) DEVICE — 3M™ TEGADERM™ TRANSPARENT FILM DRESSING FRAME STYLE, 1627, 4 IN X 10 IN (10 CM X 25 CM), 20/CT 4CT/CASE: Brand: 3M™ TEGADERM™

## (undated) DEVICE — FOOT SWITCH DRAPE: Brand: UNBRANDED

## (undated) DEVICE — STANDARD HYPODERMIC NEEDLE,POLYPROPYLENE HUB: Brand: MONOJECT

## (undated) DEVICE — PAD,NON-ADHERENT,3X8,STERILE,LF,1/PK: Brand: MEDLINE

## (undated) DEVICE — SUTURE ETHLN SZ 5-0 L18IN NONABSORBABLE BLK L19MM PS-2 3/8 1666G

## (undated) DEVICE — SUTURE VICRYL + SZ 0 L18IN ABSRB UD L36MM CT-1 1/2 CIR VCP840D

## (undated) DEVICE — COVER LT HNDL CAM BLU DISP W/ SURG CTRL

## (undated) DEVICE — GLOVE SURG SZ 75 L12IN FNGR THK94MIL TRNSLUC YEL LTX

## (undated) DEVICE — ZIMMER® STERILE DISPOSABLE TOURNIQUET CUFF WITH PLC, DUAL PORT, SINGLE BLADDER, 18 IN. (46 CM)

## (undated) DEVICE — YANKAUER,BULB TIP,W/O VENT,RIGID,STERILE: Brand: MEDLINE

## (undated) DEVICE — 3M™ TEGADERM™ TRANSPARENT FILM DRESSING FRAME STYLE, 1626W, 4 IN X 4-3/4 IN (10 CM X 12 CM), 50/CT 4CT/CASE: Brand: 3M™ TEGADERM™

## (undated) DEVICE — TOOL MR8-14MH30 MR8 14CM MATCH 3MM: Brand: MIDAS REX MR8

## (undated) DEVICE — SOLUTION IV 1000ML 0.9% SOD CHL

## (undated) DEVICE — STAPLER SKIN H3.9MM WIRE DIA0.58MM CRWN 6.9MM 35 STPL ROT

## (undated) DEVICE — SUTURE MCRYL SZ 4-0 L27IN ABSRB UD L19MM PS-2 1/2 CIR PRIM Y426H

## (undated) DEVICE — GOWN,SIRUS,POLYRNF,BRTHSLV,XL,30/CS: Brand: MEDLINE

## (undated) DEVICE — SUTURE STRATAFIX SYMMETRIC PDS + SZ 1 L18IN ABSRB VLT L48MM SXPP1A400

## (undated) DEVICE — PODIATRY: Brand: MEDLINE INDUSTRIES, INC.

## (undated) DEVICE — C-ARM: Brand: UNBRANDED

## (undated) DEVICE — ELECTRODE PT RET AD L9FT HI MOIST COND ADH HYDRGEL CORDED

## (undated) DEVICE — NEPTUNE E-SEP SMOKE EVACUATION PENCIL, COATED, 70MM BLADE, PUSH BUTTON SWITCH: Brand: NEPTUNE E-SEP

## (undated) DEVICE — Device: Brand: DISPOSABLE ELECTROSURGICAL SNARE

## (undated) DEVICE — TRAP POLYP ETRAP

## (undated) DEVICE — GARMENT,MEDLINE,DVT,INT,CALF,MED, GEN2: Brand: MEDLINE

## (undated) DEVICE — CANNULA,OXY,ADULT,SUPERSOFT,W/7'TUB,SC: Brand: MEDLINE INDUSTRIES, INC.

## (undated) DEVICE — ENDOSCOPIC KIT 2 12 FT OP4 DE2 GWN SYR

## (undated) DEVICE — SUTURE VCRL SZ 3-0 L18IN ABSRB UD PS-2 L19MM 1/2 CIR J497G

## (undated) DEVICE — LAMINECTOMY: Brand: MEDLINE INDUSTRIES, INC.

## (undated) DEVICE — TOWEL,OR,DSP,ST,BLUE,DLX,8/PK,10PK/CS: Brand: MEDLINE

## (undated) DEVICE — NEURO SPONGES: Brand: DEROYAL

## (undated) DEVICE — GLOVE SURG SZ 75 L12IN FNGR THK94MIL WHT POLYMER LTX BEAD

## (undated) DEVICE — CONTAINER,SPECIMEN,PNEU TUBE,3OZ,OR STRL: Brand: MEDLINE

## (undated) DEVICE — TOWEL,STOP FLAG GOLD N-W: Brand: MEDLINE

## (undated) DEVICE — SYRINGE MED 10ML TRNSLUC BRL PLUNG BLK MRK POLYPR CTRL

## (undated) DEVICE — SUTURE MONOCRYL + SZ 4-0 L27IN ABSRB UD L19MM PS-2 3/8 CIR MCP426H

## (undated) DEVICE — AGENT HEMOSTATIC SURGIFLOW MATRIX KIT W/THROMBIN

## (undated) DEVICE — CONMED SCOPE SAVER BITE BLOCK, 20X27 MM: Brand: SCOPE SAVER

## (undated) DEVICE — ORTHO PRE OP PACK: Brand: MEDLINE INDUSTRIES, INC.

## (undated) DEVICE — KIT EVAC 0.13IN RECT TB DIA10FR 400CC PVC 3 SPR Y CONN DRN

## (undated) DEVICE — PENCIL ELECTROSURG ROCK W HOLSTER 50 BX

## (undated) DEVICE — TRAP FLUID

## (undated) DEVICE — NEEDLE HYPO 16GA L1.5IN PUR POLYPR HUB S STL REG BVL STR

## (undated) DEVICE — 6 ML SYRINGE LUER-LOCK TIP: Brand: MONOJECT

## (undated) DEVICE — CRADLE ARM INDIV PT CARE KT COMP FOR FOR RADLUC WILSON FRME

## (undated) DEVICE — COVER,TABLE,HEAVY DUTY,77"X90",STRL: Brand: MEDLINE

## (undated) DEVICE — SUTURE VCRL SZ 4-0 L27IN ABSRB UD L19MM PS-2 3/8 CIR PRIM J426H

## (undated) DEVICE — PREVENA PLUS INCISION MANAGEMENT SYSTEM: Brand: PREVENA PLUS™

## (undated) DEVICE — SUTURE VCRL SZ 5-0 L18IN ABSRB UD L16MM PC-3 3/8 CIR PRIM J844G

## (undated) DEVICE — SUTURE VICRYL + SZ 2-0 L18IN ABSRB UD CT1 L36MM 1/2 CIR VCP839D

## (undated) DEVICE — C-ARMOR C-ARM EQUIPMENT COVERS CLEAR STERILE UNIVERSAL FIT 12 PER CASE: Brand: C-ARMOR